# Patient Record
Sex: MALE | Race: WHITE | NOT HISPANIC OR LATINO | Employment: UNEMPLOYED | URBAN - METROPOLITAN AREA
[De-identification: names, ages, dates, MRNs, and addresses within clinical notes are randomized per-mention and may not be internally consistent; named-entity substitution may affect disease eponyms.]

---

## 2017-06-16 ENCOUNTER — OFFICE VISIT (OUTPATIENT)
Dept: FAMILY MEDICINE | Facility: CLINIC | Age: 42
End: 2017-06-16
Payer: MEDICAID

## 2017-06-16 ENCOUNTER — RADIANT APPOINTMENT (OUTPATIENT)
Dept: GENERAL RADIOLOGY | Facility: CLINIC | Age: 42
End: 2017-06-16
Attending: PHYSICIAN ASSISTANT
Payer: MEDICAID

## 2017-06-16 VITALS
WEIGHT: 241.4 LBS | HEIGHT: 74 IN | BODY MASS INDEX: 30.98 KG/M2 | SYSTOLIC BLOOD PRESSURE: 111 MMHG | TEMPERATURE: 97.9 F | DIASTOLIC BLOOD PRESSURE: 75 MMHG | HEART RATE: 100 BPM

## 2017-06-16 DIAGNOSIS — M54.5 ACUTE MIDLINE LOW BACK PAIN, WITH SCIATICA PRESENCE UNSPECIFIED: Primary | ICD-10-CM

## 2017-06-16 DIAGNOSIS — M54.9 BACK PAIN: ICD-10-CM

## 2017-06-16 DIAGNOSIS — F41.1 GAD (GENERALIZED ANXIETY DISORDER): ICD-10-CM

## 2017-06-16 PROCEDURE — 72100 X-RAY EXAM L-S SPINE 2/3 VWS: CPT

## 2017-06-16 PROCEDURE — 99214 OFFICE O/P EST MOD 30 MIN: CPT | Performed by: PHYSICIAN ASSISTANT

## 2017-06-16 RX ORDER — ESCITALOPRAM OXALATE 10 MG/1
10 TABLET ORAL DAILY
Qty: 30 TABLET | Refills: 0 | Status: SHIPPED | OUTPATIENT
Start: 2017-06-16 | End: 2017-11-21

## 2017-06-16 RX ORDER — KETOROLAC TROMETHAMINE 30 MG/ML
60 INJECTION, SOLUTION INTRAMUSCULAR; INTRAVENOUS ONCE
Qty: 2 ML | Refills: 0 | OUTPATIENT
Start: 2017-06-16 | End: 2017-06-16

## 2017-06-16 RX ORDER — ALPRAZOLAM 0.25 MG
TABLET ORAL
Qty: 5 TABLET | Refills: 0 | Status: SHIPPED | OUTPATIENT
Start: 2017-06-16 | End: 2017-11-27

## 2017-06-16 RX ORDER — KETOROLAC TROMETHAMINE 10 MG/1
10 TABLET, FILM COATED ORAL EVERY 6 HOURS PRN
Qty: 20 TABLET | Refills: 0 | Status: SHIPPED | OUTPATIENT
Start: 2017-06-16 | End: 2017-11-27

## 2017-06-16 RX ORDER — CYCLOBENZAPRINE HCL 10 MG
5-10 TABLET ORAL 3 TIMES DAILY PRN
Qty: 30 TABLET | Refills: 0 | Status: SHIPPED | OUTPATIENT
Start: 2017-06-16 | End: 2018-12-14

## 2017-06-16 ASSESSMENT — ENCOUNTER SYMPTOMS
MYALGIAS: 0
EYE DISCHARGE: 0
DIARRHEA: 0
EYE REDNESS: 0
SORE THROAT: 0
SHORTNESS OF BREATH: 0
CHILLS: 0
HEADACHES: 0
WHEEZING: 0
BLURRED VISION: 0
NAUSEA: 0
PALPITATIONS: 0
ABDOMINAL PAIN: 0
VOMITING: 0
FEVER: 0
COUGH: 0

## 2017-06-16 ASSESSMENT — ANXIETY QUESTIONNAIRES
GAD7 TOTAL SCORE: 10
6. BECOMING EASILY ANNOYED OR IRRITABLE: SEVERAL DAYS
3. WORRYING TOO MUCH ABOUT DIFFERENT THINGS: SEVERAL DAYS
2. NOT BEING ABLE TO STOP OR CONTROL WORRYING: MORE THAN HALF THE DAYS
7. FEELING AFRAID AS IF SOMETHING AWFUL MIGHT HAPPEN: MORE THAN HALF THE DAYS
1. FEELING NERVOUS, ANXIOUS, OR ON EDGE: MORE THAN HALF THE DAYS
5. BEING SO RESTLESS THAT IT IS HARD TO SIT STILL: SEVERAL DAYS

## 2017-06-16 ASSESSMENT — PATIENT HEALTH QUESTIONNAIRE - PHQ9: 5. POOR APPETITE OR OVEREATING: SEVERAL DAYS

## 2017-06-16 NOTE — MR AVS SNAPSHOT
After Visit Summary   6/16/2017    Perry Preciado Jr.    MRN: [id number]           Patient Information     Date Of Birth          1975        Visit Information        Provider Department      6/16/2017 1:00 PM Ro Estrada PA-C [facility]        Today's Diagnoses     Acute midline low back pain, with sciatica presence unspecified    -  1    KANE (generalized anxiety disorder)          Care Instructions    1. Back pain- No acute findings on x-ray. Will treat with ketorolac every 6 hours as needed x 4 days and cylclobenzaprine every 8-12 hrs as needed. Can also try alternating heat/ice in 20 minute intervals, avoid aggravating factors, but encouraged gentle ROM and stretching.  Would recommend physical therapy or follow up with primary care provider if symptoms worsen or do not improve.    2. Anxiety and feeling of panic attacks. Will restart lexapro 10mg #30 with no refills once daily. Discussed how this medication works and potential for increased anxiety in the next few weeks. Also prescribed alprazolam 0.25mg #5 with no refills to use for severe anxiety or panic attack. This medication is not intended for long term treatment of anxiety. Discussed importance of good sleep, exercise for stress relief, and the need to decrease caffeine consumption. Follow up with primary care provider in 1-2 weeks or sooner if needed.             Follow-ups after your visit        Additional Services     MENTAL HEALTH REFERRAL       Your provider has referred you to: [facility]: [facility] - Counseling (Individual/Couples/Family) - [facility] [phone]   [email or web address]/   *Patient will be contacted by [place]'s scheduling partner, [facility]), to schedule an appointment.  Patients may also call [facility] to schedule.      All scheduling is subject to the client's specific insurance plan &  "benefits, provider/location availability, and provider clinical specialities.  Please arrive 15 minutes early for your first appointment and bring your completed paperwork.    Please be aware that coverage of these services is subject to the terms and limitations of your health insurance plan.  Call member services at your health plan with any benefit or coverage questions.            PHYSICAL THERAPY REFERRAL       *This therapy referral will be filtered to a centralized scheduling office at [facility] and the patient will receive a call to schedule an appointment at a [facility] location most convenient for them. *     [facility] provides Physical Therapy evaluation and treatment and many specialty services across the [facility] system.  If requesting a specialty program, please choose from the list below.    If you have not heard from the scheduling office within 2 business days, please call [phone] for all locations, with the exception of [place], please call [phone].  Treatment: Evaluation & Treatment  Special Instructions/Modalities: none  Special Programs: None    Please be aware that coverage of these services is subject to the terms and limitations of your health insurance plan.  Call member services at your health plan with any benefit or coverage questions.      **Note to Provider:  If you are referring outside of [facility] for the therapy appointment, please list the name of the location in the \"special instructions\" above, print the referral and give to the patient to schedule the appointment.                  Follow-up notes from your care team     Return in about 1 week (around 6/23/2017).      Who to contact     If you have questions or need follow up information about today's clinic visit or your schedule please contact [facility] directly at [phone].  Normal or non-critical lab and imaging results will be communicated to you by " "MyChart, letter or phone within 4 business days after the clinic has received the results. If you do not hear from us within 7 days, please contact the clinic through [employer] or phone. If you have a critical or abnormal lab result, we will notify you by phone as soon as possible.  Submit refill requests through [employer] or call your pharmacy and they will forward the refill request to us. Please allow 3 business days for your refill to be completed.          Additional Information About Your Visit        [employer] Information     [employer] lets you send messages to your doctor, view your test results, renew your prescriptions, schedule appointments and more. To sign up, go to [email or web address] . Click on \"Log in\" on the left side of the screen, which will take you to the Welcome page. Then click on \"Sign up Now\" on the right side of the page.     You will be asked to enter the access code listed below, as well as some personal information. Please follow the directions to create your username and password.     Your access code is: [email or web address]  Expires: 9/15/2017  9:22 AM     Your access code will  in 90 days. If you need help or a new code, please call your [place] clinic or [phone].        Care EveryWhere ID     This is your Care EveryWhere ID. This could be used by other organizations to access your [place] medical records  [id number]        Your Vitals Were     Pulse Temperature Height BMI (Body Mass Index)          100 97.9  F (36.6  C) (Tympanic) 6' 2\" (1.88 m) 30.99 kg/m2         Blood Pressure from Last 3 Encounters:   17 111/75   16 129/90   16 130/86    Weight from Last 3 Encounters:   17 241 lb 6.4 oz (109.5 kg)   16 256 lb (116.1 kg)   16 258 lb (117 kg)              We Performed the Following     MENTAL HEALTH REFERRAL     PHYSICAL THERAPY REFERRAL          Today's Medication Changes          These changes are accurate as of: 17 11:59 PM.  If you " have any questions, ask your nurse or doctor.               Start taking these medicines.        Dose/Directions    ALPRAZolam 0.25 MG tablet   Commonly known as:  XANAX   Used for:  KANE (generalized anxiety disorder)   Started by:  Ro Estrada PA-C        Take 1 tablet by mouth once daily as needed for severe anxiety   Quantity:  5 tablet   Refills:  0       cyclobenzaprine 10 MG tablet   Commonly known as:  FLEXERIL   Used for:  Acute midline low back pain, with sciatica presence unspecified   Started by:  Ro Estrada PA-C        Dose:  5-10 mg   Take 0.5-1 tablets (5-10 mg) by mouth 3 times daily as needed for muscle spasms   Quantity:  30 tablet   Refills:  0       escitalopram 10 MG tablet   Commonly known as:  LEXAPRO   Used for:  KANE (generalized anxiety disorder)   Started by:  Ro Estrada PA-C        Dose:  10 mg   Take 1 tablet (10 mg) by mouth daily   Quantity:  30 tablet   Refills:  0       * ketorolac 60 MG/2ML Soln injection   Commonly known as:  TORADOL   Used for:  Acute midline low back pain, with sciatica presence unspecified   Started by:  Ro Estrada PA-C        Dose:  60 mg   Inject 2 mLs (60 mg) into the muscle once for 1 dose   Quantity:  2 mL   Refills:  0       * ketorolac 10 MG tablet   Commonly known as:  TORADOL   Used for:  Acute midline low back pain, with sciatica presence unspecified   Started by:  Ro Estrada PA-C        Dose:  10 mg   Take 1 tablet (10 mg) by mouth every 6 hours as needed for moderate pain   Quantity:  20 tablet   Refills:  0       * Notice:  This list has 2 medication(s) that are the same as other medications prescribed for you. Read the directions carefully, and ask your doctor or other care provider to review them with you.         Where to get your medicines      These medications were sent to [facility] [place] [id number] [place] [address]     Phone:  [phone]      cyclobenzaprine 10 MG tablet    escitalopram 10 MG tablet    ketorolac 10 MG tablet         Some of these will need a paper prescription and others can be bought over the counter.  Ask your nurse if you have questions.     Bring a paper prescription for each of these medications     ALPRAZolam 0.25 MG tablet       You don't need a prescription for these medications     ketorolac 60 MG/2ML Soln injection                Primary Care Provider Office Phone # Fax #    Wilfrid Turner [phone]       [place] [id number] [facility] [address]        Thank you!     Thank you for choosing [facility]  for your care. Our goal is always to provide you with excellent care. Hearing back from our patients is one way we can continue to improve our services. Please take a few minutes to complete the written survey that you may receive in the mail after your visit with us. Thank you!             Your Updated Medication List - Protect others around you: Learn how to safely use, store and throw away your medicines at www.disposemymeds.org.          This list is accurate as of: 6/16/17 11:59 PM.  Always use your most recent med list.                   Brand Name Dispense Instructions for use    ALEVE PO      None Entered       ALPRAZolam 0.25 MG tablet    XANAX    5 tablet    Take 1 tablet by mouth once daily as needed for severe anxiety       cyclobenzaprine 10 MG tablet    FLEXERIL    30 tablet    Take 0.5-1 tablets (5-10 mg) by mouth 3 times daily as needed for muscle spasms       escitalopram 10 MG tablet    LEXAPRO    30 tablet    Take 1 tablet (10 mg) by mouth daily       ibuprofen 600 MG tablet    ADVIL/MOTRIN    30 tablet    Take 1 tablet (600 mg) by mouth every 8 hours as needed for moderate pain       * ketorolac 60 MG/2ML Soln injection    TORADOL    2 mL    Inject 2 mLs (60 mg) into the muscle once for 1 dose       * ketorolac 10 MG tablet    TORADOL    20 tablet    Take 1  tablet (10 mg) by mouth every 6 hours as needed for moderate pain       * Notice:  This list has 2 medication(s) that are the same as other medications prescribed for you. Read the directions carefully, and ask your doctor or other care provider to review them with you.

## 2017-06-16 NOTE — PROGRESS NOTES
SUBJECTIVE:                                                    Perry Preciado Jr. is a 42 year old male who presents to clinic today for the following health issues:    Back Pain      Duration: last night         Specific cause: fall off roof yesterday, did not have immediate pain    Description:   Location of pain: low back right,   Character of pain: gnawing and constant  Pain radiation:radiates into the right leg  New numbness or weakness in legs, not attributed to pain:  YES    Intensity: Currently 6/10    History:   Pain interferes with job: YES, unable to go in   History of back problems: recurrent self limited episodes of low back pain in the past  Any previous MRI or X-rays: Yes--at [facility] .  Date 2005 Back surgery had discectomy  Sees a specialist for back pain:  No.  Did injections, though has been 2 years since last injection  Therapies tried without relief: none    Alleviating factors:   Improved by: aleve      Precipitating factors:  Worsened by: Sitting    Functional and Psychosocial Screen (Val STarT Back):      Not performed today       Accompanying Signs & Symptoms:  Risk of Fracture:  yes  Risk of Cauda Equina:  None  Risk of Infection:  None  Risk of Cancer:  None  Risk of Ankylosing Spondylitis:  Onset at age <35, male, AND morning back stiffness. no                    Depression Followup    Status since last visit: Worsened, sometimes get panicky, or anxious around people.  Lexapro did work, though has been off med x 1.5 years. Has struggled with anxiety most of his life.     See PHQ-9 for current symptoms.  Other associated symptoms: None    Complicating factors:   Significant life event:  Yes-  Moved back home, got .    Current substance abuse:  None  Anxiety or Panic symptoms:  Yes-  Anxious and feels like he is going to have a panic attack    Sleep is good. He does not regularly exercise. Caffeine intake is very high. He consumes 40 floz of Mountain Dew, 2 cups of coffee, and  1 monster energy shake per day. No suicidal ideation or thoughts of harming himself. PHQ-9 score is 2 and KANE-7 score is 10    PHQ-9  English PHQ-9   Any Language          Problem list and histories reviewed & adjusted, as indicated.  Additional history: as documented    Patient Active Problem List   Diagnosis     Major depressive disorder, recurrent episode, moderate (H)     CARDIOVASCULAR SCREENING; LDL GOAL LESS THAN 130     Acute bilateral low back pain with right-sided sciatica     Carpal tunnel syndrome of right wrist     Past Surgical History:   Procedure Laterality Date     BACK SURGERY         Social History   Substance Use Topics     Smoking status: Former Smoker     Packs/day: 0.50     Types: Cigarettes     Smokeless tobacco: Not on file     Alcohol use No     History reviewed. No pertinent family history.      Current Outpatient Prescriptions   Medication Sig Dispense Refill     escitalopram (LEXAPRO) 10 MG tablet Take 1 tablet (10 mg) by mouth daily 30 tablet 0     ketorolac (TORADOL) 10 MG tablet Take 1 tablet (10 mg) by mouth every 6 hours as needed for moderate pain 20 tablet 0     ALPRAZolam (XANAX) 0.25 MG tablet Take 1 tablet by mouth once daily as needed for severe anxiety 5 tablet 0     cyclobenzaprine (FLEXERIL) 10 MG tablet Take 0.5-1 tablets (5-10 mg) by mouth 3 times daily as needed for muscle spasms 30 tablet 0     ALEVE OR None Entered       ibuprofen (ADVIL,MOTRIN) 600 MG tablet Take 1 tablet (600 mg) by mouth every 8 hours as needed for moderate pain (Patient not taking: Reported on 6/16/2017) 30 tablet 0     No Known Allergies  Labs reviewed in EPIC    Reviewed and updated as needed this visit by clinical staff  Tobacco  Allergies  Meds  Problems  Med Hx  Surg Hx  Fam Hx  Soc Hx        Reviewed and updated as needed this visit by Provider  Allergies  Meds  Problems         ROS:  Review of Systems   Constitutional: Negative for chills, fever and malaise/fatigue.   HENT:  "Negative for congestion, ear pain and sore throat.    Eyes: Negative for blurred vision, discharge and redness.   Respiratory: Negative for cough, shortness of breath and wheezing.    Cardiovascular: Negative for chest pain and palpitations.   Gastrointestinal: Negative for abdominal pain, diarrhea, nausea and vomiting.   Musculoskeletal: Positive for joint pain. Negative for myalgias.   Skin: Negative for rash.   Neurological: Negative for headaches.   Psychiatric/Behavioral: Negative for depression, hallucinations, memory loss, substance abuse and suicidal ideas. The patient is nervous/anxious. The patient does not have insomnia.          OBJECTIVE:                                                    /75 (BP Location: Right arm, Cuff Size: Adult Large)  Pulse 100  Temp 97.9  F (36.6  C) (Tympanic)  Ht 6' 2\" (1.88 m)  Wt 241 lb 6.4 oz (109.5 kg)  BMI 30.99 kg/m2  Body mass index is 30.99 kg/(m^2).  Physical Exam   Constitutional: He is well-developed, well-nourished, and in no distress.   Musculoskeletal:   No obvious deformity, erythema, edema, or ecchymosis of the thoracic or lumbar spine. Tenderness with palpation along the lumbar spine and surrounding musculature. Negative straight leg raises. Non-tender internal and external rotation of the hips. 2+ DTR's, lower extremity CMS intact.   Neurological:   No focal deficits. Symmetric strength.    Psychiatric:   Alert and cooperative. Somewhat anxious.        Diagnostic Test Results:  X-rays of lumbar spine- acute findings.      ASSESSMENT/PLAN:                                                        1. Acute midline low back pain, with sciatica presence unspecified    - ketorolac (TORADOL) 60 MG/2ML SOLN injection; Inject 2 mLs (60 mg) into the muscle once for 1 dose  Dispense: 2 mL; Refill: 0  - PHYSICAL THERAPY REFERRAL  - ketorolac (TORADOL) 10 MG tablet; Take 1 tablet (10 mg) by mouth every 6 hours as needed for moderate pain  Dispense: 20 tablet; " Refill: 0  - cyclobenzaprine (FLEXERIL) 10 MG tablet; Take 0.5-1 tablets (5-10 mg) by mouth 3 times daily as needed for muscle spasms  Dispense: 30 tablet; Refill: 0    2. KANE (generalized anxiety disorder)    - MENTAL HEALTH REFERRAL  - escitalopram (LEXAPRO) 10 MG tablet; Take 1 tablet (10 mg) by mouth daily  Dispense: 30 tablet; Refill: 0  - ALPRAZolam (XANAX) 0.25 MG tablet; Take 1 tablet by mouth once daily as needed for severe anxiety  Dispense: 5 tablet; Refill: 0       Patient Instructions   1. Back pain- No acute findings on x-ray. Will treat with ketorolac every 6 hours as needed x 4 days and cylclobenzaprine every 8-12 hrs as needed. Can also try alternating heat/ice in 20 minute intervals, avoid aggravating factors, but encouraged gentle ROM and stretching.  Would recommend physical therapy or follow up with primary care provider if symptoms worsen or do not improve.    2. Anxiety and feeling of panic attacks. Will restart lexapro 10mg #30 with no refills once daily. Discussed how this medication works and potential for increased anxiety in the next few weeks. Also prescribed alprazolam 0.25mg #5 with no refills to use for severe anxiety or panic attack. This medication is not intended for long term treatment of anxiety. Discussed importance of good sleep, exercise for stress relief, and the need to decrease caffeine consumption. Follow up with primary care provider in 1-2 weeks or sooner if needed.         Ro Estrada PA-C  [facility]

## 2017-06-16 NOTE — PATIENT INSTRUCTIONS
1. Back pain- No acute findings on x-ray. Will treat with ketorolac every 6 hours as needed x 4 days and cylclobenzaprine every 8-12 hrs as needed. Can also try alternating heat/ice in 20 minute intervals, avoid aggravating factors, but encouraged gentle ROM and stretching.  Would recommend physical therapy or follow up with primary care provider if symptoms worsen or do not improve.    2. Anxiety and feeling of panic attacks. Will restart lexapro 10mg #30 with no refills once daily. Discussed how this medication works and potential for increased anxiety in the next few weeks. Also prescribed alprazolam 0.25mg #5 with no refills to use for severe anxiety or panic attack. This medication is not intended for long term treatment of anxiety. Discussed importance of good sleep, exercise for stress relief, and the need to decrease caffeine consumption. Follow up with primary care provider in 1-2 weeks or sooner if needed.

## 2017-06-16 NOTE — NURSING NOTE
"Chief Complaint   Patient presents with     Back Pain     Depression       Initial /75 (BP Location: Right arm, Cuff Size: Adult Large)  Pulse 100  Temp 97.9  F (36.6  C) (Tympanic)  Ht 6' 2\" (1.88 m)  Wt 241 lb 6.4 oz (109.5 kg)  BMI 30.99 kg/m2 Estimated body mass index is 30.99 kg/(m^2) as calculated from the following:    Height as of this encounter: 6' 2\" (1.88 m).    Weight as of this encounter: 241 lb 6.4 oz (109.5 kg).  Medication Reconciliation: complete    Health Maintenance that is potentially due pending provider review:  NONE    N/a  Carly Thompson M.A.          "

## 2017-06-17 ASSESSMENT — ANXIETY QUESTIONNAIRES: GAD7 TOTAL SCORE: 10

## 2017-06-17 ASSESSMENT — ENCOUNTER SYMPTOMS
HALLUCINATIONS: 0
NERVOUS/ANXIOUS: 1
DEPRESSION: 0
INSOMNIA: 0
MEMORY LOSS: 0

## 2017-06-17 ASSESSMENT — PATIENT HEALTH QUESTIONNAIRE - PHQ9: SUM OF ALL RESPONSES TO PHQ QUESTIONS 1-9: 2

## 2017-06-17 ASSESSMENT — LIFESTYLE VARIABLES: SUBSTANCE_ABUSE: 0

## 2017-07-07 PROBLEM — F41.1 GAD (GENERALIZED ANXIETY DISORDER): Status: ACTIVE | Noted: 2017-07-07

## 2017-11-21 ENCOUNTER — TELEPHONE (OUTPATIENT)
Dept: FAMILY MEDICINE | Facility: CLINIC | Age: 42
End: 2017-11-21

## 2017-11-21 DIAGNOSIS — F41.1 GAD (GENERALIZED ANXIETY DISORDER): ICD-10-CM

## 2017-11-21 RX ORDER — ESCITALOPRAM OXALATE 20 MG/1
20 TABLET ORAL DAILY
Qty: 30 TABLET | Refills: 0 | Status: SHIPPED | OUTPATIENT
Start: 2017-11-21 | End: 2017-11-27 | Stop reason: ALTCHOICE

## 2017-11-21 NOTE — TELEPHONE ENCOUNTER
escitalopram - pharmacy is requesting 20 MG instead of 10 mg      Last Written Prescription Date:  6/16/17  Last Fill Quantity: 30,   # refills: 0  Future Office visit:       Routing refill request to provider for review/approval because:  Drug not on the G, P or [facility] refill protocol or controlled substance

## 2017-11-27 ENCOUNTER — OFFICE VISIT (OUTPATIENT)
Dept: FAMILY MEDICINE | Facility: CLINIC | Age: 42
End: 2017-11-27

## 2017-11-27 ENCOUNTER — TELEPHONE (OUTPATIENT)
Dept: FAMILY MEDICINE | Facility: CLINIC | Age: 42
End: 2017-11-27

## 2017-11-27 VITALS
BODY MASS INDEX: 30.3 KG/M2 | DIASTOLIC BLOOD PRESSURE: 86 MMHG | WEIGHT: 236 LBS | SYSTOLIC BLOOD PRESSURE: 110 MMHG | TEMPERATURE: 97.2 F | HEART RATE: 88 BPM

## 2017-11-27 DIAGNOSIS — F41.9 ANXIETY: Primary | ICD-10-CM

## 2017-11-27 DIAGNOSIS — F41.1 GAD (GENERALIZED ANXIETY DISORDER): ICD-10-CM

## 2017-11-27 DIAGNOSIS — M25.511 CHRONIC RIGHT SHOULDER PAIN: ICD-10-CM

## 2017-11-27 DIAGNOSIS — G89.29 CHRONIC RIGHT SHOULDER PAIN: ICD-10-CM

## 2017-11-27 DIAGNOSIS — F41.1 GAD (GENERALIZED ANXIETY DISORDER): Primary | ICD-10-CM

## 2017-11-27 DIAGNOSIS — S49.91XS INJURY OF RIGHT SHOULDER, SEQUELA: ICD-10-CM

## 2017-11-27 PROCEDURE — 99214 OFFICE O/P EST MOD 30 MIN: CPT | Performed by: PHYSICIAN ASSISTANT

## 2017-11-27 RX ORDER — TRAMADOL HYDROCHLORIDE 50 MG/1
50-100 TABLET ORAL EVERY 6 HOURS PRN
Qty: 20 TABLET | Refills: 0 | Status: SHIPPED | OUTPATIENT
Start: 2017-11-27 | End: 2019-03-04

## 2017-11-27 RX ORDER — VENLAFAXINE HYDROCHLORIDE 75 MG/1
75 TABLET, EXTENDED RELEASE ORAL DAILY
Qty: 30 TABLET | Refills: 1 | Status: SHIPPED | OUTPATIENT
Start: 2017-11-27 | End: 2018-01-29

## 2017-11-27 RX ORDER — DULOXETIN HYDROCHLORIDE 60 MG/1
60 CAPSULE, DELAYED RELEASE ORAL DAILY
Qty: 30 CAPSULE | Refills: 1 | Status: SHIPPED | OUTPATIENT
Start: 2017-11-27 | End: 2017-11-27

## 2017-11-27 RX ORDER — ALPRAZOLAM 0.25 MG
TABLET ORAL
Qty: 20 TABLET | Refills: 0 | Status: SHIPPED | OUTPATIENT
Start: 2017-11-27 | End: 2018-01-26

## 2017-11-27 RX ORDER — HYDROXYZINE HYDROCHLORIDE 25 MG/1
25-50 TABLET, FILM COATED ORAL EVERY 6 HOURS PRN
Qty: 60 TABLET | Refills: 1 | Status: SHIPPED | OUTPATIENT
Start: 2017-11-27 | End: 2019-03-04

## 2017-11-27 ASSESSMENT — ENCOUNTER SYMPTOMS
PHOTOPHOBIA: 0
BLURRED VISION: 0
DIZZINESS: 0
SEIZURES: 0
COUGH: 0
NECK PAIN: 0
BLOOD IN STOOL: 0
SORE THROAT: 0
HEARTBURN: 0
NAUSEA: 0
WHEEZING: 0
NERVOUS/ANXIOUS: 1
HEMOPTYSIS: 0
DEPRESSION: 1
ABDOMINAL PAIN: 0
WEIGHT LOSS: 0
FEVER: 0
DYSURIA: 0
VOMITING: 0
FOCAL WEAKNESS: 0
NEUROLOGICAL NEGATIVE: 1
BACK PAIN: 0
SENSORY CHANGE: 0
ORTHOPNEA: 0
WEAKNESS: 0
LOSS OF CONSCIOUSNESS: 0
FREQUENCY: 0
EYE PAIN: 0
SHORTNESS OF BREATH: 0
HEADACHES: 0
EYE REDNESS: 0
INSOMNIA: 0
EYE DISCHARGE: 0
HALLUCINATIONS: 0
DIARRHEA: 0
MYALGIAS: 0
DOUBLE VISION: 0
CONSTIPATION: 0
PALPITATIONS: 0
DIAPHORESIS: 0
TINGLING: 0
SPUTUM PRODUCTION: 0

## 2017-11-27 ASSESSMENT — LIFESTYLE VARIABLES: SUBSTANCE_ABUSE: 0

## 2017-11-27 NOTE — MR AVS SNAPSHOT
After Visit Summary   11/27/2017    Perry Preciado Jr.    MRN: [id number]           Patient Information     Date Of Birth          1975        Visit Information        Provider Department      11/27/2017 2:00 PM Iban Garay PA-C [facility]        Today's Diagnoses     Anxiety    -  1    Chronic right shoulder pain        KANE (generalized anxiety disorder)        Injury of right shoulder, sequela           Follow-ups after your visit        Additional Services     MENTAL HEALTH REFERRAL  - Adult; Assessments and Testing, Psychiatry and Medication Management; General Psychological Testing; [facility]: [facility] [phone]; The scheduling team will contact you to schedule your appointment.  If ...       All scheduling is subject to the client's specific insurance plan & benefits, provider/location availability, and provider clinical specialities.  Please arrive 15 minutes early for your first appointment and bring your completed paperwork.    Please be aware that coverage of these services is subject to the terms and limitations of your health insurance plan.  Call member services at your health plan with any benefit or coverage questions.                      ORTHO  REFERRAL       [facility] is referring you to the Orthopedic  Services at [facility].       The  Representative will assist you in the coordination of your Orthopedic and Musculoskeletal Care as prescribed by your physician.    The  Representative will call you within 1 business day to help schedule your appointment, or you may contact the  Representative at:    All areas ~ [phone]     Type of Referral : Surgical / Specialist       Timeframe requested: Routine    Coverage of these services is subject to the terms and limitations of your health insurance plan.  Please call member services at your health plan  "with any benefit or coverage questions.      If X-rays, CT or MRI's have been performed, please contact the facility where they were done to arrange for , prior to your scheduled appointment.  Please bring this referral request to your appointment and present it to your specialist.                  Follow-up notes from your care team     Return if symptoms worsen or fail to improve.      Who to contact     If you have questions or need follow up information about today's clinic visit or your schedule please contact [facility] directly at [phone].  Normal or non-critical lab and imaging results will be communicated to you by MyScreenhart, letter or phone within 4 business days after the clinic has received the results. If you do not hear from us within 7 days, please contact the clinic through [employer] or phone. If you have a critical or abnormal lab result, we will notify you by phone as soon as possible.  Submit refill requests through [employer] or call your pharmacy and they will forward the refill request to us. Please allow 3 business days for your refill to be completed.          Additional Information About Your Visit        [employer] Information     [employer] lets you send messages to your doctor, view your test results, renew your prescriptions, schedule appointments and more. To sign up, go to [email or web address] . Click on \"Log in\" on the left side of the screen, which will take you to the Welcome page. Then click on \"Sign up Now\" on the right side of the page.     You will be asked to enter the access code listed below, as well as some personal information. Please follow the directions to create your username and password.     Your access code is: [email or web address]  Expires: 2018  3:44 PM     Your access code will  in 90 days. If you need help or a new code, please call your [facility] or [phone].        Care EveryWhere ID     This is your Care EveryWhere ID. This " could be used by other organizations to access your [place] medical records  [id number]        Your Vitals Were     Pulse Temperature BMI (Body Mass Index)             88 97.2  F (36.2  C) (Tympanic) 30.3 kg/m2          Blood Pressure from Last 3 Encounters:   11/27/17 110/86   06/16/17 111/75   07/04/16 129/90    Weight from Last 3 Encounters:   11/27/17 236 lb (107 kg)   06/16/17 241 lb 6.4 oz (109.5 kg)   06/07/16 256 lb (116.1 kg)              We Performed the Following     MENTAL HEALTH REFERRAL  - Adult; Assessments and Testing, Psychiatry and Medication Management; General Psychological Testing; [facility] [phone]; The scheduling team will contact you to schedule your appointment.  If ...     ORTHO  REFERRAL          Today's Medication Changes          These changes are accurate as of: 11/27/17  3:44 PM.  If you have any questions, ask your nurse or doctor.               Start taking these medicines.        Dose/Directions    DULoxetine 60 MG EC capsule   Commonly known as:  CYMBALTA   Used for:  KANE (generalized anxiety disorder), Anxiety   Started by:  Iban Garay PA-C        Dose:  60 mg   Take 1 capsule (60 mg) by mouth daily   Quantity:  30 capsule   Refills:  1       hydrOXYzine 25 MG tablet   Commonly known as:  ATARAX   Used for:  Anxiety   Started by:  Iban Garay PA-C        Dose:  25-50 mg   Take 1-2 tablets (25-50 mg) by mouth every 6 hours as needed for anxiety   Quantity:  60 tablet   Refills:  1       traMADol 50 MG tablet   Commonly known as:  ULTRAM   Used for:  Injury of right shoulder, sequela   Started by:  Iban Garay PA-C        Dose:   mg   Take 1-2 tablets ( mg) by mouth every 6 hours as needed for pain   Quantity:  20 tablet   Refills:  0         Stop taking these medicines if you haven't already. Please contact your care team if you have questions.     escitalopram 20 MG tablet   Commonly known as:  LEXAPRO   Stopped  by:  Iban Garay PA-C                Where to get your medicines      These medications were sent to [facility] [address] [address], [place] - [address] AT [facility] [facility] [address]  [address] [address]     Phone:  [phone]     DULoxetine 60 MG EC capsule    hydrOXYzine 25 MG tablet         Some of these will need a paper prescription and others can be bought over the counter.  Ask your nurse if you have questions.     Bring a paper prescription for each of these medications     ALPRAZolam 0.25 MG tablet    traMADol 50 MG tablet                Primary Care Provider Office Phone # Fax #    Wilfrid Turner [phone]       [facility] [address] [address]        Equal Access to Services     VIOLA ESTES AH: Won blando Socandie, waaxda luqadaha, qaybta kaalmada adesajanyada, deejay palmer. So [facility] [phone].    ATENCIÓN: Si habla español, tiene a galvez disposición servicios gratuitos de asistencia lingüística. [facility] [phone].    We comply with applicable federal civil rights laws and [place] laws. We do not discriminate on the basis of race, color, national origin, age, disability, sex, sexual orientation, or gender identity.            Thank you!     Thank you for choosing [facility]  for your care. Our goal is always to provide you with excellent care. Hearing back from our patients is one way we can continue to improve our services. Please take a few minutes to complete the written survey that you may receive in the mail after your visit with us. Thank you!             Your Updated Medication List - Protect others around you: Learn how to safely use, store and throw away your medicines at www.disposemymeds.org.          This list is accurate as of: 11/27/17  3:44 PM.  Always use your most recent med list.                   Brand Name Dispense Instructions for use Diagnosis     ALEVE PO      None Entered        ALPRAZolam 0.25 MG tablet    XANAX    20 tablet    Take 1 tablet by mouth once daily as needed for severe anxiety    KANE (generalized anxiety disorder)       cyclobenzaprine 10 MG tablet    FLEXERIL    30 tablet    Take 0.5-1 tablets (5-10 mg) by mouth 3 times daily as needed for muscle spasms    Acute midline low back pain, with sciatica presence unspecified       DULoxetine 60 MG EC capsule    CYMBALTA    30 capsule    Take 1 capsule (60 mg) by mouth daily    KANE (generalized anxiety disorder), Anxiety       hydrOXYzine 25 MG tablet    ATARAX    60 tablet    Take 1-2 tablets (25-50 mg) by mouth every 6 hours as needed for anxiety    Anxiety       ibuprofen 600 MG tablet    ADVIL/MOTRIN    30 tablet    Take 1 tablet (600 mg) by mouth every 8 hours as needed for moderate pain        traMADol 50 MG tablet    ULTRAM    20 tablet    Take 1-2 tablets ( mg) by mouth every 6 hours as needed for pain    Injury of right shoulder, sequela

## 2017-11-27 NOTE — NURSING NOTE
"Chief Complaint   Patient presents with     Anxiety     Depression       Initial /86 (BP Location: Right arm, Patient Position: Chair, Cuff Size: Adult Regular)  Pulse 88  Temp 97.2  F (36.2  C) (Tympanic)  Wt 236 lb (107 kg)  BMI 30.3 kg/m2 Estimated body mass index is 30.3 kg/(m^2) as calculated from the following:    Height as of 6/16/17: 6' 2\" (1.88 m).    Weight as of this encounter: 236 lb (107 kg).  Medication Reconciliation: complete    Health Maintenance that is potentially due pending provider review:  NONE    n/a    Is there anyone who you would like to be able to receive your results? No  If yes have patient fill out UMBERTO    Jasmin STEVENS CMA    "

## 2017-11-27 NOTE — PROGRESS NOTES
HPI    SUBJECTIVE:   Perry Preciado Jr. is a 42 year old male who presents to clinic today for anxiety and depression but he also has a long history of right shoulder problems and has had significant muscle atrophy. 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 We discussed treatment options for her shoulder and I have recommended orthopedic referral because he has had previous injections as well as x-rays.  For his anxiety he admits to a significant history of drug use more than 9 years ago and feels that has contributed to his anxiety and has never had a psychiatry evaluation.          *Patient states that he would also like gabapentin and flexeril     Depression and Anxiety Follow-Up    Status since last visit: No change- Could be a little worse says that he does not feel his medication is helping     Other associated symptoms:None    Complicating factors:     Significant life event: No     Current substance abuse: None    PHQ-9 Score and MyChart F/U Questions 6/16/2017   Total Score 2   Q9: Suicide Ideation Not at all     KANE-7 SCORE 6/16/2017   Total Score 10       PHQ-9  English  PHQ-9   Any Language  GAD7  Suicide Assessment Five-step Evaluation and Treatment (SAFE-T)      Amount of exercise or physical activity: 4-5 days/week for an average of 30-45 minutes    Problems taking medications regularly: No    Medication side effects: none    Diet: regular (no restrictions)    Problem list and histories reviewed & adjusted, as indicated.  Additional history: as documented    Patient Active Problem List   Diagnosis     Major depressive disorder, recurrent episode, moderate (H)     CARDIOVASCULAR SCREENING; LDL GOAL LESS THAN 130     Acute bilateral low back pain with right-sided sciatica     Carpal tunnel syndrome of right wrist     KANE (generalized anxiety disorder)     Past Surgical History:   Procedure Laterality Date     BACK  SURGERY         Social History   Substance Use Topics     Smoking status: Former Smoker     Packs/day: 0.50     Types: Cigarettes     Smokeless tobacco: Current User     Alcohol use No     History reviewed. No pertinent family history.      Current Outpatient Prescriptions   Medication Sig Dispense Refill     hydrOXYzine (ATARAX) 25 MG tablet Take 1-2 tablets (25-50 mg) by mouth every 6 hours as needed for anxiety 60 tablet 1     ALPRAZolam (XANAX) 0.25 MG tablet Take 1 tablet by mouth once daily as needed for severe anxiety 20 tablet 0     DULoxetine (CYMBALTA) 60 MG EC capsule Take 1 capsule (60 mg) by mouth daily 30 capsule 1     traMADol (ULTRAM) 50 MG tablet Take 1-2 tablets ( mg) by mouth every 6 hours as needed for pain 20 tablet 0     cyclobenzaprine (FLEXERIL) 10 MG tablet Take 0.5-1 tablets (5-10 mg) by mouth 3 times daily as needed for muscle spasms 30 tablet 0     ibuprofen (ADVIL,MOTRIN) 600 MG tablet Take 1 tablet (600 mg) by mouth every 8 hours as needed for moderate pain 30 tablet 0     ALEVE OR None Entered       No Known Allergies  Labs reviewed in EPIC      Reviewed and updated as needed this visit by clinical staff     Reviewed and updated as needed this visit by Provider       Review of Systems   Constitutional: Negative for diaphoresis, fever, malaise/fatigue and weight loss.   HENT: Negative for congestion, ear discharge, ear pain, hearing loss, nosebleeds and sore throat.    Eyes: Negative for blurred vision, double vision, photophobia, pain, discharge and redness.   Respiratory: Negative for cough, hemoptysis, sputum production, shortness of breath and wheezing.    Cardiovascular: Negative for chest pain, palpitations, orthopnea and leg swelling.   Gastrointestinal: Negative for abdominal pain, blood in stool, constipation, diarrhea, heartburn, melena, nausea and vomiting.   Genitourinary: Negative.  Negative for dysuria, frequency and urgency.   Musculoskeletal: Positive for joint  pain. Negative for back pain, myalgias and neck pain.   Skin: Negative for itching and rash.   Neurological: Negative.  Negative for dizziness, tingling, sensory change, focal weakness, seizures, loss of consciousness, weakness and headaches.   Endo/Heme/Allergies: Negative.    Psychiatric/Behavioral: Positive for depression. Negative for hallucinations, substance abuse and suicidal ideas. The patient is nervous/anxious. The patient does not have insomnia.          Physical Exam   Constitutional: He is oriented to person, place, and time and well-developed, well-nourished, and in no distress.   HENT:   Head: Normocephalic and atraumatic.   Right Ear: External ear normal.   Left Ear: External ear normal.   Nose: Nose normal.   Mouth/Throat: Oropharynx is clear and moist.   Eyes: Conjunctivae and EOM are normal. Pupils are equal, round, and reactive to light. Right eye exhibits no discharge. Left eye exhibits no discharge. No scleral icterus.   Neck: Normal range of motion. Neck supple. No thyromegaly present.   Cardiovascular: Normal rate, regular rhythm, normal heart sounds and intact distal pulses.  Exam reveals no gallop and no friction rub.    No murmur heard.  Pulmonary/Chest: Effort normal and breath sounds normal. No respiratory distress. He has no wheezes. He has no rales. He exhibits no tenderness.   Abdominal: Soft. Bowel sounds are normal. He exhibits no distension and no mass. There is no tenderness. There is no rebound and no guarding.   Musculoskeletal: Normal range of motion. He exhibits no edema or tenderness.   Lymphadenopathy:     He has no cervical adenopathy.   Neurological: He is alert and oriented to person, place, and time. He has normal reflexes. No cranial nerve deficit. He exhibits normal muscle tone. Gait normal. Coordination normal.   Skin: Skin is warm and dry. No rash noted. No erythema.   Psychiatric: Memory, affect and judgment normal. His mood appears anxious. He exhibits a depressed  mood. He expresses no homicidal and no suicidal ideation. He expresses no suicidal plans and no homicidal plans.         (F41.9) Anxiety  (primary encounter diagnosis)  Comment:   Plan: MENTAL HEALTH REFERRAL  - Adult; Assessments         and Testing, Psychiatry and Medication         Management; General Psychological Testing; [facility]        [facility] [phone]; The        scheduling team will contact you to schedule         your appointment.  If ..., hydrOXYzine (ATARAX)        25 MG tablet, DULoxetine (CYMBALTA) 60 MG EC         capsule            (M25.511,  G89.29) Chronic right shoulder pain  Comment:  Plan: ORTHO  REFERRAL            (F41.1) KANE (generalized anxiety disorder)  Comment:   Plan: ALPRAZolam (XANAX) 0.25 MG tablet, DULoxetine         (CYMBALTA) 60 MG EC capsule            (S49.91XS) Injury of right shoulder, sequela  Comment:   Plan: traMADol (ULTRAM) 50 MG tablet            At this time referral to orthopedics for shoulder problems and referral to psychiatry for psychiatric problems. Temporarily, I have prescribed hydroxyzine for anxiety and panic attacks and a few Xanax for severe cases and then Cymbalta for his depression and anxiety as well. Will follow-up in the next month after he has seen psychiatry.

## 2017-11-27 NOTE — TELEPHONE ENCOUNTER
Verbal order from Faizan COLBY discontinue the Cymbalta due to cost and start Effexor XR 75 mg one daily  Sara Butler RN

## 2017-11-27 NOTE — TELEPHONE ENCOUNTER
Pt's wife says Perry was just seen this afternoon. He currently doesn't have insurance. The Cymbalta Rx would cost $250.  He is wondering if there is something cheaper.  They use "[facility] in [place].    Wife- Melinda phone  [phone]

## 2018-01-26 DIAGNOSIS — F41.1 GAD (GENERALIZED ANXIETY DISORDER): ICD-10-CM

## 2018-01-26 NOTE — TELEPHONE ENCOUNTER
Controlled Substance Refill Request for ALPRAZolam (XANAX) 0.25 MG tablet  Problem List Complete:  Yes, KANE (generalized anxiety disorder) [F41.1]    checked in past 6 months?  No, route to RN     PHQ-9 SCORE 6/16/2017   Total Score 2     KANE-7 SCORE 6/16/2017   Total Score 10       Last Written Prescription Date:  11/27/17  Last Fill Quantity: 20,  # refills: 0   Last Office Visit with [facility] provider:  11/27/17   Future Office Visit:

## 2018-01-29 ENCOUNTER — TELEPHONE (OUTPATIENT)
Dept: FAMILY MEDICINE | Facility: CLINIC | Age: 43
End: 2018-01-29

## 2018-01-29 DIAGNOSIS — F32.0 MILD MAJOR DEPRESSION (H): Primary | ICD-10-CM

## 2018-01-29 RX ORDER — VENLAFAXINE HYDROCHLORIDE 75 MG/1
75 CAPSULE, EXTENDED RELEASE ORAL DAILY
Qty: 90 CAPSULE | Refills: 3 | Status: SHIPPED | OUTPATIENT
Start: 2018-01-29 | End: 2018-12-14

## 2018-01-29 RX ORDER — ALPRAZOLAM 0.25 MG
TABLET ORAL
Qty: 20 TABLET | Refills: 0 | Status: SHIPPED | OUTPATIENT
Start: 2018-01-29 | End: 2019-03-04

## 2018-01-29 NOTE — TELEPHONE ENCOUNTER
Insurance will not cover the venlafaxine tablets--please fax new rx for the capsules if OK to change.

## 2018-02-01 ENCOUNTER — TELEPHONE (OUTPATIENT)
Dept: FAMILY MEDICINE | Facility: CLINIC | Age: 43
End: 2018-02-01

## 2018-02-01 NOTE — TELEPHONE ENCOUNTER
Pharmacy requesting refill of Gabapentin 300 mg capsules.  SIG: Take 3 capsules by mouth 3 times caily  #90  Last filled 8/2/16  Not on his current med list.   Everette Craig

## 2018-02-22 ENCOUNTER — TELEPHONE (OUTPATIENT)
Dept: FAMILY MEDICINE | Facility: CLINIC | Age: 43
End: 2018-02-22

## 2018-02-22 NOTE — TELEPHONE ENCOUNTER
There are no medications in this encounter.     Controlled Substance Refill Request for gabapentin  Problem List Complete:  Yes   checked in past 6 months?  No, route to RN

## 2018-05-05 DIAGNOSIS — F41.1 GAD (GENERALIZED ANXIETY DISORDER): Primary | ICD-10-CM

## 2018-05-07 RX ORDER — ESCITALOPRAM OXALATE 20 MG/1
TABLET ORAL
Qty: 30 TABLET | Refills: 1 | Status: SHIPPED | OUTPATIENT
Start: 2018-05-07 | End: 2018-08-09

## 2018-05-07 NOTE — TELEPHONE ENCOUNTER
"Requested Prescriptions   Pending Prescriptions Disp Refills     escitalopram (LEXAPRO) 20 MG tablet [Pharmacy Med Name: ESCITALOPRAM 20MG TABLETS] 30 tablet 0     Sig: TAKE 1 TABLET BY MOUTH EVERY DAY    SSRIs Protocol Passed    5/5/2018 12:52 PM       Passed - Recent (12 mo) or future (30 days) visit within the authorizing provider's specialty    Patient had office visit in the last 12 months or has a visit in the next 30 days with authorizing provider or within the authorizing provider's specialty.  See \"Patient Info\" tab in inbasket, or \"Choose Columns\" in Meds & Orders section of the refill encounter.           Passed - Patient is age 18 or older      This patient wants a new prescription for escitalopram (LEXAPRO) 20 MG tablet [Pharmacy Med Name: ESCITALOPRAM 20MG TABLETS  "

## 2018-09-25 DIAGNOSIS — F41.1 GAD (GENERALIZED ANXIETY DISORDER): ICD-10-CM

## 2018-09-25 RX ORDER — ALPRAZOLAM 0.25 MG
TABLET ORAL
Qty: 20 TABLET | Refills: 0 | Status: CANCELLED | OUTPATIENT
Start: 2018-09-25

## 2018-09-26 NOTE — TELEPHONE ENCOUNTER
Patient needs to be seen, was given a short script on 1-29-18, per office visit on 11-27-17:    At this time referral to orthopedics for shoulder problems and referral to psychiatry for psychiatric problems. Temporarily, I have prescribed hydroxyzine for anxiety and panic attacks and a few Xanax for severe cases and then Cymbalta for his depression and anxiety as well. Will follow-up in the next month after he has seen psychiatry.    Left message to call back.  FUNMILAYO Cooper

## 2018-12-14 ENCOUNTER — ANCILLARY PROCEDURE (OUTPATIENT)
Dept: GENERAL RADIOLOGY | Facility: CLINIC | Age: 43
End: 2018-12-14
Attending: NURSE PRACTITIONER
Payer: COMMERCIAL

## 2018-12-14 ENCOUNTER — TELEPHONE (OUTPATIENT)
Dept: FAMILY MEDICINE | Facility: CLINIC | Age: 43
End: 2018-12-14

## 2018-12-14 ENCOUNTER — OFFICE VISIT (OUTPATIENT)
Dept: FAMILY MEDICINE | Facility: CLINIC | Age: 43
End: 2018-12-14
Payer: COMMERCIAL

## 2018-12-14 VITALS
RESPIRATION RATE: 18 BRPM | BODY MASS INDEX: 30.93 KG/M2 | HEART RATE: 76 BPM | DIASTOLIC BLOOD PRESSURE: 80 MMHG | TEMPERATURE: 97.2 F | SYSTOLIC BLOOD PRESSURE: 118 MMHG | WEIGHT: 241 LBS | HEIGHT: 74 IN

## 2018-12-14 DIAGNOSIS — M25.522 LEFT ELBOW PAIN: ICD-10-CM

## 2018-12-14 DIAGNOSIS — G44.309 HEADACHES DUE TO OLD HEAD TRAUMA: Primary | ICD-10-CM

## 2018-12-14 DIAGNOSIS — M70.32 BURSITIS OF LEFT ELBOW, UNSPECIFIED BURSA: ICD-10-CM

## 2018-12-14 DIAGNOSIS — R55 SYNCOPE, UNSPECIFIED SYNCOPE TYPE: ICD-10-CM

## 2018-12-14 DIAGNOSIS — R00.2 PALPITATIONS: ICD-10-CM

## 2018-12-14 DIAGNOSIS — M77.8 LEFT ELBOW TENDINITIS: ICD-10-CM

## 2018-12-14 DIAGNOSIS — S09.90XS HEADACHES DUE TO OLD HEAD TRAUMA: Primary | ICD-10-CM

## 2018-12-14 PROCEDURE — 73070 X-RAY EXAM OF ELBOW: CPT | Mod: LT

## 2018-12-14 PROCEDURE — 99214 OFFICE O/P EST MOD 30 MIN: CPT | Performed by: NURSE PRACTITIONER

## 2018-12-14 RX ORDER — HYDROCODONE BITARTRATE AND ACETAMINOPHEN 5; 325 MG/1; MG/1
1 TABLET ORAL EVERY 8 HOURS PRN
Qty: 30 TABLET | Refills: 0 | Status: SHIPPED | OUTPATIENT
Start: 2018-12-14 | End: 2019-03-04

## 2018-12-14 ASSESSMENT — ANXIETY QUESTIONNAIRES
6. BECOMING EASILY ANNOYED OR IRRITABLE: MORE THAN HALF THE DAYS
GAD7 TOTAL SCORE: 12
1. FEELING NERVOUS, ANXIOUS, OR ON EDGE: MORE THAN HALF THE DAYS
5. BEING SO RESTLESS THAT IT IS HARD TO SIT STILL: MORE THAN HALF THE DAYS
3. WORRYING TOO MUCH ABOUT DIFFERENT THINGS: SEVERAL DAYS
2. NOT BEING ABLE TO STOP OR CONTROL WORRYING: MORE THAN HALF THE DAYS
GAD7 TOTAL SCORE: 12
GAD7 TOTAL SCORE: 12
7. FEELING AFRAID AS IF SOMETHING AWFUL MIGHT HAPPEN: MORE THAN HALF THE DAYS
7. FEELING AFRAID AS IF SOMETHING AWFUL MIGHT HAPPEN: MORE THAN HALF THE DAYS
4. TROUBLE RELAXING: SEVERAL DAYS

## 2018-12-14 ASSESSMENT — PATIENT HEALTH QUESTIONNAIRE - PHQ9
SUM OF ALL RESPONSES TO PHQ QUESTIONS 1-9: 8
SUM OF ALL RESPONSES TO PHQ QUESTIONS 1-9: 8
10. IF YOU CHECKED OFF ANY PROBLEMS, HOW DIFFICULT HAVE THESE PROBLEMS MADE IT FOR YOU TO DO YOUR WORK, TAKE CARE OF THINGS AT HOME, OR GET ALONG WITH OTHER PEOPLE: SOMEWHAT DIFFICULT

## 2018-12-14 ASSESSMENT — MIFFLIN-ST. JEOR: SCORE: 2062.92

## 2018-12-14 NOTE — PATIENT INSTRUCTIONS
Contact Cardiology department to set-up your Holter monitor at [phone]    Contact [phone] to set-up MRI     Your provider has referred you for the following:   Consult at [facility]: [facility] [phone]   [email or web address]  [facility]: [facility] [phone]   [email or web address]  Brian [phone]   [email or web address]        Patient Education     Near-Fainting with Uncertain Cause  Fainting (syncope) is a temporary loss of consciousness (passing out). This happens when blood flow to the brain is reduced. Near-fainting (near-syncope) is like fainting, but you do not fully pass out. Instead, you feel like you are going to pass out, but do not actually lose consciousness.  Signs and symptoms  The following are symptoms of near-fainting:    Feeling lightheaded or like you are going to faint    Weak pulse    Nausea    Sweating    Blurred vision or feeling like your vision is fading    Palpitations    Chest pain    Hard time breathing    Feeling cool and clammy  Causes  This happens when your blood pressure suddenly drops, and not enough blood flows to your brain.  Common minor causes include:    Sudden emotional stress such as fear, pain, panic, or the sight of blood    Straining or overexertion, such as straining while using the toilet, coughing, or sneezing    Standing up too quickly, or standing up for too long a time  More serious causes include:    Very slow, fast, or irregular heart rate (arrhythmia)    Dehydration    Significant blood loss    Medicines, or a recent change in medicines. Medicines that can cause fainting include blood pressure or heart medicines.    Heart attack    Heart valve problems  Remember, even minor causes can become serious if you fall and injure yourself, or are driving. You may need more tests. It is very important that you follow up with your doctor as advised.  Home care  The  following guidelines will help you care for yourself at home:    Rest today. Resume your normal activities as soon as you are feeling back to normal.    If you become lightheaded or dizzy, lie down right away or sit with your head between your knees.    Drink plenty of fluids and don't skip meals.  Because the exact cause of your near fainting spell is not known, another spell could occur without warning. To stay safe, do not drive a car or use dangerous equipment. Do not take a bath alone. Use a shower instead. Do not swim alone. You can resume these activities when your healthcare provider says that you are no longer in danger of having a near-fainting spell.  Follow-up care  Follow up with your healthcare provider, or as advised.  Call 911  Call 911 if any of the following occur:    Another near-fainting or full fainting spell occurs, and it is not explained by the common causes listed above    Chest, arm, neck, jaw, back or abdominal pain    Shortness of breath    Weakness, tingling, or numbness in one side of the face, or in one arm or leg    Slurred speech, confusion, trouble walking or seeing    Seizure  When to seek medical advice  Call your healthcare provider right away if any of these occur:    Changes in your medicines    Occasional mild lightheadedness, especially when standing up too quickly or straining  Date Last Reviewed: 10/1/2016    8220-9514 [employer]. [address] [place] [address]. All rights reserved. This information is not intended as a substitute for professional medical care. Always follow your healthcare professional's instructions.           Patient Education     Tennis Elbow  Muscles connect to bones by thick, fibrous cords (tendons). When the muscles are overused by repeated motion, the tendons may become inflamed and painful. This condition is called tendonitis.  Tennis elbow (lateral epicondylitis) is a form of tendonitis. It occurs when the forearm muscles  are used again and again in a twisting motion. Pain from tennis elbow occurs mainly on the outside of the elbow. But the pain can spread into the forearm and wrist. Your elbow may also be swollen and tender to the touch.  The pain may get worse when you move your arm or do simple activities. Bending your wrist back, shaking hands, or turning a doorknob may cause pain. The pain often gets worse after several weeks or months. Sometimes you may feel pain when your arm is still.  Tennis players who use a backhand stroke with poor technique are more likely to get tennis elbow. But playing tennis is only one cause of tennis elbow. Other common activities that can cause it include:    Hammering    Painting    Raking  Besides tennis players, people at risk include , gardeners, musicians, and dentists. Sometimes people get tennis elbow without doing anything that would cause the injury.  Treatment includes resting the arm and taking anti-inflammatory medicines. Special splints can help ease symptoms. Symptoms should get better after 4 to 6 weeks of rest. You may need steroid injections if resting and using a splint don t help. After the pain is relieved, you should change your activities so the symptoms don t return. You may need physical therapy. It may include stretching, range-of-motion, and strengthening exercises. These treatments help most cases. You may need surgery if your symptoms continue for 6 months despite treatment.  Home care  Follow these guidelines when caring for yourself at home:    Rest your elbow as needed. Protect it from movement that causes pain. You may be told to use a forearm splint at night to ease symptoms in the morning. Your healthcare provider may recommend a special wrap or splint to compress the muscles of the forearm. This can ease pain during daytime activities. As your symptoms get better, start to move your elbow more.    Put an ice pack on the injured area. Do this for 20  minutes every 1 to 2 hours the first day for pain relief. You can make an ice pack by wrapping a plastic bag of ice cubes in a thin towel. Continue using the ice pack 3 to 4 times a day for the next several days. Then use the ice pack as needed to ease pain and swelling.    You may use acetaminophen or ibuprofen to control pain, unless another pain medicine was prescribed. If you have chronic liver or kidney disease, talk with your healthcare provider before using these medicines. Also talk with your provider if you ve had a stomach ulcer or gastrointestinal bleeding.    After your elbow heals, avoid the motion that caused your pain. Or learn to move in a way that causes less stress on the tendon. Using a forearm wrap may keep tennis elbow from happening again.    A tennis elbow strap may ease pain and keep you from further injury when you start playing tennis again. You can also lower your risk for injury by warming up before you play and cooling down afterward. You should also use the right equipment. For instance, make sure your racquet has the right  and is the right size for you.  Follow-up care  Follow up with your healthcare provider, or as advised, if your symptoms don t get better after 2 to 3 weeks of treatment.  When to seek medical advice  Call your healthcare provider right away if any of these occur:    Redness over the painful area    Pain, stiffness,  or swelling at the elbow gets worse    Any numbness or tingling in your arm, hands, or fingers    Unexplained fever over 100.4 F (38 C)   Date Last Reviewed: 5/1/2017 2000-2018 [employer]. [address] [place] [address], [place] [id number]. All rights reserved. This information is not intended as a substitute for professional medical care. Always follow your healthcare professional's instructions.

## 2018-12-14 NOTE — TELEPHONE ENCOUNTER
Per insurance : NEW TO THERAPY MAX 7 DAYS SUPPLY    We dispensed only #21 tablets, patient is aware that he lost all remaining tablets.    This is an FYI    Thank you  Renu Davis CPht    [facility]  Phone: [phone]  Fax: [phone]

## 2018-12-14 NOTE — LETTER
My Depression Action Plan  Name: Perry Preciado Jr.   Date of Birth 1975  Date: 12/14/2018    My doctor: No Ref-Primary, Physician   My clinic: [place] [id number] [place] [id number] [phone]          GREEN    ZONE   Good Control    What it looks like:     Things are going generally well. You have normal up s and down s. You may even feel depressed from time to time, but bad moods usually last less than a day.   What you need to do:  1. Continue to care for yourself (see self care plan)  2. Check your depression survival kit and update it as needed  3. Follow your physician s recommendations including any medication.  4. Do not stop taking medication unless you consult with your physician first.           YELLOW         ZONE Getting Worse    What it looks like:     Depression is starting to interfere with your life.     It may be hard to get out of bed; you may be starting to isolate yourself from others.    Symptoms of depression are starting to last most all day and this has happened for several days.     You may have suicidal thoughts but they are not constant.   What you need to do:     1. Call your care team, your response to treatment will improve if you keep your care team informed of your progress. Yellow periods are signs an adjustment may need to be made.     2. Continue your self-care, even if you have to fake it!    3. Talk to someone in your support network    4. Open up your depression survival kit           RED    ZONE Medical Alert - Get Help    What it looks like:     Depression is seriously interfering with your life.     You may experience these or other symptoms: You can t get out of bed most days, can t work or engage in other necessary activities, you have trouble taking care of basic hygiene, or basic responsibilities, thoughts of suicide or death that will not go away, self-injurious behavior.     What you need to do:  1. Call  your care team and request a same-day appointment. If they are not available (weekends or after hours) call your local crisis line, emergency room or 911.            Depression Self Care Plan / Survival Kit    Self-Care for Depression  Here s the deal. Your body and mind are really not as separate as most people think.  What you do and think affects how you feel and how you feel influences what you do and think. This means if you do things that people who feel good do, it will help you feel better.  Sometimes this is all it takes.  There is also a place for medication and therapy depending on how severe your depression is, so be sure to consult with your medical provider and/ or Behavioral Health Consultant if your symptoms are worsening or not improving.     In order to better manage my stress, I will:    Exercise  Get some form of exercise, every day. This will help reduce pain and release endorphins, the  feel good  chemicals in your brain. This is almost as good as taking antidepressants!  This is not the same as joining a gym and then never going! (they count on that by the way ) It can be as simple as just going for a walk or doing some gardening, anything that will get you moving.      Hygiene   Maintain good hygiene (Get out of bed in the morning, Make your bed, Brush your teeth, Take a shower, and Get dressed like you were going to work, even if you are unemployed).  If your clothes don't fit try to get ones that do.    Diet  I will strive to eat foods that are good for me, drink plenty of water, and avoid excessive sugar, caffeine, alcohol, and other mood-altering substances.  Some foods that are helpful in depression are: complex carbohydrates, B vitamins, flaxseed, fish or fish oil, fresh fruits and vegetables.    Psychotherapy  I agree to participate in Individual Therapy (if recommended).    Medication  If prescribed medications, I agree to take them.  Missing doses can result in serious side effects.   I understand that drinking alcohol, or other illicit drug use, may cause potential side effects.  I will not stop my medication abruptly without first discussing it with my provider.    Staying Connected With Others  I will stay in touch with my friends, family members, and my primary care provider/team.    Use your imagination  Be creative.  We all have a creative side; it doesn t matter if it s oil painting, sand castles, or mud pies! This will also kick up the endorphins.    Witness Beauty  (AKA stop and smell the roses) Take a look outside, even in mid-winter. Notice colors, textures. Watch the squirrels and birds.     Service to others  Be of service to others.  There is always someone else in need.  By helping others we can  get out of ourselves  and remember the really important things.  This also provides opportunities for practicing all the other parts of the program.    Humor  Laugh and be silly!  Adjust your TV habits for less news and crime-drama and more comedy.    Control your stress  Try breathing deep, massage therapy, biofeedback, and meditation. Find time to relax each day.     My support system    Clinic Contact:  Phone number:    Contact 1:  Phone number:    Contact 2:  Phone number:    Oriental orthodox/:  Phone number:    Therapist:  Phone number:    [facility] crisis center:    Phone number:    Other community support:  Phone number:

## 2018-12-15 ASSESSMENT — ANXIETY QUESTIONNAIRES: GAD7 TOTAL SCORE: 12

## 2019-01-09 ENCOUNTER — OFFICE VISIT (OUTPATIENT)
Dept: ORTHOPEDICS | Facility: CLINIC | Age: 44
End: 2019-01-09
Payer: COMMERCIAL

## 2019-01-09 VITALS
SYSTOLIC BLOOD PRESSURE: 139 MMHG | BODY MASS INDEX: 30.93 KG/M2 | WEIGHT: 241 LBS | DIASTOLIC BLOOD PRESSURE: 89 MMHG | HEIGHT: 74 IN

## 2019-01-09 DIAGNOSIS — M77.12 LATERAL EPICONDYLITIS OF LEFT ELBOW: ICD-10-CM

## 2019-01-09 DIAGNOSIS — S59.902A INJURY OF LEFT ELBOW, INITIAL ENCOUNTER: Primary | ICD-10-CM

## 2019-01-09 DIAGNOSIS — R20.0 LEFT ARM NUMBNESS: ICD-10-CM

## 2019-01-09 PROCEDURE — 99244 OFF/OP CNSLTJ NEW/EST MOD 40: CPT | Performed by: PEDIATRICS

## 2019-01-09 ASSESSMENT — MIFFLIN-ST. JEOR: SCORE: 2062.92
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
## 2019-01-09 NOTE — LETTER
January 9, 2019      Perry Preciado .  [address] [address]        To Whom It May Concern,     Perry is under my care for left arm injury.  He may return to work with the following restrictions:  - Limit lifting with left arm to 5-10 lbs, occasionally    Follow up will be in 3-4 weeks.      Sincerely,        Yumiko Shell MD

## 2019-01-10 ENCOUNTER — HOSPITAL ENCOUNTER (OUTPATIENT)
Dept: MRI IMAGING | Facility: CLINIC | Age: 44
Discharge: HOME | End: 2019-01-10
Attending: NURSE PRACTITIONER | Admitting: NURSE PRACTITIONER
Payer: COMMERCIAL

## 2019-01-10 DIAGNOSIS — G44.309 HEADACHES DUE TO OLD HEAD TRAUMA: ICD-10-CM

## 2019-01-10 DIAGNOSIS — S09.90XS HEADACHES DUE TO OLD HEAD TRAUMA: ICD-10-CM

## 2019-01-10 PROCEDURE — 70551 MRI BRAIN STEM W/O DYE: CPT

## 2019-01-10 NOTE — LETTER
January 14, 2019      Perry Preciado Jr.  [address] [address]        Dear ,    We are writing to inform you of your test results. We tried to reach you by phone but were unable.    Your MRI results are normal.    Resulted Orders   MR Brain w/o Contrast    Narrative    MRI OF THE BRAIN WITHOUT CONTRAST 1/10/2019 2:12 PM     COMPARISON: None.    HISTORY:  Headache, post traumatic. Head trauma 2 months ago continued  headaches with syncope. Headaches due to old head trauma.    TECHNIQUE: Sagittal T1-weighted, axial T2-weighted, axial FLAIR, and  axial diffusion-weighted MR images of the brain were acquired without  intravenous contrast.    FINDINGS: The ventricles and basal cisterns are within normal limits  in configuration. There is no midline shift. There are no extra-axial  fluid collections. There is no evidence for acute stroke or for acute  intracranial hemorrhage. Gray-white differentiation is well  maintained.    There is no sinusitis or mastoiditis.      Impression    IMPRESSION: Normal brain MRI.    BRAD WALKER MD       If you have any questions or concerns, please call the clinic at the number listed above.       Sincerely,        Deneen Monroe,ABHIJIT/rm

## 2019-03-04 ENCOUNTER — OFFICE VISIT (OUTPATIENT)
Dept: FAMILY MEDICINE | Facility: CLINIC | Age: 44
End: 2019-03-04
Payer: COMMERCIAL

## 2019-03-04 VITALS
SYSTOLIC BLOOD PRESSURE: 124 MMHG | WEIGHT: 242 LBS | RESPIRATION RATE: 18 BRPM | DIASTOLIC BLOOD PRESSURE: 80 MMHG | TEMPERATURE: 98.8 F | HEIGHT: 74 IN | HEART RATE: 80 BPM | BODY MASS INDEX: 31.06 KG/M2

## 2019-03-04 DIAGNOSIS — M77.8 LEFT ELBOW TENDINITIS: ICD-10-CM

## 2019-03-04 DIAGNOSIS — M25.522 LEFT ELBOW PAIN: ICD-10-CM

## 2019-03-04 DIAGNOSIS — M19.019 ARTHROPATHY OF SHOULDER REGION: ICD-10-CM

## 2019-03-04 DIAGNOSIS — G56.02 CARPAL TUNNEL SYNDROME OF LEFT WRIST: ICD-10-CM

## 2019-03-04 DIAGNOSIS — S49.91XS INJURY OF RIGHT SHOULDER, SEQUELA: ICD-10-CM

## 2019-03-04 PROCEDURE — 99214 OFFICE O/P EST MOD 30 MIN: CPT | Performed by: NURSE PRACTITIONER

## 2019-03-04 RX ORDER — VENLAFAXINE HYDROCHLORIDE 75 MG/1
75 CAPSULE, EXTENDED RELEASE ORAL DAILY
COMMUNITY
End: 2019-05-09

## 2019-03-04 RX ORDER — TRAMADOL HYDROCHLORIDE 50 MG/1
50 TABLET ORAL EVERY 12 HOURS PRN
Qty: 20 TABLET | Refills: 0 | Status: SHIPPED | OUTPATIENT
Start: 2019-03-04 | End: 2019-07-15

## 2019-03-04 ASSESSMENT — MIFFLIN-ST. JEOR: SCORE: 2067.45

## 2019-03-04 NOTE — PROGRESS NOTES
SUBJECTIVE:   Perry Preciado Jr. is a 43 year old male who presents to clinic today for the following health issues:    Patient is here to follow up with his left elbow and right shoulder pain. He states that his pain has gotten worse.      Problem list and histories reviewed & adjusted, as indicated.  Additional history: as documented    Patient Active Problem List   Diagnosis     Major depressive disorder, recurrent episode, moderate (H)     CARDIOVASCULAR SCREENING; LDL GOAL LESS THAN 130     Acute bilateral low back pain with right-sided sciatica     Carpal tunnel syndrome of right wrist     KANE (generalized anxiety disorder)     Past Surgical History:   Procedure Laterality Date     BACK SURGERY         Social History     Tobacco Use     Smoking status: Former Smoker     Packs/day: 0.50     Types: Cigarettes     Smokeless tobacco: Current User   Substance Use Topics     Alcohol use: No     History reviewed. No pertinent family history.      Current Outpatient Medications   Medication Sig Dispense Refill     ALEVE OR None Entered       escitalopram (LEXAPRO) 20 MG tablet Take 1 tablet (20 mg total) by mouth once daily. 90 tablet 0     GABAPENTIN PO Take 800 mg by mouth 4 times daily       ibuprofen (ADVIL,MOTRIN) 600 MG tablet Take 1 tablet (600 mg) by mouth every 8 hours as needed for moderate pain 30 tablet 0     venlafaxine (EFFEXOR XR) 75 MG 24 hr capsule Take 75 mg by mouth daily       Allergies   Allergen Reactions     Wellbutrin [Bupropion]      Recent Labs   Lab Test 11/15/14  1445 10/07/14  0155   ALT  --  40   CR 1.28* 1.29*   GFRESTIMATED 63 62   GFRESTBLACK 76 75   POTASSIUM 4.0 3.4      BP Readings from Last 3 Encounters:   03/04/19 124/80   01/09/19 139/89   12/14/18 118/80    Wt Readings from Last 3 Encounters:   03/04/19 109.8 kg (242 lb)   01/09/19 109.3 kg (241 lb)   12/14/18 109.3 kg (241 lb)                    Reviewed and updated as needed this visit by clinical staff       Reviewed  "and updated as needed this visit by Provider         ROS:  Constitutional, HEENT, cardiovascular, pulmonary, gi and gu systems are negative, except as otherwise noted.    OBJECTIVE:     /80 (BP Location: Right arm, Cuff Size: Adult Large)   Pulse 80   Temp 98.8  F (37.1  C) (Tympanic)   Resp 18   Ht 1.88 m (6' 2\")   Wt 109.8 kg (242 lb)   BMI 31.07 kg/m    Body mass index is 31.07 kg/m .  GENERAL: healthy, alert and no distress  EYES: Eyes grossly normal to inspection, PERRL and conjunctivae and sclerae normal  RESP: lungs clear to auscultation - no rales, rhonchi or wheezes  CV: regular rate and rhythm, normal S1 S2, no S3 or S4, no murmur, click or rub, no peripheral edema and peripheral pulses strong  MS:   Inspection: atrophy noted right shoulder   Tender: anterior capsule proximal bicep tendon, greater tuberosity and upper trapezius muscle  Non-tender: proximal-mid clavicle, mid-distal clavicle, AC joint,  Range of Motion  Active:limited due to pain.  Passive: limited due to pain.  Strength: forward flexion 3/5, abduction  3/5, internal rotation  3/5 and external rotation  3/5  Special tests:  Positive: Neers and Calderon  Negative: cross arm adduction    SKIN: no suspicious lesions or rashes  NEURO: Normal strength and tone, mentation intact and speech normal  PSYCH: mentation appears normal, affect normal/bright    Inspection: olecranon bursa swelling  Tender: lateral epicondyle, olecranon bursa and distal bicep tendon  Non-tender: common extensor tendon, medial epicondyle, common flexor tendon and radial head/neck  Range of Motion: flexion: normal, extension: normal, supination:  normal, pronation: normal  Strength: elbow strength full  Special tests: normal stability, normal valgus stress, normal varus stress:     Positive Phalen's test left    ASSESSMENT/PLAN:     (M77.8) Left elbow tendinitis  Comment: Symptoms also representative of tendinitis.  Patient will wear a arm brace.  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  Atrophy noted.  Patient has had increased issues and problems we will have him follow-up with orthopedics  Plan: ORTHO  REFERRAL          (G56.02) Carpal tunnel syndrome of left wrist  Comment: Patient noted to have carpal tunnel on the left hand will have him start wearing brace if not improving next steps would be physical therapy  Plan: order for DME       (M25.522) Left elbow pain  Comment:   Plan:     (S49.91XS) Injury of right shoulder, sequela  Comment: Tramadol reordered  Plan: traMADol (ULTRAM) 50 MG tablet          [facility]

## 2019-03-04 NOTE — PATIENT INSTRUCTIONS
Patient Education     Carpal Tunnel Syndrome    Carpal tunnel syndrome is a painful condition of the wrist and arm. It is caused by pressure on the median nerve. The median nerve is one of the nerves that give feeling and movement to the hand. It passes through a tunnel in the wrist called the carpal tunnel. This tunnel is made up of bones and ligaments. Narrowing of this tunnel or swelling of the tissues inside the tunnel puts pressure on the median nerve. This causes numbness, pins and needles, or electric shooting pains in your hand and forearm. Often the pain is worse at night and may wake you when you are asleep.  Carpal tunnel syndrome may occur during pregnancy and with use of birth control pills. It is more common in workers who must often bend their wrists. It is also common in people who work with power tools that cause strong vibrations.  Home care    Rest the painful wrist. Avoid repeated bending of the wrist back and forth. This puts pressure on the median nerve. Avoid using power tools with strong vibrations.    If you were given a splint, wear it at night while you sleep. You may also wear it during the day for comfort.    Move your fingers and wrists often to prevent stiffness.    Elevate your arms on pillows when you lie down.    Try using the unaffected hand more.    Try not to hold your wrists in a bent, downward position.    Sometimes changes in the work place may ease symptoms. If you type most of the day, it may help to change the position of your keyboard or add a wrist support. Your wrist should be in a neutral position and not bent back when typing.    You may use over-the-counter pain medicine to treat pain and inflammation, unless another medicine was prescribed. Anti-inflammatory pain medicines, such as ibuprofen or naproxen may be more effective than acetaminophen, which treats pain, but not inflammation. If you have chronic liver or kidney disease or ever had a stomach ulcer or  gastrointestinal bleeding, talk with your healthcare provider before using these medicines.    Opioid pain medicine will only give temporary relief and does not treat the problem. If pain continues, you may need a shot of a steroid drug into your wrist.    If the above methods fail, you may need surgery. This will open the carpal tunnel and release the pressure on the trapped nerve.  Follow-up care  Follow up with your healthcare provider, or as advised. If X-rays were taken, you will be notified of any new findings that may affect your care.     When to seek medical advice  Call your healthcare provider right away if any of these occur:    Pain not improving with the above treatment    Fingers or hand become cold, blue, numb, or tingly    Your whole arm becomes swollen or weak   Date Last Reviewed: 5/1/2018 2000-2018 [employer]. [address] [place] [address], [place] [address]. All rights reserved. This information is not intended as a substitute for professional medical care. Always follow your healthcare professional's instructions.           Patient Education     Chronic Pain  Pain serves an important role. It lets you know something is wrong that needs your attention. When the body heals, pain normally goes away.  When pain lasts longer than 6 months, it is called  chronic  pain. This is pain that is present even after the body has healed. Chronic pain can cause mood problems and get in the way of your relationships and your daily life.  A number of conditions can cause chronic pain. Some of the more common include:    Previous surgery    An old injury    Infection    Diseases such as diabetes    Nerve damage    Back injury    Arthritis    Migraine or other headaches    Fibromyalgia    Cancer  Depression and stress can make chronic pain symptoms worse. In some cases, a cause for the pain can't be found.   Treatment  Treatment can greatly reduce pain. In many cases, pain can become less severe, occur  less often, and interfere less with your daily life. Chronic pain is often treated with a combination of medicines, therapies, and lifestyle changes. You will work closely with your healthcare provider to find a treatment plan that works best for you.    Ask your healthcare provider for a referral to a pain management specialty center. These can provide the most recent and proven pain management strategies, along with emotional support and comprehensive services.    Several different types of medicines may be prescribed for chronic pain. Work with your healthcare provider to develop a medicine plan that helps manage your pain.    Physical therapy can help reduce certain types of chronic pain.    Occupational therapy teaches you how to do routine tasks of daily living in ways that lessen your discomfort.    Counseling can help you cope better with stress and pain.    Other therapies such as meditation, yoga, biofeedback, massage, and acupuncture can also help manage chronic pain.    Changing certain habits can help reduce chronic pain. They include:  ? Eating healthy  ? Developing an exercise routine  ? Getting enough sleep   ? Stopping smoking and limiting alcohol use  ? Losing excess weight  Follow-up care  Follow up with your healthcare provider, or as advised. Let your healthcare provider know if your current treatment plan is working or if changes are needed.  Resources  For more information, contact:    American Headache and Migraine Association, ahma.memberclicks.net or 300-160-3120    American Chronic Pain Association, theacpa.org or 582-009-2426  Date Last Reviewed: 8/1/2017 2000-2018 [employer]. [address] [place] [address]. All rights reserved. This information is not intended as a substitute for professional medical care. Always follow your healthcare professional's instructions.           Patient Education     Shoulder Pain with Uncertain Cause  Shoulder pain can have many  causes. Pain often comes from the structures that surround the shoulder joint. These are the joint capsule, ligaments, tendons, muscles, and bursa. Pain can also come from cartilage in the joint. Cartilage can become worn out or injured. It s important to know what s causing your pain so the healthcare provider can use the correct treatment. But sometimes it s difficult to find the exact cause of shoulder pain. You may need to see a specialist (orthopedist). You may also need special tests such as a CT scan or MRI. The provider may need to use special tools to look inside the joint (arthroscopy).  Shoulder pain can be treated with a sling or a device that keeps your shoulder from moving. You can take an anti-inflammatory medicine such as ibuprofen to ease pain. You may need to do special shoulder exercises. Follow up with a specialist if the pain is severe or doesn t go away after a few weeks.  Home care  Follow these tips when caring for yourself at home:    If a sling was given to you, leave it in place for the time advised by your healthcare provider. If you aren t sure how long to wear it, ask for advice. If the sling becomes loose, adjust it so that your forearm is level with the ground. Your shoulder should feel well supported.    Put an ice pack on the injured area for 20 minutes every 1 to 2 hours the first day. You can make your own ice pack by putting ice cubes in a plastic bag. Wrap the bag in a thin towel. Continue with ice packs 3 to 4 times a day for the next 2 days. Then use the pack as needed to ease pain and swelling.    You may use acetaminophen or ibuprofen to control pain, unless another pain medicine was prescribed. If you have chronic liver or kidney disease, talk with your healthcare provider before using these medicines. Also talk with your provider if you ve ever had a stomach ulcer or GI bleeding.    Shoulder pain may seem worse at night, when there is less to distract you from the pain. If  you sleep on your side, try to keep weight off your painful shoulder. Propping pillows behind you may stop you from rolling over onto that shoulder during sleep.     Shoulder and elbow joints can become stiff if left in a sling for too long. You should start range of motion exercises about 7 to 10 days after the injury. Talk with your provider to find out what type of exercises to do and how soon to start.    You can take the sling off to shower or bathe.  Follow-up care  Follow up with your healthcare provider if you don t start to get better in the next 5 days.  When to seek medical advice  Call your healthcare provider right away if any of these occur:    Pain or swelling gets worse or continues for more than a few days    Your hand or fingers become cold, blue, numb, or tingly    Large amount of bruising on your shoulder or upper arm    Difficulty moving your hand or fingers    Weakness in your hand or fingers    Your shoulder becomes stiff    It feels like your shoulder is popping out    You are less able to do your daily activities  Date Last Reviewed: 10/1/2016    7036-1506 [employer]. [address] [place] [address], [place] [address]. All rights reserved. This information is not intended as a substitute for professional medical care. Always follow your healthcare professional's instructions.

## 2019-03-20 ENCOUNTER — TRANSFERRED RECORDS (OUTPATIENT)
Dept: HEALTH INFORMATION MANAGEMENT | Facility: CLINIC | Age: 44
End: 2019-03-20

## 2019-04-02 ENCOUNTER — HOSPITAL ENCOUNTER (OUTPATIENT)
Dept: MRI IMAGING | Facility: CLINIC | Age: 44
Discharge: HOME | End: 2019-04-02
Attending: ORTHOPAEDIC SURGERY | Admitting: ORTHOPAEDIC SURGERY
Payer: COMMERCIAL

## 2019-04-02 ENCOUNTER — HOSPITAL ENCOUNTER (OUTPATIENT)
Dept: GENERAL RADIOLOGY | Facility: CLINIC | Age: 44
End: 2019-04-02
Attending: ORTHOPAEDIC SURGERY
Payer: COMMERCIAL

## 2019-04-02 ENCOUNTER — HOSPITAL ENCOUNTER (OUTPATIENT)
Dept: MRI IMAGING | Facility: CLINIC | Age: 44
End: 2019-04-02
Attending: ORTHOPAEDIC SURGERY
Payer: COMMERCIAL

## 2019-04-02 DIAGNOSIS — M25.511 RIGHT SHOULDER PAIN: ICD-10-CM

## 2019-04-02 PROCEDURE — A9579 GAD-BASE MR CONTRAST NOS,1ML: HCPCS | Performed by: ORTHOPAEDIC SURGERY

## 2019-04-02 PROCEDURE — 72141 MRI NECK SPINE W/O DYE: CPT

## 2019-04-02 PROCEDURE — 27211110 XR SHOULDER CONTRAST CT/MR INJECTION

## 2019-04-02 PROCEDURE — 25000125 ZZHC RX 250: Performed by: ORTHOPAEDIC SURGERY

## 2019-04-02 PROCEDURE — 73222 MRI JOINT UPR EXTREM W/DYE: CPT | Mod: RT

## 2019-04-02 PROCEDURE — 25000128 H RX IP 250 OP 636: Performed by: ORTHOPAEDIC SURGERY

## 2019-04-02 PROCEDURE — 25500064 ZZH RX 255 OP 636: Performed by: ORTHOPAEDIC SURGERY

## 2019-04-02 RX ORDER — IOPAMIDOL 408 MG/ML
10 INJECTION, SOLUTION INTRATHECAL ONCE
Status: COMPLETED | OUTPATIENT
Start: 2019-04-02 | End: 2019-04-02

## 2019-04-02 RX ORDER — EPINEPHRINE 1 MG/ML
0.05 INJECTION, SOLUTION, CONCENTRATE INTRAVENOUS ONCE
Status: COMPLETED | OUTPATIENT
Start: 2019-04-02 | End: 2019-04-02

## 2019-04-02 RX ORDER — LIDOCAINE HYDROCHLORIDE 10 MG/ML
5 INJECTION, SOLUTION EPIDURAL; INFILTRATION; INTRACAUDAL; PERINEURAL ONCE
Status: COMPLETED | OUTPATIENT
Start: 2019-04-02 | End: 2019-04-02

## 2019-04-02 RX ADMIN — LIDOCAINE HYDROCHLORIDE 2 ML: 10 INJECTION, SOLUTION EPIDURAL; INFILTRATION; INTRACAUDAL; PERINEURAL at 14:30

## 2019-04-02 RX ADMIN — IOPAMIDOL 2 ML: 408 INJECTION, SOLUTION INTRATHECAL at 14:30

## 2019-04-02 RX ADMIN — GADOPENTETATE DIMEGLUMINE 0.05 ML: 469.01 INJECTION INTRAVENOUS at 14:30

## 2019-04-02 RX ADMIN — EPINEPHRINE 0.05 MG: 1 INJECTION, SOLUTION, CONCENTRATE INTRAVENOUS at 14:30

## 2019-04-02 NOTE — PROGRESS NOTES
RADIOLOGY PROCEDURE NOTE  Patient name: Perry Preciado Jr.  MRN: [id number]  : 1975    Pre-procedure diagnosis: Pain.  Post-procedure diagnosis: Same    Procedure Date/Time: 2019  2:27 PM  Procedure: Right shoulder intraarticular KANE injection for MRI to follow.  Estimated blood loss: None  Specimen(s) collected with description: None.  The patient tolerated the procedure well with no immediate complications.    See imaging dictation for procedural details.    Provider name: Morgan Stokes  Assistant(s):None

## 2019-05-06 ENCOUNTER — TELEPHONE (OUTPATIENT)
Dept: FAMILY MEDICINE | Facility: CLINIC | Age: 44
End: 2019-05-06

## 2019-05-06 NOTE — TELEPHONE ENCOUNTER
Called pt in regards to him calling directly to Dr. Oneill office inre: to his MRI.    Reyna Lozano  [facility]

## 2019-05-06 NOTE — TELEPHONE ENCOUNTER
Reason for Call:  MRI Results    Detailed comments: pt is calling for his MRI results from 4/2/19.  Please let him know.      Phone Number Patient can be reached at: Home number on file [phone] (home)    Best Time: any    Can we leave a detailed message on this number? YES    Call taken on 5/6/2019 at 9:28 AM by Reyna Lozano

## 2019-05-09 ENCOUNTER — OFFICE VISIT (OUTPATIENT)
Dept: FAMILY MEDICINE | Facility: CLINIC | Age: 44
End: 2019-05-09
Payer: COMMERCIAL

## 2019-05-09 VITALS
TEMPERATURE: 97.5 F | DIASTOLIC BLOOD PRESSURE: 84 MMHG | SYSTOLIC BLOOD PRESSURE: 128 MMHG | WEIGHT: 258 LBS | HEART RATE: 80 BPM | BODY MASS INDEX: 33.13 KG/M2

## 2019-05-09 DIAGNOSIS — M25.511 CHRONIC RIGHT SHOULDER PAIN: ICD-10-CM

## 2019-05-09 DIAGNOSIS — M77.8 ELBOW TENDINITIS: Primary | ICD-10-CM

## 2019-05-09 DIAGNOSIS — G89.29 CHRONIC RIGHT SHOULDER PAIN: ICD-10-CM

## 2019-05-09 DIAGNOSIS — F41.1 GAD (GENERALIZED ANXIETY DISORDER): ICD-10-CM

## 2019-05-09 PROCEDURE — 99214 OFFICE O/P EST MOD 30 MIN: CPT | Performed by: NURSE PRACTITIONER

## 2019-05-09 RX ORDER — HYDROCODONE BITARTRATE AND ACETAMINOPHEN 5; 325 MG/1; MG/1
1 TABLET ORAL EVERY 12 HOURS PRN
Qty: 90 TABLET | Refills: 0 | Status: SHIPPED | OUTPATIENT
Start: 2019-05-09 | End: 2019-06-26

## 2019-05-09 RX ORDER — ESCITALOPRAM OXALATE 20 MG/1
TABLET ORAL
Qty: 90 TABLET | Refills: 0 | Status: SHIPPED | OUTPATIENT
Start: 2019-05-09 | End: 2019-09-23

## 2019-05-09 RX ORDER — ARIPIPRAZOLE 5 MG/1
5 TABLET ORAL DAILY
Refills: 0 | COMMUNITY
Start: 2019-05-07 | End: 2019-07-18

## 2019-05-09 RX ORDER — QUETIAPINE FUMARATE 100 MG/1
100 TABLET, FILM COATED ORAL DAILY
Refills: 1 | COMMUNITY
Start: 2019-03-19 | End: 2019-09-23

## 2019-05-09 ASSESSMENT — PAIN SCALES - GENERAL: PAINLEVEL: SEVERE PAIN (7)

## 2019-05-09 NOTE — NURSING NOTE
"Chief Complaint   Patient presents with     Shoulder       Initial /84 (BP Location: Right arm, Patient Position: Sitting, Cuff Size: Adult Large)   Pulse 80   Temp 97.5  F (36.4  C) (Tympanic)   Wt 117 kg (258 lb)   BMI 33.13 kg/m   Estimated body mass index is 33.13 kg/m  as calculated from the following:    Height as of 3/4/19: 1.88 m (6' 2\").    Weight as of this encounter: 117 kg (258 lb).    Patient presents to the clinic using No DME    Health Maintenance that is potentially due pending provider review:  NONE    n/a    Is there anyone who you would like to be able to receive your results? No  If yes have patient fill out UMBERTO    Jasmin Avila CMA    "

## 2019-05-09 NOTE — PATIENT INSTRUCTIONS
Follow-up with Dr Bonilla for your right Shoulder pain      Patient Education   Opioid Pain Medicines (Narcotics)  What You Need to Know  What are opioids?   Opioids are pain medicines that must be prescribed by a doctor. They are also known as narcotics. Examples are:     morphine (MS Contin, Parris)    oxycodone (Oxycontin)    oxycodone and acetaminophen (Percocet)    hydrocodone and acetaminophen (Vicodin, Norco)    fentanyl patch (Duragesic)    hydromorphone (Dilaudid)    methadone  What do opioids do well?   Opioids are best for short-term pain after a surgery or injury. They also work well for cancer pain. Unlike other pain medicines, they do not harm the liver or kidney (though people with liver or kidney problems should use caution while taking opioids). They may help some people with long-lasting (chronic) pain.   What do opioids NOT do well?   Opioids never get rid of pain entirely, and they do not work well for most patients with chronic pain. Opioids do not reduce swelling, one of the causes of pain. They also don't work well for nerve pain.   Prescribed opioids aren't the best way to manage chronic pain.   Other ways to manage pain include:     ibuprofen or acetaminophen. You should always try this first.     acupuncture or massage, deep breathing, meditation, visual imagery, aromatherapy       use heat or ice at the pain site     physical therapy and exercise     stop smoking     see a counselor or therapist   Risks and side effects  Talk to your doctor before you start or decide to keep taking one of these medicines. Risks and side effects include:    Lowering your breathing rate enough to cause death    Overdose, including death, especially if taking higher than prescribed doses    Long-term opioid use    Worse depression symptoms; less pleasure in things you usually enjoy    Feeling tired or sluggish    Slower thoughts or cloudy thinking    Being more sensitive to pain over time; pain is harder to  control    Trouble sleeping or restless sleep    Changes in hormone levels (for example, less testosterone)    Changes in sex drive or ability to have sex    Constipation    Unsafe driving    Itching and sweating    Feeling dizzy    Nausea, vomiting and dry mouth  What else should I know about opioids?    When someone takes opioids for too long or too often, they become dependent. This means that if you stop or reduce the medicine too quickly, you will have withdrawal symptoms.    Dependence is not the same as addiction. Addiction is when people keep using a substance despite harm. This includes harm to the body, their mind or their relations with others. If you have a history of drug or alcohol abuse, taking opioids can cause a relapse.    Over time, opioids don't work as well. Most people will need higher and higher doses. The higher the dose, the more serious the side effects. We don't know the long-term effects of opioids.    People who have used opioids for a long time may have a lower quality of life, worse depression, higher levels of pain and more visits to doctors.    Never share your opioids with others; it is against the law. Be sure to store opioids in a secure place, locked if possible.Young children can easily swallow them and overdose.    You can overdose on opioids. Signs of overdose include decrease or loss of consciousnes, slowed breathing, trouble waking and blue lips. If someone is worried about overdose, they should call 911.     If you are at risk for overdose, you may get naloxone (Narcan), a medicine that reverses the effects of opioids. If you overdose, a friend or family member can give you Narcan while waiting for the ambulance. They need to know the signs of overdose and how to give Narcan.  While you're taking opioids:  Don't use alcohol or street drugs. Taking them together can cause death.  Don't take any of these medicines unless your doctor says its okay. Taking these with opioids  can cause death.  Benzodiazepines (such as lorazepam or diazepam).  Muscle relaxers (such as cyclobenzaprine)  sleeping pills  other opioids   Safe disposal of opioids  Find your area drug take-back program, your pharmacy mail-back program, buy a special disposal bag (such as Deterra) from your pharmacy or flush them down the toilet. Use the guidelines at www.fda.gov/drugs/resourcesforyou.  For informational purposes only. Not to replace the advice of your health care provider.   Copyright   2016 [employer]. All rights reserved. Buy buy tea 323070 - Rev 02/18.       Patient Education     Anxiety Reaction  Anxiety is the feeling we all get when we think something bad might happen. It is a normal response to stress and usually causes only a mild reaction. When anxiety becomes more severe, it can interfere with daily life. In some cases, you may not even be aware of what it is you re anxious about. There may also be a genetic link or it may be a learned behavior in the home.  Both psychological and physical triggers cause stress reaction. It's often a response to fear or emotional stress, real or imagined. This stress may come from home, family, work, or social relationships.  During an anxiety reaction, you may feel:    Helpless    Nervous    Depressed    Irritable  Your body may show signs of anxiety in many ways. You may experience:    Dry mouth    Shakiness    Dizziness    Weakness    Trouble breathing    Breathing fast (hyperventilating)    Chest pressure    Sweating    Headache    Nausea    Diarrhea    Tiredness    Inability to sleep    Sexual problems  Home care    Try to locate the sources of stress in your life. They may not be obvious. These may include:  ? Daily hassles of life (such as traffic jams, missed appointments, or car troubles)  ? Major life changes, both good (new baby or job promotion) and bad (loss of job or loss of loved one)  ? Overload: feeling that you have too many  responsibilities and can't take care of all of them at once  ? Feeling helpless or feeling that your problems are beyond what you re able to solve    Notice how your body reacts to stress. Learn to listen to your body signals. This will help you take action before the stress becomes severe.    When you can, do something about the source of your stress. (Avoid hassles, limit the amount of change that happens in your life at one time and take a break when you feel overloaded).    Unfortunately, many stressful situations can't be avoided. It is necessary to learn how to better manage stress. There are many proven methods that will reduce your anxiety. These include simple things like exercise, good nutrition, and adequate rest. Also, there are certain techniques that are helpful:  ? Relaxation  ? Breathing exercises  ? Visualization  ? Biofeedback  ? Meditation  For more information about this, consult your healthcare provider or go to a local bookstore and review the many books and tapes available on this subject.  Follow-up care  If you feel that your anxiety is not responding to self-help measures, contact your healthcare provider or make an appointment with a counselor. You may need short-term psychological counseling and temporary medicine to help you manage stress.  Call 911  Call 911 if any of these happen:    Trouble breathing    Confusion    Drowsiness or trouble wakening    Fainting or loss of consciousness    Rapid heart rate    Seizure    New chest pain that becomes more severe, lasts longer, or spreads into your shoulder, arm, neck, jaw, or back  When to seek medical advice  Call your healthcare provider right away if any of these happen:    Your symptoms get worse    Severe headache not relieved by rest and mild pain reliever  Date Last Reviewed: 10/1/2017    2539-2371 [employer]. [address] [place] [address], [place] [address]. All rights reserved. This information is not intended as a  substitute for professional medical care. Always follow your healthcare professional's instructions.           Patient Education     Chronic Pain  Pain serves an important role. It lets you know something is wrong that needs your attention. When the body heals, pain normally goes away.  When pain lasts longer than 6 months, it is called  chronic  pain. This is pain that is present even after the body has healed. Chronic pain can cause mood problems and get in the way of your relationships and your daily life.  A number of conditions can cause chronic pain. Some of the more common include:    Previous surgery    An old injury    Infection    Diseases such as diabetes    Nerve damage    Back injury    Arthritis    Migraine or other headaches    Fibromyalgia    Cancer  Depression and stress can make chronic pain symptoms worse. In some cases, a cause for the pain can't be found.   Treatment  Treatment can greatly reduce pain. In many cases, pain can become less severe, occur less often, and interfere less with your daily life. Chronic pain is often treated with a combination of medicines, therapies, and lifestyle changes. You will work closely with your healthcare provider to find a treatment plan that works best for you.    Ask your healthcare provider for a referral to a pain management specialty center. These can provide the most recent and proven pain management strategies, along with emotional support and comprehensive services.    Several different types of medicines may be prescribed for chronic pain. Work with your healthcare provider to develop a medicine plan that helps manage your pain.    Physical therapy can help reduce certain types of chronic pain.    Occupational therapy teaches you how to do routine tasks of daily living in ways that lessen your discomfort.    Counseling can help you cope better with stress and pain.    Other therapies such as meditation, yoga, biofeedback, massage, and acupuncture can  also help manage chronic pain.    Changing certain habits can help reduce chronic pain. They include:  ? Eating healthy  ? Developing an exercise routine  ? Getting enough sleep   ? Stopping smoking and limiting alcohol use  ? Losing excess weight  Follow-up care  Follow up with your healthcare provider, or as advised. Let your healthcare provider know if your current treatment plan is working or if changes are needed.  Resources  For more information, contact:    American [employer] Migraine Association, [facility].[employer] Plan or [phone]    American Chronic Pain Association, theacpa.org or 103-195-1554  Date Last Reviewed: 8/1/2017 2000-2018 [employer]. [address] [place] [address]. All rights reserved. This information is not intended as a substitute for professional medical care. Always follow your healthcare professional's instructions.           Patient Education     Tennis Elbow  Muscles connect to bones by thick, fibrous cords (tendons). When the muscles are overused by repeated motion, the tendons may become inflamed and painful. This condition is called tendonitis.  Tennis elbow (lateral epicondylitis) is a form of tendonitis. It occurs when the forearm muscles are used again and again in a twisting motion. Pain from tennis elbow occurs mainly on the outside of the elbow. But the pain can spread into the forearm and wrist. Your elbow may also be swollen and tender to the touch.  The pain may get worse when you move your arm or do simple activities. Bending your wrist back, shaking hands, or turning a doorknob may cause pain. The pain often gets worse after several weeks or months. Sometimes you may feel pain when your arm is still.  Tennis players who use a backhand stroke with poor technique are more likely to get tennis elbow. But playing tennis is only one cause of tennis elbow. Other common activities that can cause it  include:    Hammering    Painting    Raking  Besides tennis players, people at risk include , gardeners, musicians, and dentists. Sometimes people get tennis elbow without doing anything that would cause the injury.  Treatment includes resting the arm and taking anti-inflammatory medicines. Special splints can help ease symptoms. Symptoms should get better after 4 to 6 weeks of rest. You may need steroid injections if resting and using a splint don t help. After the pain is relieved, you should change your activities so the symptoms don t return. You may need physical therapy. It may include stretching, range-of-motion, and strengthening exercises. These treatments help most cases. You may need surgery if your symptoms continue for 6 months despite treatment.  Home care  Follow these guidelines when caring for yourself at home:    Rest your elbow as needed. Protect it from movement that causes pain. You may be told to use a forearm splint at night to ease symptoms in the morning. Your healthcare provider may recommend a special wrap or splint to compress the muscles of the forearm. This can ease pain during daytime activities. As your symptoms get better, start to move your elbow more.    Put an ice pack on the injured area. Do this for 20 minutes every 1 to 2 hours the first day for pain relief. You can make an ice pack by wrapping a plastic bag of ice cubes in a thin towel. Continue using the ice pack 3 to 4 times a day for the next several days. Then use the ice pack as needed to ease pain and swelling.    You may use acetaminophen or ibuprofen to control pain, unless another pain medicine was prescribed. If you have chronic liver or kidney disease, talk with your healthcare provider before using these medicines. Also talk with your provider if you ve had a stomach ulcer or gastrointestinal bleeding.    After your elbow heals, avoid the motion that caused your pain. Or learn to move in a way that causes  less stress on the tendon. Using a forearm wrap may keep tennis elbow from happening again.    A tennis elbow strap may ease pain and keep you from further injury when you start playing tennis again. You can also lower your risk for injury by warming up before you play and cooling down afterward. You should also use the right equipment. For instance, make sure your racquet has the right  and is the right size for you.  Follow-up care  Follow up with your healthcare provider, or as advised, if your symptoms don t get better after 2 to 3 weeks of treatment.  When to seek medical advice  Call your healthcare provider right away if any of these occur:    Redness over the painful area    Pain, stiffness,  or swelling at the elbow gets worse    Any numbness or tingling in your arm, hands, or fingers    Unexplained fever over 100.4 F (38 C)   Date Last Reviewed: 5/1/2017 2000-2018 [employer]. [address] [place] [address]. All rights reserved. This information is not intended as a substitute for professional medical care. Always follow your healthcare professional's instructions.

## 2019-05-09 NOTE — LETTER
[facility]  05/09/19    Patient: Perry Preciado Jr.  YOB: 1975  Medical Record Number: [id number]                                                                  Opioid / Opioid Plus Controlled Substance Agreement    I understand that my care provider has prescribed an opioid (narcotic) controlled substance to help manage my condition(s). I am taking this medicine to help me function or work. I know this is strong medicine, and that it can cause serious side effects. Opioid medicine can be sedating, addicting and may cause a dependency on the drug. They can affect my ability to drive or think, and cause depression. They need to be taken exactly as prescribed. Combining opioids with certain medicines or chemicals (such as cocaine, sedatives and tranquilizers, sleeping pills, meth) can be dangerous or even fatal. Also, if I stop opioids suddenly, I may have severe withdrawal symptoms. Last, I understand that opioids do not work for all types of pain nor for all patients. If not helpful, I may be asked to stop them.        The risks, benefits, and side effects of these medicine(s) were explained to me. I agree that:    1. I will take part in other treatments as advised by my care team. This may be psychiatry or counseling, physical therapy, behavioral therapy, group treatment or a referral to a pain clinic. I will reduce or stop my medicine when my care team tells me to do so.  2. I will take my medicines as prescribed. I will not change the dose or schedule unless my care team tells me to. There will be no refills if I  run out early.   I may be contactedwithout warning and asked to complete a urine drug test or pill count at any time.   3. I will keep all my appointments, and understand this is part of the monitoring of opioids. My care team may require an office visit for EVERY opioid/controlled substance refill. If I miss appointments or don t follow instructions, my care team  may stop my medicine.  4. I will not ask other providers to prescribe controlled substances, and I will not accept controlled substances from other people. If I need another prescribed controlled substance for a new reason, I will tell my care team within 1 business day.  5. I will use one pharmacy to fill all of my controlled substance prescriptions, and it is up to me to make sure that I do not run out of my medicines on weekends or holidays. If my care team is willing to refill my opioid prescription without a visit, I must request refills only during office hours, refills may take up to 3 days to process, and it may take up to 5 to 7 days for my medicine to be mailed and ready at my pharmacy. Prescriptions will not be mailed anywhere except my pharmacy.        [id number]  Rev 12/18         Registration to scan to EHR                             Page 1 of 2               Controlled Substance Agreement Opioid        [facility]  05/09/19  Patient: Perry Preciado Jr.  YOB: 1975  Medical Record Number: [id number]                                                                  6. I am responsible for my prescriptions. If the medicine/prescription is lost or stolen, it will not be replaced. I also agree not to share controlled substance medicines with anyone.  7. I agree to not use ANY illegal or recreational drugs. This includes marijuana, cocaine, bath salts or other drugs. I agree not to use alcohol unless my care team says I may.          I agree to give urine samples whenever asked. If I don t give a urine sample, the care team may stop my medicine.    8. If I enroll in the [place] Medical Marijuana program, I will tell my care team. I will also sign an agreement to share my medical records with my care team.   9. I will bring in my list of medicines (or my medicine bottles) each time I come to the clinic.   10. I will tell my care team right away if I become pregnant or have a new  medical problem treated outside of my regular clinic.  11. I understand that this medicine can affect my thinking and judgment. It may be unsafe for me to drive, use machinery and do dangerous tasks. I will not do any of these things until I know how the medicine affects me. If my dose changes, I will wait to see how it affects me. I will contact my care team if I have concerns about medicine side effects.    I understand that if I do not follow any of the conditions above, my prescriptions or treatment may be stopped.      I agree that my provider, clinic care team, and pharmacy may work with any city, state or federal law enforcement agency that investigates the misuse, sale, or other diversion of my controlled medicine. I will allow my provider to discuss my care with or share a copy of this agreement with any other treating provider, pharmacy or emergency room where I receive care. I agree to give up (waive) any right of privacy or confidentiality with respect to these consents.     I have read this agreement and have asked questions about anything I did not understand.      ________________________________________________________________________  Patient signature - Date/Time -  Perry Preciado Jr.                                      ________________________________________________________________________  Witness signature                                                            ________________________________________________________________________  Provider signature - WOLF Wei CNP      [id number]  Rev 12/18         Registration to scan to EHR                         Page 2 of 2                   Controlled Substance Agreement Opioid           Page 1 of 2  Opioid Pain Medicines (also known as Narcotics)  What You Need to Know    What are opioids?   Opioids are pain medicines that must be prescribed by a doctor.  They are also known as narcotics.    Examples are:     morphine (MS Contin,  Parris)    oxycodone (Oxycontin)    oxycodone and acetaminophen (Percocet)    hydrocodone and acetaminophen (Vicodin, Norco)     fentanyl patch (Duragesic)     hydromorphone (Dilaudid)     methadone     What do opioids do well?   Opioids are best for short-term pain after a surgery or injury. They also work well for cancer pain. Unlike other pain medicines, they do not cause liver or kidney failure or ulcers. They may help some people with long-lasting (chronic) pain.     What do opioids NOT do well?   Opioids never get rid of pain entirely, and they do not work well for most patients with chronic pain. Opioids do not reduce swelling, one of the causes of pain. They also don t work well for nerve pain.                           For informational purposes only.  Not to replace the advice of your care provider.  Copyright [address] [facility]. All right reserved. [employer] 956020-Gth 02/18.      Page 2 of 2    Risks and side effects   Talk to your doctor before you start or decide to keep taking one of these medicines. Side effects include:    Lowering your breathing rate enough to cause death    Overdose, including death, especially if taking higher than prescribed doses    Long-term opioid use    Worse depression symptoms; less pleasure in things you usually enjoy    Feeling tired or sluggish    Slower thoughts or cloudy thinking    Being more sensitive to pain over time; pain is harder to control    Trouble sleeping or restless sleep    Changes in hormone levels (for example, less testosterone)    Changes in sex drive or ability to have sex    Constipation    Unsafe driving    Itching and sweating    Feeling dizzy    Nausea, vomiting and dry mouth    What else should I know about opioids?  When someone takes opioids for too long or too often, they become dependent. This means that if you stop or reduce the medicine too quickly, you will have withdrawal symptoms.    Dependence is not the same as addiction.  Addiction is when people keep using a substance that harms their body, their mind or their relations with others. If you have a history of drug or alcohol abuse, taking opioids can cause a relapse.    Over time, opioids don t work as well. Most people will need higher and higher doses. The higher the dose, the more serious the side effects. We don t know the long-term effects of opioids.      Prescribed opioids aren't the best way to manage chronic pain    Other ways to manage pain include:      Ibuprofen or acetaminophen.  You should always try this first.      Treat health problems that may be causing pain.      acupuncture or massage, deep breathing, meditation, visual imagery, aromatherapy.      Use heat or ice at the pain site      Physical therapy and exercise      Stop smoking      See a counselor or therapist                                                  People who have used opioids for a long time may have a lower quality of life, worse depression, higher levels of pain and more visits to doctors.    Never share your opioids with others. Be sure to store opioids in a secure place, locked if possible.Young children can easily swallow them and overdose.     You can overdose on opioids.  Signs of overdose include decrease or loss of consciousness, slowed breathing, trouble waking and blue lips.  If someone is worried about overdose, they should call 911.    If you are at risk for overdose, you may get naloxone (Narcan, a medicine that reverses the effects of opioids.  If you overdose, a friend or family member can give you Narcan while waiting for the ambulance.  They need to know the signs of overdose and how to give Narcan.    While you're taking opioids:    Don't use alcohol or street drugs. Taking them together can cause death.    Don't take any of these medicines unless your doctor says its okay.  Taking these with opioids can cause death.    Benzodiazepines (such as lorazepam         or  diazepam)    Muscle relaxers (such as cyclobenzaprine)    sleeping pills    other opioids    Safe disposal of opioids  Find your area drug take-back program, your pharmacy mail-back program, buy a special disposal bag (such as Deterra) from your pharmacy or flush them down the toilet.  Use the guidelines at:  www.fda.gov/drugs/resourcesforyou

## 2019-05-09 NOTE — PROGRESS NOTES
SUBJECTIVE:   Perry Preciado Jr. is a 44 year old male who presents to clinic today for the following   health issues:    Musculoskeletal problem/pain      Duration: About a year     Description  Location: Right shoulder     Intensity:  severe    Accompanying signs and symptoms: none    History  Previous similar problem: no   Previous evaluation:  none    Precipitating or alleviating factors:  Trauma or overuse: YES  Aggravating factors include: overuse    Therapies tried and outcome: nothing      Additional history: as documented    Reviewed  and updated as needed this visit by clinical staff         Reviewed and updated as needed this visit by Provider         Patient Active Problem List   Diagnosis     Major depressive disorder, recurrent episode, moderate (H)     CARDIOVASCULAR SCREENING; LDL GOAL LESS THAN 130     Acute bilateral low back pain with right-sided sciatica     Carpal tunnel syndrome of right wrist     KANE (generalized anxiety disorder)     Past Surgical History:   Procedure Laterality Date     BACK SURGERY         Social History     Tobacco Use     Smoking status: Former Smoker     Packs/day: 0.50     Types: Cigarettes     Smokeless tobacco: Current User   Substance Use Topics     Alcohol use: No     History reviewed. No pertinent family history.      Current Outpatient Medications   Medication Sig Dispense Refill     ALEVE OR None Entered       escitalopram (LEXAPRO) 20 MG tablet Take 1 tablet (20 mg total) by mouth once daily. 90 tablet 0     GABAPENTIN PO Take 800 mg by mouth 4 times daily       ibuprofen (ADVIL,MOTRIN) 600 MG tablet Take 1 tablet (600 mg) by mouth every 8 hours as needed for moderate pain 30 tablet 0     order for DME Wrist splint 1 Units 0     order for DME Equipment being ordered: wrist band 1 Units 0     traMADol (ULTRAM) 50 MG tablet Take 1 tablet (50 mg) by mouth every 12 hours as needed for pain 20 tablet 0     ARIPiprazole (ABILIFY) 5 MG tablet Take 5 mg by mouth  daily  0     QUEtiapine (SEROQUEL) 100 MG tablet Take 100 mg by mouth daily  1     Allergies   Allergen Reactions     Wellbutrin [Bupropion]      Naltrexone Other (See Comments)     Headaches  Headaches       Recent Labs   Lab Test 11/15/14  1445 10/07/14  0155   ALT  --  40   CR 1.28* 1.29*   GFRESTIMATED 63 62   GFRESTBLACK 76 75   POTASSIUM 4.0 3.4      BP Readings from Last 3 Encounters:   05/09/19 128/84   03/04/19 124/80   01/09/19 139/89    Wt Readings from Last 3 Encounters:   05/09/19 117 kg (258 lb)   03/04/19 109.8 kg (242 lb)   01/09/19 109.3 kg (241 lb)                    ROS:  Constitutional, HEENT, cardiovascular, pulmonary, gi and gu systems are negative, except as otherwise noted.    OBJECTIVE:     /84 (BP Location: Right arm, Patient Position: Sitting, Cuff Size: Adult Large)   Pulse 80   Temp 97.5  F (36.4  C) (Tympanic)   Wt 117 kg (258 lb)   BMI 33.13 kg/m    Body mass index is 33.13 kg/m .  GENERAL: healthy, alert and no distress  EYES: Eyes grossly normal to inspection, PERRL and conjunctivae and sclerae normal  HENT: ear canals and TM's normal, nose and mouth without ulcers or lesions  NECK: no adenopathy, no asymmetry, masses, or scars and thyroid normal to palpation  RESP: lungs clear to auscultation - no rales, rhonchi or wheezes  CV: regular rate and rhythm, normal S1 S2, no S3 or S4, no murmur, click or rub, no peripheral edema and peripheral pulses strong  MS: no gross musculoskeletal defects noted, no edema  SKIN: no suspicious lesions or rashes  NEURO: Normal strength and tone, mentation intact and speech normal  PSYCH: mentation appears normal, affect normal/bright  MS:   Inspection: atrophy noted right shoulder   Tender: anterior capsule proximal bicep tendon, greater tuberosity and upper trapezius muscle with severe atrophy of the muscles to the shoulder  Non-tender: proximal-mid clavicle, mid-distal clavicle, AC joint,  Range of Motion  Active:limited due to  pain.  Passive: limited due to pain.  Strength: forward flexion 3/5, abduction  3/5, internal rotation  3/5 and external rotation  3/5  Special tests:  Positive: Neers and Calderon  Negative: cross arm adduction    Inspection: no swelling, no ecchymosis  Tender: lateral epicondyle, common flexor tendon, supracondylar notch, olecranon bursa and distal bicep tendon  Non-tender: common extensor tendon, medial epicondyle, common flexor tendon and radial head/neck  Range of Motion: all normal  Strength: elbow strength full  Special tests: normal stability, normal valgus stress, normal varus stress:         ASSESSMENT/PLAN:   (M77.8) Elbow tendinitis  (primary encounter diagnosis)  Comment: Patient's current symptoms are present his elbow will have patient see physical therapy  Plan: PHYSICAL THERAPY REFERRAL, patient does state that he has a arm band he will wear that        HYDROcodone-acetaminophen (NORCO) 5-325 MG         tablet         (M25.511,  G89.29) Chronic right shoulder pain  Comment: Patient has chronic shoulder pain controlled by Norco renewed today.  Reviewed physical therapy patient not interested in physical therapy.  Patient did sign a substance agreement letter will continue to manage her chronic pain.  We will see how that develops over the next month or 2 next steps would be to place a referral for pain patient is also being followed up by orthopedics for his shoulder.  Did provide him with pain medications until he can determine his treatment plan with orthopedics for shoulder.  Plan: HYDROcodone-acetaminophen (NORCO) 5-325 MG         tablet            (F41.1) KANE (generalized anxiety disorder)  Comment: Anxiety controlled with Lexapro renewed today  Plan: escitalopram (LEXAPRO) 20 MG tablet     WOLF Wei [facility]

## 2019-06-03 ENCOUNTER — TELEPHONE (OUTPATIENT)
Dept: FAMILY MEDICINE | Facility: CLINIC | Age: 44
End: 2019-06-03

## 2019-06-03 ENCOUNTER — HOSPITAL ENCOUNTER (OUTPATIENT)
Dept: PHYSICAL THERAPY | Facility: CLINIC | Age: 44
Setting detail: THERAPIES SERIES
End: 2019-06-03
Attending: NURSE PRACTITIONER
Payer: COMMERCIAL

## 2019-06-03 PROCEDURE — 97110 THERAPEUTIC EXERCISES: CPT | Mod: GP | Performed by: PHYSICAL THERAPIST

## 2019-06-03 PROCEDURE — 97162 PT EVAL MOD COMPLEX 30 MIN: CPT | Mod: GP | Performed by: PHYSICAL THERAPIST

## 2019-06-03 NOTE — TELEPHONE ENCOUNTER
Notify patient that he will need to follow-up with orthopedics for his work restrictions for his shoulder as they are treating him and determine his treatment plan and progress.       Deneen Monroe CNP
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
## 2019-06-19 ENCOUNTER — TRANSFERRED RECORDS (OUTPATIENT)
Dept: HEALTH INFORMATION MANAGEMENT | Facility: CLINIC | Age: 44
End: 2019-06-19

## 2019-06-26 ENCOUNTER — NURSE TRIAGE (OUTPATIENT)
Dept: NURSING | Facility: CLINIC | Age: 44
End: 2019-06-26

## 2019-06-26 ENCOUNTER — TRANSFERRED RECORDS (OUTPATIENT)
Dept: HEALTH INFORMATION MANAGEMENT | Facility: CLINIC | Age: 44
End: 2019-06-26

## 2019-06-26 DIAGNOSIS — G89.29 CHRONIC RIGHT SHOULDER PAIN: ICD-10-CM

## 2019-06-26 DIAGNOSIS — M25.511 CHRONIC RIGHT SHOULDER PAIN: ICD-10-CM

## 2019-06-26 DIAGNOSIS — M77.8 ELBOW TENDINITIS: ICD-10-CM

## 2019-06-26 RX ORDER — HYDROCODONE BITARTRATE AND ACETAMINOPHEN 5; 325 MG/1; MG/1
1 TABLET ORAL EVERY 12 HOURS PRN
Qty: 20 TABLET | Refills: 0 | Status: SHIPPED | OUTPATIENT
Start: 2019-06-26 | End: 2019-07-15

## 2019-06-26 NOTE — TELEPHONE ENCOUNTER
Requested Prescriptions   Pending Prescriptions Disp Refills     HYDROcodone-acetaminophen (NORCO) 5-325 MG tablet 90 tablet 0     Sig: Take 1 tablet by mouth every 12 hours as needed for severe pain       There is no refill protocol information for this order        Controlled Substance Refill Request for Norco  Problem List Complete:  No     PROVIDER TO CONSIDER COMPLETION OF PROBLEM LIST AND OVERVIEW/CONTROLLED SUBSTANCE AGREEMENT    Last Written Prescription Date:  5/9/19  Last Fill Quantity: 90,   # refills: 0    THE MOST RECENT OFFICE VISIT MUST BE WITHIN THE PAST 3 MONTHS. AT LEAST ONE FACE TO FACE VISIT MUST OCCUR EVERY 6 MONTHS. ADDITIONAL VISITS CAN BE VIRTUAL.  (THIS STATEMENT SHOULD BE DELETED.)    Last Office Visit with [facility] primary care provider: 5/9/19  [place]    Future Office visit:     Controlled substance agreement:   Encounter-Level CSA:    There are no encounter-level csa.     Patient-Level CSA:    Controlled Substance Agreement - Opioid - Scan on 5/10/2019  3:21 PM (below)           Last Urine Drug Screen: No results found for: CDAUT, No results found for: COMDAT, No results found for: THC13, PCP13, COC13, MAMP13, OPI13, AMP13, BZO13, TCA13, MTD13, BAR13, OXY13, PPX13, BUP13     Processing:  Patient will  in clinic     [email or web address]       checked in past 3 months?  No, route to RN

## 2019-06-26 NOTE — TELEPHONE ENCOUNTER
Patient calling clinic back regarding his refill request for HYDROcodone-acetaminophen (NORCO) 5-325 MG tablet. Informed patient a prescription for 20 tablets is ready at the clinic. Patient wanted RN to check if pharmacy has the prescription and if the provider called in the medication. Pharmacy states they do no have prescription for patient. Advised patient to call clinic in the morning to  prescription. Caller verbalized understanding. Denies further questions.        Adama Acuña RN  [facility]         Reason for Disposition    Caller has medication question only, adult not sick, and triager answers question    Protocols used: MEDICATION QUESTION CALL-A-

## 2019-06-26 NOTE — TELEPHONE ENCOUNTER
Long term plan unclear.  Will give 20 tabs.  Deneen needs to weigh in on ongoing use, her note made it seem like ongoing norco from her might not be the plan.

## 2019-07-15 ENCOUNTER — OFFICE VISIT (OUTPATIENT)
Dept: FAMILY MEDICINE | Facility: CLINIC | Age: 44
End: 2019-07-15
Payer: COMMERCIAL

## 2019-07-15 VITALS
WEIGHT: 253 LBS | DIASTOLIC BLOOD PRESSURE: 80 MMHG | BODY MASS INDEX: 32.47 KG/M2 | RESPIRATION RATE: 18 BRPM | HEART RATE: 88 BPM | SYSTOLIC BLOOD PRESSURE: 124 MMHG | TEMPERATURE: 97.2 F | HEIGHT: 74 IN

## 2019-07-15 DIAGNOSIS — M25.511 CHRONIC RIGHT SHOULDER PAIN: ICD-10-CM

## 2019-07-15 DIAGNOSIS — G89.29 CHRONIC RIGHT SHOULDER PAIN: ICD-10-CM

## 2019-07-15 DIAGNOSIS — Z71.6 ENCOUNTER FOR SMOKING CESSATION COUNSELING: Primary | ICD-10-CM

## 2019-07-15 DIAGNOSIS — M77.8 ELBOW TENDINITIS: ICD-10-CM

## 2019-07-15 PROCEDURE — 99214 OFFICE O/P EST MOD 30 MIN: CPT | Performed by: NURSE PRACTITIONER

## 2019-07-15 RX ORDER — HYDROCODONE BITARTRATE AND ACETAMINOPHEN 5; 325 MG/1; MG/1
1 TABLET ORAL EVERY 12 HOURS PRN
Qty: 60 TABLET | Refills: 0 | Status: SHIPPED | OUTPATIENT
Start: 2019-07-15 | End: 2019-09-23

## 2019-07-15 ASSESSMENT — ANXIETY QUESTIONNAIRES
GAD7 TOTAL SCORE: 2
3. WORRYING TOO MUCH ABOUT DIFFERENT THINGS: SEVERAL DAYS
2. NOT BEING ABLE TO STOP OR CONTROL WORRYING: SEVERAL DAYS
4. TROUBLE RELAXING: NOT AT ALL
7. FEELING AFRAID AS IF SOMETHING AWFUL MIGHT HAPPEN: NOT AT ALL
GAD7 TOTAL SCORE: 2
GAD7 TOTAL SCORE: 2
7. FEELING AFRAID AS IF SOMETHING AWFUL MIGHT HAPPEN: NOT AT ALL
6. BECOMING EASILY ANNOYED OR IRRITABLE: NOT AT ALL
1. FEELING NERVOUS, ANXIOUS, OR ON EDGE: NOT AT ALL
5. BEING SO RESTLESS THAT IT IS HARD TO SIT STILL: NOT AT ALL

## 2019-07-15 ASSESSMENT — MIFFLIN-ST. JEOR: SCORE: 2112.35

## 2019-07-15 ASSESSMENT — PATIENT HEALTH QUESTIONNAIRE - PHQ9
SUM OF ALL RESPONSES TO PHQ QUESTIONS 1-9: 5
SUM OF ALL RESPONSES TO PHQ QUESTIONS 1-9: 5
10. IF YOU CHECKED OFF ANY PROBLEMS, HOW DIFFICULT HAVE THESE PROBLEMS MADE IT FOR YOU TO DO YOUR WORK, TAKE CARE OF THINGS AT HOME, OR GET ALONG WITH OTHER PEOPLE: NOT DIFFICULT AT ALL

## 2019-07-15 NOTE — PROGRESS NOTES
Subjective     Perry Preciado Jr. is a 44 year old male who presents to clinic today for the following health issues:    HPI   Chronic Pain Follow-Up       Type / Location of Pain: right shoulder  Analgesia/pain control:       Recent changes:  same      Overall control: Comfortably manageable  Activity level/function:      Daily activities:  Able to do light housework, cooking    Work:  Unable to work  Adverse effects:  No  Adherance    Taking medication as directed?  Yes    Participating in other treatments: no   Risk Factors:    Sleep:  Good    Mood/anxiety:  controlled    Recent family or social stressors:  none noted    Other aggravating factors: none  PHQ-9 SCORE 6/16/2017 12/14/2018 7/15/2019   PHQ-9 Total Score MyChart - 8 (Mild depression) 5 (Mild depression)   PHQ-9 Total Score 2 8 5     KANE-7 SCORE 6/16/2017 12/14/2018 7/15/2019   Total Score - 12 (moderate anxiety) 2 (minimal anxiety)   Total Score 10 12 2     Encounter-Level CSA:    There are no encounter-level csa.     Patient-Level CSA:    Controlled Substance Agreement - Opioid - Scan on 5/10/2019  3:21 PM (below)           Patient Active Problem List   Diagnosis     Major depressive disorder, recurrent episode, moderate (H)     CARDIOVASCULAR SCREENING; LDL GOAL LESS THAN 130     Acute bilateral low back pain with right-sided sciatica     Carpal tunnel syndrome of right wrist     KANE (generalized anxiety disorder)     Past Surgical History:   Procedure Laterality Date     BACK SURGERY         Social History     Tobacco Use     Smoking status: Former Smoker     Packs/day: 0.50     Types: Cigarettes     Smokeless tobacco: Current User   Substance Use Topics     Alcohol use: No     History reviewed. No pertinent family history.      Current Outpatient Medications   Medication Sig Dispense Refill     ALEVE OR None Entered       ARIPiprazole (ABILIFY) 5 MG tablet Take 5 mg by mouth daily  0     escitalopram (LEXAPRO) 20 MG tablet Take 1 tablet (20 mg  "total) by mouth once daily. 90 tablet 0     GABAPENTIN PO Take 800 mg by mouth 4 times daily       HYDROcodone-acetaminophen (NORCO) 5-325 MG tablet Take 1 tablet by mouth every 12 hours as needed for severe pain 20 tablet 0     ibuprofen (ADVIL,MOTRIN) 600 MG tablet Take 1 tablet (600 mg) by mouth every 8 hours as needed for moderate pain 30 tablet 0     QUEtiapine (SEROQUEL) 100 MG tablet Take 100 mg by mouth daily  1     Allergies   Allergen Reactions     Wellbutrin [Bupropion]      Naltrexone Other (See Comments)     Headaches  Headaches       Recent Labs   Lab Test 11/15/14  1445 10/07/14  0155   ALT  --  40   CR 1.28* 1.29*   GFRESTIMATED 63 62   GFRESTBLACK 76 75   POTASSIUM 4.0 3.4        Reviewed and updated as needed this visit by Provider         Review of Systems   ROS COMP: Constitutional, HEENT, cardiovascular, pulmonary, gi and gu systems are negative, except as otherwise noted.      Objective    /80 (BP Location: Right arm, Cuff Size: Adult Large)   Pulse 88   Temp 97.2  F (36.2  C) (Tympanic)   Resp 18   Ht 1.88 m (6' 2\")   Wt 114.8 kg (253 lb)   BMI 32.48 kg/m    Body mass index is 32.48 kg/m .  Physical Exam   GENERAL: healthy, alert and no distress  EYES: Eyes grossly normal to inspection, PERRL and conjunctivae and sclerae normal  NECK: no adenopathy, no asymmetry, masses, or scars and thyroid normal to palpation  RESP: lungs clear to auscultation - no rales, rhonchi or wheezes  CV: regular rate and rhythm, normal S1 S2, no S3 or S4, no murmur, click or rub, no peripheral edema and peripheral pulses strong  MS: no gross musculoskeletal defects noted, no edema  SKIN: no suspicious lesions or rashes  NEURO: Normal strength and tone, mentation intact and speech normal  PSYCH: mentation appears normal, affect normal/bright  Inspection: atrophy noted right shoulder   Tender: anterior capsule proximal bicep tendon, greater tuberosity and upper trapezius muscle with severe atrophy of the " "muscles to the shoulder  Non-tender: proximal-mid clavicle, mid-distal clavicle, AC joint,  Range of Motion  Active:limited due to pain.  Passive: limited due to pain.  Strength: forward flexion 3/5, abduction  3/5, internal rotation  3/5 and external rotation  3/5  Special tests:  Positive: Neers and Calderon  Negative: cross arm adduction     Inspection: no swelling, no ecchymosis  Tender: lateral epicondyle, common flexor tendon, supracondylar notch, olecranon bursa and distal bicep tendon  Non-tender: common extensor tendon, medial epicondyle, common flexor tendon and radial head/neck  Range of Motion: all normal  Strength: elbow strength full  Special tests: normal stability, normal valgus stress, normal varus stress:   Diagnostic Test Results:  Labs reviewed in Epic        Assessment & Plan     (Z71.6) Encounter for smoking cessation counseling  (primary encounter diagnosis)  Comment: Patient would like to stop smoking Chantix ordered  Plan: varenicline (CHANTIX BRUCE) 0.5 MG X 11 & 1 MG X         42 tablet    (M25.511,  G89.29) Chronic right shoulder pain  Comment: Patient has history of chronic shoulder pain was seen by Dr. kebede.  Referred to the [facility] for evaluation his pain medications today  Plan: HYDROcodone-acetaminophen (NORCO) 5-325 MG         tablet           BMI:   Estimated body mass index is 32.48 kg/m  as calculated from the following:    Height as of this encounter: 1.88 m (6' 2\").    Weight as of this encounter: 114.8 kg (253 lb).           See Patient Instructions    Return in about 4 weeks (around 8/12/2019) for Pain management.    WOLF Wei [facility]      "

## 2019-07-15 NOTE — PATIENT INSTRUCTIONS
Patient Education     Chronic Pain  Pain serves an important role. It lets you know something is wrong that needs your attention. When the body heals, pain normally goes away.  When pain lasts longer than 6 months, it is called  chronic  pain. This is pain that is present even after the body has healed. Chronic pain can cause mood problems and get in the way of your relationships and your daily life.  A number of conditions can cause chronic pain. Some of the more common include:    Previous surgery    An old injury    Infection    Diseases such as diabetes    Nerve damage    Back injury    Arthritis    Migraine or other headaches    Fibromyalgia    Cancer  Depression and stress can make chronic pain symptoms worse. In some cases, a cause for the pain can't be found.   Treatment  Treatment can greatly reduce pain. In many cases, pain can become less severe, occur less often, and interfere less with your daily life. Chronic pain is often treated with a combination of medicines, therapies, and lifestyle changes. You will work closely with your healthcare provider to find a treatment plan that works best for you.    Ask your healthcare provider for a referral to a pain management specialty center. These can provide the most recent and proven pain management strategies, along with emotional support and comprehensive services.    Several different types of medicines may be prescribed for chronic pain. Work with your healthcare provider to develop a medicine plan that helps manage your pain.    Physical therapy can help reduce certain types of chronic pain.    Occupational therapy teaches you how to do routine tasks of daily living in ways that lessen your discomfort.    Counseling can help you cope better with stress and pain.    Other therapies such as meditation, yoga, biofeedback, massage, and acupuncture can also help manage chronic pain.    Changing certain habits can help reduce chronic pain. They  include:  ? Eating healthy  ? Developing an exercise routine  ? Getting enough sleep   ? Stopping smoking and limiting alcohol use  ? Losing excess weight  Follow-up care  Follow up with your healthcare provider, or as advised. Let your healthcare provider know if your current treatment plan is working or if changes are needed.  Resources  For more information, contact:    American Headache and Migraine [email or web address] or [phone]    American Chronic Pain Association, theacpa.org or 777-727-3749  Date Last Reviewed: 8/1/2017 2000-2018 [employer]. [address] [place] [address]. All rights reserved. This information is not intended as a substitute for professional medical care. Always follow your healthcare professional's instructions.           Patient Education     Kicking the Smoking Habit  If you smoke, quitting is one of the best changes you can make for your heart and your overall health. Your risk of heart attack goes down within one day of putting out that last cigarette. As you go longer without smoking, your risk goes down even more. Quitting isn t easy, but millions of people have done it. You can, too. It s never too late to quit.  Getting started  Boost your chances of success by deciding on your  quit plan.  Your health care provider and cardiac rehab team can help you develop this plan. Even if you ve already quit, it s easy to slip back into smoking.  Your plan can help you avoid and recover from relapse.  In any case, start by setting a date to quit within a month, and do it.    Keys to your quit plan    Talk to your healthcare provider about prescription medicines and nicotine replacement products that help stop the urge to smoke.     Join a support group or quit smoking program. Talking with others about the challenges of quitting can help you get through them.    Ask other smokers in your household to quit with you.    Look for the cues in your  life that you associate with smoking and avoid them.  Track your triggers  What gives you that  S-pfct-z-cigarette  feeling? List all the situations that make you want a cigarette. Then think of other ways to deal with these situations. Here are some examples:  Situation How I'll handle it   Finishing a meal Get up from the table and take a walk   Having an argument Find a quiet place and breathe deeply   Feeling lonely or bored Call a friend to talk         Tips for quitting successfully    List the benefits of quitting such as reducing heart risks and saving money. Keep this list and review it whenever you feel like smoking.    Get support. Let your friends know you may call them to chat when you have an urge to smoke.    If you ve tried to quit before without success, this time avoid the triggers that may cause the relapse.    Make the most of slip-ups. Try to learn from them, and then get back on track.    Be accountable to your friends and your calendar so that you stay on track.  For family and friends    Be supportive and patient. Quitting smoking can be difficult and stressful.    If you smoke, now s a great time to quit. Even if you don t quit, never smoke around your loved one. Secondhand smoke is dangerous to his or her heart.    The best goals are accomplished in teams. Remember that when your loved one states he or she wants to stop smoking.  Date Last Reviewed: 7/1/2016 2000-2018 [employer]. [address] [place] [address], [place] [address]. All rights reserved. This information is not intended as a substitute for professional medical care. Always follow your healthcare professional's instructions.           Patient Education     Resources to Help You Quit Smoking  If you have quit smoking or are thinking about quitting, congratulations! It can be hard to quit smoking, but the benefits are well worth it. To help you quit and stay smoke-free, there are many resources that can help.  Your  health plan  If you have health insurance, call them for more details about their phone coaching programs.    Blue Cross and Blue Shield [facility]: 2-615-720-BLUE    [employer]: 6-308-626-QUIT    [facility]: 4-314-399-BLUE    [employer]: 3-258-415-5712    [employer]: 1-948.129.8086    [facility] members: 1-230.317.7488    [facility]: 1-937.680.9760    [facility]: 1-667.460.9729    [facility]: 1-563.950.6096  American Cancer Society: 1-314.398.2423  The American Cancer Society can help you find local resources to quit smoking.  QUITPLAN: 3-726-017-PLAN (5137)  Offers a telephone helpline, gum, patches and lozenges. These services are free for the uninsured and those without coverage. The online program is free to everyone at www.[employer].com.  American Lung Association: 5-757-IVXC-[facility] (351-2393)  Provides a lung helpline as well as an online program, self-help book and group clinic support for quitting smoking. www.lung.org/stop-smoking  National Cancer [place]: 4-948-057-QUIT (6355)  Offers a telephone hotline, online text chat and a website with tools, information and support for smokers who want to quit. www.smokefree.gov  Medication Therapy Management:  458.883.4216 ([facility])  [phone] ([place])  This is a clinic program to help you quit smoking. It offers one-on-one sessions with a pharmacist.  [employer] Exhale! Group Tobacco Cessation: 819.404.4608  Small group sessions held throughout the [facility] area for people who are trying to live free from tobacco. www.[employer].org/exhale  Call to reserve your spot or for additional information.  For informational purposes only. Not to replace the advice of your health care provider.  Copyright   2013 [employer]. All rights reserved. [employer] 358951 - Rev 12/15.  For informational purposes only. Not to replace the advice of your health care provider.  Copyright    2018 [facility]. All rights reserved.           Patient Education     The Benefits of Living Smoke Free  What do you want to gain from quitting? Check off some reasons to quit.  Health benefits  ___  Improve my ability to breathe without coughing or shortness of breath  ___  Reduce my risk of lung cancer, heart disease, chronic lung disease  ___  Have fewer wrinkles and softer skin  ___  Improve my sense of taste and smell  ___  For pregnant women--reduce the risk of having a miscarriage, stillbirth, premature birth, or low-birth-weight baby  Personal benefits  ___  Feel more in control of my life  ___  Have better-smelling hair, breath, clothes, home, and car  ___  Save time by not having to take smoke breaks, buy cigarettes, or hunt for a light  ___  Have whiter teeth  Family benefits  ___  Reduce my children s respiratory tract infections  ___  Set a good example for my children  ___  Reduce my family s cancer risk  Financial benefits  ___  Save hundreds of dollars each year that would be spent on cigarettes  ___  Save money on medical bills  ___  Save on life, health, and car insurance premiums     Those dollars add up!  Cigarettes are expensive, and getting more expensive all the time. Do you realize how much money you are spending on cigarettes per year? What is the average amount you spend on a pack of cigarettes? What is the average number of packs that you smoke per day? Using your answers to these questions, fill in this formula to help you find out:  ($ _____ per pack) ×  ( _____ number of packs per day) × (365 days) =  $ _____ yearly cost of smoking  Besides tobacco, there are other costs, including extra cleaning bills and replacement costs for clothing and furniture; medical expenses for smoking-related illnesses; and higher health, life, and car insurance premiums.  Cigars and pipes count too!  Cigars and pipes are also dangerous. So are smokeless (chewing) tobacco and snuff. All of  these products contain nicotine, a highly addictive substance that has harmful effects on your body. Quitting smoking means giving up all tobacco products.      For more information    https://smokefree.gov/rsjh-nm-vs-expert    National Cancer [place] Smoking Quitline: 877-44U-QUIT (953-572-1408)   Date Last Reviewed: 2/1/2017 2000-2018 [employer]. [address] [place] [address]. All rights reserved. This information is not intended as a substitute for professional medical care. Always follow your healthcare professional's instructions.

## 2019-07-16 ASSESSMENT — ANXIETY QUESTIONNAIRES: GAD7 TOTAL SCORE: 2

## 2019-07-18 DIAGNOSIS — G89.29 CHRONIC RIGHT SHOULDER PAIN: Primary | ICD-10-CM

## 2019-07-18 DIAGNOSIS — M25.511 CHRONIC RIGHT SHOULDER PAIN: Primary | ICD-10-CM

## 2019-07-18 DIAGNOSIS — F32.0 MILD MAJOR DEPRESSION (H): ICD-10-CM

## 2019-07-18 RX ORDER — GABAPENTIN 400 MG/1
800 CAPSULE ORAL 4 TIMES DAILY
Qty: 240 CAPSULE | Refills: 0 | Status: SHIPPED | OUTPATIENT
Start: 2019-07-18 | End: 2019-09-23

## 2019-07-18 RX ORDER — ARIPIPRAZOLE 5 MG/1
5 TABLET ORAL DAILY
Qty: 30 TABLET | Refills: 0 | Status: SHIPPED | OUTPATIENT
Start: 2019-07-18 | End: 2019-09-23

## 2019-07-18 NOTE — TELEPHONE ENCOUNTER
"Requested Prescriptions   Pending Prescriptions Disp Refills     ARIPiprazole (ABILIFY) 5 MG tablet  0     Sig: Take 1 tablet (5 mg) by mouth daily       Antipsychotic Medications Failed - 7/18/2019 10:08 AM        Failed - Lipid panel on file within the past 12 months     No lab results found.            Failed - CBC on file in past 12 months     Recent Labs   Lab Test 11/15/14  1445   WBC 9.6   RBC 4.49   HGB 13.8   HCT 39.5*                    Failed - A1c or Glucose on file in past 12 months     Recent Labs   Lab Test 11/15/14  1445   GLC 94       Please review patients last 3 weights. If a weight gain of >10 lbs exists, you may refill the prescription once after instructing the patient to schedule an appointment within the next 30 days.    Wt Readings from Last 3 Encounters:   07/15/19 114.8 kg (253 lb)   05/09/19 117 kg (258 lb)   03/04/19 109.8 kg (242 lb)             Passed - Blood pressure under 140/90 in past 12 months     BP Readings from Last 3 Encounters:   07/15/19 124/80   05/09/19 128/84   03/04/19 124/80                 Passed - Patient is 12 years of age or older        Passed - Heart Rate on file within past 12 months     Pulse Readings from Last 3 Encounters:   07/15/19 88   05/09/19 80   03/04/19 80               Passed - Medication is active on med list        Passed - Recent (6 mo) or future (30 days) visit within the authorizing provider's specialty     Patient had office visit in the last 6 months or has a visit in the next 30 days with authorizing provider or within the authorizing provider's specialty.  See \"Patient Info\" tab in inbasket, or \"Choose Columns\" in Meds & Orders section of the refill encounter.            gabapentin (NEURONTIN) 400 MG capsule       Sig: Take 2 capsules (800 mg) by mouth 4 times daily       There is no refill protocol information for this order        ABILIFY 5 MG TAB      Last Written Prescription Date:  PATIENT REPORTED  Last Fill Quantity: 30,   # " refills: LAST FILLED AT PHARMACY 5/7/19  Last Office Visit: 7/15/19  Future Office visit:       GABAPENTIN 400 MG CAP      Last Written Prescription Date:  PATIENT REPORTED  Last Fill Quantity: 240,   # refills: LAST FILLED AT PHARMACY 6/18/19  Last Office Visit: 7/15/19  Future Office visit:       Routing refill request to provider for review/approval because:  Drug not on the [facility], P or [facility] refill protocol or controlled substance

## 2019-08-14 NOTE — ADDENDUM NOTE
Encounter addended by: Yodit Martinez, PT on: 8/14/2019 3:30 PM   Actions taken: Sign clinical note, Flowsheet accepted, Episode resolved

## 2019-08-14 NOTE — PROGRESS NOTES
Outpatient Physical Therapy Discharge Note     Patient: Perry Preciado Jr.  : 1975    Evaluation date:  6/3/19    Referring Provider: Deneen Monroe NP    Therapy Diagnosis: L elbow    Client Self Report:   Current status is unknown since patient did not return for further PT visits.    Objective Measurements:  Current objective status is unknown since patient failed to complete treatment     Goals:  Goal Identifier Picking up objects   Goal Description Pt will demonstrate full  GH AROM with less than 1/10 pain in order to be able to don/doff jacket/shirt to return to PLOF w/o  pain.   Target Date 19   Date Met      Progress:unable to assess as pt failed to schedule f/u appts     Goal Identifier GH ROM   Goal Description Pt will demonstrate full  GH AROM with less than 1/10 pain in order to be able to don/doff jacket/shirt to return to PLOF w/o  pain.   Target Date 19   Date Met      Progress:unable to assess as pt failed to schedule f/u appts     Goal Identifier SPADI   Goal Description Pt will report <10% disability on SPADI to demonstrate improved ability to complete daily activities and demonstrate significant clinical improvement   Target Date 19   Date Met      Progress:unable to assess as pt failed to schedule f/u appts       Progress Toward Goals:   Progress this reporting period: Progress this reporting period: Pt has failed to schedule f/u visits within 30 days from last visit thus is being d/c from therapy at this time. Objective measures are all taken from last visit. Current status is unknown at this time.          Plan:  Discharge from therapy.    Discharge:    Reason for Discharge: Patient has failed to schedule further appointments.      Equipment Issued: none    Discharge Plan:  Not completed since patient failed to schedule further appointments.    Yodit Martinez  Physical Therapist #[id number]  [place] [id number] [place] [address]  [id number]  [email or web address]   [email or web address]   Office: [phone] Fax: [phone]

## 2019-09-23 ENCOUNTER — OFFICE VISIT (OUTPATIENT)
Dept: FAMILY MEDICINE | Facility: CLINIC | Age: 44
End: 2019-09-23
Payer: COMMERCIAL

## 2019-09-23 ENCOUNTER — TELEPHONE (OUTPATIENT)
Dept: FAMILY MEDICINE | Facility: CLINIC | Age: 44
End: 2019-09-23

## 2019-09-23 VITALS
WEIGHT: 252 LBS | TEMPERATURE: 97.1 F | DIASTOLIC BLOOD PRESSURE: 80 MMHG | RESPIRATION RATE: 18 BRPM | HEART RATE: 80 BPM | HEIGHT: 74 IN | BODY MASS INDEX: 32.34 KG/M2 | SYSTOLIC BLOOD PRESSURE: 130 MMHG

## 2019-09-23 DIAGNOSIS — F41.1 GAD (GENERALIZED ANXIETY DISORDER): ICD-10-CM

## 2019-09-23 DIAGNOSIS — Z23 NEED FOR PROPHYLACTIC VACCINATION AND INOCULATION AGAINST INFLUENZA: ICD-10-CM

## 2019-09-23 DIAGNOSIS — M25.511 CHRONIC RIGHT SHOULDER PAIN: ICD-10-CM

## 2019-09-23 DIAGNOSIS — M25.511 CHRONIC RIGHT SHOULDER PAIN: Primary | ICD-10-CM

## 2019-09-23 DIAGNOSIS — F43.10 PTSD (POST-TRAUMATIC STRESS DISORDER): ICD-10-CM

## 2019-09-23 DIAGNOSIS — F32.0 MILD MAJOR DEPRESSION (H): ICD-10-CM

## 2019-09-23 DIAGNOSIS — G89.29 CHRONIC RIGHT SHOULDER PAIN: Primary | ICD-10-CM

## 2019-09-23 DIAGNOSIS — G89.29 CHRONIC RIGHT SHOULDER PAIN: ICD-10-CM

## 2019-09-23 PROCEDURE — 90471 IMMUNIZATION ADMIN: CPT | Performed by: NURSE PRACTITIONER

## 2019-09-23 PROCEDURE — 99214 OFFICE O/P EST MOD 30 MIN: CPT | Mod: 25 | Performed by: NURSE PRACTITIONER

## 2019-09-23 PROCEDURE — 90686 IIV4 VACC NO PRSV 0.5 ML IM: CPT | Performed by: NURSE PRACTITIONER

## 2019-09-23 RX ORDER — HYDROCODONE BITARTRATE AND ACETAMINOPHEN 5; 325 MG/1; MG/1
1 TABLET ORAL EVERY 12 HOURS PRN
Qty: 60 TABLET | Refills: 0 | Status: SHIPPED | OUTPATIENT
Start: 2019-09-23 | End: 2019-09-23

## 2019-09-23 RX ORDER — HYDROCODONE BITARTRATE AND ACETAMINOPHEN 5; 325 MG/1; MG/1
1 TABLET ORAL EVERY 12 HOURS PRN
Qty: 60 TABLET | Refills: 0 | Status: SHIPPED | OUTPATIENT
Start: 2019-09-23 | End: 2020-01-22

## 2019-09-23 RX ORDER — QUETIAPINE FUMARATE 100 MG/1
100 TABLET, FILM COATED ORAL DAILY
Qty: 30 TABLET | Refills: 0 | Status: ON HOLD | OUTPATIENT
Start: 2019-09-23 | End: 2020-05-04

## 2019-09-23 RX ORDER — GABAPENTIN 400 MG/1
800 CAPSULE ORAL 4 TIMES DAILY
Qty: 240 CAPSULE | Refills: 0 | Status: SHIPPED | OUTPATIENT
Start: 2019-09-23 | End: 2020-01-22

## 2019-09-23 RX ORDER — ARIPIPRAZOLE 5 MG/1
5 TABLET ORAL DAILY
Qty: 30 TABLET | Refills: 0 | Status: SHIPPED | OUTPATIENT
Start: 2019-09-23 | End: 2020-01-22

## 2019-09-23 RX ORDER — ESCITALOPRAM OXALATE 20 MG/1
TABLET ORAL
Qty: 90 TABLET | Refills: 0 | Status: SHIPPED | OUTPATIENT
Start: 2019-09-23 | End: 2020-01-22

## 2019-09-23 ASSESSMENT — MIFFLIN-ST. JEOR: SCORE: 2107.81

## 2019-09-23 NOTE — TELEPHONE ENCOUNTER
Pt said his Hydrocodone has to go to [facility]. Due to his Insurance it can only go there. Unable to transfer Pain Medications per Pharmacy.  Please resend RX  Pt said the scripts went to [facility]. They were able to transfer other meds but not the Generic Norco.  Simran [place] [facility]

## 2019-09-23 NOTE — PATIENT INSTRUCTIONS
Patient Education     Chronic Pain  Pain serves an important role. It lets you know something is wrong that needs your attention. When the body heals, pain normally goes away.  When pain lasts longer than 6 months, it is called  chronic  pain. This is pain that is present even after the body has healed. Chronic pain can cause mood problems and get in the way of your relationships and your daily life.  A number of conditions can cause chronic pain. Some of the more common include:    Previous surgery    An old injury    Infection    Diseases such as diabetes    Nerve damage    Back injury    Arthritis    Migraine or other headaches    Fibromyalgia    Cancer  Depression and stress can make chronic pain symptoms worse. In some cases, a cause for the pain can't be found.   Treatment  Treatment can greatly reduce pain. In many cases, pain can become less severe, occur less often, and interfere less with your daily life. Chronic pain is often treated with a combination of medicines, therapies, and lifestyle changes. You will work closely with your healthcare provider to find a treatment plan that works best for you.    Ask your healthcare provider for a referral to a pain management specialty center. These can provide the most recent and proven pain management strategies, along with emotional support and comprehensive services.    Several different types of medicines may be prescribed for chronic pain. Work with your healthcare provider to develop a medicine plan that helps manage your pain.    Physical therapy can help reduce certain types of chronic pain.    Occupational therapy teaches you how to do routine tasks of daily living in ways that lessen your discomfort.    Counseling can help you cope better with stress and pain.    Other therapies such as meditation, yoga, biofeedback, massage, and acupuncture can also help manage chronic pain.    Changing certain habits can help reduce chronic pain. They  include:  ? Eating healthy  ? Developing an exercise routine  ? Getting enough sleep   ? Stopping smoking and limiting alcohol use  ? Losing excess weight  Follow-up care  Follow up with your healthcare provider, or as advised. Let your healthcare provider know if your current treatment plan is working or if changes are needed.  Resources  For more information, contact:    American Headache and Migraine Association, [facility].[employer] or 489-814-4006    American Chronic Pain Association, theacpa.org or 748-452-1388  Date Last Reviewed: 8/1/2017 2000-2018 [employer]. [address] [place] [address]. All rights reserved. This information is not intended as a substitute for professional medical care. Always follow your healthcare professional's instructions.

## 2019-09-23 NOTE — PROGRESS NOTES
Subjective     Perry Preciado Jr. is a 44 year old male who presents to clinic today for the following health issues:    HPI   Chronic Pain Follow-Up       Type / Location of Pain: right shoulder  Analgesia/pain control:       Recent changes:  worse- mowing and raking has made the pain worse      Overall control: Comfortably manageable  Activity level/function:      Daily activities:  Able to do light housework, cooking    Work:  Unable to work  Adverse effects:  No  Adherance    Taking medication as directed?  Yes    Participating in other treatments: no   Risk Factors:    Sleep:  Good    Mood/anxiety:  controlled    Recent family or social stressors:  none noted    Other aggravating factors: none  PHQ-9 SCORE 6/16/2017 12/14/2018 7/15/2019   PHQ-9 Total Score MyChart - 8 (Mild depression) 5 (Mild depression)   PHQ-9 Total Score 2 8 5     KANE-7 SCORE 6/16/2017 12/14/2018 7/15/2019   Total Score - 12 (moderate anxiety) 2 (minimal anxiety)   Total Score 10 12 2     Encounter-Level CSA:    There are no encounter-level csa.     Patient-Level CSA:    Controlled Substance Agreement - Opioid - Scan on 5/10/2019  3:21 PM           Patient Active Problem List   Diagnosis     Major depressive disorder, recurrent episode, moderate (H)     CARDIOVASCULAR SCREENING; LDL GOAL LESS THAN 130     Acute bilateral low back pain with right-sided sciatica     Carpal tunnel syndrome of right wrist     KANE (generalized anxiety disorder)     Past Surgical History:   Procedure Laterality Date     BACK SURGERY         Social History     Tobacco Use     Smoking status: Former Smoker     Packs/day: 0.50     Types: Cigarettes     Smokeless tobacco: Current User   Substance Use Topics     Alcohol use: No     History reviewed. No pertinent family history.      Current Outpatient Medications   Medication Sig Dispense Refill     ALEVE OR None Entered       ARIPiprazole (ABILIFY) 5 MG tablet Take 1 tablet (5 mg) by mouth daily 30 tablet 0      escitalopram (LEXAPRO) 20 MG tablet Take 1 tablet (20 mg total) by mouth once daily. 90 tablet 0     gabapentin (NEURONTIN) 400 MG capsule Take 2 capsules (800 mg) by mouth 4 times daily 240 capsule 0     HYDROcodone-acetaminophen (NORCO) 5-325 MG tablet Take 1 tablet by mouth every 12 hours as needed for severe pain 60 tablet 0     ibuprofen (ADVIL,MOTRIN) 600 MG tablet Take 1 tablet (600 mg) by mouth every 8 hours as needed for moderate pain 30 tablet 0     QUEtiapine (SEROQUEL) 100 MG tablet Take 100 mg by mouth daily  1     Allergies   Allergen Reactions     Wellbutrin [Bupropion]      Naltrexone Other (See Comments)     Headaches  Headaches       Recent Labs   Lab Test 11/15/14  1445 10/07/14  0155   ALT  --  40   CR 1.28* 1.29*   GFRESTIMATED 63 62   GFRESTBLACK 76 75   POTASSIUM 4.0 3.4      BP Readings from Last 3 Encounters:   09/23/19 130/80   07/15/19 124/80   05/09/19 128/84    Wt Readings from Last 3 Encounters:   09/23/19 114.3 kg (252 lb)   07/15/19 114.8 kg (253 lb)   05/09/19 117 kg (258 lb)               Reviewed and updated as needed this visit by Provider         Review of Systems   ROS COMP: Constitutional, HEENT, cardiovascular, pulmonary, gi and gu systems are negative, except as otherwise noted.      Objective    There were no vitals taken for this visit.  There is no height or weight on file to calculate BMI.  Physical Exam   GENERAL: healthy, alert and no distress  EYES: Eyes grossly normal to inspection, PERRL and conjunctivae and sclerae normal  NECK: no adenopathy, no asymmetry, masses, or scars and thyroid normal to palpation  RESP: lungs clear to auscultation - no rales, rhonchi or wheezes  CV: regular rate and rhythm, normal S1 S2, no S3 or S4, no murmur, click or rub, no peripheral edema and peripheral pulses strong  MS: no gross musculoskeletal defects noted, no edema  SKIN: no suspicious lesions or rashes  NEURO: Normal strength and tone, mentation intact and speech normal  PSYCH:  mentation appears normal, affect normal/bright    Inspection: atrophy noted right shoulder   Tender: anterior capsule proximal bicep tendon, greater tuberosity and upper trapezius muscle with severe atrophy of the muscles to the shoulder  Non-tender: proximal-mid clavicle, mid-distal clavicle, AC joint,  Range of Motion  Active:limited due to pain.  Passive: limited due to pain.  Strength: forward flexion 3/5, abduction  3/5, internal rotation  3/5 and external rotation  3/5  Special tests:  Positive: Neers and Calderon  Negative: cross arm adduction     Inspection: no swelling, no ecchymosis  Tender: lateral epicondyle, common flexor tendon, supracondylar notch, olecranon bursa and distal bicep tendon  Non-tender: common extensor tendon, medial epicondyle, common flexor tendon and radial head/neck  Range of Motion: all normal  Strength: elbow strength full  Special tests: normal stability, normal valgus stress, normal varus stress:           Assessment & Plan     (M25.511,  G89.29) Chronic right shoulder pain  (primary encounter diagnosis)  Comment: Patient has chronic right shoulder pain with atrophy to the right shoulder has been seen by his shoulder specialist was sent for [facility]  for evaluation has not had that appointment yet. Patient states that it will be coming up in the next 2 to 3 weeks.  Based on patient's concern for chronic pain we will have him he will follow-up with [facility] I have also placed a pain management referral for him will determine best treatment plan once we hear from [facility] and then also from pain clinic.  Medications renewed today   evaluatPlan: gabapentin (NEURONTIN) 400 MG capsule,         HYDROcodone-acetaminophen (NORCO) 5-325 MG         tablet, PAIN MANAGEMENT REFERRAL            (F32.0) Mild major depression (H)  Comment: Patient on Abilify and Seroquel for major depression PTSD does have psych involved is recently switch clinics will need a new psychiatrist can put in a referral for  "psychiatry for evaluation medication management refilled his Seroquel and Abilify for 1 month  Plan: ARIPiprazole (ABILIFY) 5 MG tablet, MENTAL         HEALTH REFERRAL  - Adult; Psychiatry and         Medication Management; Psychiatry; [facility]:         Psychiatry Clinic [phone]; We will         contact you to schedule the appointment or         please call with any questions      (F41.1) KANE (generalized anxiety disorder)  Comment: *Patient feels his anxiety is controlled with Lexapro  Plan: escitalopram (LEXAPRO) 20 MG tablet, MENTAL         HEALTH REFERRAL  - Adult; Psychiatry and         Medication Management; Psychiatry; P:         Psychiatry Clinic [phone]; We will         contact you to schedule the appointment or         please call with any questions      (F43.10) PTSD (post-traumatic stress disorder)  Comment: Patient on Abilify and Seroquel for major depression PTSD does have psych involved is recently switch clinics will need a new psychiatrist can put in a referral for psychiatry for evaluation medication management refilled his Seroquel and Abilify for 1 month  Plan: ARIPiprazole (ABILIFY) 5 MG tablet, QUEtiapine         (SEROQUEL) 100 MG tablet, MENTAL HEALTH         REFERRAL  - Adult; Psychiatry and Medication         Management; Psychiatry; [facility]: Psychiatry Clinic         [phone]; We will contact you to schedule        the appointment or please call with any         questions      (Z23) Need for prophylactic vaccination and inoculation against influenza  Comment:   Plan: INFLUENZA VACCINE IM > 6 MONTHS VALENT IIV4         [83723], Vaccine Administration, Initial         [08908]       BMI:   Estimated body mass index is 32.35 kg/m  as calculated from the following:    Height as of this encounter: 1.88 m (6' 2\").    Weight as of this encounter: 114.3 kg (252 lb).   Weight management plan: Discussed healthy diet and exercise guidelines        See Patient Instructions    No follow-ups " on file.    WOLF Wei CNP  [facility]

## 2019-09-24 ENCOUNTER — TELEPHONE (OUTPATIENT)
Dept: PALLIATIVE MEDICINE | Facility: CLINIC | Age: 44
End: 2019-09-24

## 2019-09-30 PROBLEM — G89.4 CHRONIC PAIN SYNDROME: Status: ACTIVE | Noted: 2019-09-30

## 2019-10-07 ENCOUNTER — TELEPHONE (OUTPATIENT)
Dept: PSYCHIATRY | Facility: CLINIC | Age: 44
End: 2019-10-07
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
## 2020-01-22 ENCOUNTER — OFFICE VISIT (OUTPATIENT)
Dept: FAMILY MEDICINE | Facility: CLINIC | Age: 45
End: 2020-01-22
Payer: COMMERCIAL

## 2020-01-22 VITALS
BODY MASS INDEX: 31.7 KG/M2 | RESPIRATION RATE: 18 BRPM | DIASTOLIC BLOOD PRESSURE: 70 MMHG | HEART RATE: 88 BPM | HEIGHT: 74 IN | TEMPERATURE: 97.3 F | SYSTOLIC BLOOD PRESSURE: 132 MMHG | WEIGHT: 247 LBS

## 2020-01-22 DIAGNOSIS — F32.0 MILD MAJOR DEPRESSION (H): ICD-10-CM

## 2020-01-22 DIAGNOSIS — G89.29 CHRONIC RIGHT SHOULDER PAIN: Primary | ICD-10-CM

## 2020-01-22 DIAGNOSIS — F43.10 PTSD (POST-TRAUMATIC STRESS DISORDER): ICD-10-CM

## 2020-01-22 DIAGNOSIS — F41.1 GAD (GENERALIZED ANXIETY DISORDER): ICD-10-CM

## 2020-01-22 DIAGNOSIS — M25.511 CHRONIC RIGHT SHOULDER PAIN: Primary | ICD-10-CM

## 2020-01-22 DIAGNOSIS — M19.019 ARTHROPATHY OF SHOULDER REGION: ICD-10-CM

## 2020-01-22 PROCEDURE — 99214 OFFICE O/P EST MOD 30 MIN: CPT | Performed by: NURSE PRACTITIONER

## 2020-01-22 RX ORDER — GABAPENTIN 400 MG/1
800 CAPSULE ORAL 4 TIMES DAILY
Qty: 240 CAPSULE | Refills: 0 | Status: SHIPPED | OUTPATIENT
Start: 2020-01-22 | End: 2020-01-22

## 2020-01-22 RX ORDER — ESCITALOPRAM OXALATE 20 MG/1
TABLET ORAL
Qty: 90 TABLET | Refills: 0 | Status: SHIPPED | OUTPATIENT
Start: 2020-01-22 | End: 2020-01-22

## 2020-01-22 RX ORDER — ARIPIPRAZOLE 5 MG/1
5 TABLET ORAL DAILY
Qty: 30 TABLET | Refills: 0 | Status: SHIPPED | OUTPATIENT
Start: 2020-01-22 | End: 2020-01-22

## 2020-01-22 RX ORDER — HYDROCODONE BITARTRATE AND ACETAMINOPHEN 5; 325 MG/1; MG/1
1 TABLET ORAL EVERY 12 HOURS PRN
Qty: 60 TABLET | Refills: 0 | Status: SHIPPED | OUTPATIENT
Start: 2020-01-22 | End: 2020-01-22

## 2020-01-22 RX ORDER — ESCITALOPRAM OXALATE 20 MG/1
TABLET ORAL
Qty: 90 TABLET | Refills: 0 | Status: ON HOLD | OUTPATIENT
Start: 2020-01-22 | End: 2020-05-19

## 2020-01-22 RX ORDER — HYDROCODONE BITARTRATE AND ACETAMINOPHEN 5; 325 MG/1; MG/1
1 TABLET ORAL EVERY 12 HOURS PRN
Qty: 60 TABLET | Refills: 0 | Status: SHIPPED | OUTPATIENT
Start: 2020-01-22 | End: 2020-04-20

## 2020-01-22 RX ORDER — ARIPIPRAZOLE 5 MG/1
5 TABLET ORAL DAILY
Qty: 30 TABLET | Refills: 0 | Status: ON HOLD | OUTPATIENT
Start: 2020-01-22 | End: 2020-05-19

## 2020-01-22 RX ORDER — GABAPENTIN 400 MG/1
800 CAPSULE ORAL 4 TIMES DAILY
Qty: 240 CAPSULE | Refills: 0 | Status: ON HOLD | OUTPATIENT
Start: 2020-01-22 | End: 2020-05-19

## 2020-01-22 ASSESSMENT — ANXIETY QUESTIONNAIRES
6. BECOMING EASILY ANNOYED OR IRRITABLE: SEVERAL DAYS
7. FEELING AFRAID AS IF SOMETHING AWFUL MIGHT HAPPEN: SEVERAL DAYS
5. BEING SO RESTLESS THAT IT IS HARD TO SIT STILL: SEVERAL DAYS
GAD7 TOTAL SCORE: 7
1. FEELING NERVOUS, ANXIOUS, OR ON EDGE: SEVERAL DAYS
7. FEELING AFRAID AS IF SOMETHING AWFUL MIGHT HAPPEN: SEVERAL DAYS
4. TROUBLE RELAXING: SEVERAL DAYS
2. NOT BEING ABLE TO STOP OR CONTROL WORRYING: SEVERAL DAYS
3. WORRYING TOO MUCH ABOUT DIFFERENT THINGS: SEVERAL DAYS

## 2020-01-22 ASSESSMENT — PATIENT HEALTH QUESTIONNAIRE - PHQ9
10. IF YOU CHECKED OFF ANY PROBLEMS, HOW DIFFICULT HAVE THESE PROBLEMS MADE IT FOR YOU TO DO YOUR WORK, TAKE CARE OF THINGS AT HOME, OR GET ALONG WITH OTHER PEOPLE: SOMEWHAT DIFFICULT
SUM OF ALL RESPONSES TO PHQ QUESTIONS 1-9: 8
SUM OF ALL RESPONSES TO PHQ QUESTIONS 1-9: 8

## 2020-01-22 ASSESSMENT — MIFFLIN-ST. JEOR: SCORE: 2085.13

## 2020-01-22 NOTE — PROGRESS NOTES
Subjective     Perry Preciado Jr. is a 44 year old male who presents to clinic today for the following health issues:    HPI   Chronic Pain Follow-Up       Type / Location of Pain: right shoulder  Analgesia/pain control:       Recent changes:  worse      Overall control: Comfortably manageable  Activity level/function:      Daily activities:  Able to do light housework, cooking    Work:  Unable to work  Adverse effects:  No  Adherance    Taking medication as directed?  Yes    Participating in other treatments: no   Risk Factors:    Sleep:  Good    Mood/anxiety:  controlled    Recent family or social stressors:  none noted    Other aggravating factors: none  PHQ-9 SCORE 12/14/2018 7/15/2019 1/22/2020   PHQ-9 Total Score MyChart 8 (Mild depression) 5 (Mild depression) 8 (Mild depression)   PHQ-9 Total Score 8 5 8     KANE-7 SCORE 12/14/2018 7/15/2019 1/22/2020   Total Score 12 (moderate anxiety) 2 (minimal anxiety) 7 (mild anxiety)   Total Score 12 2 7     Encounter-Level CSA:    There are no encounter-level csa.     Patient-Level CSA:    Controlled Substance Agreement - Opioid - Scan on 5/10/2019  3:21 PM           Patient Active Problem List   Diagnosis     Major depressive disorder, recurrent episode, moderate (H)     CARDIOVASCULAR SCREENING; LDL GOAL LESS THAN 130     Acute bilateral low back pain with right-sided sciatica     Carpal tunnel syndrome of right wrist     KANE (generalized anxiety disorder)     Chronic pain syndrome     Past Surgical History:   Procedure Laterality Date     BACK SURGERY         Social History     Tobacco Use     Smoking status: Former Smoker     Packs/day: 0.50     Types: Cigarettes     Smokeless tobacco: Current User   Substance Use Topics     Alcohol use: No     History reviewed. No pertinent family history.      Current Outpatient Medications   Medication Sig Dispense Refill     ALEVE OR None Entered       ARIPiprazole (ABILIFY) 5 MG tablet Take 1 tablet (5 mg) by mouth daily 30  "tablet 0     escitalopram (LEXAPRO) 20 MG tablet Take 1 tablet (20 mg total) by mouth once daily. 90 tablet 0     gabapentin (NEURONTIN) 400 MG capsule Take 2 capsules (800 mg) by mouth 4 times daily 240 capsule 0     HYDROcodone-acetaminophen (NORCO) 5-325 MG tablet Take 1 tablet by mouth every 12 hours as needed for severe pain 60 tablet 0     ibuprofen (ADVIL,MOTRIN) 600 MG tablet Take 1 tablet (600 mg) by mouth every 8 hours as needed for moderate pain 30 tablet 0     QUEtiapine (SEROQUEL) 100 MG tablet Take 1 tablet (100 mg) by mouth daily 30 tablet 0     Allergies   Allergen Reactions     Wellbutrin [Bupropion]      Naltrexone Other (See Comments)     Headaches  Headaches       Recent Labs   Lab Test 11/15/14  1445 10/07/14  0155   ALT  --  40   CR 1.28* 1.29*   GFRESTIMATED 63 62   GFRESTBLACK 76 75   POTASSIUM 4.0 3.4      BP Readings from Last 3 Encounters:   01/22/20 132/70   09/23/19 130/80   07/15/19 124/80    Wt Readings from Last 3 Encounters:   01/22/20 112 kg (247 lb)   09/23/19 114.3 kg (252 lb)   07/15/19 114.8 kg (253 lb)                  Reviewed and updated as needed this visit by Provider         Review of Systems   ROS COMP: Constitutional, HEENT, cardiovascular, pulmonary, GI, , musculoskeletal, neuro, skin, endocrine and psych systems are negative, except as otherwise noted.      Objective    /70 (BP Location: Right arm, Cuff Size: Adult Large)   Pulse 88   Temp 97.3  F (36.3  C) (Tympanic)   Resp 18   Ht 1.88 m (6' 2\")   Wt 112 kg (247 lb)   BMI 31.71 kg/m    Body mass index is 31.71 kg/m .  Physical Exam   GENERAL: healthy, alert and no distress  EYES: Eyes grossly normal to inspection, PERRL and conjunctivae and sclerae normal  HENT: ear canals and TM's normal, nose and mouth without ulcers or lesions  NECK: no adenopathy, no asymmetry, masses, or scars and thyroid normal to palpation  RESP: lungs clear to auscultation - no rales, rhonchi or wheezes  CV: regular rate and " rhythm, normal S1 S2, no S3 or S4, no murmur, click or rub, no peripheral edema and peripheral pulses strong  MS: no gross musculoskeletal defects noted, no edema  SKIN: no suspicious lesions or rashes  NEURO: Normal strength and tone, mentation intact and speech normal  PSYCH: mentation appears normal, affect normal/bright      Inspection: atrophy noted right shoulder   Tender: anterior capsule proximal bicep tendon, greater tuberosity and upper trapezius muscle with severe atrophy of the muscles to the shoulder  Non-tender: proximal-mid clavicle, mid-distal clavicle, AC joint,  Range of Motion  Active:limited due to pain.  Passive: limited due to pain.  Strength: forward flexion 3/5, abduction  3/5, internal rotation  3/5 and external rotation  3/5  Special tests:  Positive: Neers and Calderon  Negative: cross arm adduction     Inspection: no swelling, no ecchymosis  Tender: lateral epicondyle, common flexor tendon, supracondylar notch, olecranon bursa and distal bicep tendon  Non-tender: common extensor tendon, medial epicondyle, common flexor tendon and radial head/neck  Range of Motion: all normal  Strength: elbow strength full  Special tests: normal stability, normal valgus stress, normal varus stress:              Assessment & Plan     (M25.511,  G89.29) Chronic right shoulder pain  (primary encounter diagnosis)  Comment: Controlled with current dose of Norco renewed today patient was given 1 month supply patient can have one more refill before having to be re-seen.  Plan: HYDROcodone-acetaminophen (NORCO) 5-325 MG         tablet, gabapentin (NEURONTIN) 400 MG capsule,         DISCONTINUED: gabapentin (NEURONTIN) 400 MG         capsule, DISCONTINUED:         HYDROcodone-acetaminophen (NORCO) 5-325 MG         tablet, DISCONTINUED: HYDROcodone-acetaminophen        (NORCO) 5-325 MG tablet, DISCONTINUED:         gabapentin (NEURONTIN) 400 MG capsule    (M19.019) Arthropathy of shoulder region  Comment: Patient  have follow-up with [facility] for atrophy of the shoulder  Plan: HYDROcodone-acetaminophen (NORCO) 5-325 MG         tablet, gabapentin (NEURONTIN) 400 MG capsule,         DISCONTINUED: gabapentin (NEURONTIN) 400 MG         capsule, DISCONTINUED:         HYDROcodone-acetaminophen (NORCO) 5-325 MG         tablet, DISCONTINUED: HYDROcodone-acetaminophen        (NORCO) 5-325 MG tablet, DISCONTINUED:         gabapentin (NEURONTIN) 400 MG capsule      (F32.0) Mild major depression (H)  Comment: Controlled with current medications  Plan: ARIPiprazole (ABILIFY) 5 MG tablet,         DISCONTINUED: ARIPiprazole (ABILIFY) 5 MG         tablet, DISCONTINUED: ARIPiprazole (ABILIFY) 5         MG tablet      (F43.10) PTSD (post-traumatic stress disorder)  Comment: Controlled with current medications  Plan: ARIPiprazole (ABILIFY) 5 MG tablet,         DISCONTINUED: ARIPiprazole (ABILIFY) 5 MG         tablet, DISCONTINUED: ARIPiprazole (ABILIFY) 5         MG tablet      (F41.1) KANE (generalized anxiety disorder)  Comment: Controlled with current medications  Plan: escitalopram (LEXAPRO) 20 MG tablet,         DISCONTINUED: escitalopram (LEXAPRO) 20 MG         tablet, DISCONTINUED: escitalopram (LEXAPRO) 20        MG tablet       See Patient Instructions    No follow-ups on file.    WOLF Wei [facility]

## 2020-01-22 NOTE — PATIENT INSTRUCTIONS
Contact Dr Bonilla to reinitiate your [place] Referral.        Patient Education     Chronic Pain  Pain serves an important role. It lets you know something is wrong that needs your attention. When the body heals, pain normally goes away.  When pain lasts longer than 6 months, it is called  chronic  pain. This is pain that is present even after the body has healed. Chronic pain can cause mood problems and get in the way of your relationships and your daily life.  A number of conditions can cause chronic pain. Some of the more common include:    Previous surgery    An old injury    Infection    Diseases such as diabetes    Nerve damage    Back injury    Arthritis    Migraine or other headaches    Fibromyalgia    Cancer  Depression and stress can make chronic pain symptoms worse. In some cases, a cause for the pain can't be found.   Treatment  Treatment can greatly reduce pain. In many cases, pain can become less severe, occur less often, and interfere less with your daily life. Chronic pain is often treated with a combination of medicines, therapies, and lifestyle changes. You will work closely with your healthcare provider to find a treatment plan that works best for you.    Ask your healthcare provider for a referral to a pain management specialty center. These can provide the most recent and proven pain management strategies, along with emotional support and comprehensive services.    Several different types of medicines may be prescribed for chronic pain. Work with your healthcare provider to develop a medicine plan that helps manage your pain.    Physical therapy can help reduce certain types of chronic pain.    Occupational therapy teaches you how to do routine tasks of daily living in ways that lessen your discomfort.    Counseling can help you cope better with stress and pain.    Other therapies such as meditation, yoga, biofeedback, massage, and acupuncture can also help manage chronic pain.    Changing certain  habits can help reduce chronic pain. They include:  ? Eating healthy  ? Developing an exercise routine  ? Getting enough sleep   ? Stopping smoking and limiting alcohol use  ? Losing excess weight  Follow-up care  Follow up with your healthcare provider, or as advised. Let your healthcare provider know if your current treatment plan is working or if changes are needed.  Resources  For more information, contact:    American Headache and Migraine Association, [employer] or 863-475-8703    American Chronic Pain Association, theacpa.org or 453-431-8580  Date Last Reviewed: 8/1/2017 2000-2019 [employer]. [address] [place], [place] [address]. All rights reserved. This information is not intended as a substitute for professional medical care. Always follow your healthcare professional's instructions.

## 2020-01-22 NOTE — PROGRESS NOTES
"Subjective     Perry Preciado Jr. is a 44 year old male who presents to clinic today for the following health issues:    HPI   Elbow Pain    Onset: ***    Description:   Location: {.:058444::\"***\"}  Character: {.:176334}    Intensity: {.:648597}    Progression of Symptoms: {.:174289:x}    Accompanying Signs & Symptoms:  Other symptoms: {.:659918::\"none\"}    History:   Previous similar pain: { :092997}      Precipitating factors:   Trauma or overuse: { :876617}    Alleviating factors:  Improved by: {.:385329::\"nothing\"}    Therapies Tried and outcome: ***      {additonal problems for provider to add (Optional):137250}    {HIST REVIEW/ LINKS 2 (Optional):057561}    {Additional problems for the provider to add (optional):174930}  Reviewed and updated as needed this visit by Provider         Review of Systems   {ROS COMP (Optional):107487}      Objective    There were no vitals taken for this visit.  There is no height or weight on file to calculate BMI.  Physical Exam   {Exam List (Optional):886534}    {Diagnostic Test Results (Optional):984271::\"Diagnostic Test Results:\",\"Labs reviewed in Epic\"}        {PROVIDER CHARTING PREFERENCE:776052}      "

## 2020-01-23 ASSESSMENT — ANXIETY QUESTIONNAIRES: GAD7 TOTAL SCORE: 7

## 2020-04-20 ENCOUNTER — VIRTUAL VISIT (OUTPATIENT)
Dept: FAMILY MEDICINE | Facility: CLINIC | Age: 45
End: 2020-04-20
Payer: COMMERCIAL

## 2020-04-20 DIAGNOSIS — G89.29 CHRONIC RIGHT SHOULDER PAIN: ICD-10-CM

## 2020-04-20 DIAGNOSIS — M25.511 CHRONIC RIGHT SHOULDER PAIN: ICD-10-CM

## 2020-04-20 DIAGNOSIS — M19.019 ARTHROPATHY OF SHOULDER REGION: ICD-10-CM

## 2020-04-20 PROCEDURE — 99214 OFFICE O/P EST MOD 30 MIN: CPT | Mod: 95 | Performed by: NURSE PRACTITIONER

## 2020-04-20 RX ORDER — HYDROCODONE BITARTRATE AND ACETAMINOPHEN 5; 325 MG/1; MG/1
1 TABLET ORAL EVERY 12 HOURS PRN
Qty: 60 TABLET | Refills: 0 | Status: ON HOLD | OUTPATIENT
Start: 2020-04-20 | End: 2020-05-19

## 2020-04-20 NOTE — PATIENT INSTRUCTIONS
Patient Education     Chronic Pain  Pain serves an important role. It lets you know something is wrong that needs your attention. When the body heals, pain normally goes away.  When pain lasts longer than 6 months, it is called  chronic  pain. This is pain that is present even after the body has healed. Chronic pain can cause mood problems and get in the way of your relationships and your daily life.  A number of conditions can cause chronic pain. Some of the more common include:    Previous surgery    An old injury    Infection    Diseases such as diabetes    Nerve damage    Back injury    Arthritis    Migraine or other headaches    Fibromyalgia    Cancer  Depression and stress can make chronic pain symptoms worse. In some cases, a cause for the pain can't be found.   Treatment  Treatment can greatly reduce pain. In many cases, pain can become less severe, occur less often, and interfere less with your daily life. Chronic pain is often treated with a combination of medicines, therapies, and lifestyle changes. You will work closely with your healthcare provider to find a treatment plan that works best for you.    Ask your healthcare provider for a referral to a pain management specialty center. These can provide the most recent and proven pain management strategies, along with emotional support and comprehensive services.    Several different types of medicines may be prescribed for chronic pain. Work with your healthcare provider to develop a medicine plan that helps manage your pain.    Physical therapy can help reduce certain types of chronic pain.    Occupational therapy teaches you how to do routine tasks of daily living in ways that lessen your discomfort.    Counseling can help you cope better with stress and pain.    Other therapies such as meditation, yoga, biofeedback, massage, and acupuncture can also help manage chronic pain.    Changing certain habits can help reduce chronic pain. They  include:  ? Eating healthy  ? Developing an exercise routine  ? Getting enough sleep   ? Stopping smoking and limiting alcohol use  ? Losing excess weight  Follow-up care  Follow up with your healthcare provider, or as advised. Let your healthcare provider know if your current treatment plan is working or if changes are needed.  Resources  For more information, contact:    American Headache and Migraine [email or web address] or [phone]    American Chronic Pain Association, theacpa.org or 526-421-8682  Date Last Reviewed: 8/1/2017 2000-2019 [employer]. [address] [place] [address]. All rights reserved. This information is not intended as a substitute for professional medical care. Always follow your healthcare professional's instructions.           Patient Education     Managing Chronic Pain   Being in pain can be exhausting. You may find you have trouble working, sleeping, or just doing day-to-day tasks. But you can learn to manage pain, feel better, and regain control of your life.   Understanding chronic pain  Chronic pain is a serious medical problem. It is defined as pain that lasts longer than 3 months. Chronic pain includes pain that you feel regularly, even if it comes and goes. The pain may be from an ongoing injury or health problem. Or it may be because of a chronic pain syndrome, such as fibromyalgia. Sometimes pain persists when no cause can be found.   Pain should be treated  You have a right to have your pain treated. Untreated chronic pain can affect your overall health. It can lead to depression, anxiety, anger, and personality changes. It can also disrupt work, sleep, relationships, and other aspects of normal life. It may not be possible to relieve all of your pain. But it can be reduced to a level you can cope with.   Your role in treatment  Your healthcare provider will work closely with you on a plan to manage your pain. But it s up to you to put  this plan into action. Control of chronic pain is done mainly through self-management. This means that you take an active role in your care. Getting support from family and friends is important too.   Planning your treatment  Your healthcare provider will first look for a cause of your pain that can be treated. He or she will also assess your pain level. This may be done by asking you to rate your pain on a scale from 1 (low pain) to 10 (severe pain). Your provider will also ask you to describe the pain. For example, is your pain sharp or dull? Is it constant or does it come and go? You may be asked to keep a pain log. This is a diary in which you track your pain. It may help identify things that tend to make your pain worse. You and your healthcare provider can make a plan to help prevent and cope with pain on a daily basis. In some cases, you may be referred to a special pain program or clinic. Your treatment plan may include:    Medicines    Complementary therapies    Mind and body therapies    Other medical treatments    Getting physical activity   Medicines  Medicine will most likely be a part of your treatment plan. Your provider will evaluate which are the best medicines for your pain. You may use over-the-counter or prescription medicines. You may need to take more than one medicine. It may take some time to find the best medicine or combination of medicines for your pain. Take all medicines as directed. Pain medicines can be used in many ways. You may take medicines:    Every day to help   stay ahead  of the pain so that it doesn t flare up    At times when pain is worse than usual    Before activities that tend to trigger pain    To decrease sensitivity to pain  Medicines for chronic pain include:    Nonsteroidal anti-inflammatory medicines (NSAIDs) for pain from swelling and inflammation. Your provider may prescribe a type of NSAID called a CARDOZO inhibitor.    Acetaminophen    Anticonvulsants to treat nerve  pain called neuropathy    Antidepressants to treat neuropathy    Muscle relaxants for muscle spasms    Topical medicines. These are put on the skin.    Opioids, or narcotics, to treat severe pain. These very strong medicines can help ease certain kinds of pain. They most often are used for short periods of time. Your provider will monitor your care very closely if you take opioids.   Complementary therapies  These are treatments that can be used along with medical care to help relieve pain. Look for a licensed practitioner with experience treating chronic pain. Talk with your healthcare provider about using complementary therapies such as:    Massage    Acupuncture and acupressure    Chiropractic    Vitamins or herbal supplements   Mind/body therapies  The brain and the body are both part of the pain response. The brain reads the pain signals from the body. This means that your mind has some control over how pain signals are processed. Mind/body therapies may help change how your brain reads pain signals. They may be learned with the help of a trained therapist or in a class. They include:    Deep breathing    Distraction    Visualization    Meditation    Biofeedback   Other medical treatments  If other treatments don t work for you, one of these procedures or devices may help:    Nerve blocks to numb nerves in a painful area    Trigger point injections for painful muscles    Steroid injections for joint pain     Transcutaneous electrical nerve stimulation (TENS) to block pain signals to the brain    Spinal stimulation to block spinal pain    Implanted spinal pump that contains pain medicine    Ablation using heat, cold, or chemicals to destroy painful nerves                                                                                                                    Getting physical activity  Being physically active has many benefits. It can improve your ability to cope with pain. It may also help improve your  mood, sleep, and overall health. Your healthcare provider can help you plan an exercise program that s right for your needs. This may include:    Stretching and range-of-motion exercises    Low-impact exercise such as walking, biking, swimming, and other water exercise    Strength training using light weights    Walking up the stairs instead of taking the elevator    Riding a bike instead of driving    Parking your car farther from your destination   You may need to not do high-impact activities. These involve jumping, running, or sudden starts, stops, or changes of direction. If you haven t exercised in a long time or you have physical limitations, your healthcare provider may refer you to a physical therapist. He or she can teach you stretches and exercises that fit your condition and fitness level.   Being active and healthy  A healthier lifestyle makes it easier to cope with pain and function better. Follow these tips:    Choose a balance of healthy foods and drinks.    Limit alcohol and caffeine.    Go to bed at about the same time each day.    Don t let pain keep you from others. Spend time with friends and family.    Keep your mind active. Read books or take classes.    If you re not working, volunteer or join a club or social group.   Getting support  A support group lets you talk with others who also have chronic pain. Chronic pain support groups can help you feel less isolated. They can also give you tips for coping with pain. To find a local support group, contact your nearest hospital or pain clinic.   You may also want to try counseling. Counseling can help you learn coping skills and methods such as visualization. It can also help with mood problems. When choosing a counselor, look for someone who has worked with people who have chronic pain.   See your provider for regular visits and let him or her know how well treatments are working for you. Also reach out to family and friends for help and  support.                              For more support and information, contact these groups:    American Academy of Pain Management, www.aapainmanage.org    American Academy of Pain Medicine, www.painmed.org    American Chronic Pain Association, www.theacpa.org    National Pain Foundation, www.thenationalpainfoundation.org  Date Last Reviewed: 12/1/2017 2000-2019 [employer]. [address] [place] [address]. All rights reserved. This information is not intended as a substitute for professional medical care. Always follow your healthcare professional's instructions.

## 2020-04-20 NOTE — PROGRESS NOTES
"Perry Preciado Jr. is a 45 year old male who is being evaluated via a billable video visit.      The patient has been notified of following:     \"This video visit will be conducted via a call between you and your physician/provider. We have found that certain health care needs can be provided without the need for an in-person physical exam.  This service lets us provide the care you need with a video conversation.  If a prescription is necessary we can send it directly to your pharmacy.  If lab work is needed we can place an order for that and you can then stop by our lab to have the test done at a later time.    Video visits are billed at different rates depending on your insurance coverage.  Please reach out to your insurance provider with any questions.    If during the course of the call the physician/provider feels a video visit is not appropriate, you will not be charged for this service.\"    Patient has given verbal consent for Video visit? Yes    How would you like to obtain your AVS? Mail a copy    Patient would like the video invitation sent by: Text to cell phone: [phone]    Subjective     Perry Preciado Jr. is a 45 year old male who presents to clinic today for the following health issues:    Chronic Pain Follow-Up    Where in your body do you have pain? Right shoulder, neck  How has your pain affected your ability to work? Patient started a full time job today  Which of these pain treatments have you tried since your last clinic visit? none  How well are you sleeping? Fair  How has your mood been since your last visit? Better  Have you had a significant life event? No  Other aggravating factors: none  Taking medication as directed? Yes    PHQ-9 SCORE 12/14/2018 7/15/2019 1/22/2020   PHQ-9 Total Score MyChart 8 (Mild depression) 5 (Mild depression) 8 (Mild depression)   PHQ-9 Total Score 8 5 8     KANE-7 SCORE 12/14/2018 7/15/2019 1/22/2020   Total Score 12 (moderate anxiety) 2 (minimal " anxiety) 7 (mild anxiety)   Total Score 12 2 7     No flowsheet data found.  Encounter-Level CSA:    There are no encounter-level csa.     Patient-Level CSA:    Controlled Substance Agreement - Opioid - Scan on 5/10/2019  3:21 PM                Video Start Time: 9:24 AM      Patient Active Problem List   Diagnosis     Major depressive disorder, recurrent episode, moderate (H)     CARDIOVASCULAR SCREENING; LDL GOAL LESS THAN 130     Acute bilateral low back pain with right-sided sciatica     Carpal tunnel syndrome of right wrist     KANE (generalized anxiety disorder)     Chronic pain syndrome     Past Surgical History:   Procedure Laterality Date     BACK SURGERY         Social History     Tobacco Use     Smoking status: Former Smoker     Packs/day: 0.50     Types: Cigarettes     Smokeless tobacco: Current User   Substance Use Topics     Alcohol use: No     History reviewed. No pertinent family history.      Current Outpatient Medications   Medication Sig Dispense Refill     ALEVE OR None Entered       ARIPiprazole (ABILIFY) 5 MG tablet Take 1 tablet (5 mg) by mouth daily 30 tablet 0     escitalopram (LEXAPRO) 20 MG tablet Take 1 tablet (20 mg total) by mouth once daily. 90 tablet 0     gabapentin (NEURONTIN) 400 MG capsule Take 2 capsules (800 mg) by mouth 4 times daily 240 capsule 0     HYDROcodone-acetaminophen (NORCO) 5-325 MG tablet Take 1 tablet by mouth every 12 hours as needed for severe pain 60 tablet 0     ibuprofen (ADVIL,MOTRIN) 600 MG tablet Take 1 tablet (600 mg) by mouth every 8 hours as needed for moderate pain 30 tablet 0     QUEtiapine (SEROQUEL) 100 MG tablet Take 1 tablet (100 mg) by mouth daily 30 tablet 0     Allergies   Allergen Reactions     Wellbutrin [Bupropion]      Naltrexone Other (See Comments)     Headaches  Headaches       Recent Labs   Lab Test 11/15/14  1445 10/07/14  0155   ALT  --  40   CR 1.28* 1.29*   GFRESTIMATED 63 62   GFRESTBLACK 76 75   POTASSIUM 4.0 3.4      BP Readings  "from Last 3 Encounters:   01/22/20 132/70   09/23/19 130/80   07/15/19 124/80    Wt Readings from Last 3 Encounters:   01/22/20 112 kg (247 lb)   09/23/19 114.3 kg (252 lb)   07/15/19 114.8 kg (253 lb)                    Reviewed and updated as needed this visit by Provider         Review of Systems   ROS COMP: Constitutional, HEENT, cardiovascular, pulmonary, gi and gu systems are negative, except as otherwise noted.      Objective    There were no vitals taken for this visit.  Estimated body mass index is 31.71 kg/m  as calculated from the following:    Height as of 1/22/20: 1.88 m (6' 2\").    Weight as of 1/22/20: 112 kg (247 lb).  Physical Exam   GENERAL: healthy, alert and no distress  EYES: Eyes grossly normal to inspection, PERRL and conjunctivae and sclerae normal  RESP: no distress   CV: no distress     Diagnostic Test Results:  Labs reviewed in Epic        Assessment & Plan     (M25.511,  G89.29) Chronic right shoulder pain  Comment: Controlled with current dose of medications will renew today.  Patient also noted to have new onset questionable tendinitis to the shoulder will be seen next week in person for further evaluation  Plan: HYDROcodone-acetaminophen (NORCO) 5-325 MG         tablet      (M19.019) Arthropathy of shoulder region  Comment: Controlled   Plan: HYDROcodone-acetaminophen (NORCO) 5-325 MG         tablet         WOLF Wei CNP  [facility]      Video-Visit Details    Type of service:  Video Visit    Video End Time (time video stopped): 946    Originating Location (pt. Location): Home    Distant Location (provider location):  [facility]     Mode of Communication:  Video Conference via [employer]    No follow-ups on file.       WOLF Wei CNP    "

## 2020-05-02 ENCOUNTER — TRANSFERRED RECORDS (OUTPATIENT)
Dept: HEALTH INFORMATION MANAGEMENT | Facility: CLINIC | Age: 45
End: 2020-05-02

## 2020-05-03 ENCOUNTER — HOSPITAL ENCOUNTER (INPATIENT)
Age: 45
End: 2020-05-03
Attending: PSYCHIATRY & NEUROLOGY | Admitting: PSYCHIATRY & NEUROLOGY
Payer: COMMERCIAL

## 2020-05-03 ENCOUNTER — TELEPHONE (OUTPATIENT)
Dept: BEHAVIORAL HEALTH | Facility: CLINIC | Age: 45
End: 2020-05-03

## 2020-05-03 RX ORDER — OLANZAPINE 10 MG/2ML
10 INJECTION, POWDER, FOR SOLUTION INTRAMUSCULAR 3 TIMES DAILY PRN
Status: CANCELLED | OUTPATIENT
Start: 2020-05-03

## 2020-05-03 RX ORDER — BISACODYL 10 MG
10 SUPPOSITORY, RECTAL RECTAL DAILY PRN
Status: CANCELLED | OUTPATIENT
Start: 2020-05-03

## 2020-05-03 RX ORDER — ALUMINA, MAGNESIA, AND SIMETHICONE 2400; 2400; 240 MG/30ML; MG/30ML; MG/30ML
30 SUSPENSION ORAL EVERY 4 HOURS PRN
Status: CANCELLED | OUTPATIENT
Start: 2020-05-03

## 2020-05-03 RX ORDER — HYDROXYZINE HYDROCHLORIDE 25 MG/1
50-100 TABLET, FILM COATED ORAL EVERY 4 HOURS PRN
Status: CANCELLED | OUTPATIENT
Start: 2020-05-03

## 2020-05-03 RX ORDER — ACETAMINOPHEN 325 MG/1
650 TABLET ORAL EVERY 4 HOURS PRN
Status: CANCELLED | OUTPATIENT
Start: 2020-05-03

## 2020-05-03 RX ORDER — TRAZODONE HYDROCHLORIDE 50 MG/1
50 TABLET, FILM COATED ORAL
Status: CANCELLED | OUTPATIENT
Start: 2020-05-03

## 2020-05-03 RX ORDER — OLANZAPINE 10 MG/1
10 TABLET ORAL 3 TIMES DAILY PRN
Status: CANCELLED | OUTPATIENT
Start: 2020-05-03

## 2020-05-03 NOTE — TELEPHONE ENCOUNTER
Patient cleared and ready for behavioral bed placement: Yes    S Pt is a 45 year old male at the [facility] ed    B Pt arrived to ed last night by EMS. Pt is suicidal with a plan and intent to use a gun and shoot himself. Pt does have access to guns and has made previous attempts by overdose. Pt is a daily meth user. UTOX positive for meth only. Pt is anxious and says the only thing that helps is meth. Pt also endorses AH and VH that he attributes to meth use. Pt did take oral zyprexa to alleviate anxiety concerns and paranoia in ed. Pt has a history of depression, anxiety, and schizophrenia. Pt has no current therapy or outpatient providers. Pt and his finance broke up recently. Pt is paranoid in the ed. Last use of meth was yesterday. Pt thinks people are trying to hurt him. Pt is homeless. No previous mental health admissions. Pt has no medical concerns besides chronic back and neck pain. Pt denies all symptoms for COVID19. Pt denies aggression and HI. Pt labs resulted potassium 3.3, creatine 1.35.     [facility] [place]    [facility] [place] [facility]/[place]    - started 734AM 736AM  - intake requesting clinical from ER 834AM  - 926AM intake spoke with ed physician. Physician feels pt is experiencing some psychotic symptoms beyond meth use as last use was yesterday morning. Pt did receive 20mg zyprexa thus far in the ed orally due to paranoia and psychosis. Pt is on [place] and travel screening results were negative. Pt does not have any complaints of COVID19  - 933AM on call accepts pt  - requested covid19 screening from facility (pt denies all concerns but is at an at risk population) escalated to management intake awaiting outcome 1030AM  - per on call provider management at facility and provider are requesting a COVID19 test and result before accepting pt management aware of request intake awaiting directive 1042AM  - doc-doc attempted unable to start 1157AM started On call physician wants COVID19 tested and resulted because pt is homeless  and using drugs and will be on a locked unit with close proximity to pts and staff. Pt needs a COVID19 negative test.   - 139PM per intake supervisor if covid19 testing takes over 24 hours check chest xray, additional lab work (lft (normal), c reactive protein (normal), was obtained and ed feels that there is not a concern of COVID!9 per Luanne MENDEZ of behavioral health proceed with admission after talking with on call   - on call accepts pt 153PM unit and ed aware of admission  - pt eloped from ed 3PM went to parking lot, pt then was brought back into the ed, police negotiated with pt and pt was brought back to ed and no restraints or IM required presenting with paranoia  - pt was calm overnight and slept with no behavior issues 758AM  - COVID19 screening still in process  -called provider 801AM    /74  P 80  T 98.4  R  18  SPO2 99

## 2020-05-04 ENCOUNTER — HOSPITAL ENCOUNTER (INPATIENT)
Facility: HOSPITAL | Age: 45
LOS: 16 days | Discharge: PSYCHIATRIC OR SUBSTANCE USE FACILITY | End: 2020-05-20
Attending: PSYCHIATRY & NEUROLOGY | Admitting: PSYCHIATRY & NEUROLOGY
Payer: COMMERCIAL

## 2020-05-04 DIAGNOSIS — F10.10 ALCOHOL ABUSE: ICD-10-CM

## 2020-05-04 DIAGNOSIS — F11.10 OPIOID ABUSE (H): Primary | ICD-10-CM

## 2020-05-04 DIAGNOSIS — J45.20 MILD INTERMITTENT ASTHMA WITHOUT COMPLICATION: ICD-10-CM

## 2020-05-04 DIAGNOSIS — I10 HYPERTENSION, UNSPECIFIED TYPE: ICD-10-CM

## 2020-05-04 DIAGNOSIS — E78.1 HYPERTRIGLYCERIDEMIA: ICD-10-CM

## 2020-05-04 DIAGNOSIS — F17.200 TOBACCO USE DISORDER: ICD-10-CM

## 2020-05-04 DIAGNOSIS — F41.1 GAD (GENERALIZED ANXIETY DISORDER): ICD-10-CM

## 2020-05-04 DIAGNOSIS — F33.1 MAJOR DEPRESSIVE DISORDER, RECURRENT EPISODE, MODERATE (H): ICD-10-CM

## 2020-05-04 DIAGNOSIS — F29 PSYCHOSIS, UNSPECIFIED PSYCHOSIS TYPE (H): ICD-10-CM

## 2020-05-04 PROCEDURE — 25000132 ZZH RX MED GY IP 250 OP 250 PS 637: Performed by: NURSE PRACTITIONER

## 2020-05-04 PROCEDURE — 12400000 ZZH R&B MH

## 2020-05-04 RX ORDER — QUETIAPINE FUMARATE 100 MG/1
100-200 TABLET, FILM COATED ORAL
Status: ON HOLD | COMMUNITY
End: 2020-05-19

## 2020-05-04 RX ORDER — OLANZAPINE 10 MG/1
10 TABLET ORAL 3 TIMES DAILY PRN
Status: DISCONTINUED | OUTPATIENT
Start: 2020-05-04 | End: 2020-05-20 | Stop reason: HOSPADM

## 2020-05-04 RX ORDER — ALBUTEROL SULFATE 90 UG/1
1-2 AEROSOL, METERED RESPIRATORY (INHALATION) EVERY 4 HOURS PRN
Refills: 12 | Status: ON HOLD | COMMUNITY
Start: 2019-07-17 | End: 2020-05-04

## 2020-05-04 RX ORDER — CITALOPRAM HYDROBROMIDE 20 MG/1
20 TABLET ORAL DAILY
Refills: 1 | Status: ON HOLD | COMMUNITY
Start: 2019-11-25 | End: 2020-05-04

## 2020-05-04 RX ORDER — BISACODYL 10 MG
10 SUPPOSITORY, RECTAL RECTAL DAILY PRN
Status: DISCONTINUED | OUTPATIENT
Start: 2020-05-04 | End: 2020-05-20 | Stop reason: HOSPADM

## 2020-05-04 RX ORDER — ACETAMINOPHEN 325 MG/1
650 TABLET ORAL EVERY 4 HOURS PRN
Status: DISCONTINUED | OUTPATIENT
Start: 2020-05-04 | End: 2020-05-20 | Stop reason: HOSPADM

## 2020-05-04 RX ORDER — HYDROXYZINE HYDROCHLORIDE 25 MG/1
50-100 TABLET, FILM COATED ORAL EVERY 4 HOURS PRN
Status: DISCONTINUED | OUTPATIENT
Start: 2020-05-04 | End: 2020-05-20 | Stop reason: HOSPADM

## 2020-05-04 RX ORDER — OLANZAPINE 10 MG/2ML
10 INJECTION, POWDER, FOR SOLUTION INTRAMUSCULAR 3 TIMES DAILY PRN
Status: DISCONTINUED | OUTPATIENT
Start: 2020-05-04 | End: 2020-05-20 | Stop reason: HOSPADM

## 2020-05-04 RX ORDER — TRAZODONE HYDROCHLORIDE 50 MG/1
50 TABLET, FILM COATED ORAL
Status: DISCONTINUED | OUTPATIENT
Start: 2020-05-04 | End: 2020-05-05

## 2020-05-04 RX ORDER — ALUMINA, MAGNESIA, AND SIMETHICONE 2400; 2400; 240 MG/30ML; MG/30ML; MG/30ML
30 SUSPENSION ORAL EVERY 4 HOURS PRN
Status: DISCONTINUED | OUTPATIENT
Start: 2020-05-04 | End: 2020-05-20 | Stop reason: HOSPADM

## 2020-05-04 RX ADMIN — HYDROXYZINE HYDROCHLORIDE 100 MG: 25 TABLET, FILM COATED ORAL at 20:13

## 2020-05-04 ASSESSMENT — ACTIVITIES OF DAILY LIVING (ADL)
AMBULATION: 0-->INDEPENDENT
DRESS: SCRUBS (BEHAVIORAL HEALTH);INDEPENDENT
COGNITION: 0 - NO COGNITION ISSUES REPORTED
LAUNDRY: UNABLE TO COMPLETE
DRESS: 0-->INDEPENDENT
TOILETING: 0-->INDEPENDENT
FALL_HISTORY_WITHIN_LAST_SIX_MONTHS: NO
TRANSFERRING: 0-->INDEPENDENT
RETIRED_COMMUNICATION: 0-->UNDERSTANDS/COMMUNICATES WITHOUT DIFFICULTY
SWALLOWING: 0-->SWALLOWS FOODS/LIQUIDS WITHOUT DIFFICULTY
BATHING: 0-->INDEPENDENT
RETIRED_EATING: 0-->INDEPENDENT
ORAL_HYGIENE: INDEPENDENT
HYGIENE/GROOMING: INDEPENDENT

## 2020-05-04 ASSESSMENT — MIFFLIN-ST. JEOR: SCORE: 2061.99

## 2020-05-04 NOTE — PLAN OF CARE
"  Problem: Adult Behavioral Health Plan of Care  Goal: Patient-Specific Goal (Individualization)  Description: Patient will sleep 6-8 hours a night  Patient will attend 50% of unit programming when able  Patient will complete ADLs independently  Patient will be compliant with treatment team recommendations    5/4/2020: Patient unable to wean at this time due to unpredictable behaviors. Treatment team to reassess daily.  5/4/2020 1619 by Alexandria Cobian, RN  Outcome: No Change    Problem: Thought Process Alteration  Goal: Optimal Thought Clarity  Description: Patient will verbalize a decrease in hallucinations by time of discharge.  Patient will be able to have a linear, logical conversation by time of discharge.  Patient will verbalize 2-3 coping skills by time of discharge.  5/4/2020 1619 by Alexandria Cobian RN  Outcome: No Change    End of shift report received from previous shift RN. Pt observed, sleeping in bed having regular respirations. Nourishment at bedside   1630- Pt sister Alla called (on signed UMBERTO), states pt needs help and wants help. Reports pt is not aggressive but does show paranoia when under the influence. Sister inquired about plan of discharge, writer reports treatment team hasn't met yet so no plan has been set up, she is hopeful to see pt go to treatment.   1700- Pt up for dinner. Calm, cooperative, flat affect, hypoactive, lethargic. Answers questions briskly and appropriately. Denies SI/HI/hallucinations. States he's ready to \"better life and get somewhere better\". Hopeful for placement at treatment facility as endorses struggle with methamphetamine addiction. Pt ate all of dinner, second plate ordered from kitchen. Pt made phone call to sister and ate 100% of second entree. Extra juice and milk provided. Requests making another phone call in one hour. Sleeps between cares.  1930-Attempted to wake pt per his request, at 7:30pm. Pt audibly snoring, pt left resting.   2020- Offered pt to use " "phone, declined and will use tomorrow. Accepted snack and extra fluids. C/o anxiety, hydroxyzine 100mg administered.  Pt states thoughts of suicide had brought him in but is not currently having thoughts of harm. Endorses drink \"a 6 pack\" everyday.   2230- Pt sleeping.   Face to face end of shift report communicated to oncoming RN.     Alexandria Cobian RN  5/4/2020  10:33 PM        "

## 2020-05-04 NOTE — PROGRESS NOTES
05/04/20 1457   Patient Belongings   Did you bring any home meds/supplements to the hospital?  No   Patient Belongings remains with patient;locker;sent to security per site process   Patient Belongings Remaining with Patient ring   Patient Belongings Put in Hospital Secure Location (Security or Locker, etc.) clothing;shoes;wallet;cash/credit card   Belongings Search Yes   Clothing Search Yes   Second Staff María RN   Comment ring on patients finger, black shorts, brown long sleeve shirt, blue jeans, plaid fleece lined jacket, blackbelt, sun glasses, cigs and orange bic lighter,2 silver colored rings in denture cup, brown leather slip on shoes   List items sent to safe: black leather wallet, ebt card, [place] ID card, sliding scale card, vanilla gift card for $50, net [employer] debit visa, [facility] debit visa, green dot debit visa, [employer] card    All other belongings put in assigned cubby in belongings room.     I have reviewed my belongings list on admission and verify that it is correct.     Patient signature_______________________________    Second staff witness (if patient unable to sign) ______________________________       I have received all my belongings at discharge.    Patient signature________________________________    Evy   5/4/2020  3:02 PM

## 2020-05-04 NOTE — PLAN OF CARE
Prior to Admission Medication Reconciliation:     Medications added:   [] None  [] As listed below:        Medications deleted:   [] None  [] As listed below:        Changes made to existing medications:   [] None  [] As listed below:        Last times/dates taken verified with patient:  [] Yes- completed myself  [] Nurse completed no changes made  [] Unable to review with patient:  [] Nurse completed/changes made:         Allergy modifications:    []Did not review  []Patient verified NKA  []Patient verified current existing allergies: no changes made  []New allergies: listed below        Medication reconciliation sources:   []Patient  []Patient family member/emergency contact: **  [][facility] Report Review  []Epic Chart Review  []Care Everywhere review  []Pharmacy med list: **  []Pharmacy phone call  []Outside meds dispense report  []Nursing home MAR:  []Other: **    Pharmacy desired at discharge:    Is patient on coumadin?  []No  []Yes:     INR result:    INR date:    Verified by facility:    Coumadin strength/instructions:    Requests for consultation by provider or pharmacist:   [] Patient understands why all of their meds were prescribed and how to take them. No questions.   [] Fill dates coincide with compliancy for all maintenance meds.   [] Fill dates do not coincide with compliancy with maintenance meds. See notes in PTA medlist about how patient is taking.   [] Patient has questions about the following:        Comments:           Sabine Buchanan on 5/4/2020 at 2:11 PM       Discrepancies: [] No []Not Applicable []Yes: listed below        Time spent on medication reconciliation:   []5-20 mins  []20-40 mins  []> 40 mins    Issues completing PTA medication reconciliation:  [] On hold for a long time  [] Waited for a call back  [] Fax didn't come through  [] Fax took a long time  [] Other:    Notifying appropriate party of changes/additions/discrepancies:  []No changes made, notification not necessary.   []  Notified attending provider via text page  [] Notified attending provider in person  [] Notified pharmacy  [] Notified nurse  [] Attending provider not available, left detailed notes  [] Changes/additions made don't need provider notification because provider has not seen patient or input any orders  [] Changes/additions made don't need provider notification because changes made are to medications not ordered      No medications prior to admission.

## 2020-05-04 NOTE — PLAN OF CARE
"  Problem: Adult Behavioral Health Plan of Care  Goal: Patient-Specific Goal (Individualization)  Description: Patient will sleep 6-8 hours a night  Patient will attend 50% of unit programming when able  Patient will complete ADLs independently  Patient will be compliant with treatment team recommendations    5/4/2020: Patient unable to wean at this time due to unpredictable behaviors. Treatment team to reassess daily.  Outcome: No Change  Note:      Patient was compliant with changing into scrubs and answering some questions, but seems to be still somewhat sedated from B-52 he received in ED. He is slow to respond and unable to answer many questions from this writer. He did need assistance with filling out UMBERTO and menu. He denies SI and HI, though he was delayed in answering these questions. He denies hallucinations at this time. He does endorse pain but does not tell writer where or how much it hurts. He also does not discuss his mood at this time. Patient has two small reddened areas to his left ankle area as well as a linear scar on his left ankle. He also has multiple tattoos all over his body. His pharmacy is [facility] in [place]. He tells this writer what medications he is on, but keeps repeating them over again.     Problem: Thought Process Alteration  Goal: Optimal Thought Clarity  Description: Patient will verbalize a decrease in hallucinations by time of discharge.  Patient will be able to have a linear, logical conversation by time of discharge.  Patient will verbalize 2-3 coping skills by time of discharge.  Outcome: No Change     ADMISSION NOTE    Reason for admission: SI, paranoia, meth use, hallucinations.  Safety concerns: elopement precautions.  Risk for or history of violence: per transport staff, it took multiple staff to get patient to take B52 after he eloped from hospital and he has an \"explosive personality\".   Full skin assessment: completed-multiple tattoos all over body, linear scar on " "right ankle, 2 small reddened areas on left ankle    Patient arrived on unit [facility] accompanied by staff and security on 5/4/2020  2:19 PM.   Status on arrival: cooperative  /72   Pulse 77   Temp 97  F (36.1  C) (Tympanic)   Resp 16   Ht 1.88 m (6' 2\")   Wt 110.2 kg (243 lb)   BMI 31.20 kg/m    Patient given tour of unit and Welcome to  unit papers given to patient, wanding completed, belongings inventoried, and admission assessment completed.   Patient's legal status on arrival is  hold. Appropriate legal rights discussed with and copy given to patient. Patient Bill of Rights discussed with and copy given to patient.   Patient denies SI, HI, and thoughts of self harm and contracts for safety while on unit.      Face to face end of shift report communicated to oncoming RN.                 "

## 2020-05-04 NOTE — PLAN OF CARE
Prior to Admission Medication Reconciliation:     Medications added:   [x] None  [] As listed below:    Medications deleted:   [] None  [x] As listed below:    celexa 20 mg- patient got a 30 ds back in November- not sure if this was a mistake or he was trying it instead of the lexapro? Provider to advise.     Aleve- noted from 2008- nurse can ask if still taking and add back on if necessary    Ibuprofen script with no refills from 2014    Changes made to existing medications:   [x] None  [] As listed below:      Last times/dates taken verified with patient:  [] Yes- completed myself  [x] Nurse will have to complete.  [] Unable to review with patient:  [] Nurse completed/changes made:     Allergy modifications:    [x]Did not review  []Patient verified NKA  []Patient verified current existing allergies: no changes made  []New allrgies: listed below      Medication reconciliation sources:   []Patient  []Patient family member/emergency contact: **  [][facility] Report Review  []Epic Chart Review  []Care Everywhere review  []Pharmacy med list: **  [x]Pharmacy phone call:    Aripiprazole 5 mg 1/22/20 30 ds daily    Citalopram 20 mg 1 every day 11/25/2019 30 ds    lexapro 20 mg 1 daily 30  Ds 1/22/2020    Gabapentin 400 mg 2 capsules qid 1/22/20    Quetiapine 100 mg 1-2 tabs at bedtime prn 30 ds 11/25/2019  [x]Outside meds dispense report: see below  []Nursing home MAR:  []Other: **    Pharmacy desired at discharge: pt fills at [facility] in [place]    Is patient on coumadin?  [x]No    Requests for consultation by provider or pharmacist:   [] Patient understands why all of their meds were prescribed and how to take them. No questions.   [] Fill dates coincide with compliancy for all maintenance meds.   [x] Fill dates do not coincide with compliancy with maintenance meds.     Comments: patient most likely not compliant. See above for fill dates on maintenance meds and below for norco history. Ready for nurse to discuss with  patient.       Sabine Buchanan on 5/4/2020 at 2:57 PM       Discrepancies: [] No []Not Applicable []Yes: listed below        Time spent on medication reconciliation:   []5-20 mins  [x]20-40 mins  []> 40 mins    Issues completing PTA medication reconciliation:  [] On hold for a long time  [] Waited for a call back  [] Fax didn't come through  [] Fax took a long time  [] Other:    Notifying appropriate party of changes/additions/discrepancies:  []No changes made, notification not necessary.   [] Notified attending provider via text page  [] Notified attending provider in person  [] Notified pharmacy  [] Notified nurse  [] Attending provider not available, left detailed notes  [x] Changes/additions made don't need provider notification because provider has not seen patient or input any orders  [] Changes/additions made don't need provider notification because changes made are to medications not ordered  Medications Prior to Admission   Medication Sig Dispense Refill Last Dose     ARIPiprazole (ABILIFY) 5 MG tablet Take 1 tablet (5 mg) by mouth daily 30 tablet 0      escitalopram (LEXAPRO) 20 MG tablet Take 1 tablet (20 mg total) by mouth once daily. 90 tablet 0      gabapentin (NEURONTIN) 400 MG capsule Take 2 capsules (800 mg) by mouth 4 times daily 240 capsule 0      HYDROcodone-acetaminophen (NORCO) 5-325 MG tablet Take 1 tablet by mouth every 12 hours as needed for severe pain 60 tablet 0      QUEtiapine (SEROQUEL) 100 MG tablet Take 100-200 mg by mouth nightly as needed        VENTOLIN  (90 Base) MCG/ACT inhaler Take 1-2 puffs by mouth every 4 hours as needed  12         Medication Dispense History (from 5/5/2019 to 5/3/2020)   Expand All  Collapse All   ARIPiprazole      Dispensed  Days Supply  Quantity  Provider  Pharmacy    ARIPIPRAZOLE 5MG TABLETS  11/25/2019  30  30 each  CB BRITO  [facility] #...    ARIPIPRAZOLE 5MG TABLETS  07/18/2019  30  30 each  TIARRA MCGEE  [facility]  #...    ARIPIPRAZOLE 5MG TABLETS  05/07/2019  30  30 each  SHERMAN TALBOT  [facility] #...          Albuterol Sulfate      Dispensed  Days Supply  Quantity  Provider  Pharmacy    VENTOLIN HFA INH W/DOS CTR 200PUFFS  07/17/2019  16  18 g  [facility]I LUI  [facility]    VENTOLIN  (90 Base) MCG/ACTAERS  07/01/2019  16  18 g  [facility]...          Citalopram Hydrobromide      Dispensed  Days Supply  Quantity  Provider  Pharmacy    CITALOPRAM 20MG TABLETS  11/25/2019  30  30 each  CB BRITO  [facility] #...          Escitalopram Oxalate      Dispensed  Days Supply  Quantity  Provider  Pharmacy    ESCITALOPRAM 20MG TABLETS  07/17/2019  30  30 each  TIARRA MONROE  [facility] #...    ESCITALOPRAM 20MG TABLETS  05/07/2019  30  30 each  SHERMAN TALBOT  [facility] #...          Gabapentin      Dispensed  Days Supply  Quantity  Provider  Pharmacy    GABAPENTIN 400MG CAPSULES  06/18/2019  30  240 each  SHERMAN TALBOT  [facility] #...    GABAPENTIN 400MG CAPSULES  05/06/2019  30  240 each  SHERMAN TALBOT  [facility] #...          HYDROcodone-Acetaminophen      Dispensed  Days Supply  Quantity  Provider  Pharmacy    HYDROCOD/ACETAM 5-325MG TAB  04/20/2020  30  60  [id number] ...    HYDROCODONE/ACETAMINOPHEN 5-325 TB  09/24/2019  30  60 each  [facility] #...    HYDROCO/APAP TAB 5-325MG  07/15/2019  30  60 tablet  Tiarra Monroe APRN CNP  [facility]    HYDROCO/APAP TAB 5-325MG  06/28/2019  10  20 tablet  Koby Baez MD  [facility]    HYDROCODONE/ACETAMINOPHEN 5-325 TB  05/10/2019  34  68 each  [facility] #...          QUEtiapine Fumarate      Dispensed  Days Supply  Quantity  Provider  Pharmacy    QUETIAPINE 100MG TABLETS  11/25/2019  30  60 each  CB BR[facility]O  [facility] STORE #...          Varenicline Tartrate      Dispensed  Days Supply  Quantity  Provider  Pharmacy    [facility] STARTING MONTH BRUCE 53'S  07/15/2019  28  53 each  [facility] #...

## 2020-05-05 PROBLEM — R91.1 LUNG NODULE: Status: ACTIVE | Noted: 2020-05-05

## 2020-05-05 PROBLEM — M51.379 DDD (DEGENERATIVE DISC DISEASE), LUMBOSACRAL: Status: ACTIVE | Noted: 2020-05-05

## 2020-05-05 PROBLEM — M54.41 CHRONIC RIGHT-SIDED LOW BACK PAIN WITH RIGHT-SIDED SCIATICA: Status: ACTIVE | Noted: 2018-05-10

## 2020-05-05 PROBLEM — F15.10 METHAMPHETAMINE ABUSE (H): Status: ACTIVE | Noted: 2018-02-23

## 2020-05-05 PROBLEM — G89.29 CHRONIC RIGHT-SIDED LOW BACK PAIN WITH RIGHT-SIDED SCIATICA: Status: ACTIVE | Noted: 2018-05-10

## 2020-05-05 PROBLEM — F17.200 TOBACCO USE DISORDER: Status: ACTIVE | Noted: 2018-05-10

## 2020-05-05 PROBLEM — Z91.51 HISTORY OF SUICIDE ATTEMPT: Status: ACTIVE | Noted: 2018-05-10

## 2020-05-05 PROBLEM — F19.10 IV DRUG ABUSE (H): Status: ACTIVE | Noted: 2018-05-10

## 2020-05-05 PROBLEM — J45.20 MILD INTERMITTENT ASTHMA WITHOUT COMPLICATION: Status: ACTIVE | Noted: 2018-05-10

## 2020-05-05 PROBLEM — F19.10 SUBSTANCE ABUSE (H): Status: ACTIVE | Noted: 2018-05-07

## 2020-05-05 PROBLEM — F41.8 DEPRESSION WITH ANXIETY: Status: ACTIVE | Noted: 2020-05-05

## 2020-05-05 PROBLEM — F11.10 HEROIN ABUSE (H): Status: ACTIVE | Noted: 2018-05-10

## 2020-05-05 LAB
ALBUMIN SERPL-MCNC: 3.2 G/DL (ref 3.4–5)
ALP SERPL-CCNC: 76 U/L (ref 40–150)
ALT SERPL W P-5'-P-CCNC: 35 U/L (ref 0–70)
AST SERPL W P-5'-P-CCNC: 25 U/L (ref 0–45)
BILIRUB DIRECT SERPL-MCNC: <0.1 MG/DL (ref 0–0.2)
BILIRUB SERPL-MCNC: 0.2 MG/DL (ref 0.2–1.3)
CREAT SERPL-MCNC: 1.16 MG/DL (ref 0.66–1.25)
GFR SERPL CREATININE-BSD FRML MDRD: 76 ML/MIN/{1.73_M2}
POTASSIUM SERPL-SCNC: 4.4 MMOL/L (ref 3.4–5.3)
PROT SERPL-MCNC: 7.1 G/DL (ref 6.8–8.8)

## 2020-05-05 PROCEDURE — 80076 HEPATIC FUNCTION PANEL: CPT | Performed by: NURSE PRACTITIONER

## 2020-05-05 PROCEDURE — 36415 COLL VENOUS BLD VENIPUNCTURE: CPT | Performed by: NURSE PRACTITIONER

## 2020-05-05 PROCEDURE — 84132 ASSAY OF SERUM POTASSIUM: CPT | Performed by: NURSE PRACTITIONER

## 2020-05-05 PROCEDURE — 87389 HIV-1 AG W/HIV-1&-2 AB AG IA: CPT | Performed by: NURSE PRACTITIONER

## 2020-05-05 PROCEDURE — 99232 SBSQ HOSP IP/OBS MODERATE 35: CPT | Performed by: NURSE PRACTITIONER

## 2020-05-05 PROCEDURE — 82565 ASSAY OF CREATININE: CPT | Performed by: NURSE PRACTITIONER

## 2020-05-05 PROCEDURE — 12400000 ZZH R&B MH

## 2020-05-05 PROCEDURE — 40000275 ZZH STATISTIC RCP TIME EA 10 MIN

## 2020-05-05 PROCEDURE — 93005 ELECTROCARDIOGRAM TRACING: CPT

## 2020-05-05 PROCEDURE — 82306 VITAMIN D 25 HYDROXY: CPT | Performed by: NURSE PRACTITIONER

## 2020-05-05 PROCEDURE — 86803 HEPATITIS C AB TEST: CPT | Performed by: NURSE PRACTITIONER

## 2020-05-05 PROCEDURE — 99223 1ST HOSP IP/OBS HIGH 75: CPT | Mod: 95 | Performed by: NURSE PRACTITIONER

## 2020-05-05 PROCEDURE — 25000132 ZZH RX MED GY IP 250 OP 250 PS 637: Performed by: NURSE PRACTITIONER

## 2020-05-05 RX ORDER — ALBUTEROL SULFATE 0.83 MG/ML
2.5 SOLUTION RESPIRATORY (INHALATION) EVERY 6 HOURS PRN
Status: DISCONTINUED | OUTPATIENT
Start: 2020-05-05 | End: 2020-05-06

## 2020-05-05 RX ORDER — QUETIAPINE FUMARATE 100 MG/1
100 TABLET, FILM COATED ORAL AT BEDTIME
Status: DISCONTINUED | OUTPATIENT
Start: 2020-05-05 | End: 2020-05-15

## 2020-05-05 RX ORDER — ALBUTEROL SULFATE 90 UG/1
2 AEROSOL, METERED RESPIRATORY (INHALATION) EVERY 6 HOURS PRN
Status: DISCONTINUED | OUTPATIENT
Start: 2020-05-05 | End: 2020-05-05 | Stop reason: CLARIF

## 2020-05-05 RX ORDER — GABAPENTIN 400 MG/1
400 CAPSULE ORAL 3 TIMES DAILY
Status: DISCONTINUED | OUTPATIENT
Start: 2020-05-06 | End: 2020-05-06

## 2020-05-05 RX ORDER — GABAPENTIN 300 MG/1
300 CAPSULE ORAL 3 TIMES DAILY
Status: DISCONTINUED | OUTPATIENT
Start: 2020-05-05 | End: 2020-05-05

## 2020-05-05 RX ORDER — GABAPENTIN 300 MG/1
300 CAPSULE ORAL 3 TIMES DAILY
Status: COMPLETED | OUTPATIENT
Start: 2020-05-05 | End: 2020-05-05

## 2020-05-05 RX ORDER — ACAMPROSATE CALCIUM 333 MG/1
666 TABLET, DELAYED RELEASE ORAL 3 TIMES DAILY
Status: DISCONTINUED | OUTPATIENT
Start: 2020-05-05 | End: 2020-05-20 | Stop reason: HOSPADM

## 2020-05-05 RX ORDER — CITALOPRAM HYDROBROMIDE 20 MG/1
20 TABLET ORAL DAILY
Status: DISCONTINUED | OUTPATIENT
Start: 2020-05-05 | End: 2020-05-20 | Stop reason: HOSPADM

## 2020-05-05 RX ORDER — QUETIAPINE FUMARATE 50 MG/1
50 TABLET, FILM COATED ORAL 2 TIMES DAILY PRN
Status: DISCONTINUED | OUTPATIENT
Start: 2020-05-05 | End: 2020-05-20 | Stop reason: HOSPADM

## 2020-05-05 RX ADMIN — GABAPENTIN 300 MG: 300 CAPSULE ORAL at 20:21

## 2020-05-05 RX ADMIN — QUETIAPINE 50 MG: 50 TABLET, FILM COATED ORAL at 11:43

## 2020-05-05 RX ADMIN — QUETIAPINE FUMARATE 100 MG: 100 TABLET ORAL at 20:20

## 2020-05-05 RX ADMIN — ACAMPROSATE CALCIUM 666 MG: 333 TABLET, DELAYED RELEASE ORAL at 13:24

## 2020-05-05 RX ADMIN — GABAPENTIN 300 MG: 300 CAPSULE ORAL at 13:24

## 2020-05-05 RX ADMIN — ACAMPROSATE CALCIUM 666 MG: 333 TABLET, DELAYED RELEASE ORAL at 20:21

## 2020-05-05 RX ADMIN — CITALOPRAM HYDROBROMIDE 20 MG: 20 TABLET ORAL at 11:43

## 2020-05-05 ASSESSMENT — ACTIVITIES OF DAILY LIVING (ADL)
HYGIENE/GROOMING: INDEPENDENT
DRESS: SCRUBS (BEHAVIORAL HEALTH)
ORAL_HYGIENE: INDEPENDENT

## 2020-05-05 NOTE — PLAN OF CARE
"Face to face end of shift report received from Helena ERICKSON RN. Rounding completed and patient observed lying in bed with eyes closed, breathing regular and unlabored.     20:00 Update: Patient endorsed both anxiety and depression but said they are better than on admit. He denied HI/SI and all hallucinations at this time. He isolated to his room most of the shift but came out for dinner. He said he was praying because he \"is done using and done putting my family through this.\" He said multiple times he is getting too old for \"chasing a drug.\" He denied pain. He is clean and appropriately dressed. Patient has slept off and on. He ate only 25% of dinner and declined snack but denied any issues. He said he is very appreciative of the staff here. He denied needing any PRNs. He talked highly of his family and said he called his parents. He reported he is from the [facility]note [facility] but \"grew up all over the place because of bouncing from foster home to foster home.\"       Face to face end of shift report communicated to oncoming RN.     Problem: Adult Behavioral Health Plan of Care  Goal: Patient-Specific Goal (Individualization)  Description: Patient will sleep 6-8 hours a night  Patient will attend 50% of unit programming when able  Patient will complete ADLs independently  Patient will be compliant with treatment team recommendations      5/5/2020 1656 by Carolann Chapa, RN  Outcome: No Change     Problem: Thought Process Alteration  Goal: Optimal Thought Clarity  Description: Patient will verbalize a decrease in hallucinations by time of discharge.  Patient will be able to have a linear, logical conversation by time of discharge.  Patient will verbalize 2-3 coping skills by time of discharge.  5/5/2020 1656 by Carolann Chapa, RN  Outcome: Improving     "

## 2020-05-05 NOTE — PLAN OF CARE
Face to face end of shift report obtained from Alexandria MOSELEY RN.     Problem: Adult Behavioral Health Plan of Care  Goal: Patient-Specific Goal (Individualization)  Description: Patient will sleep 6-8 hours a night  Patient will attend 50% of unit programming when able  Patient will complete ADLs independently  Patient will be compliant with treatment team recommendations    5/4/2020: Patient unable to wean at this time due to unpredictable behaviors. Treatment team to reassess daily.  5/5/2020 0037 by Debbie Tate, FUNMILAYO  Outcome: No Change  Note:      Pt observed resting in bed.Pt appears to be sleeping in bed with eyes closed, having regular respirations and position changes. 15 minutes and PRN safety checks completed with no noted complains.    Patient slept 7 hours. Will continue to monitor.      Problem: Thought Process Alteration  Goal: Optimal Thought Clarity  Description: Patient will verbalize a decrease in hallucinations by time of discharge.  Patient will be able to have a linear, logical conversation by time of discharge.  Patient will verbalize 2-3 coping skills by time of discharge.  5/5/2020 0037 by Debbie Tate, RN  Outcome: No Change     Face to face end of shift report communicated to oncoming RN.  Debbie Tate, FUNMILAYO  6:12 AM

## 2020-05-05 NOTE — H&P
"Psychiatric Eval/H&P    Patient Name: Perry Preciado Jr.   YOB: 1975  Age: 45 year old  [id number]    Primary Physician: Deneen Monroe   Completed by FE EncisoKEVIN   none  ARHMS:n/a  Primary psychiatrist/NP : none  Therapist none            CC: \"I cant keep doing this. Im 45\"    HPI   Perry Preciado Jr. is a 45 year old  male who brought himself ot the ER with increased suicidal thoughts with no plan. He did attempt one other time in 2010.     He was quite labile in the ER initially though was given 10 mg of oral Zyprexa and did calm down. Believes he was diagnosed with schizophrenia in 2016. He reports he only hears voices when he is high and only had paranoid thoughts when hes high. Sleep was decent when not using. States his depression is still present when sober but celexa helped. Also states seroquel was very helpful for anxiety as well as gabapentin. States \" I have no paranoia or hallucnations when im clean\". He appears anxious and sad. He makes limited eye contact. He states \"im 45 years old. I cant keep doing this. I want my life on track\". He appears quite depressed. He is easy to engage with in conversation and is a good historian. He does not appear preoccupied. He states he would like to go to inpatient CD treatmnet though feels he needs more than 30 days which I would agree with. He states his parents are quite supportive and so is one of his sisters. He talks to them regularly.           Past Psychiatric History:     [facility] 2010 admission did have attempt of overdose  No history of civil commitment. No other inpatient admissions    Reports historical head traumas when he was involved in Ultimate fighting.        Social History:     was . Has 3 children. 2 younger children were adopted out and oldest adult child is in intermediate. Has been on and off with his ex wife though she \"cheated on me\" while he was in treatment triggering his recent relapse. He is " "close with his parents who live in [place]. Also close with one sister. Is on disability for his shoulder though is asking about disability for his mental health. Is currently homeless        Chemical Use History:     Drinks infrequently though when he does it is to excess.     Last treatment 2019 2019 was at treatment center in [place] not court ordered. Was sober almost 2 years. He does want to go to treatment. Does use IV.      Family Psychiatric History: none       Medical History and ROS    Prior to Admission medications    Medication Sig Start Date End Date Taking? Authorizing Provider   QUEtiapine (SEROQUEL) 100 MG tablet Take 100-200 mg by mouth nightly as needed   Yes Reported, Patient   ARIPiprazole (ABILIFY) 5 MG tablet Take 1 tablet (5 mg) by mouth daily 1/22/20   Deneen Monroe APRN CNP   escitalopram (LEXAPRO) 20 MG tablet Take 1 tablet (20 mg total) by mouth once daily. 1/22/20   Deneen Monroe APRN CNP   gabapentin (NEURONTIN) 400 MG capsule Take 2 capsules (800 mg) by mouth 4 times daily 1/22/20   Deneen Monroe APRN CNP   HYDROcodone-acetaminophen (NORCO) 5-325 MG tablet Take 1 tablet by mouth every 12 hours as needed for severe pain 4/20/20   Deneen Monroe APRN CNP     Allergies   Allergen Reactions     Wellbutrin [Bupropion]      Naltrexone Other (See Comments)     Headaches  Headaches       Past Medical History:   Diagnosis Date     Acute pain of right shoulder due to trauma      Anxiety      Past Surgical History:   Procedure Laterality Date     BACK SURGERY         Current Medications celexa, gabapentin, seroquel :nota taken in close to 1 month     Past Psychiatric Medications Tried abilify: unkown if helped      Physical Exam:     Please refer to the physical exam completed on by valentina Levi on 4/5/20. No Further issues of concern noted           MSE/PSYCH  PSYCHIATRIC EXAM  /70   Pulse 77   Temp 99.1  F (37.3  C) (Tympanic)   Resp 16   Ht 1.88 m (6' 2\")   Wt " "110.2 kg (243 lb)   SpO2 99%   BMI 31.20 kg/m    MSE/PSYCH  PSYCHIATRIC EXAM  /78   Pulse 80   Temp 96.5  F (35.8  C) (Oral)   Resp 16   Ht 1.88 m (6' 2\")   Wt 110.2 kg (243 lb)   SpO2 96%   BMI 31.20 kg/m    -Appearance/Behavior: normal and improved  -Motor: intact  -Gait: not observed was laying in bed  -Abnormal involuntary movements: none  -Mood: anxious  -Affect: sad. flat  -Speech: regular      -Thought process/associations: Logical and Goal directed.  -Thought content: no delusions or hallucinations  -Perceptual disturbances: No hallucinations..              -Suicidal/Homicidal Ideation: suicidla ideation. No plan. No hallucniation  -Judgment: poor  -Insight: fair  *Orientation: time, place and person.  *Memory: intact  *Attention: intact  *Language: fluent, no aphasias, able to repeat phrases and name objects. Vocab intact.  *Fund of information: likeley lower average  *Cognitive functioning estimate: 0 - independent.         Labs:     Cbc unremarkable    CMP remarkable for elevated creatinine (1.3) and BUN (20) as well as mild hypokalemia 3.3 and procalcitonin(0.26)     Assessment/Impression: This is a 45 year old yo male with a history of schizophrenia and substance abuse who has been off of his Seroquel for over a month and has been increasingly depressed with suicidal ideation that worsened the day he brought himself in. he admits to meth, alcohol and marijuana use,   Educated regarding medication indications, risks, benefits, side effects, contraindications and possible interactions. Verbally expressed understanding.     DX:       Schizophrenia by history  Ptsd by history   Methamphetamine use disorder, severe          Plan:     Labs Needed:    Lfts,   hep c   hIV  Creatinine  potassium      Med Changes:    Pta meds were abilify, Seroquel, celexa and gabapentin which he has not been taking for at least a month.       Will stop abilify    Will order ekg for baseline to monitor while on " "celexa and seroquel    Restart seroquel 100 mg hs  Restart celexa 20 mg daily   Restart gabapentin  Start campral 666 tid        DC Planning:    -rule 25 and likely inpatient CD treatment.   No need for 72 hour hold. Volunatary    Anticipated length of stay 5 days              Perry Preciado Jr. is a 45 year old male who is being evaluated via a billable video visit.      The patient has been notified of following:     \"This video visit will be conducted via a call between you and your physician/provider. We have found that certain health care needs can be provided without the need for an in-person physical exam.  This service lets us provide the care you need with a video conversation.  If a prescription is necessary we can send it directly to your pharmacy.  If lab work is needed we can place an order for that and you can then stop by our lab to have the test done at a later time.    If during the course of the call the physician/provider feels a video visit is not appropriate, you will not be charged for this service.\"     Patient has given verbal consent for Video visit? Yes    Video Start Time: 1045    Video End Time (time video stopped): 1115    I have reviewed and updated the patient's Past Medical History, Social History, Family History and Medication List.    ALLERGIES  Wellbutrin [bupropion] and Naltrexone      Video-Visit Details    Type of service:  Video Visit        Originating Location (pt. Location): [place]    Distant Location (provider location):  [place]    Mode of Communication:  [facility] Conference [facility]    "

## 2020-05-05 NOTE — PLAN OF CARE
"  Problem: Adult Behavioral Health Plan of Care  Goal: Patient-Specific Goal (Individualization)  Description: Patient will sleep 6-8 hours a night  Patient will attend 50% of unit programming when able  Patient will complete ADLs independently  Patient will be compliant with treatment team recommendations    5/4/2020: Patient unable to wean at this time due to unpredictable behaviors. Treatment team to reassess daily.  5/5/2020 0856 by Helena Arango RN  Outcome: No Change  Note:        Problem: Thought Process Alteration  Goal: Optimal Thought Clarity  Description: Patient will verbalize a decrease in hallucinations by time of discharge.  Patient will be able to have a linear, logical conversation by time of discharge.  Patient will verbalize 2-3 coping skills by time of discharge.  5/5/2020 0856 by Helena Arango RN  Outcome: No Change   Pt. Has been in bed this morning, states \"I just feel so tired\", slept 7 plus hours last night, compliant with treatment team recommendations, polite and cooperative so far, is independent with ADL's, able to have a linear conversation with this writer, ate 75% of breakfast, denies nausea and pain, denies suicidal ideation, states he feels safe here, denies hallucinations so far this shift.  Pt. Moved into room 520, bed 1, is cooperative, willing to stay voluntary.  1140-  Pt. Requested/received seroquel 50 mg po for anxiety.    Face to face end of shift report will be communicated to oncoming afternoon shift RN.     Helena Arango RN  5/5/2020  9:38 AM        "

## 2020-05-05 NOTE — H&P
[facility]    History and Physical  Medical Services       Date of Admission:  5/4/2020  Date of Service (when I saw the patient): 05/05/20    Assessment & Plan     Principal Problem:    Psychosis (H)    Active Problems:    HTN (hypertension)- pt reports he does have a hx of high blood pressure but has not taken any medications for it and states that his bp has been normal for a while. Blood pressure is stable 118/78. Pt denies chest pain, sob.       Mild intermittent asthma without complication- pt uses albuterol inhaler as needed for wheezing and sob. Pt reports he has not needed to use it for over a year. Pt denies wheezing, sob, chest pain, difficulty breathing. Albuterol neb ordered due to covid and inhaler conservation.        Chronic right-sided low back pain with right-sided sciatica- pt has hx of DDD and displacement of lumbar intervertebral disc without myelopathy. Pt reports pain is controlled with gabapentin. Gabapentin ordered by pmhnp.     Drug abuse- pt reports he used Meth 2 days prior to going to ED. Utox positive for amphetamines.     Abnormal creatinine- pt baseline creatinine 11/26/19 was 1.23. Creatinine on 5/2/20 was 1.35. Will continue to monitor. Creatinine in process.       Code Status: Full Code    Xi Levi CNP    Primary Care Physician   Deneen Monroe    Chief Complaint   Psych evaluation     History is obtained from the patient and medical chart     History of Present Illness   (Per medical chart) Perry Preciado Jr. is a 45 year old  male who brought himself ot the ER with increased suicidal thoughts with no plan. He did attempt one other time in 2010.     Past Medical History    I have reviewed this patient's medical history and updated it with pertinent information if needed.   Past Medical History:   Diagnosis Date     Acute bilateral low back pain with right-sided sciatica 6/7/2016     Acute pain of right shoulder due to trauma      Anxiety      Aspiration  pneumonia (H) 2/24/2014     CARDIOVASCULAR SCREENING; LDL GOAL LESS THAN 130 6/7/2016     Carpal tunnel syndrome of right wrist 6/7/2016     Chest pain 2/19/2014     Chest trauma 2/19/2014     Chronic pain syndrome 9/30/2019     Chronic right-sided low back pain with right-sided sciatica 5/10/2018     DDD (degenerative disc disease), lumbosacral 5/5/2020     Depression with anxiety 5/5/2020     Displacement of lumbar intervertebral disc without myelopathy 5/16/2012     KANE (generalized anxiety disorder) 7/7/2017     Heroin abuse (H) 5/10/2018     History of ADHD 1/6/2014     History of drug abuse (H) 1/6/2014     History of suicide attempt 5/10/2018     HTN (hypertension) 2/26/2014     Hyperglycemia 2/23/2014     Hypertriglyceridemia 11/2/2015     IV drug abuse (H) 5/10/2018     Major depressive disorder, recurrent (H) 2/23/2018     Major depressive disorder, recurrent episode, moderate (H) 6/6/2016     Methamphetamine abuse (H) 2/23/2018    Overview:  see [facility] H&P 11/27/2009, [facility] admit 9/2/2010     Mild intermittent asthma without complication 5/10/2018     Opiate overdose (H) 2/22/2014     Positive D dimer 11/2/2015     Psychosis (H) 5/4/2020     Sepsis (H) 2/22/2014     SIRS (systemic inflammatory response syndrome) (H) 11/2/2015     Substance abuse (H) 5/7/2018     Suicidal ideation 2/23/2018     Tobacco use disorder 5/10/2018       Past Surgical History   I have reviewed this patient's surgical history and updated it with pertinent information if needed.  Past Surgical History:   Procedure Laterality Date     BACK SURGERY         Prior to Admission Medications   Prior to Admission Medications   Prescriptions Last Dose Informant Patient Reported? Taking?   ARIPiprazole (ABILIFY) 5 MG tablet More than a month at Unknown time  No No   Sig: Take 1 tablet (5 mg) by mouth daily   HYDROcodone-acetaminophen (NORCO) 5-325 MG tablet Unknown at Unknown time  No No   Sig: Take 1 tablet by mouth every 12 hours as  needed for severe pain   QUEtiapine (SEROQUEL) 100 MG tablet Past Week at Unknown time  Yes Yes   Sig: Take 100-200 mg by mouth nightly as needed   escitalopram (LEXAPRO) 20 MG tablet More than a month at Unknown time  No No   Sig: Take 1 tablet (20 mg total) by mouth once daily.   gabapentin (NEURONTIN) 400 MG capsule More than a month at Unknown time  No No   Sig: Take 2 capsules (800 mg) by mouth 4 times daily      Facility-Administered Medications: None     Allergies   Allergies   Allergen Reactions     Wellbutrin [Bupropion]      Naltrexone Other (See Comments)     Headaches  Headaches         Social History   I have reviewed this patient's social history and updated it with pertinent information if needed. Perry GARCIA Ilana .  reports that he has quit smoking. His smoking use included cigarettes. He smoked 0.50 packs per day. He uses smokeless tobacco. He reports current drug use. Drug: Methamphetamines. He reports that he does not drink alcohol.    Family History   I have reviewed this patient's family history and updated it with pertinent information if needed.   No family history on file.    Review of Systems   CONSTITUTIONAL:  negative  EYES:  negative  HEENT:  negative  RESPIRATORY:  negative  CARDIOVASCULAR:  negative  GASTROINTESTINAL:  negative  GENITOURINARY:  negative  INTEGUMENT/BREAST:  negative  HEMATOLOGIC/LYMPHATIC:  negative  ALLERGIC/IMMUNOLOGIC:  negative  ENDOCRINE:  negative  MUSCULOSKELETAL:  negative  NEUROLOGICAL:  negative    Physical Exam   Temp: 97.5  F (36.4  C) Temp src: Tympanic BP: 114/75 Pulse: 87   Resp: 16 SpO2: 98 % O2 Device: None (Room air)    Vital Signs with Ranges  Temp:  [96.5  F (35.8  C)-99.1  F (37.3  C)] 97.5  F (36.4  C)  Pulse:  [77-87] 87  Resp:  [16] 16  BP: (106-118)/(52-78) 114/75  SpO2:  [96 %-99 %] 98 %  243 lbs 0 oz    Constitutional: NAD, vitals stable, appears well, well-developed, appears well-nourished   HEENT: atraumatic, normocephalic, PERRLA,  bilateral ears normal, nose normal, oropharynx no exudate, no erythema, no thyromegaly, no lymphadenopathy   Respiratory: good effort, speaks in full sentences, symmetric rise and fall of chest, CTA bialterally, no rales, no rhonchi, no crackles, no wheezes  Cardiovascular: RRR, S1, S2, no extra heart sounds, no murmurs, no edema   GI: normal inspection, normoactive bowel sounds x 4, no tenderness, no guarding, no masses, no organomegly  Lymph/Hematologic: no cervical lymphadenopathy, no bruising   Genitourinary: deferred   Skin: warm, dry, intact, no rashes, no open wounds  Musculoskeletal: gait normal, FROM  Neurologic: alert and oriented   Psychiatric: calm, cooperative     Data   Data reviewed today:   Recent Labs   Lab 05/05/20  1204   POTASSIUM 4.4   CR 1.16       No results found for this or any previous visit (from the past 24 hour(s)).

## 2020-05-06 PROCEDURE — 99231 SBSQ HOSP IP/OBS SF/LOW 25: CPT | Performed by: NURSE PRACTITIONER

## 2020-05-06 PROCEDURE — 12400000 ZZH R&B MH

## 2020-05-06 PROCEDURE — 99233 SBSQ HOSP IP/OBS HIGH 50: CPT | Mod: 95 | Performed by: NURSE PRACTITIONER

## 2020-05-06 PROCEDURE — 25000132 ZZH RX MED GY IP 250 OP 250 PS 637: Performed by: NURSE PRACTITIONER

## 2020-05-06 RX ORDER — ALBUTEROL SULFATE 90 UG/1
2 AEROSOL, METERED RESPIRATORY (INHALATION) EVERY 4 HOURS PRN
Status: DISCONTINUED | OUTPATIENT
Start: 2020-05-06 | End: 2020-05-20 | Stop reason: HOSPADM

## 2020-05-06 RX ORDER — GABAPENTIN 300 MG/1
600 CAPSULE ORAL 3 TIMES DAILY
Status: DISCONTINUED | OUTPATIENT
Start: 2020-05-07 | End: 2020-05-11

## 2020-05-06 RX ORDER — GABAPENTIN 400 MG/1
400 CAPSULE ORAL 3 TIMES DAILY
Status: COMPLETED | OUTPATIENT
Start: 2020-05-06 | End: 2020-05-06

## 2020-05-06 RX ADMIN — ACAMPROSATE CALCIUM 666 MG: 333 TABLET, DELAYED RELEASE ORAL at 14:15

## 2020-05-06 RX ADMIN — GABAPENTIN 400 MG: 400 CAPSULE ORAL at 08:16

## 2020-05-06 RX ADMIN — ACAMPROSATE CALCIUM 666 MG: 333 TABLET, DELAYED RELEASE ORAL at 20:52

## 2020-05-06 RX ADMIN — GABAPENTIN 400 MG: 400 CAPSULE ORAL at 20:53

## 2020-05-06 RX ADMIN — GABAPENTIN 400 MG: 400 CAPSULE ORAL at 14:15

## 2020-05-06 RX ADMIN — HYDROXYZINE HYDROCHLORIDE 100 MG: 25 TABLET, FILM COATED ORAL at 17:22

## 2020-05-06 RX ADMIN — CITALOPRAM HYDROBROMIDE 20 MG: 20 TABLET ORAL at 08:16

## 2020-05-06 RX ADMIN — QUETIAPINE FUMARATE 100 MG: 100 TABLET ORAL at 20:52

## 2020-05-06 RX ADMIN — QUETIAPINE 50 MG: 50 TABLET, FILM COATED ORAL at 10:55

## 2020-05-06 RX ADMIN — HYDROXYZINE HYDROCHLORIDE 100 MG: 25 TABLET, FILM COATED ORAL at 12:53

## 2020-05-06 RX ADMIN — ACAMPROSATE CALCIUM 666 MG: 333 TABLET, DELAYED RELEASE ORAL at 08:16

## 2020-05-06 ASSESSMENT — ACTIVITIES OF DAILY LIVING (ADL)
ORAL_HYGIENE: INDEPENDENT
LAUNDRY: UNABLE TO COMPLETE
DRESS: INDEPENDENT;SCRUBS (BEHAVIORAL HEALTH)
HYGIENE/GROOMING: INDEPENDENT
HYGIENE/GROOMING: INDEPENDENT
ORAL_HYGIENE: INDEPENDENT
LAUNDRY: UNABLE TO COMPLETE
DRESS: INDEPENDENT;SCRUBS (BEHAVIORAL HEALTH)

## 2020-05-06 NOTE — PLAN OF CARE
"  Problem: Adult Behavioral Health Plan of Care  Goal: Patient-Specific Goal (Individualization)  Description: Patient will sleep 6-8 hours a night  Patient will attend 50% of unit programming when able  Patient will complete ADLs independently  Patient will be compliant with treatment team recommendations      5/6/2020 1546 by Karly Ling, RN  Outcome: Improving  Note: 1530: Received end of shift report from FUNMILAYO Melgoza. Pt participating in group upon arrival.     2151: Pt out et about on unit majority of shift, participates in groups, PRN hydroxyzine given x1 post dinner for c/o \"My anxiety is getting pretty high\"; effective relief noted. Denies SI/HI, hallucinations, endorses moderate depression et anxiety. Fair insight to overall situation, voices goals of wanting to complete treatment et stay sober. Compliant with nursing assessment et medication administration. Adequate food et fluid intake.     Face to face end of shift report to be communicated to on-coming staff.     Karly iLng RN  5/6/2020  10:19 PM                Problem: Adult Behavioral Health Plan of Care  Goal: Adheres to Safety Considerations for Self and Others  Outcome: Improving  Note: Appropriate behavior with staff et peers.      Problem: Thought Process Alteration  Goal: Optimal Thought Clarity  Description: Patient will verbalize a decrease in hallucinations by time of discharge.  Patient will be able to have a linear, logical conversation by time of discharge.  Patient will verbalize 2-3 coping skills by time of discharge.  5/6/2020 1546 by Karly Ling, RN  Outcome: Improving  Note: Thought process is linear, logical. Conversation is reality based. Denies hallucinations. Pt states he would like to get back into hobbies, wood carving, , having own business.     Problem: Adult Behavioral Health Plan of Care  Goal: Optimized Coping Skills in Response to Life Stressors  Outcome: Improving     "

## 2020-05-06 NOTE — PLAN OF CARE
Face to face shift report received from Debbie CALLE RN. Patient observed.      Problem: Adult Behavioral Health Plan of Care  Goal: Patient-Specific Goal (Individualization)  Description: Patient will sleep 6-8 hours a night  Patient will attend 50% of unit programming when able  Patient will complete ADLs independently  Patient will be compliant with treatment team recommendations      5/6/2020 1009 by Rhona Otero, RN  Outcome: Improving  Note: Patient is in his room this morning, he reports that he is sad today. Pt denies SI, HI, hallucinations, and anxiety. He reports he is sad as he wants to be done using drugs (meth), he states he really wants to get help. He reports that he feels he needs to stay here until he gets to treatment. Pt states he is tired of the drug life and wants to make things better for himself. He states he has wanted to quit before but never this strongly. Pt casarez report he feels worthless due to the way he lives his life and wishes he had made different choices. Pt does report he has a good supportive family. Pt does ask to find out what the plan is as he really wants to know what the plan is.   Pt reports sadness throughout the day, he states he continues to feel hopeless.   1055 pt administered Seroquel 50 mg for anxiety  1253 pt administered hydroxyzine for anxiety  Pt requested a book for self help, nurse does contact OT who does provide a patient a Hope book.    Problem: Thought Process Alteration  Goal: Optimal Thought Clarity  Description: Patient will verbalize a decrease in hallucinations by time of discharge.  Patient will be able to have a linear, logical conversation by time of discharge.  Patient will verbalize 2-3 coping skills by time of discharge.    Pt denies hallucinations. He has linear conversations today.   5/6/2020 1009 by Rhona Otero, RN  Outcome: Improving   The face to face report will be communicated to on coming RN.

## 2020-05-06 NOTE — PROGRESS NOTES
[facility]    Medical Services Progress Note    Date of Service (when I saw the patient): 05/06/2020    Assessment & Plan     Principal Problem:    Psychosis (H)     Active Medical Problems:    HTN (hypertension)- pt reports he does have a hx of high blood pressure but has not taken any medications for it and states that his bp has been normal for a while. Blood pressure is stable 118/78. Pt denies chest pain, sob.   5/6/20- pt vitals stable. Denies chest pain, sob.        Mild intermittent asthma without complication- pt uses albuterol inhaler as needed for wheezing and sob. Pt reports he has not needed to use it for over a year. Pt denies wheezing, sob, chest pain, difficulty breathing. Albuterol neb ordered due to covid and inhaler conservation. \  5/6/20- pt denies wheezing, chest pain, sob, difficulty breathing. Pt has not needed albuterol inhaler.        Chronic right-sided low back pain with right-sided sciatica- pt has hx of DDD and displacement of lumbar intervertebral disc without myelopathy. Pt reports pain is controlled with gabapentin. Gabapentin ordered by pmhnp.   5/6/20- pt denies any pain. Taking Gabapentin for pain.      Drug abuse- pt reports he used Meth 2 days prior to going to ED. Utox positive for amphetamines.      Abnormal creatinine- pt baseline creatinine 11/26/19 was 1.23. Creatinine on 5/2/20 was 1.35. Will continue to monitor. Creatinine in process.   5/6/20- creatinine improved to 1.16.     Pt medically stable, no acute medical concerns. Chronic medical problems stable. Will sign off. Please consult for any new medical issues or concerns.       Code Status: No Order.    Xi Levi CNP      -Data reviewed today: I reviewed all new labs and imaging results over the last 24 hours.     Physical Exam   Temp: 97.4  F (36.3  C) Temp src: Tympanic BP: 111/84 Pulse: 87   Resp: 14 SpO2: 93 % O2 Device: None (Room air)    Vitals:    05/04/20 1419   Weight: 110.2 kg (243 lb)      Vital Signs with Ranges  Temp:  [97.4  F (36.3  C)-98.4  F (36.9  C)] 97.4  F (36.3  C)  Pulse:  [] 87  Resp:  [14-16] 14  BP: (106-126)/(70-87) 111/84  SpO2:  [93 %-97 %] 93 %  No intake/output data recorded.    Constitutional: NAD, vitals stable, appears well- developed, well appearing   Respiratory: good effort, speaks in full sentences, symmetric rise and fall of chest, CTA bilaterally, no rales, rhonchi, crackles, wheezing   Cardiovascular: RRR, S1, S2, no extra heart sounds, no murmurs, no edema   GI: normal inspection, normoactive bowel sounds x 4, no tenderness, no guarding, no masses, no organomegaly   Skin/Integumen: warm, dry, intact, no rashes, no open wounds       Medications     acamprosate  666 mg Oral TID     citalopram  20 mg Oral Daily     gabapentin  400 mg Oral TID     [START ON 5/7/2020] gabapentin  600 mg Oral TID     QUEtiapine  100 mg Oral At Bedtime       Data   Recent Labs   Lab 05/05/20  1204   POTASSIUM 4.4   CR 1.16   ALBUMIN 3.2*   PROTTOTAL 7.1   BILITOTAL 0.2   ALKPHOS 76   ALT 35   AST 25       No results found for this or any previous visit (from the past 24 hour(s)).

## 2020-05-06 NOTE — PROGRESS NOTES
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 recommendations, including housing.    2. Obtain a full DA (Diagnostic Assessment) from [facility]    3. Obtain [facility] for counseling 1-2 times per week. Follow all recommendations.    4. Remain free from all mood altering substances unless prescribed by a physician.    5. Obtain a full physical. Follow " all recommendations.        Collateral Contacts     Name:       Relationship:       Phone Number:     Releases:    No     Collateral Contacts     Name:       Relationship:       Phone Number:       Releases:    No   ollateral Contacts      A problematic pattern of alcohol/drug use leading to clinically significant impairment or distress, as manifested by at least two of the following, occurring within a 12-month period:    1.) Alcohol/drug is often taken in larger amounts or over a longer period than was intended.  2.) There is a persistent desire or unsuccessful efforts to cut down or control alcohol/drug use  3.) A great deal of time is spent in activities necessary to obtain alcohol, use alcohol, or recover from its effects.  4.) Craving, or a strong desire or urge to use alcohol/drug  5.) Recurrent alcohol/drug use resulting in a failure to fulfill major role obligations at work, school or home.  6.) Continued alcohol use despite having persistent or recurrent social or interpersonal problems caused or exacerbated by the effects of alcohol/drug.  7.) Important social, occupational, or recreational activities are given up or reduced because of alcohol/drug use.  8.) Recurrent alcohol/drug use in situations in which it is physically hazardous.  9.) Alcohol/drug use is continued despite knowledge of having a persistent or recurrent physical or psychological problem that is likely to have been caused or exacerbated by alcohol.  10.) Tolerance, as defined by either of the following: A need for markedly increased amounts of alcohol/drug to achieve intoxication or desired effect.  11.) Withdrawal, as manifested by either of the following: The characteristic withdrawal syndrome for alcohol/drug (refer to Criteria A and B of the criteria set for alcohol/drug withdrawal).    Pt [facility] been in locked [facility] facility since [facility]/04/2020.

## 2020-05-06 NOTE — PROGRESS NOTES
"[facility]  Psychiatric Progress Note      Impression:     Perry is slept all night.  He does not feel oversedated today.  He is still very flat and appears extremely depressed.  He makes very little eye contact.  When I asked him if he is still having suicidal ideation he tells me \"I just feel extremely worthless absolutely worthless\" he does believe that when he finds out that he can get into treatment and get his life back together he will feel hopeful again    Educated regarding medication indications, risks, benefits, side effects, contraindications and possible interactions. Verbally expressed understanding.        Diagnoses:     Mdd, recurrent severe without psychotic features  Ptsd by history   Methamphetamine use disorder, severe    Attestation:  Patient has been seen and evaluated by me,  Leigh Viera NP          Interim History:   The patient's care was discussed with the treatment team and chart notes were reviewed.            Medications:       acamprosate  666 mg Oral TID     citalopram  20 mg Oral Daily     gabapentin  400 mg Oral TID     [START ON 5/7/2020] gabapentin  600 mg Oral TID     QUEtiapine  100 mg Oral At Bedtime              10 point ROS negative        Allergies:     Allergies   Allergen Reactions     Wellbutrin [Bupropion]      Naltrexone Other (See Comments)     Headaches  Headaches              Psychiatric Examination:   /84   Pulse 87   Temp 97.4  F (36.3  C) (Tympanic)   Resp 14   Ht 1.88 m (6' 2\")   Wt 110.2 kg (243 lb)   SpO2 93%   BMI 31.20 kg/m    Weight is 243 lbs 0 oz  Body mass index is 31.2 kg/m .    Appearance:  awake, alert and adequately groomed  Attitude:  cooperative  Eye Contact:  poor   Mood:  sad   Affect:  intensity is flat  Speech:  decreased prosody  Psychomotor Behavior:  no evidence of tardive dyskinesia, dystonia, or tics  Thought Process:  logical  Associations:  no loose associations  Thought Content:  no evidence of " suicidal ideation or homicidal ideation significant feelings of worthlessness and hopelessness  Insight:  Limited  Judgment:  poor  Oriented to:  time, person, and place  Attention Span and Concentration:  fair  Recent and Remote Memory:  intact  Fund of Knowledge: low-normal  Muscle Strength and Tone: normal  Gait and Station: Normal           Labs:     Results for orders placed or performed during the hospital encounter of 05/04/20   Hepatic panel     Status: Abnormal   Result Value Ref [place]    Bilirubin Direct <0.1 0.0 - 0.2 mg/dL    Bilirubin Total 0.2 0.2 - 1.3 mg/dL    Albumin 3.2 (L) 3.4 - 5.0 g/dL    Protein Total 7.1 6.8 - 8.8 g/dL    Alkaline Phosphatase 76 40 - 150 U/L    ALT 35 0 - 70 U/L    AST 25 0 - 45 U/L   Creatinine     Status: None   Result Value Ref [facility]    Creatinine 1.16 0.66 - 1.25 mg/dL    GFR Estimate 76 >60 mL/min/[1.73_m2]    GFR Estimate If Black 88 >60 mL/min/[1.73_m2]   Potassium     Status: None   Result Value Ref Range    Potassium 4.4 3.4 - 5.3 mmol/L                Plan/Treatment Team     BEHAVIORAL TEAM DISCUSSION    Progress: Still very depressed s    Continued Stay Criteria/Rationale: Very high risk of suicide and relapse if he were to be discharged    Medical/Physical: Hepatitis C and HIV labs are pending    Precautions: None    Falls precaution?: No  Behavioral Orders   Procedures     Code 1 - Restrict to Unit     Routine Programming     As clinically indicated     Status 15     Every 15 minutes.                     Plan for this encounter/Med Changes/Labs:     Rule 25 be completed today    Gabapentin will be increased today  EKG reviewed        Rationale for change in precautions or plan: Increase gabapentin to target anxiety      Participants:april and albert HARDY 3 to 5 days.  He will need to go [id number] to chemical dependency treatment.  High risk of relapse and suicide              Perry Preciado Jr. is a 45 year old male who is being evaluated via a billable  "video visit.      The patient has been notified of following:     \"This video visit will be conducted via a call between you and your physician/provider. We have found that certain health care needs can be provided without the need for an in-person physical exam.  This service lets us provide the care you need with a video conversation.  If a prescription is necessary we can send it directly to your pharmacy.  If lab work is needed we can place an order for that and you can then stop by our lab to have the test done at a later time.    If during the course of the call the physician/provider feels a video visit is not appropriate, you will not be charged for this service.\"     Patient has given verbal consent for Video visit? Yes    Video Start Time: 0900    Video End Time (time video stopped): 0915    I have reviewed and updated the patient's Past Medical History, Social History, Family History and Medication List.    ALLERGIES  Wellbutrin [bupropion] and Naltrexone      Video-Visit Details    Type of service:  Video Visit        Originating Location (pt. Location): [place]    Distant Location (provider location):  [place]    Mode of Communication:  Video Conference via [employer]                   "

## 2020-05-06 NOTE — PLAN OF CARE
Face to face end of shift report obtained from Carolann SÁNCHEZ RN.     Problem: Adult Behavioral Health Plan of Care  Goal: Patient-Specific Goal (Individualization)  Description: Patient will sleep 6-8 hours a night  Patient will attend 50% of unit programming when able  Patient will complete ADLs independently  Patient will be compliant with treatment team recommendations    5/6/2020 0012 by Debbie Tate, RN  Outcome: No Change  Note:      Pt observed resting in bed.Pt appears to be sleeping in bed with eyes closed, having regular respirations and position changes. 15 minutes and PRN safety checks completed with no noted complains.    Patient slept 7 hours. Will continue to monitor.      Problem: Thought Process Alteration  Goal: Optimal Thought Clarity  Description: Patient will verbalize a decrease in hallucinations by time of discharge.  Patient will be able to have a linear, logical conversation by time of discharge.  Patient will verbalize 2-3 coping skills by time of discharge.  5/6/2020 0012 by Debbie Tate, RN  Outcome: No Change     Face to face end of shift report communicated to oncoming RN.  Debbie Tate, FUNMILAYO  6:18 AM

## 2020-05-06 NOTE — PLAN OF CARE
Spoke with pt this morning- He was up in the lounge socializing with peers. Informed pt that staff spoke with [facility] this morning and confirmed that he will be meeting with pt today to complete rule 25 assessment. Pt states he is sick of relying on his friends and family for things and is ready to make a change and start over but is worried he wont be able to do it.     [facility] here to meet with pt this afternoon for rule 25

## 2020-05-07 LAB
DEPRECATED CALCIDIOL+CALCIFEROL SERPL-MC: 22 UG/L (ref 20–75)
HCV AB SERPL QL IA: NONREACTIVE
HIV 1+2 AB+HIV1 P24 AG SERPL QL IA: NONREACTIVE
SARS-COV-2 PCR COMMENT: NORMAL
SARS-COV-2 RNA SPEC QL NAA+PROBE: NEGATIVE
SARS-COV-2 RNA SPEC QL NAA+PROBE: NORMAL
SPECIMEN SOURCE: NORMAL
SPECIMEN SOURCE: NORMAL

## 2020-05-07 PROCEDURE — 87635 SARS-COV-2 COVID-19 AMP PRB: CPT | Performed by: NURSE PRACTITIONER

## 2020-05-07 PROCEDURE — 25000132 ZZH RX MED GY IP 250 OP 250 PS 637: Performed by: NURSE PRACTITIONER

## 2020-05-07 PROCEDURE — 99233 SBSQ HOSP IP/OBS HIGH 50: CPT | Mod: 95 | Performed by: NURSE PRACTITIONER

## 2020-05-07 PROCEDURE — 12400000 ZZH R&B MH

## 2020-05-07 RX ORDER — PROPRANOLOL HYDROCHLORIDE 20 MG/1
20 TABLET ORAL 2 TIMES DAILY
Status: COMPLETED | OUTPATIENT
Start: 2020-05-07 | End: 2020-05-07

## 2020-05-07 RX ORDER — PROPRANOLOL HYDROCHLORIDE 20 MG/1
20 TABLET ORAL 2 TIMES DAILY
Status: DISCONTINUED | OUTPATIENT
Start: 2020-05-08 | End: 2020-05-11

## 2020-05-07 RX ORDER — PROPRANOLOL HYDROCHLORIDE 20 MG/1
20 TABLET ORAL 2 TIMES DAILY
Status: DISCONTINUED | OUTPATIENT
Start: 2020-05-07 | End: 2020-05-07

## 2020-05-07 RX ADMIN — GABAPENTIN 600 MG: 300 CAPSULE ORAL at 20:58

## 2020-05-07 RX ADMIN — GABAPENTIN 600 MG: 300 CAPSULE ORAL at 13:31

## 2020-05-07 RX ADMIN — QUETIAPINE FUMARATE 100 MG: 100 TABLET ORAL at 20:58

## 2020-05-07 RX ADMIN — ACAMPROSATE CALCIUM 666 MG: 333 TABLET, DELAYED RELEASE ORAL at 13:31

## 2020-05-07 RX ADMIN — ACAMPROSATE CALCIUM 666 MG: 333 TABLET, DELAYED RELEASE ORAL at 08:14

## 2020-05-07 RX ADMIN — CITALOPRAM HYDROBROMIDE 20 MG: 20 TABLET ORAL at 08:15

## 2020-05-07 RX ADMIN — PROPRANOLOL HYDROCHLORIDE 20 MG: 20 TABLET ORAL at 20:57

## 2020-05-07 RX ADMIN — QUETIAPINE 50 MG: 50 TABLET, FILM COATED ORAL at 11:58

## 2020-05-07 RX ADMIN — ACAMPROSATE CALCIUM 666 MG: 333 TABLET, DELAYED RELEASE ORAL at 20:58

## 2020-05-07 RX ADMIN — HYDROXYZINE HYDROCHLORIDE 100 MG: 25 TABLET, FILM COATED ORAL at 17:06

## 2020-05-07 RX ADMIN — GABAPENTIN 600 MG: 300 CAPSULE ORAL at 08:14

## 2020-05-07 RX ADMIN — PROPRANOLOL HYDROCHLORIDE 20 MG: 20 TABLET ORAL at 13:32

## 2020-05-07 ASSESSMENT — ACTIVITIES OF DAILY LIVING (ADL)
HYGIENE/GROOMING: INDEPENDENT
ORAL_HYGIENE: INDEPENDENT
ORAL_HYGIENE: INDEPENDENT
LAUNDRY: UNABLE TO COMPLETE
HYGIENE/GROOMING: INDEPENDENT
LAUNDRY: UNABLE TO COMPLETE
DRESS: SCRUBS (BEHAVIORAL HEALTH);INDEPENDENT
DRESS: INDEPENDENT

## 2020-05-07 NOTE — PLAN OF CARE
Problem: Adult Behavioral Health Plan of Care  Goal: Patient-Specific Goal (Individualization)  Description: Patient will sleep 6-8 hours a night  Patient will attend 50% of unit programming when able  Patient will complete ADLs independently  Patient will be compliant with treatment team recommendations      5/7/2020 1609 by Karly Ling, RN  Outcome: Improving  Note: 1530: Received end of shift report from FUNMILAYO Melgoza. Pt sitting on bed in room upon arrival, full range affect, mood is calm.     2228: Pt isolated self majority of shift, resting on et off in bed, is hopeful, voices feelings of hopefulness et anxiety r/t life stressors, peer pressure, et completing treatment. Continues fair insight to overall sitution. Denies SI/HI, hallucinations et pain. Adequate food et fluid intake. PRN hydroxyzine given x1 during dinner for c/o increasing anxiety. Effective relief noted.     Face to face end of shift report to be communicated to on-coming staff.     Karly Ling RN  5/7/2020  10:31 PM                Problem: Adult Behavioral Health Plan of Care  Goal: Adheres to Safety Considerations for Self and Others  Outcome: Improving  Note: Appropriate behavior with staff et peers.      Problem: Adult Behavioral Health Plan of Care  Goal: Optimized Coping Skills in Response to Life Stressors  Outcome: Improving     Problem: Thought Process Alteration  Goal: Optimal Thought Clarity  Description: Patient will verbalize a decrease in hallucinations by time of discharge.  Patient will be able to have a linear, logical conversation by time of discharge.  Patient will verbalize 2-3 coping skills by time of discharge.  5/7/2020 1609 by Karly Ling, RN  Outcome: Improving  Note: Thought process is linear, logical. Conversation is reality based.

## 2020-05-07 NOTE — PLAN OF CARE
Face to face shift report received from Debbie CALLE RN. Patient observed.      Problem: Adult Behavioral Health Plan of Care  Goal: Patient-Specific Goal (Individualization)  Description: Patient will sleep 6-8 hours a night  Patient will attend 50% of unit programming when able  Patient will complete ADLs independently  Patient will be compliant with treatment team recommendations      5/7/2020 1055 by Rhona Otero, RN  Outcome: Improving  Note: Patient is up in the lounge this morning, he eats in the lounge and socializes with peers. Pt is less tearful today, but continues to endorse sadness. Pt does continue to talk about the poor decisions he feels he has made throughout his life. Pt does have a CoVid swab completed and is cooperative with this. Pt does continue to report some anxiety. Pt is compliant with medications.   Pt does return to his room after watching television for a bit. Pt continues to sit in his room.        Problem: Thought Process Alteration  Goal: Optimal Thought Clarity  Description: Patient will verbalize a decrease in hallucinations by time of discharge.  Patient will be able to have a linear, logical conversation by time of discharge.  Patient will verbalize 2-3 coping skills by time of discharge.    Pt is linear in conversation. He denies hallucinations, SI, and HI.   5/7/2020 1055 by Rhona Otero RN  Outcome: Improving  The face to face report will be communicated to on coming RN.

## 2020-05-07 NOTE — PLAN OF CARE
Face to face end of shift report obtained from FUNMILAYO Brandt.     Problem: Adult Behavioral Health Plan of Care  Goal: Patient-Specific Goal (Individualization)  Description: Patient will sleep 6-8 hours a night  Patient will attend 50% of unit programming when able  Patient will complete ADLs independently  Patient will be compliant with treatment team recommendations    5/7/2020 0007 by Debbie Tate, RN  Note:     Pt observed resting in bed.Pt appears to be sleeping in bed with eyes closed, having regular respirations and position changes. 15 minutes and PRN safety checks completed with no noted complains.    Patient slept 5.5 hours. Will continue to monitor.      Problem: Thought Process Alteration  Goal: Optimal Thought Clarity  Description: Patient will verbalize a decrease in hallucinations by time of discharge.  Patient will be able to have a linear, logical conversation by time of discharge.  Patient will verbalize 2-3 coping skills by time of discharge.  5/7/2020 0007 by Debbie Tate, RN  Outcome: No Change     Face to face end of shift report communicated to oncoming RN.  Debbie Tate, FUNMILAYO  6:13 AM

## 2020-05-07 NOTE — PLAN OF CARE
"Met with pt this morning- He states he is doing \"better today than yesterday.\" Asked pt about his rule 25 assessment yesterday- he says he thinks it went well. He talked about needing more than a 30 day program. Informed pt that [facility] informed staff this morning he will be sending referrals to [facility] programs and talked about these with him. Pt asked if a referral can also be sent to [facility] in [place]- informed [facility] of pt's request.   "

## 2020-05-07 NOTE — PROGRESS NOTES
"[facility]  Psychiatric Progress Note      Impression:     Kb appears brighter today. He smiles and his eye contact is improved. He is appearing anxious and seems restless. Nursing states that he appeared anxious and restless in the lounge last night. Rocks and moves in his chair a lot. States he feels anxious and also has anxious thoughts. He asked for seorquel to be increased though I expressed some concern of akathesia though he believes it is anxiety. I did tell him that propranolol could help with anxiety as well as restlessness nad he said that he had tried it in the past for anxiety when he was on seroquel and it worked very well.     Educated regarding medication indications, risks, benefits, side effects, contraindications and possible interactions. Verbally expressed understanding.        Diagnoses:     Mdd, recurrent severe without psychotic features  Ptsd by history   Methamphetamine use disorder, severe    Attestation:  Patient has been seen and evaluated by me,  Leigh Viera NP          Interim History:   The patient's care was discussed with the treatment team and chart notes were reviewed.            Medications:       acamprosate  666 mg Oral TID     citalopram  20 mg Oral Daily     gabapentin  600 mg Oral TID     [START ON 5/8/2020] propranolol  20 mg Oral BID     propranolol  20 mg Oral BID     QUEtiapine  100 mg Oral At Bedtime              10 point ROS negative        Allergies:     Allergies   Allergen Reactions     Wellbutrin [Bupropion]      Naltrexone Other (See Comments)     Headaches  Headaches              Psychiatric Examination:   /67   Pulse 95   Temp 97.5  F (36.4  C) (Tympanic)   Resp 16   Ht 1.88 m (6' 2\")   Wt 110.2 kg (243 lb)   SpO2 99%   BMI 31.20 kg/m    Weight is 243 lbs 0 oz  Body mass index is 31.2 kg/m .    Appearance:  awake, alert and adequately groomed  Attitude:  cooperative  Eye Contact:  improving  Mood:  A bit brighter  Affect:  " intensity is flat  Speech:  decreased prosody  Psychomotor Behavior:  no evidence of tardive dyskinesia, dystonia, or tics  Thought Process:  logical  Associations:  no loose associations  Thought Content:  no evidence of suicidal ideation or homicidal ideation significant feelings of worthlessness and hopelessness  Insight:  Limited  Judgment:  poor  Oriented to:  time, person, and place  Attention Span and Concentration:  fair  Recent and Remote Memory:  intact  Fund of Knowledge: low-normal  Muscle Strength and Tone: normal  Gait and Station: Normal           Labs:     Results for orders placed or performed during the hospital encounter of 05/04/20   Hepatic panel     Status: Abnormal   Result Value Ref [place]    Bilirubin Direct <0.1 0.0 - 0.2 mg/dL    Bilirubin Total 0.2 0.2 - 1.3 mg/dL    Albumin 3.2 (L) 3.4 - 5.0 g/dL    Protein Total 7.1 6.8 - 8.8 g/dL    Alkaline Phosphatase 76 40 - 150 U/L    ALT 35 0 - 70 U/L    AST 25 0 - 45 U/L   Vitamin D     Status: None   Result Value Ref Range    Vitamin D Deficiency screening 22 20 - 75 ug/L   Hepatitis C antibody     Status: None   Result Value Ref [facility]    Hepatitis C Antibody Nonreactive NR^Nonreactive   HIV Antigen Antibody Combo     Status: None   Result Value Ref [place]    HIV Antigen Antibody Combo Nonreactive NR^Nonreactive       Creatinine     Status: None   Result Value Ref [place]    Creatinine 1.16 0.66 - 1.25 mg/dL    GFR Estimate 76 >60 mL/min/[1.73_m2]    GFR Estimate If Black 88 >60 mL/min/[1.73_m2]   Potassium     Status: None   Result Value Ref [place]    Potassium 4.4 3.4 - 5.3 mmol/L                Plan/Treatment Team     BEHAVIORAL TEAM DISCUSSION    Progress:     smilling today. Still very depressed though appears a bit brighter.     Continued Stay Criteria/Rationale: Very high risk of suicide and relapse if he were to be discharged    Medical/Physical: Hepatitis C and HIV labs are pending    Precautions: None    Falls precaution?: No  Behavioral  "Orders   Procedures     Code 1 - Restrict to Unit     NO Nebulized Medications     Routine Programming     As clinically indicated     Status 15     Every 15 minutes.                     Plan for this encounter/Med Changes/Labs:     Rule 25 be completed and referrals being sent    Propranolol will be started for anxiety     EKG reviewed        Rationale for change in precautions or plan: Increase gabapentin to target anxiety      Participants:april and tabby      ELOS 3 to 5 days.  He will need to go [id number] to chemical dependency treatment.  High risk of relapse and suicide              Perry Preciado Jr. is a 45 year old male who is being evaluated via a billable video visit.      The patient has been notified of following:     \"This video visit will be conducted via a call between you and your physician/provider. We have found that certain health care needs can be provided without the need for an in-person physical exam.  This service lets us provide the care you need with a video conversation.  If a prescription is necessary we can send it directly to your pharmacy.  If lab work is needed we can place an order for that and you can then stop by our lab to have the test done at a later time.    If during the course of the call the physician/provider feels a video visit is not appropriate, you will not be charged for this service.\"     Patient has given verbal consent for Video visit? Yes    Video Start Time: 0920    Video End Time (time video stopped): 0940    I have reviewed and updated the patient's Past Medical History, Social History, Family History and Medication List.    ALLERGIES  Wellbutrin [bupropion] and Naltrexone      Video-Visit Details    Type of service:  Video Visit        Originating Location (pt. Location): [place]    Distant Location (provider location):  [place]    Mode of Communication:  Video Conference via [employer]                   "

## 2020-05-08 PROCEDURE — 12400000 ZZH R&B MH

## 2020-05-08 PROCEDURE — 25000132 ZZH RX MED GY IP 250 OP 250 PS 637: Performed by: NURSE PRACTITIONER

## 2020-05-08 PROCEDURE — 99232 SBSQ HOSP IP/OBS MODERATE 35: CPT | Mod: 95 | Performed by: NURSE PRACTITIONER

## 2020-05-08 RX ADMIN — ACAMPROSATE CALCIUM 666 MG: 333 TABLET, DELAYED RELEASE ORAL at 13:22

## 2020-05-08 RX ADMIN — PROPRANOLOL HYDROCHLORIDE 20 MG: 20 TABLET ORAL at 16:07

## 2020-05-08 RX ADMIN — GABAPENTIN 600 MG: 300 CAPSULE ORAL at 13:22

## 2020-05-08 RX ADMIN — ACAMPROSATE CALCIUM 666 MG: 333 TABLET, DELAYED RELEASE ORAL at 20:33

## 2020-05-08 RX ADMIN — GABAPENTIN 600 MG: 300 CAPSULE ORAL at 20:33

## 2020-05-08 RX ADMIN — OLANZAPINE 10 MG: 10 TABLET, FILM COATED ORAL at 12:21

## 2020-05-08 RX ADMIN — QUETIAPINE 50 MG: 50 TABLET, FILM COATED ORAL at 17:26

## 2020-05-08 RX ADMIN — PROPRANOLOL HYDROCHLORIDE 20 MG: 20 TABLET ORAL at 08:21

## 2020-05-08 RX ADMIN — ACAMPROSATE CALCIUM 666 MG: 333 TABLET, DELAYED RELEASE ORAL at 08:21

## 2020-05-08 RX ADMIN — QUETIAPINE FUMARATE 100 MG: 100 TABLET ORAL at 20:34

## 2020-05-08 RX ADMIN — HYDROXYZINE HYDROCHLORIDE 100 MG: 25 TABLET, FILM COATED ORAL at 14:51

## 2020-05-08 RX ADMIN — CITALOPRAM HYDROBROMIDE 20 MG: 20 TABLET ORAL at 08:21

## 2020-05-08 RX ADMIN — GABAPENTIN 600 MG: 300 CAPSULE ORAL at 08:21

## 2020-05-08 ASSESSMENT — ACTIVITIES OF DAILY LIVING (ADL)
ORAL_HYGIENE: INDEPENDENT
HYGIENE/GROOMING: INDEPENDENT
DRESS: INDEPENDENT;SCRUBS (BEHAVIORAL HEALTH)
LAUNDRY: UNABLE TO COMPLETE

## 2020-05-08 NOTE — PROGRESS NOTES
"[facility]  Psychiatric Progress Note      Impression:     Perry is seen via video conferencing today in the presence of the RN. He reports that he is \"doing good\" today. He reports that he slept well last night. Does report some ongoing anxiety, Propranolol was started yesterday which he reported has helped in the past. He does not feel that any adjustments to medications are needed today. When asked about suicidal ideation he states \"not really, I've been doing really good\". Reports that he has attended some group programming He asks about CD treatment referrals, Rule 25 completed on Wednesday. is encouraged to speak with LADC and/or  for the most current updates.     Educated regarding medication indications, risks, benefits, side effects, contraindications and possible interactions. Verbally expressed understanding.        Diagnoses:     Mdd, recurrent severe without psychotic features  PTSD, chronic by history   Methamphetamine use disorder, severe    Attestation:  Patient has been seen and evaluated by me,  Danyelle Sherman NP          Interim History:   The patient's care was discussed with the treatment team and chart notes were reviewed.            Medications:       acamprosate  666 mg Oral TID     citalopram  20 mg Oral Daily     gabapentin  600 mg Oral TID     propranolol  20 mg Oral BID     QUEtiapine  100 mg Oral At Bedtime        10 point ROS - denies concerns       Allergies:     Allergies   Allergen Reactions     Wellbutrin [Bupropion]      Naltrexone Other (See Comments)     Headaches  Headaches              Psychiatric Examination:   /63   Pulse 64   Temp 97.5  F (36.4  C) (Tympanic)   Resp 16   Ht 1.88 m (6' 2\")   Wt 110.2 kg (243 lb)   SpO2 96%   BMI 31.20 kg/m    Weight is 243 lbs 0 oz  Body mass index is 31.2 kg/m .    Appearance: awake, alert, dressed in hospital scrubs, casually groomed  Attitude: cooperative, pleasant  Eye Contact: good  Mood: " "depressed and anxious  Affect: little brighter today  Speech: clear, coherent  Psychomotor Behavior:  no evidence of tardive dyskinesia, dystonia, or tics  Thought Process:  logical, linear, more goal oriented  Associations:  no loose associations  Thought Content:  no evidence of suicidal ideation or homicidal ideation, feels hopeless  Insight:  Limited  Judgment: limited  Oriented to:  time, person, and place  Attention Span and Concentration:  fair  Recent and Remote Memory:  intact  Fund of Knowledge: low-normal  Muscle Strength and Tone: normal  Gait and Station: Normal           Labs:     No results found for this or any previous visit (from the past 24 hour(s)).           Plan/Treatment Team     BEHAVIORAL TEAM DISCUSSION    Progress: gradual. Mood starting to improve    Continued Stay Criteria/Rationale: Very high risk of suicide and relapse if he were to be discharged prior to finding CD treatment placement    Medical/Physical: none noted    Precautions: None    Falls precaution?: No  Behavioral Orders   Procedures     Code 1 - Restrict to Unit     NO Nebulized Medications     Routine Programming     As clinically indicated     Status 15     Every 15 minutes.       Plan for this encounter/Med Changes/Labs:     Rule 25 completed- referrals for CD treatment sent  Continue current medications      Rationale for change in precautions or plan: symptoms starting to improve on meds      Participants: Danyelle Sherman CNP, SW, RN      ELOS: 3 to 5 days.  He will need to go [id number] to CD treatment.  High risk of relapse and suicide attempt is discharged prior to treatment bed secured.      Video visit  Perry GARCIA Ilana  is a 45 year old male who is being evaluated via a billable video visit.      The patient has been notified of following:     \"This video visit will be conducted via a call between you and your physician/provider. We have found that certain health care needs can be provided without the need " "for an in-person physical exam.  This service lets us provide the care you need with a video conversation.  If a prescription is necessary we can send it directly to your pharmacy.  If lab work is needed we can place an order for that and you can then stop by our lab to have the test done at a later time.    If during the course of the call the physician/provider feels a video visit is not appropriate, you will not be charged for this service.\"     Patient has given verbal consent for Video visit? Yes    Video Start Time: 1145    I have reviewed and updated the patient's Past Medical History, Social History, Family History and Medication List.      Video-Visit Details    Type of service:  Video Visit    Video End Time (time video stopped): 1155      Originating Location (pt. Location): [facility] inpatient behavioral health unit    Distant Location (provider location): remote provider    Mode of Communication:  Video Conference via [employer] Meeting trista                   "

## 2020-05-08 NOTE — PLAN OF CARE
Pt. Transferred to [facility] @ 1326. Accompanied by 3 staff and security. Pt. Calm and cooperative. Pt. Given tour of unit.     Face to face end of shift report communicated to oncoming RN.     Shana Marcano RN  5/8/2020  3:35 PM

## 2020-05-08 NOTE — PLAN OF CARE
Problem: Adult Behavioral Health Plan of Care  Goal: Patient-Specific Goal (Individualization)  Description: Patient will sleep 6-8 hours a night  Patient will attend 50% of unit programming when able  Patient will complete ADLs independently  Patient will be compliant with treatment team recommendations      5/7/2020 2359 by Yodit Garcias, RN  Outcome: No Change  Note:        Problem: Thought Process Alteration  Goal: Optimal Thought Clarity  Description: Patient will verbalize a decrease in hallucinations by time of discharge.  Patient will be able to have a linear, logical conversation by time of discharge.  Patient will verbalize 2-3 coping skills by time of discharge.  5/7/2020 2356 by Yodit Garcias, RN  Outcome: No Change     Face to face shift report received from Karly MELLO. Rounding completed, pt observed.    Pt appeared to be sleeping most of this shift.     Face to face report will be communicated to onctrey RN.    Yodit Garcias RN  5/8/2020  6:10 AM

## 2020-05-08 NOTE — PLAN OF CARE
Problem: Adult Behavioral Health Plan of Care  Goal: Patient-Specific Goal (Individualization)  Description: Patient will sleep 6-8 hours a night  Patient will attend 50% of unit programming when able  Patient will complete ADLs independently  Patient will be compliant with treatment team recommendations      5/8/2020 0746 by Haven Singleton RN  Outcome: Improving     Problem: Thought Process Alteration  Goal: Optimal Thought Clarity  Description: Patient will verbalize a decrease in hallucinations by time of discharge.  Patient will be able to have a linear, logical conversation by time of discharge.  Patient will verbalize 2-3 coping skills by time of discharge.  5/8/2020 0746 by Haven Singleton RN  Outcome: Improving    Face to face shift report received from SILVANA MELLO.   Rounding completed, pt observed.     Pt was up in common area for meals has been eating % of meals, states he is anxious to get to the next phase and go to treatment.  Pt is compliant with medications and treatment team recommendations.  Remains independent with ADLS and stated he slept well last night.      1221-Pt stated he became very anxious and needed something for anxiety.  Pt stated that all of a sudden he became very nervous/anxious and requested Zyprexa and took oral Zyprexa.  PRN Zyprexa given per PRN order.   No falls this shift very pleasant and cooperative with staff and peers.     1310-Pt was transferred to [facility] for continuation of care.  Report given to KATHY MELLO.

## 2020-05-08 NOTE — PLAN OF CARE
"Met with pt this afternoon- He was resting in his room. States he feels \"panicky\" today and has anxiety- says nothing triggered this he just woke up feeling this way. Pt states he is doing \"good\" otherwise. Says he has been attending the unit programming and finds some of the groups helpful. Pt asks if referrals have been sent out for treatment yet- informed pt that staff did not see a note from [facility] indicating this but staff did update him yesterday that he would also like the referral to [facility] in [place]. Pt denies any other questions or concerns at this time.   "

## 2020-05-08 NOTE — PLAN OF CARE
PT arrived with hospital security at 1615 from [facility], [facility] and [facility]

## 2020-05-09 PROCEDURE — 25000132 ZZH RX MED GY IP 250 OP 250 PS 637: Performed by: NURSE PRACTITIONER

## 2020-05-09 PROCEDURE — 12400000 ZZH R&B MH

## 2020-05-09 PROCEDURE — 99232 SBSQ HOSP IP/OBS MODERATE 35: CPT | Mod: 95 | Performed by: NURSE PRACTITIONER

## 2020-05-09 RX ORDER — ONDANSETRON 4 MG/1
4 TABLET, ORALLY DISINTEGRATING ORAL EVERY 6 HOURS PRN
Status: DISCONTINUED | OUTPATIENT
Start: 2020-05-09 | End: 2020-05-20 | Stop reason: HOSPADM

## 2020-05-09 RX ADMIN — HYDROXYZINE HYDROCHLORIDE 100 MG: 25 TABLET, FILM COATED ORAL at 10:08

## 2020-05-09 RX ADMIN — QUETIAPINE 50 MG: 50 TABLET, FILM COATED ORAL at 12:03

## 2020-05-09 RX ADMIN — ACAMPROSATE CALCIUM 666 MG: 333 TABLET, DELAYED RELEASE ORAL at 21:29

## 2020-05-09 RX ADMIN — GABAPENTIN 600 MG: 300 CAPSULE ORAL at 13:42

## 2020-05-09 RX ADMIN — POLYETHYLENE GLYCOL 400 AND PROPYLENE GLYCOL 2 DROP: 4; 3 SOLUTION/ DROPS OPHTHALMIC at 13:42

## 2020-05-09 RX ADMIN — NICOTINE POLACRILEX 4 MG: 2 GUM, CHEWING ORAL at 18:43

## 2020-05-09 RX ADMIN — CITALOPRAM HYDROBROMIDE 20 MG: 20 TABLET ORAL at 08:20

## 2020-05-09 RX ADMIN — POLYETHYLENE GLYCOL 400 AND PROPYLENE GLYCOL 2 DROP: 4; 3 SOLUTION/ DROPS OPHTHALMIC at 16:55

## 2020-05-09 RX ADMIN — PROPRANOLOL HYDROCHLORIDE 20 MG: 20 TABLET ORAL at 16:02

## 2020-05-09 RX ADMIN — QUETIAPINE FUMARATE 100 MG: 100 TABLET ORAL at 21:30

## 2020-05-09 RX ADMIN — HYDROXYZINE HYDROCHLORIDE 100 MG: 25 TABLET, FILM COATED ORAL at 18:10

## 2020-05-09 RX ADMIN — GABAPENTIN 600 MG: 300 CAPSULE ORAL at 08:21

## 2020-05-09 RX ADMIN — ACAMPROSATE CALCIUM 666 MG: 333 TABLET, DELAYED RELEASE ORAL at 08:20

## 2020-05-09 RX ADMIN — PROPRANOLOL HYDROCHLORIDE 20 MG: 20 TABLET ORAL at 08:20

## 2020-05-09 RX ADMIN — ACAMPROSATE CALCIUM 666 MG: 333 TABLET, DELAYED RELEASE ORAL at 13:42

## 2020-05-09 RX ADMIN — GABAPENTIN 600 MG: 300 CAPSULE ORAL at 21:29

## 2020-05-09 ASSESSMENT — ACTIVITIES OF DAILY LIVING (ADL)
HYGIENE/GROOMING: INDEPENDENT
ORAL_HYGIENE: INDEPENDENT
DRESS: SCRUBS (BEHAVIORAL HEALTH)
DRESS: INDEPENDENT;SCRUBS (BEHAVIORAL HEALTH)
ORAL_HYGIENE: INDEPENDENT
HYGIENE/GROOMING: INDEPENDENT
LAUNDRY: UNABLE TO COMPLETE

## 2020-05-09 NOTE — PLAN OF CARE
"PT reporting increased feelings of wanting to use. PT reports he has been looking at his veins and has been feeling very preoccupied with wanting to use and wanting the Euphoric feeling of using. PT reported he has used meth and heroin since he was 14. First used meth as a child with his father.  He reported that he is exhausted with being a drug user and disappointed with himself for continuing to use drugs even when he doesn't want to. PT reports he has a very supportive family but he feels like a burden to them.  PT agrees with the plan to go door to door to treatment and believes that is the best option for him. PT reported that he feels he needs a new town, new friends and to not be in contact with friends of his past because he will almost always accept drug offers from them.     PT has had several anxiety r/t scheduled and PRN medications throughout the day for anxiety with no relief.   PT had Zyprexa 10mg 1221  PT Gabapentin 600mg at 1322  Atarax 100mg 1322  Propanolol 20mg 1607  Seroquel PRN at 1725 50mg    PT is reporting that he is not feeling \"relief or any difference with these medications.\"  PT reported that he was on Suboxone during his last treatment and has been on it before and that it is something he would like to go back on. He reported his craving intensities are very high today.       PT went to some groups and has been calm, and cooperative throughout shift with full range affect.    PT denies HI, SI, and hallucinations.   PT denies pain.       Face to face end of shift report communicated to oncoming RN     Nory Franco RN  5/8/2020  9:36 PM                       Problem: Adult Behavioral Health Plan of Care  Goal: Patient-Specific Goal (Individualization)  Description: Patient will sleep 6-8 hours a night  Patient will attend 50% of unit programming when able  Patient will complete ADLs independently  Patient will be compliant with treatment team recommendations      5/8/2020 2123 by Salvador, " Nory, RN  Outcome: Improving    Problem: Thought Process Alteration  Goal: Optimal Thought Clarity  Description: Patient will verbalize a decrease in hallucinations by time of discharge.  Patient will be able to have a linear, logical conversation by time of discharge.  Patient will verbalize 2-3 coping skills by time of discharge.  5/8/2020 2123 by Nory Franco, RN  Outcome: Improving   ng

## 2020-05-09 NOTE — PLAN OF CARE
"  Problem: Adult Behavioral Health Plan of Care  Goal: Patient-Specific Goal (Individualization)  Description: Patient will sleep 6-8 hours a night  Patient will attend 50% of unit programming when able  Patient will complete ADLs independently  Patient will be compliant with treatment team recommendations      5/9/2020 1834 by Dorina Rivas RN  Outcome: Improving  Note: Shift Summery:  Patient is alert and up on the unit complaining of extreme anxiety especially after a phone call that troubled him. Patient denies physical problems including pain. Patient talked about his cravings to use drugs and states that he wants to go to treatment that will help him get rid of the drugs in his system. Patient has a very reddened eye and patient complains of burning irritation in the eye. Requested and given systane eye gtt at 1630 for burning. Patient states that these drops have been effective for relief of burning. Patient had also requested and given Hydroxyzine 100 mg po for complaints of anxiety. Patient then complained of nicotine withdrawal and requested nicotine gum also. Contacted practitioner.   Face to face end of shift report communicated to 11-7 shift RN.     Dorina Rivas RN  5/9/2020  10:29 PM        Patient's Stated Goal for Shift:  \"no stated goal\"    Goal Status:  In Process       Problem: Thought Process Alteration  Goal: Optimal Thought Clarity  Description: Patient will verbalize a decrease in hallucinations by time of discharge.  Patient will be able to have a linear, logical conversation by time of discharge.  Patient will verbalize 2-3 coping skills by time of discharge.  5/9/2020 1834 by Dorina Rivas RN  Outcome: Improving     "

## 2020-05-09 NOTE — PLAN OF CARE
Face to face shift report received from nurse. Rounding completed, pt observed.    Problem: Adult Behavioral Health Plan of Care  Goal: Patient-Specific Goal (Individualization)  Description: Patient will sleep 6-8 hours a night  Patient will attend 50% of unit programming when able  Patient will complete ADLs independently  Patient will be compliant with treatment team recommendations  5/9/2020 0743 by Juan Sainz RN  Note: Patient is calm and cooperative. PRN of hydroxyzine @ 1011 c/o anxiety. Attended groups and is independent. Patient is compliant with treatment teams. Patient now gets double portions for food. Also a new order for eye drops. Patient is independent with ADL's.       Problem: Thought Process Alteration  Goal: Optimal Thought Clarity  Description: Patient will verbalize a decrease in hallucinations by time of discharge.  Patient will be able to have a linear, logical conversation by time of discharge.  Patient will verbalize 2-3 coping skills by time of discharge.  5/9/2020 0743 by Juan Sainz RN  Outcome: Improving  Patient was talking logically.    Face to face report will be communicated to oncoming RN.    Juan Sainz RN  5/9/2020  1500 AM

## 2020-05-09 NOTE — PROGRESS NOTES
"[facility]  Psychiatric Progress Note      Impression:     Perry is seen via video conferencing today in the presence of the RN. He states that everything seems to be \"going good\". He does report feeling a little nauseous today and has a headache, states he feels he is still going through some withdrawal. We discussed the plan to go door to door to treatment, which he feels is the only option that will keep him sober. He notes that he hopes they will be able to find a treatment facility away from where he currently lives. \"Then I won't know anyone. I know there are drugs everywhere though. I would go to [facility] if it would keep me sober\". He states \"I'm 45 years old, its time to stop living my life like I'm in a puddle\". He does ask if I know where referrals have been sent so far. Encouraged him to speak with [facility] on Monday to discuss where referrals have been sent and preference to go somewhere unfamiliar to try to enhance his chance of success at treatment.     Educated regarding medication indications, risks, benefits, side effects, contraindications and possible interactions. Verbally expressed understanding.        Diagnoses:     Major depressive disorder, recurrent, severe without psychotic features  PTSD, chronic by history   Methamphetamine use disorder, severe    Attestation:  Patient has been seen and evaluated by me,  Danyelle Sherman NP          Interim History:   The patient's care was discussed with the treatment team and chart notes were reviewed.            Medications:       acamprosate  666 mg Oral TID     citalopram  20 mg Oral Daily     gabapentin  600 mg Oral TID     propranolol  20 mg Oral BID     QUEtiapine  100 mg Oral At Bedtime        10 point ROS - reports nausea, headache today       Allergies:     Allergies   Allergen Reactions     Wellbutrin [Bupropion]      Naltrexone Other (See Comments)     Headaches  Headaches              Psychiatric Examination:   /74   Pulse " "62   Temp 97  F (36.1  C) (Tympanic)   Resp 16   Ht 1.88 m (6' 2\")   Wt 110.2 kg (243 lb)   SpO2 98%   BMI 31.20 kg/m    Weight is 243 lbs 0 oz  Body mass index is 31.2 kg/m .    Appearance: awake, alert, dressed in hospital scrubs, casually groomed  Attitude: cooperative, pleasant  Eye Contact: good  Mood: anxious  Affect: mood congruent  Speech: clear, coherent  Psychomotor Behavior:  no evidence of tardive dyskinesia, dystonia, or tics  Thought Process:  logical, linear, goal oriented  Associations:  no loose associations  Thought Content:  no evidence of suicidal ideation or homicidal ideation, feeling more hopeful in regards to sobriety  Insight:  Limited, slight improvement  Judgment: limited  Oriented to:  time, person, and place  Attention Span and Concentration:  fair  Recent and Remote Memory:  intact  Fund of Knowledge: low-normal  Muscle Strength and Tone: normal  Gait and Station: Normal           Labs:     No results found for this or any previous visit (from the past 24 hour(s)).           Plan/Treatment Team     BEHAVIORAL TEAM DISCUSSION    Progress: gradual. Mood starting to improve    Continued Stay Criteria/Rationale: Very high risk of suicide and relapse if he were to be discharged prior to finding CD treatment placement    Medical/Physical: none noted    Precautions: None    Falls precaution?: No  Behavioral Orders   Procedures     Code 1 - Restrict to Unit     NO Nebulized Medications     Routine Programming     As clinically indicated     Status 15     Every 15 minutes.       Plan for this encounter/Med Changes/Labs:     Rule 25 completed- referrals for CD treatment sent  Continue Campral 666 mg TID  Celexa 20 mg daily  Gabapentin 600 mg TID  Propranolol 20 mg BID  Seroquel 100 mg at HS  Will add Zofran for nausea, eye drops for dry/irritated eyes      Rationale for change in precautions or plan: symptoms starting to improve on meds, high risk for relapse and " "rehospitalization      Participants: Danyelle Sherman CNP, RN      ELOS: about 3-5 days.  He will need to go [id number] to CD treatment.  High risk of relapse and suicide attempt is discharged prior to treatment bed secured.      Video visit  Perry Preciado Jr. is a 45 year old male who is being evaluated via a billable video visit.      The patient has been notified of following:     \"This video visit will be conducted via a call between you and your physician/provider. We have found that certain health care needs can be provided without the need for an in-person physical exam.  This service lets us provide the care you need with a video conversation.  If a prescription is necessary we can send it directly to your pharmacy.  If lab work is needed we can place an order for that and you can then stop by our lab to have the test done at a later time.    If during the course of the call the physician/provider feels a video visit is not appropriate, you will not be charged for this service.\"     Patient has given verbal consent for Video visit? Yes    Video Start Time: 1040    I have reviewed and updated the patient's Past Medical History, Social History, Family History and Medication List.      Video-Visit Details    Type of service:  Video Visit    Video End Time (time video stopped): 1050      Originating Location (pt. Location): [facility] inpatient behavioral health unit    Distant Location (provider location): remote provider    Mode of Communication:  Video Conference via [employer] trista                   "

## 2020-05-09 NOTE — PLAN OF CARE
Face to face end of shift report obtained from Nory PEREIRA RN.     Problem: Adult Behavioral Health Plan of Care  Goal: Patient-Specific Goal (Individualization)  Description: Patient will sleep 6-8 hours a night  Patient will attend 50% of unit programming when able  Patient will complete ADLs independently  Patient will be compliant with treatment team recommendations    5/9/2020 0015 by Debbie Tate, RN  Outcome: No Change  Note:      Pt observed resting in bed.Pt appears to be sleeping in bed with eyes closed, having regular respirations and position changes. 15 minutes and PRN safety checks completed with no noted complains.    Patient slept 6.75 hours. Will continue to monitor.    Problem: Thought Process Alteration  Goal: Optimal Thought Clarity  Description: Patient will verbalize a decrease in hallucinations by time of discharge.  Patient will be able to have a linear, logical conversation by time of discharge.  Patient will verbalize 2-3 coping skills by time of discharge.  5/9/2020 0015 by Debbie Tate, RN  Outcome: No Change     Face to face end of shift report communicated to oncoming RN.  Debbie Tate, FUNMILAYO  6:09 AM

## 2020-05-10 PROCEDURE — 25000132 ZZH RX MED GY IP 250 OP 250 PS 637: Performed by: NURSE PRACTITIONER

## 2020-05-10 PROCEDURE — 12400000 ZZH R&B MH

## 2020-05-10 RX ORDER — CLONIDINE HYDROCHLORIDE 0.1 MG/1
0.1 TABLET ORAL 3 TIMES DAILY PRN
Status: DISCONTINUED | OUTPATIENT
Start: 2020-05-10 | End: 2020-05-12

## 2020-05-10 RX ADMIN — NICOTINE POLACRILEX 4 MG: 2 GUM, CHEWING ORAL at 15:29

## 2020-05-10 RX ADMIN — PROPRANOLOL HYDROCHLORIDE 20 MG: 20 TABLET ORAL at 08:25

## 2020-05-10 RX ADMIN — CITALOPRAM HYDROBROMIDE 20 MG: 20 TABLET ORAL at 08:25

## 2020-05-10 RX ADMIN — HYDROXYZINE HYDROCHLORIDE 100 MG: 25 TABLET, FILM COATED ORAL at 15:29

## 2020-05-10 RX ADMIN — CLONIDINE HYDROCHLORIDE 0.1 MG: 0.1 TABLET ORAL at 17:51

## 2020-05-10 RX ADMIN — GABAPENTIN 600 MG: 300 CAPSULE ORAL at 13:18

## 2020-05-10 RX ADMIN — POLYETHYLENE GLYCOL 400 AND PROPYLENE GLYCOL 2 DROP: 4; 3 SOLUTION/ DROPS OPHTHALMIC at 06:19

## 2020-05-10 RX ADMIN — HYDROXYZINE HYDROCHLORIDE 100 MG: 25 TABLET, FILM COATED ORAL at 10:55

## 2020-05-10 RX ADMIN — PROPRANOLOL HYDROCHLORIDE 20 MG: 20 TABLET ORAL at 15:29

## 2020-05-10 RX ADMIN — GABAPENTIN 600 MG: 300 CAPSULE ORAL at 08:25

## 2020-05-10 RX ADMIN — POLYETHYLENE GLYCOL 400 AND PROPYLENE GLYCOL 2 DROP: 4; 3 SOLUTION/ DROPS OPHTHALMIC at 17:52

## 2020-05-10 RX ADMIN — GABAPENTIN 600 MG: 300 CAPSULE ORAL at 20:15

## 2020-05-10 RX ADMIN — QUETIAPINE FUMARATE 100 MG: 100 TABLET ORAL at 20:15

## 2020-05-10 RX ADMIN — QUETIAPINE 50 MG: 50 TABLET, FILM COATED ORAL at 13:18

## 2020-05-10 RX ADMIN — POLYETHYLENE GLYCOL 400 AND PROPYLENE GLYCOL 2 DROP: 4; 3 SOLUTION/ DROPS OPHTHALMIC at 10:55

## 2020-05-10 RX ADMIN — ACAMPROSATE CALCIUM 666 MG: 333 TABLET, DELAYED RELEASE ORAL at 08:25

## 2020-05-10 RX ADMIN — ACAMPROSATE CALCIUM 666 MG: 333 TABLET, DELAYED RELEASE ORAL at 20:15

## 2020-05-10 RX ADMIN — HYDROXYZINE HYDROCHLORIDE 100 MG: 25 TABLET, FILM COATED ORAL at 06:15

## 2020-05-10 RX ADMIN — ACAMPROSATE CALCIUM 666 MG: 333 TABLET, DELAYED RELEASE ORAL at 13:18

## 2020-05-10 ASSESSMENT — MIFFLIN-ST. JEOR: SCORE: 2076.96

## 2020-05-10 ASSESSMENT — ACTIVITIES OF DAILY LIVING (ADL)
DRESS: SCRUBS (BEHAVIORAL HEALTH);INDEPENDENT
LAUNDRY: UNABLE TO COMPLETE
ORAL_HYGIENE: INDEPENDENT
HYGIENE/GROOMING: INDEPENDENT
ORAL_HYGIENE: INDEPENDENT
LAUNDRY: UNABLE TO COMPLETE
HYGIENE/GROOMING: INDEPENDENT
DRESS: SCRUBS (BEHAVIORAL HEALTH)

## 2020-05-10 NOTE — PROGRESS NOTES
BEHAVIORAL TEAM DISCUSSION     Progress: gradual. Mood starting to improve, high anxiety     Continued Stay Criteria/Rationale: Very high risk of suicide and relapse if he were to be discharged prior to finding CD treatment placement     Medical/Physical: none noted     Precautions: None     Falls precaution?: No       Behavioral Orders   Procedures     Code 1 - Restrict to Unit     NO Nebulized Medications     Routine Programming       As clinically indicated     Status 15       Every 15 minutes.     Plan for this encounter/Med Changes/Labs:      Rule 25 completed- referrals for CD treatment sent  Continue Campral 666 mg TID  Celexa 20 mg daily  Gabapentin 600 mg TID  Seroquel 100 mg at HS  Will add Zofran for nausea, eye drops for dry/irritated eyes  Start Clonidine 0.1 mg TID prn anxiety/restlessness     Rationale for change in precautions or plan: symptoms starting to improve on meds, high risk for relapse and rehospitalization, anxiety remains high     Participants: Danyelle Sherman CNP, RN        ELOS:  He will need to go [id number] to CD treatment.  High risk of relapse and suicide attempt if discharged prior to treatment bed secured.     Diagnoses:      Major depressive disorder, recurrent, severe without psychotic features  PTSD, chronic by history   Methamphetamine use disorder, severe       Medications:        acamprosate  666 mg Oral TID     citalopram  20 mg Oral Daily     gabapentin  600 mg Oral TID     propranolol  20 mg Oral BID     QUEtiapine  100 mg Oral At Bedtime

## 2020-05-10 NOTE — PLAN OF CARE
Face to face end of shift report obtained from Dorina STEVENS RN.     Problem: Adult Behavioral Health Plan of Care  Goal: Patient-Specific Goal (Individualization)  Description: Patient will sleep 6-8 hours a night  Patient will attend 50% of unit programming when able  Patient will complete ADLs independently  Patient will be compliant with treatment team recommendations    5/10/2020 0048 by Debbie Tate, RN  Outcome: No Change  Note:    Pt observed resting in bed.Pt appears to be sleeping in bed with eyes closed, having regular respirations and position changes. 15 minutes and PRN safety checks completed with no noted complains.      0615 PRN hydroxyzine given for anxiety 10/10   0619 PRN eye drops given for right eye irritation   Patient slept 7 hours. Will continue to monitor.    Problem: Thought Process Alteration  Goal: Optimal Thought Clarity  Description: Patient will verbalize a decrease in hallucinations by time of discharge.  Patient will be able to have a linear, logical conversation by time of discharge.  Patient will verbalize 2-3 coping skills by time of discharge.  5/10/2020 0048 by Debbie Tate, RN  Outcome: No Change     Face to face end of shift report communicated to oncoming RN.  Debbie Tate, RN  6:50 AM

## 2020-05-11 PROCEDURE — 25000132 ZZH RX MED GY IP 250 OP 250 PS 637: Performed by: NURSE PRACTITIONER

## 2020-05-11 PROCEDURE — 99233 SBSQ HOSP IP/OBS HIGH 50: CPT | Mod: 95 | Performed by: NURSE PRACTITIONER

## 2020-05-11 PROCEDURE — 12400000 ZZH R&B MH

## 2020-05-11 RX ORDER — POLYETHYLENE GLYCOL 3350 17 G
2 POWDER IN PACKET (EA) ORAL
Status: DISCONTINUED | OUTPATIENT
Start: 2020-05-11 | End: 2020-05-20 | Stop reason: HOSPADM

## 2020-05-11 RX ORDER — GABAPENTIN 300 MG/1
900 CAPSULE ORAL 3 TIMES DAILY
Status: DISCONTINUED | OUTPATIENT
Start: 2020-05-11 | End: 2020-05-20 | Stop reason: HOSPADM

## 2020-05-11 RX ORDER — AMANTADINE HYDROCHLORIDE 100 MG/1
100 CAPSULE, GELATIN COATED ORAL 2 TIMES DAILY
Status: DISCONTINUED | OUTPATIENT
Start: 2020-05-11 | End: 2020-05-20 | Stop reason: HOSPADM

## 2020-05-11 RX ORDER — PROPRANOLOL HYDROCHLORIDE 80 MG/1
80 CAPSULE, EXTENDED RELEASE ORAL DAILY
Status: DISCONTINUED | OUTPATIENT
Start: 2020-05-11 | End: 2020-05-12

## 2020-05-11 RX ADMIN — AMANTADINE HYDROCHLORIDE 100 MG: 100 CAPSULE ORAL at 20:15

## 2020-05-11 RX ADMIN — ACAMPROSATE CALCIUM 666 MG: 333 TABLET, DELAYED RELEASE ORAL at 08:04

## 2020-05-11 RX ADMIN — Medication 2 MG: at 14:58

## 2020-05-11 RX ADMIN — GABAPENTIN 900 MG: 300 CAPSULE ORAL at 20:16

## 2020-05-11 RX ADMIN — POLYETHYLENE GLYCOL 400 AND PROPYLENE GLYCOL 2 DROP: 4; 3 SOLUTION/ DROPS OPHTHALMIC at 08:04

## 2020-05-11 RX ADMIN — ACAMPROSATE CALCIUM 666 MG: 333 TABLET, DELAYED RELEASE ORAL at 13:26

## 2020-05-11 RX ADMIN — HYDROXYZINE HYDROCHLORIDE 100 MG: 25 TABLET, FILM COATED ORAL at 16:20

## 2020-05-11 RX ADMIN — Medication 2 MG: at 19:22

## 2020-05-11 RX ADMIN — GABAPENTIN 900 MG: 300 CAPSULE ORAL at 13:26

## 2020-05-11 RX ADMIN — PROPRANOLOL HYDROCHLORIDE 20 MG: 20 TABLET ORAL at 08:04

## 2020-05-11 RX ADMIN — QUETIAPINE FUMARATE 100 MG: 100 TABLET ORAL at 20:16

## 2020-05-11 RX ADMIN — ACAMPROSATE CALCIUM 666 MG: 333 TABLET, DELAYED RELEASE ORAL at 20:15

## 2020-05-11 RX ADMIN — ACETAMINOPHEN 650 MG: 325 TABLET, FILM COATED ORAL at 16:20

## 2020-05-11 RX ADMIN — CITALOPRAM HYDROBROMIDE 20 MG: 20 TABLET ORAL at 08:04

## 2020-05-11 RX ADMIN — GABAPENTIN 600 MG: 300 CAPSULE ORAL at 08:04

## 2020-05-11 RX ADMIN — QUETIAPINE 50 MG: 50 TABLET, FILM COATED ORAL at 10:09

## 2020-05-11 RX ADMIN — AMANTADINE HYDROCHLORIDE 100 MG: 100 CAPSULE ORAL at 12:12

## 2020-05-11 RX ADMIN — PROPRANOLOL HYDROCHLORIDE 80 MG: 80 CAPSULE, EXTENDED RELEASE ORAL at 12:12

## 2020-05-11 ASSESSMENT — ACTIVITIES OF DAILY LIVING (ADL)
HYGIENE/GROOMING: INDEPENDENT
LAUNDRY: UNABLE TO COMPLETE
ORAL_HYGIENE: INDEPENDENT
DRESS: SCRUBS (BEHAVIORAL HEALTH)
ORAL_HYGIENE: INDEPENDENT
DRESS: INDEPENDENT
HYGIENE/GROOMING: INDEPENDENT

## 2020-05-11 NOTE — PLAN OF CARE
Face to face shift report received from nurse. Rounding completed, pt observed.    Problem: Adult Behavioral Health Plan of Care  Goal: Patient-Specific Goal (Individualization)  Description: Patient will sleep 6-8 hours a night  Patient will attend 50% of unit programming when able  Patient will complete ADLs independently  Patient will be compliant with treatment team recommendations    5/11/2020 1355 by Juan Sainz RN  Outcome: No Change  Note: Patient was calm and cooperative. Is completing ADL's independently, Is compliant with treatment team. @ 1620 took atarax for anxiety and tylenol for lower back/right shoulder pain. Requested nicotine lozenge twice on this shift. Was up socializing with peers all shift. No group participation this shift. States PRNs were somewhat effective. Excellent food and fluid intake.       Problem: Thought Process Alteration  Goal: Optimal Thought Clarity  Description: Patient will verbalize a decrease in hallucinations by time of discharge.  Patient will be able to have a linear, logical conversation by time of discharge.  Patient will verbalize 2-3 coping skills by time of discharge.  5/11/2020 1552 by Juan Sainz RN  Outcome: Improving  Patient denies any hallucinations, and was able to have a linear conversation.     Face to face report will be communicated to oncoming RN.    Juan Sainz RN  5/11/2020  10:52 PM

## 2020-05-11 NOTE — PROGRESS NOTES
"[facility]  Psychiatric Progress Note      Impression:     Kb is still appearing very restless and rocks back and forth. He tells me that he feels \"like a marching band is going to come out of me\". He appears quite uncomfortable. He does not have repetitive movements other than rocking though states  \"sometimes he gets tics\" . He states it is painful. States his body is uncomfortable. He becomes tearful when he tells me about this and states it is embarrassing. I asked him what he felt this was caused by and he said \"im a junkie. I use heroin and fentanyl. Its probably withdrawal.   He states his last binge was on meth though just prior to this he was using opiates. He states suboxone made him feel better. He also states that using meth decreased his feelings of needing to move though once the methamphetamine left his system the feelings increased dramatically.          Educated regarding medication indications, risks, benefits, side effects, contraindications and possible interactions. Verbally expressed understanding.        Diagnoses:     Mdd, recurrent severe without psychotic features  Ptsd by history   Methamphetamine use disorder, severe    Attestation:  Patient has been seen and evaluated by me,  Leigh Viera NP          Interim History:   The patient's care was discussed with the treatment team and chart notes were reviewed.            Medications:       acamprosate  666 mg Oral TID     amantadine  100 mg Oral BID     citalopram  20 mg Oral Daily     gabapentin  900 mg Oral TID     propranolol ER  80 mg Oral Daily     QUEtiapine  100 mg Oral At Bedtime              10 point ROS negative        Allergies:     Allergies   Allergen Reactions     Wellbutrin [Bupropion]      Naltrexone Other (See Comments)     Headaches  Headaches              Psychiatric Examination:   /78   Pulse 64   Temp 98.1  F (36.7  C) (Tympanic)   Resp 18   Ht 1.88 m (6' 2\")   Wt 111.7 kg (246 lb 4.8 " oz)   SpO2 95%   BMI 31.62 kg/m    Weight is 246 lbs 4.8 oz  Body mass index is 31.62 kg/m .    Appearance:  awake, alert and adequately groomed  Attitude:  cooperative  Eye Contact:  improving  Mood:  Anxious and tearful  Affect:  intensity is flat  Speech:  decreased prosody  Psychomotor Behavior:  no evidence of tardive dyskinesia, dystonia, or tics  Thought Process:  logical  Associations:  no loose associations  Thought Content:  no evidence of suicidal ideation or homicidal ideationdepressed tearful  Insight:  Limited  Judgment:  poor  Oriented to:  time, person, and place  Attention Span and Concentration:  fair  Recent and Remote Memory:  intact  Fund of Knowledge: low-normal  Muscle Strength and Tone: normal  Gait and Station: Normal           Labs:     Results for orders placed or performed during the hospital encounter of 05/04/20   Hepatic panel     Status: Abnormal   Result Value Ref Range    Bilirubin Direct <0.1 0.0 - 0.2 mg/dL    Bilirubin Total 0.2 0.2 - 1.3 mg/dL    Albumin 3.2 (L) 3.4 - 5.0 g/dL    Protein Total 7.1 6.8 - 8.8 g/dL    Alkaline Phosphatase 76 40 - 150 U/L    ALT 35 0 - 70 U/L    AST 25 0 - 45 U/L   Vitamin D     Status: None   Result Value Ref Range    Vitamin D Deficiency screening 22 20 - 75 ug/L   Hepatitis C antibody     Status: None   Result Value Ref Range    Hepatitis C Antibody Nonreactive NR^Nonreactive   HIV Antigen Antibody Combo     Status: None   Result Value Ref Range    HIV Antigen Antibody Combo Nonreactive NR^Nonreactive       Creatinine     Status: None   Result Value Ref Range    Creatinine 1.16 0.66 - 1.25 mg/dL    GFR Estimate 76 >60 mL/min/[1.73_m2]    GFR Estimate If Black 88 >60 mL/min/[1.73_m2]   Potassium     Status: None   Result Value Ref Range    Potassium 4.4 3.4 - 5.3 mmol/L   COVID-19 Virus (Coronavirus) by PCR Nasopharyngeal     Status: None    Specimen: Nasopharyngeal   Result Value Ref Range    COVID-19 Virus PCR to [facility] - Source  Nasopharyngeal     COVID-19 Virus PCR to [facility] - Result       Test received-See reflex to IDDL test SARS CoV2 (COVID-19) Virus RT-PCR   SARS-CoV-2 COVID-19 Virus (Coronavirus) RT-PCR Nasopharyngeal     Status: None    Specimen: Nasopharyngeal   Result Value Ref Range    SARS-CoV-2 Virus Specimen Source Nasopharyngeal     SARS-CoV-2 PCR Result NEGATIVE     SARS-CoV-2 PCR Comment       This automated, real-time RT-PCR assay by [employer] on the [employer] has   been given Emergency Use Authorization (EUA) for the in vitro qualitative detection of RNA   from the SARS-CoV2 virus in nasopharyngeal swabs in viral transport medium from patients   with signs and symptoms of infection who are suspected of COVID-19. The performance is   unknown in asymptomatic patients.                  Plan/Treatment Team     BEHAVIORAL TEAM DISCUSSION    Progress:     Anxious, restless, [facility] is not taking ser[facility]. R/o tardive akathesia secondary to years of methamphetamine use.     Continued Stay Criteria/Rationale: Very high risk of suicide and relapse if he were to be discharged    Medical/Physical:  States he has high cholestrol and would likevel checked.     Precautions: None    Falls precaution?: No  Behavioral Orders   Procedures     Code 1 - Restrict to Unit     NO Nebulized Medications     Routine Programming     As clinically indicated     Status 15     Every 15 minutes.                     Plan for this encounter/Med Changes/Labs:     Will contact [facility] to see where referrals were made.     Consider adding suboxone . Will speak with Dr. Anderson first    Start amantadine  Change propranolol to LA.   Add cogentin though watch for increase in abnormal movements.          Rationale for change in precautions or plan: Increase gabapentin to target anxiety      Participants:david merrittOS 3 to 5 days.  He will need to go [id number] to chemical  "dependency treatment.  High risk of relapse and suicide              Perry Preciado Jr. is a 45 year old male who is being evaluated via a billable video visit.      The patient has been notified of following:     \"This video visit will be conducted via a call between you and your physician/provider. We have found that certain health care needs can be provided without the need for an in-person physical exam.  This service lets us provide the care you need with a video conversation.  If a prescription is necessary we can send it directly to your pharmacy.  If lab work is needed we can place an order for that and you can then stop by our lab to have the test done at a later time.    If during the course of the call the physician/provider feels a video visit is not appropriate, you will not be charged for this service.\"     Patient has given verbal consent for Video visit? Yes    Video Start Time: 1000    Video End Time (time video stopped): 1020    I have reviewed and updated the patient's Past Medical History, Social History, Family History and Medication List.    ALLERGIES  Wellbutrin [bupropion] and Naltrexone      Video-Visit Details    Type of service:  Video Visit        Originating Location (pt. Location): [place]    Distant Location (provider location):  [place] [facility]    Mode of Communication:  Video Conference via [employer]                   "

## 2020-05-11 NOTE — PLAN OF CARE
"  Problem: Adult Behavioral Health Plan of Care  Goal: Patient-Specific Goal (Individualization)  Description: Patient will sleep 6-8 hours a night  Patient will attend 50% of unit programming when able  Patient will complete ADLs independently  Patient will be compliant with treatment team recommendations      5/11/2020 1355 by Melinda Steen, RN  Outcome: No Change  Note:   VSS, denies physical pain. States he is having \"internal pain\" due to thoughts of guilt and ruining his 18 mos sobriety . \"I feel like there is a marching band inside of me.\"   Rates his anxiety 20/10. Endorses high depression.  States \"Heroine and Fentanyl are a big part of my diet.\"  \"I'm feeling sad over this whole thing.\" states he started using meth and heroine  five mos  ago when he found out his girlfriend cheated on him. States \"I'm having a hard time talking about things because I feel like such a piece of crap.\" \"people view addicts as less than human\"(pt is physically rocking and moving entire time provider is talking with pt.) Noted to be tense and rocking much of day. States he started using drugs at [age] yrs of age-\"my father got me on them.\"   Attends and participates in groups. Cooperative with assessment questions. Behavior appropriate with staff and peers.   Denies all criteria including: SI, SIB, HI or hallucinations.  Carlitos obtained and faxed to [facility] in [place].        Problem: Thought Process Alteration  Goal: Optimal Thought Clarity  Description: Patient will verbalize a decrease in hallucinations by time of discharge.  Patient will be able to have a linear, logical conversation by time of discharge.  Patient will verbalize 2-3 coping skills by time of discharge.  5/11/2020 1355 by Melinda Steen, RN  Outcome: No Change     Face to face end of shift report communicated to oncoming shift.     Melinda Steen RN  5/11/2020  2:08 PM        "

## 2020-05-11 NOTE — PLAN OF CARE
PT reported having a hard time today. PT reports he his really feeling withdrawal effects. He said he just feels restless and like he is coming out of his skin. PT reported hot flashes as well. PT encouraged to increase fluids. PT attended groups. PT is friendly with staff and patients.  PT had PRN atarax 100mg and clonidine 0.1mg. PT reported that he felt like he felt some relief from the clonidine but that it did not last as long as he thought. PT questioning again if he could be restarted on Suboxone stating that is what has helped him the best in the past.    PT was less irritable and more restless this shift but kept himself occupied.       Face to face end of shift report communicated to oncoming RN    Nory Franco RN  5/10/2020  9:28 PM         Problem: Adult Behavioral Health Plan of Care  Goal: Patient-Specific Goal (Individualization)  Description: Patient will sleep 6-8 hours a night  Patient will attend 50% of unit programming when able  Patient will complete ADLs independently  Patient will be compliant with treatment team recommendations      5/10/2020 2123 by Nory Franco RN  Outcome: Improving  Note:        Problem: Thought Process Alteration  Goal: Optimal Thought Clarity  Description: Patient will verbalize a decrease in hallucinations by time of discharge.  Patient will be able to have a linear, logical conversation by time of discharge.  Patient will verbalize 2-3 coping skills by time of discharge.  5/10/2020 2123 by Nory Franco RN  Outcome: Improving

## 2020-05-11 NOTE — PLAN OF CARE
Face to face end of shift report obtained from Nory PEREIRA RN.     Problem: Adult Behavioral Health Plan of Care  Goal: Patient-Specific Goal (Individualization)  Description: Patient will sleep 6-8 hours a night  Patient will attend 50% of unit programming when able  Patient will complete ADLs independently  Patient will be compliant with treatment team recommendations    5/11/2020 0034 by Debbie Tate, RN  Outcome: No Change  Note:    Pt observed resting in bed.Pt appears to be sleeping in bed with eyes closed, having regular respirations and position changes. 15 minutes and PRN safety checks completed with no noted complains.      Patient slept 5.5 hours. Will continue to monitor.    Problem: Thought Process Alteration  Goal: Optimal Thought Clarity  Description: Patient will verbalize a decrease in hallucinations by time of discharge.  Patient will be able to have a linear, logical conversation by time of discharge.  Patient will verbalize 2-3 coping skills by time of discharge.  5/11/2020 0034 by Debbie Tate, RN  Outcome: No Change     Face to face end of shift report communicated to oncoming RN.  Debbie Tate, FUNMILAYO  6:17 AM

## 2020-05-12 PROCEDURE — 25000132 ZZH RX MED GY IP 250 OP 250 PS 637: Performed by: NURSE PRACTITIONER

## 2020-05-12 PROCEDURE — 99233 SBSQ HOSP IP/OBS HIGH 50: CPT | Mod: 95 | Performed by: NURSE PRACTITIONER

## 2020-05-12 PROCEDURE — 12400000 ZZH R&B MH

## 2020-05-12 RX ORDER — PROPRANOLOL HYDROCHLORIDE 120 MG/1
120 CAPSULE, EXTENDED RELEASE ORAL DAILY
Status: DISCONTINUED | OUTPATIENT
Start: 2020-05-13 | End: 2020-05-13

## 2020-05-12 RX ORDER — BUPRENORPHINE AND NALOXONE 2; .5 MG/1; MG/1
1 FILM, SOLUBLE BUCCAL; SUBLINGUAL DAILY
Status: DISCONTINUED | OUTPATIENT
Start: 2020-05-12 | End: 2020-05-13

## 2020-05-12 RX ADMIN — GABAPENTIN 900 MG: 300 CAPSULE ORAL at 13:42

## 2020-05-12 RX ADMIN — BENZTROPINE MESYLATE 1.5 MG: 1 TABLET ORAL at 10:59

## 2020-05-12 RX ADMIN — Medication 2 MG: at 11:00

## 2020-05-12 RX ADMIN — ACAMPROSATE CALCIUM 666 MG: 333 TABLET, DELAYED RELEASE ORAL at 13:42

## 2020-05-12 RX ADMIN — GABAPENTIN 900 MG: 300 CAPSULE ORAL at 08:04

## 2020-05-12 RX ADMIN — ACAMPROSATE CALCIUM 666 MG: 333 TABLET, DELAYED RELEASE ORAL at 08:04

## 2020-05-12 RX ADMIN — CITALOPRAM HYDROBROMIDE 20 MG: 20 TABLET ORAL at 08:04

## 2020-05-12 RX ADMIN — AMANTADINE HYDROCHLORIDE 100 MG: 100 CAPSULE ORAL at 08:04

## 2020-05-12 RX ADMIN — ACAMPROSATE CALCIUM 666 MG: 333 TABLET, DELAYED RELEASE ORAL at 20:51

## 2020-05-12 RX ADMIN — Medication 2 MG: at 07:39

## 2020-05-12 RX ADMIN — QUETIAPINE FUMARATE 100 MG: 100 TABLET ORAL at 20:52

## 2020-05-12 RX ADMIN — Medication 2 MG: at 18:28

## 2020-05-12 RX ADMIN — GABAPENTIN 900 MG: 300 CAPSULE ORAL at 20:51

## 2020-05-12 RX ADMIN — BUPRENORPHINE HYDROCHLORIDE, NALOXONE HYDROCHLORIDE 1 FILM: 2; .5 FILM, SOLUBLE BUCCAL; SUBLINGUAL at 14:37

## 2020-05-12 RX ADMIN — PROPRANOLOL HYDROCHLORIDE 80 MG: 80 CAPSULE, EXTENDED RELEASE ORAL at 08:04

## 2020-05-12 RX ADMIN — AMANTADINE HYDROCHLORIDE 100 MG: 100 CAPSULE ORAL at 20:52

## 2020-05-12 RX ADMIN — POLYETHYLENE GLYCOL 400 AND PROPYLENE GLYCOL 2 DROP: 4; 3 SOLUTION/ DROPS OPHTHALMIC at 21:10

## 2020-05-12 NOTE — PROGRESS NOTES
"[facility]  Psychiatric Progress Note      Impression:     Kb states that the medication changes yesterday did make him feel less restless and he noticed a difference in his physical feeling of restlessness though he still states he feels like he could crawl out of his skin but the feeling is less.  He is not rocking back and forth and he is sitting more still than he was yesterday.  His affect is brighter and he looks more comfortable.  He tells me that he is getting referred to [facility] and is hoping to be accepted there for treatment.  He was on Suboxone for maintenance therapy for 6 months and states he did very well on it he does not recall his dose and states \"I do like to keep it lower\".  He has been here since  May 5 and has not had opiates in quite some time.  He is likely going to a medication assistance treatment program that we will provide Suboxone.  I did speak with Dr. Anderson about starting him on Suboxone and as the patient tolerates the Suboxone the dose can be increased and will likely need to be increased.  He did take it 3 times a day in the past though it is long-acting and there is no need for 3 times a day.  I did discuss this with him and he would prefer to take it twice a day.  Today I will start it low dose just once a day and likely increase it tomorrow.     Educated regarding medication indications, risks, benefits, side effects, contraindications and possible interactions. Verbally expressed understanding.        Diagnoses:     Mdd, recurrent severe without psychotic features  Ptsd by history   Methamphetamine use disorder, severe    Attestation:  Patient has been seen and evaluated by me,  Leigh Viera NP          Interim History:   The patient's care was discussed with the treatment team and chart notes were reviewed.            Medications:       acamprosate  666 mg Oral TID     amantadine  100 mg Oral BID     buprenorphine HCl-naloxone HCl  1 Film " "Sublingual Daily     citalopram  20 mg Oral Daily     gabapentin  900 mg Oral TID     [START ON 5/13/2020] propranolol ER  120 mg Oral Daily     QUEtiapine  100 mg Oral At Bedtime              10 point ROS negative        Allergies:     Allergies   Allergen Reactions     Wellbutrin [Bupropion]      Naltrexone Other (See Comments)     Headaches  Headaches              Psychiatric Examination:   /82   Pulse 63   Temp 98.4  F (36.9  C) (Tympanic)   Resp 16   Ht 1.88 m (6' 2\")   Wt 111.7 kg (246 lb 4.8 oz)   SpO2 96%   BMI 31.62 kg/m    Weight is 246 lbs 4.8 oz  Body mass index is 31.62 kg/m .    Appearance:  awake, alert and adequately groomed  Attitude:  cooperative  Eye Contact:  improving  Mood: Less anxious  Affect:  intensity is flat  Speech:  decreased prosody  Psychomotor Behavior: Restlessness is decreased though still present  Thought Process:  logical  Associations:  no loose associations  Thought Content:  no evidence of suicidal ideation or homicidal ideationdepressed tearful  Insight:  Limited  Judgment:  poor  Oriented to:  time, person, and place  Attention Span and Concentration:  fair  Recent and Remote Memory:  intact  Fund of Knowledge: low-normal  Muscle Strength and Tone: normal  Gait and Station: Normal           Labs:     Results for orders placed or performed during the hospital encounter of 05/04/20   Hepatic panel     Status: Abnormal   Result Value Ref Range    Bilirubin Direct <0.1 0.0 - 0.2 mg/dL    Bilirubin Total 0.2 0.2 - 1.3 mg/dL    Albumin 3.2 (L) 3.4 - 5.0 g/dL    Protein Total 7.1 6.8 - 8.8 g/dL    Alkaline Phosphatase 76 40 - 150 U/L    ALT 35 0 - 70 U/L    AST 25 0 - 45 U/L   Vitamin D     Status: None   Result Value Ref Range    Vitamin D Deficiency screening 22 20 - 75 ug/L   Hepatitis C antibody     Status: None   Result Value Ref Range    Hepatitis C Antibody Nonreactive NR^Nonreactive   HIV Antigen Antibody Combo     Status: None   Result Value Ref Range    HIV " Antigen Antibody Combo Nonreactive NR^Nonreactive       Creatinine     Status: None   Result Value Ref Range    Creatinine 1.16 0.66 - 1.25 mg/dL    GFR Estimate 76 >60 mL/min/[1.73_m2]    GFR Estimate If Black 88 >60 mL/min/[1.73_m2]   Potassium     Status: None   Result Value Ref Range    Potassium 4.4 3.4 - 5.3 mmol/L   COVID-19 Virus (Coronavirus) by PCR Nasopharyngeal     Status: None    Specimen: Nasopharyngeal   Result Value Ref Range    COVID-19 Virus PCR to [facility] - Source Nasopharyngeal     COVID-19 Virus PCR to [facility] - Result       Test received-See reflex to IDDL test SARS CoV2 (COVID-19) Virus RT-PCR   SARS-CoV-2 COVID-19 Virus (Coronavirus) RT-PCR Nasopharyngeal     Status: None    Specimen: Nasopharyngeal   Result Value Ref Range    SARS-CoV-2 Virus Specimen Source Nasopharyngeal     SARS-CoV-2 PCR Result NEGATIVE     SARS-CoV-2 PCR Comment       This automated, real-time RT-PCR assay by [employer] on the [employer] has   been given Emergency Use Authorization (EUA) for the in vitro qualitative detection of RNA   from the SARS-CoV2 virus in nasopharyngeal swabs in viral transport medium from patients   with signs and symptoms of infection who are suspected of COVID-19. The performance is   unknown in asymptomatic patients.                  Plan/Treatment Team     BEHAVIORAL TEAM DISCUSSION    Progress:     Anxious, restless, possible akathesia from [facility] states it is there when he is not taking seroquel. R/o tardive akathesia secondary to years of methamphetamine use.     Restlessness improved though continues to be an issue.  Will increase propranolol and monitor blood pressure.  Will add Cogentin as he has not tried this.  I did tell him that we would need to watch for an increase in any abnormal movements though he does not have noticeable dyskinesia and appears to be akathisia    Continued Stay Criteria/Rationale: Very high risk of suicide and relapse if he were to be  "discharged    Medical/Physical:   none  Precautions: None    Falls precaution?: No  Behavioral Orders   Procedures     Code 1 - Restrict to Unit     NO Nebulized Medications     Routine Programming     As clinically indicated     Status 15     Every 15 minutes.                     Plan for this encounter/Med Changes/Labs:     Restlessness has improved with amantadine and propranolol though it continues to be somewhat of an issue.  I did tell them I was going to give him a one-time dose of Cogentin to see if he felt less restless prior to starting his Suboxone as Suboxone will likely mask the restlessness while he is on it though it may return when he comes off of it.      I will add Suboxone today for maintenance therapy.  It will likely need to be increased tomorrow.  Increase slowly and monitor how he tolerates this.  He plans on going to a MAT program.          Rationale for change in precautions or plan: Increase gabapentin to target anxiety and akathisia.      Participants:april and Toni HARDY 3 to 5 days.  He will need to go [id number] to chemical dependency treatment.  High risk of relapse and suicide              Perry GARCIA Ilana Samuels is a 45 year old male who is being evaluated via a billable video visit.      The patient has been notified of following:     \"This video visit will be conducted via a call between you and your physician/provider. We have found that certain health care needs can be provided without the need for an in-person physical exam.  This service lets us provide the care you need with a video conversation.  If a prescription is necessary we can send it directly to your pharmacy.  If lab work is needed we can place an order for that and you can then stop by our lab to have the test done at a later time.    If during the course of the call the physician/provider feels a video visit is not appropriate, you will not be charged for this service.\"     Patient has given verbal consent for " Video visit? Yes    Video Start Time: 0900    Video End Time (time video stopped): 0939    I have reviewed and updated the patient's Past Medical History, Social History, Family History and Medication List.    ALLERGIES  Wellbutrin [bupropion] and Naltrexone      Video-Visit Details    Type of service:  Video Visit        Originating Location (pt. Location): [facility]    Distant Location (provider location):  [facility]    Mode of Communication:  Video Conference via [employer]

## 2020-05-12 NOTE — PLAN OF CARE
Met with pt this afternoon- [facility] was in meeting with pt and updating him that he has been accepting to [facility] in [facility] and they should have a bed for him within a week. Pt talks about needing structure as that is the only thing that will keep him sober. Pt states he has having a hard time with the cravings. He states it has been difficult for him to think the marriage is over, especially when his ex cheated on him with the Harry man. Pt asks for staff to call [facility] child support and let them know he can't pay as he will be going to treatment- informed pt that staff would get the number for him to call and he could inform them. Pt denies any other questions or concerns at this time.

## 2020-05-12 NOTE — PROGRESS NOTES
Referral sent to [facility] for dustin lynch in [place] - [email or web address]

## 2020-05-12 NOTE — PLAN OF CARE
Face to Face report received from FUNMILAYO Lewis.  Rounding completed. [place] [facility] [place]   Problem: Adult Behavioral Health Plan of Care  Goal: Patient-Specific Goal (Individualization)  Description: Patient will sleep 6-8 hours a night  Patient will attend 50% of unit programming when able  Patient will complete ADLs independently  Patient will be compliant with treatment team recommendations      5/12/2020 0753 by Toni Sinclair RN  Outcome: Improving  He is up on the unit and is social with others. He is maintaining appropriate boundaries with staff and peers. He is restless at times and is noted to be rocking back and forth when sitting in the chair. HIs speech is pressured. He is attending groups. He is compliant with his medications. He denies feeling suicidal. He continues with depression and anxiety. He is maintaining safe behavior on the unit.     Problem: Thought Process Alteration  Goal: Optimal Thought Clarity  Description: Patient will verbalize a decrease in hallucinations by time of discharge.  Patient will be able to have a linear, logical conversation by time of discharge.  Patient will verbalize 2-3 coping skills by time of discharge.  5/12/2020 0753 by Toni Sinclair, RN  Outcome: Improving  He is maintaining appropriate boundaries with staff and peers. He is noted to be talking/ moving his mouth when no one is around him. He is able to manage a reality based conversation. He speech is pressured. He focuses on getting suboxone and is speaking with the provider about this.

## 2020-05-12 NOTE — PLAN OF CARE
Face to face end of shift report obtained from Juan CHEN RN.     Problem: Adult Behavioral Health Plan of Care  Goal: Patient-Specific Goal (Individualization)  Description: Patient will sleep 6-8 hours a night  Patient will attend 50% of unit programming when able  Patient will complete ADLs independently  Patient will be compliant with treatment team recommendations    5/12/2020 0026 by Debbie Tate, RN  Outcome: No Change  Note:    Pt observed resting in bed.Pt appears to be sleeping in bed with eyes closed, having regular respirations and position changes. 15 minutes and PRN safety checks completed with no noted complaints.    Patient slept 7 hours. Will continue to monitor.    Problem: Thought Process Alteration  Goal: Optimal Thought Clarity  Description: Patient will verbalize a decrease in hallucinations by time of discharge.  Patient will be able to have a linear, logical conversation by time of discharge.  Patient will verbalize 2-3 coping skills by time of discharge.  5/12/2020 0026 by Debbie Tate, RN  Outcome: No Change     Face to face end of shift report communicated to oncoming RN.  Debbie Tate, FUNMILAYO  6:12 AM

## 2020-05-13 PROCEDURE — 25000132 ZZH RX MED GY IP 250 OP 250 PS 637: Performed by: NURSE PRACTITIONER

## 2020-05-13 PROCEDURE — 99232 SBSQ HOSP IP/OBS MODERATE 35: CPT | Performed by: NURSE PRACTITIONER

## 2020-05-13 PROCEDURE — 12400000 ZZH R&B MH

## 2020-05-13 RX ORDER — BUPRENORPHINE AND NALOXONE 2; .5 MG/1; MG/1
1 FILM, SOLUBLE BUCCAL; SUBLINGUAL DAILY
Status: DISCONTINUED | OUTPATIENT
Start: 2020-05-13 | End: 2020-05-15

## 2020-05-13 RX ORDER — PROPRANOLOL HYDROCHLORIDE 80 MG/1
80 CAPSULE, EXTENDED RELEASE ORAL DAILY
Status: DISCONTINUED | OUTPATIENT
Start: 2020-05-14 | End: 2020-05-14

## 2020-05-13 RX ADMIN — ACETAMINOPHEN 650 MG: 325 TABLET, FILM COATED ORAL at 08:54

## 2020-05-13 RX ADMIN — GABAPENTIN 900 MG: 300 CAPSULE ORAL at 14:02

## 2020-05-13 RX ADMIN — ACAMPROSATE CALCIUM 666 MG: 333 TABLET, DELAYED RELEASE ORAL at 08:54

## 2020-05-13 RX ADMIN — HYDROXYZINE HYDROCHLORIDE 100 MG: 25 TABLET, FILM COATED ORAL at 16:07

## 2020-05-13 RX ADMIN — GABAPENTIN 900 MG: 300 CAPSULE ORAL at 08:54

## 2020-05-13 RX ADMIN — QUETIAPINE 50 MG: 50 TABLET, FILM COATED ORAL at 18:27

## 2020-05-13 RX ADMIN — CITALOPRAM HYDROBROMIDE 20 MG: 20 TABLET ORAL at 08:54

## 2020-05-13 RX ADMIN — BUPRENORPHINE HYDROCHLORIDE, NALOXONE HYDROCHLORIDE 1 FILM: 2; .5 FILM, SOLUBLE BUCCAL; SUBLINGUAL at 09:03

## 2020-05-13 RX ADMIN — ACAMPROSATE CALCIUM 666 MG: 333 TABLET, DELAYED RELEASE ORAL at 14:02

## 2020-05-13 RX ADMIN — GABAPENTIN 900 MG: 300 CAPSULE ORAL at 20:43

## 2020-05-13 RX ADMIN — AMANTADINE HYDROCHLORIDE 100 MG: 100 CAPSULE ORAL at 20:43

## 2020-05-13 RX ADMIN — Medication 2 MG: at 14:02

## 2020-05-13 RX ADMIN — QUETIAPINE FUMARATE 100 MG: 100 TABLET ORAL at 20:43

## 2020-05-13 RX ADMIN — AMANTADINE HYDROCHLORIDE 100 MG: 100 CAPSULE ORAL at 08:54

## 2020-05-13 RX ADMIN — Medication 2 MG: at 08:56

## 2020-05-13 RX ADMIN — ACAMPROSATE CALCIUM 666 MG: 333 TABLET, DELAYED RELEASE ORAL at 20:43

## 2020-05-13 RX ADMIN — POLYETHYLENE GLYCOL 400 AND PROPYLENE GLYCOL 2 DROP: 4; 3 SOLUTION/ DROPS OPHTHALMIC at 08:56

## 2020-05-13 ASSESSMENT — ACTIVITIES OF DAILY LIVING (ADL)
HYGIENE/GROOMING: INDEPENDENT
HYGIENE/GROOMING: INDEPENDENT
DRESS: SCRUBS (BEHAVIORAL HEALTH)
ORAL_HYGIENE: INDEPENDENT
LAUNDRY: UNABLE TO COMPLETE
ORAL_HYGIENE: INDEPENDENT
DRESS: INDEPENDENT;SCRUBS (BEHAVIORAL HEALTH)

## 2020-05-13 NOTE — PLAN OF CARE
"  Problem: Adult Behavioral Health Plan of Care  Goal: Patient-Specific Goal (Individualization)  Description: Patient will sleep 6-8 hours a night  Patient will attend 50% of unit programming when able  Patient will complete ADLs independently  Patient will be compliant with treatment team recommendations      5/13/2020 1451 by Melinda Steen, RN  Outcome: No Change  Note:      VSS--HR low per trend, 59-61/min-provider notified before admin of Inderal-order to hold dose. No new orders for lower dose rec'd.   Pain to posterior neck-states he slept wrong-tylenol 650 mg rec'd with pt denying pain on recheck.   Denies SI, SIB, HI or hallucinations. Endorses high anxiety and depression. Irritable this AM-states he feels like he wants to leave because he is starting to have a lot of of\"feelings\" with sobriety and people rub him wrong. Pt encouraged to keep to his goal and stay until discharged to [facility]. Encourage pt to journal goals as well.  Remains hyper focused on getting Suboxone --pacing, agitated this AM until pt recieves. Pt wants frequency increased and states he will let us know when to increase the dose. Pt told per provider she is not changing dose or frequency. Pt asks this writer to call previous provider to get Suboxone.     Problem: Thought Process Alteration  Goal: Optimal Thought Clarity  Description: Patient will verbalize a decrease in hallucinations by time of discharge.  Patient will be able to have a linear, logical conversation by time of discharge.  Patient will verbalize 2-3 coping skills by time of discharge.  5/13/2020 1451 by Melinda Steen, RN  Outcome: No Change    Face to face end of shift report communicated to *oncoming shift.     Melinda Steen, RN  5/13/2020  2:58 PM           "

## 2020-05-13 NOTE — PLAN OF CARE
"Face to face end of shift report communicated to oncoming shift.     Xiomara Stoll RN  5/12/2020  10:41 PM  Patient polite and cooperative with nursing cares.  Patient attends groups this shift.  Patient complete's ADL's independently.  Patient compliant with treatment team recommendations.  Patient denies SI, HI, AH and VH.  Patient reports \"I'm starting to feel better and get things in order in my life, I need to meet people who do not use drugs and get back into that\"      Noticed patient's right eye has some redness/irritation, asked patient about this, patient reports \"well it's my contacts\"  gave patient contact case and some saline solution and he removed contacts and had eye drops put into his eye.      Patient requests multiple snacks.  \"I think the Gabapentin makes me so hungry\"        Problem: Adult Behavioral Health Plan of Care  Goal: Patient-Specific Goal (Individualization)  Description: Patient will sleep 6-8 hours a night  Patient will attend 50% of unit programming when able  Patient will complete ADLs independently  Patient will be compliant with treatment team recommendations      Outcome: Improving     Problem: Thought Process Alteration  Goal: Optimal Thought Clarity  Description: Patient will verbalize a decrease in hallucinations by time of discharge.  Patient will be able to have a linear, logical conversation by time of discharge.  Patient will verbalize 2-3 coping skills by time of discharge.  Outcome: Improving     "

## 2020-05-13 NOTE — PLAN OF CARE
"Face to face shift report received from nurse. Rounding completed, pt observed.    Problem: Adult Behavioral Health Plan of Care  Goal: Patient-Specific Goal (Individualization)  Description: Patient will sleep 6-8 hours a night  Patient will attend 50% of unit programming when able  Patient will complete ADLs independently  Patient will be compliant with treatment team recommendations  5/13/2020 1649 by Juan Sainz RN  Outcome: Improving, patient is calm and cooperative but has pressured speech and is fixated on getting a second dose of SUBOXONE. Patient stated a 10/10 anxiety took atarax @1600. States it didn't help @1830 Took seroquel 50mg to help. Keeps bring up suboxone for two time daily and stated that if it didn't get changed he might just sign himself out. Also states that he doesn't want to go because he wants help but he will do it if he has to. Reinforced that its good that he came in and we dont want him to leave because he does need some help and that he has been improving. During this interaction speech was pressured and he couldn't stand still constant movement back and forward with feet. @1845 Had a conversation with patient about 12 hour intent states he doesn't want to do that for sure now. But is still talking about the suboxone and how he feels that he has cravings but \"its just part of him coming off of drugs\". Stated that it calmed him down to talk with us. The whole time he didn't stop moving and talked in circles. Patient seems more restless, agitated, and less insightful thinking from previous shifts a couple of days ago. However, he is happy to know where he will be transferring to and is able to understand that we are here to help him. @ 2010 patient stated that he is feeling less anxiety and was even joking around, came across in a much happier mood.      Problem: Thought Process Alteration  Goal: Optimal Thought Clarity  Description: Patient will verbalize a decrease in " hallucinations by time of discharge.  Patient will be able to have a linear, logical conversation by time of discharge.  Patient will verbalize 2-3 coping skills by time of discharge.  5/13/2020 1649 by Juan Sainz RN  Outcome: Improving  Patient states he isnt having hallucinations, but at this time he isnt able to have nay linear or logical conversations.      Face to face report will be communicated to oncoming RN.    Juan GARCIA. FUNMILAYO Sainz  5/13/2020  4:50 PM

## 2020-05-13 NOTE — PROGRESS NOTES
"[facility]  Psychiatric Progress Note      Impression:     Perry Preciado Jr. is a 45 year old  male who brought himself ot the ER with increased suicidal thoughts with no plan. He did attempt one other time in 2010.      Perry was pleasant and cooperative. He questioned if it was normal for him to want to leave, \"around this time, like 12 days sober?\" Discussed if his desire to leave was related to wanting to use, or just to get outside. He denied the desire to use, but admitted to cravings. \"I think I'm just a little stir crazy. I'm having a pretty bad day. I'm not sure what it is.\" Stated that he was going to group as this often helps put things into perspective. He also talked about his desire to maintain sobriety despite the cravings. \"I'm a 45 year old adult. I can't keep living like this.\" Asked about his diagnoses and wondered if he could be tested for \"Bipolar Disorder.\" Reviewed the symptoms and criteria, including the need for a prolonged period of sobriety with ongoing symptoms, including mily.     He did ask about Suboxone scheduling. It is currently scheduled once daily and will not be increased by this provider who is not certified to manage Suboxone. Perry has been accepted to [facility], which is reportedly a Suboxone treatment program, and he will be able to have this managed there. He was agreeable to this and understanding in the presence of provider. He did complain to nursing later.     HR at 59 this morning. Propranolol was scheduled to be increased to 120mg. Will keep at 80mg for now. He was also agreeable to this.          Diagnoses:     Major Depressive Disorder, recurrent, severe without psychotic features  PTSD by history  Methamphetamine use disorder, severe  Opioid Abuse Disorder, Unspecified    Attestation:  Patient has been seen and evaluated by me,  Marco Antonio Vergara NP          Interim History:   The patient's care was discussed with the treatment team and " "chart notes were reviewed.            Medications:       acamprosate  666 mg Oral TID     amantadine  100 mg Oral BID     buprenorphine HCl-naloxone HCl  1 Film Sublingual Daily     citalopram  20 mg Oral Daily     gabapentin  900 mg Oral TID     propranolol ER  120 mg Oral Daily     QUEtiapine  100 mg Oral At Bedtime        10 point ROS negative        Allergies:     Allergies   Allergen Reactions     Wellbutrin [Bupropion]      Naltrexone Other (See Comments)     Headaches  Headaches              Psychiatric Examination:   /55   Pulse 61   Temp 96.9  F (36.1  C) (Tympanic)   Resp 14   Ht 1.88 m (6' 2\")   Wt 111.7 kg (246 lb 4.8 oz)   SpO2 94%   BMI 31.62 kg/m    Weight is 246 lbs 4.8 oz  Body mass index is 31.62 kg/m .    Appearance:  awake, alert and adequately groomed  Attitude:  cooperative  Eye Contact:  good  Mood:  anxious  Affect:  mood congruent  Speech:  clear, coherent  Psychomotor Behavior:  no evidence of tardive dyskinesia, dystonia, or tics  Thought Process:  logical and goal oriented  Associations:  no loose associations  Thought Content:  no evidence of suicidal ideation or homicidal ideation and no evidence of psychotic thought  Insight:  fair  Judgment:  fair  Oriented to:  time, person, and place  Attention Span and Concentration:  intact  Recent and Remote Memory:  intact  Fund of Knowledge: low-normal  Muscle Strength and Tone: normal  Gait and Station: Normal         Labs:     Patient concerned about cholesterol levels, which he stated have been \"detrimentaly\" high in the past. Will order a lipid panel to ease his anxiety.              Plan/Treatment Team     BEHAVIORAL TEAM DISCUSSION    Progress:     Some improvement, especially with anticipation of discharge. Reported he is having a bad day, but seems determined to utilize appropriate coping skills. He did talk about wanting to leave, but then identified the positives in staying, specifically related to his desire to remain " sober.       Continued Stay Criteria/Rationale:   High risk for substance abuse relapse and suicide if he were to discharge prior to having a program in place.     Medical/Physical:   none  Precautions: None    Falls precaution?: No  Behavioral Orders   Procedures     Code 1 - Restrict to Unit     NO Nebulized Medications     Routine Programming     As clinically indicated     Status 15     Every 15 minutes.       Plan for this encounter/Med Changes/Labs:     Continue Propranolol at 80mg w/o increasing to 120mg at this time.  Continue Suboxone order as is.  Continue Campral EC 666mg tid  Continue Symmetrel 100mg twice daily  Continue Celexa 20mg daily  Continue Gabapentin 900mg tid  Continue Seroquel 100mg at bed  Order Lipid panel    Rationale for change in precautions or plan:   HR trending down - prevent bradycardia. Will monitor at 80mg for now.     Participants: Marco Antonio Vergara, WOLF, CNP, SW, and Nursing      BHASKAROS 3 to 5 days.  He will need to go [id number] to chemical dependency treatment.  High risk of relapse and suicide

## 2020-05-13 NOTE — PLAN OF CARE
Problem: Adult Behavioral Health Plan of Care  Goal: Patient-Specific Goal (Individualization)  Description: Patient will sleep 6-8 hours a night  Patient will attend 50% of unit programming when able  Patient will complete ADLs independently  Patient will be compliant with treatment team recommendations    Pt appears to be sleeping comfortably with regular respirations. Pt slept approx. 5 hours this shift; woke up around 0430. Up for snack x2. Reports feeling very hungry. Will continue to monitor.    Outcome: Improving    Problem: Thought Process Alteration  Goal: Optimal Thought Clarity  Description: Patient will verbalize a decrease in hallucinations by time of discharge.  Patient will be able to have a linear, logical conversation by time of discharge.  Patient will verbalize 2-3 coping skills by time of discharge.    Outcome: Improving    Face to face end of shift report communicated to oncoming RN.     5/13/2020  5:51 AM

## 2020-05-13 NOTE — PLAN OF CARE
Met with pt this afternoon- He states he is doing better this afternoon than he was this morning. States he has been attending unit programming. He asks for update on a discharge date- informed him staff has not heard of a specific date yet. Pt then asks for the contact info for [facility] as he has some questions about what he can and can not have- provided pt with contact info.

## 2020-05-14 LAB
ALBUMIN SERPL-MCNC: 3.3 G/DL (ref 3.4–5)
ALP SERPL-CCNC: 93 U/L (ref 40–150)
ALT SERPL W P-5'-P-CCNC: 68 U/L (ref 0–70)
ANION GAP SERPL CALCULATED.3IONS-SCNC: 3 MMOL/L (ref 3–14)
AST SERPL W P-5'-P-CCNC: 31 U/L (ref 0–45)
BILIRUB SERPL-MCNC: 0.2 MG/DL (ref 0.2–1.3)
BUN SERPL-MCNC: 18 MG/DL (ref 7–30)
CALCIUM SERPL-MCNC: 8.3 MG/DL (ref 8.5–10.1)
CHLORIDE SERPL-SCNC: 103 MMOL/L (ref 94–109)
CHOLEST SERPL-MCNC: 274 MG/DL
CO2 SERPL-SCNC: 31 MMOL/L (ref 20–32)
CREAT SERPL-MCNC: 1.21 MG/DL (ref 0.66–1.25)
GFR SERPL CREATININE-BSD FRML MDRD: 72 ML/MIN/{1.73_M2}
GLUCOSE SERPL-MCNC: 110 MG/DL (ref 70–99)
HDLC SERPL-MCNC: 20 MG/DL
LDLC SERPL CALC-MCNC: ABNORMAL MG/DL
NONHDLC SERPL-MCNC: 254 MG/DL
POTASSIUM SERPL-SCNC: 4.6 MMOL/L (ref 3.4–5.3)
PROT SERPL-MCNC: 6.8 G/DL (ref 6.8–8.8)
SODIUM SERPL-SCNC: 137 MMOL/L (ref 133–144)
TRIGL SERPL-MCNC: 1730 MG/DL

## 2020-05-14 PROCEDURE — 36415 COLL VENOUS BLD VENIPUNCTURE: CPT | Performed by: NURSE PRACTITIONER

## 2020-05-14 PROCEDURE — 25000132 ZZH RX MED GY IP 250 OP 250 PS 637: Performed by: NURSE PRACTITIONER

## 2020-05-14 PROCEDURE — 80061 LIPID PANEL: CPT | Performed by: PSYCHIATRY & NEUROLOGY

## 2020-05-14 PROCEDURE — 80053 COMPREHEN METABOLIC PANEL: CPT | Performed by: NURSE PRACTITIONER

## 2020-05-14 PROCEDURE — 99232 SBSQ HOSP IP/OBS MODERATE 35: CPT | Performed by: NURSE PRACTITIONER

## 2020-05-14 PROCEDURE — 12400000 ZZH R&B MH

## 2020-05-14 RX ORDER — FENOFIBRATE 145 MG/1
145 TABLET, COATED ORAL
Status: DISCONTINUED | OUTPATIENT
Start: 2020-05-14 | End: 2020-05-20 | Stop reason: HOSPADM

## 2020-05-14 RX ORDER — PROPRANOLOL HCL 60 MG
60 CAPSULE, EXTENDED RELEASE 24HR ORAL DAILY
Status: DISCONTINUED | OUTPATIENT
Start: 2020-05-15 | End: 2020-05-16

## 2020-05-14 RX ADMIN — HYDROXYZINE HYDROCHLORIDE 100 MG: 25 TABLET, FILM COATED ORAL at 12:37

## 2020-05-14 RX ADMIN — ACAMPROSATE CALCIUM 666 MG: 333 TABLET, DELAYED RELEASE ORAL at 13:39

## 2020-05-14 RX ADMIN — CITALOPRAM HYDROBROMIDE 20 MG: 20 TABLET ORAL at 08:14

## 2020-05-14 RX ADMIN — AMANTADINE HYDROCHLORIDE 100 MG: 100 CAPSULE ORAL at 08:15

## 2020-05-14 RX ADMIN — AMANTADINE HYDROCHLORIDE 100 MG: 100 CAPSULE ORAL at 20:55

## 2020-05-14 RX ADMIN — GABAPENTIN 900 MG: 300 CAPSULE ORAL at 13:39

## 2020-05-14 RX ADMIN — QUETIAPINE 50 MG: 50 TABLET, FILM COATED ORAL at 18:39

## 2020-05-14 RX ADMIN — GABAPENTIN 900 MG: 300 CAPSULE ORAL at 20:55

## 2020-05-14 RX ADMIN — PROPRANOLOL HYDROCHLORIDE 80 MG: 80 CAPSULE, EXTENDED RELEASE ORAL at 08:14

## 2020-05-14 RX ADMIN — FENOFIBRATE 145 MG: 145 TABLET ORAL at 17:18

## 2020-05-14 RX ADMIN — QUETIAPINE FUMARATE 100 MG: 100 TABLET ORAL at 20:55

## 2020-05-14 RX ADMIN — ACAMPROSATE CALCIUM 666 MG: 333 TABLET, DELAYED RELEASE ORAL at 20:55

## 2020-05-14 RX ADMIN — BUPRENORPHINE HYDROCHLORIDE, NALOXONE HYDROCHLORIDE 1 FILM: 2; .5 FILM, SOLUBLE BUCCAL; SUBLINGUAL at 08:15

## 2020-05-14 RX ADMIN — ACAMPROSATE CALCIUM 666 MG: 333 TABLET, DELAYED RELEASE ORAL at 08:14

## 2020-05-14 RX ADMIN — Medication 2 MG: at 14:24

## 2020-05-14 RX ADMIN — GABAPENTIN 900 MG: 300 CAPSULE ORAL at 08:15

## 2020-05-14 ASSESSMENT — ACTIVITIES OF DAILY LIVING (ADL)
LAUNDRY: UNABLE TO COMPLETE
HYGIENE/GROOMING: INDEPENDENT
DRESS: SCRUBS (BEHAVIORAL HEALTH)
ORAL_HYGIENE: INDEPENDENT
HYGIENE/GROOMING: INDEPENDENT
DRESS: SCRUBS (BEHAVIORAL HEALTH);INDEPENDENT
LAUNDRY: UNABLE TO COMPLETE
ORAL_HYGIENE: INDEPENDENT

## 2020-05-14 NOTE — PROGRESS NOTES
[facility]    Medical Services Progress Note    Date of Service (when I saw the patient): 05/14/2020    Assessment & Plan     Principal Problem:    Psychosis (H)     Active Medical Problems:    HTN (hypertension)- pt reports he does have a hx of high blood pressure but has not taken any medications for it and states that his bp has been normal for a while. Blood pressure is stable 118/78. Pt denies chest pain, sob.   5/6/20- pt vitals stable. Denies chest pain, sob.   5/14/20- pt vitals stable. Denies cp, sob.       Mild intermittent asthma without complication- pt uses albuterol inhaler as needed for wheezing and sob. Pt reports he has not needed to use it for over a year. Pt denies wheezing, sob, chest pain, difficulty breathing. Albuterol neb ordered due to covid and inhaler conservation.   5/6/20- pt denies wheezing, chest pain, sob, difficulty breathing. Pt has not needed albuterol inhaler.   5/14/20- pt denies cp, sob, difficulty breathing, wheezing       Chronic right-sided low back pain with right-sided sciatica- pt has hx of DDD and displacement of lumbar intervertebral disc without myelopathy. Pt reports pain is controlled with gabapentin. Gabapentin ordered by pmhnp.   5/6/20- pt denies any pain. Taking Gabapentin for pain.      Drug abuse- pt reports he used Meth 2 days prior to going to ED. Utox positive for amphetamines.      Abnormal creatinine- pt baseline creatinine 11/26/19 was 1.23. Creatinine on 5/2/20 was 1.35. Will continue to monitor. Creatinine in process.   5/6/20- creatinine improved to 1.16.     Mixed hyperlipidemia- was consulted by pmhnp about pt lipid panel. Pt triglycerides are very high at 1,730. Pt has a history of alcohol and drug abuse.  Pt denies any pancreatitis symptoms such as nausea, vomiting, tenderness, abdominal pain. Spoke with pt about life style modifications such as a low calorie/low fat diet, restrict consumption of high glycemic foods, exercise, avoiding  alcohol, and drug use. Also discussed the increased risks of cardiovascular effects, stroke, and pancreatitis. Instructed the pt would need to follow up with PCP in one week after discharge, and also would need LFT and kidney function checked in 4-6 weeks after starting fenofibrate. Pt was instructed to report any medication side effects to nurse immediately. Pt verbalized understanding.  Pt also was started on Propranolol for withdrawal symptoms on this admission. Beta blockers can increase lipids. Orders- ordered CMP to check liver and kidney function, consulted with Pmhnp and she agreed that decreasing  propranolol to 60 mg and will wean off completely over the next few days, start on  Fenofibrate 145 mg daily with dinner. Spoke with nurse about monitoring for pancreatitis signs and symptoms as well as fenofibrate side effects such as myopathy.         Code Status: No Order.    Xi Levi CNP      -Data reviewed today: I reviewed all new labs and imaging results over the last 24 hours.     Physical Exam   Temp: 96.4  F (35.8  C) Temp src: Tympanic BP: 128/74 Pulse: 67   Resp: 16 SpO2: 94 % O2 Device: None (Room air)    Vitals:    05/04/20 1419 05/10/20 0700   Weight: 110.2 kg (243 lb) 111.7 kg (246 lb 4.8 oz)     Vital Signs with Ranges  Temp:  [96.1  F (35.6  C)-97.1  F (36.2  C)] 96.4  F (35.8  C)  Pulse:  [64-67] 67  Resp:  [16-18] 16  BP: (100-134)/(51-74) 128/74  SpO2:  [94 %-95 %] 94 %  No intake/output data recorded.    Constitutional: NAD, vitals stable, appears well- developed, well appearing   Respiratory: good effort, speaks in full sentences, symmetric rise and fall of chest, no audible wheezing  Cardiovascular: warm extremities per pt,  no edema   GI:  no tenderness, no guarding, no masses, per pt palpation   Skin/Integumen: warm, dry, intact, no rashes, no open wounds per pt report       Medications     acamprosate  666 mg Oral TID     amantadine  100 mg Oral BID     buprenorphine HCl-naloxone  "HCl  1 Film Sublingual Daily     citalopram  20 mg Oral Daily     fenofibrate  145 mg Oral Daily with supper     gabapentin  900 mg Oral TID     propranolol ER  80 mg Oral Daily     QUEtiapine  100 mg Oral At Bedtime       Data   No lab results found in last 7 days.    No results found for this or any previous visit (from the past 24 hour(s)).    Perry Preciado Jr. is a 45 year old male who is being evaluated via a billable video visit.      The patient has been notified of following:     \"This video visit will be conducted via a call between you and your physician/provider. We have found that certain health care needs can be provided without the need for an in-person physical exam.  This service lets us provide the care you need with a video conversation.  If a prescription is necessary we can send it directly to your pharmacy.  If lab work is needed we can place an order for that and you can then stop by our lab to have the test done at a later time.    If during the course of the call the physician/provider feels a video visit is not appropriate, you will not be charged for this service.\"     Patient has given verbal consent for Video visit? Yes      Video Start Time: 1500    Perry Preciado Jr. complains of  No chief complaint on file.      I have reviewed and updated the patient's Past Medical History, Social History, Family History and Medication List.    ALLERGIES  Wellbutrin [bupropion] and Naltrexone          Video-Visit Details    Type of service:  Video Visit    Video End Time (time video stopped): 1515    Originating Location (pt. Location): [address]    Distant Location (provider location):  [facility]     Mode of Communication:  Video Conference via [facility]      Xi Levi CNP              "

## 2020-05-14 NOTE — PROGRESS NOTES
Spoke with pt regarding his placement and suboxone. Pt presented as calm w/broad affect during conversation.

## 2020-05-14 NOTE — PLAN OF CARE
"  Problem: Adult Behavioral Health Plan of Care  Goal: Patient-Specific Goal (Individualization)  Description: Patient will sleep 6-8 hours a night  Patient will attend 50% of unit programming when able  Patient will complete ADLs independently  Patient will be compliant with treatment team recommendations      5/14/2020 1149 by Haven Singleton RN  Outcome: Improving     Problem: Thought Process Alteration  Goal: Optimal Thought Clarity  Description: Patient will verbalize a decrease in hallucinations by time of discharge.  Patient will be able to have a linear, logical conversation by time of discharge.  Patient will verbalize 2-3 coping skills by time of discharge.  5/14/2020 1149 by Haven Singleton RN  Outcome: Improving  Face to face shift report received from Dorina MELLO. Rounding completed, pt observed in common area watching tv.     Pt has been up and attending group, ADLS were completed independently.  Slept well during the night.  Pt has been compliant with medications and treatment team.  Pt talked about going on to treatment and being happy that he is here and looking forward to living life again.  Hopeful that things in his life will be \"good\" again.  Denies SI/HI at this time.  Speech appears to be pressured as he talks about his past life and the future.  He did discuss having a second dose of suboxone, writer informed him that the provider would make those decisions and inform him.      Pt has been attending group and compliant with medications and treatment team recommendations.  He did state that he feels he is ready to move on to treatment and hopes this happens soon.      1237-pt requested something increased anxiety, speech is rapid, was rocking in chair at lunch.  Gave 100 mg Atarax prn see MAR.  1330-pt stated medication was beneficial.      Lab value returned-left sticky note for provider to address.    1510-Provider called and spoke to pt in regards to his elevated Tri.  Enc to not drink alcohol and " monitor what he eats.  She will start him on a new medication.  Staff to educate on Cholesterol and how to reduce and manage for discharge.  Monitor for s/s of pancreatitis.  Call on-call MD if N/V occur.     Face to face report will be communicated to oncoming RN.    Haven Singleton RN  5/14/2020  2:20 PM

## 2020-05-14 NOTE — PLAN OF CARE
Face to face shift report received from nurse. Rounding completed, pt observed.    Problem: Adult Behavioral Health Plan of Care  Goal: Patient-Specific Goal (Individualization)  Description: Patient will sleep 6-8 hours a night  Patient will attend 50% of unit programming when able  Patient will complete ADLs independently  Patient will be compliant with treatment team recommendations  5/14/2020 1617 by Juan Sainz RN  Outcome: Improving, Patient is calm and cooperative. Is attending groups, able to complete ADL's independently, is compliant with treatment teams and medications. Took seroquel 50mg at 1841 for anxiety. Had a CMP at 1600. Denies any hallucinations. States he's happy to be getting treatment and he feels like hes doing the right thing.     Problem: Thought Process Alteration  Goal: Optimal Thought Clarity  Description: Patient will verbalize a decrease in hallucinations by time of discharge.  Patient will be able to have a linear, logical conversation by time of discharge.  Patient will verbalize 2-3 coping skills by time of discharge.  5/14/2020 1617 by Juan Sainz RN  Outcome: Improving, Patient denies having hallucinations, able to have a clear conversation.     Face to face report will be communicated to oncoming RN.    Juan Sainz RN  5/14/2020  10:15 PM

## 2020-05-14 NOTE — PROGRESS NOTES
Did not have the chance to speak with Perry today. He is upset that this provider will not increase Suboxone. He has been reminded that this may be addressed when he transfers to [facility].     A lipid profile was ordered yesterday. Triglycerides are significantly elevated, as well as non HDL Cholesterol. Spoke with FNP who is going to follow up with patient today and order the appropriate treatment.     Marco Antonio Vergara, WOLF, CNP

## 2020-05-14 NOTE — PLAN OF CARE
"  Problem: Adult Behavioral Health Plan of Care  Goal: Patient-Specific Goal (Individualization)  Description: Patient will sleep 6-8 hours a night  Patient will attend 50% of unit programming when able  Patient will complete ADLs independently  Patient will be compliant with treatment team recommendations      5/14/2020 0252 by Dorina Rivas, RN  Outcome: Improving  Note: Shift Summery:  Report received from evening shift RN.    Patient in bed with eyes closed and regular resps. Occasional position changes noted.    Hand off given to on-coming shift.    Patient's Stated Goal for Shift:  \"no stated goal\"    Goal Status:  In Process       Problem: Thought Process Alteration  Goal: Optimal Thought Clarity  Description: Patient will verbalize a decrease in hallucinations by time of discharge.  Patient will be able to have a linear, logical conversation by time of discharge.  Patient will verbalize 2-3 coping skills by time of discharge.  5/14/2020 0252 by Dorina Rivas, RN  Outcome: Improving     "

## 2020-05-14 NOTE — PLAN OF CARE
Spoke with pt briefly this morning- He asks staff for any update on a discharge date- informed pt staff has no update on this. Pt asks to have LADC come meet with him today for more of an update. Informed LADC who states he will meet with him this morning.

## 2020-05-15 PROCEDURE — 99232 SBSQ HOSP IP/OBS MODERATE 35: CPT | Mod: 95 | Performed by: NURSE PRACTITIONER

## 2020-05-15 PROCEDURE — 25000132 ZZH RX MED GY IP 250 OP 250 PS 637: Performed by: NURSE PRACTITIONER

## 2020-05-15 PROCEDURE — 12400000 ZZH R&B MH

## 2020-05-15 RX ORDER — BUPRENORPHINE AND NALOXONE 2; .5 MG/1; MG/1
1 FILM, SOLUBLE BUCCAL; SUBLINGUAL 2 TIMES DAILY
Status: DISCONTINUED | OUTPATIENT
Start: 2020-05-15 | End: 2020-05-16

## 2020-05-15 RX ADMIN — GABAPENTIN 900 MG: 300 CAPSULE ORAL at 20:38

## 2020-05-15 RX ADMIN — POLYETHYLENE GLYCOL 400 AND PROPYLENE GLYCOL 2 DROP: 4; 3 SOLUTION/ DROPS OPHTHALMIC at 08:49

## 2020-05-15 RX ADMIN — HYDROXYZINE HYDROCHLORIDE 100 MG: 25 TABLET, FILM COATED ORAL at 04:28

## 2020-05-15 RX ADMIN — BUPRENORPHINE HYDROCHLORIDE, NALOXONE HYDROCHLORIDE 1 FILM: 2; .5 FILM, SOLUBLE BUCCAL; SUBLINGUAL at 08:40

## 2020-05-15 RX ADMIN — AMANTADINE HYDROCHLORIDE 100 MG: 100 CAPSULE ORAL at 20:38

## 2020-05-15 RX ADMIN — CITALOPRAM HYDROBROMIDE 20 MG: 20 TABLET ORAL at 08:39

## 2020-05-15 RX ADMIN — PROPRANOLOL HYDROCHLORIDE 60 MG: 60 CAPSULE, EXTENDED RELEASE ORAL at 08:48

## 2020-05-15 RX ADMIN — ACAMPROSATE CALCIUM 666 MG: 333 TABLET, DELAYED RELEASE ORAL at 08:39

## 2020-05-15 RX ADMIN — BUPRENORPHINE HYDROCHLORIDE, NALOXONE HYDROCHLORIDE 1 FILM: 2; .5 FILM, SOLUBLE BUCCAL; SUBLINGUAL at 17:11

## 2020-05-15 RX ADMIN — ACAMPROSATE CALCIUM 666 MG: 333 TABLET, DELAYED RELEASE ORAL at 20:38

## 2020-05-15 RX ADMIN — GABAPENTIN 900 MG: 300 CAPSULE ORAL at 13:15

## 2020-05-15 RX ADMIN — QUETIAPINE FUMARATE 150 MG: 100 TABLET ORAL at 20:38

## 2020-05-15 RX ADMIN — ACAMPROSATE CALCIUM 666 MG: 333 TABLET, DELAYED RELEASE ORAL at 13:15

## 2020-05-15 RX ADMIN — Medication 2 MG: at 20:38

## 2020-05-15 RX ADMIN — QUETIAPINE 50 MG: 50 TABLET, FILM COATED ORAL at 18:54

## 2020-05-15 RX ADMIN — GABAPENTIN 900 MG: 300 CAPSULE ORAL at 08:39

## 2020-05-15 RX ADMIN — AMANTADINE HYDROCHLORIDE 100 MG: 100 CAPSULE ORAL at 08:40

## 2020-05-15 RX ADMIN — FENOFIBRATE 145 MG: 145 TABLET ORAL at 17:11

## 2020-05-15 RX ADMIN — Medication 2 MG: at 08:49

## 2020-05-15 ASSESSMENT — ACTIVITIES OF DAILY LIVING (ADL)
ORAL_HYGIENE: INDEPENDENT
LAUNDRY: UNABLE TO COMPLETE
DRESS: SCRUBS (BEHAVIORAL HEALTH)
HYGIENE/GROOMING: INDEPENDENT

## 2020-05-15 NOTE — PROGRESS NOTES
"[facility]  Psychiatric Progress Note      Impression:     Perry Preciado Jr. is a 45 year old  male who brought himself ot the ER with increased suicidal thoughts with no plan.      Perry is seen via video conferencing in the presence of an RN. He reports that he is just waiting to hear back on when he can go to [facility] for treatment. He does inquire about having Suboxone increased, will increase to BID per his request to split dose, states that in the past he took it three times a day. He denies any suicidal ideation. Talks in detail about wanting to stay sober, states he has \"wasted almost half a decade\" using drugs. Does report some restlessness and anxiety, though feels that this is slowly improving. Does report some trouble with sleep, was awake at 0430 and requested Atarax. Would like to try small increase in bedtime Seroquel, does not feel that this is causing akathisia. Was started on new medication yesterday by medical NP for elevated lipids, Propranolol was decreased this morning, plan is to taper off over the next few days as beta blockers can increase lipids. Has been attending groups throughout the day and is social with peers and staff.         Diagnoses:     Major Depressive Disorder, recurrent, severe without psychotic features  PTSD, chronic by history  Methamphetamine use disorder, severe  Opioid Abuse Disorder, severe    Attestation:  Patient has been seen and evaluated by me,  Danyelle Sherman, NP          Interim History:   The patient's care was discussed with the treatment team and chart notes were reviewed.            Medications:       acamprosate  666 mg Oral TID     amantadine  100 mg Oral BID     buprenorphine HCl-naloxone HCl  1 Film Sublingual Daily     citalopram  20 mg Oral Daily     fenofibrate  145 mg Oral Daily with supper     gabapentin  900 mg Oral TID     propranolol ER  60 mg Oral Daily     QUEtiapine  100 mg Oral At Bedtime        10 point ROS- denies " "concerns        Allergies:     Allergies   Allergen Reactions     Wellbutrin [Bupropion]      Naltrexone Other (See Comments)     Headaches  Headaches              Psychiatric Examination:   /83   Pulse 79   Temp 97.9  F (36.6  C) (Tympanic)   Resp 16   Ht 1.88 m (6' 2\")   Wt 111.7 kg (246 lb 4.8 oz)   SpO2 (!) 65%   BMI 31.62 kg/m    Weight is 246 lbs 4.8 oz  Body mass index is 31.62 kg/m .    Appearance: awake, alert, dressed in hospital scrubs, casually groomed  Attitude:  cooperative, pleasant  Eye Contact:  good  Mood: anxious, reports feeling more hopeful  Affect:  mood congruent  Speech:  clear, coherent  Psychomotor Behavior:  no evidence of tardive dyskinesia, dystonia, or tics  Thought Process:  logical and goal oriented  Associations:  no loose associations  Thought Content: denies any suicidal or homicidal ideation, denies hallucinations  Insight:  fair  Judgment:  fair  Oriented to:  time, person, and place  Attention Span and Concentration:  intact  Recent and Remote Memory:  intact  Fund of Knowledge: low-normal  Muscle Strength and Tone: normal  Gait and Station: Normal         Labs:     Results for orders placed or performed during the hospital encounter of 05/04/20 (from the past 24 hour(s))   Comprehensive metabolic panel   Result Value Ref [facility]    Sodium 137 133 - 144 mmol/L    Potassium 4.6 3.4 - 5.3 mmol/L    Chloride 103 94 - 109 mmol/L    Carbon Dioxide 31 20 - 32 mmol/L    Anion Gap 3 3 - 14 mmol/L    Glucose 110 (H) 70 - 99 mg/dL    Urea Nitrogen 18 7 - 30 mg/dL    Creatinine 1.21 0.66 - 1.25 mg/dL    GFR Estimate 72 >60 mL/min/[1.73_m2]    GFR Estimate If Black 84 >60 mL/min/[1.73_m2]    Calcium 8.3 (L) 8.5 - 10.1 mg/dL    Bilirubin Total 0.2 0.2 - 1.3 mg/dL    Albumin 3.3 (L) 3.4 - 5.0 g/dL    Protein Total 6.8 6.8 - 8.8 g/dL    Alkaline Phosphatase 93 40 - 150 U/L    ALT 68 0 - 70 U/L    AST 31 0 - 45 U/L            Plan/Treatment Team     BEHAVIORAL TEAM " "DISCUSSION    Progress:   Slowly improving. Feeling more positive about CD treatment. Denies SI and attending groups.       Continued Stay Criteria/Rationale:   High risk for substance abuse relapse and suicide if he were to discharge prior to having a program in place. Accepted at [facility] for treatment.     Medical/Physical:   none  Precautions: None    Falls precaution?: No  Behavioral Orders   Procedures     Code 1 - Restrict to Unit     NO Nebulized Medications     Routine Programming     As clinically indicated     Status 15     Every 15 minutes.       Plan for this encounter/Med Changes/Labs:     Decrease Propranolol LA to 60 mg daily, will wean off over next few days  Increase Suboxone to BID dosing  Continue Campral EC 666mg tid  Continue Symmetrel 100mg twice daily  Continue Celexa 20mg daily  Continue Gabapentin 900mg tid  Increase Seroquel to 150 mg at bed      Rationale for change in precautions or plan:   Taper off Propranolol as beta blockers can elevate lipids. Will discharge to facility that can manage Suboxone.    Participants: Danyelle Sherman, APRN, CNP, SW, and Nursing      ELOS 3 to 5 days.  He will need to go [id number] to chemical dependency treatment.  High risk of relapse and suicide        Video Visit:  Perry Preciado Jr. is a 45 year old male who is being evaluated via a billable video visit.      The patient has been notified of following:     \"This video visit will be conducted via a call between you and your physician/provider. We have found that certain health care needs can be provided without the need for an in-person physical exam.  This service lets us provide the care you need with a video conversation.  If a prescription is necessary we can send it directly to your pharmacy.  If lab work is needed we can place an order for that and you can then stop by our lab to have the test done at a later time.    If during the course of the call the physician/provider feels a " "video visit is not appropriate, you will not be charged for this service.\"     Patient has given verbal consent for Video visit? Yes    Video Start Time: 1035    I have reviewed and updated the patient's Past Medical History, Social History, Family History and Medication List.      Video-Visit Details    Type of service:  Video Visit    Video End Time (time video stopped): 1047    Originating Location (pt. Location): Other  [facility]    Distant Location (provider location):  [facility] remote provider    Mode of Communication:  Video Conference via [employer] Meeting trista                                "
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
## 2020-05-15 NOTE — PLAN OF CARE
Problem: Adult Behavioral Health Plan of Care  Goal: Patient-Specific Goal (Individualization)  Description: Patient will sleep 6-8 hours a night  Patient will attend 50% of unit programming when able  Patient will complete ADLs independently  Patient will be compliant with treatment team recommendations      5/15/2020 0729 by Haven Singleton RN  Outcome: Improving     Problem: Thought Process Alteration  Goal: Optimal Thought Clarity  Description: Patient will verbalize a decrease in hallucinations by time of discharge.  Patient will be able to have a linear, logical conversation by time of discharge.  Patient will verbalize 2-3 coping skills by time of discharge.  Outcome: Improving     Face to face shift report received from KATHY RN. Rounding completed, pt observed.     Pt is alert and out in commons area visiting with peers.  Calm and cooperative with staff.  ADLS complete independently. Is compliant with medications and treatment team recommendations.  Denies SI/HI at this time.   Able to have linear conversations.  Diet has been modified to  Low fat/low salt.  Writer encouraged increasing water consumption and educated on proper diet for recent abnormal lipid panal.     Pt saw provider today with changes to the Suboxone and increase to seroquel pt is in agreement with current plan.    0849-drops given for left eye redness and irritation.  Stated relief from eye drops.       Face to face report will be communicated to oncoming RN.    Haven Singleton RN  5/15/2020  2:07 PM

## 2020-05-15 NOTE — PLAN OF CARE
Face to face report received from Juan RN. Pt. Observed.       Problem: Adult Behavioral Health Plan of Care  Goal: Patient-Specific Goal (Individualization)  Description: Patient will sleep 6-8 hours a night  Patient will attend 50% of unit programming when able  Patient will complete ADLs independently  Patient will be compliant with treatment team recommendations      5/15/2020 0508 by Shana Marcano, RN  Outcome: No Change     Pt has been in bed with eyes closed and regular respirations for a total of 5 hours this noc  shift. 15 minute and PRN checks all night. Pt. complaints of 6/10 anxiety. PRN Atarax 100 mg po per  Pt. Request @ 0428. Will continue to monitor.      Face to face end of shift report to be communicated to oncoming RN.     Shana Marcano RN  5/15/2020

## 2020-05-15 NOTE — PLAN OF CARE
Face to face shift report received from nurse. Rounding completed, pt observed.    Problem: Adult Behavioral Health Plan of Care  Goal: Patient-Specific Goal (Individualization)  Description: Patient will sleep 6-8 hours a night  Patient will attend 50% of unit programming when able  Patient will complete ADLs independently  Patient will be compliant with treatment team recommendations  5/15/2020 4357 by Juan Sainz RN  Outcome: Improving   Patient is calm and cooperative. Patient is able to do ADL's indepndently, is compliant with medications and treatment teams, is attending groups. Was reporting rising restlessness he asked for a seroquel took it at 1854. Stated it helped. Patient was up in day room after supper talking with peers.    Problem: Thought Process Alteration  Goal: Optimal Thought Clarity  Description: Patient will verbalize a decrease in hallucinations by time of discharge.  Patient will be able to have a linear, logical conversation by time of discharge.  Patient will verbalize 2-3 coping skills by time of discharge.  5/15/2020 7167 by Juan Sainz RN  Outcome: Improving  Patient denies hallucinations but states his mind is constantly racing. Patient is able to have a clear conversation, states he has to work on being ok with it     Face to face report will be communicated to oncoming RN.    Juan GARCIA. FUNMILAYO Sainz  5/15/2020  10:48 PM

## 2020-05-16 LAB
ALBUMIN UR-MCNC: NEGATIVE MG/DL
APPEARANCE UR: CLEAR
BACTERIA #/AREA URNS HPF: ABNORMAL /HPF
BILIRUB UR QL STRIP: NEGATIVE
COLOR UR AUTO: ABNORMAL
GLUCOSE UR STRIP-MCNC: NEGATIVE MG/DL
HGB UR QL STRIP: NEGATIVE
KETONES UR STRIP-MCNC: NEGATIVE MG/DL
LEUKOCYTE ESTERASE UR QL STRIP: NEGATIVE
NITRATE UR QL: NEGATIVE
PH UR STRIP: 7 PH (ref 4.7–8)
RBC #/AREA URNS AUTO: <1 /HPF (ref 0–2)
SOURCE: ABNORMAL
SP GR UR STRIP: 1.01 (ref 1–1.03)
UROBILINOGEN UR STRIP-MCNC: NORMAL MG/DL (ref 0–2)
WBC #/AREA URNS AUTO: <1 /HPF (ref 0–5)

## 2020-05-16 PROCEDURE — 25000132 ZZH RX MED GY IP 250 OP 250 PS 637: Performed by: NURSE PRACTITIONER

## 2020-05-16 PROCEDURE — 12400000 ZZH R&B MH

## 2020-05-16 PROCEDURE — 99232 SBSQ HOSP IP/OBS MODERATE 35: CPT | Mod: 95 | Performed by: NURSE PRACTITIONER

## 2020-05-16 PROCEDURE — 81001 URINALYSIS AUTO W/SCOPE: CPT | Performed by: NURSE PRACTITIONER

## 2020-05-16 RX ORDER — BUPRENORPHINE AND NALOXONE 4; 1 MG/1; MG/1
1 FILM, SOLUBLE BUCCAL; SUBLINGUAL DAILY
Status: DISCONTINUED | OUTPATIENT
Start: 2020-05-17 | End: 2020-05-18

## 2020-05-16 RX ORDER — MULTIPLE VITAMINS W/ MINERALS TAB 9MG-400MCG
1 TAB ORAL DAILY
Status: DISCONTINUED | OUTPATIENT
Start: 2020-05-16 | End: 2020-05-20 | Stop reason: HOSPADM

## 2020-05-16 RX ORDER — BUPRENORPHINE AND NALOXONE 2; .5 MG/1; MG/1
1 FILM, SOLUBLE BUCCAL; SUBLINGUAL DAILY
Status: DISCONTINUED | OUTPATIENT
Start: 2020-05-16 | End: 2020-05-18

## 2020-05-16 RX ORDER — CLONIDINE HYDROCHLORIDE 0.1 MG/1
0.1 TABLET ORAL 3 TIMES DAILY PRN
Status: DISCONTINUED | OUTPATIENT
Start: 2020-05-16 | End: 2020-05-20 | Stop reason: HOSPADM

## 2020-05-16 RX ORDER — PRAZOSIN HYDROCHLORIDE 1 MG/1
1 CAPSULE ORAL AT BEDTIME
Status: DISCONTINUED | OUTPATIENT
Start: 2020-05-16 | End: 2020-05-17

## 2020-05-16 RX ADMIN — FENOFIBRATE 145 MG: 145 TABLET ORAL at 17:09

## 2020-05-16 RX ADMIN — MULTIPLE VITAMINS W/ MINERALS TAB 1 TABLET: TAB at 10:00

## 2020-05-16 RX ADMIN — AMANTADINE HYDROCHLORIDE 100 MG: 100 CAPSULE ORAL at 08:40

## 2020-05-16 RX ADMIN — PROPRANOLOL HYDROCHLORIDE 60 MG: 60 CAPSULE, EXTENDED RELEASE ORAL at 08:40

## 2020-05-16 RX ADMIN — Medication 2 MG: at 10:07

## 2020-05-16 RX ADMIN — Medication 2 MG: at 11:17

## 2020-05-16 RX ADMIN — BUPRENORPHINE HYDROCHLORIDE, NALOXONE HYDROCHLORIDE 1 FILM: 2; .5 FILM, SOLUBLE BUCCAL; SUBLINGUAL at 17:40

## 2020-05-16 RX ADMIN — ACAMPROSATE CALCIUM 666 MG: 333 TABLET, DELAYED RELEASE ORAL at 21:09

## 2020-05-16 RX ADMIN — AMANTADINE HYDROCHLORIDE 100 MG: 100 CAPSULE ORAL at 21:09

## 2020-05-16 RX ADMIN — POLYETHYLENE GLYCOL 400 AND PROPYLENE GLYCOL 2 DROP: 4; 3 SOLUTION/ DROPS OPHTHALMIC at 10:10

## 2020-05-16 RX ADMIN — HYDROXYZINE HYDROCHLORIDE 100 MG: 25 TABLET, FILM COATED ORAL at 11:16

## 2020-05-16 RX ADMIN — ACAMPROSATE CALCIUM 666 MG: 333 TABLET, DELAYED RELEASE ORAL at 08:40

## 2020-05-16 RX ADMIN — ACAMPROSATE CALCIUM 666 MG: 333 TABLET, DELAYED RELEASE ORAL at 13:47

## 2020-05-16 RX ADMIN — GABAPENTIN 900 MG: 300 CAPSULE ORAL at 21:08

## 2020-05-16 RX ADMIN — BUPRENORPHINE HYDROCHLORIDE, NALOXONE HYDROCHLORIDE 1 FILM: 2; .5 FILM, SOLUBLE BUCCAL; SUBLINGUAL at 08:40

## 2020-05-16 RX ADMIN — GABAPENTIN 900 MG: 300 CAPSULE ORAL at 08:40

## 2020-05-16 RX ADMIN — CITALOPRAM HYDROBROMIDE 20 MG: 20 TABLET ORAL at 08:40

## 2020-05-16 RX ADMIN — PRAZOSIN HYDROCHLORIDE 1 MG: 1 CAPSULE ORAL at 21:09

## 2020-05-16 RX ADMIN — QUETIAPINE FUMARATE 150 MG: 100 TABLET ORAL at 21:09

## 2020-05-16 RX ADMIN — GABAPENTIN 900 MG: 300 CAPSULE ORAL at 13:47

## 2020-05-16 RX ADMIN — CLONIDINE HYDROCHLORIDE 0.1 MG: 0.1 TABLET ORAL at 17:40

## 2020-05-16 ASSESSMENT — ACTIVITIES OF DAILY LIVING (ADL)
ORAL_HYGIENE: INDEPENDENT
DRESS: SCRUBS (BEHAVIORAL HEALTH)
ORAL_HYGIENE: INDEPENDENT
HYGIENE/GROOMING: INDEPENDENT
HYGIENE/GROOMING: INDEPENDENT
DRESS: SCRUBS (BEHAVIORAL HEALTH);INDEPENDENT
LAUNDRY: UNABLE TO COMPLETE

## 2020-05-16 NOTE — PROGRESS NOTES
"[facility]  Psychiatric Progress Note      Impression:     Perry Preciado Jr. is a 45 year old  male who brought himself ot the ER with increased suicidal thoughts with no plan.      Perry is seen via video conferencing in the presence of an RN. He reports doing \"okay\" today. Reports anxiety remains high. He notes that in the past he has never had to deal with his anxiety, as he would just use and it would take that feeling away. He does report that he fell asleep quicker last night, though has been struggling with vivid nightmares. He does not believe that he has been on Prazosin in the past, is interested in trying this at bedtime. Also discussed stopping Propranolol due to elevated triglycerides. He questions if there is 'anything stronger\" he can take for anxiety. Reviewed that we would increase Suboxone today, which could help and would add Clonidine as needed, he states he has taken that in the past at treatment though is unsure of it helped. Could consider increase in Celexa, though today he presents as more hyperverbal and somewhat restless. Question possible manic type symptoms or could be related to past history of ADHD. Will try to explore this further tomorrow. He does report some hesitancy when urinating and possible flank pain. Will order UA. He also reports red, irritated eyes likely due to his contacts. Agrees to leave contacts out and flush with saline and if still bothersome can speak to medical NP on Monday. Denies any side effects from medication.         Diagnoses:     Major Depressive Disorder, recurrent, severe without psychotic features  R/o bipolar disorder  PTSD, chronic by history  Methamphetamine use disorder, severe  Opioid Abuse Disorder, severe    Attestation:  Patient has been seen and evaluated by me,  Danyelle Sherman NP          Interim History:   The patient's care was discussed with the treatment team and chart notes were reviewed.            Medications:       " "acamprosate  666 mg Oral TID     amantadine  100 mg Oral BID     buprenorphine HCl-naloxone HCl  1 Film Sublingual Daily     [START ON 5/17/2020] buprenorphine HCl-naloxone HCl  1 Film Sublingual Daily     citalopram  20 mg Oral Daily     fenofibrate  145 mg Oral Daily with supper     gabapentin  900 mg Oral TID     multivitamin w/minerals  1 tablet Oral Daily     prazosin  1 mg Oral At Bedtime     QUEtiapine  150 mg Oral At Bedtime        10 point ROS- reports right eye irritation from contact lens, urinary hesitancy       Allergies:     Allergies   Allergen Reactions     Wellbutrin [Bupropion]      Naltrexone Other (See Comments)     Headaches  Headaches              Psychiatric Examination:   /65   Pulse 63   Temp 97.7  F (36.5  C) (Tympanic)   Resp 16   Ht 1.88 m (6' 2\")   Wt 111.7 kg (246 lb 4.8 oz)   SpO2 99%   BMI 31.62 kg/m    Weight is 246 lbs 4.8 oz  Body mass index is 31.62 kg/m .    Appearance: awake, alert, dressed in hospital scrubs, casually groomed  Attitude:  cooperative, pleasant  Eye Contact:  good  Mood: anxious  Affect: brighter  Speech:  clear, coherent, somewhat rambling today  Psychomotor Behavior:  no evidence of tardive dyskinesia, dystonia, or tics  Thought Process:  logical and goal oriented  Associations:  no loose associations  Thought Content: denies any suicidal or homicidal ideation, denies hallucinations  Insight:  fair  Judgment:  fair  Oriented to:  time, person, and place  Attention Span and Concentration:  intact  Recent and Remote Memory:  intact  Fund of Knowledge: low-normal  Muscle Strength and Tone: normal  Gait and Station: Normal         Labs:     Results for orders placed or performed during the hospital encounter of 05/04/20 (from the past 24 hour(s))   UA with Microscopic reflex to Culture    Specimen: Urine clean catch; Midstream Urine   Result Value Ref [facility]    Color Urine Light Yellow     Appearance Urine Clear     Glucose Urine Negative NEG^Negative " mg/dL    Bilirubin Urine Negative NEG^Negative    Ketones Urine Negative NEG^Negative mg/dL    Specific Gravity Urine 1.011 1.003 - 1.035    Blood Urine Negative NEG^Negative    pH Urine 7.0 4.7 - 8.0 pH    Protein Albumin Urine Negative NEG^Negative mg/dL    Urobilinogen mg/dL Normal 0.0 - 2.0 mg/dL    Nitrite Urine Negative NEG^Negative    Leukocyte Esterase Urine Negative NEG^Negative    Source Midstream Urine     WBC Urine <1 0 - 5 /HPF    RBC Urine <1 0 - 2 /HPF    Bacteria Urine None (A) NEG^Negative /HPF            Plan/Treatment Team     BEHAVIORAL TEAM DISCUSSION    Progress:   Slowly improving. Feeling more positive about CD treatment. Denies SI and attending groups.       Continued Stay Criteria/Rationale:   High risk for substance abuse relapse and suicide if he were to discharge prior to having a program in place. Accepted at [facility] for treatment.     Medical/Physical:   none  Precautions: None    Falls precaution?: No  Behavioral Orders   Procedures     Code 1 - Restrict to Unit     NO Nebulized Medications     Routine Programming     As clinically indicated     Status 15     Every 15 minutes.       Plan for this encounter/Med Changes/Labs:   Stop Propranolol LA  (can increase triglycerides)  Start Prazosin 1 mg at bedtime, increase as tolerated  Start Clonidine 0.1 mg TID prn anxiety/restless  Increase Suboxone to 4mg in AM, 2mg in evening  Continue Campral EC 666mg tid  Continue Symmetrel 100mg twice daily  Continue Celexa 20mg daily- monitor for manic symptoms  Continue Gabapentin 900mg tid  Continue Seroquel 150 mg at bed  Order UA- reports hesitancy, pain  Has been accepted at [facility] for CD treatment- date pending    Rationale for change in precautions or plan:   Taper off Propranolol as beta blockers can elevate lipids. Will discharge to facility that can manage Suboxone.    Participants: Danyelle Sherman, APRN, CNP, SW, and Nursing      ELOS 3 to 5 days.  He will need to go  "[id number] to chemical dependency treatment.  High risk of relapse and suicide        Video Visit:  Perry Preciado Jr. is a 45 year old male who is being evaluated via a billable video visit.      The patient has been notified of following:     \"This video visit will be conducted via a call between you and your physician/provider. We have found that certain health care needs can be provided without the need for an in-person physical exam.  This service lets us provide the care you need with a video conversation.  If a prescription is necessary we can send it directly to your pharmacy.  If lab work is needed we can place an order for that and you can then stop by our lab to have the test done at a later time.    If during the course of the call the physician/provider feels a video visit is not appropriate, you will not be charged for this service.\"     Patient has given verbal consent for Video visit? Yes    Video Start Time: 1305    I have reviewed and updated the patient's Past Medical History, Social History, Family History and Medication List.      Video-Visit Details    Type of service:  Video Visit    Video End Time (time video stopped): 1324    Originating Location (pt. Location): Other  [facility] unit    Distant Location (provider location):  [facility] remote provider    Mode of Communication:  Video Conference via [employer] Meeting trista                                "

## 2020-05-16 NOTE — PLAN OF CARE
Face to face report received from Juan MELLO. Pt. Observed.       Problem: Adult Behavioral Health Plan of Care  Goal: Patient-Specific Goal (Individualization)  Description: Patient will sleep 6-8 hours a night  Patient will attend 50% of unit programming when able  Patient will complete ADLs independently  Patient will be compliant with treatment team recommendations      5/16/2020 0223 by Shana Marcano, RN  Outcome: No Change     Pt has been in bed with eyes closed and regular respirations for a total of 6.75 hours this noc shift. Pt. Out to unge x 1 this noc shift. 15 minute and PRN checks all night. No complaints offered. Will continue to monitor.    Face to face end of shift report to be communicated to oncoming RN.     Shana Marcano RN  5/16/2020

## 2020-05-16 NOTE — PLAN OF CARE
"Problem: Adult Behavioral Health Plan of Care  Goal: Patient-Specific Goal (Individualization)  Description: Patient will sleep 6-8 hours a night  Patient will attend 50% of unit programming when able  Patient will complete ADLs independently  Patient will be compliant with treatment team recommendations  5/16/2020 1123 by Alexandria Cobian, RN  Outcome: No Change    End of shift report received from previous shift RN, pt observed in [facility] area. Calm, cooperative, full range affect. Hyperverbal at times but redirects self appropriately. Insight to illness, hopeful for recovery and discharge plan.   Reddness to left eye. No pain, discharge noted. Pt took contacts out, washed with NS, and placed in contact case with NS. PRN systane administered. Nicotine lozenge 2mg admin at 1007, 1117. Hydroxyzine 100mg admin for increasing anxiety at 1116, reports non effective.   1330- ITV meeting with provider. Pt calm, hyper verbal, rambles, goal orientated. Eye pain, concern reported to provider.  1430- Attempted UA collection. \"I just peed, I will try later\". Will pass it on to next shift.  Face to face end of shift report communicated to oncoming RN.   1345- Clean catch mid steam collected, labeled, sent to lab. Clear, yellow color.   Alexandria Cobian RN  5/16/2020  2:41 PM        Problem: Thought Process Alteration  Goal: Optimal Thought Clarity  Description: Patient will verbalize a decrease in hallucinations by time of discharge.  Patient will be able to have a linear, logical conversation by time of discharge.  Patient will verbalize 2-3 coping skills by time of discharge.  Outcome: Improving   Attends groups, social with peers. Linear logical conversation held.  "

## 2020-05-17 PROCEDURE — 25000132 ZZH RX MED GY IP 250 OP 250 PS 637: Performed by: NURSE PRACTITIONER

## 2020-05-17 PROCEDURE — 12400000 ZZH R&B MH

## 2020-05-17 RX ORDER — PRAZOSIN HYDROCHLORIDE 1 MG/1
2 CAPSULE ORAL AT BEDTIME
Status: DISCONTINUED | OUTPATIENT
Start: 2020-05-17 | End: 2020-05-20 | Stop reason: HOSPADM

## 2020-05-17 RX ADMIN — ACETAMINOPHEN 650 MG: 325 TABLET, FILM COATED ORAL at 17:43

## 2020-05-17 RX ADMIN — PRAZOSIN HYDROCHLORIDE 2 MG: 1 CAPSULE ORAL at 20:27

## 2020-05-17 RX ADMIN — AMANTADINE HYDROCHLORIDE 100 MG: 100 CAPSULE ORAL at 08:27

## 2020-05-17 RX ADMIN — BUPRENORPHINE HYDROCHLORIDE, NALOXONE HYDROCHLORIDE 1 FILM: 2; .5 FILM, SOLUBLE BUCCAL; SUBLINGUAL at 17:43

## 2020-05-17 RX ADMIN — QUETIAPINE FUMARATE 150 MG: 100 TABLET ORAL at 20:27

## 2020-05-17 RX ADMIN — AMANTADINE HYDROCHLORIDE 100 MG: 100 CAPSULE ORAL at 20:27

## 2020-05-17 RX ADMIN — GABAPENTIN 900 MG: 300 CAPSULE ORAL at 08:27

## 2020-05-17 RX ADMIN — FENOFIBRATE 145 MG: 145 TABLET ORAL at 17:05

## 2020-05-17 RX ADMIN — ACAMPROSATE CALCIUM 666 MG: 333 TABLET, DELAYED RELEASE ORAL at 08:27

## 2020-05-17 RX ADMIN — HYDROXYZINE HYDROCHLORIDE 100 MG: 25 TABLET, FILM COATED ORAL at 02:58

## 2020-05-17 RX ADMIN — GABAPENTIN 900 MG: 300 CAPSULE ORAL at 20:27

## 2020-05-17 RX ADMIN — CLONIDINE HYDROCHLORIDE 0.1 MG: 0.1 TABLET ORAL at 18:22

## 2020-05-17 RX ADMIN — CLONIDINE HYDROCHLORIDE 0.1 MG: 0.1 TABLET ORAL at 10:13

## 2020-05-17 RX ADMIN — CITALOPRAM HYDROBROMIDE 20 MG: 20 TABLET ORAL at 08:27

## 2020-05-17 RX ADMIN — ALUMINUM HYDROXIDE, MAGNESIUM HYDROXIDE, AND DIMETHICONE 30 ML: 400; 400; 40 SUSPENSION ORAL at 06:44

## 2020-05-17 RX ADMIN — Medication 2 MG: at 18:21

## 2020-05-17 RX ADMIN — BUPRENORPHINE HYDROCHLORIDE, NALOXONE HYDROCHLORIDE 1 FILM: 4; 1 FILM, SOLUBLE BUCCAL; SUBLINGUAL at 08:27

## 2020-05-17 RX ADMIN — ACAMPROSATE CALCIUM 666 MG: 333 TABLET, DELAYED RELEASE ORAL at 20:27

## 2020-05-17 RX ADMIN — ACAMPROSATE CALCIUM 666 MG: 333 TABLET, DELAYED RELEASE ORAL at 13:47

## 2020-05-17 RX ADMIN — GABAPENTIN 900 MG: 300 CAPSULE ORAL at 13:47

## 2020-05-17 RX ADMIN — Medication 2 MG: at 06:26

## 2020-05-17 RX ADMIN — MULTIPLE VITAMINS W/ MINERALS TAB 1 TABLET: TAB at 08:27

## 2020-05-17 ASSESSMENT — MIFFLIN-ST. JEOR: SCORE: 2157.25

## 2020-05-17 ASSESSMENT — ACTIVITIES OF DAILY LIVING (ADL)
LAUNDRY: UNABLE TO COMPLETE
DRESS: SCRUBS (BEHAVIORAL HEALTH)
DRESS: SCRUBS (BEHAVIORAL HEALTH);INDEPENDENT
HYGIENE/GROOMING: INDEPENDENT
HYGIENE/GROOMING: INDEPENDENT
ORAL_HYGIENE: INDEPENDENT
ORAL_HYGIENE: INDEPENDENT

## 2020-05-17 NOTE — PROGRESS NOTES
BEHAVIORAL TEAM DISCUSSION     Progress:   Slowly improving. Social with peers, denies SI and attending groups. Looking forward to treatment.        Continued Stay Criteria/Rationale:   High risk for substance abuse relapse and suicide if he were to discharge prior to having a program in place. Accepted at [facility] for treatment.      Medical/Physical:   none  Precautions: None     Falls precaution?: No       Behavioral Orders   Procedures     Code 1 - Restrict to Unit     NO Nebulized Medications     Routine Programming       As clinically indicated     Status 15       Every 15 minutes.        Plan for this encounter/Med Changes/Labs:   Increase Prazosin to 2 mg at bedtime, further increase as tolerated  Continue Suboxone 4mg in AM, 2mg in evening  Continue Campral EC 666mg tid  Continue Symmetrel 100mg twice daily  Continue Celexa 20mg daily- monitor for manic symptoms  Continue Gabapentin 900mg tid  Continue Seroquel 150 mg at bed  Has been accepted at [facility] for CD treatment- date pending     Rationale for change in precautions or plan:    Will discharge to facility that can manage Suboxone. Continue to stabilize mood.     Participants: Danyelle Sherman, APRN, CNP, and Nursing        ELOS 3 to 5 days.  He will need to go [id number] to chemical dependency treatment.  High risk of relapse and suicide        Diagnoses:      Major Depressive Disorder, recurrent, severe without psychotic features  R/o bipolar disorder  PTSD, chronic by history  Methamphetamine use disorder, severe  Opioid Abuse Disorder, severe    Current Facility-Administered Medications   Medication     acamprosate (CAMPRAL) EC tablet 666 mg     acetaminophen (TYLENOL) tablet 650 mg     albuterol (PROAIR HFA/PROVENTIL HFA/VENTOLIN HFA) 108 (90 Base) MCG/ACT inhaler 2 puff     alum & mag hydroxide-simethicone (MAALOX  ES) suspension 30 mL     amantadine (SYMMETREL) capsule 100 mg     bisacodyl (DULCOLAX) Suppository 10 mg      buprenorphine HCl-naloxone HCl (SUBOXONE) 2-0.5 MG per film 1 Film     buprenorphine HCl-naloxone HCl (SUBOXONE) 4-1 MG per film 1 Film     citalopram (celeXA) tablet 20 mg     cloNIDine (CATAPRES) tablet 0.1 mg     fenofibrate (TRICOR) tablet 145 mg     gabapentin (NEURONTIN) capsule 900 mg     hydrOXYzine (ATARAX) tablet  mg     magnesium hydroxide (MILK OF MAGNESIA) suspension 30 mL     Medication instructions: Do NOT use nebulized medications     multivitamin w/minerals (THERA-VIT-M) tablet 1 tablet     nicotine (COMMIT) lozenge 2 mg     OLANZapine (zyPREXA) tablet 10 mg    Or     OLANZapine (zyPREXA) injection 10 mg     ondansetron (ZOFRAN-ODT) ODT tab 4 mg     polyethylene glycol-propylene glycol PF (SYSTANE ULTRA PF) opthalmic solution 2 drop     prazosin (MINIPRESS) capsule 2 mg     QUEtiapine (SEROquel) tablet 150 mg     QUEtiapine (SEROquel) tablet 50 mg

## 2020-05-17 NOTE — PLAN OF CARE
"Problem: Adult Behavioral Health Plan of Care  Goal: Patient-Specific Goal (Individualization)  Description: Patient will sleep 6-8 hours a night  Patient will attend 50% of unit programming when able  Patient will complete ADLs independently  Patient will be compliant with treatment team recommendations  5/17/2020 1028 by Alexandria Cobian RN  Outcome: No Change    End of shift report received from previous shift RN. Pt observed in [facility] area being social with peers listening to music. Restless. Cooperative, high energy, hyperverbal, bright affect. Compliant with mediations. Declines pain, anxiety, depression. Shower supplies given. Reports \"antsyness\" after shower. PRN clonodine 0.1mg administered at 1013. Pt restless, akathisia noted.   Attends groups, talks of \"vision board\" and is hopeful to stay sober.   Face to face end of shift report communicated to oncoming RN.     Alexandria Cobian RN  5/17/2020  2:26 PM        Problem: Thought Process Alteration  Goal: Optimal Thought Clarity  Description: Patient will verbalize a decrease in hallucinations by time of discharge.  Patient will be able to have a linear, logical conversation by time of discharge.  Patient will verbalize 2-3 coping skills by time of discharge.  5/17/2020 1028 by Alexandria oCbian, RN  Outcome: Improving   No hallucinations reported this shift. Linear and logical. Has hard time tracking at times, remains hyperverbal.   "

## 2020-05-17 NOTE — PLAN OF CARE
Face to face report received from Melinda MELLO. Pt. Observed.     Problem: Adult Behavioral Health Plan of Care  Goal: Patient-Specific Goal (Individualization)  Description: Patient will sleep 6-8 hours a night  Patient will attend 50% of unit programming when able  Patient will complete ADLs independently  Patient will be compliant with treatment team recommendations      5/17/2020 0018 by Shana Marcano, RN  Outcome: No Change     Pt has been in bed with eyes closed and regular respirations x 5 hours this noc shift. 15 minute and PRN checks all night. 0258 Pt. Administered PRN Atarax 100 mg po per pt. Request for increasing anxiety related to not being able to stay asleep. Will continue to monitor.      Face to face end of shift report to be communicated to oncoming RN.     Shana Marcano RN  5/17/2020

## 2020-05-17 NOTE — PLAN OF CARE
"  Problem: Adult Behavioral Health Plan of Care  Goal: Patient-Specific Goal (Individualization)  Description: Patient will sleep 6-8 hours a night  Patient will attend 50% of unit programming when able  Patient will complete ADLs independently  Patient will be compliant with treatment team recommendations      5/16/2020 2240 by Melinda Steen, RN  Outcome: Improving  Note:      VSS, denies pain. Denies all criteria including: SI, SIB, HI or hallucinations.  Thought process clear--linear conversations. Pt sounds hopeful when talking about going to treatment and goals for his future. States he was \"just going through the motions before--once to get my drivers license back and another time for a girlfriend. I didn't learn anything. I want to have a life that is happy and not filled with constant bull shit.\"  Active on unit, behavior appropriate with staff and peers. Attends and participates in groups.   Problem: Thought Process Alteration  Goal: Optimal Thought Clarity  Description: Patient will verbalize a decrease in hallucinations by time of discharge.  Patient will be able to have a linear, logical conversation by time of discharge.  Patient will verbalize 2-3 coping skills by time of discharge.  5/16/2020 2240 by Melinda Steen, RN  Outcome: Improving    Face to face end of shift report communicated to *oncoming shift.     Melinda Steen RN  5/16/2020  10:46 PM           "

## 2020-05-18 PROCEDURE — 12400000 ZZH R&B MH

## 2020-05-18 PROCEDURE — 99232 SBSQ HOSP IP/OBS MODERATE 35: CPT | Performed by: NURSE PRACTITIONER

## 2020-05-18 PROCEDURE — 25000132 ZZH RX MED GY IP 250 OP 250 PS 637: Performed by: NURSE PRACTITIONER

## 2020-05-18 RX ORDER — BUPRENORPHINE AND NALOXONE 4; 1 MG/1; MG/1
1 FILM, SOLUBLE BUCCAL; SUBLINGUAL 2 TIMES DAILY
Status: DISCONTINUED | OUTPATIENT
Start: 2020-05-18 | End: 2020-05-20 | Stop reason: HOSPADM

## 2020-05-18 RX ADMIN — HYDROXYZINE HYDROCHLORIDE 100 MG: 25 TABLET, FILM COATED ORAL at 19:54

## 2020-05-18 RX ADMIN — ACAMPROSATE CALCIUM 666 MG: 333 TABLET, DELAYED RELEASE ORAL at 08:18

## 2020-05-18 RX ADMIN — BUPRENORPHINE HYDROCHLORIDE, NALOXONE HYDROCHLORIDE 1 FILM: 4; 1 FILM, SOLUBLE BUCCAL; SUBLINGUAL at 08:25

## 2020-05-18 RX ADMIN — Medication 2 MG: at 16:56

## 2020-05-18 RX ADMIN — AMANTADINE HYDROCHLORIDE 100 MG: 100 CAPSULE ORAL at 08:18

## 2020-05-18 RX ADMIN — ACAMPROSATE CALCIUM 666 MG: 333 TABLET, DELAYED RELEASE ORAL at 13:21

## 2020-05-18 RX ADMIN — GABAPENTIN 900 MG: 300 CAPSULE ORAL at 08:18

## 2020-05-18 RX ADMIN — PRAZOSIN HYDROCHLORIDE 2 MG: 1 CAPSULE ORAL at 21:10

## 2020-05-18 RX ADMIN — QUETIAPINE 50 MG: 50 TABLET, FILM COATED ORAL at 16:55

## 2020-05-18 RX ADMIN — GABAPENTIN 900 MG: 300 CAPSULE ORAL at 21:10

## 2020-05-18 RX ADMIN — BUPRENORPHINE HYDROCHLORIDE, NALOXONE HYDROCHLORIDE 1 FILM: 4; 1 FILM, SOLUBLE BUCCAL; SUBLINGUAL at 17:49

## 2020-05-18 RX ADMIN — MULTIPLE VITAMINS W/ MINERALS TAB 1 TABLET: TAB at 08:18

## 2020-05-18 RX ADMIN — CLONIDINE HYDROCHLORIDE 0.1 MG: 0.1 TABLET ORAL at 13:21

## 2020-05-18 RX ADMIN — QUETIAPINE FUMARATE 150 MG: 100 TABLET ORAL at 21:10

## 2020-05-18 RX ADMIN — FENOFIBRATE 145 MG: 145 TABLET ORAL at 16:54

## 2020-05-18 RX ADMIN — ACETAMINOPHEN 650 MG: 325 TABLET, FILM COATED ORAL at 08:18

## 2020-05-18 RX ADMIN — Medication 2 MG: at 21:13

## 2020-05-18 RX ADMIN — AMANTADINE HYDROCHLORIDE 100 MG: 100 CAPSULE ORAL at 21:10

## 2020-05-18 RX ADMIN — ACAMPROSATE CALCIUM 666 MG: 333 TABLET, DELAYED RELEASE ORAL at 21:10

## 2020-05-18 RX ADMIN — CITALOPRAM HYDROBROMIDE 20 MG: 20 TABLET ORAL at 08:18

## 2020-05-18 RX ADMIN — GABAPENTIN 900 MG: 300 CAPSULE ORAL at 13:21

## 2020-05-18 ASSESSMENT — ACTIVITIES OF DAILY LIVING (ADL)
DRESS: INDEPENDENT;SCRUBS (BEHAVIORAL HEALTH)
LAUNDRY: UNABLE TO COMPLETE
ORAL_HYGIENE: INDEPENDENT
HYGIENE/GROOMING: INDEPENDENT
DRESS: SCRUBS (BEHAVIORAL HEALTH)
ORAL_HYGIENE: INDEPENDENT
HYGIENE/GROOMING: INDEPENDENT

## 2020-05-18 NOTE — PLAN OF CARE
Problem: Adult Behavioral Health Plan of Care  Goal: Patient-Specific Goal (Individualization)  Description: Patient will sleep 6-8 hours a night  Patient will attend 50% of unit programming when able  Patient will complete ADLs independently  Patient will be compliant with treatment team recommendations      5/18/2020 1837 by Melinda Steen, RN  Outcome: Improving  Note:      VSS  Continues to deny all criteria.  Restless on unit this evening, endorses high anxiety. Seroquel 50 mg rec'd for anxiety per request. Also receives hydroxyzine 100 mg for anxiety later tonight (see mar). Pt verbalizes increased anxiety may be related to uncertainty with upcoming discharge and starting a new phase in his life. Pt remains hopeful for having a clean and sober future.   Attends groups, behavior appropriate with staff and peers.   No complaints of R-flank pain this shift. No U/A collected.     Problem: Thought Process Alteration  Goal: Optimal Thought Clarity  Description: Patient will verbalize a decrease in hallucinations by time of discharge.  Patient will be able to have a linear, logical conversation by time of discharge.  Patient will verbalize 2-3 coping skills by time of discharge.  5/18/2020 1837 by Melinda Steen, RN  Outcome: No Change   Face to face end of shift report communicated to oncoming shift.     Melinda Steen RN  5/18/2020  6:58 PM

## 2020-05-18 NOTE — PROGRESS NOTES
"[facility]    Medical Services Progress Note    Date of Service (when I saw the patient): 05/18/2020    Assessment & Plan     Principal Problem:    Psychosis (H)     Active Medical Problems:    HTN (hypertension)- pt reports he does have a hx of high blood pressure but has not taken any medications for it and states that his bp has been normal for a while. Blood pressure is stable 118/78. Pt denies chest pain, sob.   5/6/20- pt vitals stable. Denies chest pain, sob.   5/14/20- pt vitals stable. Denies cp, sob.   5/18/20- pt vitals stable. Denies cp, sob.      Mild intermittent asthma without complication- pt uses albuterol inhaler as needed for wheezing and sob. Pt reports he has not needed to use it for over a year. Pt denies wheezing, sob, chest pain, difficulty breathing. Albuterol neb ordered due to covid and inhaler conservation.   5/6/20- pt denies wheezing, chest pain, sob, difficulty breathing. Pt has not needed albuterol inhaler.   5/14/20- pt denies cp, sob, difficulty breathing, wheezing   5/18/20- pt denies cp, sob, difficulty breathing, wheezing.      Chronic right-sided low back pain with right-sided sciatica- pt has hx of DDD and displacement of lumbar intervertebral disc without myelopathy. Pt reports pain is controlled with gabapentin. Gabapentin ordered by pmhnp.   5/6/20- pt denies any pain. Taking Gabapentin for pain.   5/18/20- pt reports right side low back pain. He reports that it feels like its a pulled muscle and pain is reproducible.  But he also states \"it feels like I am not emptying my bladder fully when I urinate\" so questionable if the back pain is from chronic back pain vs UTI. UA on 5/16/20 was normal and no bacteria. Will repeat UA.      Drug abuse- pt reports he used Meth 2 days prior to going to ED. Utox positive for amphetamines.      Abnormal creatinine- pt baseline creatinine 11/26/19 was 1.23. Creatinine on 5/2/20 was 1.35. Will continue to monitor. Creatinine in " process.   5/6/20- creatinine improved to 1.16.     Mixed hyperlipidemia- was consulted by pmhnp about pt lipid panel. Pt triglycerides are very high at 1,730. Pt has a history of alcohol and drug abuse.  Pt denies any pancreatitis symptoms such as nausea, vomiting, tenderness, abdominal pain. Spoke with pt about life style modifications such as a low calorie/low fat diet, restrict consumption of high glycemic foods, exercise, avoiding alcohol, and drug use. Also discussed the increased risks of cardiovascular effects, stroke, and pancreatitis. Instructed the pt would need to follow up with PCP in one week after discharge, and also would need LFT and kidney function checked in 4-6 weeks after starting fenofibrate. Pt was instructed to report any medication side effects to nurse immediately. Pt verbalized understanding.  Pt also was started on Propranolol for withdrawal symptoms on this admission. Beta blockers can increase lipids. Orders- ordered CMP to check liver and kidney function, consulted with Pmhnp and she agreed that decreasing  propranolol to 60 mg and will wean off completely over the next few days, start on  Fenofibrate 145 mg daily with dinner. Spoke with nurse about monitoring for pancreatitis signs and symptoms as well as fenofibrate side effects such as myopathy.       Right sided flank pain- Pt has history of chronic low back pain and reports the pain feels like a pulled muscle. Pt demonstrated that when he bends over to the right side is when it hurts. Given that the pain is reproducible would think this is his chronic low back pain.  But he does report difficulty urinating and states it feels like he isn't emptying his bladder completely x 3 days.  Pt denies burning with urination, hematuria, penile pain or discharge. Pt reports he has had unprotected sex but it was 8 months ago and has not had any urinary symptoms until 3 days ago. Pt reports he has not been drinking much. Discussed with pt to  increase po fluid intake specifically water. Pt verbalized understanding and stated the would increase his water intake.   Ordered UA       Code Status: No Order.    Xi Levi CNP      -Data reviewed today: I reviewed all new labs and imaging results over the last 24 hours.     Physical Exam   Temp: 97.5  F (36.4  C) Temp src: Tympanic BP: 102/72 Pulse: 70   Resp: 18 SpO2: 95 % O2 Device: None (Room air)    Vitals:    05/04/20 1419 05/10/20 0700 05/17/20 0900   Weight: 110.2 kg (243 lb) 111.7 kg (246 lb 4.8 oz) 119.7 kg (264 lb)     Vital Signs with Ranges  Temp:  [97.5  F (36.4  C)-98.4  F (36.9  C)] 97.5  F (36.4  C)  Pulse:  [61-92] 70  Resp:  [16-18] 18  BP: (102-129)/(61-78) 102/72  SpO2:  [92 %-97 %] 95 %  No intake/output data recorded.    Constitutional: NAD, vitals stable, appears well- developed, well appearing   Respiratory: good effort, speaks in full sentences, symmetric rise and fall of chest, no audible wheezing  Cardiovascular: warm extremities per pt,  no edema   GI:  no tenderness, no guarding, no masses, per pt palpation   Skin/Integumen: warm, dry, intact, no rashes, no open wounds per pt report.  Musculoskeletal- no CVA tenderness, low right sided back pain with movement.        Medications     acamprosate  666 mg Oral TID     amantadine  100 mg Oral BID     buprenorphine HCl-naloxone HCl  1 Film Sublingual Daily     buprenorphine HCl-naloxone HCl  1 Film Sublingual Daily     citalopram  20 mg Oral Daily     fenofibrate  145 mg Oral Daily with supper     gabapentin  900 mg Oral TID     multivitamin w/minerals  1 tablet Oral Daily     prazosin  2 mg Oral At Bedtime     QUEtiapine  150 mg Oral At Bedtime       Data   Recent Labs   Lab 05/14/20  1558      POTASSIUM 4.6   CHLORIDE 103   CO2 31   BUN 18   CR 1.21   ANIONGAP 3   DORENE 8.3*   *   ALBUMIN 3.3*   PROTTOTAL 6.8   BILITOTAL 0.2   ALKPHOS 93   ALT 68   AST 31       No results found for this or any previous visit (from the  past 24 hour(s)).          Xi Levi, CNP

## 2020-05-18 NOTE — PROGRESS NOTES
Spoke with Ramakrishna HEREDIA regarding pt placement as well as sent new psych notes. Will let this writer know when he is able to admit.

## 2020-05-18 NOTE — PLAN OF CARE
Met with pt this morning- He asks for any update from [facility] regarding treatment. Informed [facility] that pt would like to meet with him. Pt talks about wanting to get on Social security, unemployment, and having some one to help manage his money he will get from his lawsuit. Talked with pt about putting in for a [facility] case management referral and pt was agreeable with this.

## 2020-05-18 NOTE — PLAN OF CARE
Problem: Adult Behavioral Health Plan of Care  Goal: Patient-Specific Goal (Individualization)  Description: Patient will sleep 6-8 hours a night  Patient will attend 50% of unit programming when able  Patient will complete ADLs independently  Patient will be compliant with treatment team recommendations    5/17/2020 2129 by Melinda Steen, RN  Outcome: Improving  Note:   VSS, pain to R-lower back (kidney) rated 5/10-tylenol rec'd per request with no further complaints. Denies all criteria including: SI, SIB, HI or hallucinations.  Endorses anxiety restless with difficult time sitting---Clonidine prn rec'd per request this afternoon. No S&S akathisia noted or reported.   Cooperative with medications and behavior appropriate with staff and peers. Attends and participates in groups. Pt hopeful to sleep better tonight. Prazosin increased to 2 mg.        Problem: Thought Process Alteration  Goal: Optimal Thought Clarity  Description: Patient will verbalize a decrease in hallucinations by time of discharge.  Patient will be able to have a linear, logical conversation by time of discharge.  Patient will verbalize 2-3 coping skills by time of discharge.  5/17/2020 2129 by Melinda Steen, RN  Outcome: Improving     Face to face end of shift report communicated to oncoming shift.     Melinda Steen RN  5/17/2020  9:38 PM    \

## 2020-05-18 NOTE — PROGRESS NOTES
"[facility]  Psychiatric Progress Note      Impression:     Perry Preciado Jr. is a 45 year old  male who brought himself ot the ER with increased suicidal thoughts with no plan.      Perry is seen via video conferencing in the presence of an RN. He reports feeling like the medication he is on is helping his anxiety and restlessness. Tolerating Prazosin. He reports that he does still have dreams, wakes up remembering vivid colors, though states he does not experience the anxiety he had previously with them. He denies any suicidal thoughts, reports feeling more hopeful. He states that he is \"feeling emotions\" now so is working on managing that as he would typically use substances to mask the emotions. Is very concerned that he would relapse is he were to discharge to a lesser level of care, states that he wants \"to get it right\" this time, states he has never really put effort into maintaining sobriety but feels that he is ready now. Does agree to increase Suboxone one more time, he has been accepted at a facility that can manage this medication. Currently waiting to hear admission date to [facility] as he has been accepted. He has been attending group programming throughout the day, feels that it has been very beneficial.          Diagnoses:     Major Depressive Disorder, recurrent, severe without psychotic features  R/o bipolar disorder  PTSD, chronic by history  Methamphetamine use disorder, severe  Opioid Abuse Disorder, severe    Attestation:  Patient has been seen and evaluated by me,  Danyelle Sherman NP          Interim History:   The patient's care was discussed with the treatment team and chart notes were reviewed.            Medications:       acamprosate  666 mg Oral TID     amantadine  100 mg Oral BID     buprenorphine HCl-naloxone HCl  1 Film Sublingual BID     citalopram  20 mg Oral Daily     fenofibrate  145 mg Oral Daily with supper     gabapentin  900 mg Oral TID     multivitamin " "w/minerals  1 tablet Oral Daily     prazosin  2 mg Oral At Bedtime     QUEtiapine  150 mg Oral At Bedtime        10 point ROS- flank pain (seen by medical NP)       Allergies:     Allergies   Allergen Reactions     Wellbutrin [Bupropion]      Naltrexone Other (See Comments)     Headaches  Headaches              Psychiatric Examination:   /72   Pulse 70   Temp 97.5  F (36.4  C) (Tympanic)   Resp 18   Ht 1.88 m (6' 2\")   Wt 119.7 kg (264 lb)   SpO2 95%   BMI 33.90 kg/m    Weight is 264 lbs 0 oz  Body mass index is 33.9 kg/m .    Appearance: awake, alert, dressed in hospital scrubs, casually groomed  Attitude:  cooperative, pleasant  Eye Contact:  good  Mood: improving, anxious regarding discharge  Affect: mood congruent  Speech:  clear, coherent  Psychomotor Behavior:  no evidence of tardive dyskinesia, dystonia, or tics  Thought Process: linear, logical, goal oriented  Associations:  no loose associations  Thought Content: denies any suicidal or homicidal ideation, denies hallucinations  Insight:  fair  Judgment:  fair  Oriented to:  time, person, and place  Attention Span and Concentration:  intact  Recent and Remote Memory:  intact  Fund of Knowledge: low-normal  Muscle Strength and Tone: normal  Gait and Station: Normal         Labs:     No results found for this or any previous visit (from the past 24 hour(s)).         Plan/Treatment Team     BEHAVIORAL TEAM DISCUSSION    Progress:   Slowly improving. Feeling more positive about CD treatment. Trying to manage cravings and urges to use.      Continued Stay Criteria/Rationale:   High risk for substance abuse relapse and suicide if he were to discharge prior to having a program in place. I do feel that he would relapse and not make it to treatment if he does not go door to door. Accepted at [facility] for treatment, pending date likely this week.    Medical/Physical:   none  Precautions: None    Falls precaution?: No  Behavioral Orders   Procedures " "    Code 1 - Restrict to Unit     NO Nebulized Medications     Routine Programming     As clinically indicated     Status 15     Every 15 minutes.       Plan for this encounter/Med Changes/Labs:   Continue Prazosin 2 mg at bedtime, further increase as tolerated  Increase Suboxone to 4 mg in AM, 4 mg in evening  Continue Campral EC 666mg tid  Continue Symmetrel 100mg twice daily  Continue Celexa 20mg daily  Continue Gabapentin 900mg tid  Continue Seroquel 150 mg at bed  Has been accepted at [facility] for CD treatment- date pending    Rationale for change in precautions or plan:   Will discharge to facility that can manage Suboxone- continue to adjust dose.     Participants: Danyelle Sherman, APRN, CNP, SW, and Nursing      ELOS 2-3 days.  He will need to go [id number] to chemical dependency treatment.  High risk of relapse and suicide.        Video Visit:  Perry Preciado Jr. is a 45 year old male who is being evaluated via a billable video visit.      The patient has been notified of following:     \"This video visit will be conducted via a call between you and your physician/provider. We have found that certain health care needs can be provided without the need for an in-person physical exam.  This service lets us provide the care you need with a video conversation.  If a prescription is necessary we can send it directly to your pharmacy.  If lab work is needed we can place an order for that and you can then stop by our lab to have the test done at a later time.    If during the course of the call the physician/provider feels a video visit is not appropriate, you will not be charged for this service.\"     Patient has given verbal consent for Video visit? Yes    Video Start Time: 1410    I have reviewed and updated the patient's Past Medical History, Social History, Family History and Medication List.      Video-Visit Details    Type of service:  Video Visit    Video End Time (time video stopped): " 1420    Originating Location (pt. Location): Other [facility] inpatient behavioral health unit    Distant Location (provider location):  [facility] remote provider    Mode of Communication:  Video Conference via [employer] Meeting trista

## 2020-05-18 NOTE — PLAN OF CARE
"PT reporting feeling more \"human and emotional today and not numbed by drugs.\" PT hopeful to go to treatment soon and would still like to go door to door to prevent relapse. PT social and compliant with medications.  PT complaining in AM about right flank and back pain and took Tylenol. PT was encouraged to drink fluids and had repeat UA ordered. PT urinated twice without giving sample and was reminded more than once to give sample.   PT then reported to NP that maybe \"the more I think about it, its probably just back pain, but I dont know. I might be overfixating on it.\" PT denies SI, HI and hallucinations.     Face to face end of shift report communicated to oncoming RN     Nory Franco RN  5/18/2020  3:24 PM       Problem: Adult Behavioral Health Plan of Care  Goal: Patient-Specific Goal (Individualization)  Description: Patient will sleep 6-8 hours a night  Patient will attend 50% of unit programming when able  Patient will complete ADLs independently  Patient will be compliant with treatment team recommendations      5/18/2020 1519 by Nory Franco, RN  Outcome: Improving  Note:        Problem: Thought Process Alteration  Goal: Optimal Thought Clarity  Description: Patient will verbalize a decrease in hallucinations by time of discharge.  Patient will be able to have a linear, logical conversation by time of discharge.  Patient will verbalize 2-3 coping skills by time of discharge.  Outcome: Improving     "

## 2020-05-18 NOTE — PLAN OF CARE
Face to face report received from Melinda MELLO. Pt. Observed.     Problem: Adult Behavioral Health Plan of Care  Goal: Patient-Specific Goal (Individualization)  Description: Patient will sleep 6-8 hours a night  Patient will attend 50% of unit programming when able  Patient will complete ADLs independently  Patient will be compliant with treatment team recommendations      5/18/2020 0426 by Shana Marcano RN  Outcome: No Change     Pt has been in bed with eyes closed and regular respirations for a total of 3.5 hours. 15 minute and PRN checks all night. No complaints offered. Will continue to monitor.      Face to face end of shift report to be communicated to oncoming RN.     Shana Marcano RN  5/18/2020

## 2020-05-19 LAB
ALBUMIN UR-MCNC: NEGATIVE MG/DL
APPEARANCE UR: CLEAR
BACTERIA #/AREA URNS HPF: ABNORMAL /HPF
BILIRUB UR QL STRIP: NEGATIVE
COLOR UR AUTO: ABNORMAL
GLUCOSE UR STRIP-MCNC: NEGATIVE MG/DL
HGB UR QL STRIP: NEGATIVE
KETONES UR STRIP-MCNC: NEGATIVE MG/DL
LEUKOCYTE ESTERASE UR QL STRIP: NEGATIVE
NITRATE UR QL: NEGATIVE
PH UR STRIP: 6.5 PH (ref 4.7–8)
RBC #/AREA URNS AUTO: <1 /HPF (ref 0–2)
SOURCE: ABNORMAL
SP GR UR STRIP: 1.01 (ref 1–1.03)
UROBILINOGEN UR STRIP-MCNC: NORMAL MG/DL (ref 0–2)
WBC #/AREA URNS AUTO: <1 /HPF (ref 0–5)

## 2020-05-19 PROCEDURE — 99238 HOSP IP/OBS DSCHRG MGMT 30/<: CPT | Performed by: NURSE PRACTITIONER

## 2020-05-19 PROCEDURE — 25000132 ZZH RX MED GY IP 250 OP 250 PS 637: Performed by: NURSE PRACTITIONER

## 2020-05-19 PROCEDURE — 12400000 ZZH R&B MH

## 2020-05-19 PROCEDURE — 81001 URINALYSIS AUTO W/SCOPE: CPT | Performed by: NURSE PRACTITIONER

## 2020-05-19 PROCEDURE — 99232 SBSQ HOSP IP/OBS MODERATE 35: CPT | Performed by: NURSE PRACTITIONER

## 2020-05-19 RX ORDER — CITALOPRAM HYDROBROMIDE 20 MG/1
20 TABLET ORAL DAILY
Qty: 30 TABLET | Refills: 0 | Status: SHIPPED | OUTPATIENT
Start: 2020-05-20 | End: 2023-12-12

## 2020-05-19 RX ORDER — AMANTADINE HYDROCHLORIDE 100 MG/1
100 CAPSULE, GELATIN COATED ORAL 2 TIMES DAILY
Qty: 60 CAPSULE | Refills: 0 | Status: SHIPPED | OUTPATIENT
Start: 2020-05-19 | End: 2023-12-12

## 2020-05-19 RX ORDER — BUPRENORPHINE AND NALOXONE 4; 1 MG/1; MG/1
1 FILM, SOLUBLE BUCCAL; SUBLINGUAL 2 TIMES DAILY
Qty: 60 FILM | Refills: 0 | Status: SHIPPED | OUTPATIENT
Start: 2020-05-19 | End: 2020-05-19

## 2020-05-19 RX ORDER — CLONIDINE HYDROCHLORIDE 0.1 MG/1
0.1 TABLET ORAL 3 TIMES DAILY PRN
Qty: 90 TABLET | Refills: 0 | Status: SHIPPED | OUTPATIENT
Start: 2020-05-19 | End: 2023-12-12

## 2020-05-19 RX ORDER — POLYETHYLENE GLYCOL 3350 17 G
2 POWDER IN PACKET (EA) ORAL
Qty: 81 LOZENGE | Refills: 0 | Status: SHIPPED | OUTPATIENT
Start: 2020-05-19 | End: 2023-12-12

## 2020-05-19 RX ORDER — BUPRENORPHINE AND NALOXONE 4; 1 MG/1; MG/1
1 FILM, SOLUBLE BUCCAL; SUBLINGUAL 2 TIMES DAILY
Qty: 60 FILM | Refills: 0 | Status: SHIPPED | OUTPATIENT
Start: 2020-05-19 | End: 2023-12-12

## 2020-05-19 RX ORDER — FENOFIBRATE 145 MG/1
145 TABLET, COATED ORAL
Qty: 30 TABLET | Refills: 0 | Status: SHIPPED | OUTPATIENT
Start: 2020-05-19 | End: 2023-12-12

## 2020-05-19 RX ORDER — MULTIPLE VITAMINS W/ MINERALS TAB 9MG-400MCG
1 TAB ORAL DAILY
Qty: 30 TABLET | Refills: 0 | Status: SHIPPED | OUTPATIENT
Start: 2020-05-20 | End: 2023-12-12

## 2020-05-19 RX ORDER — ACAMPROSATE CALCIUM 333 MG/1
666 TABLET, DELAYED RELEASE ORAL 3 TIMES DAILY
Qty: 180 TABLET | Refills: 0 | Status: SHIPPED | OUTPATIENT
Start: 2020-05-19 | End: 2023-12-12

## 2020-05-19 RX ORDER — GABAPENTIN 300 MG/1
900 CAPSULE ORAL 3 TIMES DAILY
Qty: 270 CAPSULE | Refills: 0 | Status: SHIPPED | OUTPATIENT
Start: 2020-05-19 | End: 2023-12-12

## 2020-05-19 RX ORDER — HYDROXYZINE HYDROCHLORIDE 50 MG/1
50-100 TABLET, FILM COATED ORAL 2 TIMES DAILY PRN
Qty: 90 TABLET | Refills: 0 | Status: SHIPPED | OUTPATIENT
Start: 2020-05-19 | End: 2023-12-12

## 2020-05-19 RX ORDER — ALBUTEROL SULFATE 90 UG/1
2 AEROSOL, METERED RESPIRATORY (INHALATION) EVERY 4 HOURS PRN
Qty: 1 INHALER | Refills: 0 | Status: SHIPPED | OUTPATIENT
Start: 2020-05-19 | End: 2023-12-12

## 2020-05-19 RX ORDER — QUETIAPINE FUMARATE 50 MG/1
150 TABLET, FILM COATED ORAL AT BEDTIME
Qty: 90 TABLET | Refills: 0 | Status: SHIPPED | OUTPATIENT
Start: 2020-05-19 | End: 2023-12-12

## 2020-05-19 RX ORDER — PRAZOSIN HYDROCHLORIDE 2 MG/1
2 CAPSULE ORAL AT BEDTIME
Qty: 30 CAPSULE | Refills: 0 | Status: SHIPPED | OUTPATIENT
Start: 2020-05-19 | End: 2023-12-12

## 2020-05-19 RX ADMIN — ACAMPROSATE CALCIUM 666 MG: 333 TABLET, DELAYED RELEASE ORAL at 22:08

## 2020-05-19 RX ADMIN — Medication 2 MG: at 11:33

## 2020-05-19 RX ADMIN — BUPRENORPHINE HYDROCHLORIDE, NALOXONE HYDROCHLORIDE 1 FILM: 4; 1 FILM, SOLUBLE BUCCAL; SUBLINGUAL at 08:09

## 2020-05-19 RX ADMIN — BUPRENORPHINE HYDROCHLORIDE, NALOXONE HYDROCHLORIDE 1 FILM: 4; 1 FILM, SOLUBLE BUCCAL; SUBLINGUAL at 17:30

## 2020-05-19 RX ADMIN — QUETIAPINE FUMARATE 150 MG: 100 TABLET ORAL at 22:09

## 2020-05-19 RX ADMIN — ACAMPROSATE CALCIUM 666 MG: 333 TABLET, DELAYED RELEASE ORAL at 08:08

## 2020-05-19 RX ADMIN — GABAPENTIN 900 MG: 300 CAPSULE ORAL at 22:08

## 2020-05-19 RX ADMIN — ACAMPROSATE CALCIUM 666 MG: 333 TABLET, DELAYED RELEASE ORAL at 13:36

## 2020-05-19 RX ADMIN — AMANTADINE HYDROCHLORIDE 100 MG: 100 CAPSULE ORAL at 22:09

## 2020-05-19 RX ADMIN — PRAZOSIN HYDROCHLORIDE 2 MG: 1 CAPSULE ORAL at 22:09

## 2020-05-19 RX ADMIN — GABAPENTIN 900 MG: 300 CAPSULE ORAL at 08:09

## 2020-05-19 RX ADMIN — Medication 2 MG: at 12:41

## 2020-05-19 RX ADMIN — GABAPENTIN 900 MG: 300 CAPSULE ORAL at 13:36

## 2020-05-19 RX ADMIN — QUETIAPINE 50 MG: 50 TABLET, FILM COATED ORAL at 20:06

## 2020-05-19 RX ADMIN — MULTIPLE VITAMINS W/ MINERALS TAB 1 TABLET: TAB at 08:09

## 2020-05-19 RX ADMIN — AMANTADINE HYDROCHLORIDE 100 MG: 100 CAPSULE ORAL at 08:09

## 2020-05-19 RX ADMIN — CLONIDINE HYDROCHLORIDE 0.1 MG: 0.1 TABLET ORAL at 13:36

## 2020-05-19 RX ADMIN — HYDROXYZINE HYDROCHLORIDE 50 MG: 25 TABLET, FILM COATED ORAL at 11:33

## 2020-05-19 RX ADMIN — CLONIDINE HYDROCHLORIDE 0.1 MG: 0.1 TABLET ORAL at 06:52

## 2020-05-19 RX ADMIN — FENOFIBRATE 145 MG: 145 TABLET ORAL at 17:30

## 2020-05-19 RX ADMIN — CITALOPRAM HYDROBROMIDE 20 MG: 20 TABLET ORAL at 08:08

## 2020-05-19 ASSESSMENT — ACTIVITIES OF DAILY LIVING (ADL)
DRESS: SCRUBS (BEHAVIORAL HEALTH);INDEPENDENT
HYGIENE/GROOMING: INDEPENDENT
HYGIENE/GROOMING: INDEPENDENT
ORAL_HYGIENE: INDEPENDENT
ORAL_HYGIENE: INDEPENDENT
DRESS: SCRUBS (BEHAVIORAL HEALTH)

## 2020-05-19 NOTE — PLAN OF CARE
Faxed referral for adult MH  to [facility] this morning.     Received intake packet from [facility] for - provided this to pt to complete.

## 2020-05-19 NOTE — PROGRESS NOTES
"[facility]  Psychiatric Progress Note      Impression:     Perry Preciado Jr. is a 45 year old  male who brought himself ot the ER with increased suicidal thoughts with no plan.      Perry is seen via video conferencing in the presence of an RN. He reports that he is doing well. Reports feeling anxious about discharge. He denies side effects from medications. Did bring up that nursing staff felt he was more restless and anxious since the suboxone was increased, though he denies this. \"I think whatever you guys have me on is starting to work\". States he is just anxious about treatment, knows that he has been accepted though currently does not have a date. He denies any suicidal thoughts.     Later in the day he did become aware that he was accepted at [facility] for tomorrow. Plan is for discharge at 9 AM. 30 day supply of medications sent to [facility] to go with him.          Diagnoses:     Major Depressive Disorder, recurrent, severe without psychotic features  R/o bipolar disorder  PTSD, chronic by history  Methamphetamine use disorder, severe  Opioid Abuse Disorder, severe    Attestation:  Patient has been seen and evaluated by me,  Danyelle Sherman NP          Interim History:   The patient's care was discussed with the treatment team and chart notes were reviewed.            Medications:       acamprosate  666 mg Oral TID     amantadine  100 mg Oral BID     buprenorphine HCl-naloxone HCl  1 Film Sublingual BID     citalopram  20 mg Oral Daily     fenofibrate  145 mg Oral Daily with supper     gabapentin  900 mg Oral TID     multivitamin w/minerals  1 tablet Oral Daily     prazosin  2 mg Oral At Bedtime     QUEtiapine  150 mg Oral At Bedtime        10 point ROS- denies concerns today       Allergies:     Allergies   Allergen Reactions     Wellbutrin [Bupropion]      Naltrexone Other (See Comments)     Headaches  Headaches              Psychiatric Examination:   /73   Pulse 66   " "Temp 98  F (36.7  C) (Tympanic)   Resp 16   Ht 1.88 m (6' 2\")   Wt 119.7 kg (264 lb)   SpO2 95%   BMI 33.90 kg/m    Weight is 264 lbs 0 oz  Body mass index is 33.9 kg/m .    Appearance: awake, alert, dressed in hospital scrubs, casually groomed  Attitude:  cooperative, pleasant  Eye Contact:  good  Mood: improving, anxious regarding discharge  Affect: mood congruent  Speech:  clear, coherent  Psychomotor Behavior:  no evidence of tardive dyskinesia, dystonia, or tics  Thought Process: linear, logical, goal oriented  Associations:  no loose associations  Thought Content: denies any suicidal or homicidal ideation, denies hallucinations  Insight:  fair  Judgment:  fair  Oriented to:  time, person, and place  Attention Span and Concentration:  intact  Recent and Remote Memory:  intact  Fund of Knowledge: low-normal  Muscle Strength and Tone: normal  Gait and Station: Normal         Labs:     Results for orders placed or performed during the hospital encounter of 05/04/20 (from the past 24 hour(s))   UA with Microscopic reflex to Culture    Specimen: Midstream Urine   Result Value Ref [facility]    Color Urine Straw     Appearance Urine Clear     Glucose Urine Negative NEG^Negative mg/dL    Bilirubin Urine Negative NEG^Negative    Ketones Urine Negative NEG^Negative mg/dL    Specific Gravity Urine 1.007 1.003 - 1.035    Blood Urine Negative NEG^Negative    pH Urine 6.5 4.7 - 8.0 pH    Protein Albumin Urine Negative NEG^Negative mg/dL    Urobilinogen mg/dL Normal 0.0 - 2.0 mg/dL    Nitrite Urine Negative NEG^Negative    Leukocyte Esterase Urine Negative NEG^Negative    Source Midstream Urine     WBC Urine <1 0 - 5 /HPF    RBC Urine <1 0 - 2 /HPF    Bacteria Urine Few (A) NEG^Negative /HPF            Plan/Treatment Team     BEHAVIORAL TEAM DISCUSSION    Progress: improving. Feeling more positive about CD treatment. Trying to manage cravings and urges to use.      Continued Stay Criteria/Rationale:   High risk for substance " "abuse relapse and suicide if he were to discharge prior to having a program in place. I do feel that he would relapse and not make it to treatment if he does not go door to door. Accepted at [facility] for treatment, pending date likely this week.    Medical/Physical:   none  Precautions: None    Falls precaution?: No  Behavioral Orders   Procedures     Code 1 - Restrict to Unit     NO Nebulized Medications     Routine Programming     As clinically indicated     Status 15     Every 15 minutes.       Plan for this encounter/Med Changes/Labs:   Continue Prazosin 2 mg at bedtime, further increase as tolerated  Increase Suboxone to 4 mg in AM, 4 mg in evening  Continue Campral EC 666mg tid  Continue Symmetrel 100mg twice daily  Continue Celexa 20mg daily  Continue Gabapentin 900mg tid  Continue Seroquel 150 mg at bed  Accepted at [facility] for CD treatment tomorrow 5/20/20    Rationale for change in precautions or plan:   Will discharge to facility that can manage Suboxone    Participants: Danyelle Sherman, APRN, CNP, SW, and Nursing      Video Visit:  Perry Preciado Jr. is a 45 year old male who is being evaluated via a billable video visit.      The patient has been notified of following:     \"This video visit will be conducted via a call between you and your physician/provider. We have found that certain health care needs can be provided without the need for an in-person physical exam.  This service lets us provide the care you need with a video conversation.  If a prescription is necessary we can send it directly to your pharmacy.  If lab work is needed we can place an order for that and you can then stop by our lab to have the test done at a later time.    If during the course of the call the physician/provider feels a video visit is not appropriate, you will not be charged for this service.\"     Patient has given verbal consent for Video visit? Yes    Video Start Time: 6341    I have reviewed and updated " the patient's Past Medical History, Social History, Family History and Medication List.      Video-Visit Details    Type of service:  Video Visit    Video End Time (time video stopped): 1354    Originating Location (pt. Location): Other  [facility] unit    Distant Location (provider location):  [facility] remote provider    Mode of Communication:  Video Conference via [employer] trista

## 2020-05-19 NOTE — DISCHARGE INSTRUCTIONS
Behavioral Discharge Planning and Instructions    Summary: Patient was admitted with SI and a plan to shoot self     Main Diagnosis: Schizophrenia by history, Ptsd by history, Methamphetamine use disorder, severe      Major Treatments, Procedures and Findings: Stabilize with medications, connect with community programs.    Symptoms to Report: feeling more aggressive, increased confusion, losing more sleep, mood getting worse or thoughts of suicide    Lifestyle Adjustment: Take all medications as prescribed, meet with doctor/ medication provider, out patient therapist, , and [facility] worker as scheduled. Abstain from alcohol or any unprescribed drugs.    Psychiatry Follow-up:     [facility]   - Referral sent on 5-19-20  [address]  3rd floor, [address]  [place] [address]  Phone: [phone]  Fax: [phone]    Rule 25 completed by SVETLANA Collier- 5-6-20  [facility]   Behavioral Health Unit  [address]  [address] [phone]  Fax - [phone]    [facility]- Accepted   [address] [place] [address]   Phone: [phone]  Fax: [phone]        [facility]   PCP- Deneen Monroe  [place], [place]    Medication Management- Jayce Flores     Therapy- Monica Martinez     [facility]  [address] [place] [address] [phone]  Fax: [phone]      Resources:   Crisis Intervention: 122.418.5535 or 752-107-9154 (TTY: 301.711.2495).  Call anytime for help.  National [place] on Mental Illness (www.[place].naldo.org): 398.625.3716 or 522-741-4681.  Suicide Awareness Voices of Education (SAVE) (www.save.org): 716-352-TMOO (8190)  National Suicide Prevention Line (www.mentalhealthmn.org): 209-711-IGWL (6880)  Mental Health Consumer/Survivor Network of [place] (www.mhcsn.net): 278.310.1516 or 886-277-6739  Mental Health Association of [place] (www.mentalhealth.org): 622.855.5066 or 978-263-0998    General  "Medication Instructions:   See your medication sheet(s) for instructions.   Take all medicines as directed.  Make no changes unless your doctor suggests them.   Go to all your doctor visits.  Be sure to have all your required lab tests. This way, your medicines can be refilled on time.  Do not use any drugs not prescribed by your doctor.  Avoid alcohol.    [facility] Area:  [facility], Crisis stabilization Hospitals in [place]- [phone]  [facility] Crisis Line: 1-453.891.6325  [facility]: 293-7529  Sexual Assault Program [facility]: [phone] or 1-153.649.3023  [facility]: 1-199.857.2510 Ext: 279       Calls answered Mon-Fri-8:00 am--4:30 pm    [place]:  [facility] Family Peace: 1-164.550.5444  [facility] - 6-166-775-7099  [facility] first call for help: 8-315-798-0658  [facility] Crisis Center:  ([phone]    [place]:  Warm Line: 1-246.744.7323       Calls answered Tuesday--Saturday 4:00 pm--10:00 pm  [facility] Crisis Line - [phone]  [facility] 326-715-3283    [place] Statewide:  [place] Crisis and Referral Services: 4-890-539-7903  National Suicide Prevention Lifeline: 4-924-302-TALK (8166)   - eek3cdsw- Text \"Life\" to 84421  First Call for Help: 2-1-1  EUFEMIA Helpline- 0-432-WVIF-HELP   Crisis Text Line: Text  [place]  to 859325    If you were tested, we will call you with your COVID-19 result. You don't need to call us to check on your result. You can also use the information at the end of this document to sign up for [facility] Socrative where you can get your results and a message about those results sent to you through the Socrative application.    Regardless of if you have been tested or not:  Patient who have symptoms (cough, fever, or shortness of breath), need to isolate for 7 days from when symptoms started AND 72 hours after fever resolves (without fever reducing medications) AND improvement of respiratory " symptoms (whichever is longer).      Isolate yourself at home (in own room/own bathroom if possible)    Do Not allow any visitors    Do Not go to work or school    Do Not go to Worship,  centers, shopping, or other public places.    Do Not shake hands.    Avoid close and intimate contact with others (hugging, kissing).    Follow CDC recommendations for household cleaning of frequently touched services.     After the initial 7 days, continue to isolate yourself from household members as much as possible. To continue decrease the risk of community spread and exposure, you and any members of your household should limit activities in public for 14 days after starting home isolation.     You can reference the following CDC link for helpful home isolation/care tips:  https://www.cdc.gov/coronavirus/2019-ncov/downloads/10Things.pdf    Protect Others:    Cover Your Mouth and Nose with a mask, disposable tissue or wash cloth to avoid spreading germs to others.    Wash your hands and face frequently with soap and water    Call Back If: Breathing difficulty develops or you become worse.    For more information about COVID19 and options for caring for yourself at home, please visit the CDC website at https://www.cdc.gov/coronavirus/2019-ncov/about/steps-when-sick.html  For more options for care at [facility], please visit our website at [email or web address]    For more information, please use the [place] Department of Health COVID-19 Website: https://www.health.[facility].[place].us/diseases/coronavirus/index.html  [place] Department of Health ([facility]) COVID-19 Hotlines (Interpreters available):      Health questions: Phone Number: 152.160.4159 or 1-515.277.4958 and Hours: 7 a.m. to 7 p.m.    Schools and  questions: Phone Number: 498.315.3837 or 1-486.888.9616 and Hours 7 a.m. to 7 p.m.

## 2020-05-19 NOTE — PLAN OF CARE
"  Problem: Adult Behavioral Health Plan of Care  Goal: Patient-Specific Goal (Individualization)  Description: Patient will sleep 6-8 hours a night  Patient will attend 50% of unit programming when able  Patient will complete ADLs independently  Patient will be compliant with treatment team recommendations  5/19/2020 1143 by Dorina Meadows, RN  Outcome: Improving  Note: Pt has been restless and anxious. Pt used Alpha Stim x1 and rec'd 50 mg of PRN Hydroxyzine at 1133 for anxiety. Pt hopeful that he will be updated on an admission date for [facility] soon. He denied suicidal ideation. 1336 - Pt rec'd 0.1 mg of PRN Clonidine for continued anxiety/restlessness. Pt reported that medications and Alpha Stim helped \"somewhat.\"     Face to face end of shift report communicated to oncoming RN.      Problem: Thought Process Alteration  Goal: Optimal Thought Clarity  Description: Patient will verbalize a decrease in hallucinations by time of discharge.  Patient will be able to have a linear, logical conversation by time of discharge.  Patient will verbalize 2-3 coping skills by time of discharge.  5/19/2020 1143 by Dorina Meadows, RN  Outcome: Improving  Note: Pt denied hallucinations. He was able to have a linear and logical conversation.      "

## 2020-05-19 NOTE — PLAN OF CARE
Problem: Adult Behavioral Health Plan of Care  Goal: Patient-Specific Goal (Individualization)  Description: Patient will sleep 6-8 hours a night  Patient will attend 50% of unit programming when able  Patient will complete ADLs independently  Patient will be compliant with treatment team recommendations      5/19/2020 0353 by Helena Klein, RN  Outcome: Improving  Note: Report received from Melinda graham. Pt was in the lounge at shift change with no complaints offered.     0015- Pt c/o to other nurse that he has not removed his contacts since he was admitted. This is not true as this nurse provided pt with a contact lense case and normal saline for his lenses and pt had removed them due to c/o dry eyes multiple mornings. Pt had also been educated on not sleeping with his contacts in.     0115- Pt observed sleeping in supine position with regular and unlabored respirations.    Pt has been in bed with eyes closed and regular respirations. 15 minute and PRN checks all night. No complaints offered. Will continue to monitor.    Per other NOC staff that has been here the past few nights, this pt is markedly anxious and jumpy this morning compared to previous days. He is rapidly pacing and bouncing on his heels in about a 10 ft area and appears extremely restless and unable to sit.    0652- Pt requested and was admin 0.1 mg clonidine for c/o high anxiety and restlessness. 0700- O2 rechecked with pt moving aroud nonstop and it is still 92% on room air.        Face to face end of shift report communicated to heraclio RN.    May 19, 2020  3:53 AM          Problem: Thought Process Alteration  Goal: Optimal Thought Clarity  Description: Patient will verbalize a decrease in hallucinations by time of discharge.  Patient will be able to have a linear, logical conversation by time of discharge.  Patient will verbalize 2-3 coping skills by time of discharge.  5/19/2020 0353 by Helena Klein, RN  Outcome:  Improving  Note: Pt does not endorse any hallucinations at this time and is able to have a linear and logical conversation with this writer and other nurse.

## 2020-05-19 NOTE — PLAN OF CARE
Spoke with pt this morning- He asks for update regarding discharge date. Informed pt that [facility] emailed staff today stating he is still waiting to hear a date from [facility].     Spoke with pt about opening at [facility] and provided him with contact info to call and ask questions. Pt called and said he agrees to go to [facility] tomorrow. Informed [facility] of this. [facility] arranged transport with [facility] for 9am tomorrow. Informed pt's nurse.

## 2020-05-19 NOTE — PLAN OF CARE
0648- Urine sample collected as ordered with no difficulty. Primary nurse notified. Waiting results.

## 2020-05-19 NOTE — PROGRESS NOTES
"[facility]    Medical Services Progress Note    Date of Service (when I saw the patient): 05/19/2020    Assessment & Plan     Principal Problem:    Psychosis (H)     Active Medical Problems:    HTN (hypertension)- pt reports he does have a hx of high blood pressure but has not taken any medications for it and states that his bp has been normal for a while. Blood pressure is stable 118/78. Pt denies chest pain, sob.   5/6/20- pt vitals stable. Denies chest pain, sob.   5/14/20- pt vitals stable. Denies cp, sob.   5/18/20- pt vitals stable. Denies cp, sob.  5/19/20- vitals stable. Denies cp, sob.       Mild intermittent asthma without complication- pt uses albuterol inhaler as needed for wheezing and sob. Pt reports he has not needed to use it for over a year. Pt denies wheezing, sob, chest pain, difficulty breathing. Albuterol neb ordered due to covid and inhaler conservation.   5/6/20- pt denies wheezing, chest pain, sob, difficulty breathing. Pt has not needed albuterol inhaler.   5/14/20- pt denies cp, sob, difficulty breathing, wheezing   5/18/20- pt denies cp, sob, difficulty breathing, wheezing.  5/19/20- denies sp, sob, difficulty breathing, wheezing      Chronic right-sided low back pain with right-sided sciatica- pt has hx of DDD and displacement of lumbar intervertebral disc without myelopathy. Pt reports pain is controlled with gabapentin. Gabapentin ordered by pmhnp.   5/6/20- pt denies any pain. Taking Gabapentin for pain.   5/18/20- pt reports right side low back pain. He reports that it feels like its a pulled muscle and pain is reproducible.  But he also states \"it feels like I am not emptying my bladder fully when I urinate\" so questionable if the back pain is from chronic back pain vs UTI. UA on 5/16/20 was normal and no bacteria. Will repeat UA.   5/19/20- pt denies pain today, UA showed few bacteria, otherwise was negative      Drug abuse- pt reports he used Meth 2 days prior to going " to ED. Utox positive for amphetamines.      Abnormal creatinine- pt baseline creatinine 11/26/19 was 1.23. Creatinine on 5/2/20 was 1.35. Will continue to monitor. Creatinine in process.   5/6/20- creatinine improved to 1.16.   5/20-20- creatinine was 1.21 on 5/14/20    Mixed hyperlipidemia- was consulted by pmhnp about pt lipid panel. Pt triglycerides are very high at 1,730. Pt has a history of alcohol and drug abuse.  Pt denies any pancreatitis symptoms such as nausea, vomiting, tenderness, abdominal pain. Spoke with pt about life style modifications such as a low calorie/low fat diet, restrict consumption of high glycemic foods, exercise, avoiding alcohol, and drug use. Also discussed the increased risks of cardiovascular effects, stroke, and pancreatitis. Instructed the pt would need to follow up with PCP in one week after discharge, and also would need LFT and kidney function checked in 4-6 weeks after starting fenofibrate. Pt was instructed to report any medication side effects to nurse immediately. Pt verbalized understanding.  Pt also was started on Propranolol for withdrawal symptoms on this admission. Beta blockers can increase lipids. Orders- ordered CMP to check liver and kidney function, consulted with Pmhnp and she agreed that decreasing  propranolol to 60 mg and will wean off completely over the next few days, start on  Fenofibrate 145 mg daily with dinner. Spoke with nurse about monitoring for pancreatitis signs and symptoms as well as fenofibrate side effects such as myopathy.     5/19/20- pt denies any new muscle aches, cramps, pain. Reports he is tolerating fenofibrate well. Pt will need to follow up with PCP within one week of discharge or sooner if symptoms warrant. Pt verbalized understanding.     Right sided flank pain- Pt has history of chronic low back pain and reports the pain feels like a pulled muscle. Pt demonstrated that when he bends over to the right side is when it hurts. Given that  the pain is reproducible would think this is his chronic low back pain.  But he does report difficulty urinating and states it feels like he isn't emptying his bladder completely x 3 days.  Pt denies burning with urination, hematuria, penile pain or discharge. Pt reports he has had unprotected sex but it was 8 months ago and has not had any urinary symptoms until 3 days ago. Pt reports he has not been drinking much. Discussed with pt to increase po fluid intake specifically water. Pt verbalized understanding and stated the would increase his water intake.   Ordered UA   5/19/20- pt reports he has increased water intake, denies any flank pain today. UA was negative other than a few bacteria.     Code Status: No Order.    Xi Levi CNP      -Data reviewed today: I reviewed all new labs and imaging results over the last 24 hours.     Physical Exam   Temp: 98  F (36.7  C) Temp src: Tympanic BP: 128/73 Pulse: 66   Resp: 16 SpO2: 95 % O2 Device: None (Room air)    Vitals:    05/04/20 1419 05/10/20 0700 05/17/20 0900   Weight: 110.2 kg (243 lb) 111.7 kg (246 lb 4.8 oz) 119.7 kg (264 lb)     Vital Signs with Ranges  Temp:  [96  F (35.6  C)-98  F (36.7  C)] 98  F (36.7  C)  Pulse:  [65-67] 66  Resp:  [16] 16  BP: (122-144)/(73-81) 128/73  SpO2:  [92 %-95 %] 95 %  No intake/output data recorded.    Constitutional: NAD, vitals stable, appears well- developed, well appearing   Respiratory: good effort, speaks in full sentences, symmetric rise and fall of chest, no audible wheezing  Cardiovascular: warm extremities per pt,  no edema   GI:  no tenderness, no guarding, no masses, per pt palpation   Skin/Integumen: warm, dry, intact, no rashes, no open wounds per pt report.  Musculoskeletal- no CVA tenderness, low right sided back pain with movement.        Medications     acamprosate  666 mg Oral TID     amantadine  100 mg Oral BID     buprenorphine HCl-naloxone HCl  1 Film Sublingual BID     citalopram  20 mg Oral Daily      fenofibrate  145 mg Oral Daily with supper     gabapentin  900 mg Oral TID     multivitamin w/minerals  1 tablet Oral Daily     prazosin  2 mg Oral At Bedtime     QUEtiapine  150 mg Oral At Bedtime       Data   Recent Labs   Lab 05/14/20  1558      POTASSIUM 4.6   CHLORIDE 103   CO2 31   BUN 18   CR 1.21   ANIONGAP 3   DORENE 8.3*   *   ALBUMIN 3.3*   PROTTOTAL 6.8   BILITOTAL 0.2   ALKPHOS 93   ALT 68   AST 31       No results found for this or any previous visit (from the past 24 hour(s)).          Xi Levi, CNP

## 2020-05-19 NOTE — PLAN OF CARE
Voice message left with Cornell from [facility] [phone] in regards to pt discharge and pushing time back as medications will not be ready. Also left a message at the main number [phone].  Message left with CANDE--Barbi STEVENS as well.

## 2020-05-19 NOTE — PROGRESS NOTES
Pt will be picked up tomorrow 05/20/2020 at 9:00am by Carlos Baires. Spoke with Barbi CASTELLON, who has spoken with NP for Pt Medication 30 day supply.

## 2020-05-20 VITALS
TEMPERATURE: 98 F | SYSTOLIC BLOOD PRESSURE: 116 MMHG | RESPIRATION RATE: 16 BRPM | BODY MASS INDEX: 33.88 KG/M2 | OXYGEN SATURATION: 92 % | WEIGHT: 264 LBS | HEIGHT: 74 IN | DIASTOLIC BLOOD PRESSURE: 61 MMHG | HEART RATE: 70 BPM

## 2020-05-20 PROCEDURE — 25000132 ZZH RX MED GY IP 250 OP 250 PS 637: Performed by: NURSE PRACTITIONER

## 2020-05-20 RX ADMIN — MULTIPLE VITAMINS W/ MINERALS TAB 1 TABLET: TAB at 08:46

## 2020-05-20 RX ADMIN — BUPRENORPHINE HYDROCHLORIDE, NALOXONE HYDROCHLORIDE 1 FILM: 4; 1 FILM, SOLUBLE BUCCAL; SUBLINGUAL at 08:48

## 2020-05-20 RX ADMIN — AMANTADINE HYDROCHLORIDE 100 MG: 100 CAPSULE ORAL at 08:47

## 2020-05-20 RX ADMIN — CITALOPRAM HYDROBROMIDE 20 MG: 20 TABLET ORAL at 08:47

## 2020-05-20 RX ADMIN — ACAMPROSATE CALCIUM 666 MG: 333 TABLET, DELAYED RELEASE ORAL at 08:44

## 2020-05-20 RX ADMIN — GABAPENTIN 900 MG: 300 CAPSULE ORAL at 08:45

## 2020-05-20 RX ADMIN — Medication 2 MG: at 00:09

## 2020-05-20 NOTE — PLAN OF CARE
Problem: Adult Behavioral Health Plan of Care  Goal: Patient-Specific Goal (Individualization)  Description: Patient will sleep 6-8 hours a night  Patient will attend 50% of unit programming when able  Patient will complete ADLs independently  Patient will be compliant with treatment team recommendations      5/19/2020 1923 by Melinda Steen, RN  Outcome: Improving  Note:     VSS, verbalizes high anxiety this evening with discharge tomorrow to [facility]. Pt states he was set to go to [facility] and was looking forward to having family involvement in his treatment plan. Pt updated that medications for discharge are on hold pending insurance and this writer was told to call [facility] and attempt to retime . Pt verbalizes understanding. Pt states he was told by [facility] staff they could pick him up Thursday as well.   Attends groups, behavior appropriate with staff and peers.    Pt updated on calling his Insurance company to request a change of restriction. Pt understand he needs to call by [phone].  Pt asks multiple times what is happening in the AM-if he is discharging or not. This writer verbalizes to him that I have not heard back from [facility] or  so I am unsure what will happen.      Problem: Thought Process Alteration  Goal: Optimal Thought Clarity  Description: Patient will verbalize a decrease in hallucinations by time of discharge.  Patient will be able to have a linear, logical conversation by time of discharge.  Patient will verbalize 2-3 coping skills by time of discharge.  5/19/2020 1923 by Melinda Steen, RN  Outcome: Improving     Face to face end of shift report communicated to oncoming shift.     Melinda Steen RN  5/19/2020  7:40 PM

## 2020-05-20 NOTE — PLAN OF CARE
Spoke with patient's insurance regarding the approval for medications.  Assured this writer that the approval needs to be run through the system.   Spoke with house supervisor and this is approved.

## 2020-05-20 NOTE — PLAN OF CARE
Called and left message for staff at [facility] regarding  time.     Pt is discharging at the recommendation of the treatment team. Pt is discharging to [facility] transported by [facility] staff. Pt denies having any thoughts of hurting themself or anyone else. Pt denies anxiety or depression. Pt has follow up with [facility] and [facility]. Discharge instructions, including; demographic sheet, psychiatric evaluation, discharge summary, and AVS were faxed to these next level of care providers.

## 2020-05-20 NOTE — PLAN OF CARE
Problem: Adult Behavioral Health Plan of Care  Goal: Patient-Specific Goal (Individualization)  Description: Patient will sleep 6-8 hours a night  Patient will attend 50% of unit programming when able  Patient will complete ADLs independently  Patient will be compliant with treatment team recommendations      5/20/2020 0544 by Helena Klein, RN  Outcome: Improving  Note: Report received from Melinda graham. Pt observed in the lounge visiting with peers for the beginning of the shift until approx 0200.     0215- Pt observed sleeping in supine position with regular and unlabored respirations.    0400 Pt up and to the lounge asking where his roommate is.     0445- Pt sleeping again    Pt has been in bed with eyes closed and regular respirations. 15 minute and PRN checks all night. No complaints offered. Will continue to monitor.    Pt verbalizes fear of not knowing [facility] and concern that they wont do the authorization for his meds through [employer]. Pt was reassured that we will help him with the process. Writer stood with pt to call but we were advised the member services number will not be available until 0800 our time for pt to request the authorization. April stated she will help if pt encounters issues getting auth himself.    AVS completed with pt, belogings done, and meals ordered from kitchen for the transport to [facility].     Pt only slept approx 3 hours    Face to face end of shift report communicated to heraclio RN.    May 20, 2020  5:44 AM          Problem: Thought Process Alteration  Goal: Optimal Thought Clarity  Description: Patient will verbalize a decrease in hallucinations by time of discharge.  Patient will be able to have a linear, logical conversation by time of discharge.  Patient will verbalize 2-3 coping skills by time of discharge.  5/20/2020 0544 by Helena Klein, RN  Outcome: Improving  Note: Pt is able to maintain  linear and reality based conversation

## 2020-05-20 NOTE — DISCHARGE SUMMARY
"     Psychiatric Discharge Summary    Perry Preciado Jr. MRN# [id number]   Age: 45 year old YOB: 1975     Date of Admission:  5/4/2020  Date of Discharge:             5/20/20  Admitting Physician:  Kostas Anderson MD  Discharge Physician:  Leigh sullivan         Event Leading to Hospitalization and Hospital Stay   Perry Precaido Jr. is a 45 year old  male who brought himself ot the ER with increased suicidal thoughts with no plan. He did attempt one other time in 2010.      He was quite labile in the ER initially though was given 10 mg of oral Zyprexa and did calm down. Believes he was diagnosed with schizophrenia in 2016. He reports he only hears voices when he is high and only had paranoid thoughts when hes high. Sleep was decent when not using. States his depression is still present when sober but celexa helped. Also states seroquel was very helpful for anxiety as well as gabapentin. States \" I have no paranoia or hallucnations when im clean\". He appears anxious and sad. He makes limited eye contact. He states \"im 45 years old. I cant keep doing this. I want my life on track\". He appears quite depressed. He is easy to engage with in conversation and is a good historian. He does not appear preoccupied. He states he would like to go to inpatient CD treatmnet though feels he needs more than 30 days which I would agree with. He states his parents are quite supportive and so is one of his sisters. He talks to them regularly.        Perry was restarted on Seroquel and tolerated it well.  He states that the Seroquel significantly helps his anxiety and when not on it he is high risk of relapsing.  He was not sleeping well until Seroquel was restarted.  She did appear to have some racing thoughts irritability, poor frustration tolerance when not on the Seroquel and the Seroquel did improve this significantly.  He did not have any euphoria or elation he still did meet criteria for a bipolar type II " disorder.  He was very pleasant throughout his hospitalization and quite easy to work with.  He recognizes his substance use is a significant issue and does want to become sober and work a program of sobriety.  He denied any further suicidal ideation throughout his hospitalization after the first 5 days.  He had his rule 25 and willingly went to chemical dependency treatment.  He was very cooperative while on the unit and participated in all unit programming.                     At time of discharge, there is no evidence that patient is in immediate danger of self or others.        DIagnoses:     MDD, recurrent, severe  R/o borderline personality disorder.   Ptsd by history   Methamphetamine use disorder, severe          Labs:     Results for orders placed or performed during the hospital encounter of 05/04/20   Hepatic panel     Status: Abnormal   Result Value Ref Range    Bilirubin Direct <0.1 0.0 - 0.2 mg/dL    Bilirubin Total 0.2 0.2 - 1.3 mg/dL    Albumin 3.2 (L) 3.4 - 5.0 g/dL    Protein Total 7.1 6.8 - 8.8 g/dL    Alkaline Phosphatase 76 40 - 150 U/L    ALT 35 0 - 70 U/L    AST 25 0 - 45 U/L   Vitamin D     Status: None   Result Value Ref Range    Vitamin D Deficiency screening 22 20 - 75 ug/L   Hepatitis C antibody     Status: None   Result Value Ref Range    Hepatitis C Antibody Nonreactive NR^Nonreactive   HIV Antigen Antibody Combo     Status: None   Result Value Ref Range    HIV Antigen Antibody Combo Nonreactive NR^Nonreactive       Creatinine     Status: None   Result Value Ref Range    Creatinine 1.16 0.66 - 1.25 mg/dL    GFR Estimate 76 >60 mL/min/[1.73_m2]    GFR Estimate If Black 88 >60 mL/min/[1.73_m2]   Potassium     Status: None   Result Value Ref Range    Potassium 4.4 3.4 - 5.3 mmol/L   COVID-19 Virus (Coronavirus) by PCR Nasopharyngeal     Status: None    Specimen: Nasopharyngeal   Result Value Ref Range    COVID-19 Virus PCR to [facility] - Source Nasopharyngeal     COVID-19 Virus PCR to [facility] - Result       Test received-See reflex to IDDL test SARS CoV2 (COVID-19) Virus RT-PCR   SARS-CoV-2 COVID-19 Virus (Coronavirus) RT-PCR Nasopharyngeal     Status: None    Specimen: Nasopharyngeal   Result Value Ref Range    SARS-CoV-2 Virus Specimen Source Nasopharyngeal     SARS-CoV-2 PCR Result NEGATIVE     SARS-CoV-2 PCR Comment       This automated, real-time RT-PCR assay by [employer] on the [employer] has   been given Emergency Use Authorization (EUA) for the in vitro qualitative detection of RNA   from the SARS-CoV2 virus in nasopharyngeal swabs in viral transport medium from patients   with signs and symptoms of infection who are suspected of COVID-19. The performance is   unknown in asymptomatic patients.     Lipid Profile     Status: Abnormal   Result Value Ref Range    Cholesterol 274 (H) <200 mg/dL    Triglycerides 1,730 (H) <150 mg/dL    HDL Cholesterol 20 (L) >39 mg/dL    LDL Cholesterol Calculated  <100 mg/dL     Cannot estimate LDL when triglyceride exceeds 400 mg/dL    Non HDL Cholesterol 254 (H) <130 mg/dL   Comprehensive metabolic panel     Status: Abnormal   Result Value Ref Range    Sodium 137 133 - 144 mmol/L    Potassium 4.6 3.4 - 5.3 mmol/L    Chloride 103 94 - 109 mmol/L    Carbon Dioxide 31 20 - 32 mmol/L    Anion Gap 3 3 - 14 mmol/L    Glucose 110 (H) 70 - 99 mg/dL    Urea Nitrogen 18 7 - 30 mg/dL    Creatinine 1.21 0.66 - 1.25 mg/dL    GFR Estimate 72 >60 mL/min/[1.73_m2]    GFR Estimate If Black 84 >60 mL/min/[1.73_m2]    Calcium 8.3 (L) 8.5 - 10.1 mg/dL    Bilirubin Total 0.2 0.2 - 1.3 mg/dL    Albumin 3.3 (L) 3.4 - 5.0 g/dL    Protein Total 6.8 6.8 - 8.8 g/dL    Alkaline Phosphatase 93 40 - 150 U/L    ALT 68 0 - 70 U/L    AST 31 0 - 45 U/L   UA with Microscopic reflex to Culture     Status: Abnormal    Specimen: Urine clean catch; Midstream Urine   Result Value Ref Range    Color Urine Light Yellow     Appearance Urine Clear     Glucose Urine Negative NEG^Negative  mg/dL    Bilirubin Urine Negative NEG^Negative    Ketones Urine Negative NEG^Negative mg/dL    Specific Gravity Urine 1.011 1.003 - 1.035    Blood Urine Negative NEG^Negative    pH Urine 7.0 4.7 - 8.0 pH    Protein Albumin Urine Negative NEG^Negative mg/dL    Urobilinogen mg/dL Normal 0.0 - 2.0 mg/dL    Nitrite Urine Negative NEG^Negative    Leukocyte Esterase Urine Negative NEG^Negative    Source Midstream Urine     WBC Urine <1 0 - 5 /HPF    RBC Urine <1 0 - 2 /HPF    Bacteria Urine None (A) NEG^Negative /HPF   UA with Microscopic reflex to Culture     Status: Abnormal    Specimen: Midstream Urine   Result Value Ref Range    Color Urine Straw     Appearance Urine Clear     Glucose Urine Negative NEG^Negative mg/dL    Bilirubin Urine Negative NEG^Negative    Ketones Urine Negative NEG^Negative mg/dL    Specific Gravity Urine 1.007 1.003 - 1.035    Blood Urine Negative NEG^Negative    pH Urine 6.5 4.7 - 8.0 pH    Protein Albumin Urine Negative NEG^Negative mg/dL    Urobilinogen mg/dL Normal 0.0 - 2.0 mg/dL    Nitrite Urine Negative NEG^Negative    Leukocyte Esterase Urine Negative NEG^Negative    Source Midstream Urine     WBC Urine <1 0 - 5 /HPF    RBC Urine <1 0 - 2 /HPF    Bacteria Urine Few (A) NEG^Negative /HPF                    Discharge Medications:     Current Discharge Medication List      START taking these medications    Details   acamprosate (CAMPRAL) 333 MG EC tablet Take 2 tablets (666 mg) by mouth 3 times daily  Qty: 180 tablet, Refills: 0    Associated Diagnoses: Alcohol abuse      albuterol (PROAIR HFA/PROVENTIL HFA/VENTOLIN HFA) 108 (90 Base) MCG/ACT inhaler Inhale 2 puffs into the lungs every 4 hours as needed for wheezing  Qty: 1 Inhaler, Refills: 0    Comments: Pharmacy may dispense brand covered by insurance (Proair, or proventil or ventolin or generic albuterol inhaler)  Associated Diagnoses: Mild intermittent asthma without complication      amantadine (SYMMETREL) 100 MG capsule Take 1  capsule (100 mg) by mouth 2 times daily  Qty: 60 capsule, Refills: 0    Associated Diagnoses: Psychosis, unspecified psychosis type (H)      buprenorphine HCl-naloxone HCl (SUBOXONE) 4-1 MG per film Place 1 Film under the tongue 2 times daily  Qty: 60 Film, Refills: 0    Comments: GEOVANNY: [id number]  Associated Diagnoses: Opioid abuse (H)      citalopram (CELEXA) 20 MG tablet Take 1 tablet (20 mg) by mouth daily  Qty: 30 tablet, Refills: 0    Associated Diagnoses: Major depressive disorder, recurrent episode, moderate (H)      cloNIDine (CATAPRES) 0.1 MG tablet Take 1 tablet (0.1 mg) by mouth 3 times daily as needed (anxiety/restless)  Qty: 90 tablet, Refills: 0    Associated Diagnoses: Hypertension, unspecified type      fenofibrate (TRICOR) 145 MG tablet Take 1 tablet (145 mg) by mouth daily (with dinner)  Qty: 30 tablet, Refills: 0    Associated Diagnoses: Hypertriglyceridemia      hydrOXYzine (ATARAX) 50 MG tablet Take 1-2 tablets ( mg) by mouth 2 times daily as needed for anxiety  Qty: 90 tablet, Refills: 0    Associated Diagnoses: KANE (generalized anxiety disorder)      multivitamin w/minerals (THERA-VIT-M) tablet Take 1 tablet by mouth daily  Qty: 30 tablet, Refills: 0    Associated Diagnoses: Alcohol abuse      nicotine (COMMIT) 2 MG lozenge Place 1 lozenge (2 mg) inside cheek every hour as needed for smoking cessation  Qty: 81 lozenge, Refills: 0    Associated Diagnoses: Tobacco use disorder      polyethylene glycol-propylene glycol PF (SYSTANE ULTRA PF) 0.4-0.3 % SOLN opthalmic solution Place 2 drops into both eyes 3 times daily as needed for dry eyes  Qty:        prazosin (MINIPRESS) 2 MG capsule Take 1 capsule (2 mg) by mouth At Bedtime  Qty: 30 capsule, Refills: 0    Associated Diagnoses: Hypertension, unspecified type         CONTINUE these medications which have CHANGED    Details   gabapentin (NEURONTIN) 300 MG capsule Take 3 capsules (900 mg) by mouth 3 times daily  Qty: 270 capsule, Refills: 0  "   Associated Diagnoses: KANE (generalized anxiety disorder)      QUEtiapine (SEROQUEL) 50 MG tablet Take 3 tablets (150 mg) by mouth At Bedtime  Qty: 90 tablet, Refills: 0    Associated Diagnoses: Psychosis, unspecified psychosis type (H)         STOP taking these medications       ARIPiprazole (ABILIFY) 5 MG tablet Comments:   Reason for Stopping:         escitalopram (LEXAPRO) 20 MG tablet Comments:   Reason for Stopping:         HYDROcodone-acetaminophen (NORCO) 5-325 MG tablet Comments:   Reason for Stopping:                      Psychiatric Examination:       MSE/PSYCH  PSYCHIATRIC EXAM  /62   Pulse (!) 18   Temp 98.7  F (37.1  C) (Tympanic)   Resp 16   Ht 1.88 m (6' 2\")   Wt 119.7 kg (264 lb)   SpO2 96%   BMI 33.90 kg/m    -Appearance/Behavior: normal and improved  -Motor: intact  -Gait: intact  -Abnormal involuntary movements: none  -Mood: reactive and calm  -Affect: brighter  -Speech: regular      -Thought process/associations: Logical and Goal directed.  -Thought content: no delusions or hallucinations  -Perceptual disturbances: No hallucinations..              -Suicidal/Homicidal Ideation: denies any   -Judgment: Good.  -Insight: Good.  *Orientation: time, place and person.  *Memory: intact  *Attention: intact  *Language: fluent, no aphasias, able to repeat phrases and name objects. Vocab intact.  *Fund of information: appropriate for education  *Cognitive functioning estimate: 0 - independent.           Discharge Plan:         He is going to CD treatment.         Attestation:  The patient has been seen and evaluated by Danyelle Sherman. I personally did not see him on his day of admission as he left early.          Discharge Services Provided:    30 minutes spent on discharge services, including:  Final examination of patient.  Review and discussion of Hospital stay.  Instructions for continued outpatient care/goals.  Preparation of discharge records.  Preparation of medications refills and " new prescriptions.  Preparation of Applicable referral forms.               Perry was not seen by me on his day of discharge.  He was seen by Danyelle Sherman the day prior to discharge and Danyelle gave me a report yesterday evening regarding his discharge and his disposition.

## 2020-09-28 ENCOUNTER — TELEPHONE (OUTPATIENT)
Dept: FAMILY MEDICINE | Facility: CLINIC | Age: 45
End: 2020-09-28

## 2020-09-28 NOTE — LETTER
My Asthma Action Plan    Name: Perry Preciado Jr.   YOB: 1975  Date: 9/28/2020   My doctor: WOLF Wei CNP   My clinic: [facility]        My Rescue Medicine:   Albuterol inhaler (Proair/Ventolin/Proventil HFA)  2-4 puffs EVERY 4 HOURS as needed. Use a spacer if recommended by your provider.   My Asthma Severity:   Intermittent / Exercise Induced  Know your asthma triggers: Patient is unaware of triggers             GREEN ZONE   Good Control    I feel good    No cough or wheeze    Can work, sleep and play without asthma symptoms       Take your asthma control medicine every day.     1. If exercise triggers your asthma, take your rescue medication    15 minutes before exercise or sports, and    During exercise if you have asthma symptoms  2. Spacer to use with inhaler: If you have a spacer, make sure to use it with your inhaler             YELLOW ZONE Getting Worse  I have ANY of these:    I do not feel good    Cough or wheeze    Chest feels tight    Wake up at night   1. Keep taking your Green Zone medications  2. Start taking your rescue medicine:    every 20 minutes for up to 1 hour. Then every 4 hours for 24-48 hours.  3. If you stay in the Yellow Zone for more than 12-24 hours, contact your doctor.  4. If you do not return to the Green Zone in 12-24 hours or you get worse, start taking your oral steroid medicine if prescribed by your provider.           RED ZONE Medical Alert - Get Help  I have ANY of these:    I feel awful    Medicine is not helping    Breathing getting harder    Trouble walking or talking    Nose opens wide to breathe       1. Take your rescue medicine NOW  2. If your provider has prescribed an oral steroid medicine, start taking it NOW  3. Call your doctor NOW  4. If you are still in the Red Zone after 20 minutes and you have not reached your doctor:    Take your rescue medicine again and    Call 911 or go to the emergency room right away    See  your regular doctor within 2 weeks of an Emergency Room or Urgent Care visit for follow-up treatment.          Annual Reminders:  Meet with Asthma Educator,  Flu Shot in the Fall, consider Pneumonia Vaccination for patients with asthma (aged 19 and older).    Pharmacy:    [facility], [place] - [address] PHARMACY [facility], [place] - [address] [place] [address] DRUG STORE #[id number] - [place], [place] - [address] [facility] [address] [facility] [address]  [facility], [place] - [address] [place] [address] PHARMACY 1654 - [place], [place] - [address] [facility] - [place], [place] - [address]    Electronically signed by WOLF Wei CNP   Date: 09/28/20                    Asthma Triggers  How To Control Things That Make Your Asthma Worse    Triggers are things that make your asthma worse.  Look at the list below to help you find your triggers and   what you can do about them. You can help prevent asthma flare-ups by staying away from your triggers.      Trigger                                                          What you can do   Cigarette Smoke  Tobacco smoke can make asthma worse. Do not allow smoking in your home, car or around you.  Be sure no one smokes at a child s day care or school.  If you smoke, ask your health care provider for ways to help you quit.  Ask family members to quit too.  Ask your health care provider for a referral to Quit Plan to help you quit smoking, or call 0-194-278-PLAN.     Colds, Flu, Bronchitis  These are common triggers of asthma. Wash your hands often.  Don t touch your eyes, nose or mouth.  Get a flu shot every year.     Dust Mites  These are tiny bugs that live in cloth or carpet. They are too small to see. Wash sheets and blankets in hot water every week.   Encase pillows and mattress in dust mite proof covers.  Avoid having carpet if you can. If you have carpet, vacuum weekly.    Use a dust mask and HEPA vacuum.   Pollen and Outdoor Mold  Some people are allergic to trees, grass, or weed pollen, or molds. Try to keep your windows closed.  Limit time out doors when pollen count is high.   Ask you health care provider about taking medicine during allergy season.     Animal Dander  Some people are allergic to skin flakes, urine or saliva from pets with fur or feathers. Keep pets with fur or feathers out of your home.    If you can t keep the pet outdoors, then keep the pet out of your bedroom.  Keep the bedroom door closed.  Keep pets off cloth furniture and away from stuffed toys.     Mice, Rats, and Cockroaches  Some people are allergic to the waste from these pests.   Cover food and garbage.  Clean up spills and food crumbs.  Store grease in the refrigerator.   Keep food out of the bedroom.   Indoor Mold  This can be a trigger if your home has high moisture. Fix leaking faucets, pipes, or other sources of water.   Clean moldy surfaces.  Dehumidify basement if it is damp and smelly.   Smoke, Strong Odors, and Sprays  These can reduce air quality. Stay away from strong odors and sprays, such as perfume, powder, hair spray, paints, smoke incense, paint, cleaning products, candles and new carpet.   Exercise or Sports  Some people with asthma have this trigger. Be active!  Ask your doctor about taking medicine before sports or exercise to prevent symptoms.    Warm up for 5-10 minutes before and after sports or exercise.     Other Triggers of Asthma  Cold air:  Cover your nose and mouth with a scarf.  Sometimes laughing or crying can be a trigger.  Some medicines and food can trigger asthma.

## 2020-09-28 NOTE — LETTER
[facility]  [address] [place] [id number]  [phone]      September 28, 2020    Perry Preciado Jr.                                                                                                                     [address] [place] [address]            Dear Perry,    At [facility] we care about your health and well-being. A review of your chart has indicated that you are due for an Asthma Control Test. For copyright reasons we have attached a copy of the questionnaire. Please complete the questions and mail them back to the clinic in the provided envelope.    You may contact the clinic at [phone] if you have any questions or concerns about this request.      Sincerely,         [facility] Care Staff/ ss

## 2020-09-28 NOTE — TELEPHONE ENCOUNTER
Panel Management Review      Patient has the following on his problem list:   Asthma review   No flowsheet data found.   1. Is Asthma diagnosis on the Problem List? Yes    2. Is Asthma listed on Health Maintenance? Yes    3. Patient is due for:  ACT and AAP      Composite cancer screening  Chart review shows that this patient is due/due soon for the following None  Summary:    Patient is due/failing the following:   AAP and ACT    Action needed:   Patient needs to do ACT.    Type of outreach:    Copy of ACT mailed to patient, will reach out in 5 days.    Questions for provider review:    None                                                                                                                                    Elizabeth Johnson MA      Chart routed to Care Team .

## 2020-12-07 ENCOUNTER — TELEPHONE (OUTPATIENT)
Dept: FAMILY MEDICINE | Facility: CLINIC | Age: 45
End: 2020-12-07

## 2020-12-07 NOTE — LETTER
[facility]  [address] [place] [id number]  [phone]      December 7, 2020    Perry Preciado Jr.                                                                                                                     [address] [place] [address]            Dear Perry,    At [facility] we care about your health and well-being. A review of your chart has indicated that you are due for an Asthma Control Test. For copyright reasons we have attached a copy of the questionnaire. Please complete the questions and mail them back to the clinic in the provided envelope.    You may contact the clinic at [phone] if you have any questions or concerns about this request.       Sincerely,         [facility] Care Staff/ ss

## 2021-05-28 ENCOUNTER — RECORDS - HEALTHEAST (OUTPATIENT)
Dept: ADMINISTRATIVE | Facility: CLINIC | Age: 46
End: 2021-05-28

## 2021-07-06 ENCOUNTER — TRANSFERRED RECORDS (OUTPATIENT)
Dept: HEALTH INFORMATION MANAGEMENT | Facility: CLINIC | Age: 46
End: 2021-07-06

## 2021-07-06 LAB
C TRACH DNA SPEC QL PROBE+SIG AMP: NEGATIVE
N GONORRHOEA DNA SPEC QL PROBE+SIG AMP: NEGATIVE
SPECIMEN DESCRIP: NORMAL
SPECIMEN DESCRIPTION: NORMAL

## 2021-07-07 ENCOUNTER — MEDICAL CORRESPONDENCE (OUTPATIENT)
Dept: HEALTH INFORMATION MANAGEMENT | Facility: CLINIC | Age: 46
End: 2021-07-07

## 2021-07-07 ENCOUNTER — TELEPHONE (OUTPATIENT)
Dept: BEHAVIORAL HEALTH | Facility: CLINIC | Age: 46
End: 2021-07-07

## 2021-07-07 ENCOUNTER — TRANSFERRED RECORDS (OUTPATIENT)
Dept: HEALTH INFORMATION MANAGEMENT | Facility: CLINIC | Age: 46
End: 2021-07-07

## 2021-07-07 NOTE — TELEPHONE ENCOUNTER
512pm - Gabriel, on call resident, paged   521pm - Gabriel accepts for GREEN team   Clinical faxed to the unit     R: 22/Demian     Pt placed in queue   530pm - unit charge notified, he reports staffing concerns. Intake expressed the appropriate escalation would be to connect w the ANS. Intake will have the ED call after shift change for admission, if unit is staffed and can safely accommodate admission please call ED directly for report @ [phone]  532pm - [facility] ED notified

## 2021-07-07 NOTE — TELEPHONE ENCOUNTER
S:DEC, [facility] ED, 46/M, Psychosis    B:Pt came into ED last night  Pt has been sober for the last 16 months and recently relapsed on meth  Pt cleared from sub induced psychosis  Pt reporting ah/vh  Pt reports he sees cars that are not there  Pt thinks people are following him and trying to kill him  Pt paranoid  Pt has hx of schizophrenia and is not on his meds  Pt reports being on seroquel but not med compliant  Pt reported SI with intent but no plan  Pt agitated-given zyprexa    utox pos for amphetamines  No other medical concerns  BP 96/54  (most recent at 1346)  Waiting for COVID  Patient cleared and ready for behavioral bed placement: Yes      A:72hh    R:pt placed on worklist awaiting mh placement

## 2021-07-08 ENCOUNTER — HOSPITAL ENCOUNTER (INPATIENT)
Facility: CLINIC | Age: 46
LOS: 4 days | Discharge: HOME | End: 2021-07-12
Attending: PSYCHIATRY & NEUROLOGY | Admitting: PSYCHIATRY & NEUROLOGY
Payer: COMMERCIAL

## 2021-07-08 DIAGNOSIS — F20.3 UNDIFFERENTIATED SCHIZOPHRENIA (H): Primary | ICD-10-CM

## 2021-07-08 PROBLEM — F29 PSYCHOSIS (H): Status: ACTIVE | Noted: 2021-07-08

## 2021-07-08 LAB
ALBUMIN SERPL-MCNC: 3.2 G/DL (ref 3.4–5)
ALP SERPL-CCNC: 73 U/L (ref 40–150)
ALT SERPL W P-5'-P-CCNC: 26 U/L (ref 0–70)
ANION GAP SERPL CALCULATED.3IONS-SCNC: 3 MMOL/L (ref 3–14)
AST SERPL W P-5'-P-CCNC: 15 U/L (ref 0–45)
BASOPHILS # BLD AUTO: 0.1 10E9/L (ref 0–0.2)
BASOPHILS NFR BLD AUTO: 0.9 %
BILIRUB SERPL-MCNC: 0.3 MG/DL (ref 0.2–1.3)
BUN SERPL-MCNC: 20 MG/DL (ref 7–30)
CALCIUM SERPL-MCNC: 8.6 MG/DL (ref 8.5–10.1)
CHLORIDE SERPL-SCNC: 109 MMOL/L (ref 94–109)
CHOLEST SERPL-MCNC: 152 MG/DL
CO2 SERPL-SCNC: 27 MMOL/L (ref 20–32)
CREAT SERPL-MCNC: 1.04 MG/DL (ref 0.66–1.25)
DIFFERENTIAL METHOD BLD: NORMAL
EOSINOPHIL # BLD AUTO: 0 10E9/L (ref 0–0.7)
EOSINOPHIL NFR BLD AUTO: 0 %
ERYTHROCYTE [DISTWIDTH] IN BLOOD BY AUTOMATED COUNT: 13.2 % (ref 10–15)
FOLATE SERPL-MCNC: 18.7 NG/ML
GFR SERPL CREATININE-BSD FRML MDRD: 86 ML/MIN/{1.73_M2}
GLUCOSE SERPL-MCNC: 90 MG/DL (ref 70–99)
HCT VFR BLD AUTO: 40.1 % (ref 40–53)
HDLC SERPL-MCNC: 33 MG/DL
HGB BLD-MCNC: 13.5 G/DL (ref 13.3–17.7)
INTERPRETATION ECG - MUSE: NORMAL
LDLC SERPL CALC-MCNC: 97 MG/DL
LYMPHOCYTES # BLD AUTO: 3.7 10E9/L (ref 0.8–5.3)
LYMPHOCYTES NFR BLD AUTO: 44.3 %
MCH RBC QN AUTO: 30.1 PG (ref 26.5–33)
MCHC RBC AUTO-ENTMCNC: 33.7 G/DL (ref 31.5–36.5)
MCV RBC AUTO: 89 FL (ref 78–100)
MONOCYTES # BLD AUTO: 0.6 10E9/L (ref 0–1.3)
MONOCYTES NFR BLD AUTO: 7.8 %
NEUTROPHILS # BLD AUTO: 3.9 10E9/L (ref 1.6–8.3)
NEUTROPHILS NFR BLD AUTO: 47 %
NONHDLC SERPL-MCNC: 119 MG/DL
PLATELET # BLD AUTO: 189 10E9/L (ref 150–450)
PLATELET # BLD EST: NORMAL 10*3/UL
POTASSIUM SERPL-SCNC: 3.7 MMOL/L (ref 3.4–5.3)
PROT SERPL-MCNC: 6.4 G/DL (ref 6.8–8.8)
RBC # BLD AUTO: 4.49 10E12/L (ref 4.4–5.9)
RBC MORPH BLD: NORMAL
SODIUM SERPL-SCNC: 139 MMOL/L (ref 133–144)
T4 FREE SERPL-MCNC: 0.95 NG/DL (ref 0.76–1.46)
TRIGL SERPL-MCNC: 112 MG/DL
TSH SERPL DL<=0.005 MIU/L-ACNC: 0.31 MU/L (ref 0.4–4)
VIT B12 SERPL-MCNC: 468 PG/ML (ref 193–986)
WBC # BLD AUTO: 8.3 10E9/L (ref 4–11)

## 2021-07-08 PROCEDURE — 82746 ASSAY OF FOLIC ACID SERUM: CPT | Performed by: STUDENT IN AN ORGANIZED HEALTH CARE EDUCATION/TRAINING PROGRAM

## 2021-07-08 PROCEDURE — 85025 COMPLETE CBC W/AUTO DIFF WBC: CPT | Performed by: STUDENT IN AN ORGANIZED HEALTH CARE EDUCATION/TRAINING PROGRAM

## 2021-07-08 PROCEDURE — 36415 COLL VENOUS BLD VENIPUNCTURE: CPT | Performed by: STUDENT IN AN ORGANIZED HEALTH CARE EDUCATION/TRAINING PROGRAM

## 2021-07-08 PROCEDURE — 124N000002 HC R&B MH UMMC

## 2021-07-08 PROCEDURE — 84443 ASSAY THYROID STIM HORMONE: CPT | Performed by: STUDENT IN AN ORGANIZED HEALTH CARE EDUCATION/TRAINING PROGRAM

## 2021-07-08 PROCEDURE — 250N000013 HC RX MED GY IP 250 OP 250 PS 637: Performed by: STUDENT IN AN ORGANIZED HEALTH CARE EDUCATION/TRAINING PROGRAM

## 2021-07-08 PROCEDURE — 80061 LIPID PANEL: CPT | Performed by: STUDENT IN AN ORGANIZED HEALTH CARE EDUCATION/TRAINING PROGRAM

## 2021-07-08 PROCEDURE — 82607 VITAMIN B-12: CPT | Performed by: STUDENT IN AN ORGANIZED HEALTH CARE EDUCATION/TRAINING PROGRAM

## 2021-07-08 PROCEDURE — 84439 ASSAY OF FREE THYROXINE: CPT | Performed by: STUDENT IN AN ORGANIZED HEALTH CARE EDUCATION/TRAINING PROGRAM

## 2021-07-08 PROCEDURE — 93005 ELECTROCARDIOGRAM TRACING: CPT

## 2021-07-08 PROCEDURE — 250N000013 HC RX MED GY IP 250 OP 250 PS 637

## 2021-07-08 PROCEDURE — 80053 COMPREHEN METABOLIC PANEL: CPT | Performed by: STUDENT IN AN ORGANIZED HEALTH CARE EDUCATION/TRAINING PROGRAM

## 2021-07-08 PROCEDURE — 93010 ELECTROCARDIOGRAM REPORT: CPT | Performed by: INTERNAL MEDICINE

## 2021-07-08 RX ORDER — CITALOPRAM HYDROBROMIDE 40 MG/1
40 TABLET ORAL DAILY
Status: ON HOLD | COMMUNITY
End: 2021-07-12

## 2021-07-08 RX ORDER — BUPRENORPHINE AND NALOXONE 12; 3 MG/1; MG/1
1 FILM, SOLUBLE BUCCAL; SUBLINGUAL 2 TIMES DAILY
Status: ON HOLD | COMMUNITY
End: 2021-07-12

## 2021-07-08 RX ORDER — CLONIDINE HYDROCHLORIDE 0.1 MG/1
0.1 TABLET ORAL 2 TIMES DAILY
Status: ON HOLD | COMMUNITY
End: 2021-07-12

## 2021-07-08 RX ORDER — ACETAMINOPHEN 325 MG/1
650 TABLET ORAL EVERY 4 HOURS PRN
Status: DISCONTINUED | OUTPATIENT
Start: 2021-07-08 | End: 2021-07-12 | Stop reason: HOSPADM

## 2021-07-08 RX ORDER — OLANZAPINE 10 MG/2ML
10 INJECTION, POWDER, FOR SOLUTION INTRAMUSCULAR 3 TIMES DAILY PRN
Status: DISCONTINUED | OUTPATIENT
Start: 2021-07-08 | End: 2021-07-12 | Stop reason: HOSPADM

## 2021-07-08 RX ORDER — AMOXICILLIN 250 MG
1 CAPSULE ORAL 2 TIMES DAILY PRN
Status: DISCONTINUED | OUTPATIENT
Start: 2021-07-08 | End: 2021-07-12 | Stop reason: HOSPADM

## 2021-07-08 RX ORDER — ARIPIPRAZOLE 5 MG/1
5 TABLET ORAL DAILY
Status: DISCONTINUED | OUTPATIENT
Start: 2021-07-08 | End: 2021-07-12 | Stop reason: HOSPADM

## 2021-07-08 RX ORDER — GABAPENTIN 300 MG/1
600 CAPSULE ORAL 3 TIMES DAILY
Status: DISCONTINUED | OUTPATIENT
Start: 2021-07-08 | End: 2021-07-09

## 2021-07-08 RX ORDER — GABAPENTIN 600 MG/1
600 TABLET ORAL 3 TIMES DAILY
Status: ON HOLD | COMMUNITY
End: 2021-07-12

## 2021-07-08 RX ORDER — BUPRENORPHINE AND NALOXONE 4; 1 MG/1; MG/1
3 FILM, SOLUBLE BUCCAL; SUBLINGUAL 2 TIMES DAILY
Status: DISCONTINUED | OUTPATIENT
Start: 2021-07-08 | End: 2021-07-12 | Stop reason: HOSPADM

## 2021-07-08 RX ORDER — MAGNESIUM HYDROXIDE/ALUMINUM HYDROXICE/SIMETHICONE 120; 1200; 1200 MG/30ML; MG/30ML; MG/30ML
30 SUSPENSION ORAL EVERY 4 HOURS PRN
Status: DISCONTINUED | OUTPATIENT
Start: 2021-07-08 | End: 2021-07-12 | Stop reason: HOSPADM

## 2021-07-08 RX ORDER — OLANZAPINE 10 MG/1
10 TABLET ORAL 3 TIMES DAILY PRN
Status: DISCONTINUED | OUTPATIENT
Start: 2021-07-08 | End: 2021-07-12 | Stop reason: HOSPADM

## 2021-07-08 RX ORDER — HYDROXYZINE HYDROCHLORIDE 25 MG/1
25 TABLET, FILM COATED ORAL EVERY 4 HOURS PRN
Status: DISCONTINUED | OUTPATIENT
Start: 2021-07-08 | End: 2021-07-12 | Stop reason: HOSPADM

## 2021-07-08 RX ORDER — ATORVASTATIN CALCIUM 20 MG/1
20 TABLET, FILM COATED ORAL DAILY
COMMUNITY
End: 2023-12-12

## 2021-07-08 RX ORDER — TRAZODONE HYDROCHLORIDE 50 MG/1
50 TABLET, FILM COATED ORAL
Status: DISCONTINUED | OUTPATIENT
Start: 2021-07-08 | End: 2021-07-12 | Stop reason: HOSPADM

## 2021-07-08 RX ADMIN — TRAZODONE HYDROCHLORIDE 50 MG: 50 TABLET ORAL at 03:06

## 2021-07-08 RX ADMIN — ARIPIPRAZOLE 5 MG: 5 TABLET ORAL at 13:04

## 2021-07-08 RX ADMIN — GABAPENTIN 600 MG: 300 CAPSULE ORAL at 13:05

## 2021-07-08 RX ADMIN — BUPRENORPHINE AND NALOXONE 3 FILM: 4; 1 FILM BUCCAL; SUBLINGUAL at 19:54

## 2021-07-08 RX ADMIN — HYDROXYZINE HYDROCHLORIDE 25 MG: 25 TABLET, FILM COATED ORAL at 03:06

## 2021-07-08 RX ADMIN — GABAPENTIN 600 MG: 300 CAPSULE ORAL at 19:54

## 2021-07-08 RX ADMIN — BUPRENORPHINE AND NALOXONE 3 FILM: 4; 1 FILM BUCCAL; SUBLINGUAL at 13:05

## 2021-07-08 ASSESSMENT — ACTIVITIES OF DAILY LIVING (ADL)
HYGIENE/GROOMING: INDEPENDENT
HYGIENE/GROOMING: INDEPENDENT
DRESS: SCRUBS (BEHAVIORAL HEALTH);INDEPENDENT
ORAL_HYGIENE: INDEPENDENT
LAUNDRY: WITH SUPERVISION
ORAL_HYGIENE: INDEPENDENT
DRESS: INDEPENDENT

## 2021-07-08 ASSESSMENT — MIFFLIN-ST. JEOR: SCORE: 1968.54

## 2021-07-08 NOTE — H&P
"Psychiatry History and Physical    Perry Preciado MRN# [id number]   Age: 46 year old YOB: 1975     Date of Admission:  7/8/2021  Admitting Physician:  Pb Arciniega MD          Contacts:     Primary Physician:   WOLF Wei [phone] [facility]       Other: KATHYChart Review   [phone]         Chief Complaint:     Auditory hallucinations and paranoid delusion           History of Present Illness:     History obtained from patient interview and chart review.    Perry Preciado is a 46 year old male with a history of recurrent, severe MDD, schizophrenia and polysubstance use admitted from the [facility] ED to unit 22 on a 72 hr hold on 07/08/2021 due to concern for auditory hallucinations and paranoid delusion in the context of methamphetamine relapse and medication non-adherance. The patient was previously diagnosed with recurrent severe MDD in 2019, states he was diagnosed with schizophrenia in 2016 and has been using meth since he was 13.  He was last hospitalized on 5/4/2020 for SI and was discharged to a chemical dependency program which he completed successfully and has been not been using for the last 16 months.  For the past 40 days, he has endorsed AH and five days ago, he relapsed on methamphetamine after stresses from \"seeing people at work use\". On 7/7 his girlfriend brought him to [facility] ED because of extreme paranoia and hallucinations. In the ED, he was given Zyprexa and then transferred to unit 22. He is currently prescribed Seroquel, Suboxone, Gabapentin and Celexa however he has not taken them for the last 5 days and says the Seroquel was not effective. His goal for hospitalization is to be stabilized and discharged to an inpatient chemical dependency program.       Per Staff Note:   \"Admitted to unit 22 a 46 year old male from [place] ED for Psychosis. Per notes, patient has hx of Schizophrenia and Polysubstance abuse. Pt " "has been sober for over a year and relapsed on meth, has reported AVH stating that he sees cars that are not there and that people are following him and trying to kill him. Pt was paranoid and had stopped taking his meds, reports being on Seroquel but not taking it currently.  Patient reported SI with intent but no plan, was agitated and given Zyprexa in ED. Utox positive for amphetamines. Covid is Negative.       Upon arrival, patient was cooperative with search and interview process. Oriented briefly to unit. Appeared anxious and slightly restless. Requested for food as he was hungry.  Denies SI/SIB/AVH currently. States his twin brother  a month ago, his sister tried to hang herself a while ago. Endorses previous treatment for CD States he currently does not drink and only drank a week ago. Other drugs he has used include meth and heroin. States he has been treated for depression with Celexa but has not taken the med for four days. States he has not taken all his meds for 4 days.  Endorses  poor sleep but did sleep well previous night. Pt states he has lost more than 20 pounds in the last six months without trying when he was using meth.      Pt is requesting for Suboxone stating he takes it BID but can wait till morning. Admitted on 72 hh and on Suicide/SIB/Withdrawal/Elopement Precautions. Rates anxiety at a 10/10, Depression at 10/10, 10 being worst. Given prn Hydroxyzine for anxiety and Trazodone for sleep and went to bed.\"     He was medically cleared for admission to inpatient psychiatric unit.    Patient Interview on 2021:  Perry Precidao says that he was \"16 months sober\" and relapsed on methamphetamine. For the past 40 days, he has experienced psychosis, paranoid thoughts of people being out to get him, and auditory hallucinations. He says that he was hospitalized 1 year ago in a similar state and received chemical dependency treatment. He would like to go to a chemical dependency treatment " "center upon discharge and mentioned [place] [facility]. He says that the trigger for his relapse was \"seeing a lot of people around me high\", particularly at his workplace. He works as a substance abuse specialist. He lives with his girlfriend and describes their living situation as a \"good environment\". He also reports VH and states that the AH have happened even when he wasn't using, although they were less intense. He agrees that the voices make him fearful when he is using. He says that the voices started \"a couple years ago\" prior to his 16 month period of sobriety. He says that he started using meth when he was 13. He states he he is \"starting to feel sick\" as he has not had suboxone at least since his admission. He has previously taken seroquel and celexa with minimal improvement. He says that his sleep is \"ok\" and he hasn't slept well for the past 4 days. He feels embarrassed and ashamed about his recent use. He has had suicidal ideations since his relapse. He agreed to restarting suboxone and gabapentin as well as adding abilify. He hasn't taken his medications for the past 5 days. He feels safe here and has not experienced paranoia since arriving. He would like double meal portions and was offered trazodone as needed for sleep.      ED/Hospital Course   In the [facility] ED, the patient was given Zyprexa for agitation, also placed on 72 hr hold but patient signed voluntary hospitalization at [facility]. Last night, he was given hydroxyzine 25mg and trazodone 50 mg for sleep.       The risks, benefits, alternatives and side effects have been discussed and are understood by the patient and other caregivers.         Psychiatric Review of Systems:     ROS negative other than what is stated in HPI           Medical Review of Systems:     The Review of Systems is negative other than what is noted in the HPI         Psychiatric History:     Prior diagnoses:  MDD, recurrent, severe, R/o borderline personality disorder, Ptsd by " history, Methamphetamine use disorder    Hospitalizations: [facility] has been hospitalized multiple times for chemical dependency (4/30/18, 2/8/18, 7/9/17 and 10/6/14). Also hospitalized on 5/4/2020 for SI and 10/18/2018 for anxiety.    Court Committments: none    Suicide attempts: Attempted in 2010    Self-injurious behavior: None    ECT: None    TMS:  None           Substance Use History:     Alcohol: Denies regular alcohol intake, except over past week. 1 bottle of whiskey a day, per chart review.    Illicit Substances:  Used amphetamines since 12 y/o, endorses heroin use    Chemical Dependency Treatment: Inpatient program on 5/20/2020           Social History:       Family/Relationships: maintain contact with His family.      Living Situation: Domiciled.Currently lives with his girlfriend.      Occupation: Worked as a substance abuse specialist.     Service: None            Past Medical History:       No past medical history on file.  No past surgical history on file.       Allergies:      Allergies   Allergen Reactions     Wellbutrin [Bupropion] Anaphylaxis and Swelling     Facial swelling            Medications:     No current outpatient medications on file.             Family History:     No family history on file.         Labs:     Recent Results (from the past 24 hour(s))   EKG 12 lead    Collection Time: 07/08/21  3:14 AM   Result Value Ref Range    Interpretation ECG Click View Image link to view waveform and result    CBC with platelets differential    Collection Time: 07/08/21  7:49 AM   Result Value Ref Range    WBC 8.3 4.0 - 11.0 10e9/L    RBC Count 4.49 4.4 - 5.9 10e12/L    Hemoglobin 13.5 13.3 - 17.7 g/dL    Hematocrit 40.1 40.0 - 53.0 %    MCV 89 78 - 100 fl    MCH 30.1 26.5 - 33.0 pg    MCHC 33.7 31.5 - 36.5 g/dL    RDW 13.2 10.0 - 15.0 %    Platelet Count 189 150 - 450 10e9/L    Diff Method Manual Differential     % Neutrophils 47.0 %    % Lymphocytes 44.3 %    % Monocytes 7.8 %    %  "Eosinophils 0.0 %    % Basophils 0.9 %    Absolute Neutrophil 3.9 1.6 - 8.3 10e9/L    Absolute Lymphocytes 3.7 0.8 - 5.3 10e9/L    Absolute Monocytes 0.6 0.0 - 1.3 10e9/L    Absolute Eosinophils 0.0 0.0 - 0.7 10e9/L    Absolute Basophils 0.1 0.0 - 0.2 10e9/L    RBC Morphology Normal     Platelet Estimate Confirming automated cell count    Comprehensive metabolic panel    Collection Time: 07/08/21  7:49 AM   Result Value Ref Range    Sodium 139 133 - 144 mmol/L    Potassium 3.7 3.4 - 5.3 mmol/L    Chloride 109 94 - 109 mmol/L    Carbon Dioxide 27 20 - 32 mmol/L    Anion Gap 3 3 - 14 mmol/L    Glucose 90 70 - 99 mg/dL    Urea Nitrogen 20 7 - 30 mg/dL    Creatinine 1.04 0.66 - 1.25 mg/dL    GFR Estimate 86 >60 mL/min/[1.73_m2]    GFR Estimate If Black >90 >60 mL/min/[1.73_m2]    Calcium 8.6 8.5 - 10.1 mg/dL    Bilirubin Total 0.3 0.2 - 1.3 mg/dL    Albumin 3.2 (L) 3.4 - 5.0 g/dL    Protein Total 6.4 (L) 6.8 - 8.8 g/dL    Alkaline Phosphatase 73 40 - 150 U/L    ALT 26 0 - 70 U/L    AST 15 0 - 45 U/L   Lipid panel    Collection Time: 07/08/21  7:49 AM   Result Value Ref Range    Cholesterol 152 <200 mg/dL    Triglycerides 112 <150 mg/dL    HDL Cholesterol 33 (L) >39 mg/dL    LDL Cholesterol Calculated 97 <100 mg/dL    Non HDL Cholesterol 119 <130 mg/dL   TSH with free T4 reflex and/or T3 as indicated    Collection Time: 07/08/21  7:49 AM   Result Value Ref Range    TSH 0.31 (L) 0.40 - 4.00 mU/L   Vitamin B12    Collection Time: 07/08/21  7:49 AM   Result Value Ref Range    Vitamin B12 468 193 - 986 pg/mL   Folate    Collection Time: 07/08/21  7:49 AM   Result Value Ref Range    Folate 18.7 >5.4 ng/mL   T4 free    Collection Time: 07/08/21  7:49 AM   Result Value Ref Range    T4 Free 0.95 0.76 - 1.46 ng/dL            Psychiatric Examination:   /73   Pulse 64   Temp 97.5  F (36.4  C)   Resp 14   Ht 1.905 m (6' 3\")   Wt 99.8 kg (220 lb)   SpO2 94%   BMI 27.50 kg/m      Appearance:  awake, alert, dressed in " hospital scrubs, appeared as age stated and cooperative, visible tattoos on neck,arms/forearms dorsum of hands/fingers  Attitude:  cooperative  Eye Contact:  fair  Mood:  depressed  Affect:  appropriate and in normal range, mood congruent and intensity is normal  Speech:  clear, coherent  Psychomotor Behavior:  no evidence of tardive dyskinesia, dystonia, or tics  Thought Process:  logical, linear and goal oriented  Associations:  no loose associations  Thought Content:  thoughts of self-harm, which are decreased and auditory hallucinations present  Insight: fair  Judgment: poor  Oriented to:  time, person, and place  Attention Span and Concentration:  intact  Recent and Remote Memory: Grossly intact  Language:  english with appropriate syntax and vocabulary  Fund of Knowledge: normal, fair  Muscle Strength and Tone: not assessed  Gait and Station: Normal         Physical Exam:     See ED assessment note by ED physician.         Assessment & Plan   Perry Preciado is a 46 year old male with a history of schizophrenia, MDD and polysubstance abuse admitted from the [facility] ED to unit 22 on 7/8/2021. Significant symptoms include AH, paranoid delusions, SI, depressed and substance use. The patient presented to the ED 5 days after relapsing on methamphetamine and medication non-adherence with AH and paranoid delusions. Upon presentation, he was given Zyprexa and transferred to unit 22.  He has a history of multiple hospitalizations for MDD and substance abuse and his last psychiatric hospitalization was in 5/4/2020 for SI. The patient is currently prescribed Seroquel, Suboxone, Gabapentin and Celexa. Given his recent meth relapse and history of AH and depression, the most likely diagnosis is substance induced psychosis.Differential diagnoses include schizophrenia and bipolar disorder.    Given that he currently has SI and psychosis, patient warrants inpatient psychiatric hospitalization to maintain his safety.  Disposition pending clinical stabilization, medication optimization and development of an appropriate discharge plan.    Risk for harm is moderate-high.  Risk factors: SI, maladaptive coping, substance use, impulsive and past behaviors  Protective factors: family and engaged in treatment     Admit to Unit 22 with Attending Physician Dr. Pb Arciniega M.D.    Principal psychiatric diagnosis:     # Substance Induced Psychosis  -Suboxone 4-1 mg per film. 3 film, sublingual BID  -CD treatment upon discharge     Secondary diagnoses:     # Schizophrenia  - Abilify PO 5mg Daily/AM  - Gabapentin  mg TID  -Hold Celexa    PRN Medications:   - Acetaminophen 650 mg po Q4hrs PRN pain  - Mylanta 30 ml Q4H PRN for indigestion  - Hydroxyzine 25 mg Q4hrs PRN   - Olanzapine 10 mg PO/IM TID PRN severe agitation/psychosis  - Trazodone 50 mg PRN at bedtime     Medications: risks/benefits discussed with patient and/or guardian     Patient will be treated in therapeutic milieu with appropriate individual and group therapies.    Legal Status:   Orders Placed This Encounter      Legal status Voluntary      Safety Assessment:      Behavioral Orders   Procedures     Code 1 - Restrict to Unit     Elopement precautions     Routine Programming     As clinically indicated     Self Injury Precaution     Single Room     Status 15     Every 15 minutes.     Suicide precautions     Patients on Suicide Precautions should have a Combination Diet ordered that includes a Diet selection(s) AND a Behavioral Tray selection for Safe Tray - with utensils, or Safe Tray - NO utensils       Withdrawal precautions      Pt has not required locked seclusion or restraints in the past 24 hours to maintain safety, please refer to RN documentation for further details.      Dispo: unknown pending medication management and clinical stabilization      -------------------------------------------------------  Cristian Viera, MS3    Kulwant Kohler MD  PGY-1 Psychiatry  Resident    Attestation:  For attending attestation statement, see progress note dated July 9, 2021. Pb Arciniega MD

## 2021-07-08 NOTE — PLAN OF CARE
Problem: Behavioral Health Plan of Care  Goal: Plan of Care Review  Outcome: No Change    Admitted to unit 22 a 46 year old male from [place] ED for Psychosis. Per notes, patient has hx of Schizophrenia and Polysubstance abuse. Pt has been sober for over a year and relapsed on meth, has reported AVH stating that he sees cars that are not there and that people are following him and trying to kill him. Pt was paranoid and had stopped taking his meds, reports being on Seroquel but not taking it currently.  Patient reported SI with intent but no plan, was agitated and given Zyprexa in ED. Utox positive for amphetamines. Covid is Negative.      Upon arrival, patient was cooperative with search and interview process. Oriented briefly to unit. Appeared anxious and slightly restless. Requested for food as he was hungry.  Denies SI/SIB/AVH currently. States his twin brother  a month ago, his sister tried to hang herself a while ago. Endorses previous treatment for CD States he currently does not drink and only drank a week ago. Other drugs he has used include meth and heroin. States he has been treated for depression with Celexa but has not taken the med for four days. States he has not taken all his meds for 4 days.  Endorses  poor sleep but did sleep well previous night. Pt states he has lost more than 20 pounds in the last six months without trying when he was using meth.     Pt is requesting for Suboxone stating he takes it BID but can wait till morning. Admitted on 72 hh and on Suicide/SIB/Withdrawal/Elopement Precautions. Rates anxiety at a 10/10, Depression at 10/10, 10 being worst. Given prn Hydroxyzine for anxiety and Trazodone for sleep and went to bed.     Note: Patient has labs this morning including Lipid Panel but he ate supper at around 0245 as he was very hungry.

## 2021-07-08 NOTE — PHARMACY-ADMISSION MEDICATION HISTORY
Admission Medication History Completed by Pharmacy    See [facility] Admission Navigator for allergy information, preferred outpatient pharmacy, prior to admission medications and immunization status.     Medication History Sources:     Perry    [facility] in [place], [place] ([phone])    Changes made to PTA medication list (reason):    Added: Atorvastatin, citalopram, clonidine, gabapentin    Deleted: None    Changed: None    Additional Information:    Perry verified his home medications. He reported taking gabapentin 900 mg PO TID but the prescription is for 600 mg PO TID.    Perry reported taking clonidine as needed but the prescription directions are clonidine 0.1 mg PO BID.    Prescriptions dispensed:  o Suboxone 12/3 mg films last filled 07/01/21 for quantity 60  o Gabapentin 600 mg last filled 07/01/21 for quantity 90    Prior to Admission medications    Medication Sig Last Dose Taking? Auth Provider   atorvastatin (LIPITOR) 20 MG tablet Take 20 mg by mouth daily Past Week at Unknown time Yes Unknown, Entered By History   Buprenorphine HCl-Naloxone HCl (SUBOXONE) 12-3 MG FILM per film Place 1 Film under the tongue 2 times daily  7/7/2021 at Unknown time Yes Reported, Patient   citalopram (CELEXA) 40 MG tablet Take 40 mg by mouth daily Past Week at Unknown time Yes Unknown, Entered By History   cloNIDine (CATAPRES) 0.1 MG tablet Take 0.1 mg by mouth 2 times daily  Yes Unknown, Entered By History   gabapentin (NEURONTIN) 600 MG tablet Take 600 mg by mouth 3 times daily Past Week at Unknown time Yes Unknown, Entered By History       Patient was asked about OTC/herbal products specifically.  PTA med list reflects this.     Allergies were reviewed, assessed, and updated with the patient.       The information provided in this note is only as accurate as the sources available at the time of the update(s).    Date completed: 07/08/21    Medication history completed by: Joyce Brown,  PharmD

## 2021-07-08 NOTE — PLAN OF CARE
"Pt refused a.m Abilify saying \"I don't HAVE to take it, right?\" Writer told pt she is not court ordered for taking it but it would be to her benefit if she would. Pt still refused. Pt denies SI/SIB/HI and hallucinations. Pt appears paranoid in the context she will not talk with writer through the med window, she would only stand between the windows and wouldn't come any closer to writer. Pt is aware of her discharge today.  "

## 2021-07-08 NOTE — PROGRESS NOTES
07/08/21 0306   Patient Belongings   Did you bring any home meds/supplements to the hospital?  Yes   Disposition of meds  Sent to security/pharmacy per site process   Patient Belongings locker   Patient Belongings Put in Hospital Secure Location (Security or Locker, etc.) cash/credit card;cell phone/electronics;clothing;medication(s);tote;plastic bag   Belongings Search Yes   Clothing Search Yes   Second Staff Colby Bethea   Comment clothing,belt , cell phone,shoes/ wrist watch 2 visa and 2 mastercards to the security   A               Admission:  I am responsible for any personal items that are not sent to the safe or pharmacy.  [facility] is not responsible for loss, theft or damage of any property in my possession.    Signature:  _________________________________ Date: _______  Time: _____                                              Staff Signature:  ____________________________ Date: ________  Time: _____      2nd Staff person, if patient is unable/unwilling to sign:    Signature: ________________________________ Date: ________  Time: _____     Discharge:  [facility] has returned all of my personal belongings:    Signature: _________________________________ Date: ________  Time: _____                                          Staff Signature:  ____________________________ Date: ________  Time: _____

## 2021-07-08 NOTE — PLAN OF CARE
Assessment/Intervention/Current Symtoms and Care Coordination  -Refer to psychosocial completed on 7/8/21 by Elva Painter, LIDIAC, JONELLEC for assessment/social functioning  -Chart review  -Pre round meeting with team  -Rounded with team, addressed patient needs/concerns  -Post round meeting with team  -Initial psychosocial  -Team note  -Personal plan of care  -Contacted Patricia ([phone]) to let her know patient has two charts. This hospitalization should be merged into MRN [id number].  Patient actually has three charts - she will have them all merged into one once he discharges.    Current Symptoms include the following: Psychosis and AH    Discharge Plan or Goal  Pending stabilization & development of a safe discharge plan.  Considerations include: Co Occurring substance use program    Barriers to Discharge  Patient requires further psychiatric stabilization due to current symptomology    Referral Status  CD assessment order placed    Legal Status  Patient is voluntary

## 2021-07-08 NOTE — PLAN OF CARE
BEHAVIORAL TEAM DISCUSSION    Participants:   Dr. Pb Arciniega, Attending Psychiatrist  Kulwant Kohler MD, PGY1  Taty Martin, FUNMILAYO and Helena Pelletier, RN   Elva Painter, [facility], [facility], [facility]  Progress: Initial  Anticipated length of stay: Unknown, pending stabilization and appropriate disposition plan.   Continued Stay Criteria/Rationale: SI / chemical use  Medical/Physical: No acute issues - was cleared by ED for psychiatric admission  Precautions:   Behavioral Orders   Procedures     Code 1 - Restrict to Unit     Elopement precautions     Routine Programming     As clinically indicated     Self Injury Precaution     Single Room     Status 15     Every 15 minutes.     Suicide precautions     Patients on Suicide Precautions should have a Combination Diet ordered that includes a Diet selection(s) AND a Behavioral Tray selection for Safe Tray - with utensils, or Safe Tray - NO utensils       Withdrawal precautions     Plan: The plan is to assess and patient for mental health and medication needs.  The patient will be prescribed medications to treat the identified symptoms.  Upon discharge the patient will be referred to services as appropriate based on the assessment.   Rationale for change in precautions or plan: No change at present-will continue to monitor and adjust as needed.

## 2021-07-08 NOTE — PLAN OF CARE
Problem: General Plan of Care (Inpatient Behavioral)  Goal: Individualization/Patient Specific Goal (IP Behavioral)  Description: The patient and/or their representative will achieve their patient-specific goals related to the plan of care.    The patient-specific goals include:  Flowsheets (Taken 7/8/2021 1318)  Areas of Vulnerability: chemical use, depression  Patient Strengths:   Awareness of substance use issues   Motivated and ready for change    The patient specific goals include:   Patient will participate in unit programming  Patient will identify triggers and positive coping skills  Patient will take medications as prescribed by physician both for mental health and medical  Patient coached to work on coping packet    The patient identified the following reasons for hospitalization:  Depression  Relapse    The patient identified the following goals for discharge:     Medical management     CD treatment
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
## 2021-07-08 NOTE — PLAN OF CARE
Problem: Suicidal Behavior  Goal: Suicidal Behavior is Absent or Managed  Outcome: Improving  Flowsheets (Taken 7/8/2021 1316)  Mutually Determined Action Steps (Suicidal Behavior Absent/Managed): verbalizes safety check rationale   Pt has been in bed most of the shift. Pt asked writer in the a.m for his scheduled meds. Writer informed pt that he doesn't have any yet but will probably have some after meeting with the Dr.  Pt was most concerned about getting suboxone. Pt gets his Rx from [facility] in [place].   Writer told pt while he was eating lunch that his meds have been ordered and are ready to be taken. Pt said he would come to the med window after he ate. Pt forgot and went back to his room. Pt did come and get meds eventually. Pt denies SI/SIB/HI at this time. Pt endorses having depression and anxiety.   Pt has been calm and cooperative   without behavioral issues.

## 2021-07-09 PROCEDURE — 124N000002 HC R&B MH UMMC

## 2021-07-09 PROCEDURE — H0001 ALCOHOL AND/OR DRUG ASSESS: HCPCS

## 2021-07-09 PROCEDURE — 99223 1ST HOSP IP/OBS HIGH 75: CPT | Mod: AI | Performed by: PSYCHIATRY & NEUROLOGY

## 2021-07-09 PROCEDURE — 250N000013 HC RX MED GY IP 250 OP 250 PS 637: Performed by: STUDENT IN AN ORGANIZED HEALTH CARE EDUCATION/TRAINING PROGRAM

## 2021-07-09 PROCEDURE — 250N000013 HC RX MED GY IP 250 OP 250 PS 637

## 2021-07-09 RX ORDER — GABAPENTIN 300 MG/1
900 CAPSULE ORAL 3 TIMES DAILY
Status: DISCONTINUED | OUTPATIENT
Start: 2021-07-09 | End: 2021-07-12 | Stop reason: HOSPADM

## 2021-07-09 RX ADMIN — BUPRENORPHINE AND NALOXONE 3 FILM: 4; 1 FILM BUCCAL; SUBLINGUAL at 09:16

## 2021-07-09 RX ADMIN — HYDROXYZINE HYDROCHLORIDE 25 MG: 25 TABLET, FILM COATED ORAL at 18:01

## 2021-07-09 RX ADMIN — ACETAMINOPHEN 650 MG: 325 TABLET, FILM COATED ORAL at 20:41

## 2021-07-09 RX ADMIN — GABAPENTIN 600 MG: 300 CAPSULE ORAL at 09:16

## 2021-07-09 RX ADMIN — GABAPENTIN 900 MG: 300 CAPSULE ORAL at 14:20

## 2021-07-09 RX ADMIN — BUPRENORPHINE AND NALOXONE 3 FILM: 4; 1 FILM BUCCAL; SUBLINGUAL at 20:38

## 2021-07-09 RX ADMIN — GABAPENTIN 900 MG: 300 CAPSULE ORAL at 20:38

## 2021-07-09 RX ADMIN — ARIPIPRAZOLE 5 MG: 5 TABLET ORAL at 09:16

## 2021-07-09 ASSESSMENT — ACTIVITIES OF DAILY LIVING (ADL)
HYGIENE/GROOMING: INDEPENDENT
HYGIENE/GROOMING: INDEPENDENT
ORAL_HYGIENE: INDEPENDENT
ORAL_HYGIENE: INDEPENDENT
DRESS: SCRUBS (BEHAVIORAL HEALTH)
DRESS: INDEPENDENT

## 2021-07-09 NOTE — PLAN OF CARE
Problem: Depressive Symptoms  Goal: Depressive Symptoms  Description: Signs and symptoms of listed problems will be absent or manageable.  Outcome: No Change    Nursing Assessment    General Shift Summary  Pt isolative to room almost entire shift, laying in bed majority of shift. Pt reports trying to catch up on sleep since they had been up for several days before admission. Pt was appropriate and polite. Pt does not appear to be responding.     Pt endorses depression 8/10 and anxiety 8/10.   Pt denies SI/SIB thoughts.       Patient is medication compliant and reported no side effects. No PRN medications given this shift.     Hygiene is adequate.   Pt is receiving double portions and ate almost all of meal.       Salvador Melvin RN

## 2021-07-09 NOTE — PROGRESS NOTES
"CLINICAL NUTRITION SERVICES - ASSESSMENT NOTE     Nutrition Prescription    RECOMMENDATIONS FOR MDs/PROVIDERS TO ORDER:  None today    Malnutrition Status:    Patient does not meet two of the established criteria necessary for diagnosing malnutrition    Recommendations already ordered by Registered Dietitian (RD):  Vanilla ensure PM snack     Future/Additional Recommendations:  Monitor intakes and wt trends  Adjust supplements per pt preference      REASON FOR ASSESSMENT  Perry Preciado is a 46 year old male assessed by the dietitian for MST score of 2: positive  for weight loss of 14-23 lbs and negative  for poor oral intake related to decreased appetite   PMH significant for severe MDD, schizophrenia and polysubstance admitted for auditory hallucinations and paranoid delusion.   NUTRITION HISTORY  Pt reports a decreased appetite over the past month d/t drug use. When asked for a diet recall pt stated he has been eating \"nothing really\" during this time. Reports a UBW of 250#.    CURRENT NUTRITION ORDERS  Diet: Regular  Intake/Tolerance: Pt stated appetite has improved since admit. Stated he has been eating 75-80% of his meals with double portions. He is concerned about his prior wt loss and hoping to maintain his weight while admitted. Pt stated he has been getting hungry between meals (lunch and dinner) discussed adding supplements, pt requested a vanilla ensure. Denied N/V/C/D or difficulty chewing/swallowing. Pt with no additional concerns at this time.    LABS  Labs reviewed    MEDICATIONS  Medications reviewed  PRN: maalox, senokot    ANTHROPOMETRICS  Height: 190.5 cm (6' 3\")  Most Recent Weight: 99.8 kg (220 lb)    IBW: 89.1 kg  BMI: 27.5 kg/m^2, Overweight BMI 25-29.9  Weight History: No documented wt history. Pt reports a UBW of 250# 1 month ago  Dosing Weight: 99.8 kg (current)     ASSESSED NUTRITION NEEDS  Estimated Energy Needs: 2495 kcals/day (25kcal/kg)  Justification: Maintenance  Estimated " Protein Needs:  grams protein/day (0.8 - 1 grams of pro/kg)  Justification: Maintenance  Estimated Fluid Needs: 3586-8287 mL/day (25 - 30 mL/kg)   Justification: Maintenance or Per provider pending fluid status    PHYSICAL FINDINGS  See malnutrition section below.    MALNUTRITION  % Intake: </=75% for >/= 1 month (severe)  % Weight Loss: Weight loss does not meet criteria, unable to verify pt reported wt  Subcutaneous Fat Loss: None observed  Muscle Loss: None observed  Fluid Accumulation/Edema: None noted  Malnutrition Diagnosis: Patient does not meet two of the established criteria necessary for diagnosing malnutrition    NUTRITION DIAGNOSIS  Predicted inadequate nutrient intake related to substance ause as evidenced by pt reported intakes and wt loss (30# per pt report) PTA with improvements since admit       INTERVENTIONS  Implementation  Nutrition Education: Discussed role of RD, menu ordering and available snacks/supplements. Encouraged a general healthy diet and adequate intakes. Discussed consuming more frequent meals/snacks while appetite is poor     PM snack: ensure      Goals  Patient to consume % of nutritionally adequate meal trays TID, or the equivalent with supplements/snacks.     Monitoring/Evaluation  Progress toward goals will be monitored and evaluated per protocol.    Marci Bush MS, RD, LDN  Unit Pager [phone]

## 2021-07-09 NOTE — PLAN OF CARE
Pt appears to be sleeping at start of shift. Isolative. Out of room for dinner. Pleasant, cooperative. Flat. Endorses high depression, anxiety. Denies SI.     1801 Prn atarax administered for high anxiety. Pt reports was somewhat effective.  2045 C/o 9/10 L shoulder pain. Prn tylenol administered.    Problem: Depressive Symptoms  Goal: Depressive Symptoms  Description: Signs and symptoms of listed problems will be absent or manageable.  Outcome: No Change
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
## 2021-07-09 NOTE — CONSULTS
7/9/2021    KARINE gil completed.   Pt reports he wants referral to [facility] in [place].   UMBERTO will be sent to [facility], I will send referral out this morning.     Recommendations:   1. Abstain from all non-prescribed mood-altering substances  2. Take all medications as prescribed  3. Enter and complete a residential or inpatient treatment program  4. Follow all recommendations upon discharge from treatment. Recommendations may include, but are not limited to: extended treatment, outpatient treatment and/or sober housing.  5. Follow all recommendations of your medical providers     Referrals:  [facility]  Main: [phone]  Referral/Intake Line: [phone]  Fax: [phone]     JAQUELINE consult completed by: Pippa Bobo [facility].  Phone Number: [phone]  E-mail Address: @[facility] Mental Health [facility] Addiction Services Evaluation Department  [address] [place] [address]     *Due to regulation of Title 42 of the Code of Federal Regulations (CFR) Part 2: Confidentiality laws apply to this note and the information wherein.  Thus, this note cannot be copy and pasted into any other health care staff's note nor can it be included in general medical records sent to ANY outside agency without the patient's written consent.

## 2021-07-09 NOTE — PLAN OF CARE
"  Problem: Suicidal Behavior  Goal: Suicidal Behavior is Absent or Managed  Outcome: Improving  Flowsheets (Taken 7/9/2021 4105)  Mutually Determined Action Steps (Suicidal Behavior Absent/Managed): verbalizes safety check rationale   Pt has been isolative to his room this a.m. Pt did come out to get VS done, eat breakfast and take a.m meds. Pt c/o pain in his lower back at 10/10. Pt endorses anxiety and depression at 10/10 and says \"I feel hopeless\". Writer asked what he thinks could make it better. Pt's first answer was \"I don't know\". Writer then asked again, pt then said \"I suppose CD tx.\" Pt denies SI/SIB/HI and hallucinations.   "

## 2021-07-10 PROCEDURE — 124N000002 HC R&B MH UMMC

## 2021-07-10 PROCEDURE — 250N000013 HC RX MED GY IP 250 OP 250 PS 637

## 2021-07-10 PROCEDURE — 250N000013 HC RX MED GY IP 250 OP 250 PS 637: Performed by: STUDENT IN AN ORGANIZED HEALTH CARE EDUCATION/TRAINING PROGRAM

## 2021-07-10 RX ADMIN — HYDROXYZINE HYDROCHLORIDE 25 MG: 25 TABLET, FILM COATED ORAL at 16:02

## 2021-07-10 RX ADMIN — GABAPENTIN 900 MG: 300 CAPSULE ORAL at 19:55

## 2021-07-10 RX ADMIN — GABAPENTIN 900 MG: 300 CAPSULE ORAL at 14:37

## 2021-07-10 RX ADMIN — ARIPIPRAZOLE 5 MG: 5 TABLET ORAL at 08:59

## 2021-07-10 RX ADMIN — GABAPENTIN 900 MG: 300 CAPSULE ORAL at 08:59

## 2021-07-10 RX ADMIN — BUPRENORPHINE AND NALOXONE 3 FILM: 4; 1 FILM BUCCAL; SUBLINGUAL at 19:55

## 2021-07-10 RX ADMIN — BUPRENORPHINE AND NALOXONE 3 FILM: 4; 1 FILM BUCCAL; SUBLINGUAL at 08:59

## 2021-07-10 RX ADMIN — NICOTINE POLACRILEX 2 MG: 2 GUM, CHEWING BUCCAL at 21:23

## 2021-07-10 ASSESSMENT — ACTIVITIES OF DAILY LIVING (ADL)
DRESS: SCRUBS (BEHAVIORAL HEALTH)
ORAL_HYGIENE: INDEPENDENT
DRESS: INDEPENDENT
HYGIENE/GROOMING: INDEPENDENT
HYGIENE/GROOMING: INDEPENDENT
ORAL_HYGIENE: INDEPENDENT

## 2021-07-10 NOTE — PLAN OF CARE
Problem: Sleep Disturbance  Goal: Adequate Sleep/Rest  Description: Pt slept 6 hours this night  Outcome: Declining   Pt c/o awakening twice with nightmares.he intermittently slept 6.50 hours,emotional support offered,pt offered no info around content of nightmares,defer to team

## 2021-07-10 NOTE — PLAN OF CARE
"Pt out in lounge at start of shift watching tv/ movies with peers. Flat, polite, cooperative. Good appetite. Showered.    1602 Endorses anxiety. Prn atarax administered. Reports some effectiveness.  1840 Endorses anxiety and depression. No SI. States he is \"just trying to not be so depressed.\" Pt did spend majority of shift out in lounge watching movies/tv with peers. Reports L shoulder pain that started a couple days ago; pt states he will speak with doctor on Monday about it. Declines prn at this time.  2120 Pt wondering if he can complete his unemployment online tomorrow morning; oncall resident updated and order received.    Problem: Depressive Symptoms  Goal: Depressive Symptoms  Description: Signs and symptoms of listed problems will be absent or manageable.  Outcome: No Change     "

## 2021-07-10 NOTE — PLAN OF CARE
"  Problem: Depressive Symptoms  Goal: Depressive Symptoms  Description: Signs and symptoms of listed problems will be absent or manageable.  Outcome: Improving  Flowsheets (Taken 7/10/2021 1405)  Depressive Symptoms Assessed: all  Depressive Symptoms Present:   anxiety   sleep   insight   affect   Pt has been up and out in the milieu on/off throughout the shift. Pt endorses depression and anxiety but didn't rate. Pt appears flat and blunted. Pt endorses AH but says \"they're better than they were when I came in.\" Pt denies SI/SIB/HI. Pt is eating and drinking appropriately. Pt is med compliant.   "

## 2021-07-11 PROCEDURE — 250N000013 HC RX MED GY IP 250 OP 250 PS 637

## 2021-07-11 PROCEDURE — 124N000002 HC R&B MH UMMC

## 2021-07-11 RX ADMIN — ARIPIPRAZOLE 5 MG: 5 TABLET ORAL at 08:07

## 2021-07-11 RX ADMIN — GABAPENTIN 900 MG: 300 CAPSULE ORAL at 08:07

## 2021-07-11 RX ADMIN — BUPRENORPHINE AND NALOXONE 3 FILM: 4; 1 FILM BUCCAL; SUBLINGUAL at 08:07

## 2021-07-11 RX ADMIN — GABAPENTIN 900 MG: 300 CAPSULE ORAL at 19:01

## 2021-07-11 RX ADMIN — BUPRENORPHINE AND NALOXONE 3 FILM: 4; 1 FILM BUCCAL; SUBLINGUAL at 19:01

## 2021-07-11 RX ADMIN — GABAPENTIN 900 MG: 300 CAPSULE ORAL at 13:27

## 2021-07-11 ASSESSMENT — ACTIVITIES OF DAILY LIVING (ADL)
HYGIENE/GROOMING: INDEPENDENT
ORAL_HYGIENE: INDEPENDENT
DRESS: SCRUBS (BEHAVIORAL HEALTH)
HYGIENE/GROOMING: INDEPENDENT
ORAL_HYGIENE: INDEPENDENT
DRESS: SCRUBS (BEHAVIORAL HEALTH);INDEPENDENT

## 2021-07-11 ASSESSMENT — MIFFLIN-ST. JEOR: SCORE: 2009.37
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
## 2021-07-11 NOTE — PLAN OF CARE
"Pt out in lounge at start of shift watching movie. Cooperative, flat, sad affect. Denies SI. Endorses depression and anxiety; states it is about the same as yesterday. States he is sad; unsure what makes it better, states it is \"new\" to him. Drank Ensure. Good appetite. States completed unemployment this morning. Pt significant other visited. Pt hoping to go to treatment after here. Pt did ask about signing a 12hr intent to leave; encouraged pt to wait and speak with the resident/ team in the morning about discharge. Pt hoping to discharge tomorrow and then go to treatment from home.    Problem: Depressive Symptoms  Goal: Depressive Symptoms  Description: Signs and symptoms of listed problems will be absent or manageable.  Outcome: No Change     "

## 2021-07-11 NOTE — PLAN OF CARE
Problem: Suicidal Behavior  Goal: Suicidal Behavior is Absent or Managed  Outcome: Improving  Flowsheets (Taken 7/11/2021 4898)  Mutually Determined Action Steps (Suicidal Behavior Absent/Managed): verbalizes safety check rationale   Pt has been in and out of his room all shift. Pt appears flat and blunted. Pt rates depression at 9/10 and low back pain at 7/10. Pt denies SI/SIB/HI and hallucinations. Pt c/o left shoulder pain at 10/10 with no relief from scheduled gabapentin. Gave pt an ice pack for his shoulder. When talking with pt he was telling writer about his disappointment in himself for relapsing. Pt then talked about going to tx after here and hoping he can go from door to door. Pt has been calm and polite.

## 2021-07-12 VITALS
WEIGHT: 229 LBS | OXYGEN SATURATION: 96 % | RESPIRATION RATE: 16 BRPM | HEART RATE: 65 BPM | TEMPERATURE: 97.5 F | HEIGHT: 75 IN | SYSTOLIC BLOOD PRESSURE: 108 MMHG | BODY MASS INDEX: 28.47 KG/M2 | DIASTOLIC BLOOD PRESSURE: 70 MMHG

## 2021-07-12 PROCEDURE — 99238 HOSP IP/OBS DSCHRG MGMT 30/<: CPT | Mod: GC | Performed by: PSYCHIATRY & NEUROLOGY

## 2021-07-12 PROCEDURE — 250N000013 HC RX MED GY IP 250 OP 250 PS 637

## 2021-07-12 RX ORDER — ARIPIPRAZOLE 5 MG/1
5 TABLET ORAL DAILY
Qty: 30 TABLET | Refills: 0 | Status: SHIPPED | OUTPATIENT
Start: 2021-07-13 | End: 2021-07-12

## 2021-07-12 RX ORDER — BUPRENORPHINE AND NALOXONE 4; 1 MG/1; MG/1
3 FILM, SOLUBLE BUCCAL; SUBLINGUAL 2 TIMES DAILY
Status: CANCELLED | COMMUNITY
Start: 2021-07-12

## 2021-07-12 RX ORDER — GABAPENTIN 300 MG/1
900 CAPSULE ORAL 3 TIMES DAILY
Qty: 270 CAPSULE | Refills: 0 | Status: SHIPPED | OUTPATIENT
Start: 2021-07-12 | End: 2023-12-12

## 2021-07-12 RX ORDER — GABAPENTIN 300 MG/1
900 CAPSULE ORAL 3 TIMES DAILY
Qty: 270 CAPSULE | Refills: 0 | Status: SHIPPED | OUTPATIENT
Start: 2021-07-12 | End: 2021-07-12

## 2021-07-12 RX ORDER — ARIPIPRAZOLE 5 MG/1
5 TABLET ORAL DAILY
Qty: 30 TABLET | Refills: 0 | Status: SHIPPED | OUTPATIENT
Start: 2021-07-13 | End: 2023-12-12

## 2021-07-12 RX ADMIN — ARIPIPRAZOLE 5 MG: 5 TABLET ORAL at 08:05

## 2021-07-12 RX ADMIN — GABAPENTIN 900 MG: 300 CAPSULE ORAL at 08:05

## 2021-07-12 RX ADMIN — BUPRENORPHINE AND NALOXONE 3 FILM: 4; 1 FILM BUCCAL; SUBLINGUAL at 08:05

## 2021-07-12 ASSESSMENT — ACTIVITIES OF DAILY LIVING (ADL)
DRESS: SCRUBS (BEHAVIORAL HEALTH);INDEPENDENT
ORAL_HYGIENE: INDEPENDENT
HYGIENE/GROOMING: INDEPENDENT
LAUNDRY: WITH SUPERVISION

## 2021-07-12 NOTE — PLAN OF CARE
Assessment/Intervention/Current Symtoms and Care Coordination    Attended team meeting and reviewed chart notes.    Pt will discharge today and will be in contact with [facility] for bed availability. Pt will discharge to home.        Discharge Plan or Goal  Discharge to home      Barriers to Discharge   none      Referral Status  [facility]- they have openings but pt has not been approved at this time.      Legal Status  voluntary

## 2021-07-12 NOTE — PLAN OF CARE
Problem: Suicidal Behavior  Goal: Suicidal Behavior is Absent or Managed  Outcome: Improving     Problem: Depressive Symptoms  Goal: Depressive Symptoms  Description: Signs and symptoms of listed problems will be absent or manageable.  Outcome: Improving     Pt is calm and cooperative. Requested to discharge. States his plan is to go to his apartment with his girlfriend and wait for an opening at [facility]. Pt denies hallucinations and SI. Rates anxiety a 7 and depression a 6. Med compliant. Complained of left shoulder and back pain. Given an ice pack with some reported relief. Will continue to monitor and assist as needed.

## 2021-07-12 NOTE — PLAN OF CARE
Problem: Sleep Disturbance  Goal: Adequate Sleep/Rest  Description: Pt slept 6 hours this night  Outcome: Improving   Pt slept 6.25 hours intermittently ,he awakened for a snack and reluctantly stated that he had experienced a nightmare but unable to      remember any thing about it,he did not mention any info regarding wanting to discharge soon,,defer to team

## 2021-07-12 NOTE — DISCHARGE INSTRUCTIONS
Behavioral Discharge Planning and Instructions    Summary: You were admitted on 7/8/2021 due to substance induced psychosis. You were treated by Dr. Arciniega and discharged on 7/12/21 from [facility] to Home.    Main Diagnosis:   # Substance Induced Psychosis  Major Depressive Disorder    Health Care Follow-up:   Medication Management - [facility]  [phone] FAX; [phone]  Appointment Date/TimeAugust 4th @ 10:45   Prescriber: Melinda Villarreal  Address: [facility] [address], [place]      Patient will be going to [facility]- [phone] when he is approved and bed is available.    Pt gets suboxone from [facility] in [facility]. His girlfriend will  and deliver to treatment center.      Attend all scheduled appointments with your outpatient providers. Call at least 24 hours in advance if you need to reschedule an appointment to ensure continued access to your outpatient providers.     Major Treatments, Procedures and Findings:  You were provided with: a psychiatric assessment, assessed for medical stability, medication evaluation and/or management, group therapy, milieu management.    Symptoms to Report: Feeling more aggressive, increased confusion, losing more sleep, mood getting worse, or thoughts of suicide.    Early warning signs can include: Increased depression or anxiety sleep disturbances increased thoughts or behaviors of suicide or self-harm  increased unusual thinking, such as paranoia or hearing voices.    Safety and Wellness: Take all medicines as directed. Make no changes unless your doctor suggests them. Follow treatment recommendations. Refrain from alcohol and non-prescribed drugs.  Ask your support system to help you reduce your access to items that could harm yourself or others. If there is a concern for safety, call 071.    Resources:   Crisis Intervention: 541.839.4095 or 444-878-9777 (TTY: 989.534.9516).  Call anytime for help.  National [place] on Mental  "Illness (www.[place].naldo.org): 580.551.3097 or 499-844-3126.  Alcoholics Anonymous (www.alcoholics-anonymous.org): Check your phone book for your local chapter.  Text 4 Life: txt \"LIFE\" to 73528 for immediate support and crisis intervention  Crisis text line: Text \"[place]\" to 541489. Free, confidential, 24/7.  [place], and [facility] Mobile Crisis Response Team (CRT):  [phone] or [phone]     General Medication Instructions:     See your medication sheet(s) for instructions.     Take all medicines as directed.  Make no changes unless your doctor suggests them.     Go to all your doctor visits.    Be sure to have all your required lab tests. This way, your medicines can be refilled on time.    Do not use any drugs not prescribed by your doctor.    Avoid alcohol.    Advance Directives:   Scanned document on file with [facility]? No scanned doc  Is document scanned? No. Copy Requested.  Honoring Choices Your Rights Handout: Informed and given  Was more information offered? Pt declined    The Treatment team has appreciated the opportunity to work with you. If you have any questions or concerns about your recent admission, you can contact the unit which can receive your call 24 hours a day, 7 days a week. They will be able to get in touch with a Provider if needed. The unit number is [phone] .       "

## 2021-07-12 NOTE — PROGRESS NOTES
Pt discharged at 1200. Pt's belongings were returned. Medication scripts sent to [facility]. Writer reviewed AVS with pt and pt verbalized understanding. Pt denies SI/HI, agrees to contract for safety out in the community. BT escorted pt out of the hospital. Pt taxied to his girlfriend's work with plan to start [facility] CD treatment when openings.

## 2021-07-12 NOTE — DISCHARGE SUMMARY
"    Psychiatric Discharge Summary    Hospital Day #4    Perry Preciado MRN# [id number]   Age: 46 year old YOB: 1975     Date of Admission:  7/8/2021  Date of Discharge:  7/12/2021 12:00 PM  Admitting Physician:  Pb Arciniega MD  Discharge Physician:  Pb Arciniega MD         Event Leading to Hospitalization:   \"Perry Preciado is a 46 year old male with a history of recurrent, severe MDD, schizophrenia and polysubstance use admitted from the [facility] ED to unit 22 on a 72 hr hold on 07/08/2021 due to concern for auditory hallucinations and paranoid delusion in the context of methamphetamine relapse and medication non-adherance. The patient was previously diagnosed with recurrent severe MDD in 2019, states he was diagnosed with schizophrenia in 2016 and has been using meth since he was 13.  He was last hospitalized on 5/4/2020 for SI and was discharged to a chemical dependency program which he completed successfully and has been not been using for the last 16 months.  For the past 40 days, he has endorsed AH and five days ago, he relapsed on methamphetamine after stresses from \"seeing people at work use\". On 7/7 his girlfriend brought him to [facility] ED because of extreme paranoia and hallucinations. In the ED, he was given Zyprexa and then transferred to unit 22. He is currently prescribed Seroquel, Suboxone, Gabapentin and Celexa however he has not taken them for the last 5 days and says the Seroquel was not effective. His goal for hospitalization is to be stabilized and discharged to an inpatient chemical dependency program.  \"       See Admission note by Pb Arciniega MD on for additional details.          Diagnoses:   # Substance-induced psychosis      #Schizoprenia    Diagnostic Impression:   Perry Preciado is a 46 year old male with a history of schizophrenia, MDD and polysubstance abuse admitted from the [facility] ED to unit 22 on 7/8/2021. Significant symptoms include AH, " paranoid delusions, SI, depressed and substance use. The patient presented to the ED 5 days after relapsing on methamphetamine and medication non-adherence with AH and paranoid delusions. Upon presentation, he was given Zyprexa and transferred to unit 22.  He has a history of multiple hospitalizations for MDD and substance abuse and his last psychiatric hospitalization was in 5/4/2020 for SI. Previous to admission, pt was prescribed Seroquel, Suboxone, Gabapentin and Celexa. Given his recent methampheamine relapse and history of AH and depression, the most likely diagnosis is substance induced psychosis.Differential diagnoses include schizophrenia and bipolar disorder.     Given that he presented  With SI and psychosis, patient warranted inpatient psychiatric hospitalization to maintain his safety. Clinical stabilization, medication optimization and development of an appropriate discharge plan were parameters considered by the treating team.       Consults:   Inpatient substance use comprehensive consult:   Referral  [facility]  Main: [phone]  Referral/Intake Line: [phone]  Fax: [phone]           Hospital Course:   Psychiatric Course:  Perry Preciado was admitted to [facility] on 7/8/21 on a 72 hour hold but signed in as a volentary patient on arrival. Initial presentation consistent with Substance-induced psychosis, Utox was positive for The patient was transferred from [facility] ED in [place] where he was given zyprexa and stabilized. Upon admission to unit 22 on 7/8, he was prescribed Abilify 5 mg/day for psychosis and Suboxone 12/12 mg and Gabapentin 600 mg PO 3x day were continued. Celexa was discontinued due to ineffectiveness. On the evening of 7/8, he required PRN Trazodone 50mg and hydroxyzine 25mg for insomnia and anxiety. On 7/9, he denied any AH so Abilify was continued at 5 mg, however he endorsed back pain so gabapentin was increased to 900 mg TID. Patient significantly  "improved over the weekend and CTC confirmed availability at [facility] treatment on 7/12. Treatment team decided to discharge pt, who agreed with plan and referral.       Risk Assessment:      Today Perry Preciado denies SI, SIB and HI. No overt evidence of psychosis or mily observed. Patient grossly appears to be cognitively intact. Insight and judgement have improved significantly since admission. Patient reports being \"ashamed about relapsing\"  though feels very motivated and hopeful abiut CD tratment.  Patient is aware of consequences of not following plan and medication non-complince . Patient expresses understanding of his illness and willingness to pursue treatment. Patient has not exhibited aggressive or violence behaviors since prior to this admission. Throughout patient's stay he was calmed and cooperative.  he has notable risk factors for self-harm, including anxiety, substance abuse, comorbid medical condition of chronic back/shoulder pain and previous suicide attempts. However, risk is mitigated by commitment to family, sobriety, ability to volunteer a safety plan and history of seeking help when needed. Patient does/does not have immediate access to firearms. Therefore, based on all available evidence including the factors cited above, he does not appear to be at imminent risk for self-harm, does not meet criteria for a 72-hr hold, and therefore remains appropriate for ongoing outpatient level of care. Patient agreed to further reduce risk of self-harm by following up with outpatient PCP and agreed to remain medication adherent. Additional steps taken to minimize risk include: medication optimization, close psychiatric follow up and provision of crisis resources. Voluntary referral for CD was offered, he accepted this offer.     Perry was released to group home. During this admission, he did participate in groups and was visible in the milieu, and his symptoms of AH, paranoid " delusions,  improved. At the time of discharge he was determined to not be a danger to himself or others.     This document serves as a transfer of care to Perry Preciado's outpatient providers.         Discharge Medications:     Discharge Medication List as of 7/12/2021 12:22 PM      START taking these medications    Details   buprenorphine HCl-naloxone HCl (SUBOXONE) 4-1 MG per film Place 3 Film under the tongue 2 times daily, HistoricalNADEAN         CONTINUE these medications which have CHANGED    Details   ARIPiprazole (ABILIFY) 5 MG tablet Take 1 tablet (5 mg) by mouth daily, Disp-30 tablet, R-0, E-Prescribe      gabapentin (NEURONTIN) 300 MG capsule Take 3 capsules (900 mg) by mouth 3 times daily, Disp-270 capsule, R-0, E-Prescribe         CONTINUE these medications which have NOT CHANGED    Details   atorvastatin (LIPITOR) 20 MG tablet Take 20 mg by mouth daily, Historical         STOP taking these medications       Buprenorphine HCl-Naloxone HCl (SUBOXONE) 12-3 MG FILM per film Comments:   Reason for Stopping:         citalopram (CELEXA) 40 MG tablet Comments:   Reason for Stopping:         cloNIDine (CATAPRES) 0.1 MG tablet Comments:   Reason for Stopping:         gabapentin (NEURONTIN) 600 MG tablet Comments:   Reason for Stopping:                    Psychiatric Examination:   Appearance:  no apparent distress, normal posture, normal gait, appears stated age, good hygiene and appropriately dressed  Attitude:  cooperative, engaged, friendly and pleasant  Psychomotor:  no evidence of tics, dystonia, or tardive dyskinesia  Eye Contact: appropriate  Speech:  fluent English, normal tone, normal rate and normal prosody  Mood:   Affect:  congruent, appropriate and non-labile  Thought Content: Denies AH, CAH, HI  Thought Process: linear and goal directed  Sensorium: awake and alert  Cognition: memory grossly intact  Impulse control: good  Insight: good  Judgment: fair         Discharge Plan:   Psychiatric  Appointments:   Medication Management - [facility]  [phone] FAX; [phone]  Appointment Date/TimeAusudhat 4th @ 10:45   Prescriber: Melinda Villarreal  Address: [facility] [address], [place]    Other referrals:  [facility], CD treatment  Main: [phone]  Referral/Intake Line: [phone]  Fax: [phone]          Pt seen and discussed with my attending physician, Pb Arciniega MD.   Kulwant Kohler MD  PGY-1 Psychiatry Resident    Attestation:   The patient has been seen and evaluated by me,  Pb Arciniega MD. I have examined the patient today and reviewed the discharge plan with the resident and medical student. I agree with the final assessment and plan, as noted in the discharge summary. I have reviewed today's vital signs, medications, labs and imaging.  Total time discharge plannin minutes  Pb Arciniega MD ,Ph.D.            Appendix A: All Labs This Admission:     Results for orders placed or performed during the hospital encounter of 21   CBC with platelets differential     Status: None   Result Value Ref Range    WBC 8.3 4.0 - 11.0 10e9/L    RBC Count 4.49 4.4 - 5.9 10e12/L    Hemoglobin 13.5 13.3 - 17.7 g/dL    Hematocrit 40.1 40.0 - 53.0 %    MCV 89 78 - 100 fl    MCH 30.1 26.5 - 33.0 pg    MCHC 33.7 31.5 - 36.5 g/dL    RDW 13.2 10.0 - 15.0 %    Platelet Count 189 150 - 450 10e9/L    Diff Method Manual Differential     % Neutrophils 47.0 %    % Lymphocytes 44.3 %    % Monocytes 7.8 %    % Eosinophils 0.0 %    % Basophils 0.9 %    Absolute Neutrophil 3.9 1.6 - 8.3 10e9/L    Absolute Lymphocytes 3.7 0.8 - 5.3 10e9/L    Absolute Monocytes 0.6 0.0 - 1.3 10e9/L    Absolute Eosinophils 0.0 0.0 - 0.7 10e9/L    Absolute Basophils 0.1 0.0 - 0.2 10e9/L    RBC Morphology Normal     Platelet Estimate Confirming automated cell count    Comprehensive metabolic panel     Status: Abnormal   Result Value Ref Range    Sodium 139 133 - 144 mmol/L    Potassium 3.7 3.4 - 5.3 mmol/L     Chloride 109 94 - 109 mmol/L    Carbon Dioxide 27 20 - 32 mmol/L    Anion Gap 3 3 - 14 mmol/L    Glucose 90 70 - 99 mg/dL    Urea Nitrogen 20 7 - 30 mg/dL    Creatinine 1.04 0.66 - 1.25 mg/dL    GFR Estimate 86 >60 mL/min/[1.73_m2]    GFR Estimate If Black >90 >60 mL/min/[1.73_m2]    Calcium 8.6 8.5 - 10.1 mg/dL    Bilirubin Total 0.3 0.2 - 1.3 mg/dL    Albumin 3.2 (L) 3.4 - 5.0 g/dL    Protein Total 6.4 (L) 6.8 - 8.8 g/dL    Alkaline Phosphatase 73 40 - 150 U/L    ALT 26 0 - 70 U/L    AST 15 0 - 45 U/L   Lipid panel     Status: Abnormal   Result Value Ref Range    Cholesterol 152 <200 mg/dL    Triglycerides 112 <150 mg/dL    HDL Cholesterol 33 (L) >39 mg/dL    LDL Cholesterol Calculated 97 <100 mg/dL    Non HDL Cholesterol 119 <130 mg/dL   TSH with free T4 reflex and/or T3 as indicated     Status: Abnormal   Result Value Ref Range    TSH 0.31 (L) 0.40 - 4.00 mU/L   Vitamin B12     Status: None   Result Value Ref Range    Vitamin B12 468 193 - 986 pg/mL   Folate     Status: None   Result Value Ref Range    Folate 18.7 >5.4 ng/mL   T4 free     Status: None   Result Value Ref Range    T4 Free 0.95 0.76 - 1.46 ng/dL   EKG 12 lead     Status: None   Result Value Ref Range    Interpretation ECG Click View Image link to view waveform and result    Chemical Dependency IP Consult     Status: None ()    Pippa Abad [facility]     7/9/2021 10:04 AM  7/9/2021    KARINE eval completed.   Pt reports he wants referral to [facility] in [place].   UMBERTO will be sent to [facility], I will send referral out this morning.     Recommendations:   1. Abstain from all non-prescribed mood-altering substances  2. Take all medications as prescribed  3. Enter and complete a residential or inpatient treatment   program  4. Follow all recommendations upon discharge from treatment.   Recommendations may include, but are not limited to: extended   treatment, outpatient treatment and/or sober housing.  5. Follow all recommendations  of your medical providers     Referrals:  [facility]  Main: [phone]  Referral/Intake Line: [phone]  Fax: [phone]     JAQUELINE consult completed by: Pippa Bobo [facility].  Phone Number: [phone]  E-mail Address: @[facility]   [facility]  [address] [place] [address]     *Due to regulation of Title 42 of the Code of Federal Regulations   (CFR) Part 2: Confidentiality laws apply to this note and the   information wherein.  Thus, this note cannot be copy and pasted   into any other health care staff's note nor can it be included in   general medical records sent to ANY outside agency without the   patient's written consent.

## 2021-07-14 ENCOUNTER — TELEPHONE (OUTPATIENT)
Dept: FAMILY MEDICINE | Facility: CLINIC | Age: 46
End: 2021-07-14

## 2021-07-14 NOTE — TELEPHONE ENCOUNTER
ED/UC/IP follow up phone call: [facility] discharge 7/12/21 Undifferentiated Schizophrenia    RN please call to follow up.    Number of ED visits in past 12 months = 0

## 2021-07-14 NOTE — TELEPHONE ENCOUNTER
ED / Discharge Outreach Protocol    Patient Contact    Attempt # 1    Was call answered?  No.  Unable to leave message. not set up.  LORELEI Martinez RN

## 2023-05-22 ENCOUNTER — TRANSFERRED RECORDS (OUTPATIENT)
Dept: HEALTH INFORMATION MANAGEMENT | Facility: CLINIC | Age: 48
End: 2023-05-22

## 2023-06-01 ENCOUNTER — TRANSFERRED RECORDS (OUTPATIENT)
Dept: HEALTH INFORMATION MANAGEMENT | Facility: CLINIC | Age: 48
End: 2023-06-01

## 2023-06-12 ENCOUNTER — TRANSFERRED RECORDS (OUTPATIENT)
Dept: HEALTH INFORMATION MANAGEMENT | Facility: CLINIC | Age: 48
End: 2023-06-12

## 2023-07-17 NOTE — PROGRESS NOTES
SUBJECTIVE:   Perry Preciado Jr. is a 43 year old male who presents to clinic today for the following health issues:    Patient would like a referral to Neurology to follow up with a concussion he got this summer when he was hit by a car.    Concern - MVA  Onset: August    Description:   Patient was on his bike and was hit by a minivan last summer.    Intensity: moderate    Progression of Symptoms:  same    Accompanying Signs & Symptoms:  Pain in left arm and hand, weakness in his left elbow, dizziness and fainting episodes    Previous history of similar problem:   none    Precipitating factors:   Worsened by: none    Alleviating factors:  Improved by: none    Therapies Tried and outcome: none    Problem list and histories reviewed & adjusted, as indicated.  Additional history: as documented    Patient Active Problem List   Diagnosis     Major depressive disorder, recurrent episode, moderate (H)     CARDIOVASCULAR SCREENING; LDL GOAL LESS THAN 130     Acute bilateral low back pain with right-sided sciatica     Carpal tunnel syndrome of right wrist     KANE (generalized anxiety disorder)     Past Surgical History:   Procedure Laterality Date     BACK SURGERY         Social History     Tobacco Use     Smoking status: Former Smoker     Packs/day: 0.50     Types: Cigarettes     Smokeless tobacco: Current User   Substance Use Topics     Alcohol use: No     History reviewed. No pertinent family history.      Current Outpatient Medications   Medication Sig Dispense Refill     ALEVE OR None Entered       escitalopram (LEXAPRO) 20 MG tablet Take 1 tablet (20 mg total) by mouth once daily. 90 tablet 0     hydrOXYzine (ATARAX) 25 MG tablet Take 1-2 tablets (25-50 mg) by mouth every 6 hours as needed for anxiety 60 tablet 1     ibuprofen (ADVIL,MOTRIN) 600 MG tablet Take 1 tablet (600 mg) by mouth every 8 hours as needed for moderate pain 30 tablet 0     order for DME Arm band 1 Units 0     ALPRAZolam (XANAX) 0.25 MG tablet  · TAKE 1 TABLET BY MOUTH ONCE DAILY AS NEEDED FOR SEVERE ANXIETY (Patient not taking: Reported on 12/14/2018) 20 tablet 0     traMADol (ULTRAM) 50 MG tablet Take 1-2 tablets ( mg) by mouth every 6 hours as needed for pain (Patient not taking: Reported on 12/14/2018) 20 tablet 0     Allergies   Allergen Reactions     Wellbutrin [Bupropion]      Recent Labs   Lab Test 11/15/14  1445 10/07/14  0155   ALT  --  40   CR 1.28* 1.29*   GFRESTIMATED 63 62   GFRESTBLACK 76 75   POTASSIUM 4.0 3.4      BP Readings from Last 3 Encounters:   12/14/18 118/80   11/27/17 110/86   06/16/17 111/75    Wt Readings from Last 3 Encounters:   12/14/18 109.3 kg (241 lb)   11/27/17 107 kg (236 lb)   06/16/17 109.5 kg (241 lb 6.4 oz)                    Reviewed and updated as needed this visit by clinical staff       Reviewed and updated as needed this visit by Provider         ROS:      OBJECTIVE:     /80 (BP Location: Right arm, Cuff Size: Adult Large)   Pulse 76   Temp 97.2  F (36.2  C) (Tympanic)   Resp 18   Ht 1.88 m (6' 2\")   Wt 109.3 kg (241 lb)   BMI 30.94 kg/m    Body mass index is 30.94 kg/m .  GENERAL: healthy, alert and no distress  EYES: Eyes grossly normal to inspection, PERRL and conjunctivae and sclerae normal  HENT: ear canals and TM's normal, nose and mouth without ulcers or lesions  NECK: no adenopathy, no asymmetry, masses, or scars and thyroid normal to palpation  RESP: lungs clear to auscultation - no rales, rhonchi or wheezes  CV: regular rate and rhythm, normal S1 S2, no S3 or S4, no murmur, click or rub, no peripheral edema and peripheral pulses strong  ABDOMEN: soft, nontender, no hepatosplenomegaly, no masses and bowel sounds normal  MS: no gross musculoskeletal defects noted, no edema  SKIN: no suspicious lesions or rashes  NEURO: Normal strength and tone, mentation intact and speech normal  PSYCH: mentation appears normal, affect normal/bright    Left  Inspection: no swelling, no ecchymosis, no " olecranon bursa swelling  Tender: common flexor tendon, flexor muscle of forearm, olecranon bursa and distal bicep tendon  Non-tender: lateral epicondyle, common extensor tendon, medial epicondyle, common flexor tendon and radial head/neck  Range of Motion: all normal  Strength: elbow strength full  Special tests: normal stability, normal valgus stress, normal varus stress:     LEFT ELBOW TWO VIEWS   12/14/2018 2:21 PM      HISTORY: Left elbow pain.     COMPARISON: None.     FINDINGS: Negative left elbow. Minimal traction spur at the olecranon  process.                                                                      IMPRESSION: Nothing acute.    ASSESSMENT/PLAN:   (G44.309,  S09.90XS) Headaches due to old head trauma  (primary encounter diagnosis)  Comment: Patient recently had motor vehicle accidents.  Continues to have episodes of headaches will obtain MRI and have him follow-up with neurology  Plan: MR Brain w/o & w Contrast, NEUROLOGY ADULT         REFERRAL      (R55) Syncope, unspecified syncope type  Comment: Your exam normal today.   Plan: No symptoms presently today no  heart palpitations with history of heart palpitations will obtain  Holter monitor    (R00.2) Palpitations  Comment:  No symptoms presently today no  heart palpitations with history of heart palpitations will obtain  Holter monitor  Plan: Zio Patch Holter 48 Hour Adult Pediatric      (M77.8) Left elbow tendinitis  Comment: X-ray negative today examination negative today patient continues to have symptoms of tendinitis will wear armband if not improving will follow up with orthopedics  Plan: ORTHO  REFERRAL      (M70.32) Bursitis of left elbow, unspecified bursa  Comment:X-ray negative today examination negative today patient continues to have symptoms of tendinitis will wear armband if not improving will follow up with orthopedics  Plan: To wear armband if not improving should follow-up with orthopedics    (M25.522) Left elbow  pain  Comment:   Plan: XR Elbow Left 2 Views, order for DME,         HYDROcodone-acetaminophen (NORCO) 5-325 MG         tablet      WOLF Wei CNP  [facility]

## 2023-11-21 ENCOUNTER — TELEPHONE (OUTPATIENT)
Dept: BEHAVIORAL HEALTH | Facility: CLINIC | Age: 48
End: 2023-11-21
Payer: COMMERCIAL

## 2023-11-21 NOTE — TELEPHONE ENCOUNTER
11/21/23: Pt is a(n) adult (18+ out of HS) Seeking as eval for Adult [facility] Assessment for [facility]..  Appointment scheduled by:  Patient.  (self-pay - complete Cost Estimate)  Caller name:  Perry Boughton    Caller phone #: Pt reported that he does not have a current phone number or address. Was calling with a staff from [facility]. Pt was given all information for assessment (address, provider, [facility] phone number). [facility] staff reported concerns with SI and stated that she planned to help pt to the ER to be seen, was also transferred to inpatient/detox line for consult.   Legal Guardianship Reviewed?  No  Honoring Choices Notified?  No  Brief reason for appt:  Pt called with support asking about getting into residential tx ASAP.      needed?  NO    Contact information verified/updated: [facility] attempted, but pt stated that they had not phone number/unhoused as well.     Marivel Shore

## 2023-11-27 ENCOUNTER — TELEPHONE (OUTPATIENT)
Dept: BEHAVIORAL HEALTH | Facility: CLINIC | Age: 48
End: 2023-11-27
Payer: COMMERCIAL

## 2023-11-27 NOTE — TELEPHONE ENCOUNTER
11/27/2023    Attempted mobile number, not in service. Attempted home number, went straight to , vm box not set up, unable to leave  regarding date and time of appointment.    Hazel Jarvis, Patient Navigator  [phone]

## 2023-11-29 NOTE — TELEPHONE ENCOUNTER
11/29/2023    Attempted pt's 763 number (listed as home and mobile) for reminder call, phone rang for unusually long time - voicemail never popped up. Unable to leave a message.    Pippa Bobo [facility] - Patient Navigator   [email or web address]  Direct phone: [phone]

## 2023-11-30 NOTE — TELEPHONE ENCOUNTER
This patient was a no call/no show to his 1:00 pm substance use disorder assessment appointment today on 11/30/2023.  The patient had not arrived to [facility] for the appointment as of 1:25 pm.  The patient should be directed to call the [facility] Intake department at [phone] to re-schedule the substance use disorder assessment as needed.

## 2023-12-12 ENCOUNTER — HOSPITAL ENCOUNTER (INPATIENT)
Facility: CLINIC | Age: 48
LOS: 8 days | Discharge: PSYCHIATRIC OR SUBSTANCE USE FACILITY | End: 2023-12-22
Attending: PSYCHIATRY & NEUROLOGY | Admitting: PSYCHIATRY & NEUROLOGY
Payer: COMMERCIAL

## 2023-12-12 ENCOUNTER — TELEPHONE (OUTPATIENT)
Dept: BEHAVIORAL HEALTH | Facility: CLINIC | Age: 48
End: 2023-12-12

## 2023-12-12 DIAGNOSIS — R19.7 DIARRHEA, UNSPECIFIED TYPE: ICD-10-CM

## 2023-12-12 DIAGNOSIS — F19.10 POLYSUBSTANCE ABUSE (H): ICD-10-CM

## 2023-12-12 DIAGNOSIS — F33.3 SEVERE EPISODE OF RECURRENT MAJOR DEPRESSIVE DISORDER, WITH PSYCHOTIC FEATURES (H): ICD-10-CM

## 2023-12-12 DIAGNOSIS — R45.851 SUICIDAL IDEATION: Primary | ICD-10-CM

## 2023-12-12 DIAGNOSIS — F10.21 ALCOHOL DEPENDENCE IN REMISSION (H): ICD-10-CM

## 2023-12-12 DIAGNOSIS — F11.21 OPIOID DEPENDENCE IN REMISSION (H): ICD-10-CM

## 2023-12-12 DIAGNOSIS — R45.89 SUICIDAL BEHAVIOR WITHOUT ATTEMPTED SELF-INJURY: ICD-10-CM

## 2023-12-12 DIAGNOSIS — F41.1 GAD (GENERALIZED ANXIETY DISORDER): ICD-10-CM

## 2023-12-12 DIAGNOSIS — E78.1 HYPERTRIGLYCERIDEMIA: ICD-10-CM

## 2023-12-12 DIAGNOSIS — K21.00 GASTROESOPHAGEAL REFLUX DISEASE WITH ESOPHAGITIS WITHOUT HEMORRHAGE: ICD-10-CM

## 2023-12-12 DIAGNOSIS — I10 HYPERTENSION, UNSPECIFIED TYPE: ICD-10-CM

## 2023-12-12 LAB
ALBUMIN SERPL BCG-MCNC: 4.4 G/DL (ref 3.5–5.2)
ALBUMIN UR-MCNC: 20 MG/DL
ALCOHOL BREATH TEST: 0 (ref 0–0.01)
ALP SERPL-CCNC: 79 U/L (ref 40–150)
ALT SERPL W P-5'-P-CCNC: 20 U/L (ref 0–70)
AMORPH CRY #/AREA URNS HPF: ABNORMAL /HPF
AMPHETAMINES UR QL SCN: ABNORMAL
ANION GAP SERPL CALCULATED.3IONS-SCNC: 9 MMOL/L (ref 7–15)
APPEARANCE UR: ABNORMAL
AST SERPL W P-5'-P-CCNC: 19 U/L (ref 0–45)
BARBITURATES UR QL SCN: ABNORMAL
BASOPHILS # BLD AUTO: 0.1 10E3/UL (ref 0–0.2)
BASOPHILS NFR BLD AUTO: 1 %
BENZODIAZ UR QL SCN: ABNORMAL
BILIRUB SERPL-MCNC: 0.4 MG/DL
BILIRUB UR QL STRIP: NEGATIVE
BUN SERPL-MCNC: 15.4 MG/DL (ref 6–20)
BZE UR QL SCN: ABNORMAL
CALCIUM SERPL-MCNC: 9.4 MG/DL (ref 8.6–10)
CANNABINOIDS UR QL SCN: ABNORMAL
CHLORIDE SERPL-SCNC: 104 MMOL/L (ref 98–107)
COLOR UR AUTO: YELLOW
CREAT SERPL-MCNC: 1.17 MG/DL (ref 0.67–1.17)
DEPRECATED HCO3 PLAS-SCNC: 29 MMOL/L (ref 22–29)
EGFRCR SERPLBLD CKD-EPI 2021: 77 ML/MIN/1.73M2
EOSINOPHIL # BLD AUTO: 0.1 10E3/UL (ref 0–0.7)
EOSINOPHIL NFR BLD AUTO: 1 %
ERYTHROCYTE [DISTWIDTH] IN BLOOD BY AUTOMATED COUNT: 13.4 % (ref 10–15)
FENTANYL UR QL: ABNORMAL
GLUCOSE SERPL-MCNC: 93 MG/DL (ref 70–99)
GLUCOSE UR STRIP-MCNC: NEGATIVE MG/DL
HCT VFR BLD AUTO: 46.5 % (ref 40–53)
HGB BLD-MCNC: 15.6 G/DL (ref 13.3–17.7)
HGB UR QL STRIP: ABNORMAL
IMM GRANULOCYTES # BLD: 0 10E3/UL
IMM GRANULOCYTES NFR BLD: 0 %
KETONES UR STRIP-MCNC: NEGATIVE MG/DL
LEUKOCYTE ESTERASE UR QL STRIP: NEGATIVE
LYMPHOCYTES # BLD AUTO: 3.2 10E3/UL (ref 0.8–5.3)
LYMPHOCYTES NFR BLD AUTO: 38 %
MCH RBC QN AUTO: 30.9 PG (ref 26.5–33)
MCHC RBC AUTO-ENTMCNC: 33.5 G/DL (ref 31.5–36.5)
MCV RBC AUTO: 92 FL (ref 78–100)
MONOCYTES # BLD AUTO: 0.8 10E3/UL (ref 0–1.3)
MONOCYTES NFR BLD AUTO: 10 %
MUCOUS THREADS #/AREA URNS LPF: PRESENT /LPF
NEUTROPHILS # BLD AUTO: 4.3 10E3/UL (ref 1.6–8.3)
NEUTROPHILS NFR BLD AUTO: 50 %
NITRATE UR QL: NEGATIVE
NRBC # BLD AUTO: 0 10E3/UL
NRBC BLD AUTO-RTO: 0 /100
OPIATES UR QL SCN: ABNORMAL
PCP QUAL URINE (ROCHE): ABNORMAL
PH UR STRIP: 5.5 [PH] (ref 5–7)
PLATELET # BLD AUTO: 264 10E3/UL (ref 150–450)
POTASSIUM SERPL-SCNC: 4 MMOL/L (ref 3.4–5.3)
PROT SERPL-MCNC: 7 G/DL (ref 6.4–8.3)
RBC # BLD AUTO: 5.05 10E6/UL (ref 4.4–5.9)
RBC URINE: 0 /HPF
SODIUM SERPL-SCNC: 142 MMOL/L (ref 135–145)
SP GR UR STRIP: 1.03 (ref 1–1.03)
TSH SERPL DL<=0.005 MIU/L-ACNC: 1.83 UIU/ML (ref 0.3–4.2)
UROBILINOGEN UR STRIP-MCNC: NORMAL MG/DL
WBC # BLD AUTO: 8.5 10E3/UL (ref 4–11)
WBC URINE: 0 /HPF

## 2023-12-12 PROCEDURE — 85025 COMPLETE CBC W/AUTO DIFF WBC: CPT | Performed by: PSYCHIATRY & NEUROLOGY

## 2023-12-12 PROCEDURE — 99285 EMERGENCY DEPT VISIT HI MDM: CPT | Mod: 25 | Performed by: PSYCHIATRY & NEUROLOGY

## 2023-12-12 PROCEDURE — 99284 EMERGENCY DEPT VISIT MOD MDM: CPT | Performed by: PSYCHIATRY & NEUROLOGY

## 2023-12-12 PROCEDURE — 250N000013 HC RX MED GY IP 250 OP 250 PS 637: Performed by: CLINICAL NURSE SPECIALIST

## 2023-12-12 PROCEDURE — 84443 ASSAY THYROID STIM HORMONE: CPT | Performed by: PSYCHIATRY & NEUROLOGY

## 2023-12-12 PROCEDURE — 80307 DRUG TEST PRSMV CHEM ANLYZR: CPT | Performed by: PSYCHIATRY & NEUROLOGY

## 2023-12-12 PROCEDURE — 250N000013 HC RX MED GY IP 250 OP 250 PS 637: Performed by: EMERGENCY MEDICINE

## 2023-12-12 PROCEDURE — G2213 INITIAT MED ASSIST TX IN ER: HCPCS | Performed by: PSYCHIATRY & NEUROLOGY

## 2023-12-12 PROCEDURE — 81001 URINALYSIS AUTO W/SCOPE: CPT | Performed by: PSYCHIATRY & NEUROLOGY

## 2023-12-12 PROCEDURE — 80053 COMPREHEN METABOLIC PANEL: CPT | Performed by: PSYCHIATRY & NEUROLOGY

## 2023-12-12 PROCEDURE — 250N000013 HC RX MED GY IP 250 OP 250 PS 637: Performed by: PSYCHIATRY & NEUROLOGY

## 2023-12-12 PROCEDURE — 99223 1ST HOSP IP/OBS HIGH 75: CPT | Performed by: CLINICAL NURSE SPECIALIST

## 2023-12-12 PROCEDURE — 36415 COLL VENOUS BLD VENIPUNCTURE: CPT | Performed by: PSYCHIATRY & NEUROLOGY

## 2023-12-12 PROCEDURE — 99418 PROLNG IP/OBS E/M EA 15 MIN: CPT | Performed by: CLINICAL NURSE SPECIALIST

## 2023-12-12 RX ORDER — BUPRENORPHINE HYDROCHLORIDE AND NALOXONE HYDROCHLORIDE DIHYDRATE 8; 2 MG/1; MG/1
1 TABLET SUBLINGUAL 2 TIMES DAILY
Status: DISCONTINUED | OUTPATIENT
Start: 2023-12-12 | End: 2023-12-13

## 2023-12-12 RX ORDER — MULTIPLE VITAMINS W/ MINERALS TAB 9MG-400MCG
1 TAB ORAL DAILY
Status: DISCONTINUED | OUTPATIENT
Start: 2023-12-12 | End: 2023-12-12

## 2023-12-12 RX ORDER — AMANTADINE HYDROCHLORIDE 100 MG/1
100 CAPSULE, GELATIN COATED ORAL 2 TIMES DAILY
Status: DISCONTINUED | OUTPATIENT
Start: 2023-12-12 | End: 2023-12-12

## 2023-12-12 RX ORDER — QUETIAPINE FUMARATE 25 MG/1
25 TABLET, FILM COATED ORAL 2 TIMES DAILY
Status: DISCONTINUED | OUTPATIENT
Start: 2023-12-12 | End: 2023-12-13

## 2023-12-12 RX ORDER — GABAPENTIN 300 MG/1
300 CAPSULE ORAL 3 TIMES DAILY
Qty: 4 CAPSULE | Refills: 0 | Status: COMPLETED | OUTPATIENT
Start: 2023-12-12 | End: 2023-12-13

## 2023-12-12 RX ORDER — CITALOPRAM HYDROBROMIDE 20 MG/1
20 TABLET ORAL DAILY
Status: DISCONTINUED | OUTPATIENT
Start: 2023-12-12 | End: 2023-12-12

## 2023-12-12 RX ORDER — NICOTINE 21 MG/24HR
1 PATCH, TRANSDERMAL 24 HOURS TRANSDERMAL DAILY
Status: DISCONTINUED | OUTPATIENT
Start: 2023-12-12 | End: 2023-12-22 | Stop reason: HOSPADM

## 2023-12-12 RX ORDER — QUETIAPINE FUMARATE 25 MG/1
25 TABLET, FILM COATED ORAL 2 TIMES DAILY
Status: DISCONTINUED | OUTPATIENT
Start: 2023-12-12 | End: 2023-12-12

## 2023-12-12 RX ORDER — AMANTADINE HYDROCHLORIDE 100 MG/1
100 CAPSULE, GELATIN COATED ORAL 2 TIMES DAILY
Status: DISCONTINUED | OUTPATIENT
Start: 2023-12-12 | End: 2023-12-22 | Stop reason: HOSPADM

## 2023-12-12 RX ORDER — PRAZOSIN HYDROCHLORIDE 1 MG/1
2 CAPSULE ORAL AT BEDTIME
Status: DISCONTINUED | OUTPATIENT
Start: 2023-12-12 | End: 2023-12-22 | Stop reason: HOSPADM

## 2023-12-12 RX ORDER — GABAPENTIN 300 MG/1
900 CAPSULE ORAL 3 TIMES DAILY
Status: DISCONTINUED | OUTPATIENT
Start: 2023-12-16 | End: 2023-12-14

## 2023-12-12 RX ORDER — PRAZOSIN HYDROCHLORIDE 2 MG/1
2 CAPSULE ORAL AT BEDTIME
Status: DISCONTINUED | OUTPATIENT
Start: 2023-12-12 | End: 2023-12-12

## 2023-12-12 RX ORDER — CLONIDINE HYDROCHLORIDE 0.1 MG/1
0.1 TABLET ORAL 3 TIMES DAILY PRN
Status: DISCONTINUED | OUTPATIENT
Start: 2023-12-12 | End: 2023-12-12

## 2023-12-12 RX ORDER — ACAMPROSATE CALCIUM 333 MG/1
666 TABLET, DELAYED RELEASE ORAL 3 TIMES DAILY
Status: DISCONTINUED | OUTPATIENT
Start: 2023-12-12 | End: 2023-12-20

## 2023-12-12 RX ORDER — ARIPIPRAZOLE 5 MG/1
5 TABLET ORAL DAILY
Status: DISCONTINUED | OUTPATIENT
Start: 2023-12-12 | End: 2023-12-12

## 2023-12-12 RX ORDER — ATORVASTATIN CALCIUM 20 MG/1
20 TABLET, FILM COATED ORAL EVERY EVENING
Status: DISCONTINUED | OUTPATIENT
Start: 2023-12-12 | End: 2023-12-22 | Stop reason: HOSPADM

## 2023-12-12 RX ORDER — GABAPENTIN 300 MG/1
900 CAPSULE ORAL 3 TIMES DAILY
Status: DISCONTINUED | OUTPATIENT
Start: 2023-12-12 | End: 2023-12-12

## 2023-12-12 RX ORDER — OLANZAPINE 10 MG/1
10 TABLET, ORALLY DISINTEGRATING ORAL ONCE
Status: COMPLETED | OUTPATIENT
Start: 2023-12-12 | End: 2023-12-12

## 2023-12-12 RX ORDER — QUETIAPINE FUMARATE 100 MG/1
100 TABLET, FILM COATED ORAL AT BEDTIME
Status: DISCONTINUED | OUTPATIENT
Start: 2023-12-12 | End: 2023-12-13

## 2023-12-12 RX ORDER — QUETIAPINE FUMARATE 100 MG/1
100 TABLET, FILM COATED ORAL AT BEDTIME
COMMUNITY
End: 2023-12-12

## 2023-12-12 RX ORDER — GABAPENTIN 300 MG/1
600 CAPSULE ORAL 3 TIMES DAILY
Qty: 12 CAPSULE | Refills: 0 | Status: DISCONTINUED | OUTPATIENT
Start: 2023-12-14 | End: 2023-12-14

## 2023-12-12 RX ORDER — OLANZAPINE 10 MG/1
10 TABLET, ORALLY DISINTEGRATING ORAL 2 TIMES DAILY PRN
Status: DISCONTINUED | OUTPATIENT
Start: 2023-12-12 | End: 2023-12-20

## 2023-12-12 RX ORDER — FLUOXETINE 10 MG/1
10 CAPSULE ORAL DAILY
Status: DISCONTINUED | OUTPATIENT
Start: 2023-12-12 | End: 2023-12-15

## 2023-12-12 RX ORDER — CLONIDINE HYDROCHLORIDE 0.1 MG/1
0.1 TABLET ORAL 3 TIMES DAILY PRN
Status: DISCONTINUED | OUTPATIENT
Start: 2023-12-12 | End: 2023-12-22 | Stop reason: HOSPADM

## 2023-12-12 RX ORDER — BUPRENORPHINE AND NALOXONE 12; 3 MG/1; MG/1
1 FILM, SOLUBLE BUCCAL; SUBLINGUAL 3 TIMES DAILY
COMMUNITY
End: 2023-12-12

## 2023-12-12 RX ORDER — CITALOPRAM HYDROBROMIDE 10 MG/1
10 TABLET ORAL DAILY
Status: ON HOLD | COMMUNITY
End: 2023-12-21

## 2023-12-12 RX ADMIN — OLANZAPINE 10 MG: 10 TABLET, ORALLY DISINTEGRATING ORAL at 14:24

## 2023-12-12 RX ADMIN — PRAZOSIN HYDROCHLORIDE 2 MG: 2 CAPSULE ORAL at 22:16

## 2023-12-12 RX ADMIN — AMANTADINE HYDROCHLORIDE 100 MG: 100 CAPSULE ORAL at 22:16

## 2023-12-12 RX ADMIN — ACAMPROSATE CALCIUM 666 MG: 333 TABLET, DELAYED RELEASE ORAL at 20:52

## 2023-12-12 RX ADMIN — NICOTINE POLACRILEX 4 MG: 4 GUM, CHEWING BUCCAL at 14:24

## 2023-12-12 RX ADMIN — NICOTINE 1 PATCH: 21 PATCH, EXTENDED RELEASE TRANSDERMAL at 15:00

## 2023-12-12 RX ADMIN — QUETIAPINE FUMARATE 100 MG: 100 TABLET ORAL at 22:16

## 2023-12-12 RX ADMIN — FLUOXETINE HYDROCHLORIDE 10 MG: 10 CAPSULE ORAL at 20:52

## 2023-12-12 RX ADMIN — BUPRENORPHINE AND NALOXONE 1 TABLET: 8; 2 TABLET SUBLINGUAL at 20:53

## 2023-12-12 RX ADMIN — NICOTINE POLACRILEX 4 MG: 4 GUM, CHEWING BUCCAL at 13:20

## 2023-12-12 RX ADMIN — BUPRENORPHINE AND NALOXONE 1 TABLET: 8; 2 TABLET SUBLINGUAL at 15:01

## 2023-12-12 RX ADMIN — ATORVASTATIN CALCIUM 20 MG: 20 TABLET, FILM COATED ORAL at 20:53

## 2023-12-12 RX ADMIN — NICOTINE POLACRILEX 4 MG: 4 GUM, CHEWING BUCCAL at 17:03

## 2023-12-12 RX ADMIN — OLANZAPINE 10 MG: 10 TABLET, ORALLY DISINTEGRATING ORAL at 17:03

## 2023-12-12 RX ADMIN — GABAPENTIN 300 MG: 300 CAPSULE ORAL at 22:16

## 2023-12-12 ASSESSMENT — ACTIVITIES OF DAILY LIVING (ADL)
ADLS_ACUITY_SCORE: 35
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
## 2023-12-12 NOTE — PLAN OF CARE
Perry Preciado Jr.  December 12, 2023  Plan of Care Hand-off Note     Patient Care Path: inpatient mental health    Plan for Care:   Pt presents to ED for aborted suicide attempt and substance use. Pt placed a gun in his mouth yesterday but did not follow through. Pt continues to endorse active SI with plan to use weapon on himself. Pt had recent relapse 1 month ago on fentanyl, amphetamines, and alcohol. Only positive for amphetamines, last use yesterday at 8 am. Pt endorsing paranoid, likely substance induced. Denies concerns for withdrawal symptoms from alcohol. Pt stopped MH medications 1 month ago. IPMH recommended  for safety and stabilization.    Identified Goals and Safety Issues: start medications again, IP    Overview:  Anabell Aguilar, [phone]            Legal Status: Legal Status at Admission: Voluntary/Patient has signed consent for treatment    Psychiatry Consult: ordered       Updated rn, attending  regarding plan of care.           Dillan Avery, YESSI
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
## 2023-12-12 NOTE — ED TRIAGE NOTES
Pt states that he has also been drinking recently but normally does not drink. He has recently relapsed from drugs
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
## 2023-12-13 ENCOUNTER — TELEPHONE (OUTPATIENT)
Dept: BEHAVIORAL HEALTH | Facility: CLINIC | Age: 48
End: 2023-12-13
Payer: COMMERCIAL

## 2023-12-13 PROCEDURE — 250N000013 HC RX MED GY IP 250 OP 250 PS 637: Performed by: CLINICAL NURSE SPECIALIST

## 2023-12-13 PROCEDURE — 250N000013 HC RX MED GY IP 250 OP 250 PS 637: Performed by: PSYCHIATRY & NEUROLOGY

## 2023-12-13 PROCEDURE — 250N000013 HC RX MED GY IP 250 OP 250 PS 637: Performed by: FAMILY MEDICINE

## 2023-12-13 PROCEDURE — 250N000013 HC RX MED GY IP 250 OP 250 PS 637: Performed by: EMERGENCY MEDICINE

## 2023-12-13 PROCEDURE — 99215 OFFICE O/P EST HI 40 MIN: CPT

## 2023-12-13 RX ORDER — DIPHENHYDRAMINE HCL 50 MG
50 CAPSULE ORAL ONCE
Status: COMPLETED | OUTPATIENT
Start: 2023-12-13 | End: 2023-12-13

## 2023-12-13 RX ORDER — QUETIAPINE FUMARATE 50 MG/1
50 TABLET, FILM COATED ORAL AT BEDTIME
Status: DISCONTINUED | OUTPATIENT
Start: 2023-12-13 | End: 2023-12-22 | Stop reason: HOSPADM

## 2023-12-13 RX ORDER — CALCIUM CARBONATE 500 MG/1
500 TABLET, CHEWABLE ORAL DAILY PRN
Status: DISCONTINUED | OUTPATIENT
Start: 2023-12-13 | End: 2023-12-22 | Stop reason: HOSPADM

## 2023-12-13 RX ORDER — ACETAMINOPHEN 500 MG
1000 TABLET ORAL ONCE
Status: COMPLETED | OUTPATIENT
Start: 2023-12-13 | End: 2023-12-13

## 2023-12-13 RX ORDER — FAMOTIDINE 10 MG
10 TABLET ORAL 2 TIMES DAILY
Status: DISCONTINUED | OUTPATIENT
Start: 2023-12-13 | End: 2023-12-22 | Stop reason: HOSPADM

## 2023-12-13 RX ORDER — BUPRENORPHINE 8 MG/1
8 TABLET SUBLINGUAL 2 TIMES DAILY
Status: DISCONTINUED | OUTPATIENT
Start: 2023-12-13 | End: 2023-12-14

## 2023-12-13 RX ADMIN — GABAPENTIN 300 MG: 300 CAPSULE ORAL at 13:45

## 2023-12-13 RX ADMIN — CALCIUM CARBONATE (ANTACID) CHEW TAB 500 MG 500 MG: 500 CHEW TAB at 19:40

## 2023-12-13 RX ADMIN — FLUOXETINE HYDROCHLORIDE 10 MG: 10 CAPSULE ORAL at 07:40

## 2023-12-13 RX ADMIN — ATORVASTATIN CALCIUM 20 MG: 20 TABLET, FILM COATED ORAL at 21:04

## 2023-12-13 RX ADMIN — ACETAMINOPHEN 1000 MG: 500 TABLET ORAL at 19:40

## 2023-12-13 RX ADMIN — QUETIAPINE 25 MG: 25 TABLET ORAL at 07:39

## 2023-12-13 RX ADMIN — OLANZAPINE 10 MG: 10 TABLET, ORALLY DISINTEGRATING ORAL at 16:46

## 2023-12-13 RX ADMIN — ACAMPROSATE CALCIUM 666 MG: 333 TABLET, DELAYED RELEASE ORAL at 07:40

## 2023-12-13 RX ADMIN — GABAPENTIN 300 MG: 300 CAPSULE ORAL at 20:40

## 2023-12-13 RX ADMIN — NICOTINE 1 PATCH: 21 PATCH, EXTENDED RELEASE TRANSDERMAL at 07:39

## 2023-12-13 RX ADMIN — AMANTADINE HYDROCHLORIDE 100 MG: 100 CAPSULE ORAL at 07:39

## 2023-12-13 RX ADMIN — NICOTINE POLACRILEX 4 MG: 4 GUM, CHEWING BUCCAL at 06:48

## 2023-12-13 RX ADMIN — BUPRENORPHINE AND NALOXONE 1 TABLET: 8; 2 TABLET SUBLINGUAL at 07:39

## 2023-12-13 RX ADMIN — GABAPENTIN 300 MG: 300 CAPSULE ORAL at 07:39

## 2023-12-13 RX ADMIN — BUPRENORPHINE 8 MG: 2 TABLET SUBLINGUAL at 20:40

## 2023-12-13 ASSESSMENT — ACTIVITIES OF DAILY LIVING (ADL)
ADLS_ACUITY_SCORE: 35

## 2023-12-13 NOTE — CONSULTS
Writer has consulted with patient and he was willing to complete a JAQUELINE evaluation. Patient and writer completed assessment. Patient is requesting residential treatment. Writer will complete assessment and Epic and send referrals to [facility].    SVETLANA France
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
## 2023-12-13 NOTE — PHARMACY-ADMISSION MEDICATION HISTORY
Pharmacy Intern Admission Medication History    Admission medication history is complete. The information provided in this note is only as accurate as the sources available at the time of the update.    Information Source(s): Patient and CareEverywhere/SureScripts via in-person    Pertinent Information: Patient was somewhat knowledgeable of medications that he has been taking at home. Patient reported that he threw away all of his medications about a month ago, so he hasn't been taking any medications for more than a month except Citalopram 10 mg tablet. Patient noted that he would like to start on medications again.    Changes made to PTA medication list:  Added:   citalopram (CELEXA) 10 MG tablet - Take 10 mg by mouth daily (Found on dispense report that patient's taking 10 mg, not 20 mg, confirmed with patient)  Deleted:   Acamprosate (CAMPRAL) 333 mg tablet - 2 tabs PO TID  Albuterol (PROAIR HFA/PROVENTIL HFA/VENTOLIN HFA) 108 (90 Base) MCG/ACT inhaler - 2 puffs Q4H PRN  Amantadine (SYMMETREL) 100 mg capsule - 1 cap PO BID  Aripiprazole (ABILIFY) 5 mg tablet - 1 tab PO QD  Atorvastatin (LIPITOR) 20 mg tablet - 1 tab PO QD  Suboxone 4-1 mg per film - 1 film BID  Citalopram (CELEXA) 20 mg tablet - 1 tab PO QD (Per dispense report, patient should be taking 10 mg tablet, patient confirmed taking 10 mg, last dose yesterday PM 12/11/23)  Clonidine (CATAPRES) 0.1 mg tablet - 1 tab PO TID PRN  Fenofibrate (TRICOR) 145 mg tablet - 1 tab PO QD  Gabapentin (NEURONTIN) 300 mg capsule - 3 caps PO TID  Hydroxyzine (ATARAX) 50 mg tablet - 1-2 tabs PO BID PRN  Multivitamin w/minerals (THERA-VIT-M) tablet - 1 tab PO QD  Nicotine (COMMIT) 2 mg lozenge - 1 lozenge Q1H PRN  Systane Ultra PF - 2 drops both eyes TID PRN  Prazosin (MINIPRESS) 2 mg capsule - 1 cap PO at QHS  Quetiapine (SEROQUEL) 50 mg tablet - 3 tabs PO QHS  Changed: None    Allergies reviewed with patient and updates made in EHR: yes    Medication History Completed  By: Hayeong Yun 12/12/2023 7:44 PM    PTA Med List   Medication Sig Last Dose    citalopram (CELEXA) 10 MG tablet Take 10 mg by mouth daily 12/11/2023 at PM

## 2023-12-13 NOTE — CONSULTS
"[facility]   Psychiatry Consultation Follow-Up     Perry Preciado Jr. MRN# [id number]   Age: 48 year old YOB: 1975   Date of Admission to ED: 12/12/2023         Reason for Consult:   We have been asked to evaluate this patient today at the request of [facility] staff to make recommendations for medications adjustments and recommendation for admission or discharge with services.     In person visit Details:     Patient was assessed and interviewed face-to-face in person with this writer   Assessment methods included conducting a formal interview with patient, review of medical records, collaboration with medical staff, and obtaining relevant collateral information from family and community providers when available.       Interim History    Perry Preciado Jr. is a 48 year old male with history notable for PTSD, ADHD, TBI, depression, anxiety, and substance use (methamphetamine, opiates, alcohol) that presented to the to the ED 12/13/2023 after aborted suicidal attempt in the context of relapse and medication non-adherence. The patient's care was discussed with the treatment team and chart notes were reviewed. No acute events overnight.    Patient interacting with staff appropriately at time of approach. He is agreeable with interview. Patient continues to endorse suicidal ideation and recounts events the day on arrival where he put his gun to his mouth. Feels that he will kill himself if he relapses again. He feels intermittently hopeless about maintaining sobriety but confident in recovery. Blaming himself for recent relapse.  His sleep was okay and his appetite is good. Patient has been itching today. No cravings for substances. No homicidal or violent ideation. No AVH, still feeling \"paranoid.\" Seroquel earlier this morning was helpful for anxiety as it had been in the past but patient is feeling overly tired. Discussed with patient decreasing titration schedule. Expressed cautious optimism " about ADHD treatment and this was explored with patient. Shares that he lost routine and contact with recovery community when he moved in with significant other. Notes that when he was in most recent recovery he was volunteering and practicing daily gratitude; encouraged continued focus on positive behavioral changes. Reviewed current medications with patient and plan for care. Patient remains voluntary.     The patient has not required medications for agitation, and has not required restraints/seclusion for patient and/or provider safety.    Brief Therapeutic Intervention(s): Provided rapport building, active listening, unconditional positive regard, and validation.        Medications:      acamprosate  666 mg Oral TID    amantadine  100 mg Oral BID    atorvastatin  20 mg Oral QPM    buprenorphine  8 mg Sublingual BID    diphenhydrAMINE  50 mg Oral Once    FLUoxetine  10 mg Oral Daily    gabapentin  300 mg Oral TID    And    [START ON 12/14/2023] gabapentin  600 mg Oral TID    And    [START ON 12/16/2023] gabapentin  900 mg Oral TID    nicotine  1 patch Transdermal Daily    And    nicotine   Transdermal Q8H RODOLFO    prazosin  2 mg Oral At Bedtime    QUEtiapine  12.5 mg Oral BID    QUEtiapine  50 mg Oral At Bedtime          Allergies:     Allergies   Allergen Reactions    Wellbutrin [Bupropion] Anaphylaxis and Swelling     Facial swelling    Wellbutrin [Bupropion]     Naltrexone Other (See Comments)     Headaches  Headaches            Labs:     Recent Results (from the past 24 hour(s))   Alcohol breath test POCT    Collection Time: 12/12/23  1:29 PM   Result Value Ref Range    Alcohol Breath Test 0.00 0.00 - 0.01   Comprehensive metabolic panel    Collection Time: 12/12/23  5:38 PM   Result Value Ref Range    Sodium 142 135 - 145 mmol/L    Potassium 4.0 3.4 - 5.3 mmol/L    Carbon Dioxide (CO2) 29 22 - 29 mmol/L    Anion Gap 9 7 - 15 mmol/L    Urea Nitrogen 15.4 6.0 - 20.0 mg/dL    Creatinine 1.17 0.67 - 1.17 mg/dL     "GFR Estimate 77 >60 mL/min/1.73m2    Calcium 9.4 8.6 - 10.0 mg/dL    Chloride 104 98 - 107 mmol/L    Glucose 93 70 - 99 mg/dL    Alkaline Phosphatase 79 40 - 150 U/L    AST 19 0 - 45 U/L    ALT 20 0 - 70 U/L    Protein Total 7.0 6.4 - 8.3 g/dL    Albumin 4.4 3.5 - 5.2 g/dL    Bilirubin Total 0.4 <=1.2 mg/dL   TSH with free T4 reflex    Collection Time: 12/12/23  5:38 PM   Result Value Ref Range    TSH 1.83 0.30 - 4.20 uIU/mL   CBC with platelets and differential    Collection Time: 12/12/23  5:38 PM   Result Value Ref Range    WBC Count 8.5 4.0 - 11.0 10e3/uL    RBC Count 5.05 4.40 - 5.90 10e6/uL    Hemoglobin 15.6 13.3 - 17.7 g/dL    Hematocrit 46.5 40.0 - 53.0 %    MCV 92 78 - 100 fL    MCH 30.9 26.5 - 33.0 pg    MCHC 33.5 31.5 - 36.5 g/dL    RDW 13.4 10.0 - 15.0 %    Platelet Count 264 150 - 450 10e3/uL    % Neutrophils 50 %    % Lymphocytes 38 %    % Monocytes 10 %    % Eosinophils 1 %    % Basophils 1 %    % Immature Granulocytes 0 %    NRBCs per 100 WBC 0 <1 /100    Absolute Neutrophils 4.3 1.6 - 8.3 10e3/uL    Absolute Lymphocytes 3.2 0.8 - 5.3 10e3/uL    Absolute Monocytes 0.8 0.0 - 1.3 10e3/uL    Absolute Eosinophils 0.1 0.0 - 0.7 10e3/uL    Absolute Basophils 0.1 0.0 - 0.2 10e3/uL    Absolute Immature Granulocytes 0.0 <=0.4 10e3/uL    Absolute NRBCs 0.0 10e3/uL          Psychiatric Examination:     /72   Pulse 80   Temp 98.3  F (36.8  C) (Oral)   Resp 16   Ht 1.905 m (6' 3\")   Wt 106.6 kg (235 lb)   SpO2 96%   BMI 29.37 kg/m    Weight is 235 lbs 0 oz  Body mass index is 29.37 kg/m .    Appearance: awake, alert, dressed in hospital scrubs, and appeared as age stated  Attitude:  cooperative  Eye Contact:  poor   Mood:  anxious and depressed, hopeless,   Affect:  mood congruent  Speech:  clear, coherent  Psychomotor Behavior:  no evidence of tardive dyskinesia, dystonia, or tics, fidgeting, and intact station, gait and muscle tone  Thought Process:  tangental  Associations:  no loose associations " and tangential  Thought Content:  active suicidal ideation present, no homicidal or violent ideation, no AVH,   Insight:  fair  Judgement:  fair  Oriented to:  time, person, and place  Attention Span and Concentration:  fair  Recent and Remote Memory:  intact           Preliminary Diagnoses:     PTSD  Stimulant (methamphetamine) use disorder  Opioid use disorder  Alcohol use disorder  Suicidal Ideation   ADHD        Assessment and Recommendations:   Perry Preciado Jr. is a 48 year old male with history notable for PTSD, ADHD, TBI, depression, anxiety, and substance use (methamphetamine, opiates, alcohol) that presented to the to the ED 12/13/2023 after aborted suicidal attempt in the context of relapse and medication non-adherence. Patient remains anxious, highly depressed, suicidal, hopeless. Patient does have access to a firearm. He is voluntary and currently motivated for inpatient psychiatric treatment; placement pending bed availability. Should patient request discharge, consider hold.     RISK ASSESSMENT:    Non-modifiable risk factors include personal history of trauma/abuse, history of impulsive behavior, history of suicide attempts, chronic pain, and gender. Modifiable risk factors include uncontrolled psychiatric symptoms, access to lethal means, hopelessness, and current suicidality.  Protective risk factors include obligation toward family, social support, access to treatment, and future focused . Chronic risk for suicide is elevated and based on presentation and afore noted factors, acute risk for suicide is significantly elevated.       Disposition: Inpatient psychiatric hospitalization for further symptom stabilization and medication management   Legal:     Voluntary; should patient request discharge, recommend re-evaluation for hold  Medications         Recommend the following changes:    discontinue Suboxone and Start Subutex 8-2 mg tablet BID for opioid use disorder due to patient  allergy  -decrease Seroquel to 12.5 mg at  8 am and 2 pm and nighttime dose to 50 mg     Continue the following medications:  - acamprosate 666 mg TID for alcohol cravings              - gabapentin 300 mg TID for sciatic pain and anxiety              - amantadine 100 mg BID for ADHD              - prazosin 2 mg at bedtime for nightmares              - atorvastatin 20 mg daily for high cholesterol               -Prozac 10 mg PO daily (initiated 12/12/2023)              - clonidine 0.1 mg TID PRN for withdrawal-related hypertension (hold for SBP <90, DBP<60, HR<60)  4. Additional testing  5. On-going medical management per ED team.   6. Consult psychiatry as needed.     Discussed the case and recommendations with  [facility],    ED attending Rory kam's ED RN regarding this case. Thank you for letting me participate in the care of this patient.  Please call [facility]/DEC at [phone] if you have follow-up questions or wish to place another consult.    Time with: Patient: 25 minutes; Treatment Team: 20 Minutes; Chart Review: 20 minutes  Total time spent was 65 minutes. Over 50% of times was spent counseling and coordination of care.    Donis Vergara, WOLF [facility] EMERGENCY DEPARTMENT

## 2023-12-13 NOTE — TELEPHONE ENCOUNTER
R: [place]  Access Inpatient Bed Call Log 12/13/2023 @ 12AM   Intake has called facilities that have not updated the bed status within the last 12 hours.                    [facility] is at capacity            [facility] is posting 0 beds. [phone]   [facility] is posting 0 beds. Negative covid required.                 [facility] is posting 0 bed. Neg covid. No high school or [facility]. [phone]. Per call @ 12:31 AM, they are able to review. Faxed clinical @ 12:42 AM  [facility] is posting 0 beds. [phone]   [facility] is posting 0 beds. [phone]   [facility] is posting 4 beds For Young Adults age 18-25. Negative covid. [phone].  Low acuity only. Pt not age appropriate  [facility] is posting 0 beds           [facility] is posting 0 beds [phone]     Awaiting callback from [facility]
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
## 2023-12-13 NOTE — ED NOTES
"Pt awake, VS taken. Pt asking writer \"Am I going to be okay?\" Writer spoke with the pt about being admitted and asks if the pt feels safe, he stated \"yes\". Pt calm and cooperative, and has 1:1 in room with him.  "

## 2023-12-13 NOTE — ED PROVIDER NOTES
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 5/4/2020    Sepsis (H) 2/22/2014    SIRS (systemic inflammatory response syndrome) (H) 11/2/2015    Substance abuse (H) 5/7/2018    Suicidal ideation 2/23/2018    Tobacco use disorder 5/10/2018     Past Surgical History:   Procedure Laterality Date    BACK SURGERY       acamprosate (CAMPRAL) 333 MG EC tablet  albuterol (PROAIR HFA/PROVENTIL HFA/VENTOLIN HFA) 108 (90 Base)   MCG/ACT inhaler  amantadine (SYMMETREL) 100 MG capsule  ARIPiprazole (ABILIFY) 5 MG tablet  atorvastatin (LIPITOR) 20 MG tablet  buprenorphine HCl-naloxone HCl (SUBOXONE) 4-1 MG per film  citalopram (CELEXA) 20 MG tablet  cloNIDine (CATAPRES) 0.1 MG tablet  fenofibrate (TRICOR) 145 MG tablet  gabapentin (NEURONTIN) 300 MG capsule  gabapentin (NEURONTIN) 300 MG capsule  hydrOXYzine (ATARAX) 50 MG tablet  multivitamin w/minerals (THERA-VIT-M) tablet  nicotine (COMMIT) 2 MG lozenge  polyethylene glycol-propylene glycol PF (SYSTANE ULTRA PF)   0.4-0.3 % SOLN opthalmic solution  prazosin (MINIPRESS) 2 MG capsule  QUEtiapine (SEROQUEL) 50 MG tablet      Allergies   Allergen Reactions    Wellbutrin [Bupropion] Anaphylaxis and Swelling     Facial swelling    Wellbutrin [Bupropion]     Naltrexone Other (See Comments)      Headaches  Headaches       Family History  History reviewed. No pertinent family history.  Social History   Social History     Tobacco Use    Smoking status: Every Day     Packs/day: .5     Types: Cigarettes    Smokeless tobacco: Current   Substance Use Topics    Alcohol use: Yes    Drug use: Yes     Types: Methamphetamines     Comment: last use 2 weeks ago      Past medical history, past surgical history, medications,   allergies, family history, and social history were reviewed with   the patient. No additional pertinent items.      Family history is significant for both mental illness and   substance use.    Substance use started in his teens and was interrupted by   incarceration for 16 years. He was released in 2007 and had a   period of continued sobriety until around 2011, when he relapsed.   His current relapse included intravenous meth use.       Past psychiatric medications tried:   - Wellbutrin - facial swelling, anaphylaxis  - Naltrexone - headaches, itching  - Prozac - might have been helpful  - Lexapro - helped but led to erectile dysfunction  - Celexa - not sure  - Trazodone - priapism  - Seroquel 200 mg BID was initially too sedating but when he got   used to it, found that it kept him even-keeled  - Ritalin as a child - effective for slowing his mind down and he   was able to focus  - Zyprexa - helpful  - Strattera - not effective  - Abilify - thinks possibly helpful in combination with Celexa  - prazosin - cannot remember if he actually took this  - gabapentin - helpful for sciatic pain and anxiety to some   extent  - acamprosate - helpful       Review of Systems  A medically appropriate review of systems was performed with   pertinent positives and negatives noted in the HPI, and all other   systems negative.      Per [place] , last refilled Suboxone 8-2 mg film, #75 for 30 days,   on 6/23/23. Filled pretty consistently beginning in April, with   progressive dose increases. No record of being  switched to   Subutex.      Physical Examination   BP: 131/88  Pulse: 104  Temp: 98.1  F (36.7  C)  Resp: 18  SpO2: 96 %    Physical Exam  General: Appears stated age.   Neuro: Alert and fully oriented. Extremities appear to   demonstrate normal strength on visual inspection.   Integumentary/Skin: no rash visualized, normal color    Psychiatric Examination   Appearance: awake, alert, adequately groomed, dressed in hospital   scrubs, and disheveled   Attitude:  cooperative  Eye Contact:  poor   Mood:  anxious  Affect:  mood congruent and intensity is heightened  Speech:  rambling and verbal diarrhea, difficult to interrupt,   repetitive  Psychomotor Behavior:  no evidence of tardive dyskinesia,   dystonia, or tics, intact station, gait and muscle tone, and   physical agitation  Thought Process:  goal oriented and circumstantial  Associations:  no loose associations  Thought Content:  active suicidal ideation present, plan for   suicide present, no auditory hallucinations present, no visual   hallucinations present, and paranoid ideation  Insight:  partial  Judgement:  limited  Oriented to:  time, person, and place  Attention Span and Concentration:  limited  Recent and Remote Memory:  fair  Language: able to name/identify objects without impairment  Fund of Knowledge: intact with awareness of current and past   events    ED Course        Labs Ordered and Resulted from Time of ED Arrival to Time of ED   Departure   URINE DRUG SCREEN PANEL - Abnormal       Result Value    Amphetamines Urine Screen Positive (*)     Barbituates Urine Screen Negative      Benzodiazepine Urine Screen Negative      Cannabinoids Urine Screen Negative      Cocaine Urine Screen Negative      Fentanyl Qual Urine Screen Negative      Opiates Urine Screen Negative      PCP Urine Screen Negative     ALCOHOL BREATH TEST POCT - Normal    Alcohol Breath Test 0.00         Assessments & Plan (with Medical Decision Making)   Patient presenting with  increasing depression and suicidal   ideation with an aborted attempt this morning in the setting of   having relapsed on substance use in the past month after being   sober for 8 months. He continues to endorse suicidal ideation   with a plan to use a gun. He remains very depressed and anxious,   with intense feelings of paranoia that have a different quality   than previously and which have been lingering long after he has   stopped using substances. He is verbalizing hopelessness,   worthlessness, shame, and obsessive thoughts about wanting to   die. He would benefit from hospitalization and he agrees to sign   himself in voluntarily. He is holdable should he try to leave.    Nursing notes reviewed noting no acute issues.     I have reviewed the assessment completed by the [facility].     Discussed the recommendations, including medication changes or   adjustments, with the patient, and they are in agreement.   Discussed risks and benefits of proposed medications. Answered   their questions regarding the plan and reasonable expectations.     Preliminary diagnosis:     PTSD  Stimulant (methamphetamine) use disorder  Opioid use disorder  Alcohol use disorder  Paranoia       Recommendations:  Discussed with Dr. Piter Eduardo, attending provider.    Recommend inpatient psychiatric admission for safety and   stabilization.  Recommend the following changes:     - discontinue Abilify in favor of optimizing Seroquel. He can   start with 25 mg at 8 am and 2 pm, and 100 mg at bedtime. He has   taken 200 mg BID in the past, and would eventually like to work   towards this dose. He would like to avoid over-sedation so that   he can work when he is feeling better.     - discontinue Celexa as he was not certain that it is helpful.   Start Prozac 10 mg daily - this medication could in theory   increase serum level of atorvastatin, increasing the risk of   rhabdomyolysis, so starting with a low dose is prudent.     Consider the  following labs: CBC, CMP, TSH, Hgb A1c, lipid panel,   vitamin D, vitamin B12 and folate, treponema, STI panel.    Continue the following PTA medications:    - acamprosate 666 mg TID for alcohol cravings   - gabapentin 900 mg TID for sciatic pain and anxiety   - Suboxone 8-2 mg tablet BID for opioid use disorder   - amantadine 100 mg BID for ADHD   - prazosin 2 mg at bedtime for nightmares   - atorvastatin 20 mg daily for high cholesterol   - clonidine 0.1 mg TID PRN for withdrawal-related hypertension      Attestation:  Patient time: 70 minutes  Family - Friend time: 0 minutes  Team time:15 minutes  Chart review: 20 minutes  Documentation: 40 minutes  Total time: 145 minutes  Over 50% of times was spent counseling and coordination of care.     I have provided critical care services at the bedside in the [facility] adult emergency department, evaluating the patient,   reviewing notes and laboratory values and directing care. I have   discussed recommendation regarding whether or not hospitalization   is needed and recommendations for medications and laboratory   testing with the attending emergency department provider.       Disclaimer: This note consists of symbols derived from   keyboarding, dictation, and/or voice recognition software. As a   result, there may be errors in the script that have gone   undetected.  Please consider this when interpreting information   found in the chart.    --  WOLF Mejia [facility] EMERGENCY DEPARTMENT  December 12, 2023       CBC with platelets differential     Status: None    Collection Time: 12/12/23  5:38 PM    Narrative    The following orders were created for panel order CBC with platelets differential.  Procedure                               Abnormality         Status                     ---------                               -----------         ------                     CBC with platelets and d...[[id number]]                       Final result                 Please view results for these tests on the individual orders.   Comprehensive metabolic panel     Status: Normal    Collection Time: 12/12/23  5:38 PM   Result Value Ref Range    Sodium 142 135 - 145 mmol/L    Potassium 4.0 3.4 - 5.3 mmol/L    Carbon Dioxide (CO2) 29 22 - 29 mmol/L    Anion Gap 9 7 - 15 mmol/L    Urea Nitrogen 15.4 6.0 - 20.0 mg/dL    Creatinine 1.17 0.67 - 1.17 mg/dL    GFR Estimate 77 >60 mL/min/1.73m2    Calcium 9.4 8.6 - 10.0 mg/dL    Chloride 104 98 - 107 mmol/L    Glucose 93 70 - 99 mg/dL    Alkaline Phosphatase 79 40 - 150 U/L    AST 19 0 - 45 U/L    ALT 20 0 - 70 U/L    Protein Total 7.0 6.4 - 8.3 g/dL    Albumin 4.4 3.5 - 5.2 g/dL    Bilirubin Total 0.4 <=1.2 mg/dL   TSH with free T4 reflex     Status: Normal    Collection Time: 12/12/23  5:38 PM   Result Value Ref Range    TSH 1.83 0.30 - 4.20 uIU/mL   CBC with platelets and differential     Status: None    Collection Time: 12/12/23  5:38 PM   Result Value Ref Range    WBC Count 8.5 4.0 - 11.0 10e3/uL    RBC Count 5.05 4.40 - 5.90 10e6/uL    Hemoglobin 15.6 13.3 - 17.7 g/dL    Hematocrit 46.5 40.0 - 53.0 %    MCV 92 78 - 100 fL    MCH 30.9 26.5 - 33.0 pg    MCHC 33.5 31.5 - 36.5 g/dL    RDW 13.4 10.0 - 15.0 %    Platelet Count 264 150 - 450 10e3/uL    % Neutrophils 50 %    % Lymphocytes 38 %    % Monocytes 10 %    % Eosinophils 1 %    % Basophils 1 %    % Immature Granulocytes 0 %    NRBCs per 100 WBC 0 <1 /100    Absolute Neutrophils 4.3 1.6 - 8.3 10e3/uL    Absolute Lymphocytes 3.2 0.8 - 5.3 10e3/uL    Absolute Monocytes 0.8 0.0 - 1.3 10e3/uL    Absolute Eosinophils 0.1 0.0 - 0.7 10e3/uL    Absolute Basophils 0.1 0.0 - 0.2 10e3/uL    Absolute Immature Granulocytes 0.0 <=0.4 10e3/uL    Absolute NRBCs 0.0 10e3/uL             MD Bridger Rodriguez David, MD  12/13/23 8735

## 2023-12-13 NOTE — TELEPHONE ENCOUNTER
10:10 AM: Intake called [facility] to check on statu of review. RN is away from desk. Intake to call back in 30 minutes.    10:38 AM: Intake paged [facility] to present Pt for station 10.    10:56 AM: Pt will need a private room d/t current presentation of paranoia and psychosis. No private beds available.    11:51 AM: Intake called [facility] and was informed that they do not have an appropriate bed at this time.       R: [place]  Access Inpatient Bed Call Log 12/13/23 8:15 AM    Intake has called facilities that have not updated the bed status within the last 12 hours.                             [facility] is at capacity           [facility] is posting 0 beds. [phone]  [facility] is posting 0 beds. Negative covid required.                [facility] is posting 0 bed. Neg covid. No high school or Meme-psych. [phone]  [facility] is posting 0 beds. [phone]  [facility] is posting 0 beds. [phone]  [facility] is posting 0 beds          [facility] is posting 2 beds [phone]    Pt remains on waitlist pending appropriate availability.

## 2023-12-14 ENCOUNTER — TELEPHONE (OUTPATIENT)
Dept: BEHAVIORAL HEALTH | Facility: CLINIC | Age: 48
End: 2023-12-14
Payer: COMMERCIAL

## 2023-12-14 PROBLEM — R45.89 SUICIDAL BEHAVIOR WITHOUT ATTEMPTED SELF-INJURY: Status: ACTIVE | Noted: 2023-12-14

## 2023-12-14 LAB
ALBUMIN UR-MCNC: NEGATIVE MG/DL
APPEARANCE UR: CLEAR
BILIRUB UR QL STRIP: NEGATIVE
COLOR UR AUTO: YELLOW
GLUCOSE UR STRIP-MCNC: NEGATIVE MG/DL
HGB UR QL STRIP: NEGATIVE
KETONES UR STRIP-MCNC: NEGATIVE MG/DL
LEUKOCYTE ESTERASE UR QL STRIP: NEGATIVE
MUCOUS THREADS #/AREA URNS LPF: PRESENT /LPF
NITRATE UR QL: NEGATIVE
PH UR STRIP: 5 [PH] (ref 5–7)
RBC URINE: <1 /HPF
SP GR UR STRIP: 1.02 (ref 1–1.03)
SQUAMOUS EPITHELIAL: <1 /HPF
UROBILINOGEN UR STRIP-MCNC: NORMAL MG/DL
WBC URINE: 1 /HPF

## 2023-12-14 PROCEDURE — 81001 URINALYSIS AUTO W/SCOPE: CPT | Performed by: PSYCHIATRY & NEUROLOGY

## 2023-12-14 PROCEDURE — 250N000013 HC RX MED GY IP 250 OP 250 PS 637: Performed by: PSYCHIATRY & NEUROLOGY

## 2023-12-14 PROCEDURE — 124N000002 HC R&B MH UMMC

## 2023-12-14 PROCEDURE — 87491 CHLMYD TRACH DNA AMP PROBE: CPT | Performed by: PSYCHIATRY & NEUROLOGY

## 2023-12-14 PROCEDURE — 99223 1ST HOSP IP/OBS HIGH 75: CPT | Mod: AI | Performed by: PSYCHIATRY & NEUROLOGY

## 2023-12-14 PROCEDURE — 250N000013 HC RX MED GY IP 250 OP 250 PS 637: Performed by: CLINICAL NURSE SPECIALIST

## 2023-12-14 PROCEDURE — 250N000013 HC RX MED GY IP 250 OP 250 PS 637: Performed by: EMERGENCY MEDICINE

## 2023-12-14 RX ORDER — AMOXICILLIN 250 MG
1 CAPSULE ORAL 2 TIMES DAILY PRN
Status: DISCONTINUED | OUTPATIENT
Start: 2023-12-14 | End: 2023-12-22 | Stop reason: HOSPADM

## 2023-12-14 RX ORDER — TRAZODONE HYDROCHLORIDE 50 MG/1
50 TABLET, FILM COATED ORAL
Status: DISCONTINUED | OUTPATIENT
Start: 2023-12-14 | End: 2023-12-22 | Stop reason: HOSPADM

## 2023-12-14 RX ORDER — NALOXONE HYDROCHLORIDE 0.4 MG/ML
0.2 INJECTION, SOLUTION INTRAMUSCULAR; INTRAVENOUS; SUBCUTANEOUS
Status: DISCONTINUED | OUTPATIENT
Start: 2023-12-14 | End: 2023-12-22 | Stop reason: HOSPADM

## 2023-12-14 RX ORDER — ONDANSETRON 4 MG/1
4 TABLET, FILM COATED ORAL EVERY 6 HOURS PRN
Status: DISCONTINUED | OUTPATIENT
Start: 2023-12-14 | End: 2023-12-14 | Stop reason: ALTCHOICE

## 2023-12-14 RX ORDER — LOPERAMIDE HCL 2 MG
2 CAPSULE ORAL EVERY 4 HOURS PRN
Status: DISCONTINUED | OUTPATIENT
Start: 2023-12-14 | End: 2023-12-22 | Stop reason: HOSPADM

## 2023-12-14 RX ORDER — DIAZEPAM 5 MG
5-20 TABLET ORAL EVERY 30 MIN PRN
Status: DISCONTINUED | OUTPATIENT
Start: 2023-12-14 | End: 2023-12-15

## 2023-12-14 RX ORDER — LOPERAMIDE HCL 2 MG
2 CAPSULE ORAL 4 TIMES DAILY PRN
Status: DISCONTINUED | OUTPATIENT
Start: 2023-12-14 | End: 2023-12-14

## 2023-12-14 RX ORDER — IBUPROFEN 600 MG/1
600 TABLET, FILM COATED ORAL EVERY 6 HOURS PRN
Status: DISCONTINUED | OUTPATIENT
Start: 2023-12-14 | End: 2023-12-22 | Stop reason: HOSPADM

## 2023-12-14 RX ORDER — NALOXONE HYDROCHLORIDE 0.4 MG/ML
0.4 INJECTION, SOLUTION INTRAMUSCULAR; INTRAVENOUS; SUBCUTANEOUS
Status: DISCONTINUED | OUTPATIENT
Start: 2023-12-14 | End: 2023-12-22 | Stop reason: HOSPADM

## 2023-12-14 RX ORDER — ACETAMINOPHEN 325 MG/1
650 TABLET ORAL EVERY 4 HOURS PRN
Status: DISCONTINUED | OUTPATIENT
Start: 2023-12-14 | End: 2023-12-22 | Stop reason: HOSPADM

## 2023-12-14 RX ORDER — MAGNESIUM HYDROXIDE/ALUMINUM HYDROXICE/SIMETHICONE 120; 1200; 1200 MG/30ML; MG/30ML; MG/30ML
30 SUSPENSION ORAL EVERY 4 HOURS PRN
Status: DISCONTINUED | OUTPATIENT
Start: 2023-12-14 | End: 2023-12-22 | Stop reason: HOSPADM

## 2023-12-14 RX ORDER — BUPRENORPHINE 2 MG/1
4 TABLET SUBLINGUAL 2 TIMES DAILY
Status: DISCONTINUED | OUTPATIENT
Start: 2023-12-14 | End: 2023-12-18

## 2023-12-14 RX ORDER — GABAPENTIN 100 MG/1
100 CAPSULE ORAL EVERY 6 HOURS PRN
Status: DISCONTINUED | OUTPATIENT
Start: 2023-12-14 | End: 2023-12-18

## 2023-12-14 RX ORDER — GABAPENTIN 600 MG/1
300 TABLET ORAL 3 TIMES DAILY
Status: DISCONTINUED | OUTPATIENT
Start: 2023-12-14 | End: 2023-12-20

## 2023-12-14 RX ORDER — FOLIC ACID 1 MG/1
1 TABLET ORAL DAILY
Status: DISCONTINUED | OUTPATIENT
Start: 2023-12-14 | End: 2023-12-22 | Stop reason: HOSPADM

## 2023-12-14 RX ORDER — ONDANSETRON 4 MG/1
4 TABLET, ORALLY DISINTEGRATING ORAL EVERY 6 HOURS PRN
Status: DISCONTINUED | OUTPATIENT
Start: 2023-12-14 | End: 2023-12-22 | Stop reason: HOSPADM

## 2023-12-14 RX ORDER — MULTIPLE VITAMINS W/ MINERALS TAB 9MG-400MCG
1 TAB ORAL DAILY
Status: DISCONTINUED | OUTPATIENT
Start: 2023-12-14 | End: 2023-12-22 | Stop reason: HOSPADM

## 2023-12-14 RX ADMIN — FLUOXETINE HYDROCHLORIDE 10 MG: 10 CAPSULE ORAL at 11:09

## 2023-12-14 RX ADMIN — AMANTADINE HYDROCHLORIDE 100 MG: 100 CAPSULE ORAL at 08:45

## 2023-12-14 RX ADMIN — AMANTADINE HYDROCHLORIDE 100 MG: 100 CAPSULE ORAL at 21:21

## 2023-12-14 RX ADMIN — ATORVASTATIN CALCIUM 20 MG: 20 TABLET, FILM COATED ORAL at 21:21

## 2023-12-14 RX ADMIN — Medication 12.5 MG: at 14:23

## 2023-12-14 RX ADMIN — FAMOTIDINE 10 MG: 10 TABLET ORAL at 08:44

## 2023-12-14 RX ADMIN — Medication 1 TABLET: at 11:09

## 2023-12-14 RX ADMIN — GABAPENTIN 600 MG: 300 CAPSULE ORAL at 08:44

## 2023-12-14 RX ADMIN — PRAZOSIN HYDROCHLORIDE 2 MG: 2 CAPSULE ORAL at 22:13

## 2023-12-14 RX ADMIN — FOLIC ACID 1 MG: 1 TABLET ORAL at 11:10

## 2023-12-14 RX ADMIN — Medication 300 MG: at 21:25

## 2023-12-14 RX ADMIN — FAMOTIDINE 10 MG: 10 TABLET ORAL at 21:21

## 2023-12-14 RX ADMIN — Medication 300 MG: at 14:23

## 2023-12-14 RX ADMIN — ACAMPROSATE CALCIUM 666 MG: 333 TABLET, DELAYED RELEASE ORAL at 21:25

## 2023-12-14 RX ADMIN — THIAMINE HCL TAB 100 MG 100 MG: 100 TAB at 11:10

## 2023-12-14 RX ADMIN — ACAMPROSATE CALCIUM 666 MG: 333 TABLET, DELAYED RELEASE ORAL at 11:09

## 2023-12-14 RX ADMIN — NICOTINE 1 PATCH: 21 PATCH, EXTENDED RELEASE TRANSDERMAL at 08:45

## 2023-12-14 RX ADMIN — QUETIAPINE FUMARATE 50 MG: 50 TABLET ORAL at 22:12

## 2023-12-14 RX ADMIN — ACAMPROSATE CALCIUM 666 MG: 333 TABLET, DELAYED RELEASE ORAL at 16:48

## 2023-12-14 ASSESSMENT — ACTIVITIES OF DAILY LIVING (ADL)
ADLS_ACUITY_SCORE: 35
DRESS: INDEPENDENT
DIFFICULTY_EATING/SWALLOWING: NO
WALKING_OR_CLIMBING_STAIRS_DIFFICULTY: NO
ADLS_ACUITY_SCORE: 29
HYGIENE/GROOMING: INDEPENDENT
DOING_ERRANDS_INDEPENDENTLY_DIFFICULTY: NO
DRESS: INDEPENDENT
CHANGE_IN_FUNCTIONAL_STATUS_SINCE_ONSET_OF_CURRENT_ILLNESS/INJURY: NO
DRESS: INDEPENDENT
DIFFICULTY_COMMUNICATING: NO
ADLS_ACUITY_SCORE: 29
HYGIENE/GROOMING: INDEPENDENT
ORAL_HYGIENE: INDEPENDENT
ORAL_HYGIENE: INDEPENDENT
WEAR_GLASSES_OR_BLIND: NO
HYGIENE/GROOMING: INDEPENDENT
WEAR_GLASSES_OR_BLIND: NO
TOILETING_ISSUES: NO
TOILETING_ISSUES: NO
DIFFICULTY_COMMUNICATING: NO
WALKING_OR_CLIMBING_STAIRS_DIFFICULTY: NO
DIFFICULTY_EATING/SWALLOWING: NO
LAUNDRY: WITH SUPERVISION
ADLS_ACUITY_SCORE: 29
DOING_ERRANDS_INDEPENDENTLY_DIFFICULTY: NO
CONCENTRATING,_REMEMBERING_OR_MAKING_DECISIONS_DIFFICULTY: YES
ADLS_ACUITY_SCORE: 29
HEARING_DIFFICULTY_OR_DEAF: NO
CONCENTRATING,_REMEMBERING_OR_MAKING_DECISIONS_DIFFICULTY: YES
ADLS_ACUITY_SCORE: 29
DRESSING/BATHING_DIFFICULTY: NO
ADLS_ACUITY_SCORE: 29
DRESSING/BATHING_DIFFICULTY: NO
CHANGE_IN_FUNCTIONAL_STATUS_SINCE_ONSET_OF_CURRENT_ILLNESS/INJURY: NO
ADLS_ACUITY_SCORE: 45
ORAL_HYGIENE: INDEPENDENT
HEARING_DIFFICULTY_OR_DEAF: NO

## 2023-12-14 NOTE — CONSULTS
Discussed with unit CTC.  Patient completed a JAQUELINE assessment yesterday 12/13/23 and no further update/changes are requested by CTC.  Patient has been referred to:    [facility]  [address] [place] [address]  Phone: [phone]     [facility]  [address] [place] [address]   ~13.7 mi  [phone]    Maurice Edmonds [facility] on 12/14/2023 at 12:56 PM

## 2023-12-14 NOTE — PLAN OF CARE
BEH IP Unit Acuity Rating Score (UARS)  Patient is given one point for every criteria they meet.    CRITERIA SCORING   On a 72 hour hold, court hold, committed, stay of commitment, or revocation 0    Patient LOS on BEH unit exceeds 20 days 0  LOS: 0   Patient under guardianship, 55+, otherwise medically complex, or under age 11 0   Suicide ideation without relief of precipitating factors 1   Current plan for suicide 0   Current plan for homicide 0   Imminent risk or actual attempt to seriously harm another without relief of factors precipitating the attempt 0   Severe dysfunction in daily living (ex: complete neglect for self care, extreme disruption in vegetative function, extreme deterioration in social interactions) 1   Recent (last 7 days) or current physical aggression in the ED or on unit 0   Restraints or seclusion episode in past 72 hours 0   Recent (last 7 days) or current verbal aggression, agitation, yelling, etc., while in the ED or unit 0   Active psychosis 1   Need for constant or near constant redirection (from leaving, from others, etc).  0   Intrusive or disruptive behaviors 0   TOTAL 3

## 2023-12-14 NOTE — ED NOTES
"Pt stating \"I can't use the bathroom, I feel the urge to go but I just stood in the bathroom for about 15 minutes and I can't go\". Pt said he will try again before going to bed. Pt also stating he is drowsy but asking for a zyprexa. Writer explained zyprexa would increase that drowsy feeling and not what it is intended for. Writer encouraged pt to get ready for bed- brush teeth ect and trying to sleep.  "

## 2023-12-14 NOTE — PLAN OF CARE
Patient was on the unit at about 3 am. He slept for 3 hours. It was reported by psych associate that patient complained of difficult urinating, has been going in and out of the restroom, and at some point asked psych associate to go into the restroom and help him look for his keys. Patient appear disorganized and disoriented to time and place.

## 2023-12-14 NOTE — TELEPHONE ENCOUNTER
R: [place]  Access Inpatient Bed Call Log 12/13/23 3:15 PM     Intake has called facilities that have not updated the bed status within the last 12 hours.                             9:13 PM Private on unit 12 is available- Paged [facility] for review.   9:58 Paged [facility] again.   10:31 PM intake contacted [facility] to present.     [facility] is at capacity           [facility] is posting 0 beds. [phone]  [facility] is posting 0 beds. Negative covid required.                [facility] is posting 0 bed. Neg covid. No high school or Meme-psych. [phone]  [facility] is posting 0 beds. [phone]  [facility] is posting 0 beds. [phone]  [facility] is posting 5 beds. Negative covid. [phone] Pt is not age appropriate.   [facility] is posting 0 beds          [facility] ([facility] is posting 2 beds [phone]    Pt remains on the work list pending appropriate bed availability.

## 2023-12-14 NOTE — PLAN OF CARE
"  Problem: Adult Inpatient Plan of Care  Goal: Plan of Care Review  Description: The Plan of Care Review/Shift note should be completed every shift.  The Outcome Evaluation is a brief statement about your assessment that the patient is improving, declining, or no change.  This information will be displayed automatically on your shift  note.  Outcome: Progressing     Problem: Suicide Risk  Goal: Absence of Self-Harm  Outcome: Progressing   Goal Outcome Evaluation:    Plan of Care Reviewed With: patient                 Patient alert on approach. He was seen walking to the bathroom wanting to use the bathroom. He noted that he has been having a hard time urinating. Writer opened the bathroom and he went in. He later reported that he was able to void and did not notice any blood. He also said he had a BM. Writer reported his concern of trouble voiding to provider. Writer also noted to writer the concern that patient appears sedated, during conversations and assessments, he falls asleep in between and has to be alerted by writer calling out his name. Also noted while he is responding to questions.   Provider reduced patient's subutex dosage and modified orders for Seroquel and subutex-see order comments.     Patient denied vitals noted at /71 (BP Location: Left arm, Patient Position: Sitting, Cuff Size: Adult Regular)   Pulse 103   Temp 99.2  F (37.3  C) (Oral)   Resp 16   Ht 1.905 m (6' 3\")   Wt 106.6 kg (235 lb)   SpO2 93%   BMI 29.37 kg/m    And COWS assessment completed and was 3. MSSA assessment completed and patient scored 6. No further intervention made. Patient had breakfast and has been withdrawn to his room sleeping mostly during the shift. He reports slight headache-will let writer know when he needs prn. Scheduled medications administered except scheduled morning subutex and seroquel which were held due to sedation. Patient contracts for safety and denies psych symptoms.     At about 1427 patient " approached by writer and Seroquel and gabapentin administered per order. Writer gave urine specimen cup to patient to collect specimen, he did and that was sent to the lab. Writer used patient label and added the time and initials (lab personnel Facundo informed over a phone call). Patient asked if he took his subutex and writer informed him that it was held due to him being sedated.  Provider teams and asked to placed patient on falls precaution due to him feeling unsteady and appearing unsteady while awake. He said he is mostly in bed, due to feeling unsteady on his feet-this was reported at about 1425.  Provider informed that patient's Campral was given at 1100 due to not be available at 0800 for administration and he has another scheduled for 1400. She verbally stated on a phone call that she (Dr. Crockett) was fine with writer administering the next one as scheduled.     Writer called unit 3 B for their bladder scanner to bladder scan patient, bladder scanner not on unit. Will pass on to evening shift to complete bladder scan per order.

## 2023-12-14 NOTE — TELEPHONE ENCOUNTER
Per chart and PPS handoff, [facility] was called @ 10:31PM   00:13 - Dr. Ortega accepts for MH admission. Placed pt in queue for 12NB    R: 12 / Bon    00:17 - Called unit CRN, who will review and call PPS back  01:09 - Unit CRN called back and is able to accommodate pt tonight  01:10 - Notified ED of placement    Indicia completed

## 2023-12-14 NOTE — CARE PLAN
12/14/23 0324   Patient Belongings   Did you bring any home meds/supplements to the hospital?  No   Patient Belongings locker;sent to security per site process   Patient Belongings Put in Hospital Secure Location (Security or Locker, etc.) cash/credit card;cell phone/electronics;clothing;dental appliance/dentures;money (see comment);shoes;wallet   Belongings Search Yes   Clothing Search Yes       -Items placed in pt storage locker-    1 Blue phone, (no )  1 Black coat  1 pack [place] 100's cigarettes  1 red zippo lighter  1 brown wallet  [place] ID card  Social security card  1 denture   1 Black hooded sweater  1 pair of black socks  1 pair of black and white Nike/Mikie shoes  1 red [place] [employer] t shirt  1 pair of black sweatpants    -Items sent to security in envelope #[id number]-    [place] EBT Card (1833)  Ven[employer] Debit (4675)  [place] Debit (0643)  [employer] Card (9348)  [employer] Debit (2400)  Visa Debit (7183)  $5 dollar cash    A               Admission:  I am responsible for any personal items that are not sent to the safe or pharmacy.  [facility] is not responsible for loss, theft or damage of any property in my possession.    Signature:  _________________________________ Date: _______  Time: _____                                              Staff Signature:  ____________________________ Date: ________  Time: _____      2nd Staff person, if patient is unable/unwilling to sign:    Signature: ________________________________ Date: ________  Time: _____     Discharge:  [facility] has returned all of my personal belongings:    Signature: _________________________________ Date: ________  Time: _____                                          Staff Signature:  ____________________________ Date: ________  Time: _____

## 2023-12-14 NOTE — ED NOTES
"Pt refusing some of his medications listed on the MAR, pt stating, \"I'm just so drowsy today and my head is banging. I want to talk to my doctor tomorrow about my Seroquel dosage, I'm just so drowsy\". Pt appears restless, per sitter, pt has been napping on and off throughout the day but seems anxious.  "
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
## 2023-12-15 LAB
C TRACH DNA SPEC QL PROBE+SIG AMP: NEGATIVE
N GONORRHOEA DNA SPEC QL NAA+PROBE: NEGATIVE

## 2023-12-15 PROCEDURE — 250N000013 HC RX MED GY IP 250 OP 250 PS 637: Performed by: PSYCHIATRY & NEUROLOGY

## 2023-12-15 PROCEDURE — 250N000013 HC RX MED GY IP 250 OP 250 PS 637: Performed by: EMERGENCY MEDICINE

## 2023-12-15 PROCEDURE — 250N000013 HC RX MED GY IP 250 OP 250 PS 637: Performed by: CLINICAL NURSE SPECIALIST

## 2023-12-15 PROCEDURE — 124N000002 HC R&B MH UMMC

## 2023-12-15 RX ADMIN — Medication 1 TABLET: at 08:24

## 2023-12-15 RX ADMIN — ACAMPROSATE CALCIUM 666 MG: 333 TABLET, DELAYED RELEASE ORAL at 08:27

## 2023-12-15 RX ADMIN — FAMOTIDINE 10 MG: 10 TABLET ORAL at 08:24

## 2023-12-15 RX ADMIN — BUPRENORPHINE 4 MG: 2 TABLET SUBLINGUAL at 08:26

## 2023-12-15 RX ADMIN — FAMOTIDINE 10 MG: 10 TABLET ORAL at 19:00

## 2023-12-15 RX ADMIN — FOLIC ACID 1 MG: 1 TABLET ORAL at 08:27

## 2023-12-15 RX ADMIN — FLUOXETINE HYDROCHLORIDE 10 MG: 10 CAPSULE ORAL at 08:27

## 2023-12-15 RX ADMIN — AMANTADINE HYDROCHLORIDE 100 MG: 100 CAPSULE ORAL at 08:25

## 2023-12-15 RX ADMIN — Medication 300 MG: at 19:00

## 2023-12-15 RX ADMIN — PRAZOSIN HYDROCHLORIDE 2 MG: 2 CAPSULE ORAL at 21:06

## 2023-12-15 RX ADMIN — QUETIAPINE FUMARATE 50 MG: 50 TABLET ORAL at 21:06

## 2023-12-15 RX ADMIN — Medication 300 MG: at 13:54

## 2023-12-15 RX ADMIN — ACAMPROSATE CALCIUM 666 MG: 333 TABLET, DELAYED RELEASE ORAL at 19:00

## 2023-12-15 RX ADMIN — Medication 300 MG: at 08:24

## 2023-12-15 RX ADMIN — ACAMPROSATE CALCIUM 666 MG: 333 TABLET, DELAYED RELEASE ORAL at 13:54

## 2023-12-15 RX ADMIN — AMANTADINE HYDROCHLORIDE 100 MG: 100 CAPSULE ORAL at 19:00

## 2023-12-15 RX ADMIN — Medication 12.5 MG: at 08:25

## 2023-12-15 RX ADMIN — OLANZAPINE 10 MG: 10 TABLET, ORALLY DISINTEGRATING ORAL at 10:42

## 2023-12-15 RX ADMIN — ATORVASTATIN CALCIUM 20 MG: 20 TABLET, FILM COATED ORAL at 19:00

## 2023-12-15 RX ADMIN — THIAMINE HCL TAB 100 MG 100 MG: 100 TAB at 08:28

## 2023-12-15 RX ADMIN — BUPRENORPHINE 4 MG: 2 TABLET SUBLINGUAL at 19:00

## 2023-12-15 RX ADMIN — Medication 12.5 MG: at 13:55

## 2023-12-15 RX ADMIN — NICOTINE 1 PATCH: 21 PATCH, EXTENDED RELEASE TRANSDERMAL at 08:28

## 2023-12-15 ASSESSMENT — ACTIVITIES OF DAILY LIVING (ADL)
DRESS: INDEPENDENT
ADLS_ACUITY_SCORE: 29
HYGIENE/GROOMING: INDEPENDENT
ADLS_ACUITY_SCORE: 29
DRESS: INDEPENDENT
HYGIENE/GROOMING: INDEPENDENT
ADLS_ACUITY_SCORE: 29
ORAL_HYGIENE: INDEPENDENT
ORAL_HYGIENE: INDEPENDENT
ADLS_ACUITY_SCORE: 29
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
## 2023-12-15 NOTE — PROGRESS NOTES
"Pt has been sleeping in room for most of the shift. He looks sedated but walks fine when going to the bathroom. He took a shower with no physical problems, pt stated feeling just tired and sleepy. He took his medication with no problems but the subutex was held due to pt being sedated, Pt per assessment looks sedated writer did not give scheduled dose of subutex 4mg . It is hard for pt to keep a conversation going for more than a minute without looking sedated, pt eyes closed, minimally asleep during check in. Clinical opiate withdrawal assessment done pt scored 3, no further concerns. His MSSA was also done pt scored 6 does not warrant any medications no further concerns noted. He stated being depressed and anxious. Denies SI/SIB/HI. He also denies hearing voices and or being paranoid. No acute behavioral concerns this shift. Pt cooperative with vital signs, assessment, and medication compliant.     Watch for fall precautions.      /72 (BP Location: Left arm, Patient Position: Sitting, Cuff Size: Adult Regular)   Pulse 98   Temp 98.2  F (36.8  C) (Oral)   Resp 18   Ht 1.905 m (6' 3\")   Wt 106.6 kg (235 lb)   SpO2 96%   BMI 29.37 kg/m     "

## 2023-12-15 NOTE — PLAN OF CARE
"  Problem: Suicide Risk  Goal: Absence of Self-Harm  Outcome: Progressing     Problem: Adult Behavioral Health Plan of Care  Goal: Plan of Care Review  Outcome: Progressing  Flowsheets (Taken 12/15/2023 5561)  Patient Agreement with Plan of Care: agrees   Goal Outcome Evaluation:    Plan of Care Reviewed With: patient      Patient was calmed and cooperative on this shift  vitals were stable. MSSA score was 3 and COWS 1 and 0. MSSA was discontinued, Dr. HOLLIS Said, to continue doing COWS.        Patient was awake from the start of the shift not sedated,  appeared alert and oriented took all scheduled medications with no issues. No urinary rentention, patient reported voiding just fine and stated, \"My voiding is getting better\". PRN Olanzapine given around 10 AM per patient's request. Patient attended group, affect was flat he denied SI/SIB, rated depression and anxiety 3/10.    Appetite was good, ate 95% of breakfast and 100% of lunch. Patient denied pain or any discomfort at this time. No behavior or any safety concern observed.        "

## 2023-12-15 NOTE — PLAN OF CARE
BEH IP Unit Acuity Rating Score (UARS)  Patient is given one point for every criteria they meet.    CRITERIA SCORING   On a 72 hour hold, court hold, committed, stay of commitment, or revocation 0    Patient LOS on BEH unit exceeds 20 days 0  LOS: 1   Patient under guardianship, 55+, otherwise medically complex, or under age 11 0   Suicide ideation without relief of precipitating factors 1   Current plan for suicide 0   Current plan for homicide 0   Imminent risk or actual attempt to seriously harm another without relief of factors precipitating the attempt 0   Severe dysfunction in daily living (ex: complete neglect for self care, extreme disruption in vegetative function, extreme deterioration in social interactions) 1   Recent (last 7 days) or current physical aggression in the ED or on unit 0   Restraints or seclusion episode in past 72 hours 0   Recent (last 7 days) or current verbal aggression, agitation, yelling, etc., while in the ED or unit 0   Active psychosis 1   Need for constant or near constant redirection (from leaving, from others, etc).  0   Intrusive or disruptive behaviors 0   TOTAL 3

## 2023-12-15 NOTE — PLAN OF CARE
Problem: Sleep Disturbance  Goal: Adequate Sleep/Rest  Outcome: Progressing   Goal Outcome Evaluation:  Assumed care of patient at 1130 pm, in bed sleeping without any s/s distress.Breathing  non-labored.No behavioral issues noted.No signs of SI,HI,SIB or psychosis noted.15 minute safety checks maintained as per policy.No complaints.Slept for 5.75 hours.Unable to assess MSSA/ COWS as pt slept all night.No s/s withdrawal noted. Continue to monitor.

## 2023-12-16 PROCEDURE — 250N000013 HC RX MED GY IP 250 OP 250 PS 637: Performed by: EMERGENCY MEDICINE

## 2023-12-16 PROCEDURE — 250N000013 HC RX MED GY IP 250 OP 250 PS 637: Performed by: CLINICAL NURSE SPECIALIST

## 2023-12-16 PROCEDURE — 250N000013 HC RX MED GY IP 250 OP 250 PS 637: Performed by: PSYCHIATRY & NEUROLOGY

## 2023-12-16 PROCEDURE — 99207 PR APP CREDIT; MD BILLING SHARED VISIT: CPT

## 2023-12-16 PROCEDURE — 250N000013 HC RX MED GY IP 250 OP 250 PS 637: Performed by: STUDENT IN AN ORGANIZED HEALTH CARE EDUCATION/TRAINING PROGRAM

## 2023-12-16 PROCEDURE — 124N000002 HC R&B MH UMMC

## 2023-12-16 RX ORDER — BUPRENORPHINE 2 MG/1
4 TABLET SUBLINGUAL ONCE
Status: COMPLETED | OUTPATIENT
Start: 2023-12-16 | End: 2023-12-16

## 2023-12-16 RX ADMIN — IBUPROFEN 600 MG: 600 TABLET, FILM COATED ORAL at 08:17

## 2023-12-16 RX ADMIN — OLANZAPINE 10 MG: 10 TABLET, ORALLY DISINTEGRATING ORAL at 11:06

## 2023-12-16 RX ADMIN — LOPERAMIDE HYDROCHLORIDE 2 MG: 2 CAPSULE ORAL at 06:20

## 2023-12-16 RX ADMIN — BUPRENORPHINE 4 MG: 2 TABLET SUBLINGUAL at 19:33

## 2023-12-16 RX ADMIN — FLUOXETINE 20 MG: 20 CAPSULE ORAL at 07:58

## 2023-12-16 RX ADMIN — AMANTADINE HYDROCHLORIDE 100 MG: 100 CAPSULE ORAL at 07:56

## 2023-12-16 RX ADMIN — CLONIDINE HYDROCHLORIDE 0.1 MG: 0.1 TABLET ORAL at 06:20

## 2023-12-16 RX ADMIN — Medication 1 TABLET: at 07:56

## 2023-12-16 RX ADMIN — FAMOTIDINE 10 MG: 10 TABLET ORAL at 19:34

## 2023-12-16 RX ADMIN — Medication 300 MG: at 14:17

## 2023-12-16 RX ADMIN — BUPRENORPHINE 4 MG: 2 TABLET SUBLINGUAL at 15:06

## 2023-12-16 RX ADMIN — NICOTINE 1 PATCH: 21 PATCH, EXTENDED RELEASE TRANSDERMAL at 07:58

## 2023-12-16 RX ADMIN — PRAZOSIN HYDROCHLORIDE 2 MG: 2 CAPSULE ORAL at 23:14

## 2023-12-16 RX ADMIN — Medication 12.5 MG: at 14:18

## 2023-12-16 RX ADMIN — ACAMPROSATE CALCIUM 666 MG: 333 TABLET, DELAYED RELEASE ORAL at 14:18

## 2023-12-16 RX ADMIN — Medication 300 MG: at 19:31

## 2023-12-16 RX ADMIN — BUPRENORPHINE 4 MG: 2 TABLET SUBLINGUAL at 07:56

## 2023-12-16 RX ADMIN — GABAPENTIN 100 MG: 100 CAPSULE ORAL at 12:07

## 2023-12-16 RX ADMIN — Medication 12.5 MG: at 07:56

## 2023-12-16 RX ADMIN — FAMOTIDINE 10 MG: 10 TABLET ORAL at 07:56

## 2023-12-16 RX ADMIN — FOLIC ACID 1 MG: 1 TABLET ORAL at 07:58

## 2023-12-16 RX ADMIN — CLONIDINE HYDROCHLORIDE 0.1 MG: 0.1 TABLET ORAL at 15:06

## 2023-12-16 RX ADMIN — AMANTADINE HYDROCHLORIDE 100 MG: 100 CAPSULE ORAL at 19:34

## 2023-12-16 RX ADMIN — THIAMINE HCL TAB 100 MG 100 MG: 100 TAB at 07:58

## 2023-12-16 RX ADMIN — ACAMPROSATE CALCIUM 666 MG: 333 TABLET, DELAYED RELEASE ORAL at 07:56

## 2023-12-16 RX ADMIN — ATORVASTATIN CALCIUM 20 MG: 20 TABLET, FILM COATED ORAL at 19:34

## 2023-12-16 RX ADMIN — NICOTINE POLACRILEX 4 MG: 4 GUM, CHEWING BUCCAL at 17:36

## 2023-12-16 RX ADMIN — ACAMPROSATE CALCIUM 666 MG: 333 TABLET, DELAYED RELEASE ORAL at 19:34

## 2023-12-16 RX ADMIN — Medication 300 MG: at 07:56

## 2023-12-16 RX ADMIN — IBUPROFEN 600 MG: 600 TABLET, FILM COATED ORAL at 17:16

## 2023-12-16 ASSESSMENT — ACTIVITIES OF DAILY LIVING (ADL)
DRESS: SCRUBS (BEHAVIORAL HEALTH);INDEPENDENT
ADLS_ACUITY_SCORE: 29
ADLS_ACUITY_SCORE: 29
ORAL_HYGIENE: INDEPENDENT
ADLS_ACUITY_SCORE: 29
HYGIENE/GROOMING: INDEPENDENT
ADLS_ACUITY_SCORE: 29
ADLS_ACUITY_SCORE: 29
HYGIENE/GROOMING: HANDWASHING;INDEPENDENT
ORAL_HYGIENE: INDEPENDENT
ADLS_ACUITY_SCORE: 29
DRESS: SCRUBS (BEHAVIORAL HEALTH);INDEPENDENT
ADLS_ACUITY_SCORE: 29
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
## 2023-12-16 NOTE — PROGRESS NOTES
Patient has been up twice using the restroom for longer than normal periods.  States he is voiding fine now but has had a bout of loose to liquid stools tonight.  States he is not constipated and feels the loose stool is part of his withdrawal process. Denies any other s/sxs at this time. Denies wanting any prns, warm packs for stomach upset, fluids, etc.  Will try to sleep more at this time and will let RN know if things worsen or any other concerns.     Kb appeared to sleep only 1.75 hours this night shift.  He was in the bathroom quite a bit during the night with both runny stools and flatulence.  He had ice water during the night to remain hydrated but denied any prns until about 0615 when he accepted Imodium and Clonidine.  States feeling tired and anxious about the loose stools but states this happens when he is withdrawing. COWs = 3.  Remains very pleasant, cooperative, and appreciative.    Will continue to monitor and support patient.

## 2023-12-16 NOTE — PROGRESS NOTES
Brief Medicine Note:  # Urinary hesitancy/weak stream  Nursing paged medicine on 12/16 reporting intermittent weak stream and urinary hesitancy x 3 weeks. Bladder scan performed and recorded 169 mL. Patient unable to urinate after initial scan, so repeat bladder scan not completed. Urinalyses on 12/12 and 12/14 not c/f infection.    - Changed bladder scan order to include straight catheterization if > 300 mL and patient unable to urinate  - Push fluids  - Possible BPH vs OAB, recommend follow up with PCP. Will hold off on starting a medication at this time as patient's symptoms are intermittent per nursing notes.     Medicine signing off. Please notify if symptoms don't improve or worsen.    Justine Mantilla PA-C  Internal Medicine [facility]  [facility]  Securely message with [facility] (more info)  Text page via [facility] Paging/Directory

## 2023-12-16 NOTE — PLAN OF CARE
Problem: Adult Behavioral Health Plan of Care  Goal: Plan of Care Review  Outcome: Progressing   Goal Outcome Evaluation:         Perry spent most of this evening in his room, resting in bed. He did not exhibit any withdrawal symptoms and scored 1 on his COWS assessment. His vital signs are stable (97.0, 81, 100/68, and sats 97%). His appetite is good. Perry is fully alert and oriented to name, place, and time. He also has some insight into his mental health. At around 7 pm, he approached the medication window and requested his suboxone and HS medication. No behavioral issues were observed during this shift. Pt on COWS assessment.

## 2023-12-17 PROCEDURE — 250N000013 HC RX MED GY IP 250 OP 250 PS 637: Performed by: EMERGENCY MEDICINE

## 2023-12-17 PROCEDURE — 250N000013 HC RX MED GY IP 250 OP 250 PS 637: Performed by: PSYCHIATRY & NEUROLOGY

## 2023-12-17 PROCEDURE — 250N000013 HC RX MED GY IP 250 OP 250 PS 637: Performed by: STUDENT IN AN ORGANIZED HEALTH CARE EDUCATION/TRAINING PROGRAM

## 2023-12-17 PROCEDURE — 124N000002 HC R&B MH UMMC

## 2023-12-17 PROCEDURE — 250N000013 HC RX MED GY IP 250 OP 250 PS 637: Performed by: CLINICAL NURSE SPECIALIST

## 2023-12-17 RX ADMIN — QUETIAPINE FUMARATE 50 MG: 50 TABLET ORAL at 21:02

## 2023-12-17 RX ADMIN — FAMOTIDINE 10 MG: 10 TABLET ORAL at 08:13

## 2023-12-17 RX ADMIN — THIAMINE HCL TAB 100 MG 100 MG: 100 TAB at 08:14

## 2023-12-17 RX ADMIN — GABAPENTIN 100 MG: 100 CAPSULE ORAL at 16:33

## 2023-12-17 RX ADMIN — OLANZAPINE 10 MG: 10 TABLET, ORALLY DISINTEGRATING ORAL at 16:39

## 2023-12-17 RX ADMIN — Medication 300 MG: at 08:13

## 2023-12-17 RX ADMIN — ACAMPROSATE CALCIUM 666 MG: 333 TABLET, DELAYED RELEASE ORAL at 08:14

## 2023-12-17 RX ADMIN — Medication 12.5 MG: at 08:14

## 2023-12-17 RX ADMIN — FLUOXETINE 20 MG: 20 CAPSULE ORAL at 08:14

## 2023-12-17 RX ADMIN — GABAPENTIN 100 MG: 100 CAPSULE ORAL at 10:21

## 2023-12-17 RX ADMIN — LOPERAMIDE HYDROCHLORIDE 2 MG: 2 CAPSULE ORAL at 03:41

## 2023-12-17 RX ADMIN — PRAZOSIN HYDROCHLORIDE 2 MG: 2 CAPSULE ORAL at 21:02

## 2023-12-17 RX ADMIN — BUPRENORPHINE 4 MG: 2 TABLET SUBLINGUAL at 07:22

## 2023-12-17 RX ADMIN — Medication 1 TABLET: at 08:14

## 2023-12-17 RX ADMIN — Medication 12.5 MG: at 14:30

## 2023-12-17 RX ADMIN — NICOTINE 1 PATCH: 21 PATCH, EXTENDED RELEASE TRANSDERMAL at 08:14

## 2023-12-17 RX ADMIN — LOPERAMIDE HYDROCHLORIDE 2 MG: 2 CAPSULE ORAL at 21:04

## 2023-12-17 RX ADMIN — FAMOTIDINE 10 MG: 10 TABLET ORAL at 21:03

## 2023-12-17 RX ADMIN — NICOTINE POLACRILEX 4 MG: 4 GUM, CHEWING BUCCAL at 16:33

## 2023-12-17 RX ADMIN — FOLIC ACID 1 MG: 1 TABLET ORAL at 08:13

## 2023-12-17 RX ADMIN — BUPRENORPHINE 4 MG: 2 TABLET SUBLINGUAL at 19:26

## 2023-12-17 RX ADMIN — AMANTADINE HYDROCHLORIDE 100 MG: 100 CAPSULE ORAL at 21:03

## 2023-12-17 RX ADMIN — ACAMPROSATE CALCIUM 666 MG: 333 TABLET, DELAYED RELEASE ORAL at 21:03

## 2023-12-17 RX ADMIN — ATORVASTATIN CALCIUM 20 MG: 20 TABLET, FILM COATED ORAL at 21:03

## 2023-12-17 RX ADMIN — Medication 300 MG: at 14:30

## 2023-12-17 RX ADMIN — ACAMPROSATE CALCIUM 666 MG: 333 TABLET, DELAYED RELEASE ORAL at 14:30

## 2023-12-17 RX ADMIN — Medication 300 MG: at 21:03

## 2023-12-17 RX ADMIN — IBUPROFEN 600 MG: 600 TABLET, FILM COATED ORAL at 08:14

## 2023-12-17 RX ADMIN — AMANTADINE HYDROCHLORIDE 100 MG: 100 CAPSULE ORAL at 08:13

## 2023-12-17 RX ADMIN — OLANZAPINE 10 MG: 10 TABLET, ORALLY DISINTEGRATING ORAL at 12:35

## 2023-12-17 RX ADMIN — CLONIDINE HYDROCHLORIDE 0.1 MG: 0.1 TABLET ORAL at 03:41

## 2023-12-17 RX ADMIN — GABAPENTIN 100 MG: 100 CAPSULE ORAL at 22:51

## 2023-12-17 ASSESSMENT — ACTIVITIES OF DAILY LIVING (ADL)
ADLS_ACUITY_SCORE: 29
HYGIENE/GROOMING: INDEPENDENT
ADLS_ACUITY_SCORE: 29
DRESS: SCRUBS (BEHAVIORAL HEALTH)
ADLS_ACUITY_SCORE: 29
ADLS_ACUITY_SCORE: 29
ORAL_HYGIENE: INDEPENDENT
HYGIENE/GROOMING: SHOWER;INDEPENDENT
DRESS: SCRUBS (BEHAVIORAL HEALTH);INDEPENDENT
ADLS_ACUITY_SCORE: 29
ORAL_HYGIENE: INDEPENDENT
ADLS_ACUITY_SCORE: 29

## 2023-12-17 NOTE — PROGRESS NOTES
Kb continues to report loose stools and frequent urination.  He is in the hallway bathroom quite a bit so far tonight.  States sleeping very little and has some stomach upset (cramping) due to the loose stools.  He now states that he has not experienced the GI sxs in the past with opiate or meth withdrawal. Denies seeing any noticeable blood in the stool or urine.  Denies any other s/sxs except anxiety, stomach cramping, and the loose frequent stools.    Oriented x4.  No pain except cramping.  Denies any bone and generalized body pain.  Skin smooth with no sweating.  COWs = 8 primarily due to frequent GI sxs.  Imodium and Clonidine given.  Patient cooperative with both meds. Ginger ale given and encouraged further fluid intake for patient.    Internal med consult and continue to monitor and provide support to patient who remains cooperative, calm, and appreciative of his care here.      Kb is now in his room sleeping.  Appears comfortable with unlabored breathing and no signs of distress.  Appears to have slept about one hour so far tonight.

## 2023-12-17 NOTE — PLAN OF CARE
"  Problem: Adult Inpatient Plan of Care  Goal: Plan of Care Review  Description: The Plan of Care Review/Shift note should be completed every shift.  The Outcome Evaluation is a brief statement about your assessment that the patient is improving, declining, or no change.  This information will be displayed automatically on your shift  note.  Outcome: Progressing   Goal Outcome Evaluation:    Patient was AOx4 this shift. Patient was cooperative with care and was medication compliant. Patient denied AVH and HI, but endorsed passive intermittent SI. Patient said it was wishing to be dead more than actually wanting to complete suicide. Denied plan or intent. Patient agreed to safe behaviors while on the unit. Patient rated his anxiety 9/10 and depression 7/10. Patient had severe GI upset in the a.m., but reported that to be improved by lunch. Patient reported straining to urinate, but was able to void. Patient rated GI pain at 7/10 and was given prn ibuprofen which was effective.   Patient struggled with controlling his anxiety throughout the shift. Patient said his withdrawal symptoms were greatly improved but that his \"panic\" was still an ongoing issue. Patient was given prn zyprexa and gabapentin for anxiety. Those were partially effective.   Patient was independent with his ADLs and hygiene. Patient showered and shaved.   Patient's morning COWs was 8. Patient reported fidgeting, night sweats, and irritability. Patient reported struggling with strong cravings. Patient reported the one time afternoon dose of subutex on Saturday was helpful.     Patient reported that he did 1-2 g meth and fentanyl over a few day period and he drank 175 mL of whisky daily for the past month. He reported occasional RUQ pain and kidney pain. Patient said he's never been told that he liver or kidney problems in the past. Patient still had feelings of worthlessness and hopelessness. When patient reported his substance use he said \" I know I " "messed my life up.\" Patient was educated on the disease model of substance use and positive reinforcement was given.     Pt reported having dentures on his person. Patient was given denture cream, denture , and a denture cup.     Last 24H PRN:     cloNIDine (CATAPRES) tablet 0.1 mg, 0.1 mg at 12/17/23 0341    gabapentin (NEURONTIN) capsule 100 mg, 100 mg at 12/17/23 1021    ibuprofen (ADVIL/MOTRIN) tablet 600 mg, 600 mg at 12/17/23 0814    loperamide (IMODIUM) capsule 2 mg, 2 mg at 12/17/23 0341    nicotine polacrilex (NICORETTE) gum 4 mg, 4 mg at 12/16/23 1736    OLANZapine zydis (zyPREXA) ODT tab 10 mg, 10 mg at 12/17/23 1235     /77   Pulse 93   Temp 96.8  F (36  C)   Resp 16   Ht 1.905 m (6' 3\")   Wt 106.6 kg (235 lb)   SpO2 99%   BMI 29.37 kg/m           "

## 2023-12-17 NOTE — PLAN OF CARE
"Pt reporting diarrhea, which he states that he has been having for the past couple days. Refused Imodium, encouraged pt to drink fluids, with pt being receptive to this. Alert, oriented x4. Cooperative. No noted confusion, sedation, or altered change in mental status. Denied shortness of breath and breathing difficult. Pt rated on COWS assessment 11 and 10; notified provider of scores, never hear back. Noted to be fidgeting during initial assessment, but noted to be still at times when patient was listening to writer. Received scheduled Subutex. Upon assessment for SI, pt stated that he intermittently had passive thoughts of not wanting to be alive. Denies having intention or plan to hurt himself. Contracted for safety. Denies having any thoughts of hurting others. Denies, A/VH. Pt endorsed hopelessness during initial assessment. Rated anxiety 8/10, upon reassessment, reported that anxiety wasn't improving upon reassessment. Endorsed 10/10 depression. No overt signs of psychosis or mily.     Pt requested that he wanted his bedtime medication at 11 PM. When went to pt's room at this time to given medications, pt stated that he had recently awoken from from a nap and had felt \"frozen\" and that he didn't know where he was \"for a minute.\" Assessed pt's orientation, with pt being oriented x4 at this time. Ask pt if he felt sedated at all and he endorsed sedation. Retook pt's vital which where WDL. Pt did not appeared outwardly sedated. Held pt's Seroquel due to reported sedation. Informed charge nurse of the situation and incoming nurses of this. Later reproached pt to reassess and potentially give Seroquel. At this time, pt was sleeping and heard loudly snoring. Seroquel was then not given.     Cooperative with scheduled medication. No behavioral concerns this shift.     Pt is voiding, but reports that it take a significant amount of time.      /74 (BP Location: Left arm, Patient Position: Sitting, Cuff Size: " "Adult Large)   Pulse 99   Temp 96.8  F (36  C)   Resp 18   Ht 1.905 m (6' 3\")   Wt 106.6 kg (235 lb)   SpO2 95%   BMI 29.37 kg/m      Problem: Adult Inpatient Plan of Care  Goal: Optimal Comfort and Wellbeing  Outcome: Not Progressing     Problem: Adult Behavioral Health Plan of Care  Goal: Adheres to Safety Considerations for Self and Others  Outcome: Progressing       "

## 2023-12-18 PROCEDURE — 250N000013 HC RX MED GY IP 250 OP 250 PS 637: Performed by: EMERGENCY MEDICINE

## 2023-12-18 PROCEDURE — 250N000013 HC RX MED GY IP 250 OP 250 PS 637: Performed by: PSYCHIATRY & NEUROLOGY

## 2023-12-18 PROCEDURE — 124N000002 HC R&B MH UMMC

## 2023-12-18 PROCEDURE — 250N000013 HC RX MED GY IP 250 OP 250 PS 637: Performed by: STUDENT IN AN ORGANIZED HEALTH CARE EDUCATION/TRAINING PROGRAM

## 2023-12-18 PROCEDURE — 250N000013 HC RX MED GY IP 250 OP 250 PS 637: Performed by: CLINICAL NURSE SPECIALIST

## 2023-12-18 PROCEDURE — H2032 ACTIVITY THERAPY, PER 15 MIN: HCPCS

## 2023-12-18 PROCEDURE — 99232 SBSQ HOSP IP/OBS MODERATE 35: CPT | Performed by: PSYCHIATRY & NEUROLOGY

## 2023-12-18 RX ORDER — QUETIAPINE FUMARATE 25 MG/1
25 TABLET, FILM COATED ORAL 2 TIMES DAILY PRN
Status: DISCONTINUED | OUTPATIENT
Start: 2023-12-18 | End: 2023-12-22 | Stop reason: HOSPADM

## 2023-12-18 RX ORDER — BUPRENORPHINE 2 MG/1
4 TABLET SUBLINGUAL 3 TIMES DAILY
Status: DISCONTINUED | OUTPATIENT
Start: 2023-12-18 | End: 2023-12-21

## 2023-12-18 RX ADMIN — ACETAMINOPHEN 650 MG: 325 TABLET, FILM COATED ORAL at 23:54

## 2023-12-18 RX ADMIN — Medication 1 TABLET: at 08:24

## 2023-12-18 RX ADMIN — Medication 12.5 MG: at 08:24

## 2023-12-18 RX ADMIN — BUPRENORPHINE 4 MG: 2 TABLET SUBLINGUAL at 15:31

## 2023-12-18 RX ADMIN — ACAMPROSATE CALCIUM 666 MG: 333 TABLET, DELAYED RELEASE ORAL at 08:24

## 2023-12-18 RX ADMIN — Medication 300 MG: at 13:46

## 2023-12-18 RX ADMIN — FLUOXETINE 20 MG: 20 CAPSULE ORAL at 08:25

## 2023-12-18 RX ADMIN — OLANZAPINE 10 MG: 10 TABLET, ORALLY DISINTEGRATING ORAL at 02:59

## 2023-12-18 RX ADMIN — Medication 300 MG: at 08:24

## 2023-12-18 RX ADMIN — FAMOTIDINE 10 MG: 10 TABLET ORAL at 08:24

## 2023-12-18 RX ADMIN — ATORVASTATIN CALCIUM 20 MG: 20 TABLET, FILM COATED ORAL at 19:02

## 2023-12-18 RX ADMIN — NICOTINE POLACRILEX 4 MG: 4 GUM, CHEWING BUCCAL at 15:32

## 2023-12-18 RX ADMIN — ACETAMINOPHEN 650 MG: 325 TABLET, FILM COATED ORAL at 14:44

## 2023-12-18 RX ADMIN — Medication 300 MG: at 19:01

## 2023-12-18 RX ADMIN — PRAZOSIN HYDROCHLORIDE 2 MG: 2 CAPSULE ORAL at 20:16

## 2023-12-18 RX ADMIN — AMANTADINE HYDROCHLORIDE 100 MG: 100 CAPSULE ORAL at 19:01

## 2023-12-18 RX ADMIN — QUETIAPINE FUMARATE 50 MG: 50 TABLET ORAL at 20:16

## 2023-12-18 RX ADMIN — GABAPENTIN 100 MG: 100 CAPSULE ORAL at 02:59

## 2023-12-18 RX ADMIN — BUPRENORPHINE 4 MG: 2 TABLET SUBLINGUAL at 19:03

## 2023-12-18 RX ADMIN — FAMOTIDINE 10 MG: 10 TABLET ORAL at 19:02

## 2023-12-18 RX ADMIN — BUPRENORPHINE 4 MG: 2 TABLET SUBLINGUAL at 08:22

## 2023-12-18 RX ADMIN — Medication 12.5 MG: at 13:46

## 2023-12-18 RX ADMIN — NICOTINE POLACRILEX 4 MG: 4 GUM, CHEWING BUCCAL at 08:45

## 2023-12-18 RX ADMIN — OLANZAPINE 10 MG: 10 TABLET, ORALLY DISINTEGRATING ORAL at 12:35

## 2023-12-18 RX ADMIN — AMANTADINE HYDROCHLORIDE 100 MG: 100 CAPSULE ORAL at 08:24

## 2023-12-18 RX ADMIN — THIAMINE HCL TAB 100 MG 100 MG: 100 TAB at 08:24

## 2023-12-18 RX ADMIN — FOLIC ACID 1 MG: 1 TABLET ORAL at 08:26

## 2023-12-18 RX ADMIN — NICOTINE 1 PATCH: 21 PATCH, EXTENDED RELEASE TRANSDERMAL at 08:24

## 2023-12-18 RX ADMIN — NICOTINE POLACRILEX 4 MG: 4 GUM, CHEWING BUCCAL at 13:45

## 2023-12-18 ASSESSMENT — ACTIVITIES OF DAILY LIVING (ADL)
ADLS_ACUITY_SCORE: 29

## 2023-12-18 NOTE — PLAN OF CARE
"Pt was seen in his room laying down at the start of the shift. Pt came up to the nurses station and requested PRN gabapentin and zyprexa for his anxiety. Pt believes he should be getting subutex 3 times a day instead of 2 times a day. He also stated that his dose should be higher if not. Pt advised to talk with his provider in the morning about dosage and frequency. Pt reported having multiple diarrhea episodes during the shift. Pt given PRN imodium.  Pt took all scheduled evening medications. He has been cooperative and pleasant. Pt appeared to be tense when walking down the halls with his hands in fist. When approached, he stated that he was having stomach discomfort and still feels like he has a full stomach after urinating. RN bladder scanned him and it was 16mL. Pt was seen using the phones in the halls and has been out on the unit for about half the evening. No behavioral outbursts.    /75 (BP Location: Left arm, Patient Position: Sitting, Cuff Size: Adult Large)   Pulse 83   Temp 97.8  F (36.6  C) (Temporal)   Resp 16   Ht 1.905 m (6' 3\")   Wt 106.6 kg (235 lb)   SpO2 95%   BMI 29.37 kg/m      Problem: Adult Inpatient Plan of Care  Goal: Optimal Comfort and Wellbeing  Outcome: Progressing  Intervention: Provide Person-Centered Care  Recent Flowsheet Documentation  Taken 12/17/2023 1800 by Selena Glover, RN  Trust Relationship/Rapport:   care explained   thoughts/feelings acknowledged       "

## 2023-12-18 NOTE — PROGRESS NOTES
"   12/18/23 1318   Group Therapy Session   Group Attendance other (see comments)     Pt was invited to attend morning groups while in the lounge getting coffee.  Pt stated he would be right back, though didn't return until much later, and this was mainly to get more coffee.  Engaged pt in brief casual conversation - stated he feels he's \"maybe doing a little better, I don't know, I've been better\".  When asked about hobbies/interests when he's doing well, pt spoke of enjoying artwork - painting, drawing, working on motorbikes, taking them apart, rebuilding them, etc.  "

## 2023-12-18 NOTE — PLAN OF CARE
BEH IP Unit Acuity Rating Score (UARS)  Patient is given one point for every criteria they meet.    CRITERIA SCORING   On a 72 hour hold, court hold, committed, stay of commitment, or revocation 0    Patient LOS on BEH unit exceeds 20 days 0  LOS: 4   Patient under guardianship, 55+, otherwise medically complex, or under age 11 0   Suicide ideation without relief of precipitating factors 1   Current plan for suicide 0   Current plan for homicide 0   Imminent risk or actual attempt to seriously harm another without relief of factors precipitating the attempt 0   Severe dysfunction in daily living (ex: complete neglect for self care, extreme disruption in vegetative function, extreme deterioration in social interactions) 1   Recent (last 7 days) or current physical aggression in the ED or on unit 0   Restraints or seclusion episode in past 72 hours 0   Recent (last 7 days) or current verbal aggression, agitation, yelling, etc., while in the ED or unit 0   Active psychosis 1   Need for constant or near constant redirection (from leaving, from others, etc).  0   Intrusive or disruptive behaviors 0   TOTAL 3

## 2023-12-18 NOTE — PLAN OF CARE
"Team Note Due:  Monday    Assessment/Intervention/Current Symtoms and Care Coordination:  Chart review and met with team, discussed pt progress, symptomology, and response to treatment.  Discussed the discharge plan and any potential impediments to discharge.    Called [facility] to check on the status of the referral. They said they didn't have a referral for him. Checked with [facility] who verified it was sent on 12/13. Called [facility] again, they did not receive a fax with this referral.     Referral sent to [facility] via email.    Called [facility] to verify that they received the referral sent on 12/13. They also did not receive the referral.     Referral sent to [facility] via email.     Introduced self to Perry. Discussed my role. He asked me to send a referral to [facility] in [place]. UMBERTO signed.     Referral to [email or web address]    During our conversation Perry presented as motivated for treatment and recovery. He had insight into how his use is affecting his loved ones and stated that he doesn't want to move back in with his girlfriend until he feels more confident in his sobriety. He endorsed feeling like a \"failure.\" He asked for resources to support his mental health upon discharge. Discussed an ACT team, Perry is agreeable to this. Will send referrals. Writer inquired into his history of psychotherapy, he has never engaged in this but is willing to try. He stated that he wants more mental health support and that he has had to \"take a look at [him]self\" while he's been here.     No ACT teams operating in [facility] at this time.     Looked into psychotherapists who accept his insurance. Will discuss further with Perry.     UMBERTO signed for [facility]. Referral sent.      Discharge Plan or Goal:  When patient is stable and ready for discharge options include: Residential treatment     Barriers to Discharge:  In the referral process for residential " treatment     Referral Status:  [facility] 12/13, emailed 12/18  [address] [place] [address]  Phone: [phone]     [facility] 12/13, emailed 12/18  [facility]  [address] [place] [address]   ~13.7 [address]  Phone: [phone]    [facility] in [place], 12/18  [address]  [place] [address]  Phone: [phone]    Legal Status:  Voluntary     Contacts:  Partner: An[place]ell, [phone]      Upcoming Meetings and Dates/Important Information and next steps:  ACT referrals

## 2023-12-18 NOTE — PLAN OF CARE
"Problem: Suicide Risk  Goal: Absence of Self-Harm  Outcome: Not Progressing    Pt presents in melancholy mood with congruent affect. Pt appears psychomotor agitated as he is unable sit still and is restless on the unit. Pt appeared sweaty and with slight hand tremors. Provider notifed, new order to increase subutex. COWS monitoring continues d/t dose changes to subutex.    This am, pt c/o diarrhea since Friday, AM dose of acamprosate held, due to SE of diarrhea. Provider notified, new order to hold on acamprosate until further notice. Checked in with pt after lunch, pt reported 4 bouts of diarrhea reportedly \"straight water\" with foul odor. Provider notified and IM consult ordered. Pt reports food and fluid intake not effected. Pt encouraged to increase fluids. Pt verbalized understanding.    Pt was visible on the unit, mostly withdrawn to self, not social with peers or staff. Pt is pleasant on approach and participated in nursing assessment. Pt denies SIB and thoughts of hurting others. Pt endorses depression, 7/10 and anxiety 6/10. Approx 1400 pt requested \"something for anxiety\". Pt given scheduled 1400 medications and educated on medication effects on anxiety. Pt denies AH/VH.    PRNs given this shift: 1444 650mg tylenol and nicotine gum x3.    VS reviewed: /81 (BP Location: Left arm, Patient Position: Sitting, Cuff Size: Adult Large)   Pulse 92   Temp 98.6  F (37  C) (Oral)   Resp 16   Ht 1.905 m (6' 3\")   Wt 106.6 kg (235 lb)   SpO2 93%   BMI 29.37 kg/m   . Patient denies  pain.    Length of stay: 4  "

## 2023-12-18 NOTE — PROGRESS NOTES
Perry has been in and out of his room to use the bathroom and to discuss medications several times during the night so far.  Still having frequent runny stools as well as voiding.  Denies any pain upon urination but states that his bottom is sore from the frequent stools. Denies any other pain tonight.     At one point, he seemed confused as to what time it was and stated his evening nurse knocked on his door to tell him his meds were ready.  He was very insistent that she had just knocked on his door in the past few minutes, but it had been at least 2 hours since she finished her evening shift.  He apologized for seeming confused about time.  Assured Kb that time had probably lapsed as he had been sleeping at the start of night shift.  A previous RN had given Kb both Neurontin and Zyprexa which he believes works well to help with his anxiety. Doses of each given on night shift.    116/76, 86, afebrile with SATs of 94% on RA. COWs = 5.    Appeared to sleep a total of 2.5 hours.
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
## 2023-12-19 LAB
ALBUMIN UR-MCNC: NEGATIVE MG/DL
ANION GAP SERPL CALCULATED.3IONS-SCNC: 6 MMOL/L (ref 7–15)
APPEARANCE UR: CLEAR
BILIRUB UR QL STRIP: NEGATIVE
BUN SERPL-MCNC: 14.8 MG/DL (ref 6–20)
CALCIUM SERPL-MCNC: 9.4 MG/DL (ref 8.6–10)
CHLORIDE SERPL-SCNC: 99 MMOL/L (ref 98–107)
COLOR UR AUTO: NORMAL
CREAT SERPL-MCNC: 1.07 MG/DL (ref 0.67–1.17)
DEPRECATED HCO3 PLAS-SCNC: 31 MMOL/L (ref 22–29)
EGFRCR SERPLBLD CKD-EPI 2021: 86 ML/MIN/1.73M2
ERYTHROCYTE [DISTWIDTH] IN BLOOD BY AUTOMATED COUNT: 13 % (ref 10–15)
GLUCOSE SERPL-MCNC: 104 MG/DL (ref 70–99)
GLUCOSE UR STRIP-MCNC: NEGATIVE MG/DL
HCT VFR BLD AUTO: 44.3 % (ref 40–53)
HGB BLD-MCNC: 14.6 G/DL (ref 13.3–17.7)
HGB UR QL STRIP: NEGATIVE
KETONES UR STRIP-MCNC: NEGATIVE MG/DL
LEUKOCYTE ESTERASE UR QL STRIP: NEGATIVE
MAGNESIUM SERPL-MCNC: 2 MG/DL (ref 1.7–2.3)
MCH RBC QN AUTO: 31.4 PG (ref 26.5–33)
MCHC RBC AUTO-ENTMCNC: 33 G/DL (ref 31.5–36.5)
MCV RBC AUTO: 95 FL (ref 78–100)
NITRATE UR QL: NEGATIVE
PH UR STRIP: 5 [PH] (ref 5–7)
PLATELET # BLD AUTO: 218 10E3/UL (ref 150–450)
POTASSIUM SERPL-SCNC: 5.2 MMOL/L (ref 3.4–5.3)
RBC # BLD AUTO: 4.65 10E6/UL (ref 4.4–5.9)
RBC URINE: <1 /HPF
SODIUM SERPL-SCNC: 136 MMOL/L (ref 135–145)
SP GR UR STRIP: 1.01 (ref 1–1.03)
UROBILINOGEN UR STRIP-MCNC: NORMAL MG/DL
WBC # BLD AUTO: 12.5 10E3/UL (ref 4–11)
WBC URINE: 1 /HPF

## 2023-12-19 PROCEDURE — 250N000013 HC RX MED GY IP 250 OP 250 PS 637: Performed by: EMERGENCY MEDICINE

## 2023-12-19 PROCEDURE — 250N000013 HC RX MED GY IP 250 OP 250 PS 637: Performed by: CLINICAL NURSE SPECIALIST

## 2023-12-19 PROCEDURE — 81001 URINALYSIS AUTO W/SCOPE: CPT | Performed by: PSYCHIATRY & NEUROLOGY

## 2023-12-19 PROCEDURE — 250N000013 HC RX MED GY IP 250 OP 250 PS 637: Performed by: PSYCHIATRY & NEUROLOGY

## 2023-12-19 PROCEDURE — 124N000002 HC R&B MH UMMC

## 2023-12-19 PROCEDURE — 80048 BASIC METABOLIC PNL TOTAL CA: CPT

## 2023-12-19 PROCEDURE — 99231 SBSQ HOSP IP/OBS SF/LOW 25: CPT | Performed by: PHYSICIAN ASSISTANT

## 2023-12-19 PROCEDURE — 36415 COLL VENOUS BLD VENIPUNCTURE: CPT

## 2023-12-19 PROCEDURE — 99207 PR NO CHARGE LOS: CPT | Performed by: PHYSICIAN ASSISTANT

## 2023-12-19 PROCEDURE — 250N000013 HC RX MED GY IP 250 OP 250 PS 637: Performed by: STUDENT IN AN ORGANIZED HEALTH CARE EDUCATION/TRAINING PROGRAM

## 2023-12-19 PROCEDURE — 85027 COMPLETE CBC AUTOMATED: CPT

## 2023-12-19 PROCEDURE — 83735 ASSAY OF MAGNESIUM: CPT

## 2023-12-19 RX ADMIN — AMANTADINE HYDROCHLORIDE 100 MG: 100 CAPSULE ORAL at 08:40

## 2023-12-19 RX ADMIN — BUPRENORPHINE 4 MG: 2 TABLET SUBLINGUAL at 19:15

## 2023-12-19 RX ADMIN — Medication 300 MG: at 08:40

## 2023-12-19 RX ADMIN — Medication 12.5 MG: at 13:13

## 2023-12-19 RX ADMIN — THIAMINE HCL TAB 100 MG 100 MG: 100 TAB at 08:40

## 2023-12-19 RX ADMIN — FLUOXETINE 20 MG: 20 CAPSULE ORAL at 08:42

## 2023-12-19 RX ADMIN — FOLIC ACID 1 MG: 1 TABLET ORAL at 08:40

## 2023-12-19 RX ADMIN — Medication 300 MG: at 13:13

## 2023-12-19 RX ADMIN — Medication 300 MG: at 20:29

## 2023-12-19 RX ADMIN — Medication 12.5 MG: at 08:40

## 2023-12-19 RX ADMIN — NICOTINE POLACRILEX 4 MG: 4 GUM, CHEWING BUCCAL at 14:25

## 2023-12-19 RX ADMIN — CLONIDINE HYDROCHLORIDE 0.1 MG: 0.1 TABLET ORAL at 18:29

## 2023-12-19 RX ADMIN — FAMOTIDINE 10 MG: 10 TABLET ORAL at 20:28

## 2023-12-19 RX ADMIN — AMANTADINE HYDROCHLORIDE 100 MG: 100 CAPSULE ORAL at 20:29

## 2023-12-19 RX ADMIN — PRAZOSIN HYDROCHLORIDE 2 MG: 2 CAPSULE ORAL at 20:29

## 2023-12-19 RX ADMIN — BUPRENORPHINE 4 MG: 2 TABLET SUBLINGUAL at 08:40

## 2023-12-19 RX ADMIN — NICOTINE 1 PATCH: 21 PATCH, EXTENDED RELEASE TRANSDERMAL at 14:25

## 2023-12-19 RX ADMIN — ACETAMINOPHEN 650 MG: 325 TABLET, FILM COATED ORAL at 08:52

## 2023-12-19 RX ADMIN — FAMOTIDINE 10 MG: 10 TABLET ORAL at 08:40

## 2023-12-19 RX ADMIN — ALUMINUM HYDROXIDE, MAGNESIUM HYDROXIDE, AND SIMETHICONE 30 ML: 200; 200; 20 SUSPENSION ORAL at 19:20

## 2023-12-19 RX ADMIN — ATORVASTATIN CALCIUM 20 MG: 20 TABLET, FILM COATED ORAL at 20:29

## 2023-12-19 RX ADMIN — BUPRENORPHINE 4 MG: 2 TABLET SUBLINGUAL at 13:13

## 2023-12-19 RX ADMIN — NICOTINE POLACRILEX 4 MG: 4 GUM, CHEWING BUCCAL at 18:29

## 2023-12-19 RX ADMIN — Medication 1 TABLET: at 08:40

## 2023-12-19 RX ADMIN — NICOTINE POLACRILEX 4 MG: 4 GUM, CHEWING BUCCAL at 08:39

## 2023-12-19 RX ADMIN — QUETIAPINE FUMARATE 50 MG: 50 TABLET ORAL at 20:29

## 2023-12-19 RX ADMIN — OLANZAPINE 10 MG: 10 TABLET, ORALLY DISINTEGRATING ORAL at 03:36

## 2023-12-19 ASSESSMENT — ACTIVITIES OF DAILY LIVING (ADL)
HYGIENE/GROOMING: INDEPENDENT
DRESS: INDEPENDENT
ORAL_HYGIENE: INDEPENDENT
ADLS_ACUITY_SCORE: 29
LAUNDRY: UNABLE TO COMPLETE
ORAL_HYGIENE: INDEPENDENT
ADLS_ACUITY_SCORE: 29
HYGIENE/GROOMING: INDEPENDENT
ADLS_ACUITY_SCORE: 29
DRESS: INDEPENDENT
ADLS_ACUITY_SCORE: 29
ADLS_ACUITY_SCORE: 29
LAUNDRY: UNABLE TO COMPLETE
ADLS_ACUITY_SCORE: 29
ADLS_ACUITY_SCORE: 29

## 2023-12-19 NOTE — CARE PLAN
12/18/23 1900   Group Therapy Session   Group Attendance attended group session   Time Session Began 1615   Time Session Ended 1700   Total Time (minutes) 45   Total # Attendees 3   Group Type expressive therapy   Group Topic Covered emotions/expression   Patient Response/Contribution cooperative with task     Art Therapy directive was to create an inside/outside drawing expressing aspects of self/identity.  Goals of directive: to create a visual self narrative, emotional expression, to identify personal strengths and goals, emotional expression.  Pt was an engaged participant, focused on task for the full duration of group. Pt identified several positive aspects of self, skills and passions. Pt briefly verbally processed with author and group. Pt was a positive participant in group discussion.  Pts mood was calm.

## 2023-12-19 NOTE — PROGRESS NOTES
[facility]    Medicine Progress Note - Hospitalist Service    Date of Admission:  12/12/2023    Assessment & Plan   Perry Preciado is a 48 year old male with PMH of substance induced psychosis, MDD, polysubstance use, and Schizophrenia who was admitted 12/12/2023. Internal medicine is consulted for diarrhea.     #Diarrhea  Patient reports 4 days (started 12/15) of loose, watery stools with mild lower abdominal discomfort. No recent antibiotic use or travel. No nausea or vomiting. He remains afebrile. Abdominal exam benign-bowel sounds present and minimal tenderness. Labs with mild leukocytosis but otherwise no other significant findings. Suspect 2/2 acamprosate which was started 12/12 and correlates to the timing of onset of diarrhea on 12/15. Could also be worsened in the setting of substance withdrawal. Differential also includes viral and less likely bacterial etiology  -No further work up indicated at this time  -Consider stool culture and C.Diff testing if no improvement with holding acamprosate or if clinically worsens (develops fever, worsening abdominal pain)  -Consider repeat BMP, Mg if diarrhea persists to monitor electrolytes  -Immodium PRN (already ordered)    The patient's care was discussed with the Bedside Nurse and Patient.    Medicine will sign off. Contact medicine if no improvement or worsens.     Hazel Lazo PA-C  Hospitalist Service  [facility]  Securely message with [employer] (more info)  Text page via [facility] Paging/Directory   ______________________________________________________________________    Subjective  Patient reports 4 days (started 12/15) of loose, watery stools with mild lower abdominal discomfort rated 7/10 at its worse. No recent antibiotic use or travel. No nausea or vomiting. No fevers, chills, dysuria or other complaints. Started acamprosate 12/12 which was held  yesterday.    Physical Exam   Vital Signs: Temp: 98.4  F (36.9  C) Temp src: Temporal BP: 133/85 Pulse: 82   Resp: 16 SpO2: 95 % O2 Device: None (Room air)    Weight: 235 lbs 0 oz    General: Initially asleep when entered room. Easily arousable.  NAD. Appears comfortable sitting on bed.   Resp: No signs of respiratory distress. Lungs clear to ausculation bilaterally without rales, rhonchi or wheezes.   Cardiac: RRR. S1 and S2 normal intensity. No murmurs appreciated.   GI: Abdomen soft, mild-tender with deep palpation, non-distended.  Bowel sounds present. No masses, hepatomegaly or splenomegaly. No rebound or guarding.  : No barr  Extremities: No cyanosis, clubbing or edema      Medical Decision Making       30 MINUTES SPENT BY ME on the date of service doing chart review, history, exam, documentation & further activities per the note.      Data

## 2023-12-19 NOTE — DISCHARGE INSTRUCTIONS
Behavioral Discharge Planning and Instructions    Summary: You were admitted on 12/12/2023  due to Suicidal Ideations.  You were treated by Temi Crockett MD and discharged on 12/22/23 from [facility].     Main Diagnosis: Psychosis, unspecified. Suicidal ideation with intent and plan.     Health Care Follow-up: You will work with psychiatry through [facility].       Information will be faxed to your outpatient providers to ensure a healthy continuity of care for you.     Attend all scheduled appointments with your outpatient providers. Call at least 24 hours in advance if you need to reschedule an appointment to ensure continued access to your outpatient providers.     Major Treatments, Procedures and Findings:  You were provided with: a psychiatric assessment, assessed for medical stability, medication evaluation and/or management, group therapy, CD evaluation/assessment, and milieu management    Symptoms to Report: feeling more aggressive, increased confusion, losing more sleep, mood getting worse, or thoughts of suicide    Early warning signs can include: increased depression or anxiety sleep disturbances increased thoughts or behaviors of suicide or self-harm     Safety and Wellness:  Take all medicines as directed.  Make no changes unless your doctor suggests them.      Follow treatment recommendations.  Refrain from alcohol and non-prescribed drugs.  Ask your support system to help you reduce your access to items that could harm yourself or others. Items could include:  Firearms  Medicines (both prescribed and over-the-counter)  Knives and other sharp objects  Ropes and like materials  Car keys  If there is a concern for safety, call 911. If there is a concern for safety, call 911.    Resources:   Crisis Intervention: 485.501.9908 or 583-415-7576 (TTY: 479.139.6365).  Call anytime for help.  National [place] on Mental Illness (www.mn.naldo.org): 896.497.4555 or 995-024-7821.  [place]  "Association for Children's Mental Health (www.macmh.org): 337.370.8812.  Suicide Awareness Voices of Education (SAVE) (www.save.org): 376-383-XALE (9631)  National Suicide Prevention Line (www.mentalhealthmn.org): 685-377-HXCC (0107)  Mental Health Consumer/Survivor Network of [place] (www.mhcsn.net): 937.413.9074 or 579-599-9429  Mental Health Association of [place] (www.mentalhealth.org): 623.217.3114 or 146-220-4355  Self- Management and Recovery Training., SMART-- Toll free: 804.621.3085  www.[employer] Moment.GnamGnam  [facility] Crisis Response 298-510-0271  Text 4 Life: txt \"LIFE\" to 59248 for immediate support and crisis intervention  Crisis text line: Text \"[place]\" to 208229. Free, confidential, 24/7.  Crisis Intervention: 820.644.6231 or 058-826-4366. Call anytime for help.        [employer] [place] (National [place] on Mental Illness) improves the lives of children and adults with mental illnesses and their families by providing free classes on mental illnesses and support groups for adults with mental illnesses, parents and family members. For more information: Phone: 731.497.2116 Toll free: 0-262-MPZN-HELPS Website: www.namAibops.orghttp://www.namihelps.org/      General Medication Instructions:   See your medication sheet(s) for instructions.   Take all medicines as directed.  Make no changes unless your doctor suggests them.   Go to all your doctor visits.  Be sure to have all your required lab tests. This way, your medicines can be refilled on time.  Do not use any drugs not prescribed by your doctor.  Avoid alcohol.    Advance Directives:   Scanned document on file with [place]?    Is document scanned? Pt states no documents  Honoring Choices Your Rights Handout: Informed and given  Was more information offered?      The Treatment team has appreciated the opportunity to work with you. If you have any questions or concerns about your recent admission, you can contact the unit which can receive your call 24 hours a day, 7 " days a week. They will be able to get in touch with a Provider if needed. The unit number is [phone] .

## 2023-12-19 NOTE — CONSULTS
See progress note dated 12/19/2023 by Hazel Lazo PA-C regarding consult for diarrhea.     Hazel Lazo PA-C  Internal Medicine [facility] Hospitalist  [facility]  Pager [phone]

## 2023-12-19 NOTE — PLAN OF CARE
BEH IP Unit Acuity Rating Score (UARS)  Patient is given one point for every criteria they meet.    CRITERIA SCORING   On a 72 hour hold, court hold, committed, stay of commitment, or revocation 0    Patient LOS on BEH unit exceeds 20 days 0  LOS: 5   Patient under guardianship, 55+, otherwise medically complex, or under age 11 0   Suicide ideation without relief of precipitating factors 1   Current plan for suicide 0   Current plan for homicide 0   Imminent risk or actual attempt to seriously harm another without relief of factors precipitating the attempt 0   Severe dysfunction in daily living (ex: complete neglect for self care, extreme disruption in vegetative function, extreme deterioration in social interactions) 1   Recent (last 7 days) or current physical aggression in the ED or on unit 0   Restraints or seclusion episode in past 72 hours 0   Recent (last 7 days) or current verbal aggression, agitation, yelling, etc., while in the ED or unit 0   Active psychosis 1   Need for constant or near constant redirection (from leaving, from others, etc).  0   Intrusive or disruptive behaviors 0   TOTAL 3

## 2023-12-19 NOTE — PROGRESS NOTES
"Perry was given Tylenol 650 mg for a \"sore throat\" which began today.  States feeling tired and is going to try to sleep now.  Offered chamomile tea for sleep and to coat his throat, but patient declined the tea.     Perry showered at 0300 as he had diarrhea which he states woke him from sleeping.  Then asked for, and was given, Zyprexa for anxiety/agitation.  Still has the sore throat and states the Tylenol was not totally helpful.  Declines an additional dose of Tylenol or ibuprofen at this time.    Continues to talk about the liquid frequent stools and the \"stomach\" pain.  Walking upright with no issue. Not guarding abdominal area.  Understands to let staff know if any new sxs or if discomfort worsens.  Will continue to monitor pain and any other s/sxs.  Will have internal med consult and labs today.  COWs = 5.    States stomach discomfort has not worsened or improved.  Warm packs given and has been increasing his fluid intake to replace output. States it has been difficult to start voiding but then voids with no issue.  Denies pain upon urination.  Frequent bathroom use all night long. Declined any further Tylenol at this time.    Appeared to sleep a total of 1.5 hours tonight.  Remains appreciative and pleasant.    Internal med consult and labs today.  Frequency and difficulty starting to void.  UA/UC?  Continue to assess stomach discomfort and any other s/sxs.   "

## 2023-12-19 NOTE — PLAN OF CARE
"Pt continues to report liquid stool several times this shift and diffuse low abdominal pain. Pt seen by IM provider. No no orders. See provider note 12/19/23 @1134 for recommendations. Pt diet and fluid intake adequate.    Pt continues to have feelings of worthlessness. Pt denies SIB, HI and thoughts of hurting others. Pt endorses feeling as if \"everyone would be better off if I wasn't here\". Pt denies plan or intent at this time and agrees to safe behaviors while on the unit. Pt endorses depression 6/10 and anxiety 7/10. Pt presents as mildly irritable with congruent affect. Pt oriented x4 but during pm check in, pt would nod off mid conversation. Pt reports not sleeping well at night which he believes is the reason he is tired. Pt napped after breakfast for approx an hour.    PRNs given this shift: 650mg tylenol for headache rated 9/10 with reported relief. Nicotine gum x2.  "

## 2023-12-19 NOTE — PLAN OF CARE
Problem: Adult Inpatient Plan of Care  Goal: Optimal Comfort and Wellbeing  Outcome: Progressing   Goal Outcome Evaluation:         Patient had an uneventful evening. Remained pleasant and polite with peers and staff. Denies any SI/SIB. Denies any AH/VH tonight. Opiate w/d score 5. Will have labs in the morning before the IM consult. Ate 100% of dinner and his GF brought in food tonight as well. He did not complain of any physical complaints. He remains appropriate with his interactions with peers and staff. He had a good visit with his GF. No restraints or seclusion episodes for the past 24 hours.

## 2023-12-19 NOTE — PLAN OF CARE
Team Note Due:  Monday    Assessment/Intervention/Current Symtoms and Care Coordination:  Chart review and met with team, discussed pt progress, symptomology, and response to treatment.  Discussed the discharge plan and any potential impediments to discharge.    Additional documentation sent to [facility] at their request.     Declined from [facility].     Referral for Community Support Program with [facility] completed.      Discharge Plan or Goal:  When patient is stable and ready for discharge options include: Residential treatment     Barriers to Discharge:  In the referral process for residential treatment     Referral Status:  Sarah 12/13, emailed 12/18  [address] [place] [address]  Phone: [phone]     [facility] 12/13, emailed 12/18  [employer]  [address] [place] [address]   [place]  Phone: [phone]    [facility] 12/18  [address]  [place] [address]  Phone: [phone]    [facility] 12/18, declined 12/19    Legal Status:  Voluntary     Contacts:  Partner: Anabell [phone]      Upcoming Meetings and Dates/Important Information and next steps:

## 2023-12-20 PROCEDURE — 250N000013 HC RX MED GY IP 250 OP 250 PS 637: Performed by: EMERGENCY MEDICINE

## 2023-12-20 PROCEDURE — G0177 OPPS/PHP; TRAIN & EDUC SERV: HCPCS

## 2023-12-20 PROCEDURE — 250N000013 HC RX MED GY IP 250 OP 250 PS 637: Performed by: STUDENT IN AN ORGANIZED HEALTH CARE EDUCATION/TRAINING PROGRAM

## 2023-12-20 PROCEDURE — 250N000013 HC RX MED GY IP 250 OP 250 PS 637: Performed by: CLINICAL NURSE SPECIALIST

## 2023-12-20 PROCEDURE — H2032 ACTIVITY THERAPY, PER 15 MIN: HCPCS

## 2023-12-20 PROCEDURE — 250N000013 HC RX MED GY IP 250 OP 250 PS 637: Performed by: PSYCHIATRY & NEUROLOGY

## 2023-12-20 PROCEDURE — 124N000002 HC R&B MH UMMC

## 2023-12-20 PROCEDURE — 99232 SBSQ HOSP IP/OBS MODERATE 35: CPT | Performed by: PSYCHIATRY & NEUROLOGY

## 2023-12-20 RX ORDER — OLANZAPINE 5 MG/1
5-10 TABLET, ORALLY DISINTEGRATING ORAL 2 TIMES DAILY PRN
Status: DISCONTINUED | OUTPATIENT
Start: 2023-12-20 | End: 2023-12-22 | Stop reason: HOSPADM

## 2023-12-20 RX ORDER — GABAPENTIN 600 MG/1
600 TABLET ORAL 3 TIMES DAILY
Status: DISCONTINUED | OUTPATIENT
Start: 2023-12-20 | End: 2023-12-22 | Stop reason: HOSPADM

## 2023-12-20 RX ADMIN — NICOTINE POLACRILEX 4 MG: 4 GUM, CHEWING BUCCAL at 11:01

## 2023-12-20 RX ADMIN — AMANTADINE HYDROCHLORIDE 100 MG: 100 CAPSULE ORAL at 20:47

## 2023-12-20 RX ADMIN — Medication 600 MG: at 20:46

## 2023-12-20 RX ADMIN — OLANZAPINE 10 MG: 10 TABLET, ORALLY DISINTEGRATING ORAL at 02:13

## 2023-12-20 RX ADMIN — FAMOTIDINE 10 MG: 10 TABLET ORAL at 20:47

## 2023-12-20 RX ADMIN — ATORVASTATIN CALCIUM 20 MG: 20 TABLET, FILM COATED ORAL at 20:46

## 2023-12-20 RX ADMIN — FLUOXETINE 20 MG: 20 CAPSULE ORAL at 08:10

## 2023-12-20 RX ADMIN — THIAMINE HCL TAB 100 MG 100 MG: 100 TAB at 08:10

## 2023-12-20 RX ADMIN — FOLIC ACID 1 MG: 1 TABLET ORAL at 08:10

## 2023-12-20 RX ADMIN — PRAZOSIN HYDROCHLORIDE 2 MG: 2 CAPSULE ORAL at 20:46

## 2023-12-20 RX ADMIN — BUPRENORPHINE 4 MG: 2 TABLET SUBLINGUAL at 13:31

## 2023-12-20 RX ADMIN — BUPRENORPHINE 4 MG: 2 TABLET SUBLINGUAL at 19:02

## 2023-12-20 RX ADMIN — QUETIAPINE FUMARATE 50 MG: 50 TABLET ORAL at 20:47

## 2023-12-20 RX ADMIN — BUPRENORPHINE 4 MG: 2 TABLET SUBLINGUAL at 08:01

## 2023-12-20 RX ADMIN — NICOTINE POLACRILEX 4 MG: 4 GUM, CHEWING BUCCAL at 15:53

## 2023-12-20 RX ADMIN — FAMOTIDINE 10 MG: 10 TABLET ORAL at 08:09

## 2023-12-20 RX ADMIN — NICOTINE POLACRILEX 4 MG: 4 GUM, CHEWING BUCCAL at 22:31

## 2023-12-20 RX ADMIN — Medication 12.5 MG: at 08:10

## 2023-12-20 RX ADMIN — ALUMINUM HYDROXIDE, MAGNESIUM HYDROXIDE, AND SIMETHICONE 30 ML: 200; 200; 20 SUSPENSION ORAL at 02:10

## 2023-12-20 RX ADMIN — AMANTADINE HYDROCHLORIDE 100 MG: 100 CAPSULE ORAL at 08:09

## 2023-12-20 RX ADMIN — CLONIDINE HYDROCHLORIDE 0.1 MG: 0.1 TABLET ORAL at 05:55

## 2023-12-20 RX ADMIN — Medication 1 TABLET: at 08:10

## 2023-12-20 RX ADMIN — Medication 600 MG: at 13:31

## 2023-12-20 RX ADMIN — Medication 300 MG: at 08:09

## 2023-12-20 RX ADMIN — OLANZAPINE 10 MG: 5 TABLET, ORALLY DISINTEGRATING ORAL at 15:53

## 2023-12-20 RX ADMIN — Medication 12.5 MG: at 13:31

## 2023-12-20 RX ADMIN — NICOTINE 1 PATCH: 21 PATCH, EXTENDED RELEASE TRANSDERMAL at 08:02

## 2023-12-20 ASSESSMENT — ACTIVITIES OF DAILY LIVING (ADL)
DRESS: INDEPENDENT
ADLS_ACUITY_SCORE: 29
ADLS_ACUITY_SCORE: 29
HYGIENE/GROOMING: INDEPENDENT
ADLS_ACUITY_SCORE: 29
LAUNDRY: UNABLE TO COMPLETE
ORAL_HYGIENE: INDEPENDENT
ADLS_ACUITY_SCORE: 29

## 2023-12-20 NOTE — PLAN OF CARE
Group Interaction:  Pt presented for psychotherapy group. Following the introduction, pt was observed to be falling asleep. Pt struggled to stay awake in the context of having his eyes closed some times, and forcing himself to stay awake. After a short time, the pt excused himself and said he needed to use the restroom.    Flavio Way, Ph.D., Health system.

## 2023-12-20 NOTE — PLAN OF CARE
"  Problem: Suicide Risk  Goal: Absence of Self-Harm  Intervention: Assess Risk to Self and Maintain Safety  Recent Flowsheet Documentation  Taken 12/20/2023 0216 by Bradley Goldman RN  Behavior Management: impulse control promoted     Problem: Excessive Substance Use  Goal: Improved Behavioral Control (Excessive Substance Use)  Outcome: Progressing  Note: Patient manifests no behavioral escalation, able to make his needs known  Intervention: Promote Behavior and Impulse Control  Flowsheets (Taken 12/20/2023 0216)  Behavior Management: impulse control promoted   Goal Outcome Evaluation:       Patient seems not to be in apparent distress this shift, though complained of abdominal discomfort for which he requested sprite or ice cream (not available) took an orange juice and offered Maalox declined Ibuprofen states \"the pain is inside not outside.\" Patient also requested and received Olanzapine 10 mg for anxiety/agitation at 0218. Patient had a frequent visit to the bathroom which seriously disrupted his sleep. At about 0300, patient approached this RN writer and asks \"Can I shower now, I just had an accident in my pant or do you want it to wait till morning? Patient was informed that when absolutely necessary patient could have shower but we typically do not encourage shower in the night because of limited number of staff who might be needed for other things like attending emergency codes etc and the unit might not be able to supervise the patient in shower at a time like that. Patient was allowed to have the shower, and when he finished grabbed 3 apple sauce to his room. At 0550 patient wanted to have all his 0800 scheduled medications when told its too early, he then requested another prn Zyprexa for anxiety, writer reminded patient that he had it at 0218 and that Zyprexa is prescribed twice daily prn therefore would not be able to have it till later in the afternoon today. Patient then requested/received prn " Clonidine 0.1 mg. (B/P 111/72, HR 92). Again, patient grabbed 2 apple source and an orange juice from the fridge. Patient scored 3 on COWS at 0600  Overall, patient slept 2 hours. Will continue to monitor.           Bradley Goldman, HUNG, RN

## 2023-12-20 NOTE — PLAN OF CARE
"Team Note Due:  Monday    Assessment/Intervention/Current Symtoms and Care Coordination:  Chart review and met with team, discussed pt progress, symptomology, and response to treatment.  Discussed the discharge plan and any potential impediments to discharge.    Met with Perry \"JULIAN\" to discuss hospitalization. He spoke with Sean this morning. Writer inquired into suicidal ideation, he stated, \"I don't feel like killing myself right now, when I get out I want to have the right medications so I can handle the stress. I don't think I'm going to off myself.\" We discussed his reoccurrence of use and he presented with feelings of shame around this, writer reframed this by highlighting his extended periods of sobriety and his proven ability to abstain from substances. We discussed the Community Support Program that writer has referred him to and the ways that this program may be useful. JULIAN asked if there was a surgery that he can have to \"get rid of the urges to use, like a lobotomy\" and writer again strongly encouraged engaging in psychotherapy which he continues to agree with.     Called Sean to get an update on the referral status. Spoke with Shauna. He has been added to the waitlist, she stated \"he's good to go, once we get an available bed we'll let you know.\" Shauna stated the estimated availability is \"sometime next week.\"      Discharge Plan or Goal:  When patient is stable and ready for discharge options include: Residential treatment     Barriers to Discharge:  In the referral process for residential treatment     Referral Status:  Sarah 12/13, emailed 12/18  [address] [place] [address]  Phone: [phone]     [facility] 12/13, emailed 12/18 - on waitlist as of 12/20/23  [employer]  [address] [place] [address]   [place]  Phone: [phone]    [facility] in [place], 12/18  [address]  [place] [address]  Phone: " [phone]    [facility] 12/18, declined 12/19    Community Support Program in [facility] 12/20    Legal Status:  Voluntary     Contacts:  Partner: Anabell, [phone]      Upcoming Meetings and Dates/Important Information and next steps:
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
## 2023-12-20 NOTE — PLAN OF CARE
BEH IP Unit Acuity Rating Score (UARS)  Patient is given one point for every criteria they meet.    CRITERIA SCORING   On a 72 hour hold, court hold, committed, stay of commitment, or revocation 0    Patient LOS on BEH unit exceeds 20 days 0  LOS: 6   Patient under guardianship, 55+, otherwise medically complex, or under age 11 0   Suicide ideation without relief of precipitating factors 0   Current plan for suicide 0   Current plan for homicide 0   Imminent risk or actual attempt to seriously harm another without relief of factors precipitating the attempt 0   Severe dysfunction in daily living (ex: complete neglect for self care, extreme disruption in vegetative function, extreme deterioration in social interactions) 1   Recent (last 7 days) or current physical aggression in the ED or on unit 0   Restraints or seclusion episode in past 72 hours 0   Recent (last 7 days) or current verbal aggression, agitation, yelling, etc., while in the ED or unit 0   Active psychosis 0   Need for constant or near constant redirection (from leaving, from others, etc).  0   Intrusive or disruptive behaviors 0   TOTAL 1

## 2023-12-20 NOTE — CARE PLAN
"   12/20/23 1400   General Information   Date Initially Attended OT 12/20/23 12/20/23 0227   Group Therapy Session   Group Attendance attended group session   Time Session Began 1030   Time Session Ended 1200   Total Time (minutes) 45   Total # Attendees 4   Group Type Occupational Therapy   Group Topic Covered balanced lifestyle;coping skills/lifestyle management;leisure exploration/use of leisure time;relaxation techniques   Group Session Detail OT Clinic/Task Group   Patient Participation Detail Intervention: OT Clinic with 3 peers. Pt participated in a OT Clinic group to facilitate coping skills exploration and creative expression through personally meaningful activities, and to encourage utilization of these healthy coping skills to promote overall health and wellness. Group included clinical observation of social, cognitive and kinesthetic performance skills to inform treatment and safe discharge planning.    Patient Response: Joined group after taking interest in the water color painting project that peers were working on. Shared never having done an activity like this, however finds it enjoyable and relaxing similar to drawing which patient expresses enjoying. Was social during this time with writer and other peers. 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 Having worked on these after their child took interest in them.   Spoke to another peer regarding having difficulties maintain work in the past d/t debilitating anxiety. Reports that their TBI greatly impacts their ability to work. Appeared to be proud of the two final painting projects created during this time, requesting to display them on the unit somewhere \"to add color to the place\".     Mood/Affect: Pleasant       Plan: Patient encouraged to maintain attendance for continued ongoing support in working towards occupational therapy goals to support overall treatment/care.          "

## 2023-12-20 NOTE — PLAN OF CARE
Goal Outcome Evaluation:    Plan of Care Reviewed With: patient      Problem: Suicide Risk  Goal: Absence of Self-Harm  Outcome: Progressing     Problem: Adult Behavioral Health Plan of Care  Goal: Plan of Care Review  Outcome: Progressing  Flowsheets (Taken 12/20/2023 0900)  Patient Agreement with Plan of Care: agrees  Perry has been out in the milieu most of the shift. He was calm, and cooperated with his medications and all care this shift. Speech is clear and coherent. He denied anxiety or depression. His COWS score was 1 this shift. He reported having abdominal pain and reported that he had diarrhea x 1 this shift. He refused an offer of Maalox and stated that he will let writer know if he changed his mind. Pt had an interview with Leo moulton and he stated that it went well. He was medication compliant and he denied side effects.
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
## 2023-12-21 PROCEDURE — 250N000013 HC RX MED GY IP 250 OP 250 PS 637: Performed by: PSYCHIATRY & NEUROLOGY

## 2023-12-21 PROCEDURE — 250N000013 HC RX MED GY IP 250 OP 250 PS 637: Performed by: EMERGENCY MEDICINE

## 2023-12-21 PROCEDURE — 90837 PSYTX W PT 60 MINUTES: CPT

## 2023-12-21 PROCEDURE — 124N000002 HC R&B MH UMMC

## 2023-12-21 PROCEDURE — 250N000013 HC RX MED GY IP 250 OP 250 PS 637: Performed by: CLINICAL NURSE SPECIALIST

## 2023-12-21 PROCEDURE — 99232 SBSQ HOSP IP/OBS MODERATE 35: CPT | Performed by: PSYCHIATRY & NEUROLOGY

## 2023-12-21 PROCEDURE — G0177 OPPS/PHP; TRAIN & EDUC SERV: HCPCS

## 2023-12-21 RX ORDER — LOPERAMIDE HCL 2 MG
2 CAPSULE ORAL EVERY 4 HOURS PRN
Qty: 30 CAPSULE | Refills: 0 | Status: SHIPPED | OUTPATIENT
Start: 2023-12-21 | End: 2024-01-05

## 2023-12-21 RX ORDER — BUPRENORPHINE AND NALOXONE 4; 1 MG/1; MG/1
1 FILM, SOLUBLE BUCCAL; SUBLINGUAL 2 TIMES DAILY
Start: 2023-12-21 | End: 2024-01-05

## 2023-12-21 RX ORDER — BUPRENORPHINE 2 MG/1
4 TABLET SUBLINGUAL 3 TIMES DAILY
Status: COMPLETED | OUTPATIENT
Start: 2023-12-21 | End: 2023-12-21

## 2023-12-21 RX ORDER — AMANTADINE HYDROCHLORIDE 100 MG/1
100 CAPSULE, GELATIN COATED ORAL 2 TIMES DAILY
Qty: 60 CAPSULE | Refills: 0 | Status: ON HOLD | OUTPATIENT
Start: 2023-12-21 | End: 2024-01-15

## 2023-12-21 RX ORDER — MULTIPLE VITAMINS W/ MINERALS TAB 9MG-400MCG
1 TAB ORAL DAILY
Qty: 30 TABLET | Refills: 0 | Status: ON HOLD | OUTPATIENT
Start: 2023-12-22 | End: 2024-01-15

## 2023-12-21 RX ORDER — FOLIC ACID 1 MG/1
1 TABLET ORAL DAILY
Qty: 30 TABLET | Refills: 0 | Status: ON HOLD | OUTPATIENT
Start: 2023-12-22 | End: 2024-01-15

## 2023-12-21 RX ORDER — CLONIDINE HYDROCHLORIDE 0.1 MG/1
0.1 TABLET ORAL 2 TIMES DAILY PRN
Qty: 60 TABLET | Refills: 0 | Status: ON HOLD | OUTPATIENT
Start: 2023-12-21 | End: 2024-01-15

## 2023-12-21 RX ORDER — FAMOTIDINE 10 MG
10 TABLET ORAL 2 TIMES DAILY
Qty: 60 TABLET | Refills: 0 | Status: ON HOLD | OUTPATIENT
Start: 2023-12-21 | End: 2024-01-15

## 2023-12-21 RX ORDER — BUPRENORPHINE AND NALOXONE 2; .5 MG/1; MG/1
1 FILM, SOLUBLE BUCCAL; SUBLINGUAL 2 TIMES DAILY
Start: 2023-12-21 | End: 2024-01-05

## 2023-12-21 RX ORDER — LANOLIN ALCOHOL/MO/W.PET/CERES
100 CREAM (GRAM) TOPICAL DAILY
Qty: 30 TABLET | Refills: 0 | Status: ON HOLD | OUTPATIENT
Start: 2023-12-22 | End: 2024-01-15

## 2023-12-21 RX ORDER — OLANZAPINE 10 MG/1
10 TABLET ORAL 2 TIMES DAILY PRN
Qty: 60 TABLET | Refills: 0 | Status: ON HOLD | OUTPATIENT
Start: 2023-12-21 | End: 2024-01-15

## 2023-12-21 RX ORDER — QUETIAPINE FUMARATE 25 MG/1
12.5 TABLET, FILM COATED ORAL 2 TIMES DAILY
Qty: 30 TABLET | Refills: 0 | Status: ON HOLD | OUTPATIENT
Start: 2023-12-21 | End: 2024-01-15

## 2023-12-21 RX ORDER — BUPRENORPHINE 2 MG/1
6 TABLET SUBLINGUAL 2 TIMES DAILY
Status: DISCONTINUED | OUTPATIENT
Start: 2023-12-22 | End: 2023-12-22 | Stop reason: HOSPADM

## 2023-12-21 RX ORDER — QUETIAPINE FUMARATE 50 MG/1
50 TABLET, FILM COATED ORAL AT BEDTIME
Qty: 30 TABLET | Refills: 0 | Status: ON HOLD | OUTPATIENT
Start: 2023-12-21 | End: 2024-01-15

## 2023-12-21 RX ORDER — GABAPENTIN 600 MG/1
600 TABLET ORAL 3 TIMES DAILY
Qty: 90 TABLET | Refills: 0 | Status: ON HOLD | OUTPATIENT
Start: 2023-12-21 | End: 2024-01-15

## 2023-12-21 RX ORDER — PRAZOSIN HYDROCHLORIDE 2 MG/1
2 CAPSULE ORAL AT BEDTIME
Qty: 30 CAPSULE | Refills: 0 | Status: ON HOLD | OUTPATIENT
Start: 2023-12-21 | End: 2024-01-15

## 2023-12-21 RX ORDER — TRAZODONE HYDROCHLORIDE 50 MG/1
50 TABLET, FILM COATED ORAL
Qty: 30 TABLET | Refills: 0 | Status: SHIPPED | OUTPATIENT
Start: 2023-12-21 | End: 2024-01-05

## 2023-12-21 RX ORDER — FLUOXETINE 10 MG/1
30 CAPSULE ORAL DAILY
Qty: 90 CAPSULE | Refills: 0 | Status: ON HOLD | OUTPATIENT
Start: 2023-12-22 | End: 2024-01-15

## 2023-12-21 RX ORDER — ATORVASTATIN CALCIUM 20 MG/1
20 TABLET, FILM COATED ORAL EVERY EVENING
Qty: 30 TABLET | Refills: 0 | Status: ON HOLD | OUTPATIENT
Start: 2023-12-21 | End: 2024-01-15

## 2023-12-21 RX ADMIN — FOLIC ACID 1 MG: 1 TABLET ORAL at 07:53

## 2023-12-21 RX ADMIN — BUPRENORPHINE 4 MG: 2 TABLET SUBLINGUAL at 18:40

## 2023-12-21 RX ADMIN — NICOTINE POLACRILEX 4 MG: 4 GUM, CHEWING BUCCAL at 20:50

## 2023-12-21 RX ADMIN — NICOTINE 1 PATCH: 21 PATCH, EXTENDED RELEASE TRANSDERMAL at 07:54

## 2023-12-21 RX ADMIN — OLANZAPINE 10 MG: 5 TABLET, ORALLY DISINTEGRATING ORAL at 11:12

## 2023-12-21 RX ADMIN — QUETIAPINE FUMARATE 50 MG: 50 TABLET ORAL at 20:08

## 2023-12-21 RX ADMIN — Medication 600 MG: at 18:41

## 2023-12-21 RX ADMIN — Medication 12.5 MG: at 07:53

## 2023-12-21 RX ADMIN — BUPRENORPHINE 4 MG: 2 TABLET SUBLINGUAL at 07:53

## 2023-12-21 RX ADMIN — OLANZAPINE 10 MG: 5 TABLET, ORALLY DISINTEGRATING ORAL at 01:15

## 2023-12-21 RX ADMIN — Medication 12.5 MG: at 14:51

## 2023-12-21 RX ADMIN — FAMOTIDINE 10 MG: 10 TABLET ORAL at 07:53

## 2023-12-21 RX ADMIN — FAMOTIDINE 10 MG: 10 TABLET ORAL at 20:07

## 2023-12-21 RX ADMIN — Medication 600 MG: at 07:53

## 2023-12-21 RX ADMIN — Medication 1 TABLET: at 07:54

## 2023-12-21 RX ADMIN — AMANTADINE HYDROCHLORIDE 100 MG: 100 CAPSULE ORAL at 20:08

## 2023-12-21 RX ADMIN — THIAMINE HCL TAB 100 MG 100 MG: 100 TAB at 07:53

## 2023-12-21 RX ADMIN — Medication 600 MG: at 14:51

## 2023-12-21 RX ADMIN — NICOTINE POLACRILEX 4 MG: 4 GUM, CHEWING BUCCAL at 08:26

## 2023-12-21 RX ADMIN — FLUOXETINE 30 MG: 20 CAPSULE ORAL at 07:53

## 2023-12-21 RX ADMIN — PRAZOSIN HYDROCHLORIDE 2 MG: 2 CAPSULE ORAL at 20:07

## 2023-12-21 RX ADMIN — AMANTADINE HYDROCHLORIDE 100 MG: 100 CAPSULE ORAL at 07:53

## 2023-12-21 RX ADMIN — ATORVASTATIN CALCIUM 20 MG: 20 TABLET, FILM COATED ORAL at 20:08

## 2023-12-21 RX ADMIN — BUPRENORPHINE 4 MG: 2 TABLET SUBLINGUAL at 14:51

## 2023-12-21 ASSESSMENT — ACTIVITIES OF DAILY LIVING (ADL)
ADLS_ACUITY_SCORE: 29
LAUNDRY: UNABLE TO COMPLETE
ADLS_ACUITY_SCORE: 29
ADLS_ACUITY_SCORE: 29
DRESS: INDEPENDENT
ADLS_ACUITY_SCORE: 29
ADLS_ACUITY_SCORE: 29
HYGIENE/GROOMING: INDEPENDENT
ADLS_ACUITY_SCORE: 29
ADLS_ACUITY_SCORE: 29
ORAL_HYGIENE: INDEPENDENT
ADLS_ACUITY_SCORE: 29
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
## 2023-12-21 NOTE — PLAN OF CARE
"Adult Inpatient Safety Plan:     [facility] Adult Inpatient Mental Health Units           [address]                                Perry Preciado      SAFETY PLAN:  Step 1: Warning signs / cues (Thoughts, images, mood, situation, behavior) that a crisis may be developing:  Thoughts:   Images: visions of harm: of self  Thinking Processes: ruminations (can't stop thinking about my problems):  , racing thoughts, and intrusive thoughts (bothersome, unwanted thoughts that come out of nowhere):    Mood: hopelessness, helplessness, and mood swings  Behaviors: isolating/withdrawing , using drugs, using alcohol, impulsive, reckless behaviors (acting without thinking):  , not taking care of my responsibilities, and not sleeping enough  Situations: anniversary of death of girlfriend, changes in symptoms: when I stop taking meds, pain, relationship problems, trauma , relapse, and medical condition / diagnosis:        Step 2: Coping strategies - Things I can do to take my mind off of my problems without contacting another person (relaxation technique, physical activity):  Distress Tolerance Strategies:  relaxation activities: hot shower, arts and crafts: painting, drawing, sculpting, , listen to positive and upbeat music:  , watch a funny movie:  , read a book:  , change body temperature (ice pack/cold water) , paced breathing/progressive muscle relaxation, and intense exercise: anything for 2-3 minutes   Physical Activities: go for a walk, exercise: working out, biking, yoga, meditation, deep breathing, and stretching   Focus on helpful thoughts:  \"I will get through this\" and self-compassion statements: I am worthy, I have gratitude, I am loved, I deserve happiness    Step 3: Remind myself of people and things that are important to me and worth living for:    Family  Friends      Step 4: When I am in crisis, I can ask these people to help me use my safety plan:   Name: Sister Phone:    Name: Mom Phone:    Name: " "Dad Phone:     Step 5: Making the environment safe:   I will remove alcohol and drugs, secure my medications, dispose of old medications, remove access to firearms, remove things I could use to hurt myself, and identify supportive people  Other ways to make my environment safe: making sure I'm making good decisions for my sobriety    Step 6: Professionals or agencies I can contact during a crisis:  Crisis Intervention: 746.627.6514 or 989-365-1131 (TTY: 844.591.5218).  Call anytime for help.  National [place] on Mental Illness (www.[place].naldo.org): 248.299.3357 or 621-888-6214.  Alcoholics Anonymous (www.alcoholics-anonymous.org): Check your phone book for your local chapter.  Suicide Awareness Voices of Education (SAVE) (www.save.org): 980-528-AOCO (3617)  National Suicide Prevention Line (www.mentalhealthmn.org): 201-750-IMYC (0922)  Mental Health Consumer/Survivor Network of [place] (www.mhcsn.net): 106.199.7385 or 009-865-4934  Mental Health Association of [place] (www.mentalhealth.org): 616.619.4057 or 399-574-1352  Self- Management and Recovery Training., SMART-- Toll free: 898.554.4034  www.Apttus.org  [facility] Crisis (COPE) Response - Adult 212 989-4994  [facility] Crisis Response - Adult 741 349-3279  Text 4 Life: txt \"LIFE\" to 97551 for immediate support and crisis intervention  Crisis text line: Text \"[place]\" to 263474. Free, confidential, 24/7.  Crisis Intervention: 411.126.7945 or 600-539-6550. Call anytime for help.   [facility] Mental Health Crisis Team - Child: [phone] 988 (Mental Health Crisis Line)      If unable to maintain safety despite working your plan, call 911 or go to my nearest emergency department.     Patient helped develop this safety plan and agreed to use it when needed.  Pt has been given a copy of this plan.      Today s date:  December 21, 2023  Completed by Clinician Name/ Credentials:  YESSI Rivera        Adapted from Safety Plan Template 2008 Verenice " Jaycee Watson is reprinted with the express permission of the authors.  No portion of the Safety Plan Template may be reproduced without the express, written permission.  You can contact the authors at [email or web address] or [email or web address].

## 2023-12-21 NOTE — PLAN OF CARE
Goal Outcome Evaluation:    Plan of Care Reviewed With: patient      Problem: Adult Behavioral Health Plan of Care  Goal: Plan of Care Review  Outcome: Progressing  Flowsheets (Taken 12/21/2023 1000)  Patient Agreement with Plan of Care: agrees  Perry has been in and out his room. He reports that he did not sleep well last night. He had some moments of restlessness, was agitated, requested and received prn Olanzapine 10 mg with relief stated. Pt is reporting frequency of loose stools x 3, was asked to let writer see it, but pt reported that he forgot and flushed the toilet each time. He denied SI, HI and he contracted for safety. Plan is for pt to discharge to [facility] tomorrow and will be picked at 11:00. Meds are ordered and pharmacy is aware of the discontinue time and date.  Will continue with plan of care.

## 2023-12-21 NOTE — PLAN OF CARE
Problem: Sleep Disturbance  Goal: Adequate Sleep/Rest  Outcome: Not Progressing   Goal Outcome Evaluation:    Perry appeared to only sleep a total of 2 hours this night shift.  Up 5-10 times to use the restroom but no diarrhea mentioned tonight.  Diarrhea only mentioned at the end of evening shift when patient asked to use the shower to clean up.  Showered.  Denies any pain tonight.  Zyprexa prn given at 0120,  Still seems confused as he thought his Zyprexa could be taken every hour (among other statements that pointed to confusion). Sounded tense and could be heard talking to himself tonight in his room alone.  Remains polite and cooperative.

## 2023-12-21 NOTE — PLAN OF CARE
"Individual Therapy Note      Date of Service: December 21, 2023    Patient: Perry goes by \"Perry,\" uses he/him pronouns    Individuals Present: Perry & Pal[facility]    Session start: 1000  Session end: 1045  Session duration in minutes: 45    Patient Active Problem List   Diagnosis    Major depressive disorder, recurrent episode, moderate (H)    KANE (generalized anxiety disorder)    Chronic pain syndrome    Psychosis (H)    HTN (hypertension)    Hyperglycemia    Hypertriglyceridemia    IV drug abuse (H)    Lung nodule    Major depressive disorder, recurrent (H24)    Methamphetamine abuse (H)    Mild intermittent asthma without complication    Opiate overdose (H)    Positive D dimer    Sepsis (H)    SIRS (systemic inflammatory response syndrome) (H)    Substance abuse (H)    Suicidal ideation    Tobacco use disorder    Depression with anxiety    DDD (degenerative disc disease), lumbosacral    Displacement of lumbar intervertebral disc without myelopathy    Heroin abuse (H)    History of ADHD    History of drug abuse (H)    History of suicide attempt    Chronic right-sided low back pain with right-sided sciatica    Aspiration pneumonia (H)    Chest pain    Chest trauma    Psychosis (H)    Polysubstance abuse (H)    Suicidal behavior without attempted self-injury         Modality Used:Person Centered, Brief Therapy, and Solution Focused    Goals: Safety planning, CBT skills and coping skills       Mental Status Exam:   Attitude: cooperative  Eye Contact: good  Mood: anxious  Affect: mood congruent  Speech: clear, coherent  Psychomotor Behavior: no evidence of tardive dyskinesia, dystonia, or tics  Thought Process:  logical, linear, and goal oriented  Associations: no loose associations  Thought Content: no evidence of suicidal ideation or homicidal ideation, no evidence of psychotic thought, no auditory hallucinations present, and no visual hallucinations present  Insight: good  Judgement: " intact  Attention Span and Concentration: intact    Pt progress: Pt agreed to go over safety plan. Pt was engaged with the process. We also discussed ways that he can communicate his feeling like something is off, looking into how some of the things from childhood have played out through his life, examined his drug use as a coping skill and discussed other ways to deal with feelings. Pt had good insight into his condition and coping skills that he can use.     Treatment Objective(s) Addressed:   The focus of this session was on identifying and practicing coping strategies, processing feelings related to past traumas and using, and safety planning     Progress Towards Goals and Assessment of Patient:   Patient is making progress towards treatment goals as evidenced by awareness, insight, and motivation.       Therapeutic Intervention(s):   Provided active listening, unconditional positive regard, and validation. Engaged in safety planning.  Engaged in guided discovery, explored patient's perspectives and helped expand them through socratic dialogue.    Plan/next step: Discharge

## 2023-12-21 NOTE — PLAN OF CARE
Team Note Due:  Monday    Assessment/Intervention/Current Symtoms and Care Coordination:  Chart review and met with team, discussed pt progress, symptomology, and response to treatment.  Discussed the discharge plan and any potential impediments to discharge.    Contacted [facility] to inquire about suboxone and discharge details. They have an opening tomorrow, they will send transportation at 11 AM. They need a 7-day supply of suboxone and a 30-day supply of other medications. Provider notified.     Communicated this discharge plan with Perry. He is agreeable. He called his girlfriend to see if she could drop belongings off tonight, she is not feeling well so is unable to do that this evening. He was okay with not having his belongings for a couple of days as he has an outfit here.      Discharge Plan or Goal:  Discharging at 11 AM on 12/22/23 to [facility]. They are sending transportation.     Barriers to Discharge:  Accepted to [facility], discharging tomorrow at 11 AM     Referral Status:  [facility] 12/13, emailed 12/18  [address] [place] [address]  Phone: [phone]     [facility] 12/13, emailed 12/18 - on waitlist as of 12/20/23  [employer]  [address] [place] [address]   ~13.7 mi  Phone: [phone]    [facility] in [facility] 12/18  [address]  [place] [address]  Phone: [phone]    [facility] 12/18, declined 12/19    [facility] Support Program in [facility] 12/20    Legal Status:  Voluntary     Contacts:  Partner: Anabell [phone]      Upcoming Meetings and Dates/Important Information and next steps:  Discharge 12/21 at 11:00 AM

## 2023-12-21 NOTE — PROGRESS NOTES
12/20/23 1900   Group Therapy Session   Group Attendance attended group session   Time Session Began 1615   Time Session Ended 1650   Total Time (minutes) 30   Total # Attendees 3   Group Type recreation   Group Topic Covered leisure exploration/use of leisure time   Group Session Detail TR leisure group   Patient Response/Contribution cooperative with task   Patient Participation Detail Pt participated in Therapeutic Recreation group with focus on leisure participation, communication skills, and critical thinking. Engaged and focused in the group recreational activity via a group game.  Pt was a full participant throughout the entire duration of group, but decided to be done with the group when another patient was asking for group to be over. Pt was sociable and interacted appropriately with others in group.

## 2023-12-21 NOTE — PLAN OF CARE
BEH IP Unit Acuity Rating Score (UARS)  Patient is given one point for every criteria they meet.    CRITERIA SCORING   On a 72 hour hold, court hold, committed, stay of commitment, or revocation 0    Patient LOS on BEH unit exceeds 20 days 0  LOS: 7   Patient under guardianship, 55+, otherwise medically complex, or under age 11 0   Suicide ideation without relief of precipitating factors 0   Current plan for suicide 0   Current plan for homicide 0   Imminent risk or actual attempt to seriously harm another without relief of factors precipitating the attempt 0   Severe dysfunction in daily living (ex: complete neglect for self care, extreme disruption in vegetative function, extreme deterioration in social interactions) 1   Recent (last 7 days) or current physical aggression in the ED or on unit 0   Restraints or seclusion episode in past 72 hours 0   Recent (last 7 days) or current verbal aggression, agitation, yelling, etc., while in the ED or unit 0   Active psychosis 0   Need for constant or near constant redirection (from leaving, from others, etc).  0   Intrusive or disruptive behaviors 0   TOTAL 1
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
## 2023-12-22 VITALS
RESPIRATION RATE: 18 BRPM | DIASTOLIC BLOOD PRESSURE: 76 MMHG | TEMPERATURE: 98.7 F | BODY MASS INDEX: 31.14 KG/M2 | HEIGHT: 73 IN | OXYGEN SATURATION: 95 % | WEIGHT: 235 LBS | HEART RATE: 69 BPM | SYSTOLIC BLOOD PRESSURE: 126 MMHG

## 2023-12-22 PROCEDURE — 250N000013 HC RX MED GY IP 250 OP 250 PS 637: Performed by: PSYCHIATRY & NEUROLOGY

## 2023-12-22 PROCEDURE — 99239 HOSP IP/OBS DSCHRG MGMT >30: CPT | Performed by: PSYCHIATRY & NEUROLOGY

## 2023-12-22 PROCEDURE — 250N000013 HC RX MED GY IP 250 OP 250 PS 637: Performed by: CLINICAL NURSE SPECIALIST

## 2023-12-22 PROCEDURE — 250N000013 HC RX MED GY IP 250 OP 250 PS 637: Performed by: EMERGENCY MEDICINE

## 2023-12-22 RX ADMIN — NICOTINE 1 PATCH: 21 PATCH, EXTENDED RELEASE TRANSDERMAL at 08:29

## 2023-12-22 RX ADMIN — FLUOXETINE 30 MG: 20 CAPSULE ORAL at 08:30

## 2023-12-22 RX ADMIN — FAMOTIDINE 10 MG: 10 TABLET ORAL at 08:30

## 2023-12-22 RX ADMIN — Medication 600 MG: at 08:30

## 2023-12-22 RX ADMIN — THIAMINE HCL TAB 100 MG 100 MG: 100 TAB at 08:30

## 2023-12-22 RX ADMIN — Medication 12.5 MG: at 08:30

## 2023-12-22 RX ADMIN — AMANTADINE HYDROCHLORIDE 100 MG: 100 CAPSULE ORAL at 08:30

## 2023-12-22 RX ADMIN — NICOTINE POLACRILEX 4 MG: 4 GUM, CHEWING BUCCAL at 08:35

## 2023-12-22 RX ADMIN — FOLIC ACID 1 MG: 1 TABLET ORAL at 08:30

## 2023-12-22 RX ADMIN — Medication 1 TABLET: at 08:30

## 2023-12-22 RX ADMIN — LOPERAMIDE HYDROCHLORIDE 2 MG: 2 CAPSULE ORAL at 08:29

## 2023-12-22 RX ADMIN — OLANZAPINE 10 MG: 5 TABLET, ORALLY DISINTEGRATING ORAL at 01:18

## 2023-12-22 RX ADMIN — BUPRENORPHINE 6 MG: 2 TABLET SUBLINGUAL at 08:29

## 2023-12-22 ASSESSMENT — ACTIVITIES OF DAILY LIVING (ADL)
ADLS_ACUITY_SCORE: 29

## 2023-12-22 NOTE — DISCHARGE SUMMARY
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 system.  All grammatical/typing errors or context distortion are unintentional and inherent to software.

## 2023-12-22 NOTE — PLAN OF CARE
"  Problem: Adult Behavioral Health Plan of Care  Goal: Plan of Care Review  Outcome: Progressing  Flowsheets (Taken 12/21/2023 1705)  Patient Agreement with Plan of Care: agrees  Goal: Patient-Specific Goal (Individualization)  Description: You can add care plan individualizations to a care plan. Examples of Individualization might be:  \"Parent requests to be called daily at 9am for status\", \"I have a hard time hearing out of my right ear\", or \"Do not touch me to wake me up as it startles  me\".  Outcome: Progressing   Goal Outcome Evaluation:    Plan of Care Reviewed With: patient       Patient stated  that his depression is getting better because he has been praying, but his anxiety level is still high. Rated anxiety 8/10, denied all other mental health symptoms. Patient spent most of the shift   in and out of his room. Mood was a little anxious, frequently talking on the phone. According to day shift  report, patient is discharging tomorrow to [facility], Medications were ordered. Patient will be picked  up at 11 AM.  Ate 100% of supper. Denied having any pain. No behavior issues or any safety concern observed. Took all scheduled medications with no issues.   "

## 2023-12-22 NOTE — CARE PLAN
12/21/23 9712   Group Therapy Session   Group Attendance attended group session   Time Session Began 1615   Time Session Ended 1700   Total Time (minutes) 15  (no charge)   Total # Attendees 3   Group Type life skill;task skill   Group Topic Covered coping skills/lifestyle management;problem-solving;leisure exploration/use of leisure time;cognitive activities   Group Session Detail OT Cognitive wellness group   Patient Response/Contribution cooperative with task   Patient Participation Detail Patient actively engaged in therapeutic leisure activity in order to promote: cognitive skills (attention, planning, recall, sequencing), leisure exploration, and structured socialization.  pt was a willing participant. pt was able to engage in 2 rounds of cognitive task with break in between. pt was able to follow verbal instructions and demonstration

## 2023-12-22 NOTE — PROGRESS NOTES
Perry appears to remain confused and disorganized.  He is walking in the hallway with just one shoe on and started to restock the lounge frig with juice as he thought he was assigned to do that for a job.  Appears very tired also.  Zyprexa prn given as requested.  Now trying to fall to sleep in his room.    By 0445, Perry has not been able to fall to sleep in his room.  He has dozed off at the hallway phone and in the bathroom.  Remains consistently very confused.  States he wants to go look at a bike, confused about which room is his on station 12, etc.  Remains very polite and cooperative, but does not seem to be improving in his condition.    Continues to be in and out of the bathroom most of the night.  Denies any soft stools or diarrhea, however.  Also denies any voiding issues.  Busy in his room cleaning and straightening it up at this time.    Due to discharge to [facility] at 1100 this morning.  Meds are up and in the med room.    Appeared to sleep a total of .5 hours only this night shift.

## 2023-12-22 NOTE — PLAN OF CARE
"Discharge Note:    Patient was walked down at 1045 with a 1100 scheduled  time (patient requested to go down early). At 1115 transportation called stating they wouldn't arrive until 1200. Writer educated patient on his AVH and medications. He voiced having an understanding. Medications were provided to him upon discharge. Patient left in his personal clothing. Writer went over his belongings with him, all items were present. Patient denied SI/HI/AVH/SIB. He voiced being excited that he was discharging and stated \"I can't believe I did this to myself, never again, so happy to be getting out\".  "

## 2024-01-05 ENCOUNTER — TELEPHONE (OUTPATIENT)
Dept: BEHAVIORAL HEALTH | Facility: CLINIC | Age: 49
End: 2024-01-05
Payer: COMMERCIAL

## 2024-01-05 ENCOUNTER — HOSPITAL ENCOUNTER (INPATIENT)
Facility: CLINIC | Age: 49
LOS: 9 days | Discharge: PSYCHIATRIC OR SUBSTANCE USE FACILITY | End: 2024-01-16
Attending: EMERGENCY MEDICINE | Admitting: ANESTHESIOLOGY
Payer: COMMERCIAL

## 2024-01-05 DIAGNOSIS — F11.90 OPIOID USE DISORDER: ICD-10-CM

## 2024-01-05 DIAGNOSIS — F41.1 GAD (GENERALIZED ANXIETY DISORDER): ICD-10-CM

## 2024-01-05 DIAGNOSIS — F19.10 POLYSUBSTANCE ABUSE (H): ICD-10-CM

## 2024-01-05 DIAGNOSIS — I10 HYPERTENSION, UNSPECIFIED TYPE: ICD-10-CM

## 2024-01-05 DIAGNOSIS — R19.7 DIARRHEA, UNSPECIFIED TYPE: ICD-10-CM

## 2024-01-05 DIAGNOSIS — F43.10 PTSD (POST-TRAUMATIC STRESS DISORDER): ICD-10-CM

## 2024-01-05 DIAGNOSIS — F33.2 SEVERE EPISODE OF RECURRENT MAJOR DEPRESSIVE DISORDER, WITHOUT PSYCHOTIC FEATURES (H): ICD-10-CM

## 2024-01-05 DIAGNOSIS — K21.00 GASTROESOPHAGEAL REFLUX DISEASE WITH ESOPHAGITIS WITHOUT HEMORRHAGE: ICD-10-CM

## 2024-01-05 DIAGNOSIS — R45.851 SUICIDAL IDEATION: ICD-10-CM

## 2024-01-05 DIAGNOSIS — F10.21 ALCOHOL DEPENDENCE IN REMISSION (H): ICD-10-CM

## 2024-01-05 DIAGNOSIS — F17.200 NICOTINE DEPENDENCE, UNCOMPLICATED, UNSPECIFIED NICOTINE PRODUCT TYPE: ICD-10-CM

## 2024-01-05 DIAGNOSIS — Z11.52 ENCOUNTER FOR SCREENING LABORATORY TESTING FOR SEVERE ACUTE RESPIRATORY SYNDROME CORONAVIRUS 2 (SARS-COV-2): Primary | ICD-10-CM

## 2024-01-05 DIAGNOSIS — E78.1 HYPERTRIGLYCERIDEMIA: ICD-10-CM

## 2024-01-05 DIAGNOSIS — F33.3 SEVERE EPISODE OF RECURRENT MAJOR DEPRESSIVE DISORDER, WITH PSYCHOTIC FEATURES (H): ICD-10-CM

## 2024-01-05 PROBLEM — F33.9 MAJOR DEPRESSIVE DISORDER, RECURRENT (H): Status: ACTIVE | Noted: 2018-02-23

## 2024-01-05 PROCEDURE — G2213 INITIAT MED ASSIST TX IN ER: HCPCS | Performed by: EMERGENCY MEDICINE

## 2024-01-05 PROCEDURE — 250N000013 HC RX MED GY IP 250 OP 250 PS 637: Performed by: EMERGENCY MEDICINE

## 2024-01-05 PROCEDURE — 99285 EMERGENCY DEPT VISIT HI MDM: CPT | Performed by: EMERGENCY MEDICINE

## 2024-01-05 PROCEDURE — 99285 EMERGENCY DEPT VISIT HI MDM: CPT | Mod: 25 | Performed by: EMERGENCY MEDICINE

## 2024-01-05 RX ORDER — FOLIC ACID 1 MG/1
1 TABLET ORAL DAILY
Status: DISCONTINUED | OUTPATIENT
Start: 2024-01-06 | End: 2024-01-16 | Stop reason: HOSPADM

## 2024-01-05 RX ORDER — AMANTADINE HYDROCHLORIDE 100 MG/1
100 CAPSULE, GELATIN COATED ORAL 2 TIMES DAILY
Status: DISCONTINUED | OUTPATIENT
Start: 2024-01-05 | End: 2024-01-16 | Stop reason: HOSPADM

## 2024-01-05 RX ORDER — OLANZAPINE 10 MG/1
10 TABLET ORAL 2 TIMES DAILY PRN
Status: DISCONTINUED | OUTPATIENT
Start: 2024-01-05 | End: 2024-01-16 | Stop reason: HOSPADM

## 2024-01-05 RX ORDER — CLONIDINE HYDROCHLORIDE 0.1 MG/1
0.1 TABLET ORAL 2 TIMES DAILY PRN
Status: DISCONTINUED | OUTPATIENT
Start: 2024-01-05 | End: 2024-01-07

## 2024-01-05 RX ORDER — BUPRENORPHINE AND NALOXONE 8; 2 MG/1; MG/1
1 FILM, SOLUBLE BUCCAL; SUBLINGUAL ONCE
Status: COMPLETED | OUTPATIENT
Start: 2024-01-05 | End: 2024-01-05

## 2024-01-05 RX ORDER — BUPRENORPHINE 8 MG/1
8 TABLET SUBLINGUAL 2 TIMES DAILY
Status: ON HOLD | COMMUNITY
End: 2024-01-15

## 2024-01-05 RX ORDER — FLUOXETINE 10 MG/1
30 CAPSULE ORAL DAILY
Status: DISCONTINUED | OUTPATIENT
Start: 2024-01-06 | End: 2024-01-08

## 2024-01-05 RX ORDER — GABAPENTIN 600 MG/1
600 TABLET ORAL 3 TIMES DAILY
Status: DISCONTINUED | OUTPATIENT
Start: 2024-01-05 | End: 2024-01-16 | Stop reason: HOSPADM

## 2024-01-05 RX ORDER — GABAPENTIN 600 MG/1
600 TABLET ORAL ONCE
Status: COMPLETED | OUTPATIENT
Start: 2024-01-05 | End: 2024-01-05

## 2024-01-05 RX ORDER — OLANZAPINE 10 MG/1
10 TABLET, ORALLY DISINTEGRATING ORAL ONCE
Status: COMPLETED | OUTPATIENT
Start: 2024-01-05 | End: 2024-01-05

## 2024-01-05 RX ORDER — PRAZOSIN HYDROCHLORIDE 2 MG/1
2 CAPSULE ORAL AT BEDTIME
Status: DISCONTINUED | OUTPATIENT
Start: 2024-01-05 | End: 2024-01-07

## 2024-01-05 RX ORDER — NICOTINE 21 MG/24HR
1 PATCH, TRANSDERMAL 24 HOURS TRANSDERMAL EVERY 24 HOURS
Status: ON HOLD | COMMUNITY
Start: 2023-06-08 | End: 2024-01-15

## 2024-01-05 RX ORDER — ATORVASTATIN CALCIUM 20 MG/1
20 TABLET, FILM COATED ORAL EVERY EVENING
Status: DISCONTINUED | OUTPATIENT
Start: 2024-01-06 | End: 2024-01-16 | Stop reason: HOSPADM

## 2024-01-05 RX ORDER — LOPERAMIDE HCL 2 MG
2 CAPSULE ORAL EVERY 4 HOURS PRN
Status: DISCONTINUED | OUTPATIENT
Start: 2024-01-05 | End: 2024-01-16 | Stop reason: HOSPADM

## 2024-01-05 RX ORDER — FAMOTIDINE 10 MG
10 TABLET ORAL 2 TIMES DAILY
Status: DISCONTINUED | OUTPATIENT
Start: 2024-01-05 | End: 2024-01-16 | Stop reason: HOSPADM

## 2024-01-05 RX ORDER — QUETIAPINE FUMARATE 25 MG/1
25 TABLET, FILM COATED ORAL ONCE
Status: COMPLETED | OUTPATIENT
Start: 2024-01-05 | End: 2024-01-05

## 2024-01-05 RX ORDER — MULTIPLE VITAMINS W/ MINERALS TAB 9MG-400MCG
1 TAB ORAL DAILY
Status: DISCONTINUED | OUTPATIENT
Start: 2024-01-06 | End: 2024-01-16 | Stop reason: HOSPADM

## 2024-01-05 RX ORDER — BUPRENORPHINE 2 MG/1
2 TABLET SUBLINGUAL 2 TIMES DAILY
Status: DISCONTINUED | OUTPATIENT
Start: 2024-01-05 | End: 2024-01-06

## 2024-01-05 RX ORDER — BUPRENORPHINE 2 MG/1
2 TABLET SUBLINGUAL 2 TIMES DAILY
COMMUNITY
End: 2024-01-05

## 2024-01-05 RX ORDER — BUPRENORPHINE AND NALOXONE 2; .5 MG/1; MG/1
1 FILM, SOLUBLE BUCCAL; SUBLINGUAL 2 TIMES DAILY
Status: DISCONTINUED | OUTPATIENT
Start: 2024-01-05 | End: 2024-01-06

## 2024-01-05 RX ORDER — BUPRENORPHINE AND NALOXONE 4; 1 MG/1; MG/1
1 FILM, SOLUBLE BUCCAL; SUBLINGUAL 2 TIMES DAILY
Status: DISCONTINUED | OUTPATIENT
Start: 2024-01-05 | End: 2024-01-06

## 2024-01-05 RX ORDER — QUETIAPINE FUMARATE 50 MG/1
50 TABLET, FILM COATED ORAL AT BEDTIME
Status: DISCONTINUED | OUTPATIENT
Start: 2024-01-05 | End: 2024-01-06

## 2024-01-05 RX ORDER — TRAZODONE HYDROCHLORIDE 50 MG/1
50 TABLET, FILM COATED ORAL
Status: DISCONTINUED | OUTPATIENT
Start: 2024-01-05 | End: 2024-01-06

## 2024-01-05 RX ADMIN — BUPRENORPHINE AND NALOXONE 1 FILM: 8; 2 FILM, SOLUBLE BUCCAL; SUBLINGUAL at 12:11

## 2024-01-05 RX ADMIN — OLANZAPINE 10 MG: 10 TABLET, ORALLY DISINTEGRATING ORAL at 12:11

## 2024-01-05 RX ADMIN — AMANTADINE HYDROCHLORIDE 100 MG: 100 CAPSULE ORAL at 21:36

## 2024-01-05 RX ADMIN — GABAPENTIN 600 MG: 600 TABLET, FILM COATED ORAL at 12:11

## 2024-01-05 RX ADMIN — PRAZOSIN HYDROCHLORIDE 2 MG: 2 CAPSULE ORAL at 22:35

## 2024-01-05 RX ADMIN — QUETIAPINE FUMARATE 12.5 MG: 25 TABLET ORAL at 21:36

## 2024-01-05 RX ADMIN — GABAPENTIN 600 MG: 600 TABLET, FILM COATED ORAL at 21:36

## 2024-01-05 RX ADMIN — QUETIAPINE FUMARATE 50 MG: 50 TABLET ORAL at 22:35

## 2024-01-05 RX ADMIN — OLANZAPINE 10 MG: 10 TABLET, ORALLY DISINTEGRATING ORAL at 18:38

## 2024-01-05 RX ADMIN — FAMOTIDINE 10 MG: 10 TABLET ORAL at 21:36

## 2024-01-05 RX ADMIN — QUETIAPINE FUMARATE 25 MG: 25 TABLET ORAL at 12:11

## 2024-01-05 ASSESSMENT — ACTIVITIES OF DAILY LIVING (ADL)
ADLS_ACUITY_SCORE: 35

## 2024-01-05 ASSESSMENT — LIFESTYLE VARIABLES: TOTAL_SCORE: 4

## 2024-01-05 NOTE — ED NOTES
Bed: ED12  Expected date:   Expected time:   Means of arrival:   Comments:  Benjamin Quinn 525 48M SI from rehab facility c/C on [facility]Y for transport

## 2024-01-05 NOTE — TELEPHONE ENCOUNTER
S: [facility] , DEC  Evy  calling at 2:35 PM about a 48 year old/Male presenting with SI with a plan and intent.       B: Pt arrived via EMS. Presenting problem, stressors: Pt presents with SI with a recent attempt last month. Pt reports that his Sister committed suicide last Thursday by hanging. Pt was kicked out of cd tx today    Pt affect in ED:  Sad  Pt Dx: Major Depressive Disorder, Generalized Anxiety Disorder, PTSD, ADHD, and Substance Use Disorder: Polysubstance disorder  Previous IP hx? Yes: Dec 12 admitted [facility] d/t SI and Paranoia.  Pt endorses SI with a plan to shoot himself with a gun.    Hx of suicide attempt? Yes: SA in Dec 2023  Pt denies SIB  Pt denies HI   Pt  May have had VH of shadows today, unknown .   Pt RARS Score: 2    Hx of aggression/violence, sexual offenses, legal concerns, Epic care plan? describe: No  Current concerns for aggression this visit? No  Does pt have a history of Civil Commitment? No  Is Pt their own guardian? Yes    Pt is prescribed medication. Is patient medication compliant? Yes  Pt endorses OP services: Psychiatrist  CD concerns: Actively using/consuming Meth via IV, Cannabis and Alcohol  Acute or chronic medical concerns: No  Does Pt present with specific needs, assistive devices, or exclusionary criteria? None      Pt is ambulatory  Pt is able to perform ADLs independently      A: Pt to be reviewed for [facility] admission. Pt is Voluntary  Preferred placement: [facility]    COVID Symptoms: No  If yes, COVID test required   Utox: Ordered, not yet collected   CMP: Not ordered, intake requested lab  CBC: Not ordered, intake requested lab    R: Patient cleared and ready for behavioral bed placement: Yes  Pt placed on [facility] worklist? Yes    Does Patient need a Transfer Center request created? No, Pt is located within [facility] ED, [facility] ED, or [facility] ED

## 2024-01-05 NOTE — ED PROVIDER NOTES
"    [facility] EMERGENCY DEPARTMENT ([facility])    1/05/24      ED PROVIDER NOTE        History     Chief Complaint   Patient presents with    Suicidal     Came from residential treatment center that he was kicked out of for using a cell phone.  Pt's dad committed suicide 2 months and sister committed suicide a few weeks ago.  Pt is feeling suicidal.       HPI  Perry Preciado Jr. is a 48 year old male with a history of major depressive disorder with SI and attempt, KANE, ADHD, polysubstance abuse, HTN, SIRS, pneumonia, and chronic back pain presents to the ED for evaluation of feeling suicidal.  He was hospitalized 12/12-22/23 at [facility] with suicide attempt.  Patient was at [facility] and was sent to the emergency department after he was using a cell phone which is against their rules.  Patient also expresses severe hopelessness and \"the inability to do anything right\".  Patient reports \"just wanting to die\"     Denies fever, nausea vomiting diarrhea or any acute medical issues.      Patient requests something to calm him down and that Zyprexa has worked before in the past.      Past Medical History  Past Medical History:   Diagnosis Date    Acute bilateral low back pain with right-sided sciatica 06/07/2016    Acute pain of right shoulder due to trauma     Adult antisocial behavior     assisted incarceration: 16 years    Aspiration pneumonia (H) 02/24/2014    CARDIOVASCULAR SCREENING; LDL GOAL LESS THAN 130 06/07/2016    Carpal tunnel syndrome of right wrist 06/07/2016    Chest pain 02/19/2014    Chest trauma 02/19/2014    Chronic pain syndrome 09/30/2019    Chronic right-sided low back pain with right-sided sciatica 05/10/2018    DDD (degenerative disc disease), lumbosacral 05/05/2020    Depression with anxiety 05/05/2020    Displacement of lumbar intervertebral disc without myelopathy 05/16/2012    KANE (generalized anxiety disorder) 07/07/2017    Heroin abuse (H) 05/10/2018 "    History of ADHD 01/06/2014    History of drug abuse (H) 01/06/2014    History of suicide attempt 05/10/2018    HTN (hypertension) 02/26/2014    Hyperglycemia 02/23/2014    Hypertriglyceridemia 11/02/2015    IV drug abuse (H) 05/10/2018    Major depressive disorder, recurrent (H24) 02/23/2018    Major depressive disorder, recurrent episode, moderate (H) 06/06/2016    Methamphetamine abuse (H) 02/23/2018    Overview:  see [facility] H&P 11/27/2009, [facility] admit 9/2/2010    Mild intermittent asthma without complication 05/10/2018    Opiate overdose (H) 02/22/2014    Positive D dimer 11/02/2015    Psychosis (H) 05/04/2020    Sepsis (H) 02/22/2014    SIRS (systemic inflammatory response syndrome) (H) 11/02/2015    Substance abuse (H) 05/07/2018    Suicidal ideation 02/23/2018    Tobacco use disorder 05/10/2018     Past Surgical History:   Procedure Laterality Date    BACK SURGERY       amantadine (SYMMETREL) 100 MG capsule  atorvastatin (LIPITOR) 20 MG tablet  buprenorphine (SUBUTEX) 2 MG SUBL sublingual tablet  famotidine (PEPCID) 10 MG tablet  FLUoxetine (PROZAC) 10 MG capsule  folic acid (FOLVITE) 1 MG tablet  gabapentin (NEURONTIN) 600 MG tablet  loperamide (IMODIUM) 2 MG capsule  multivitamin w/minerals (THERA-VIT-M) tablet  OLANZapine (ZYPREXA) 10 MG tablet  prazosin (MINIPRESS) 2 MG capsule  QUEtiapine (SEROQUEL) 25 MG tablet  QUEtiapine (SEROQUEL) 50 MG tablet  thiamine (B-1) 100 MG tablet  traZODone (DESYREL) 50 MG tablet  buprenorphine HCl-naloxone HCl (SUBOXONE) 2-0.5 MG per film  buprenorphine HCl-naloxone HCl (SUBOXONE) 4-1 MG per film  cloNIDine (CATAPRES) 0.1 MG tablet      Allergies   Allergen Reactions    Wellbutrin [Bupropion] Anaphylaxis and Swelling     Facial swelling    Wellbutrin [Bupropion]     Naltrexone Other (See Comments)     Headaches  Headaches       Family History  No family history on file.  Social History   Social History     Tobacco Use    Smoking status: Every Day     Packs/day: .5      Types: Cigarettes    Smokeless tobacco: Current   Substance Use Topics    Alcohol use: Yes    Drug use: Yes     Types: Methamphetamines     Comment: last use 2 weeks ago         A medically appropriate review of systems was performed with pertinent positives and negatives noted in the HPI, and all other systems negative.    Physical Exam   BP: 123/85  Pulse: 83  Temp: 97.6  F (36.4  C)  Resp: 18  SpO2: 96 %  Physical Exam  Standing, pacing,  Breathing somewhat quickly but no respiratory distress  Linear in thinking  + Suicidal ideation, denies hallucinations or delusions  + Hopelessness    ED Course, Procedures, & Data      Procedures       -----  Initiation of Medication for the Treatment of Opioid Use Disorder (OUD) in the Emergency Department (ED)  [facility]      Assessment:   Opioid(s) used: heroin   Amount: varies  Frequency: daily  Route:  varies  Duration: months  Last use: 3 days ago    Other substance(s) with ongoing use: denies    The patient meets the following DSM-V criteria for Opioid Use Disorder (OUD):   Craving  Reduced social, occupational, or recreational activities  Recurrent use in physically hazardous situations  Tolerance  Withdrawal    (Severity: Mild: 2-3 criteria, Moderate: 4-5 criteria. Severe: 6 or more criteria)  Based on my assessment, the patient has Moderate OUD.     Medication Initiated in the ED:  In the ED, treatment for OUD was initiated. The patient was given 1 dose(s) of  8mg  buprenorphine while in the ED.     Upon ED discharge, outpatient prescription treatment for OUD was initiated.   The patient was prescribed  none- admitted .      Referral to Ongoing Care and Supportive Services:   Upon ED discharge, the patient was referred to Addiction Medicine for ongoing care and supportive services. The patient was also provided with information on community resources for various supportive services for OUD, and advised to return to the ED if having worsening symptoms.   -----    Mental Health Risk Assessment        PSS-3      Date and Time Over the past 2 weeks have you felt down, depressed, or hopeless? Over the past 2 weeks have you had thoughts of killing yourself? Have you ever attempted to kill yourself? When did this last happen? User   01/05/24 1126 yes yes yes patient unable to complete [facility]          C-SSRS ([place])      Date and Time Q1 Wished to be Dead (Past Month) Q2 Suicidal Thoughts (Past Month) Q3 Suicidal Thought Method Q4 Suicidal Intent without Specific Plan Q5 Suicide Intent with Specific Plan Q6 Suicide Behavior (Lifetime) Within the Past 3 Months? RETIRED: Level of Risk per Screen Screening Not Complete User   01/05/24 1126 yes yes yes no yes -- -- -- -- KRC                       No results found for any visits on 01/05/24.  Medications   OLANZapine zydis (zyPREXA) ODT tab 10 mg (has no administration in time range)   buprenorphine HCl-naloxone HCl (SUBOXONE) 8-2 MG per film 1 Film (has no administration in time range)   gabapentin (NEURONTIN) tablet 600 mg (has no administration in time range)   QUEtiapine (SEROquel) tablet 25 mg (has no administration in time range)     Labs Ordered and Resulted from Time of ED Arrival to Time of ED Departure - No data to display  No orders to display          Critical care was not performed.     Medical Decision Making  The patient's presentation was of high complexity (an acute health issue posing potential threat to life or bodily function).    The patient's evaluation involved:  ordering and/or review of 3+ test(s) in this encounter (see separate area of note for details)  discussion of management or test interpretation with another health professional (dec mental health )    The patient's management necessitated high risk (a decision regarding hospitalization).    Assessment & Plan    Concerning for suicidal ideation, poor social support with first-degree family members who have committed suicide   Combined with  ongoing opioid use disorder, patient requires inpatient psychiatry admission   -Home medications from [facility] medication reconciliation ordered   -Single dose p.o. Zyprexa for agitation   -DEC assessment, urine drug screen, will admit on the last DEC assessment shows different findings    I have reviewed the nursing notes. I have reviewed the findings, diagnosis, plan and need for follow up with the patient.    New Prescriptions    No medications on file       Final diagnoses:   Suicidal ideation   Opioid use disorder       Wilfrid Faust MD  [facility] EMERGENCY DEPARTMENT  1/5/2024        Wilfrid Faust MD  01/05/24 1152
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
## 2024-01-05 NOTE — ED TRIAGE NOTES
Pt's father killed himself 7 months ago. Pt's sister hung herself last week. Pt was kicked out of treatment today
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
## 2024-01-06 ENCOUNTER — TELEPHONE (OUTPATIENT)
Dept: BEHAVIORAL HEALTH | Facility: CLINIC | Age: 49
End: 2024-01-06
Payer: COMMERCIAL

## 2024-01-06 LAB
AMPHETAMINES UR QL SCN: NORMAL
BARBITURATES UR QL SCN: NORMAL
BENZODIAZ UR QL SCN: NORMAL
BZE UR QL SCN: NORMAL
CANNABINOIDS UR QL SCN: NORMAL
FENTANYL UR QL: NORMAL
OPIATES UR QL SCN: NORMAL
PCP QUAL URINE (ROCHE): NORMAL

## 2024-01-06 PROCEDURE — 99255 IP/OBS CONSLTJ NEW/EST HI 80: CPT

## 2024-01-06 PROCEDURE — 250N000013 HC RX MED GY IP 250 OP 250 PS 637: Performed by: FAMILY MEDICINE

## 2024-01-06 PROCEDURE — 250N000013 HC RX MED GY IP 250 OP 250 PS 637

## 2024-01-06 PROCEDURE — 250N000013 HC RX MED GY IP 250 OP 250 PS 637: Performed by: EMERGENCY MEDICINE

## 2024-01-06 PROCEDURE — 80307 DRUG TEST PRSMV CHEM ANLYZR: CPT | Performed by: EMERGENCY MEDICINE

## 2024-01-06 RX ORDER — IBUPROFEN 600 MG/1
600 TABLET, FILM COATED ORAL ONCE
Status: COMPLETED | OUTPATIENT
Start: 2024-01-06 | End: 2024-01-06

## 2024-01-06 RX ORDER — BUPRENORPHINE AND NALOXONE 4; 1 MG/1; MG/1
1 FILM, SOLUBLE BUCCAL; SUBLINGUAL 2 TIMES DAILY
Status: DISCONTINUED | OUTPATIENT
Start: 2024-01-06 | End: 2024-01-07

## 2024-01-06 RX ORDER — LORAZEPAM 0.5 MG/1
2 TABLET ORAL ONCE
Status: COMPLETED | OUTPATIENT
Start: 2024-01-06 | End: 2024-01-06

## 2024-01-06 RX ORDER — QUETIAPINE FUMARATE 100 MG/1
100 TABLET, FILM COATED ORAL AT BEDTIME
Status: DISCONTINUED | OUTPATIENT
Start: 2024-01-06 | End: 2024-01-16 | Stop reason: HOSPADM

## 2024-01-06 RX ORDER — QUETIAPINE FUMARATE 25 MG/1
25 TABLET, FILM COATED ORAL DAILY
Status: DISCONTINUED | OUTPATIENT
Start: 2024-01-07 | End: 2024-01-10

## 2024-01-06 RX ORDER — ACETAMINOPHEN 325 MG/1
975 TABLET ORAL ONCE
Status: COMPLETED | OUTPATIENT
Start: 2024-01-06 | End: 2024-01-06

## 2024-01-06 RX ORDER — BUPRENORPHINE AND NALOXONE 2; .5 MG/1; MG/1
1 FILM, SOLUBLE BUCCAL; SUBLINGUAL 2 TIMES DAILY
Status: DISCONTINUED | OUTPATIENT
Start: 2024-01-06 | End: 2024-01-07

## 2024-01-06 RX ADMIN — QUETIAPINE FUMARATE 12.5 MG: 25 TABLET ORAL at 08:36

## 2024-01-06 RX ADMIN — OLANZAPINE 10 MG: 10 TABLET, FILM COATED ORAL at 16:07

## 2024-01-06 RX ADMIN — FAMOTIDINE 10 MG: 10 TABLET ORAL at 08:36

## 2024-01-06 RX ADMIN — BUPRENORPHINE AND NALOXONE 1 FILM: 2; .5 FILM, SOLUBLE BUCCAL; SUBLINGUAL at 16:35

## 2024-01-06 RX ADMIN — PRAZOSIN HYDROCHLORIDE 2 MG: 2 CAPSULE ORAL at 21:33

## 2024-01-06 RX ADMIN — THIAMINE HCL TAB 100 MG 100 MG: 100 TAB at 08:36

## 2024-01-06 RX ADMIN — BUPRENORPHINE AND NALOXONE 1 FILM: 4; 1 FILM, SOLUBLE BUCCAL; SUBLINGUAL at 16:35

## 2024-01-06 RX ADMIN — QUETIAPINE 100 MG: 100 TABLET ORAL at 21:32

## 2024-01-06 RX ADMIN — GABAPENTIN 600 MG: 600 TABLET, FILM COATED ORAL at 16:07

## 2024-01-06 RX ADMIN — GABAPENTIN 600 MG: 600 TABLET, FILM COATED ORAL at 20:15

## 2024-01-06 RX ADMIN — ATORVASTATIN CALCIUM 20 MG: 20 TABLET, FILM COATED ORAL at 20:14

## 2024-01-06 RX ADMIN — AMANTADINE HYDROCHLORIDE 100 MG: 100 CAPSULE ORAL at 08:36

## 2024-01-06 RX ADMIN — CLONIDINE HYDROCHLORIDE 0.1 MG: 0.1 TABLET ORAL at 11:30

## 2024-01-06 RX ADMIN — IBUPROFEN 600 MG: 600 TABLET, FILM COATED ORAL at 16:07

## 2024-01-06 RX ADMIN — GABAPENTIN 600 MG: 600 TABLET, FILM COATED ORAL at 08:36

## 2024-01-06 RX ADMIN — LORAZEPAM 2 MG: 0.5 TABLET ORAL at 17:35

## 2024-01-06 RX ADMIN — AMANTADINE HYDROCHLORIDE 100 MG: 100 CAPSULE ORAL at 20:18

## 2024-01-06 RX ADMIN — Medication 1 TABLET: at 08:35

## 2024-01-06 RX ADMIN — BUPRENORPHINE AND NALOXONE 1 FILM: 2; .5 FILM, SOLUBLE BUCCAL; SUBLINGUAL at 09:25

## 2024-01-06 RX ADMIN — CLONIDINE HYDROCHLORIDE 0.1 MG: 0.1 TABLET ORAL at 20:18

## 2024-01-06 RX ADMIN — ACETAMINOPHEN 975 MG: 325 TABLET, FILM COATED ORAL at 09:59

## 2024-01-06 RX ADMIN — FAMOTIDINE 10 MG: 10 TABLET ORAL at 20:18

## 2024-01-06 RX ADMIN — FOLIC ACID 1 MG: 1 TABLET ORAL at 08:36

## 2024-01-06 RX ADMIN — OLANZAPINE 10 MG: 10 TABLET, FILM COATED ORAL at 08:36

## 2024-01-06 RX ADMIN — BUPRENORPHINE AND NALOXONE 1 FILM: 4; 1 FILM, SOLUBLE BUCCAL; SUBLINGUAL at 09:25

## 2024-01-06 RX ADMIN — FLUOXETINE 30 MG: 10 CAPSULE ORAL at 08:37

## 2024-01-06 ASSESSMENT — ACTIVITIES OF DAILY LIVING (ADL)
ADLS_ACUITY_SCORE: 35

## 2024-01-06 NOTE — TELEPHONE ENCOUNTER
[place]: [place]  Access Inpatient Bed Call Log  1/6/24 @ 1:00am   Intake has called facilities that have not updated their bed status within the last 12 hours.??     *METRO:  [place] -- [facility]: @ cap per website.  [place] -- [facility]:  @ cap per website.  [place] -- [facility]: @ cap per website.  [place] -- [facility]: @ cap per website. Low acuity only.  [place] -- [facility]: @ cap per website.  [facility]: @ cap per website.   [facility]/ beds - POSTING 4 BEDS. Ages 18-25, Voluntary only, NO aggression/physical/sexual assault, violence hx or drug abuse. Negative Covid.   [place] -- [facility]: @ cap per website.  [place] -- [facility]: @ cap per website.  [place] -- [facility]: @ cap per website. Not reviewing until 10AM.     Pt remains on waitlist pending appropriate placement availability

## 2024-01-06 NOTE — ED NOTES
Pt requesting to use the phone to speak with their fiance. Gave the pt a phone. Five minutes later, bedside sitter notified RN of pt escalating and pacing. Went to assess the pt and they were pacing with a raised voice speaking to their fiance on the phone. Asked the pt to end the call and return the phone as they were escalating. Pt agreed and returned the phone. Pt cites they were agitated when the fiance had asked about the patient's recently  sister. Pt calm in room watching television.

## 2024-01-06 NOTE — CONSULTS
"  Perry Preciado Jr. MRN# [id number]   Age: 48 year old YOB: 1975   Date of Admission to ED: 1/5/2024    In person visit Details:     Patient was assessed and interviewed face-to-face in person with this writer [place]. Patient was observed to be able to participate in the assessment as evidenced by verbal consent. Assessment methods included conducting a formal interview with patient, review of medical records, collaboration with medical staff, and obtaining relevant collateral information from family and community providers when available.        Reason for Consult:   This note is being entered to supplement the psychiatry consultation note that was completed on January 5, 2024 by the licensed mental health professional Evy Tinsley, [facility]   have reviewed the pertinent clinical details related to their encounter. I am being consulted to offer additional guidance on psychiatric pharmacological interventions      Writer met patient in his room face-to-face by himself patient was pleasant and cooperative during assessment and interview.  Currently patient endorsed suicidal ideation denied homicidal ideation or self-injury behavior.  Patient said \" my suicidal ideation was triggered due to being kicked out of the treatment center for having cell phone, and my sister just commit suicide last Thursday by hanging herself.\"  Also patient said \" my father committed suicide several months ago by shooting himself on the head, also patient said he attempt suicide, in December by using guns.  Patient was admitted station 12 was inpatient mental health unit from December 12, 2023 until December 22, 2023 under Dr. Temi Crockett.    Patient reported he has been sober from any substance, such as methamphetamine and cocaine growing and alcohol.  Patient continues to endorse auditory hallucination, denied visual hallucination.  Patient endorses depressive symptoms, including sleep alteration, loss of interest in " pleasurable activities, feelings of guilt/worthlessness/hopelessness, problems with energy, problems with concentration, appetite disturbance. Patient severe suicidal ideation with a plan to use gun and denied homicidal ideation.  Rating currently his depression 10 out of 10.  Patient was on fluoxetine 30 mg    Patient endorses symptoms of generalized anxiety, including excessive worrying throughout the day, associated with, restlessness, easy fatigability, concentration difficulty, irritability, muscle tension and disrupted sleep.  Patient currently rating his anxiety 10 out of 10.      Patient endorses symptoms of panic attacks, ie sudden-onset periods of intense discomfort, accompanied by, palpitations/tachycardia, diaphoresis, tremulousness, shortness of breath, chest pain/discomfort, nausea/abdominal pain, chills, hot flashes, paresthesias, depersonalization, derealization, fear of losing control, or fear of dying.    Patient started Seroquel, on December 2023 for his schizophrenia, also patient started on Subutex, discontinue Celexa and started on Prozac 20 mg increase to 30 mg patient tolerated really well, will increase Seroquel to 100 mg at bedtime, and 25 mg in the morning patient may benefit from increasing Seroquel.  Per Dr. Crockett if further increase of fluoxetine patient need to be monitored for side effect of diarrhea.  Patient reported recently he stopped taking his Seroquel, and fluoxetine while staying in [facility] treatment center.  Due to family history of suicidal completion of his father and his patient is very high risk for suicidal if he attempt to leave AGAINST MEDICAL ADVICE please reassess or place a hold on him.    Patient having four sons, from age 27-16,  and single, history of severe substance use disorder such as amphetamine, heroin, cocaine, fentanyl.,  Alcohol.   Currently has been sober since early December 2023.        There is genetic loading for, his father completed  "suicide, his sister completed suicide k.  Medical history does not appear to be significant.  Substance use does not appear to be playing a contributing role in the patient's presentation.  Patient appears to cope with stress/frustration/emotion by withdrawing.  Stressors include chronic mental health issuess, peer issues, and family dynamics.        I have reviewed the nursing notes. I have reviewed the findings, diagnosis, plan and need for follow up with the patient.         HPI:     Perry Preciado Jr. is a 48 year old male with a history of major depressive disorder with SI and attempt, KANE, ADHD, polysubstance abuse, HTN, SIRS, pneumonia, and chronic back pain presents to the ED for evaluation of feeling suicidal.  He was hospitalized 12/12-22/23 at [facility] with suicide attempt.  Patient was at [facility] and was sent to the emergency department after he was using a cell phone which is against their rules.  Patient also expresses severe hopelessness and \"the inability to do anything right\".  Patient reports \"just wanting to die\"      Denies fever, nausea vomiting diarrhea or any acute medical issues.       Patient requests something to calm him down and that Zyprexa has worked before in the past.             Pt has not required locked seclusion or restraints in the past 24 hours to maintain safety, please refer to RN documentation for further details.  Substance use does not appear to be playing a contributing role in the patient's presentation.*  Brief Therapeutic Intervention(s):   Provided active listening, unconditional positive regard, and validation. Engaged in cognitive restructuring/ reframing, looked at common cognitive distortions and challenged negative thoughts. Engaged in guided discovery, explored patient's perspectives and helped expand them through socratic dialogue. Provided positive reinforcement for progress towards goals, gains in knowledge, and " application of skills previously taught.  Engaged in social skills training. Explored and identified early warning signs to anger.        Past Psychiatric History:     See DEC  note        Substance Use and History:     See DEC  note        Past Medical History:   PAST MEDICAL HISTORY:   Past Medical History:   Diagnosis Date    Acute bilateral low back pain with right-sided sciatica 06/07/2016    Acute pain of right shoulder due to trauma     Adult antisocial behavior     detention incarceration: 16 years    Aspiration pneumonia (H) 02/24/2014    CARDIOVASCULAR SCREENING; LDL GOAL LESS THAN 130 06/07/2016    Carpal tunnel syndrome of right wrist 06/07/2016    Chest pain 02/19/2014    Chest trauma 02/19/2014    Chronic pain syndrome 09/30/2019    Chronic right-sided low back pain with right-sided sciatica 05/10/2018    DDD (degenerative disc disease), lumbosacral 05/05/2020    Depression with anxiety 05/05/2020    Displacement of lumbar intervertebral disc without myelopathy 05/16/2012    KANE (generalized anxiety disorder) 07/07/2017    Heroin abuse (H) 05/10/2018    History of ADHD 01/06/2014    History of drug abuse (H) 01/06/2014    History of suicide attempt 05/10/2018    HTN (hypertension) 02/26/2014    Hyperglycemia 02/23/2014    Hypertriglyceridemia 11/02/2015    IV drug abuse (H) 05/10/2018    Major depressive disorder, recurrent (H24) 02/23/2018    Major depressive disorder, recurrent episode, moderate (H) 06/06/2016    Methamphetamine abuse (H) 02/23/2018    Overview:  see [facility] H&P 11/27/2009, [facility] admit 9/2/2010    Mild intermittent asthma without complication 05/10/2018    Opiate overdose (H) 02/22/2014    Positive D dimer 11/02/2015    Psychosis (H) 05/04/2020    Sepsis (H) 02/22/2014    SIRS (systemic inflammatory response syndrome) (H) 11/02/2015    Substance abuse (H) 05/07/2018    Suicidal ideation 02/23/2018    Tobacco use disorder 05/10/2018       PAST SURGICAL HISTORY:   Past  Surgical History:   Procedure Laterality Date    BACK SURGERY                 Allergies:     Allergies   Allergen Reactions    Wellbutrin [Bupropion] Anaphylaxis and Swelling     Facial swelling    Wellbutrin [Bupropion]     Naltrexone Other (See Comments)     Headaches  Headaches               Medications:   I have reviewed this patient's current medications  Current Facility-Administered Medications   Medication    amantadine (SYMMETREL) capsule 100 mg    atorvastatin (LIPITOR) tablet 20 mg    buprenorphine HCl-naloxone HCl (SUBOXONE) 2-0.5 MG per film 1 Film    buprenorphine HCl-naloxone HCl (SUBOXONE) 4-1 MG per film 1 Film    cloNIDine (CATAPRES) tablet 0.1 mg    famotidine (PEPCID) tablet 10 mg    FLUoxetine (PROzac) capsule 30 mg    folic acid (FOLVITE) tablet 1 mg    gabapentin (NEURONTIN) tablet 600 mg    loperamide (IMODIUM) capsule 2 mg    multivitamin w/minerals (THERA-VIT-M) tablet 1 tablet    OLANZapine (zyPREXA) tablet 10 mg    prazosin (MINIPRESS) capsule 2 mg    QUEtiapine (SEROquel) tablet 100 mg    [START ON 1/7/2024] QUEtiapine (SEROquel) tablet 25 mg    thiamine (B-1) tablet 100 mg     Current Outpatient Medications   Medication Sig    amantadine (SYMMETREL) 100 MG capsule Take 1 capsule (100 mg) by mouth 2 times daily    atorvastatin (LIPITOR) 20 MG tablet Take 1 tablet (20 mg) by mouth every evening    buprenorphine (SUBUTEX) 8 MG SUBL sublingual tablet Place 8 mg under the tongue 2 times daily    cloNIDine (CATAPRES) 0.1 MG tablet Take 1 tablet (0.1 mg) by mouth 2 times daily as needed (anxiety)    famotidine (PEPCID) 10 MG tablet Take 1 tablet (10 mg) by mouth 2 times daily    FLUoxetine (PROZAC) 10 MG capsule Take 3 capsules (30 mg) by mouth daily    folic acid (FOLVITE) 1 MG tablet Take 1 tablet (1 mg) by mouth daily    gabapentin (NEURONTIN) 600 MG tablet Take 1 tablet (600 mg) by mouth 3 times daily    multivitamin w/minerals (THERA-VIT-M) tablet Take 1 tablet by mouth daily    nicotine  (NICODERM CQ) 21 MG/24HR 24 hr patch Place 1 patch onto the skin every 24 hours    OLANZapine (ZYPREXA) 10 MG tablet Take 1 tablet (10 mg) by mouth 2 times daily as needed (agitation or sleep disturbances secondary to psychosis)    prazosin (MINIPRESS) 2 MG capsule Take 1 capsule (2 mg) by mouth at bedtime    QUEtiapine (SEROQUEL) 25 MG tablet Take 0.5 tablets (12.5 mg) by mouth 2 times daily    QUEtiapine (SEROQUEL) 50 MG tablet Take 1 tablet (50 mg) by mouth at bedtime    thiamine (B-1) 100 MG tablet Take 1 tablet (100 mg) by mouth daily              Family History:   FAMILY HISTORY: No family history on file.           Social History:        - Collateral information from the famly/friend: none         PTA Medications:   (Not in a hospital admission)         Allergies:     Allergies   Allergen Reactions    Wellbutrin [Bupropion] Anaphylaxis and Swelling     Facial swelling    Wellbutrin [Bupropion]     Naltrexone Other (See Comments)     Headaches  Headaches            Labs:     Recent Results (from the past 48 hour(s))   Urine Drug Screen Panel    Collection Time: 01/06/24  1:23 AM   Result Value Ref Range    Amphetamines Urine Screen Negative Screen Negative    Barbituates Urine Screen Negative Screen Negative    Benzodiazepine Urine Screen Negative Screen Negative    Cannabinoids Urine Screen Negative Screen Negative    Cocaine Urine Screen Negative Screen Negative    Fentanyl Qual Urine Screen Negative Screen Negative    Opiates Urine Screen Negative Screen Negative    PCP Urine Screen Negative Screen Negative          Physical and Psychiatric Examination:     /83   Pulse 79   Temp 99.1  F (37.3  C) (Oral)   Resp 18   SpO2 94%   Weight is 0 lbs 0 oz  There is no height or weight on file to calculate BMI.    Mental Status Exam:  Appearance: awake, alert  Attitude:  cooperative  Eye Contact:  poor   Mood:  angry  Affect:  appropriate and in normal range  Speech:  clear, coherent  Language: fluent and  intact in English  Psychomotor, Gait, Musculoskeletal:  no evidence of tardive dyskinesia, dystonia, or tics  Thought Process:  logical, linear, and goal oriented  Associations:  no loose associations  Thought Content:  no evidence of suicidal ideation or homicidal ideation and no evidence of psychotic thought  Insight:  fair  Judgement:  limited  Oriented to:  time, person, and place  Attention Span and Concentration:  limited  Recent and Remote Memory:  limited  Fund of Knowledge:  low-normal         Diagnoses:      Suicidal ideation  Opioid use disorder         Recommendations:     1.Pt displays the following risk factors that support IP admission: SI, family history of suicidal completion unable to contract for safety. Pt is unable to engage in safety planning to mitigate risk level in a non-secure setting. Lower levels of care have not been successful in mitigating risk. Due to this IP is the least restrictive option of care for pt. Pt should remain in IP until deemed safe to return to the community and engage in OP  supports    - Continue to recommend inpatient psychiatric hospitalizations for further stabilization   2.  Patient is voluntary for inpatient mental health unit if he attempt to leave AGAINST MEDICAL ADVICE patient need to be reassessed or placed 72-hour hold  3.  Increase Seroquel to 100 mg at bedtime and 25 mg daily in the morning  4.  Consult psychiatry as neede  5.   Refer to psychiatric provider for medication management.    treatment per ED team    - Consulted with  Karissa POWER [facility], ED physician Dr. Sparks asked if they would like this writer to enter orders in the EHR,  patient's ED RN regarding this case.    Please call [facility]/DEC at [phone] if you have follow-up questions or wish to place another consult.  Diego Dangelo, Psychiatric Nurse practitioner    Attestation:  Time with:  Patient: 25 minutes  Treatment Team: 30 Minutes  Chart Review: 30 minutes    Total time spent was 85  minutes. Over 50% of times was spent counseling and coordination of care.    I thank  primary ED provider* care team very much for letting me participate in the care of this patient.    I, Diego Dangelo, ABHIJIT, APRN, Psychiatric Nurse Practitioner have personally performed an examination of this patient.  I have edited the note to reflect all relevant changes.  I have discussed this patient with the care team January 6, 2024.  I have reviewed all vitals and laboratory findings.    Disclaimer: This note consists of symbols derived from keyboarding,

## 2024-01-06 NOTE — MEDICATION SCRIBE - ADMISSION MEDICATION HISTORY
Medication Scribe Admission Medication History    Admission medication history is complete. The information provided in this note is only as accurate as the sources available at the time of the update.    Information Source(s): Patient and CareEverywhere/SureScripts via in-person    Pertinent Information: Most recent discharge instructions include suboxone 2-0.5 mg films bid & suboxone 4-1mg film bid, & subutex 2mg, but patient reported not taking any suboxone film and taking subutex 8mg.     Changes made to PTA medication list:  Added: nicotine patches  Deleted: trazodone, imodium, suboxone 2-0.5mg film, suboxone 4-1 mg  Changed: subutex 2mg changed to 8mg    Medication Affordability:       Allergies reviewed with patient and updates made in EHR: yes    Medication History Completed By: Lena Pantoja 1/5/2024 8:36 PM    PTA Med List   Medication Sig Last Dose    amantadine (SYMMETREL) 100 MG capsule Take 1 capsule (100 mg) by mouth 2 times daily 1/4/2024    atorvastatin (LIPITOR) 20 MG tablet Take 1 tablet (20 mg) by mouth every evening 1/5/2024    buprenorphine (SUBUTEX) 8 MG SUBL sublingual tablet Place 8 mg under the tongue 2 times daily 1/5/2024    cloNIDine (CATAPRES) 0.1 MG tablet Take 1 tablet (0.1 mg) by mouth 2 times daily as needed (anxiety) 1/4/2024    famotidine (PEPCID) 10 MG tablet Take 1 tablet (10 mg) by mouth 2 times daily 1/4/2024    FLUoxetine (PROZAC) 10 MG capsule Take 3 capsules (30 mg) by mouth daily 1/5/2024    folic acid (FOLVITE) 1 MG tablet Take 1 tablet (1 mg) by mouth daily 1/5/2024    gabapentin (NEURONTIN) 600 MG tablet Take 1 tablet (600 mg) by mouth 3 times daily 1/5/2024    multivitamin w/minerals (THERA-VIT-M) tablet Take 1 tablet by mouth daily 1/4/2024    nicotine (NICODERM CQ) 21 MG/24HR 24 hr patch Place 1 patch onto the skin every 24 hours Past Month    OLANZapine (ZYPREXA) 10 MG tablet Take 1 tablet (10 mg) by mouth 2 times daily as needed (agitation or sleep disturbances  secondary to psychosis) 1/5/2024    prazosin (MINIPRESS) 2 MG capsule Take 1 capsule (2 mg) by mouth at bedtime Past Week    QUEtiapine (SEROQUEL) 25 MG tablet Take 0.5 tablets (12.5 mg) by mouth 2 times daily 1/5/2024    QUEtiapine (SEROQUEL) 50 MG tablet Take 1 tablet (50 mg) by mouth at bedtime 1/5/2024    thiamine (B-1) 100 MG tablet Take 1 tablet (100 mg) by mouth daily 1/4/2024
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
## 2024-01-06 NOTE — TELEPHONE ENCOUNTER
R: [place]  Access Inpatient Bed Call Log 1/4/24 @12:30 AM  Intake has called facilities that have not updated the bed status within the last 12 hours.                            -[facility] is posting 0 beds.     -[facility] is posting 0 beds. [phone]  -[facility] is posting 0 beds. Negative covid required             -[facility] is posting 0 beds. Neg covid. No high school or Meme-psych. [phone]  -[facility] is posting 0 beds. [phone]  -[facility] is posting 0 beds. [phone]  [facility] is posting 5 beds for Young Adults aged 18-26. Negative covid. [phone]; Pt does not meet facility criteria d/t age.  -[facility] is posting 0 beds          -[facility] is posting 0 beds. [phone]  -[facility] is posting 5 beds. LOW acuity ONLY. Mixed unit 12+. Negative covid- [phone].    Pt remains on the work list pending appropriate bed availability.

## 2024-01-06 NOTE — TELEPHONE ENCOUNTER
R: [place]  Access Inpatient Bed Call Log 1/6/24 8:30 AM    Intake has called facilities that have not updated the bed status within the last 12 hours.   ([place])                [facility] is at capacity           [facility] is posting 0 beds. [phone]   [facility] is posting 0 beds. Negative covid required.  1/6 Per call at 1:21 pm currently at capacity.             [facility] is posting 2 beds. Neg covid. No high school or Meme-psych. [phone] 1/6 Per call at 1:27 PM no beds available.  [facility] is posting 0 beds. [phone] 1/6 Per call at 1:21 pm currently at capacity.  [facility] is posting 0 beds. [phone] 1/6 Per call currently FULL.  [facility] is posting 4 beds. Negative covid. [phone] Per call @7:54am beds available 1/6 Pt not appropriate d/t unit restrictions.  [facility] is posting 0 beds          [facility] is posting 0 beds [phone].  1/6 Per call at 1:21 pm currently at capacity.    Pt remains on the work list pending appropriate bed availability.

## 2024-01-07 ENCOUNTER — TELEPHONE (OUTPATIENT)
Dept: BEHAVIORAL HEALTH | Facility: CLINIC | Age: 49
End: 2024-01-07
Payer: COMMERCIAL

## 2024-01-07 PROBLEM — R45.851 SUICIDAL IDEATION: Status: ACTIVE | Noted: 2018-02-23

## 2024-01-07 PROBLEM — F11.90 OPIOID USE DISORDER: Status: ACTIVE | Noted: 2024-01-07

## 2024-01-07 LAB
ALBUMIN SERPL BCG-MCNC: 3.5 G/DL (ref 3.5–5.2)
ALP SERPL-CCNC: 105 U/L (ref 40–150)
ALT SERPL W P-5'-P-CCNC: 109 U/L (ref 0–70)
ANION GAP SERPL CALCULATED.3IONS-SCNC: 7 MMOL/L (ref 7–15)
AST SERPL W P-5'-P-CCNC: 133 U/L (ref 0–45)
BASOPHILS # BLD AUTO: 0.1 10E3/UL (ref 0–0.2)
BASOPHILS NFR BLD AUTO: 1 %
BILIRUB SERPL-MCNC: 0.2 MG/DL
BUN SERPL-MCNC: 19 MG/DL (ref 6–20)
CALCIUM SERPL-MCNC: 9.1 MG/DL (ref 8.6–10)
CHLORIDE SERPL-SCNC: 104 MMOL/L (ref 98–107)
CREAT SERPL-MCNC: 1.09 MG/DL (ref 0.67–1.17)
DEPRECATED HCO3 PLAS-SCNC: 28 MMOL/L (ref 22–29)
EGFRCR SERPLBLD CKD-EPI 2021: 84 ML/MIN/1.73M2
EOSINOPHIL # BLD AUTO: 0.1 10E3/UL (ref 0–0.7)
EOSINOPHIL NFR BLD AUTO: 2 %
ERYTHROCYTE [DISTWIDTH] IN BLOOD BY AUTOMATED COUNT: 13.2 % (ref 10–15)
GLUCOSE SERPL-MCNC: 107 MG/DL (ref 70–99)
HCT VFR BLD AUTO: 41.9 % (ref 40–53)
HGB BLD-MCNC: 13.5 G/DL (ref 13.3–17.7)
IMM GRANULOCYTES # BLD: 0 10E3/UL
IMM GRANULOCYTES NFR BLD: 0 %
LYMPHOCYTES # BLD AUTO: 2 10E3/UL (ref 0.8–5.3)
LYMPHOCYTES NFR BLD AUTO: 38 %
MCH RBC QN AUTO: 30.4 PG (ref 26.5–33)
MCHC RBC AUTO-ENTMCNC: 32.2 G/DL (ref 31.5–36.5)
MCV RBC AUTO: 94 FL (ref 78–100)
MONOCYTES # BLD AUTO: 0.5 10E3/UL (ref 0–1.3)
MONOCYTES NFR BLD AUTO: 9 %
NEUTROPHILS # BLD AUTO: 2.7 10E3/UL (ref 1.6–8.3)
NEUTROPHILS NFR BLD AUTO: 50 %
NRBC # BLD AUTO: 0 10E3/UL
NRBC BLD AUTO-RTO: 0 /100
PLATELET # BLD AUTO: 268 10E3/UL (ref 150–450)
POTASSIUM SERPL-SCNC: 4.6 MMOL/L (ref 3.4–5.3)
PROT SERPL-MCNC: 6.4 G/DL (ref 6.4–8.3)
RBC # BLD AUTO: 4.44 10E6/UL (ref 4.4–5.9)
SARS-COV-2 RNA RESP QL NAA+PROBE: NEGATIVE
SODIUM SERPL-SCNC: 139 MMOL/L (ref 135–145)
T3FREE SERPL-MCNC: 3.1 PG/ML (ref 2–4.4)
T4 FREE SERPL-MCNC: 0.8 NG/DL (ref 0.9–1.7)
TSH SERPL DL<=0.005 MIU/L-ACNC: 1 UIU/ML (ref 0.3–4.2)
VIT D+METAB SERPL-MCNC: 23 NG/ML (ref 20–50)
WBC # BLD AUTO: 5.4 10E3/UL (ref 4–11)

## 2024-01-07 PROCEDURE — 82306 VITAMIN D 25 HYDROXY: CPT | Performed by: ANESTHESIOLOGY

## 2024-01-07 PROCEDURE — 250N000013 HC RX MED GY IP 250 OP 250 PS 637: Performed by: EMERGENCY MEDICINE

## 2024-01-07 PROCEDURE — 250N000013 HC RX MED GY IP 250 OP 250 PS 637: Performed by: FAMILY MEDICINE

## 2024-01-07 PROCEDURE — G0177 OPPS/PHP; TRAIN & EDUC SERV: HCPCS

## 2024-01-07 PROCEDURE — 84439 ASSAY OF FREE THYROXINE: CPT | Performed by: ANESTHESIOLOGY

## 2024-01-07 PROCEDURE — 85025 COMPLETE CBC W/AUTO DIFF WBC: CPT | Performed by: EMERGENCY MEDICINE

## 2024-01-07 PROCEDURE — 84443 ASSAY THYROID STIM HORMONE: CPT | Performed by: ANESTHESIOLOGY

## 2024-01-07 PROCEDURE — 36415 COLL VENOUS BLD VENIPUNCTURE: CPT | Performed by: EMERGENCY MEDICINE

## 2024-01-07 PROCEDURE — 84481 FREE ASSAY (FT-3): CPT | Performed by: ANESTHESIOLOGY

## 2024-01-07 PROCEDURE — 124N000002 HC R&B MH UMMC

## 2024-01-07 PROCEDURE — 250N000013 HC RX MED GY IP 250 OP 250 PS 637: Performed by: ANESTHESIOLOGY

## 2024-01-07 PROCEDURE — 80053 COMPREHEN METABOLIC PANEL: CPT | Performed by: EMERGENCY MEDICINE

## 2024-01-07 PROCEDURE — 99223 1ST HOSP IP/OBS HIGH 75: CPT | Mod: AI | Performed by: ANESTHESIOLOGY

## 2024-01-07 PROCEDURE — 250N000013 HC RX MED GY IP 250 OP 250 PS 637

## 2024-01-07 PROCEDURE — 87635 SARS-COV-2 COVID-19 AMP PRB: CPT | Performed by: EMERGENCY MEDICINE

## 2024-01-07 RX ORDER — PRAZOSIN HYDROCHLORIDE 1 MG/1
1 CAPSULE ORAL AT BEDTIME
Status: DISCONTINUED | OUTPATIENT
Start: 2024-01-07 | End: 2024-01-16 | Stop reason: HOSPADM

## 2024-01-07 RX ORDER — NALOXONE HYDROCHLORIDE 0.4 MG/ML
0.4 INJECTION, SOLUTION INTRAMUSCULAR; INTRAVENOUS; SUBCUTANEOUS
Status: DISCONTINUED | OUTPATIENT
Start: 2024-01-07 | End: 2024-01-16 | Stop reason: HOSPADM

## 2024-01-07 RX ORDER — BUPRENORPHINE 8 MG/1
8 TABLET SUBLINGUAL 2 TIMES DAILY
Status: DISCONTINUED | OUTPATIENT
Start: 2024-01-07 | End: 2024-01-08

## 2024-01-07 RX ORDER — NICOTINE 21 MG/24HR
1 PATCH, TRANSDERMAL 24 HOURS TRANSDERMAL DAILY
Status: DISCONTINUED | OUTPATIENT
Start: 2024-01-07 | End: 2024-01-16 | Stop reason: HOSPADM

## 2024-01-07 RX ORDER — NALOXONE HYDROCHLORIDE 0.4 MG/ML
0.2 INJECTION, SOLUTION INTRAMUSCULAR; INTRAVENOUS; SUBCUTANEOUS
Status: DISCONTINUED | OUTPATIENT
Start: 2024-01-07 | End: 2024-01-16 | Stop reason: HOSPADM

## 2024-01-07 RX ORDER — LITHIUM CARBONATE 300 MG/1
300 TABLET, FILM COATED, EXTENDED RELEASE ORAL EVERY 12 HOURS SCHEDULED
Status: DISCONTINUED | OUTPATIENT
Start: 2024-01-07 | End: 2024-01-16 | Stop reason: HOSPADM

## 2024-01-07 RX ORDER — HYDROXYZINE HYDROCHLORIDE 50 MG/1
50 TABLET, FILM COATED ORAL EVERY 6 HOURS PRN
Status: DISCONTINUED | OUTPATIENT
Start: 2024-01-07 | End: 2024-01-09

## 2024-01-07 RX ADMIN — GABAPENTIN 600 MG: 600 TABLET, FILM COATED ORAL at 13:01

## 2024-01-07 RX ADMIN — NICOTINE 1 PATCH: 21 PATCH, EXTENDED RELEASE TRANSDERMAL at 09:07

## 2024-01-07 RX ADMIN — GABAPENTIN 600 MG: 600 TABLET, FILM COATED ORAL at 08:21

## 2024-01-07 RX ADMIN — OLANZAPINE 10 MG: 10 TABLET, FILM COATED ORAL at 15:54

## 2024-01-07 RX ADMIN — FAMOTIDINE 10 MG: 10 TABLET ORAL at 20:48

## 2024-01-07 RX ADMIN — BUPRENORPHINE AND NALOXONE 1 FILM: 4; 1 FILM, SOLUBLE BUCCAL; SUBLINGUAL at 08:21

## 2024-01-07 RX ADMIN — LITHIUM CARBONATE 300 MG: 300 TABLET, EXTENDED RELEASE ORAL at 20:49

## 2024-01-07 RX ADMIN — THIAMINE HCL TAB 100 MG 100 MG: 100 TAB at 08:21

## 2024-01-07 RX ADMIN — PRAZOSIN HYDROCHLORIDE 1 MG: 1 CAPSULE ORAL at 22:03

## 2024-01-07 RX ADMIN — FLUOXETINE 30 MG: 10 CAPSULE ORAL at 08:21

## 2024-01-07 RX ADMIN — FAMOTIDINE 10 MG: 10 TABLET ORAL at 08:21

## 2024-01-07 RX ADMIN — GABAPENTIN 600 MG: 600 TABLET, FILM COATED ORAL at 20:49

## 2024-01-07 RX ADMIN — AMANTADINE HYDROCHLORIDE 100 MG: 100 CAPSULE ORAL at 20:49

## 2024-01-07 RX ADMIN — HYDROXYZINE HYDROCHLORIDE 50 MG: 50 TABLET, FILM COATED ORAL at 17:13

## 2024-01-07 RX ADMIN — LITHIUM CARBONATE 300 MG: 300 TABLET, EXTENDED RELEASE ORAL at 10:41

## 2024-01-07 RX ADMIN — QUETIAPINE 100 MG: 100 TABLET ORAL at 22:03

## 2024-01-07 RX ADMIN — OLANZAPINE 10 MG: 10 TABLET, FILM COATED ORAL at 03:09

## 2024-01-07 RX ADMIN — Medication 1 TABLET: at 08:21

## 2024-01-07 RX ADMIN — BUPRENORPHINE AND NALOXONE 1 FILM: 2; .5 FILM, SOLUBLE BUCCAL; SUBLINGUAL at 08:21

## 2024-01-07 RX ADMIN — OLANZAPINE 10 MG: 10 TABLET, FILM COATED ORAL at 12:58

## 2024-01-07 RX ADMIN — QUETIAPINE FUMARATE 25 MG: 25 TABLET ORAL at 08:21

## 2024-01-07 RX ADMIN — FOLIC ACID 1 MG: 1 TABLET ORAL at 08:21

## 2024-01-07 RX ADMIN — AMANTADINE HYDROCHLORIDE 100 MG: 100 CAPSULE ORAL at 08:56

## 2024-01-07 RX ADMIN — ATORVASTATIN CALCIUM 20 MG: 20 TABLET, FILM COATED ORAL at 20:49

## 2024-01-07 RX ADMIN — BUPRENORPHINE 8 MG: 8 TABLET SUBLINGUAL at 18:31

## 2024-01-07 ASSESSMENT — ACTIVITIES OF DAILY LIVING (ADL)
ADLS_ACUITY_SCORE: 28
ADLS_ACUITY_SCORE: 35
ADLS_ACUITY_SCORE: 28
ADLS_ACUITY_SCORE: 45
ADLS_ACUITY_SCORE: 28
ADLS_ACUITY_SCORE: 35
ADLS_ACUITY_SCORE: 28
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
## 2024-01-07 NOTE — TELEPHONE ENCOUNTER
1:52am - RN Anay reviewed pt for possible placement to [facility]. RN agreed pt would fit milieu and roommate assigned open bed. Pointed out pt needs labs ordered.     2:14am - Called ED, Requested CMP, CBC and COVID tests for pt placement.     2:18am - Paged [facility] for possible placement to [facility]    2:37am - Provider Sadiq accepts pt for placement to [facility].    2:44am - Notified Unit of pt in the queue. Unit will be short 1 staff at 3am.     2:47am - Notified ED of pt placement and updated on staffing for 30 after 3am.     Letty Completed S.R    R: Pt Placement to [facility]

## 2024-01-07 NOTE — CARE PLAN
Brought in 13 January 2024:  Black t-shirt, white t-shirt, white sweater, dark jeans, two cell phones, hey dude shoes, varied snacks.    Hey dude shoes, black t-shirt, white sweater, and dark jeans all received by pt. Other items stored.         01/07/24 1037   Patient Belongings   Did you bring any home meds/supplements to the hospital?  Yes   Disposition of meds  Sent to security/pharmacy per site process   Patient Belongings locker;sent to security per site process   Patient Belongings Remaining with Patient clothing;other (see comments);purse/wallet   Patient Belongings Put in Hospital Secure Location (Security or Locker, etc.) cash/credit card;shoes;wallet;money (see comment);glasses;clothing   Belongings Search Yes   Clothing Search Yes     Items stored in locker #18:  1 tshirt,1 pair of jeans, 1 pair of socks,1 pair of brown boots, 1 pair of gloves, boxers,  White belt, yellow sweatshirt, navy blue wrangler vest, 2 packs of cigarettes, 2 lighters, ray bans shades, wallet, no cellphone    Valuables sent to security in envelope #[id number]  Meds sent to pharmacy in envelopes #[id number], #[id number], and #[phone]        ..A               Admission:  I am responsible for any personal items that are not sent to the safe or pharmacy.  [facility] is not responsible for loss, theft or damage of any property in my possession.    Signature:  _________________________________ Date: _______  Time: _____                                              Staff Signature:  ____________________________ Date: ________  Time: _____      2nd Staff person, if patient is unable/unwilling to sign:    Signature: ________________________________ Date: ________  Time: _____     Discharge:  [facility] has returned all of my personal belongings:    Signature: _________________________________ Date: ________  Time: _____                                          Staff Signature:  ____________________________ Date: ________  Time: _____

## 2024-01-07 NOTE — PLAN OF CARE
"Goal Outcome Evaluation:    Plan of Care Reviewed With: patient        Problem: Suicidal Behavior  Goal: Suicidal Behavior is Absent or Managed  Outcome: Progressing     Problem: Depressive Signs/Symptoms  Goal: Optimized Energy Level (Depressive Signs/Symptoms)  Outcome: Progressing     Patient was admitted last night from ED, Patient is on suicide precautions and had threatened to kill himself using a gun. Patient was visible this shift, spent most of the morning in the lounge watching TV but mostly appeared sleepy. Patient was isolative and withdrawn, did not interact with peers. Patient was receptive with assessment but appeared guarded, endorsed feelings of frustration and sadness. Patient also reported feeling anxious 5/10 and depressed 4/10, denied SI/SIB/HI/AVH. Patient was compliant with medication. Patient was seen by on-call Psychiatrist and medications orders were changed, suboxone was changed to 8Mg twice daily and Lithium was started. Patient was overheard while making phone call saying \" Am Gonna show you what I will do when I get out of here\", I know you want to break up with me, I know you dont' love me\" patient continued. When asked to clarify, patient said that he was not threatening his girlfriend but acknowledged that both him and her are feeling sad. Patient reported plan to break up with her but will not hurt her. Patient verbalized feeling safe in here and contracted for safety. Patient was offered and agree to take a shower.  Patient received prn zyprexa with his scheduled 2pm gabapentin after verbalizing feeling agitated. Patient reports some effectiveness and has been watching TV in the lounge with peers.  Blood pressure 129/78, pulse 62, temperature 97.2  F (36.2  C), temperature source Oral, resp. rate 18, weight 113.5 kg (250 lb 4.8 oz), SpO2 92%.    "

## 2024-01-08 PROCEDURE — 250N000013 HC RX MED GY IP 250 OP 250 PS 637: Performed by: PSYCHIATRY & NEUROLOGY

## 2024-01-08 PROCEDURE — 99232 SBSQ HOSP IP/OBS MODERATE 35: CPT | Performed by: PSYCHIATRY & NEUROLOGY

## 2024-01-08 PROCEDURE — 250N000013 HC RX MED GY IP 250 OP 250 PS 637: Performed by: EMERGENCY MEDICINE

## 2024-01-08 PROCEDURE — 250N000013 HC RX MED GY IP 250 OP 250 PS 637

## 2024-01-08 PROCEDURE — 90832 PSYTX W PT 30 MINUTES: CPT

## 2024-01-08 PROCEDURE — 90853 GROUP PSYCHOTHERAPY: CPT

## 2024-01-08 PROCEDURE — 250N000013 HC RX MED GY IP 250 OP 250 PS 637: Performed by: ANESTHESIOLOGY

## 2024-01-08 PROCEDURE — 124N000002 HC R&B MH UMMC

## 2024-01-08 PROCEDURE — G0177 OPPS/PHP; TRAIN & EDUC SERV: HCPCS

## 2024-01-08 RX ADMIN — LITHIUM CARBONATE 300 MG: 300 TABLET, EXTENDED RELEASE ORAL at 20:03

## 2024-01-08 RX ADMIN — HYDROXYZINE HYDROCHLORIDE 50 MG: 50 TABLET, FILM COATED ORAL at 17:59

## 2024-01-08 RX ADMIN — HYDROXYZINE HYDROCHLORIDE 50 MG: 50 TABLET, FILM COATED ORAL at 11:53

## 2024-01-08 RX ADMIN — OLANZAPINE 10 MG: 10 TABLET, FILM COATED ORAL at 14:04

## 2024-01-08 RX ADMIN — FAMOTIDINE 10 MG: 10 TABLET ORAL at 20:03

## 2024-01-08 RX ADMIN — GABAPENTIN 600 MG: 600 TABLET, FILM COATED ORAL at 14:04

## 2024-01-08 RX ADMIN — NICOTINE 1 PATCH: 21 PATCH, EXTENDED RELEASE TRANSDERMAL at 05:25

## 2024-01-08 RX ADMIN — PRAZOSIN HYDROCHLORIDE 1 MG: 1 CAPSULE ORAL at 20:04

## 2024-01-08 RX ADMIN — OLANZAPINE 10 MG: 10 TABLET, FILM COATED ORAL at 03:21

## 2024-01-08 RX ADMIN — NICOTINE POLACRILEX 2 MG: 2 GUM, CHEWING ORAL at 20:08

## 2024-01-08 RX ADMIN — QUETIAPINE FUMARATE 25 MG: 25 TABLET ORAL at 07:58

## 2024-01-08 RX ADMIN — QUETIAPINE 100 MG: 100 TABLET ORAL at 20:04

## 2024-01-08 RX ADMIN — Medication 1 TABLET: at 07:58

## 2024-01-08 RX ADMIN — HYDROXYZINE HYDROCHLORIDE 50 MG: 50 TABLET, FILM COATED ORAL at 00:45

## 2024-01-08 RX ADMIN — FLUOXETINE 30 MG: 10 CAPSULE ORAL at 07:58

## 2024-01-08 RX ADMIN — THIAMINE HCL TAB 100 MG 100 MG: 100 TAB at 07:58

## 2024-01-08 RX ADMIN — GABAPENTIN 600 MG: 600 TABLET, FILM COATED ORAL at 07:58

## 2024-01-08 RX ADMIN — FAMOTIDINE 10 MG: 10 TABLET ORAL at 07:58

## 2024-01-08 RX ADMIN — GABAPENTIN 600 MG: 600 TABLET, FILM COATED ORAL at 20:03

## 2024-01-08 RX ADMIN — BUPRENORPHINE 8 MG: 8 TABLET SUBLINGUAL at 08:00

## 2024-01-08 RX ADMIN — FOLIC ACID 1 MG: 1 TABLET ORAL at 07:59

## 2024-01-08 RX ADMIN — ATORVASTATIN CALCIUM 20 MG: 20 TABLET, FILM COATED ORAL at 20:03

## 2024-01-08 RX ADMIN — NICOTINE POLACRILEX 2 MG: 2 GUM, CHEWING ORAL at 17:16

## 2024-01-08 RX ADMIN — NICOTINE POLACRILEX 2 MG: 2 GUM, CHEWING ORAL at 14:04

## 2024-01-08 RX ADMIN — LITHIUM CARBONATE 300 MG: 300 TABLET, EXTENDED RELEASE ORAL at 07:58

## 2024-01-08 RX ADMIN — AMANTADINE HYDROCHLORIDE 100 MG: 100 CAPSULE ORAL at 20:02

## 2024-01-08 RX ADMIN — AMANTADINE HYDROCHLORIDE 100 MG: 100 CAPSULE ORAL at 07:59

## 2024-01-08 RX ADMIN — BUPRENORPHINE 10 MG: 8 TABLET SUBLINGUAL at 19:09

## 2024-01-08 ASSESSMENT — ACTIVITIES OF DAILY LIVING (ADL)
ADLS_ACUITY_SCORE: 28
HYGIENE/GROOMING: INDEPENDENT
LAUNDRY: UNABLE TO COMPLETE
ADLS_ACUITY_SCORE: 28
ORAL_HYGIENE: INDEPENDENT
ADLS_ACUITY_SCORE: 28
HYGIENE/GROOMING: HANDWASHING;SHOWER;INDEPENDENT
DRESS: INDEPENDENT
ADLS_ACUITY_SCORE: 28
ORAL_HYGIENE: INDEPENDENT
ADLS_ACUITY_SCORE: 28
ADLS_ACUITY_SCORE: 28
DRESS: SCRUBS (BEHAVIORAL HEALTH);INDEPENDENT
LAUNDRY: WITH SUPERVISION
ADLS_ACUITY_SCORE: 28

## 2024-01-08 NOTE — CARE PLAN
01/08/24 1444   Group Therapy Session   Group Attendance attended group session   Time Session Began 1315   Time Session Ended 1400   Total Time (minutes) 45   Total # Attendees 5   Group Type life skill;other (see comments)  (OT)   Group Topic Covered balanced lifestyle;coping skills/lifestyle management;leisure exploration/use of leisure time;structured socialization   Group Session Detail OT - Topic:  Focus of group was on identification of specific coping skills for mental health management using a group game activity. The process also includes communication skills in a group setting, listening to others and sharing ideas, and practice strategy techniques.    Patient Response/Contribution able to recall/repeat info presented;cooperative with task;discussed personal experience with topic;expressed understanding of topic   Patient Participation Detail Pt easily engaged in the group. He was restless and often stood. Higher energy and very talkative throughout. He did use humor and was supportive of others.
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
## 2024-01-08 NOTE — CARE PLAN
01/08/24 1239   Group Therapy Session   Group Attendance attended group session   Time Session Began 1015   Time Session Ended 1100   Total Time (minutes) 20 - no charge   Total # Attendees 3   Group Type life skill;task skill;other (see comments)  (OT)   Group Topic Covered balanced lifestyle;coping skills/lifestyle management;leisure exploration/use of leisure time;structured socialization   Group Session Detail OT Clinic: Activity group for creative expression, promoting autonomy, building self worth, coping with stress and symptoms, and an opportunity to exercise cognitive skills (I.e. initiation, planning, organizing, sequencing, sustained attention, follow through, and overall self awareness).   Patient Response/Contribution able to recall/repeat info presented;cooperative with task;discussed personal experience with topic   Patient Participation Detail Pt was in and out of group and had difficulty sitting for any length of time, noting to staff that he has ADHD. He did choose a structured creative activity of coloring and did complete a couple of sections, noting then that he was done.

## 2024-01-08 NOTE — PLAN OF CARE
Goal Outcome Evaluation:    Plan of Care Reviewed With: patient    Pt out in common areas of the unit most of the shift.  Pt attended and participated in groups. Pt observed nodding off this morning after breakfast in the lounge for 30 - 60 minutes. Pt reports acute anxiety at noon today reporting anxiety at 7 out of 10. Pt requested and received hydroxyzine  50 mg prn. Pt reported this was effective to reduce anxiety to 3 out of 10. Pt denied any suicidal thoughts.  Pt states is help seeking and wants to go to CD tx after discharge. Pt again reports acute anxiety around change of shift requesting and receiving zyprexa 10 mg.

## 2024-01-08 NOTE — PLAN OF CARE
BEH IP Unit Acuity Rating Score (UARS)  Patient is given one point for every criteria they meet.    CRITERIA SCORING   On a 72 hour hold, court hold, committed, stay of commitment, or revocation 0/1    Patient LOS on BEH unit exceeds 20 days 0/1  LOS: 1   Patient under guardianship, 55+, otherwise medically complex, or under age 11 0/1   Suicide ideation without relief of precipitating factors 1/1   Current plan for suicide 0/1   Current plan for homicide 0/1   Imminent risk or actual attempt to seriously harm another without relief of factors precipitating the attempt 0/1   Severe dysfunction in daily living (ex: complete neglect for self care, extreme disruption in vegetative function, extreme deterioration in social interactions) 1/1   Recent (last 7 days) or current physical aggression in the ED or on unit 0/1   Restraints or seclusion episode in past 72 hours 0/1   Recent (last 7 days) or current verbal aggression, agitation, yelling, etc., while in the ED or unit 0/1   Active psychosis 0/1   Need for constant or near constant redirection (from leaving, from others, etc).  0/1   Intrusive or disruptive behaviors 0/1   TOTAL 2

## 2024-01-08 NOTE — PLAN OF CARE
Team Note Due:  Monday    Assessment/Intervention/Current Symtoms and Care Coordination:  Chart review and met with team, discussed pt progress, symptomology, and response to treatment.  Discussed the discharge plan and any potential impediments to discharge.    Tasks Completed:  - CTC introduced self to pt and explained role. Pt shared that he is interested in going into a MI/CD treatment center so he can work on both mental health and substance use. Pt reports preference for residential but reports that he is open to living in sober living while attending outpatient treatment, as well. CTC shared that a CD assessment will need to be completed first and pt reports understanding and openness for this. CTC will discuss with provider and ask for consult to be placed.  - CTC asked provider to submit CD consult.     Discharge Plan or Goal:  Current plan is to stabilize symptoms and develop safe disposition plan. Following hospitalization, plan is to discharge to MI/CD residential treatment.      Barriers to Discharge:  Pt was recently admitted and would benefit from further stabilization and medication management. Pt endorses anxiety.     Referral Status:  CD consult requested to explore substance use treatment options.     Legal Status:  Pt is voluntary     Contacts:  None      Upcoming Meetings and Dates/Important Information and next steps:  None

## 2024-01-08 NOTE — PROVIDER NOTIFICATION
24 1611   Individualization/Patient Specific Goals   Patient Personal Strengths expressive of needs;motivated for recovery;motivated for treatment   Patient Vulnerabilities legal concerns;poor impulse control;traumatic event;substance abuse/addiction;recent loss   Anxieties, Fears or Concerns Pt endorses anxiety at 7/10. Pt has significant history of polysubstance abuse and history of ADHD and PTSD.   Interprofessional Rounds   Summary Pt endorses anxiety at 7/10 and complains of hip pain. Pt's girlfriend visited him yesterday and brought snacks for him. The visit went well. Pt has been medication compliant, appropriate, and cooperative on the unit. Pt struggles with roommates due to PTSD. Pt's UTOX was negative and pt currently receives suboxone. Pt has significant polysubstance abuse history and began using amphetamines at age 11. Pt has recently experienced a lot of loss as both his sister and father  by suicide recently. Pt was recently kicked out of a CD treatment program due to having a cell phone which was against their policy.   Participants CTC;psychiatrist;nursing;OT   Behavioral Team Discussion   Participants Khurram Dawn MD; Edvin Paul RN; Yoanna Sheehan [facility]; Annette Mcknight, OTR/L; PA student   Progress Pt was recently admitted and would benefit from further stabilization and medication management.   Continued Stay Criteria/Rationale N/A   Medical/Physical Pt endorses hip pain   Precautions Suicide precautions   Plan Multidiscipinary team evaluation, CD evaluation, medication management, care coordination   Rationale for change in precautions or plan N/A   Safety Plan Per unit protocol   Anticipated Discharge Disposition substance use treatment     PRECAUTIONS AND SAFETY    Behavioral Orders   Procedures    Code 1 - Restrict to Unit    Discontinue 1:1 attendant for suicide risk     Order Specific Question:   I have performed an in person assessment of the patient     Answer:   Based  on this assessment the patient no longer requires a one on one attendant at this point in time.     Order Specific Question:   Rationale     Answer:   Patient States able to remain safe in hospital    Routine Programming     As clinically indicated    Status 15     Every 15 minutes.       Safety  Safety WDL: .WDL except, safety factors  Patient Location: dining room, lounge  Observed Behavior: calm  Observed Behavior (Comment): Watching TV, Dozing off  Suicidality: Status 15, Minimal furniture in room, Minimal personal belongings in room, Behavioral scrubs (joanna)

## 2024-01-08 NOTE — PLAN OF CARE
"  Problem: Suicidal Behavior  Goal: Suicidal Behavior is Absent or Managed  Outcome: Progressing  Flowsheets (Taken 1/7/2024 2133)  Mutually Determined Action Steps (Suicidal Behavior Absent/Managed): sets future-oriented goal     Problem: Depressive Signs/Symptoms  Goal: Improved Mood Symptoms (Depressive Signs/Symptoms)  Outcome: Progressing   Milieu Participation:Behavior: Pt visible in the milieu al shift and social with peers and select staff. Behaviors appropriate and assertive with needs. Pt stated his chief concern is anxiety. Stated, \"I just can't seem to shake it, anxiety hangs around and gets bad sometimes\". Pt stated his anxiety was 7/10. Pt requested and received zyprexa 10 mg. Pt stated later that hydroxyzine has helped in the past. Provider notified and order received for hydroxyzine 50 PRN every six hours for anxiety. Hydroxyzine 50 mg given and one hour later pt reported relief. Encouraged to go to groups and continue to be visible in the milieu as able. Pt attended group this evening, and states he had a good visit with his wife. Pt requested double portions and requested he could receive snacks his wife brings in. Provider notified. Denies depression, SI, AVH. Medication compliant with no stated or observed side effects.     Safety: status 15   Aggression/agitation/HI: denies, exhibited safe behavior   Affect: anxious Mood: calm    Physical Complaints/Issues: denies  I & O: eating and drinking well 100%  Sleep: denies concerns   LBM: denies concerns  ADLs: independent  Visits/calls: wife visited    Vitals:  Blood pressure 97/65, pulse 67, temperature 98  F (36.7  C), temperature source Temporal, resp. rate 16, weight 113.5 kg (250 lb 4.8 oz), SpO2 95%.  Pain denies                         "
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
## 2024-01-08 NOTE — CARE PLAN
01/07/24 1800   Group Therapy Session   Group Attendance attended group session   Time Session Began 1700   Time Session Ended 1745   Total Time (minutes) 45   Total # Attendees 1-4   Group Type life skill;psychoeducation   Group Topic Covered coping skills/lifestyle management;emotions/expression   Group Session Detail inside/Outside control  and Gratitude   Patient Response/Contribution cooperative with task;discussed personal experience with topic   Patient Participation Detail See Note     Pt independently attended topic group today and was cooperative.  Pt demonstrated a fair understanding of the topic covered and fair insight into their own specific issues related to topic.  Pt was appropriately social with peers and staff. Pt's affect was blunted and attention span was limited.  Pt had a difficult time sitting down and did a lot of pacing.  Pt will continue to be encouraged to attend groups for ongoing assessment and to work toward goals identified on plan of care.

## 2024-01-09 PROCEDURE — 250N000013 HC RX MED GY IP 250 OP 250 PS 637: Performed by: ANESTHESIOLOGY

## 2024-01-09 PROCEDURE — 124N000002 HC R&B MH UMMC

## 2024-01-09 PROCEDURE — 99232 SBSQ HOSP IP/OBS MODERATE 35: CPT | Performed by: PSYCHIATRY & NEUROLOGY

## 2024-01-09 PROCEDURE — 250N000013 HC RX MED GY IP 250 OP 250 PS 637: Performed by: PSYCHIATRY & NEUROLOGY

## 2024-01-09 PROCEDURE — 250N000013 HC RX MED GY IP 250 OP 250 PS 637: Performed by: EMERGENCY MEDICINE

## 2024-01-09 PROCEDURE — G0177 OPPS/PHP; TRAIN & EDUC SERV: HCPCS

## 2024-01-09 PROCEDURE — 250N000013 HC RX MED GY IP 250 OP 250 PS 637

## 2024-01-09 RX ORDER — HYDROXYZINE HYDROCHLORIDE 50 MG/1
100 TABLET, FILM COATED ORAL 3 TIMES DAILY PRN
Status: DISCONTINUED | OUTPATIENT
Start: 2024-01-09 | End: 2024-01-16 | Stop reason: HOSPADM

## 2024-01-09 RX ADMIN — QUETIAPINE 100 MG: 100 TABLET ORAL at 22:11

## 2024-01-09 RX ADMIN — NICOTINE POLACRILEX 2 MG: 2 GUM, CHEWING ORAL at 04:45

## 2024-01-09 RX ADMIN — NICOTINE POLACRILEX 2 MG: 2 GUM, CHEWING ORAL at 19:13

## 2024-01-09 RX ADMIN — OLANZAPINE 10 MG: 10 TABLET, FILM COATED ORAL at 16:27

## 2024-01-09 RX ADMIN — QUETIAPINE FUMARATE 25 MG: 25 TABLET ORAL at 08:12

## 2024-01-09 RX ADMIN — NICOTINE 1 PATCH: 21 PATCH, EXTENDED RELEASE TRANSDERMAL at 08:13

## 2024-01-09 RX ADMIN — HYDROXYZINE HYDROCHLORIDE 50 MG: 50 TABLET, FILM COATED ORAL at 09:58

## 2024-01-09 RX ADMIN — BUPRENORPHINE 10 MG: 8 TABLET SUBLINGUAL at 08:13

## 2024-01-09 RX ADMIN — AMANTADINE HYDROCHLORIDE 100 MG: 100 CAPSULE ORAL at 19:42

## 2024-01-09 RX ADMIN — OLANZAPINE 10 MG: 10 TABLET, FILM COATED ORAL at 04:10

## 2024-01-09 RX ADMIN — NICOTINE POLACRILEX 2 MG: 2 GUM, CHEWING ORAL at 22:13

## 2024-01-09 RX ADMIN — NICOTINE POLACRILEX 2 MG: 2 GUM, CHEWING ORAL at 09:16

## 2024-01-09 RX ADMIN — FLUOXETINE HYDROCHLORIDE 40 MG: 20 CAPSULE ORAL at 08:12

## 2024-01-09 RX ADMIN — LITHIUM CARBONATE 300 MG: 300 TABLET, EXTENDED RELEASE ORAL at 08:12

## 2024-01-09 RX ADMIN — PRAZOSIN HYDROCHLORIDE 1 MG: 1 CAPSULE ORAL at 22:11

## 2024-01-09 RX ADMIN — GABAPENTIN 600 MG: 600 TABLET, FILM COATED ORAL at 13:58

## 2024-01-09 RX ADMIN — FOLIC ACID 1 MG: 1 TABLET ORAL at 08:13

## 2024-01-09 RX ADMIN — FAMOTIDINE 10 MG: 10 TABLET ORAL at 08:12

## 2024-01-09 RX ADMIN — THIAMINE HCL TAB 100 MG 100 MG: 100 TAB at 08:12

## 2024-01-09 RX ADMIN — HYDROXYZINE HYDROCHLORIDE 100 MG: 50 TABLET, FILM COATED ORAL at 14:00

## 2024-01-09 RX ADMIN — GABAPENTIN 600 MG: 600 TABLET, FILM COATED ORAL at 08:13

## 2024-01-09 RX ADMIN — BUPRENORPHINE 10 MG: 8 TABLET SUBLINGUAL at 19:43

## 2024-01-09 RX ADMIN — AMANTADINE HYDROCHLORIDE 100 MG: 100 CAPSULE ORAL at 08:12

## 2024-01-09 RX ADMIN — ATORVASTATIN CALCIUM 20 MG: 20 TABLET, FILM COATED ORAL at 19:42

## 2024-01-09 RX ADMIN — Medication 1 TABLET: at 08:13

## 2024-01-09 RX ADMIN — HYDROXYZINE HYDROCHLORIDE 50 MG: 50 TABLET, FILM COATED ORAL at 02:44

## 2024-01-09 RX ADMIN — HYDROXYZINE HYDROCHLORIDE 100 MG: 50 TABLET, FILM COATED ORAL at 18:19

## 2024-01-09 RX ADMIN — FAMOTIDINE 10 MG: 10 TABLET ORAL at 19:42

## 2024-01-09 RX ADMIN — LITHIUM CARBONATE 300 MG: 300 TABLET, EXTENDED RELEASE ORAL at 19:43

## 2024-01-09 RX ADMIN — GABAPENTIN 600 MG: 600 TABLET, FILM COATED ORAL at 19:42

## 2024-01-09 ASSESSMENT — ACTIVITIES OF DAILY LIVING (ADL)
HYGIENE/GROOMING: INDEPENDENT
ADLS_ACUITY_SCORE: 28
ADLS_ACUITY_SCORE: 28
HYGIENE/GROOMING: INDEPENDENT
ADLS_ACUITY_SCORE: 28
DRESS: INDEPENDENT
DRESS: INDEPENDENT;SCRUBS (BEHAVIORAL HEALTH)
ADLS_ACUITY_SCORE: 28
ADLS_ACUITY_SCORE: 28
LAUNDRY: WITH SUPERVISION
ADLS_ACUITY_SCORE: 28
ADLS_ACUITY_SCORE: 28
ORAL_HYGIENE: INDEPENDENT
LAUNDRY: WITH SUPERVISION
ADLS_ACUITY_SCORE: 28
ORAL_HYGIENE: INDEPENDENT

## 2024-01-09 NOTE — PLAN OF CARE
Team Note Due:  Monday    Assessment/Intervention/Current Symtoms and Care Coordination:  Chart review and met with team, discussed pt progress, symptomology, and response to treatment.  Discussed the discharge plan and any potential impediments to discharge.    Tasks Completed:  - [place] from CD  who reported pt completed an assessment on 12/13 that is still valid.   - Met with pt to discuss referrals for residential treatment. Pt would like to stay close to the [facility] and is interested in MI/CD treatment centers. Discussed [facility], [facility], and [facility] programs. Pt completed ROIs for these programs. Pt also discussed a program in [place] that he was interested in but could not remember the name of. CTC will research programs in [place] and follow-up with pt.   - Submitted referrals for MI/CD programs.    Discharge Plan or Goal:  Current plan is to stabilize symptoms and develop safe disposition plan. Following hospitalization, plan is to discharge to MI/CD residential treatment.      Barriers to Discharge:  Pt was recently admitted and would benefit from further stabilization and medication management. Pt endorses anxiety.     Referral Status:  CTC made the following substance use treatment referrals:    [facility]   RESIDENTIAL ADMISSIONS ([facility], [facility], [facility])  Phone: [phone]  Fax: [phone]  [email or web address]  [email or web address]    [facility] for Referrals  Phone: [phone]  Fax: [phone]  [email or web address]    [facility]  Address:  [address] [place] [id number]   Phone: [phone]  1/9/2024: Called to receive fax number to submit referrals. Advised to send an email to support referral. Sent paperwork via secure email to [email or web address].     Legal Status:  Pt is voluntary     Contacts:  None      Upcoming Meetings and Dates/Important Information and next  steps:  - Follow-up on referrals  - Research programs in [place], [place]

## 2024-01-09 NOTE — PLAN OF CARE
"Problem: Depressive Signs/Symptoms  Goal: Optimized Energy Level (Depressive Signs/Symptoms)  Recent Flowsheet Documentation  Taken 1/8/2024 2206 by Carmelo Conrad RN  Mutually Determined Action Steps (Optimized Energy Level): grooms self without prompting  Intervention: Optimize Energy Level  Recent Flowsheet Documentation  Taken 1/8/2024 2000 by Carmelo Conrad RN  Activity (Behavioral Health): up ad shelley   Goal Outcome Evaluation:    The patient was present and engaged in appropriate interactions with both peers and staff. He shared that he had been dealing with high levels of anxiety but managed them by watching television and taking Hydroxyzine 50mg PRN, which he found helpful in \"taking the edge off.\" The patient did not report any other psychiatric symptoms and was cooperative and friendly when approached. He took all of his scheduled medications and ate his dinner and snacks. No safety or behavioral concerns were noted.      "

## 2024-01-09 NOTE — CARE PLAN
Occupational Therapy Group Note:     01/09/24 1612   Group Therapy Session   Group Attendance attended group session   Time Session Began 1400   Time Session Ended 1445   Total Time (minutes) 35 (no charge)   Total # Attendees 3   Group Type psychoeducation   Group Topic Covered relaxation techniques   Group Session Detail OT Relaxation Group   Patient Response/Contribution confronted peers appropriately;cooperative with task;listened actively   Patient Participation Detail Patient actively participated in a progressive muscle relaxation group in order to promote: positive relaxation and coping skills, encourage insight and self-reflection, promote a positive change, manage behaviors, and improve focus. In addition, patient was guided through proper deep breathing techniques and gentle full body movements/stretches to further promote relaxation. Patient intermittently engaged in guided activity. Patient was restless in group. Seemingly difficult for patient to sustain attention and engagement on task for prolonged periods. Pleasant in conversation and with interactions with others.

## 2024-01-09 NOTE — CARE PLAN
"Occupational Therapy Group Note:     01/09/24 1317   Group Therapy Session   Group Attendance attended group session   Time Session Began 1015   Time Session Ended 1105   Total Time (minutes) 25 (no charge)   Total # Attendees 4   Group Type recreation   Group Topic Covered leisure exploration/use of leisure time;structured socialization   Group Session Detail OT Leisure Group   Patient Response/Contribution cooperative with task   Patient Participation Detail Focus of today's group was on healthy leisure exploration and engagement. Patient actively participated in a group game in order to: improve social skills and decrease isolative behaviors, exercise cognitive skills, improve concentration, and encourage new learning and retention. Patient appeared restless in group; often shifting positions from sitting to standing and pacing. Patient described himself as \"hyper.\" Patient was in/out of group room and appeared to have some difficulty sustaining attention on task intermittently. Patient was hyper-verbal; however, redirectable. Patient speaks in an increased volume and occasionally speaks over others. However, patient was generally pleasant in interactions and remained mostly engaged in activity. Patient reported enjoyment in activity at conclusion of group.         "

## 2024-01-09 NOTE — CONSULTS
1/9/2024  JAQUELINE Consult Acknowledged  Pt completed a JAQUELINE CA with SVETLANA France, on 12/13/23. This JAQUELINE CA is still valid and he does not need a JAQUELINE Update at this time. Per EMR, pt discharged from the hospital to [facility], but was discharged due to violating their cell phone policy.    Per 12/13/2023 JAQUELINE CA:  Recommendations: ASAM Level 3.5     Clinical Substantiation:  Patient is a 48 year old male that identifies as Delma and heterosexual. Patient denies any current medical concerns or diagnoses and has access to medical services if needed. Patient reports mental health diagnoses and is medication compliant and has access to MH services in the community. Patient is willing to follow treatment recommendations. Patient reports several previous treatments episodes and appears to lack the insight and coping skills to arrest his addiction. Patient reported he has his own home and is currently laid off of work. Patient denied any current legal involvement .      Referrals/ Alternatives:  [facility] and [facility].         Here are some additional JAQUELINE tx programs for pt to look into:  [facility]  [address] [place] [address]  Phone: [phone]  Fax: [phone]  Email: [email or web address]   RESIDENTIAL ADMISSIONS ([facility] I, [facility] II, [facility] III)  Phone: [phone]  Fax: [phone]  [email or web address]  [email or web address]    [facility]   Phone: [phone]   Fax: [phone]  [place] [address], [facility], [phone]  [email or web address]    [place] Access Team for Referrals  Phone: [phone]  Fax: [phone]  [email or web address]    Fabian Hayes  [address] [place] [address]  Phone Number: [phone]  Fax Number:  [phone]  Admissions Phone Number: [phone]  [email or web address]    Geovanna Lopez MA [facility] [place]  JAQUELINE Evaluation Counselor  [phone]  [email or web address]

## 2024-01-09 NOTE — CARE PLAN
01/08/24 1383   Group Therapy Session   Group Attendance attended group session   Time Session Began 1410   Time Session Ended 1500   Total Time (minutes) 50   Total # Attendees 4   Group Type psychotherapeutic   Group Topic Covered cognitive activities;coping skills/lifestyle management   Group Session Detail DBT Toolbox   Patient Response/Contribution cooperative with task;discussed personal experience with topic;other (see comments)     Pt said he felt optimistic. He had spoken to his girlfriend which was positive. But she also informed him that the treatment center he was at, said they returned to him, two cell phones, he said they did not. He was angry and restless about this. Writer recommended learning 4 square breathing and he had not known the technique. He tried it several times and said it helped. He also uses drawing as coping and writer gave him some Art Therapy ideas for independent art work exploring feelings through his art style. He would like a daily check in if possible from covering CTC therapist. He and writer agreed when writer returned in a week, that we could meet for 1:1 Art Therapy. He gets worked up and angry, restless, but is willing to try new skills and actually practice them on the unit. Writer also gave him a fidget ball and some blank paper and markers to cope with feelings of restlessness and frustration.

## 2024-01-09 NOTE — PLAN OF CARE
Night Nursing Note  1/8/24 - 1/9/24      JULIAN was in bed at beginning of shift and appeared asleep.  Monitored q15 mins for safety.  Awake in lounge @ 0130. Stated had a argument with his girlfriend  that upset him. Requested and given lavender essential oil per his request.    Returned back to bed and slept until 0230.  Awake in lounge. Complained of sore feet and requested sandals.    Requested and given hydroxyzine 50mg for anxiety and to help return to sleep.  Continues to have difficulty sleeping. Feels having a private room may help.    Awake in lounge at 0400.  Requested and given zyprexa 10mg for anxiety   rated 7/10 @ 0410. Had snacks of pudding and orange juice.   Returned back to room and went to bed.  Slept @ intervals throughout night.  Requested and given nicorette gum @ 0445.    Continue to monitor, offer support and reassurance per care plan.    Slept 4 hrs.

## 2024-01-09 NOTE — CONSULTS
1/9/2024  JAQUELINE Consult Acknowledged  Pt completed a JAQUELINE CA with SVETLANA France, on 12/13/23. This JAQUELINE CA is still valid and he does not need a JAQUELINE Update at this time. Per EMR, pt discharged from the hospital to [facility], but was discharged due to violating their cell phone policy.     Per 12/13/2023 JAQUELINE CA:  Recommendations: ASAM Level 3.5     Clinical Substantiation:  Patient is a 48 year old male that identifies as [place] and heterosexual. Patient denies any current medical concerns or diagnoses and has access to medical services if needed. Patient reports mental health diagnoses and is medication compliant and has access to MH services in the community. Patient is willing to follow treatment recommendations. Patient reports several previous treatments episodes and appears to lack the insight and coping skills to arrest his addiction. Patient reported he has his own home and is currently laid off of work. Patient denied any current legal involvement .      Referrals/ Alternatives:  [facility] and [facility] Location.            Here are some additional JAQUELINE tx programs for pt to look into:  [facility]  [address] [place] [address]  Phone: [phone]  Fax: [phone]  Email: [email or web address]   RESIDENTIAL ADMISSIONS ([facility], [facility] II, [facility]iatric hospital III)  Phone: [phone]  Fax: [phone]  [email or web address]  [email or web address]     [facility]   Phone: [phone]   Fax: [phone]  [place] [address], [facility], [phone]  [email or web address]/     [place] Access Team for Referrals  Phone: [phone]  Fax: [phone]  [email or web address]/     Fabian Hayes  [address] [place] [address]  Phone Number: [phone]  Fax Number:  [phone]  Admissions Phone Number: [phone]  [email or web address]/     Geovanna Lopez MA [facility]  JAQUELINE Evaluation Counselor  [phone]  [email or web address]

## 2024-01-09 NOTE — PLAN OF CARE
Goal Outcome Evaluation:    Initial meeting note:    Therapist introduced self to patient and discussed psychotherapy service available to patient.     Pt response: Pt expressed interest in meeting with therapist    Plan: Made plan to meet with pt again; began identifying goals     Pt asked for a daily check in as time permits. He has ADHD, very restless, says he becomes easily frustrated. Responsive to mindfulness and grounding skills taught in short 1:1 session and group. Would like sobriety.
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
## 2024-01-09 NOTE — CARE PLAN
01/09/24 1550   Group Therapy Session   Group Attendance attended group session   Time Session Began 1315   Time Session Ended 1400   Total Time (minutes) 30   Total # Attendees 6   Group Type life skill;psychoeducation   Group Topic Covered coping skills/lifestyle management;relationship;problem-solving;structured socialization;cognitive activities   Group Session Detail General Health and Coping Skills: group discussion on an article about relationships and social skills.   Patient Participation Detail Pt participated in a group discussion on difficult conversations and confrontation. Pt appeared upbeat and social, was friendly with staff and peers. Pt contributed thoughtful insight and shared personal experiences to the group. Pt reported wanting to stand up during the discussion, and would occasionally leave and return to the room. Pt appeared engaged in the activity and attentive during the discussion.

## 2024-01-09 NOTE — PLAN OF CARE
Problem: Adult Behavioral Health Plan of Care  Goal: Plan of Care Review  Outcome: Progressing    Problem: Depressive Signs/Symptoms  Goal: Optimized Energy Level (Depressive Signs/Symptoms)  Outcome: Progressing  Intervention: Optimize Energy Level     Goal Outcome Evaluation:    Plan of Care Reviewed With: patient      Patient has been present at the milieu most of the shift, flat blunted affect, mood is anxious, endorsed high anxiety rating it 7/10 and depression 5/10, denied all other MH symptoms and no behavioral outbursts noted. Appeared restless and tired after breakfast saying he has not been sleeping well, requested and got PRN Nicotine gum and  hydroxyzine twice for anxiety with some effectiveness, said his anxiety is more from not getting enough sleep due to sharing a room and would benefit is he had a private room, spends time at the lounge watching TV and interacting with peers and said that is helping. Overall patient has been pleasant and cooperative with cares, is eating and drinking adequately, attended and participated in group activities and was medication compliant. Has a chemical dependency consult in place.   PRN hydroxyzine increased from 50 mg to 100 mg 3 times a day.
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
## 2024-01-09 NOTE — PROGRESS NOTES
"[facility]   Psychiatric Progress Note        Interim History:   The patient's care was discussed with the treatment team during the daily team meeting and/or staff's chart notes were reviewed.  Staff report patient was up off and on during last night, reported feeling anxious about having roommate which triggered his PTSD symptoms. Overall, slept not more than 4 hours. Was reported to be yelling during phone conversation with likely, his girlfriend, but polite and cooperative during his interactions with staff and other patients on this floor. Still reported high anxiety and sadness. Seen by CD counselor, see recommendations below:    \"[facility] and [facility].      Here are some additional JAQUELINE tx programs for pt to look into:  [facility]  [address] [place] [address]  Phone: [phone]  Fax: [phone]  Email: [email or web address]   RESIDENTIAL ADMISSIONS ([facility], [facility], [facility])  Phone: [phone]  Fax: [phone]  [email or web address]  www.[facility]     [facility]   Phone: [phone]   Fax: [phone]  [place] [address], [facility], [phone]  [email or web address]     [place] Access Team for Referrals  Phone: [phone]  Fax: [phone]  [email or web address]     [address] [address]  Phone Number: [phone]  Fax Number: [phone]  Admissions Phone Number: [phone]  [email or web address]"    Met with patient: We met the patient in presence of PA student. Perry was again pleasant and cooperative. He confirmed that his anxiety remained high and asked for a single room. We promised to move him to a single room. He also confirmed that he would like to go " to MICD program after discharge and was glad to hear that CD consult was in place. We talked about symptoms of ADHD and how they are different from anxiety, recommended not focus on treating ADHD at this point in time. We also suggested to increase but make less frequent his dose of Vistaril. He agreed with that, reported that, overall, he felt safe on this floor and had no more questions or concerns.     Shortly after our visit with Perry we were informed by RN that Perry suddenly decided not to move to another room and that he was OK with having a roommate?         Medications:      amantadine  100 mg Oral BID    atorvastatin  20 mg Oral QPM    buprenorphine  10 mg Sublingual BID    famotidine  10 mg Oral BID    FLUoxetine  40 mg Oral Daily    folic acid  1 mg Oral Daily    gabapentin  600 mg Oral TID    lithium ER  300 mg Oral Q12H RODOLFO (08/20)    multivitamin w/minerals  1 tablet Oral Daily    nicotine  1 patch Transdermal Daily    And    nicotine   Transdermal Q8H RODOLFO    prazosin  1 mg Oral At Bedtime    QUEtiapine  100 mg Oral At Bedtime    QUEtiapine  25 mg Oral Daily    thiamine  100 mg Oral Daily          Allergies:     Allergies   Allergen Reactions    Wellbutrin [Bupropion] Anaphylaxis and Swelling     Facial swelling    Wellbutrin [Bupropion]     Naltrexone Other (See Comments)     Headaches  Headaches            Labs:   No results found for this or any previous visit (from the past 24 hour(s)).       Psychiatric Examination:     /81   Pulse 77   Temp 97.1  F (36.2  C) (Temporal)   Resp 16   Wt 116.5 kg (256 lb 12.8 oz)   SpO2 96%   BMI 33.88 kg/m    Weight is 256 lbs 12.8 oz  Body mass index is 33.88 kg/m .    Orthostatic Vitals         Most Recent      Standing Orthostatic /77 01/09 0841    Standing Orthostatic Pulse (bpm) 75 01/09 0841           Appearance: dressed in hospital scrubs, appeared as age stated, awake but tired, alert, no apparent distress, mildly obese, and slightly  unkempt; many tattoos;  Attitude:  cooperative  Eye Contact:  fair, limited by sadness  Mood:  anxious and sad   Affect:  mood congruent and intensity is blunted  Speech:  clear, coherent and mild rambling  Psychomotor Behavior:  no evidence of tardive dyskinesia, dystonia, or tics and intact station, gait and muscle tone; some restlessness noted;  Thought Process:  goal oriented and logical, mostly organized  Associations:  no loose associations  Thought Content:  passive suicidal ideation present, no auditory hallucinations present, and no visual hallucinations present  Insight:  fair  Judgement:  limited  Oriented to:  time, person, and place  Attention Span and Concentration:   limited by anxiety, sadness  Recent and Remote Memory:  fair    Clinical Global Impressions  First: 6/4 1/8/2024      Most recent:            Precautions:     Behavioral Orders   Procedures    Code 1 - Restrict to Unit    Discontinue 1:1 attendant for suicide risk     Order Specific Question:   I have performed an in person assessment of the patient     Answer:   Based on this assessment the patient no longer requires a one on one attendant at this point in time.     Order Specific Question:   Rationale     Answer:   Patient States able to remain safe in hospital    Routine Programming     As clinically indicated    Status 15     Every 15 minutes.          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 No other medication changes today 1/9/2024. Patient as per discussion above agrees to stay in double room. CD consult done. Will continue to provide support and structure.    Total time spent was 39 minutes. Over 50% of times was spent counseling and coordination of care regarding coping skills, medication and discharge  planning.         I was present with PA student who participated in the service and in the documentation of the note. I have verified the history and personally performed the physical exam and medical decision making. I agree with the assessment and plan of care as documented in the note.     Khurram Dawn MD  [facility] Psychiatry

## 2024-01-09 NOTE — PLAN OF CARE
BEH IP Unit Acuity Rating Score (UARS)  Patient is given one point for every criteria they meet.    CRITERIA SCORING   On a 72 hour hold, court hold, committed, stay of commitment, or revocation 0/1    Patient LOS on BEH unit exceeds 20 days 0/1  LOS: 2   Patient under guardianship, 55+, otherwise medically complex, or under age 11 0/1   Suicide ideation without relief of precipitating factors 1/1   Current plan for suicide 0/1   Current plan for homicide 0/1   Imminent risk or actual attempt to seriously harm another without relief of factors precipitating the attempt 0/1   Severe dysfunction in daily living (ex: complete neglect for self care, extreme disruption in vegetative function, extreme deterioration in social interactions) 1/1   Recent (last 7 days) or current physical aggression in the ED or on unit 0/1   Restraints or seclusion episode in past 72 hours 0/1   Recent (last 7 days) or current verbal aggression, agitation, yelling, etc., while in the ED or unit 0/1   Active psychosis 0/1   Need for constant or near constant redirection (from leaving, from others, etc).  0/1   Intrusive or disruptive behaviors 0/1   TOTAL 2

## 2024-01-10 LAB — SARS-COV-2 RNA RESP QL NAA+PROBE: NEGATIVE

## 2024-01-10 PROCEDURE — 250N000013 HC RX MED GY IP 250 OP 250 PS 637: Performed by: EMERGENCY MEDICINE

## 2024-01-10 PROCEDURE — 87635 SARS-COV-2 COVID-19 AMP PRB: CPT | Performed by: PSYCHIATRY & NEUROLOGY

## 2024-01-10 PROCEDURE — 250N000013 HC RX MED GY IP 250 OP 250 PS 637: Performed by: PSYCHIATRY & NEUROLOGY

## 2024-01-10 PROCEDURE — 99232 SBSQ HOSP IP/OBS MODERATE 35: CPT | Performed by: PSYCHIATRY & NEUROLOGY

## 2024-01-10 PROCEDURE — 250N000013 HC RX MED GY IP 250 OP 250 PS 637

## 2024-01-10 PROCEDURE — G0177 OPPS/PHP; TRAIN & EDUC SERV: HCPCS

## 2024-01-10 PROCEDURE — 90853 GROUP PSYCHOTHERAPY: CPT

## 2024-01-10 PROCEDURE — 250N000013 HC RX MED GY IP 250 OP 250 PS 637: Performed by: ANESTHESIOLOGY

## 2024-01-10 PROCEDURE — 124N000002 HC R&B MH UMMC

## 2024-01-10 RX ORDER — QUETIAPINE FUMARATE 25 MG/1
25 TABLET, FILM COATED ORAL 2 TIMES DAILY
Status: DISCONTINUED | OUTPATIENT
Start: 2024-01-10 | End: 2024-01-11

## 2024-01-10 RX ADMIN — QUETIAPINE FUMARATE 25 MG: 25 TABLET ORAL at 08:13

## 2024-01-10 RX ADMIN — AMANTADINE HYDROCHLORIDE 100 MG: 100 CAPSULE ORAL at 08:13

## 2024-01-10 RX ADMIN — NICOTINE POLACRILEX 2 MG: 2 GUM, CHEWING ORAL at 18:58

## 2024-01-10 RX ADMIN — GABAPENTIN 600 MG: 600 TABLET, FILM COATED ORAL at 13:56

## 2024-01-10 RX ADMIN — THIAMINE HCL TAB 100 MG 100 MG: 100 TAB at 08:12

## 2024-01-10 RX ADMIN — FOLIC ACID 1 MG: 1 TABLET ORAL at 08:12

## 2024-01-10 RX ADMIN — NICOTINE 1 PATCH: 21 PATCH, EXTENDED RELEASE TRANSDERMAL at 08:25

## 2024-01-10 RX ADMIN — FAMOTIDINE 10 MG: 10 TABLET ORAL at 21:27

## 2024-01-10 RX ADMIN — NICOTINE POLACRILEX 2 MG: 2 GUM, CHEWING ORAL at 21:30

## 2024-01-10 RX ADMIN — NICOTINE POLACRILEX 2 MG: 2 GUM, CHEWING ORAL at 18:09

## 2024-01-10 RX ADMIN — OLANZAPINE 10 MG: 10 TABLET, FILM COATED ORAL at 13:56

## 2024-01-10 RX ADMIN — BUPRENORPHINE 10 MG: 8 TABLET SUBLINGUAL at 18:58

## 2024-01-10 RX ADMIN — BUPRENORPHINE 10 MG: 8 TABLET SUBLINGUAL at 08:12

## 2024-01-10 RX ADMIN — GABAPENTIN 600 MG: 600 TABLET, FILM COATED ORAL at 08:12

## 2024-01-10 RX ADMIN — LITHIUM CARBONATE 300 MG: 300 TABLET, EXTENDED RELEASE ORAL at 21:28

## 2024-01-10 RX ADMIN — NICOTINE POLACRILEX 2 MG: 2 GUM, CHEWING ORAL at 16:55

## 2024-01-10 RX ADMIN — FLUOXETINE HYDROCHLORIDE 40 MG: 20 CAPSULE ORAL at 08:12

## 2024-01-10 RX ADMIN — OLANZAPINE 10 MG: 10 TABLET, FILM COATED ORAL at 05:26

## 2024-01-10 RX ADMIN — HYDROXYZINE HYDROCHLORIDE 100 MG: 50 TABLET, FILM COATED ORAL at 16:53

## 2024-01-10 RX ADMIN — LITHIUM CARBONATE 300 MG: 300 TABLET, EXTENDED RELEASE ORAL at 08:12

## 2024-01-10 RX ADMIN — QUETIAPINE 100 MG: 100 TABLET ORAL at 21:28

## 2024-01-10 RX ADMIN — PRAZOSIN HYDROCHLORIDE 1 MG: 1 CAPSULE ORAL at 22:32

## 2024-01-10 RX ADMIN — HYDROXYZINE HYDROCHLORIDE 100 MG: 50 TABLET, FILM COATED ORAL at 00:58

## 2024-01-10 RX ADMIN — GABAPENTIN 600 MG: 600 TABLET, FILM COATED ORAL at 21:30

## 2024-01-10 RX ADMIN — HYDROXYZINE HYDROCHLORIDE 100 MG: 50 TABLET, FILM COATED ORAL at 12:04

## 2024-01-10 RX ADMIN — AMANTADINE HYDROCHLORIDE 100 MG: 100 CAPSULE ORAL at 21:28

## 2024-01-10 RX ADMIN — QUETIAPINE FUMARATE 25 MG: 25 TABLET ORAL at 21:29

## 2024-01-10 RX ADMIN — Medication 1 TABLET: at 08:13

## 2024-01-10 RX ADMIN — FAMOTIDINE 10 MG: 10 TABLET ORAL at 08:12

## 2024-01-10 RX ADMIN — ATORVASTATIN CALCIUM 20 MG: 20 TABLET, FILM COATED ORAL at 21:27

## 2024-01-10 ASSESSMENT — ACTIVITIES OF DAILY LIVING (ADL)
ADLS_ACUITY_SCORE: 28
DRESS: STREET CLOTHES;INDEPENDENT;SCRUBS (BEHAVIORAL HEALTH)
ADLS_ACUITY_SCORE: 28
HYGIENE/GROOMING: HANDWASHING;SHOWER;INDEPENDENT
LAUNDRY: UNABLE TO COMPLETE
ADLS_ACUITY_SCORE: 28
ADLS_ACUITY_SCORE: 28
ORAL_HYGIENE: INDEPENDENT
ADLS_ACUITY_SCORE: 28
ADLS_ACUITY_SCORE: 28

## 2024-01-10 ASSESSMENT — ABNORMAL INVOLUNTARY MOVEMENT SCALE (AIMS)
FACIAL_EXPRESSION_MUSCLES: NONE, NORMAL
LIPS_PARIETAL: NONE, NORMAL
JAW: NONE, NORMAL
TONGUE: NONE, NORMAL

## 2024-01-10 NOTE — PROGRESS NOTES
"[facility], [place]   Psychiatric Progress Note        Interim History:   The patient's care was discussed with the treatment team during the daily team meeting and/or staff's chart notes were reviewed.  Staff report patient was up off and on during last night, reported feeling anxious about having roommate who was snoring and preventing TJ from sleep, though, only yesterday patient said that he would like to stay in the same room with his current roommate and not to move to another room. Needed Zyprexa prn. Overall, slept for about 3.25 hours last night. Confirmed to RN that he was very afraid of relapsing on drugs if not discharged \"door to door\", see RN's note below:    \"Pt approached this RN asking about voluntary discharge and seeking PRN for anxiety, which he was given. Per meeting with pt, he reported feeling \"sad and anxious\" and had a strong urge to discharge and use drugs in order to \"smother my feelings.\" Pt presented as obviously anxious, agitated, restless and fidgeted throughout interview. He recognized this and said \"I'm not going to actually leave though. The mendez TJ recognizes this.\" He reported that \"there is a reason I'm here\" after a failed gun suicide attempt, but struggles with SI due to relapse and associated anxiety. He said he has completed a CD assessment and would like a door to door transfer to CD tx facility. Pt mentioned that in addition to relapse on drugs, he has been dealing with his sister and father's suicide this past year. Pt was able to mention some non pharma coping skills for anxiety such as deep breathing and exercise. He is otherwise pleasant, cooperative, social and visible in milieu. Will continue to monitor and encourage participation.\"     Met with patient: We met the patient in presence of PA student. Perry was again pleasant and cooperative, but visibly anxious and restless. Reported poor sleep last night, asked to increase his Seroquel " "and allow him to wear home shoes. He contracted for safety at this unit, confirmed that he would like to be discharged to MICD program directly \"door to door\" and had no other questions or concerns. We asked him about reasons for not changing his room to a single room. TJ said that it was because he felt more comfortable with his roommate who is a .          Medications:      amantadine  100 mg Oral BID    atorvastatin  20 mg Oral QPM    buprenorphine  10 mg Sublingual BID    famotidine  10 mg Oral BID    FLUoxetine  40 mg Oral Daily    folic acid  1 mg Oral Daily    gabapentin  600 mg Oral TID    lithium ER  300 mg Oral Q12H RODOLFO (08/20)    multivitamin w/minerals  1 tablet Oral Daily    nicotine  1 patch Transdermal Daily    And    nicotine   Transdermal Q8H RODOLFO    prazosin  1 mg Oral At Bedtime    QUEtiapine  100 mg Oral At Bedtime    QUEtiapine  25 mg Oral BID    thiamine  100 mg Oral Daily          Allergies:     Allergies   Allergen Reactions    Wellbutrin [Bupropion] Anaphylaxis and Swelling     Facial swelling    Wellbutrin [Bupropion]     Naltrexone Other (See Comments)     Headaches  Headaches            Labs:     Recent Results (from the past 24 hour(s))   Asymptomatic COVID-19 Virus (Coronavirus) by PCR Nasopharyngeal    Collection Time: 01/10/24 11:16 AM    Specimen: Nasopharyngeal; Swab   Result Value Ref Range    SARS CoV2 PCR Negative Negative          Psychiatric Examination:     /70   Pulse 64   Temp 97.8  F (36.6  C) (Oral)   Resp 18   Wt 116.5 kg (256 lb 12.8 oz)   SpO2 94%   BMI 33.88 kg/m    Weight is 256 lbs 12.8 oz  Body mass index is 33.88 kg/m .    Orthostatic Vitals         Most Recent      Standing Orthostatic /73 01/10 0818    Standing Orthostatic Pulse (bpm) 65 01/10 0818           Appearance: dressed in hospital scrubs, appeared as age stated, awake but tired, alert, no apparent distress, mildly obese, and slightly unkempt; many tattoos;  Attitude:  " cooperative  Eye Contact:  fair, limited by sadness  Mood:  anxious, less sad   Affect:  mood congruent and intensity is blunted  Speech:  clear, coherent and mild rambling  Psychomotor Behavior:  no evidence of tardive dyskinesia, dystonia, or tics and intact station, gait and muscle tone; some restlessness still present;  Thought Process:  goal oriented and logical, mostly organized  Associations:  no loose associations  Thought Content:  passive suicidal ideation present, no auditory hallucinations present, and no visual hallucinations present  Insight:  fair  Judgement:  improving  Oriented to:  time, person, and place  Attention Span and Concentration:   limited by anxiety, sadness  Recent and Remote Memory:  fair    Clinical Global Impressions  First: 6/4 1/8/2024      Most recent:            Precautions:     Behavioral Orders   Procedures    Code 1 - Restrict to Unit    Discontinue 1:1 attendant for suicide risk     Order Specific Question:   I have performed an in person assessment of the patient     Answer:   Based on this assessment the patient no longer requires a one on one attendant at this point in time.     Order Specific Question:   Rationale     Answer:   Patient States able to remain safe in hospital    Routine Programming     As clinically indicated    Status 15     Every 15 minutes.          Diagnoses:     Major depressive disorder, chronic, recurrent; generalized anxiety disorder, attention deficit disorder, substance use disorder with methamphetamine and heroin, traumatic brain injury          Plan:     Will increase Seroquel dose. Patient's shoes were inspected by RN. Those are heavy boots not allowed on this floor, patient was notified he could not use them and didn't protest. Patient as per discussion above agrees to stay in double room. CD consult done. Will continue to provide support and structure.    Total time spent was 36 minutes. Over 50% of times was spent counseling and coordination  of care regarding coping skills, medication and discharge planning.         I was present with PA student who participated in the service and in the documentation of the note. I have verified the history and personally performed the physical exam and medical decision making. I agree with the assessment and plan of care as documented in the note.     Khurram Dawn MD  [facility]

## 2024-01-10 NOTE — PLAN OF CARE
BEH IP Unit Acuity Rating Score (UARS)  Patient is given one point for every criteria they meet.    CRITERIA SCORING   On a 72 hour hold, court hold, committed, stay of commitment, or revocation 0/1    Patient LOS on BEH unit exceeds 20 days 0/1  LOS: 3   Patient under guardianship, 55+, otherwise medically complex, or under age 11 0/1   Suicide ideation without relief of precipitating factors 1/1   Current plan for suicide 0/1   Current plan for homicide 0/1   Imminent risk or actual attempt to seriously harm another without relief of factors precipitating the attempt 0/1   Severe dysfunction in daily living (ex: complete neglect for self care, extreme disruption in vegetative function, extreme deterioration in social interactions) 1/1   Recent (last 7 days) or current physical aggression in the ED or on unit 0/1   Restraints or seclusion episode in past 72 hours 0/1   Recent (last 7 days) or current verbal aggression, agitation, yelling, etc., while in the ED or unit 0/1   Active psychosis 0/1   Need for constant or near constant redirection (from leaving, from others, etc).  0/1   Intrusive or disruptive behaviors 0/1   TOTAL 2

## 2024-01-10 NOTE — CARE PLAN
01/10/24 1313   Group Therapy Session   Group Attendance attended group session   Time Session Began 1115   Time Session Ended 1200   Total Time (minutes) 30   Total # Attendees 4   Group Type life skill;other (see comments)  (OT)   Group Topic Covered balanced lifestyle;coping skills/lifestyle management;other (see comments)  (sensory)   Group Session Detail OT - Coping: Focus of group was basic introduction of using both internal and external senses for calming and alerting self for self regulation and overall mental health management. Pt was able to identify a few specific types of techniques with several of the various senses.   Patient Response/Contribution able to recall/repeat info presented;discussed personal experience with topic;expressed understanding of topic   Patient Participation Detail Pt was late to group due to behavioral issue on the unit. He did listen and ask questions, noting how he wants to change and manage things better for himself. Will continue to offer education and options for practice of techniques due to limited time in the group.

## 2024-01-10 NOTE — PLAN OF CARE
01/10/24     Group Psychotherapy Session   Group Attendance This patient attended this session   Time Session Began 1310   Time Session Ended 1346   Total Time (minutes) 36   Total # Attendees 4-6   Group Type Psychotherapy    Group Topic Covered Cognitive behavioral interventions with anxiety disorders   Group Session Detail This psychotherapy session focused on exploring sources of clinical anxiety, building understanding of these sources, and brainstorming on cognitive-behavioral approaches to cope with/address them. The session involved examining the primary domain of anxiety within the human brain, and its basic role in symptoms of anxiety. This was followed by hands-on learning on specific ways to engage behaviors that may counteract anxious preoccupation. Group included structured breathing exercise which participants engaged in, following the facilitator's lead. At the end, each patient articulated how they plan to engage behaviors that decrease anxiety.   Patient Response/Contribution Pt reported that he felt relieved following the group. Reported that he plans to practice the skills learned.   Patient Participation Detail Pt attended the group. Asked questions. Provided feedback to other group participants. Received feedback. Pt left the group briefly, and came back.   Flavio Way, Ph.D., L.I.C.S.W

## 2024-01-10 NOTE — CARE PLAN
01/10/24 1310   Group Therapy Session   Group Attendance attended group session   Time Session Began 1015   Time Session Ended 1100   Total Time (minutes) 45   Total # Attendees 5   Group Type life skill;task skill;other (see comments)  (OT)   Group Topic Covered balanced lifestyle;coping skills/lifestyle management;leisure exploration/use of leisure time;structured socialization   Group Session Detail OT Clinic: Activity group for creative expression, promoting autonomy, building self worth, coping with stress and symptoms, and an opportunity to exercise cognitive skills (I.e. initiation, planning, organizing, sequencing, sustained attention, follow through, and overall self awareness).   Patient Response/Contribution able to recall/repeat info presented;cooperative with task;discussed personal experience with topic;left the group on several occasions   Patient Participation Detail Pt was restless and stood most of the session when working on his chosen project. He was slightly impulsive and changed his mind a couple of times when choosing. He was pleasant in manner and thanked staff for offering groups and activities to engage in. He was social with others. He noted that he has worked as a  so was familiar with the materials and process of the project he chose to work on. See also BEH Rehab flowsheet.

## 2024-01-10 NOTE — PLAN OF CARE
Problem: Sleep Disturbance  Goal: Adequate Sleep/Rest  Outcome: Progressing  Intervention: Promote Sleep/Rest  Flowsheets (Taken 1/10/2024 0040)  Sleep/Rest Enhancement:   awakenings minimized   regular sleep/rest pattern promoted   noise level reduced  Patient appeared to sleep 3.25 hours this shift. He was restless tonight, checking the time at the wall clock, going to the BR many times and snacks given per request. PRN Hydroxyzine 100 mg was given at 0058 which was ineffective. He was a little irritable and agitated, complaining of his roommate who was snoring, stated he can't sleep. Pt requested for Zyprexa 10 mg and was given at 05:26 am. He was observed sleeping at the lounge after taking PRN Zyprexa. No complaints of pain. Will continue to monitor and assess pt.

## 2024-01-10 NOTE — CARE PLAN
01/09/24 1953   Group Therapy Session   Group Attendance attended group session   Time Session Began 1700   Time Session Ended 1745   Total Time (minutes) 45   Total # Attendees 5   Group Type recreation   Group Topic Covered structured socialization;leisure exploration/use of leisure time;coping skills/lifestyle management   Group Session Detail OT Leisure Group   Patient Response/Contribution cooperative with task;hyperverbal;required some redirection d/t presenting as verbally intrusive during discussion   Patient Participation Detail Pt actively participated in a structured occupational therapy group involving a self-reflecting, group leisure task. Pt appeared comfortable sharing positive past experiences and personal information with peers, and was respectful in listening and responding to peers. Pt was able to follow 3 step directions for the novel task and tracked the task consistently. Identified that his louann is important to him, and shared a dream he had recently.

## 2024-01-10 NOTE — PLAN OF CARE
Goal Outcome Evaluation:    Plan of Care Reviewed With: patient        Pt spent most of the shift in common areas of the unit.  Pt attended and participated in most available groups. Pt reports feeling anxious this shift. Pt requested hydroxyzine 100 mg this morning prn for anxiety.  Pt reported this was effective. Pt reports feeling hopeful about cd tx. And hopes to discharge soon for treatment.  Pt denies any suicidal thoughts.  Pt was social with peers. Pt reported loud out burst by peer was very triggering for him . Pt requested and received zyprexa 10 mg at 1400.

## 2024-01-10 NOTE — PLAN OF CARE
Team Note Due:  Monday    Assessment/Intervention/Current Symtoms and Care Coordination:  Chart review and met with team, discussed pt progress, symptomology, and response to treatment.  Discussed the discharge plan and any potential impediments to discharge.    Tasks Completed:  - Called to check status of referrals.  - Met with pt to discuss updates on referrals. Pt and CTC looked up the treatment center in [place], [place] and pt did not qualify for the program as it is for young adults. Pt expressed interest in [facility] as it is through his Muslim. This program is an intensive outpatient program with lodging. CTC completed UMBERTO with pt and pt signed.   - Submitted referral to [facility].     Discharge Plan or Goal:  Current plan is to stabilize symptoms and develop safe disposition plan. Following hospitalization, plan is to discharge to MI/CD residential treatment.      Barriers to Discharge:  Pt was recently admitted and would benefit from further stabilization and medication management. Pt endorses anxiety.     Referral Status:  [facility] made the following substance use treatment referrals:    [facility] - Submitted 1/9/2024  RESIDENTIAL ADMISSIONS ([facility], [facility], [facility])  Phone: [phone]  Fax: [phone]  [email or web address]  [email or web address]  1/10/2024: Left message to check status of referral.     [facility] Team for Referrals - Submitted 1/9/2024  Phone: [phone]  Fax: [phone]  [email or web address]  1/10/2024: Called and confirmed receipt of referral. Still in the review process. Received email from Allie with admissions team asking for updated progress notes, last date of use, and discharge date from [facility]. Provided updates via secure email - [email or web address].    [facility] - Submitted 1/9/2024  Address:  [address], [place]  [address]   Phone: [phone]  1/9/2024: Called to receive fax number to submit referrals. Advised to send an email to support referral. Sent paperwork via secure email to [email or web address].    [facility] - Submitted 1/10/2024  Email: [email or web address]  Phone: [phone]     Legal Status:  Pt is voluntary     Contacts:  None      Upcoming Meetings and Dates/Important Information and next steps:  None

## 2024-01-11 PROCEDURE — 99232 SBSQ HOSP IP/OBS MODERATE 35: CPT | Performed by: PSYCHIATRY & NEUROLOGY

## 2024-01-11 PROCEDURE — 250N000013 HC RX MED GY IP 250 OP 250 PS 637: Performed by: PSYCHIATRY & NEUROLOGY

## 2024-01-11 PROCEDURE — 250N000013 HC RX MED GY IP 250 OP 250 PS 637: Performed by: EMERGENCY MEDICINE

## 2024-01-11 PROCEDURE — G0177 OPPS/PHP; TRAIN & EDUC SERV: HCPCS

## 2024-01-11 PROCEDURE — 250N000013 HC RX MED GY IP 250 OP 250 PS 637: Performed by: ANESTHESIOLOGY

## 2024-01-11 PROCEDURE — 124N000002 HC R&B MH UMMC

## 2024-01-11 PROCEDURE — 250N000013 HC RX MED GY IP 250 OP 250 PS 637

## 2024-01-11 PROCEDURE — H2032 ACTIVITY THERAPY, PER 15 MIN: HCPCS

## 2024-01-11 RX ORDER — QUETIAPINE FUMARATE 50 MG/1
50 TABLET, FILM COATED ORAL 2 TIMES DAILY
Status: DISCONTINUED | OUTPATIENT
Start: 2024-01-11 | End: 2024-01-16 | Stop reason: HOSPADM

## 2024-01-11 RX ADMIN — NICOTINE POLACRILEX 2 MG: 2 GUM, CHEWING ORAL at 20:31

## 2024-01-11 RX ADMIN — NICOTINE 1 PATCH: 21 PATCH, EXTENDED RELEASE TRANSDERMAL at 08:14

## 2024-01-11 RX ADMIN — GABAPENTIN 600 MG: 600 TABLET, FILM COATED ORAL at 17:44

## 2024-01-11 RX ADMIN — GABAPENTIN 600 MG: 600 TABLET, FILM COATED ORAL at 08:09

## 2024-01-11 RX ADMIN — OLANZAPINE 10 MG: 10 TABLET, FILM COATED ORAL at 13:18

## 2024-01-11 RX ADMIN — QUETIAPINE FUMARATE 50 MG: 50 TABLET ORAL at 20:28

## 2024-01-11 RX ADMIN — GABAPENTIN 600 MG: 600 TABLET, FILM COATED ORAL at 14:29

## 2024-01-11 RX ADMIN — BUPRENORPHINE 10 MG: 8 TABLET SUBLINGUAL at 17:44

## 2024-01-11 RX ADMIN — FAMOTIDINE 10 MG: 10 TABLET ORAL at 08:08

## 2024-01-11 RX ADMIN — FOLIC ACID 1 MG: 1 TABLET ORAL at 08:09

## 2024-01-11 RX ADMIN — BUPRENORPHINE 10 MG: 8 TABLET SUBLINGUAL at 08:08

## 2024-01-11 RX ADMIN — QUETIAPINE FUMARATE 25 MG: 25 TABLET ORAL at 08:09

## 2024-01-11 RX ADMIN — THIAMINE HCL TAB 100 MG 100 MG: 100 TAB at 08:08

## 2024-01-11 RX ADMIN — LITHIUM CARBONATE 300 MG: 300 TABLET, EXTENDED RELEASE ORAL at 08:09

## 2024-01-11 RX ADMIN — Medication 1 TABLET: at 08:08

## 2024-01-11 RX ADMIN — LITHIUM CARBONATE 300 MG: 300 TABLET, EXTENDED RELEASE ORAL at 20:28

## 2024-01-11 RX ADMIN — HYDROXYZINE HYDROCHLORIDE 100 MG: 50 TABLET, FILM COATED ORAL at 11:48

## 2024-01-11 RX ADMIN — AMANTADINE HYDROCHLORIDE 100 MG: 100 CAPSULE ORAL at 08:08

## 2024-01-11 RX ADMIN — AMANTADINE HYDROCHLORIDE 100 MG: 100 CAPSULE ORAL at 20:28

## 2024-01-11 RX ADMIN — FAMOTIDINE 10 MG: 10 TABLET ORAL at 20:28

## 2024-01-11 RX ADMIN — FLUOXETINE HYDROCHLORIDE 40 MG: 20 CAPSULE ORAL at 08:08

## 2024-01-11 RX ADMIN — QUETIAPINE 100 MG: 100 TABLET ORAL at 20:29

## 2024-01-11 RX ADMIN — HYDROXYZINE HYDROCHLORIDE 100 MG: 50 TABLET, FILM COATED ORAL at 19:10

## 2024-01-11 RX ADMIN — ATORVASTATIN CALCIUM 20 MG: 20 TABLET, FILM COATED ORAL at 20:28

## 2024-01-11 ASSESSMENT — ACTIVITIES OF DAILY LIVING (ADL)
LAUNDRY: UNABLE TO COMPLETE
ADLS_ACUITY_SCORE: 28
ORAL_HYGIENE: INDEPENDENT
ADLS_ACUITY_SCORE: 28
LAUNDRY: WITH SUPERVISION
ADLS_ACUITY_SCORE: 28
HYGIENE/GROOMING: INDEPENDENT
DRESS: SCRUBS (BEHAVIORAL HEALTH);INDEPENDENT
ADLS_ACUITY_SCORE: 28
HYGIENE/GROOMING: HANDWASHING;SHOWER;INDEPENDENT
ADLS_ACUITY_SCORE: 28
DRESS: INDEPENDENT
ADLS_ACUITY_SCORE: 28
ORAL_HYGIENE: INDEPENDENT

## 2024-01-11 NOTE — CARE PLAN
01/11/24 1455   Group Therapy Session   Group Attendance attended group session   Time Session Began 1315   Time Session Ended 1400   Total Time (minutes) 25   Total # Attendees 5   Group Type life skill;psychoeducation   Group Topic Covered coping skills/lifestyle management;relationship;problem-solving;structured socialization;cognitive activities   Group Session Detail General Health and Coping Skills: education and group discusison on an article reading about apologies.   Patient Participation Detail Pt participated in a group discussion on an article reading about apologies. Pt joined the group later and occasionally left group, returning later during the discussion. Pt was a willing participant to reading out loud to the group and appeared engaged in the activity. Pt appeared friendly and polite, thanking Writer for the group.

## 2024-01-11 NOTE — PLAN OF CARE
Goal Outcome Evaluation:    Plan of Care Reviewed With: patient    Pt has been out in common areas most of the shift.  Pt has attended and participated in most available groups. Pt reports feeling anxious today. Pt reported that he tried to call fiancee this morning but no response and has been preoccupied with this. Pt has been social with peers. Pt reports feeling triggered by loud angry out burst from male peer. Pt requested and received hydroxyzine 100 mg prn at 1150 this morning and later requested zyprexa 10 mg @ 1330 for anxiety.  Pt is gopeful about going to CD tx. Denies any suicidal thoughts and is future and goal oriented.

## 2024-01-11 NOTE — PLAN OF CARE
Team Note Due:  Monday    Assessment/Intervention/Current Symtoms and Care Coordination:  Chart review and met with team, discussed pt progress, symptomology, and response to treatment.  Discussed the discharge plan and any potential impediments to discharge.    Tasks Completed:  - Checked status of referrals. See below notes.   - CTC met with pt to provide update on referral status. Pt expressed frustration regarding denials at [facility] and [place]. Pt and CTC went over current referrals and researched additional placement options. CTC discussed IRTS placement with pt, as well. Pt reported willingness to go to IRTS but wanting to stay in the [place] area. Pt also discussed a placement in [place] called [facility] and [facility] in [place]. Pt signed ROIs for [facility], [facility], and general IRTS referral. Pt reported that he is frustrated as he is here willingly and voluntarily asking for help because he recognizes he can't do it alone. Pt reports that he lost his support system and has been struggling with managing things on his own. CTC discussed case management services with pt and he reported he would be open to this. Pt also reported he might want to look into group homes in the future.  - CTC submitted referral to [facility].    - Pt asked to meet with [facility] again and asked about inpatient substance use program at [facility]. CTC shared that they are not a dual program and may decline him due to mental health. Pt reported that he understands but would like to submit a referral anyway. Submitted referral to [facility].  - Received email from Walt who reports that they may have availability tomorrow. [facility] spoke to provider who reports he would prefer an admission for early next week. Notified Walt and asked about availability for Monday admission.    Discharge Plan or Goal:  Current  "plan is to stabilize symptoms and develop safe disposition plan. Following hospitalization, plan is to discharge to MI/CD residential treatment.      Barriers to Discharge:  Pt was recently admitted and would benefit from further stabilization and medication management. Pt endorses anxiety.     Referral Status:  [facility] made the following substance use treatment referrals:    [facility] - Submitted 1/9/2024  RESIDENTIAL ADMISSIONS (NUWAY I, NUWAY II, NUWAY III)  Phone: [phone]  Fax: [phone]  [email or web address]  [email or web address]  1/10/2024: Left message to check status of referral.   1/11/2024: Spoke to Joyce who reported she has not seen the fax come through yet. Refaxed paperwork and answered questions regarding pt's participation in groups, motivation for treatment, and preceding events that led him to the hospital. Joyce called back after looking over the notes and referral. Joyce shared that she feels he needs a more high intensity program as pt has been through a lot recently and [facility] is medium intensity with groups ending at 2 PM and a lot of free time for pt's. Joyce recommended [facility], [facility], and [facility].    [facility] Access Team for Referrals - Submitted 1/9/2024  Phone: [phone]  Fax: [phone]  [email or web address]  1/10/2024: Called and confirmed receipt of referral. Still in the review process. Received email from Allie with admissions team asking for updated progress notes, last date of use, and discharge date from [facility]. Provided updates via secure email - [email or web address]. Per Allie: \"I staffed his referral with our team and he seems more appropriate for mental health programming at this point. He continues to endorse ongoing symptoms and it appears he would benefit from focusing on his mental health. We are denying his referral and recommending MH " "programming such as IRTS or PHP.\" CTC attempted to advocate for pt and provide additional detail regarding his cooperation on the unit, compliance with medications, daily group attendance and engagement, and stressors that led to his hospitalization. [facility] also inquired about [facility] status as a co-occurring program licensed to provide mental health and substance use treatment and to receive clarity around admissions criteria. However, [facility] continues to decline due to pt's mental health.     [facility] - Submitted 1/9/2024  Address:  [address] [place] [address]   Phone: [phone]  1/9/2024: Called to receive fax number to submit referrals. Advised to send an email to support referral. Sent paperwork via secure email to [email or web address]  1/11/2024: Attempted to call two times to check status of referral and was transferred to Sylvester. Call was not connected. Sent follow-up email to Sylvester at above email address to check status. [facility] responded and reported they may have availability tomorrow. Shared preference for admission early next week and asked about availability.    [facility] - Submitted 1/10/2024  Email: [email or web address]  Phone: [phone]  1/11/2024: Left message to check status of referral.     [facility] - Submitted 1/11/2024  Phone: [phone]  Fax: [phone]    [facility]  *Pending submission as this is not residential and there is not a lodging program with it*    [facility]  Phone: [phone]  Admissions Phone: [phone]  1/11/2024: Called [facility] to submit referral. They will look over pt's paperwork and assess for appropriateness. Peach from program who is declining pt due to mental health.     Legal Status:  Pt is voluntary     Contacts:  None      Upcoming Meetings and Dates/Important Information and next steps:  None  "

## 2024-01-11 NOTE — PLAN OF CARE
BEH IP Unit Acuity Rating Score (UARS)  Patient is given one point for every criteria they meet.    CRITERIA SCORING   On a 72 hour hold, court hold, committed, stay of commitment, or revocation 0/1    Patient LOS on BEH unit exceeds 20 days 0/1  LOS: 4   Patient under guardianship, 55+, otherwise medically complex, or under age 11 0/1   Suicide ideation without relief of precipitating factors 1/1   Current plan for suicide 0/1   Current plan for homicide 0/1   Imminent risk or actual attempt to seriously harm another without relief of factors precipitating the attempt 0/1   Severe dysfunction in daily living (ex: complete neglect for self care, extreme disruption in vegetative function, extreme deterioration in social interactions) 1/1   Recent (last 7 days) or current physical aggression in the ED or on unit 0/1   Restraints or seclusion episode in past 72 hours 0/1   Recent (last 7 days) or current verbal aggression, agitation, yelling, etc., while in the ED or unit 0/1   Active psychosis 0/1   Need for constant or near constant redirection (from leaving, from others, etc).  0/1   Intrusive or disruptive behaviors 0/1   TOTAL 2

## 2024-01-11 NOTE — PLAN OF CARE
NOC Shift Report    Pt in bed at beginning of shift, breathing quiet and unlabored. Pt slept on and off  through shift. Pt slept 4 hours.     No pt complaints or concerns at this time.     No PRNs given. Bedtime Minipress given this shift upon pt request.     Will continue to monitor.

## 2024-01-11 NOTE — CARE PLAN
01/11/24 1347   Group Therapy Session   Group Attendance attended group session   Time Session Began 1015   Time Session Ended 1100   Total Time (minutes) 45   Total # Attendees 6   Group Type life skill;task skill;other (see comments)  (OT)   Group Topic Covered balanced lifestyle;coping skills/lifestyle management;leisure exploration/use of leisure time;structured socialization   Group Session Detail OT Clinic: Activity group for creative expression, promoting autonomy, building self worth, coping with stress and symptoms, and an opportunity to exercise cognitive skills (I.e. initiation, planning, organizing, sequencing, sustained attention, follow through, and overall self awareness).   Patient Response/Contribution able to recall/repeat info presented;cooperative with task;discussed personal experience with topic;expressed understanding of topic;expressed readiness to alter behaviors   Patient Participation Detail Pt engaged in completing fine detail designs on his project with another patient working on a similar type project and that they were making it as a gift. He focused on positive results and affect brightened as he shared the results of his work. Accepted compliments. He was able to process the engagement in this type of activity and benefits for him. Focused on engaging his creativity, feeling relaxed, noted he had been able to sit for the entire session, and that he was doing something for someone else that was so helpful to him.

## 2024-01-11 NOTE — CARE PLAN
01/10/24 1811   Group Therapy Session   Group Attendance attended group session   Time Session Began 1700   Time Session Ended 1750   Total Time (minutes) 45   Total # Attendees 5   Group Type psychotherapeutic   Group Topic Covered coping skills/lifestyle management;emotions/expression   Group Session Detail CTC-Group members completed/discussed a worksheet on self-discovery about yourself including when you feel most comfortable, more stressed, be your authentic self, one best memory, one tough memory, and the most useful things you have learned so far.   Patient Response/Contribution expressed understanding of topic;cooperative with task;listened actively   Patient Participation Detail Patient presented to group was pleasant, engaged, and checked in feeling. Pt appeared comfortable sharing past experiences regarding both desirable and undesirable emotions regarding traumatic deaths of father and sister who completed suicide.

## 2024-01-11 NOTE — PLAN OF CARE
"  Problem: Depressive Signs/Symptoms  Goal: Optimized Energy Level (Depressive Signs/Symptoms)  Outcome: Progressing   Goal Outcome Evaluation:    Perry has been present in the television lounge the majority of the shift watching television. He has been hyperverbal.He did attend evening programing. He has been appreciate of care. Reports \"I have been in a dark place but I'm working my way out of there.\" Perry is appropriately dressed and groomed. He appears very anxious, rocks back and forth on his feet a lot. Perry denies SI/SI/AH/VH. Nursing to continue to support current plan of care.                        "

## 2024-01-11 NOTE — PROGRESS NOTES
Problem: PHYSICAL THERAPY ADULT  Goal: Performs mobility at highest level of function for planned discharge setting  See evaluation for individualized goals  Description: Treatment/Interventions: Functional transfer training, LE strengthening/ROM, Elevations, Therapeutic exercise, Endurance training, Patient/family training, Equipment eval/education, Bed mobility, Gait training  Equipment Recommended: Rene Jernigan       See flowsheet documentation for full assessment, interventions and recommendations  Note: Prognosis: Good  Problem List: Decreased strength, Decreased endurance, Impaired balance, Decreased mobility, Decreased safety awareness, Pain  Assessment: Pt is a 62 y o  female seen for PT evaluation s/p admit to [facility] on 2/11/2021  Pt was admitted with a primary dx of: cancer related pain  PT now consulted for assessment of mobility and d/c needs  Pt with Up with assistance orders  Pts current comorbidities effecting treatment include: 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 (raditation treatment), CHF, DM, Asthma, s/p mediastinoscopy on 2/12  Pts current clinical presentation is Unstable/ Unpredictable (high complexity) due to Ongoing medical management for primary dx, Increased reliance on more restrictive AD compared to baseline, Decreased activity tolerance compared to baseline, Fall risk, Increased assistance needed from caregiver at current time, Trending lab values  Prior to admission, pt was independent with all mobility, progressive decline in function over the past 3 weeks, L LE weakness > R LE  Upon evaluation, pt currently is requiring supervision for transfers and supervision for ambulation 60 ft w/ RW   Pt presents at PT eval functioning below baseline and currently w/ overall mobility deficits 2* to: LLE weakness, impaired balance, decreased endurance, gait deviations, pain, decreased activity tolerance compared to baseline, decreased functional [facility] [place]   Psychiatric Progress Note        Interim History:   The patient's care was discussed with the treatment team during the daily team meeting and/or staff's chart notes were reviewed.  Staff report patient slept for about 4 hours last night. He continues to be restless and anxious, frequently requests and utilizes prn anxiolytics. States that noise and commotion on unit triggers him. Goes to groups, participates and appears to be very focused/motivated on kicking his addictions and moving on with his life.     Met with patient: We met the patient in presence of PA student. Perry was again pleasant and cooperative, but visibly anxious and restless. This has not changed since yesterday. He recognized that he had been taking quite a lot of antianxiety meds, but told us that they didn't seem to be working well enough and requested to adjust them. We reviewed them with him, suggested to increase his dose of Seroquel and Prozac. Perry also asked us to change timing of his Subutex doses. He denied Suicidal ideation, Homicidal thoughts. 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 Confirmed that he would still very much want to go to [facility] program. He had no other questions or concerns.          Medications:      amantadine  100 mg Oral BID    atorvastatin  20 mg Oral QPM    buprenorphine  10 mg Sublingual BID    famotidine  10 mg Oral BID    [START ON 1/12/2024] FLUoxetine  60 mg Oral Daily    folic acid  1 mg Oral Daily    gabapentin  600 mg Oral TID    lithium ER  300 mg Oral Q12H [facility] (08/20)    multivitamin w/minerals  1 tablet Oral Daily    nicotine  1 patch Transdermal Daily    And    nicotine   Transdermal Q8H [facility]    prazosin  1 mg Oral At Bedtime    QUEtiapine  100 mg Oral At Bedtime    QUEtiapine  50 mg Oral BID    thiamine  100 mg Oral Daily          Allergies:      Allergies   Allergen Reactions    Wellbutrin [Bupropion] Anaphylaxis and Swelling     Facial swelling    Wellbutrin [Bupropion]     Naltrexone Other (See Comments)     Headaches  Headaches            Labs:     No results found for this or any previous visit (from the past 24 hour(s)).         Psychiatric Examination:     /70   Pulse 68   Temp 97.1  F (36.2  C) (Oral)   Resp 18   Wt 116.5 kg (256 lb 12.8 oz)   SpO2 97%   BMI 33.88 kg/m    Weight is 256 lbs 12.8 oz  Body mass index is 33.88 kg/m .    Orthostatic Vitals         Most Recent      Standing Orthostatic /97 01/11 0901    Standing Orthostatic Pulse (bpm) 68 01/11 0901           Appearance: dressed in hospital scrubs, appeared as age stated, awake but tired, alert, no apparent distress, mildly obese, and slightly unkempt; many tattoos;  Attitude: very cooperative  Eye Contact:  fair,    Mood:  anxious, less sad   Affect:  mood congruent and intensity is blunted  Speech:  clear, coherent and mild rambling  Psychomotor Behavior:  no evidence of tardive dyskinesia, dystonia, or tics and intact station, gait and muscle tone; some restlessness still present;  Thought Process:  goal oriented and logical, mostly organized  Associations:  no loose associations  Thought Content:  denies active and passive Suicidal ideation, no auditory hallucinations present, and no visual hallucinations present  Insight:  fair  Judgement:  improving  Oriented to:  time, person, and place  Attention Span and Concentration:   limited by anxiety, sadness  Recent and Remote Memory:  fair    Clinical Global Impressions  First: 6/4 1/8/2024      Most recent:            Precautions:     Behavioral Orders   Procedures    Code 1 - Restrict to Unit    Discontinue 1:1 attendant for suicide risk     Order Specific Question:   I have performed an in person assessment of the patient     Answer:   Based on this assessment the patient no longer requires a one on one attendant  mobility tolerance compared to baseline, decreased safety awareness, fall risk  Pt currently at a fall risk 2* to impairments listed above  Pt will continue to benefit from skilled acute PT interventions to address stated impairments; to maximize functional mobility; for ongoing pt/ family training; and DME needs  At conclusion of PT session pt returned back in chair with phone and call bell within reach  Pt denies any further questions at this time  Recommend home with family care and HHPT upon hospital D/C  PT Discharge Recommendation: Home with skilled therapy(family care and HHPT)     PT - OK to Discharge: No(stair training)    See flowsheet documentation for full assessment  at this point in time.     Order Specific Question:   Rationale     Answer:   Patient States able to remain safe in hospital    Routine Programming     As clinically indicated    Status 15     Every 15 minutes.          Diagnoses:     Major depressive disorder, chronic, recurrent; generalized anxiety disorder, attention deficit disorder, substance use disorder with methamphetamine and heroin, traumatic brain injury          Plan:     Will increase Seroquel dose, change Subutex doses timing and increase Prozac as of 1/11/2024. Will allow to wear home clothes. Patient as per discussion above agrees to stay in double room. CD consult done and recommendations made. Will continue to provide support and structure.    Total time spent was 38 minutes. Over 50% of times was spent counseling and coordination of care regarding coping skills, medication and discharge planning.         I was present with PA student who participated in the service and in the documentation of the note. I have verified the history and personally performed the physical exam and medical decision making. I agree with the assessment and plan of care as documented in the note.     Khurram Dawn MD  [facility] Psychiat[facility]

## 2024-01-12 LAB — SARS-COV-2 RNA RESP QL NAA+PROBE: NEGATIVE

## 2024-01-12 PROCEDURE — 250N000013 HC RX MED GY IP 250 OP 250 PS 637: Performed by: PSYCHIATRY & NEUROLOGY

## 2024-01-12 PROCEDURE — 250N000013 HC RX MED GY IP 250 OP 250 PS 637

## 2024-01-12 PROCEDURE — 87635 SARS-COV-2 COVID-19 AMP PRB: CPT | Performed by: PSYCHIATRY & NEUROLOGY

## 2024-01-12 PROCEDURE — 250N000013 HC RX MED GY IP 250 OP 250 PS 637: Performed by: ANESTHESIOLOGY

## 2024-01-12 PROCEDURE — 124N000002 HC R&B MH UMMC

## 2024-01-12 PROCEDURE — 90853 GROUP PSYCHOTHERAPY: CPT

## 2024-01-12 PROCEDURE — 99232 SBSQ HOSP IP/OBS MODERATE 35: CPT | Performed by: PSYCHIATRY & NEUROLOGY

## 2024-01-12 PROCEDURE — 250N000013 HC RX MED GY IP 250 OP 250 PS 637: Performed by: EMERGENCY MEDICINE

## 2024-01-12 PROCEDURE — G0177 OPPS/PHP; TRAIN & EDUC SERV: HCPCS

## 2024-01-12 RX ADMIN — FAMOTIDINE 10 MG: 10 TABLET ORAL at 08:03

## 2024-01-12 RX ADMIN — GABAPENTIN 600 MG: 600 TABLET, FILM COATED ORAL at 20:06

## 2024-01-12 RX ADMIN — FAMOTIDINE 10 MG: 10 TABLET ORAL at 20:06

## 2024-01-12 RX ADMIN — NICOTINE POLACRILEX 2 MG: 2 GUM, CHEWING ORAL at 16:03

## 2024-01-12 RX ADMIN — LITHIUM CARBONATE 300 MG: 300 TABLET, EXTENDED RELEASE ORAL at 08:03

## 2024-01-12 RX ADMIN — OLANZAPINE 10 MG: 10 TABLET, FILM COATED ORAL at 12:07

## 2024-01-12 RX ADMIN — THIAMINE HCL TAB 100 MG 100 MG: 100 TAB at 08:03

## 2024-01-12 RX ADMIN — AMANTADINE HYDROCHLORIDE 100 MG: 100 CAPSULE ORAL at 08:03

## 2024-01-12 RX ADMIN — NICOTINE POLACRILEX 2 MG: 2 GUM, CHEWING ORAL at 12:53

## 2024-01-12 RX ADMIN — HYDROXYZINE HYDROCHLORIDE 100 MG: 50 TABLET, FILM COATED ORAL at 06:40

## 2024-01-12 RX ADMIN — NICOTINE POLACRILEX 2 MG: 2 GUM, CHEWING ORAL at 20:07

## 2024-01-12 RX ADMIN — Medication 1 TABLET: at 08:03

## 2024-01-12 RX ADMIN — AMANTADINE HYDROCHLORIDE 100 MG: 100 CAPSULE ORAL at 20:06

## 2024-01-12 RX ADMIN — BUPRENORPHINE 10 MG: 8 TABLET SUBLINGUAL at 08:02

## 2024-01-12 RX ADMIN — LITHIUM CARBONATE 300 MG: 300 TABLET, EXTENDED RELEASE ORAL at 20:06

## 2024-01-12 RX ADMIN — BUPRENORPHINE 10 MG: 8 TABLET SUBLINGUAL at 17:11

## 2024-01-12 RX ADMIN — GABAPENTIN 600 MG: 600 TABLET, FILM COATED ORAL at 14:30

## 2024-01-12 RX ADMIN — ATORVASTATIN CALCIUM 20 MG: 20 TABLET, FILM COATED ORAL at 20:06

## 2024-01-12 RX ADMIN — GABAPENTIN 600 MG: 600 TABLET, FILM COATED ORAL at 08:03

## 2024-01-12 RX ADMIN — QUETIAPINE FUMARATE 50 MG: 50 TABLET ORAL at 16:04

## 2024-01-12 RX ADMIN — FOLIC ACID 1 MG: 1 TABLET ORAL at 08:03

## 2024-01-12 RX ADMIN — OLANZAPINE 10 MG: 10 TABLET, FILM COATED ORAL at 16:03

## 2024-01-12 RX ADMIN — QUETIAPINE FUMARATE 50 MG: 50 TABLET ORAL at 08:03

## 2024-01-12 RX ADMIN — QUETIAPINE 100 MG: 100 TABLET ORAL at 20:06

## 2024-01-12 RX ADMIN — NICOTINE 1 PATCH: 21 PATCH, EXTENDED RELEASE TRANSDERMAL at 08:14

## 2024-01-12 RX ADMIN — HYDROXYZINE HYDROCHLORIDE 100 MG: 50 TABLET, FILM COATED ORAL at 14:30

## 2024-01-12 RX ADMIN — FLUOXETINE HYDROCHLORIDE 60 MG: 20 CAPSULE ORAL at 08:02

## 2024-01-12 RX ADMIN — NICOTINE POLACRILEX 2 MG: 2 GUM, CHEWING ORAL at 14:30

## 2024-01-12 ASSESSMENT — ABNORMAL INVOLUNTARY MOVEMENT SCALE (AIMS)
LOWER_BODY_EXTREMITIES: NONE, NORMAL
TONGUE: NONE, NORMAL
UPPER_BODY_EXTREMITIES: NONE, NORMAL
AIMS_PATIENT_INCAPACITATION: NONE, NORMAL
LIPS_PARIETAL: NONE, NORMAL
JAW: NONE, NORMAL
TONGUE: NONE, NORMAL
LOWER_BODY_EXTREMITIES: NONE, NORMAL
AIMS_PATIENT_INCAPACITATION: NONE, NORMAL
AIMS_PATIENT_AWARENESS: NO AWARENESS
AIMS_SEVERITY: NONE, NORMAL
NECK_SHOULDER_HIPS: NONE, NORMAL
AIMS_SEVERITY: NONE, NORMAL
NECK_SHOULDER_HIPS: NONE, NORMAL
AIMS_PATIENT_AWARENESS: NO AWARENESS
FACIAL_EXPRESSION_MUSCLES: NONE, NORMAL
FACIAL_EXPRESSION_MUSCLES: NONE, NORMAL
JAW: NONE, NORMAL
UPPER_BODY_EXTREMITIES: NONE, NORMAL
LIPS_PARIETAL: NONE, NORMAL

## 2024-01-12 ASSESSMENT — ACTIVITIES OF DAILY LIVING (ADL)
ADLS_ACUITY_SCORE: 28
DRESS: INDEPENDENT
ADLS_ACUITY_SCORE: 28
HYGIENE/GROOMING: INDEPENDENT
ADLS_ACUITY_SCORE: 28
ORAL_HYGIENE: INDEPENDENT
ADLS_ACUITY_SCORE: 28
LAUNDRY: UNABLE TO COMPLETE
ORAL_HYGIENE: INDEPENDENT
ADLS_ACUITY_SCORE: 28
HYGIENE/GROOMING: HANDWASHING;SHOWER;INDEPENDENT
DRESS: INDEPENDENT
ADLS_ACUITY_SCORE: 28
ADLS_ACUITY_SCORE: 28
LAUNDRY: WITH SUPERVISION
ADLS_ACUITY_SCORE: 28

## 2024-01-12 NOTE — DISCHARGE INSTRUCTIONS
Behavioral Discharge Planning and Instructions    Summary: You were admitted on 1/5/2024  due to Suicidal Ideations, Suicide Attempt, and Chemical Use Issues.  You were treated by Khurram Dawn MD and discharged on 1/16/2024 from [facility] to [facility] located at: [address] [place] [id number]     Main Diagnosis:   Major depressive disorder, chronic, recurrent   Generalized anxiety disorder  Attention deficit disorder  Substance use disorder with methamphetamine and heroin  Traumatic brain injury     Health Care Follow-up:   Appointment Date/Time: Tuesday, February 6, 2024 at 2:35 PM  *Telehealth*  Psychiatrist: KASHIF Jennings    Address: [address] [place] [address], [address], [place] [address]  Phone Number: [phone]    Fax: [phone]  NOTE: Intake paperwork will be sent to [email or web address]. Please complete ASAP!  Follow-up Appointment: Monday, March 4, 2024 at 1:45 PM    A referral for case management services has been submitted for you through [facility]. Someone will follow-up with you to schedule intake. If you have questions in the meantime, you can contact the intake team at [phone].    Information will be faxed to your outpatient providers to ensure a healthy continuity of care for you.     Attend all scheduled appointments with your outpatient providers. Call at least 24 hours in advance if you need to reschedule an appointment to ensure continued access to your outpatient providers.     Major Treatments, Procedures and Findings:  You were provided with: a psychiatric assessment, assessed for medical stability, medication evaluation and/or management, group therapy, family therapy, individual therapy, CD evaluation/assessment, milieu management, and medical interventions    Symptoms to Report: feeling more aggressive, increased confusion, losing more sleep, mood getting worse, or thoughts of suicide    Early warning signs can  "include: increased depression or anxiety sleep disturbances increased thoughts or behaviors of suicide or self-harm     Safety and Wellness:  Take all medicines as directed. Make no changes unless your doctor suggests them. Follow treatment recommendations. Refrain from alcohol and non-prescribed drugs. Ask your support system to help you reduce your access to items that could harm yourself or others. Items could include:    Firearms  Medicines (both prescribed and over-the-counter)  Knives and other sharp objects  Ropes and like materials  Car keys  If there is a concern for safety, call 911.     Resources:   Crisis Intervention: 386.555.2796 or 461-123-5782 (TTY: 321.366.9387).  Call anytime for help.  National [place] on Mental Illness (www.mn.naldo.org): 402.383.7937 or 643-031-8868.  Alcoholics Anonymous (www.alcoholics-anonymous.org): Check your phone book for your local chapter.  Suicide Awareness Voices of Education (SAVE) (www.save.org): 498-267-PWES (7837)  National Suicide Prevention Line (www.mentalhealthmn.org): 876-810-FRXX (2183)  Self- Management and Recovery Training., SMART-- Toll free: 913.740.5412  www.[employer]"  [facility] Crisis (COPE) Response - Adult 333 131-6172  Text 4 Life: txt \"LIFE\" to 22494 for immediate support and crisis intervention  Crisis text line: Text \"[place]\" to 750464. Free, confidential, 24/7.     [facility] ([employer] [place] [employer] on Mental Illness) improves the lives of children and adults with mental illnesses and their families by providing free classes on mental illnesses and support groups for adults with mental illnesses, parents and family members. For more information: Phone: 108.135.8799 Toll free: 7-838-PHNF-HELPS Website: www.namihelps.orghttp://www.namihelps.org/    General Medication Instructions:   See your medication sheet(s) for instructions.   Take all medicines as directed.  Make no changes unless your doctor suggests them.   Go to all your doctor " visits.  Be sure to have all your required lab tests. This way, your medicines can be refilled on time.  Do not use any drugs not prescribed by your doctor.  Avoid alcohol.    Advance Directives:   Scanned document on file with [employer]? No scanned doc  Is document scanned? Pt states no documents  Honoring Choices Your Rights Handout: Informed and given  Was more information offered? Pt declined    The Treatment team has appreciated the opportunity to work with you. If you have any questions or concerns about your recent admission, you can contact the unit which can receive your call 24 hours a day, 7 days a week. They will be able to get in touch with a Provider if needed. The unit number is [phone].

## 2024-01-12 NOTE — PLAN OF CARE
Team Note Due:  Monday    Assessment/Intervention/Current Symtoms and Care Coordination:  Chart review and met with team, discussed pt progress, symptomology, and response to treatment.  Discussed the discharge plan and any potential impediments to discharge.    Tasks Completed:  - Followed-up with Walt. Cotton back from Jacinda at [facility] and pt is approved for admission to [facility] on Tuesday, January 16. Jacinda reports their team will provide transportation to their program for door to door service. Jacinda is requesting 30 days of medications sent with the patient and his discharge summary. Notified provider.  - Notified pt that he has been accepted for admission. Went over the program's website with pt so he could virtually tour the facility and learn more about the program. [facility] printed off information for pt, per his request.    Discharge Plan or Goal:  Current plan is to stabilize symptoms and develop safe disposition plan. Following hospitalization, plan is to discharge to MI/CD residential treatment.      Barriers to Discharge:  Pt was recently admitted and would benefit from further stabilization and medication management. Pt endorses anxiety.     Referral Status:  [facility] made the following substance use treatment referrals:    [facility] - Submitted 1/9/2024  [facility])  Phone: [phone]  Fax: [phone]  [email or web address]  [email or web address]  1/10/2024: Left message to check status of referral.   1/11/2024: Spoke to Joyce who reported she has not seen the fax come through yet. Refaxed paperwork and answered questions regarding pt's participation in groups, motivation for treatment, and preceding events that led him to the hospital. Joyce called back after looking over the notes and referral. Joyce shared that she feels he needs a more high intensity program as pt has been through a lot recently and [facility] is medium intensity with  "groups ending at 2 PM and a lot of free time for pt's. Joyce recommended [facility], [facility], and [facility].    [facility] Access Team for Referrals - Submitted 1/9/2024  Phone: [phone]  Fax: [phone]  [email or web address]  1/10/2024: Called and confirmed receipt of referral. Still in the review process. Received email from Allie with admissions team asking for updated progress notes, last date of use, and discharge date from [facility]. Provided updates via secure email - [email or web address]. Per Allie: \"I staffed his referral with our team and he seems more appropriate for mental health programming at this point. He continues to endorse ongoing symptoms and it appears he would benefit from focusing on his mental health. We are denying his referral and recommending MH programming such as IRTS or PHP.\" [facility] attempted to advocate for pt and provide additional detail regarding his cooperation on the unit, compliance with medications, daily group attendance and engagement, and stressors that led to his hospitalization. [facility] also inquired about [facility] status as a co-occurring program licensed to provide mental health and substance use treatment and to receive clarity around admissions criteria. However, [facility] continues to decline due to pt's mental health.     [facility] - Submitted 1/9/2024  Address:  [address] [place] [id number]   Phone: [phone]  1/9/2024: Called to receive fax number to submit referrals. Advised to send an email to support referral. Sent paperwork via secure email to [email or web address].  1/11/2024: Attempted to call two times to check status of referral and was transferred to Jacinda. Call was not connected. Sent follow-up email to Jacinda at above email address to check status. Jacinda responded and reported they may have availability tomorrow. Shared preference for " admission early next week and asked about availability.  1/12/2024: Sent email to Jacinda to check status and availability for next week. Pt has been approved for admission next Tuesday, January 16.    [facility] - Submitted 1/10/2024  Email: [email or web address]  Phone: [phone]  1/11/2024: Left message to check status of referral.     [facility] - Submitted 1/11/2024  Phone: [phone]  Fax: [phone]    [facility]  *Pending submission as this is not residential and there is not a lodging program with it*    [facility]  Phone: [phone]  Admissions Phone: [phone]  1/11/2024: Called [facility] to submit referral. They will look over pt's paperwork and assess for appropriateness. [place]co from program who is declining pt due to mental health.     Legal Status:  Pt is voluntary     Contacts:  None      Upcoming Meetings and Dates/Important Information and next steps:  None

## 2024-01-12 NOTE — CARE PLAN
01/12/24 1442   Group Therapy Session   Group Attendance attended group session   Time Session Began 1115   Time Session Ended 1200   Total Time (minutes) 45   Total # Attendees 3   Group Type life skill;other (see comments)  (OT)   Group Topic Covered balanced lifestyle;coping skills/lifestyle management;other (see comments)  (sensorimotor)   Group Session Detail OT - Coping:  Focus of group was on learning and practicing the evidenced based practice focused activity of sensory enhanced yoga for self regulation. Offered several techniques and modifications to engage in this type of activity, along with discussion of how to incorporate these types of practices into a weekly routine.   Patient Response/Contribution able to recall/repeat info presented;cooperative with task;discussed personal experience with topic;expressed readiness to alter behaviors;expressed understanding of topic   Patient Participation Detail Pt was actively engaged in discussion and asking questions. He did need some encouragement to be gentler and cautious with some of the movements. He noted benefits for himself of feeling less anxious and tense in his body.

## 2024-01-12 NOTE — PLAN OF CARE
BEH IP Unit Acuity Rating Score (UARS)  Patient is given one point for every criteria they meet.    CRITERIA SCORING   On a 72 hour hold, court hold, committed, stay of commitment, or revocation 0/1    Patient LOS on BEH unit exceeds 20 days 0/1  LOS: 5   Patient under guardianship, 55+, otherwise medically complex, or under age 11 0/1   Suicide ideation without relief of precipitating factors 1/1   Current plan for suicide 0/1   Current plan for homicide 0/1   Imminent risk or actual attempt to seriously harm another without relief of factors precipitating the attempt 0/1   Severe dysfunction in daily living (ex: complete neglect for self care, extreme disruption in vegetative function, extreme deterioration in social interactions) 1/1   Recent (last 7 days) or current physical aggression in the ED or on unit 0/1   Restraints or seclusion episode in past 72 hours 0/1   Recent (last 7 days) or current verbal aggression, agitation, yelling, etc., while in the ED or unit 0/1   Active psychosis 0/1   Need for constant or near constant redirection (from leaving, from others, etc).  0/1   Intrusive or disruptive behaviors 0/1   TOTAL 2

## 2024-01-12 NOTE — CARE PLAN
01/12/24 1444   Group Therapy Session   Group Attendance attended group session   Time Session Began 1300   Time Session Ended 1350   Total Time (minutes) 50   Total # Attendees 5   Group Type life skill;other (see comments)  (OT)   Group Topic Covered balanced lifestyle;leisure exploration/use of leisure time;cognitive activities;structured socialization   Group Session Detail OT - Topic: Focus of group was on identifying specific benefits and values they would like from leisure and then specific activities that are and can engage in for mental health management and well being. Brainstormed activities through a group cognitive activity.   Patient Response/Contribution able to recall/repeat info presented;cooperative with task;discussed personal experience with topic;expressed understanding of topic   Patient Participation Detail Pt easily engaged in the discussion and activity. Initially was slightly more hyperverbal and then did note having others share their ideas. He got distracted once with showing the pictures of his treatment center to another patient. He was supportive of others. Did stand for the group.

## 2024-01-12 NOTE — PLAN OF CARE
Patient has spent majority of the shift in the milieu. Social with select peers. Ate dinner and snack. Attended the evening group. Denies suicidal ideation and self injurious thoughts. Requested prn hydroxyzine for anxiety. Declined his prazosin. Affect has been bright and cooperative on the unit.     Patient evaluation continues. Assessed mood,anxiety,thoughts and behavior.     Patient gradually progressing towards goals.    Patient is encouraged to participate in groups and assisted to develop healthy coping skills.     VS reviewed: /77   Pulse 78   Temp 98.3  F (36.8  C) (Oral)   Resp 18   Wt 116.5 kg (256 lb 12.8 oz)   SpO2 95%   BMI 33.88 kg/m      Length of stay: 4    Refer to daily team meeting notes for individualized plan of care. Nursing will continue to assess.  .

## 2024-01-12 NOTE — PROGRESS NOTES
[facility], [place]   Psychiatric Progress Note        Interim History:   The patient's care was discussed with the treatment team during the daily team meeting and/or staff's chart notes were reviewed.  Staff report patient slept for about 4.5 hours last night. He continues to be anxious, frequently requests and utilizes prn anxiolytics. States that noise and commotion on unit triggers him. Goes to groups, participates and appears to be very focused/motivated on kicking his addictions and moving on with his life. Socializes with select peers. Per RN, yesterday he again refused prazosin. Per CTC patient was declined by a number of CD programs: [facility], [facility], [facility] seemed to be interested.    Met with patient: We met the patient in presence of PA student. Perry was again pleasant and cooperative, talked extensively about his negative experiences in life and him wanting to be a good member of society, not a drug addict and a convict. Said that he already felt somewhat better mood wise and would like to continue to improve. He denied having med side effects, confirmed that he would still like to go to residential CD program or IRTS and we praised him for that. He had no other questions or concerns.     After meeting with patient we were informed by [facility] that patient was accepted to [facility] CD program and his discharge date will be next Tuesday.          Medications:      amantadine  100 mg Oral BID    atorvastatin  20 mg Oral QPM    buprenorphine  10 mg Sublingual BID    famotidine  10 mg Oral BID    FLUoxetine  60 mg Oral Daily    folic acid  1 mg Oral Daily    gabapentin  600 mg Oral TID    lithium ER  300 mg Oral Q12H [facility] (08/20)    multivitamin w/minerals  1 tablet Oral Daily    nicotine  1 patch Transdermal Daily    And    nicotine   Transdermal Q8H [facility]    prazosin  1 mg Oral At Bedtime    QUEtiapine  100 mg Oral At Bedtime    QUEtiapine  50 mg Oral BID    thiamine  100 mg  Oral Daily          Allergies:     Allergies   Allergen Reactions    Wellbutrin [Bupropion] Anaphylaxis and Swelling     Facial swelling    Wellbutrin [Bupropion]     Naltrexone Other (See Comments)     Headaches  Headaches            Labs:     No results found for this or any previous visit (from the past 24 hour(s)).         Psychiatric Examination:     /77   Pulse 78   Temp 98  F (36.7  C) (Oral)   Resp 18   Wt 116.5 kg (256 lb 12.8 oz)   SpO2 94%   BMI 33.88 kg/m    Weight is 256 lbs 12.8 oz  Body mass index is 33.88 kg/m .    Orthostatic Vitals         Most Recent      Sitting Orthostatic /69 01/12 0851    Sitting Orthostatic Pulse (bpm) 68 01/12 0851    Standing Orthostatic /68 01/12 0851    Standing Orthostatic Pulse (bpm) 71 01/12 0851           Appearance: dressed in hospital scrubs, appeared as age stated, awake but tired, alert, no apparent distress, mildly obese, and slightly unkempt; many tattoos;  Attitude: very cooperative  Eye Contact:  fair,    Mood:  anxious, better  Affect:  mood congruent and reactive  Speech:  clear, coherent and mild rambling  Psychomotor Behavior:  no evidence of tardive dyskinesia, dystonia, or tics and intact station, gait and muscle tone; some restlessness still present;  Thought Process:  goal oriented and logical, mostly organized  Associations:  no loose associations  Thought Content:  denies active and passive Suicidal ideation, no auditory hallucinations present, and no visual hallucinations present  Insight:  fair  Judgement:  improving  Oriented to:  time, person, and place  Attention Span and Concentration:   limited by anxiety, sadness  Recent and Remote Memory:  fair    Clinical Global Impressions  First: 6/4 1/8/2024      Most recent:            Precautions:     Behavioral Orders   Procedures    Code 1 - Restrict to Unit    Discontinue 1:1 attendant for suicide risk     Order Specific Question:   I have performed an in person assessment of  the patient     Answer:   Based on this assessment the patient no longer requires a one on one attendant at this point in time.     Order Specific Question:   Rationale     Answer:   Patient States able to remain safe in hospital    Routine Programming     As clinically indicated    Status 15     Every 15 minutes.          Diagnoses:     Major depressive disorder, chronic, recurrent; generalized anxiety disorder, attention deficit disorder, substance use disorder with methamphetamine and heroin, traumatic brain injury          Plan:     No medication changes today 1/12/2024. Will allow to wear home clothes. Patient as per discussion above agrees to stay in double room. CD consult done and recommendations made and he was accepted to [facility] program with discharge on Tuesday. Will continue to provide support and structure.    Total time spent was 35 minutes. Over 50% of times was spent counseling and coordination of care regarding coping skills, medication and discharge planning.         I was present with PA student who participated in the service and in the documentation of the note. I have verified the history and personally performed the physical exam and medical decision making. I agree with the assessment and plan of care as documented in the note.     Khurram Dawn MD  [facility] Psychiatry

## 2024-01-12 NOTE — PLAN OF CARE
NOC Shift Report     Pt in bed at beginning of shift, breathing quiet and unlabored. Pt slept on and off  through shift. Pt slept 4.5  hours.      No pt complaints or concerns at this time.      No PRNs given.  See MAR    Will continue to monitor.

## 2024-01-12 NOTE — PLAN OF CARE
Goal Outcome Evaluation:    Plan of Care Reviewed With: patient        Pt spent most of the shift in common areas of the unit.  Pt was medication compliant. Social with peers. Pt attended and participated in all available groups. Pt continues to report anxiety and requested zyprexa 10 mg prn this morning and requested and receive hydroxyzine 100 mg @ 1400 this afternoon. Pt was glad to hear about possible discharge to [facility] program next week. Pt stated that he knows he needs longer period of structured sobriety to stay on track. Pt talked about past success 5 years sobriety following months of programming in the past. Pt reiterates he knows he needs sobriety and is scared of relapsing.

## 2024-01-12 NOTE — CARE PLAN
01/12/24 1428   Group Therapy Session   Group Attendance attended group session   Time Session Began 1015   Time Session Ended 1100   Total Time (minutes) 45   Total # Attendees 4   Group Type life skill;task skill;other (see comments)  (OT)   Group Topic Covered balanced lifestyle;coping skills/lifestyle management;leisure exploration/use of leisure time;structured socialization   Group Session Detail OT Clinic: Activity group for creative expression, promoting autonomy, building self worth, coping with stress and symptoms, and an opportunity to exercise cognitive skills (I.e. initiation, planning, organizing, sequencing, sustained attention, follow through, and overall self awareness).   Patient Response/Contribution able to recall/repeat info presented;cooperative with task;discussed personal experience with topic;expressed understanding of topic   Patient Participation Detail Pt chose another similar type creative project to work on and completed it within the session and spent most of his time standing to work. He also did take a couple of breaks from the group and returned. He did less detail work than previously. He shared news about discharge on Tuesday to treatment. He was slightly more hyperverbal this session.

## 2024-01-12 NOTE — PROGRESS NOTES
01/11/24 1800    Group Therapy Session   Time Session Began 1710   Time Session Ended 1800   Total Time (minutes) 30   Total # Attendees 5   Group Type expressive therapy   Group Topic Covered emotions/expression;relaxation techniques   Group Session Detail Mindfulness   Patient Response/Contribution cooperative with task   Patient Participation Detail Cooperatively engaged in Evening Music Relaxation group to decrease anxiety. Talkative affect, enthusiastic about the music and expression, tended to talk over the music, but was amenable to redirection.

## 2024-01-13 PROCEDURE — 250N000013 HC RX MED GY IP 250 OP 250 PS 637: Performed by: ANESTHESIOLOGY

## 2024-01-13 PROCEDURE — 250N000013 HC RX MED GY IP 250 OP 250 PS 637: Performed by: EMERGENCY MEDICINE

## 2024-01-13 PROCEDURE — G0177 OPPS/PHP; TRAIN & EDUC SERV: HCPCS

## 2024-01-13 PROCEDURE — 124N000002 HC R&B MH UMMC

## 2024-01-13 PROCEDURE — 250N000013 HC RX MED GY IP 250 OP 250 PS 637

## 2024-01-13 PROCEDURE — 250N000013 HC RX MED GY IP 250 OP 250 PS 637: Performed by: PSYCHIATRY & NEUROLOGY

## 2024-01-13 RX ADMIN — NICOTINE POLACRILEX 2 MG: 2 GUM, CHEWING ORAL at 10:12

## 2024-01-13 RX ADMIN — LITHIUM CARBONATE 300 MG: 300 TABLET, EXTENDED RELEASE ORAL at 20:37

## 2024-01-13 RX ADMIN — NICOTINE POLACRILEX 2 MG: 2 GUM, CHEWING ORAL at 19:10

## 2024-01-13 RX ADMIN — ATORVASTATIN CALCIUM 20 MG: 20 TABLET, FILM COATED ORAL at 20:37

## 2024-01-13 RX ADMIN — FAMOTIDINE 10 MG: 10 TABLET ORAL at 08:05

## 2024-01-13 RX ADMIN — OLANZAPINE 10 MG: 10 TABLET, FILM COATED ORAL at 11:35

## 2024-01-13 RX ADMIN — OLANZAPINE 10 MG: 10 TABLET, FILM COATED ORAL at 19:10

## 2024-01-13 RX ADMIN — FOLIC ACID 1 MG: 1 TABLET ORAL at 08:05

## 2024-01-13 RX ADMIN — FLUOXETINE HYDROCHLORIDE 60 MG: 20 CAPSULE ORAL at 08:04

## 2024-01-13 RX ADMIN — NICOTINE 1 PATCH: 21 PATCH, EXTENDED RELEASE TRANSDERMAL at 07:18

## 2024-01-13 RX ADMIN — NICOTINE POLACRILEX 2 MG: 2 GUM, CHEWING ORAL at 11:35

## 2024-01-13 RX ADMIN — Medication 1 TABLET: at 08:05

## 2024-01-13 RX ADMIN — HYDROXYZINE HYDROCHLORIDE 100 MG: 50 TABLET, FILM COATED ORAL at 16:01

## 2024-01-13 RX ADMIN — HYDROXYZINE HYDROCHLORIDE 100 MG: 50 TABLET, FILM COATED ORAL at 00:07

## 2024-01-13 RX ADMIN — AMANTADINE HYDROCHLORIDE 100 MG: 100 CAPSULE ORAL at 20:37

## 2024-01-13 RX ADMIN — FAMOTIDINE 10 MG: 10 TABLET ORAL at 20:37

## 2024-01-13 RX ADMIN — BUPRENORPHINE 10 MG: 8 TABLET SUBLINGUAL at 17:45

## 2024-01-13 RX ADMIN — NICOTINE POLACRILEX 2 MG: 2 GUM, CHEWING ORAL at 20:39

## 2024-01-13 RX ADMIN — HYDROXYZINE HYDROCHLORIDE 100 MG: 50 TABLET, FILM COATED ORAL at 10:12

## 2024-01-13 RX ADMIN — QUETIAPINE FUMARATE 50 MG: 50 TABLET ORAL at 14:10

## 2024-01-13 RX ADMIN — QUETIAPINE FUMARATE 50 MG: 50 TABLET ORAL at 08:04

## 2024-01-13 RX ADMIN — AMANTADINE HYDROCHLORIDE 100 MG: 100 CAPSULE ORAL at 08:04

## 2024-01-13 RX ADMIN — THIAMINE HCL TAB 100 MG 100 MG: 100 TAB at 08:05

## 2024-01-13 RX ADMIN — BUPRENORPHINE 10 MG: 8 TABLET SUBLINGUAL at 08:04

## 2024-01-13 RX ADMIN — GABAPENTIN 600 MG: 600 TABLET, FILM COATED ORAL at 14:10

## 2024-01-13 RX ADMIN — LITHIUM CARBONATE 300 MG: 300 TABLET, EXTENDED RELEASE ORAL at 08:05

## 2024-01-13 RX ADMIN — QUETIAPINE 100 MG: 100 TABLET ORAL at 20:37

## 2024-01-13 RX ADMIN — GABAPENTIN 600 MG: 600 TABLET, FILM COATED ORAL at 20:37

## 2024-01-13 RX ADMIN — GABAPENTIN 600 MG: 600 TABLET, FILM COATED ORAL at 08:05

## 2024-01-13 ASSESSMENT — ACTIVITIES OF DAILY LIVING (ADL)
ORAL_HYGIENE: INDEPENDENT
ADLS_ACUITY_SCORE: 28
HYGIENE/GROOMING: INDEPENDENT
ADLS_ACUITY_SCORE: 28
LAUNDRY: WITH SUPERVISION
DRESS: INDEPENDENT
ADLS_ACUITY_SCORE: 28

## 2024-01-13 NOTE — PLAN OF CARE
Patient has spent majority of the shift in the milieu. Attended the evening group. Denies suicidal ideation and self injurious thoughts. Reports being anxious and depressed. Requested prn zyprexa for restlessness, racing thoughts and some agitation. Denies auditory and visual hallucinations. Med compliant. Ate dinner and snack. Cooperative on  the unit.     Patient evaluation continues. Assessed mood,anxiety,thoughts and behavior.     Patient gradually progressing towards goals.    Patient is encouraged to participate in groups and assisted to develop healthy coping skills.     VS reviewed: /66   Pulse 75   Temp 97.4  F (36.3  C) (Oral)   Resp 18   Wt 116.5 kg (256 lb 12.8 oz)   SpO2 96%   BMI 33.88 kg/m      Length of stay: 5    Refer to daily team meeting notes for individualized plan of care. Nursing will continue to assess.

## 2024-01-13 NOTE — PLAN OF CARE
"Goal Outcome Evaluation:         Problem: Depressive Signs/Symptoms  Goal: Optimized Cognitive Function (Depressive Signs/Symptoms)  Outcome: Progressing  Flowsheets (Taken 1/13/2024 1146)  Mutually Determined Action Steps (Optimized Cognitive Function): participates in problem resolution   Patient has been cooperative and and medication compliant. Hard time sitting still, restless, \" at times I just want to scream out but I am able to control it'. ' I have all this built up agitation and anxiety inside of me' He has requested PRN meds x2. Sitting in lounge playing movie after movie. Tense anxious affect. Mood congruent. Will continue to offer support.                 "

## 2024-01-13 NOTE — PROGRESS NOTES
01/13/24 1300   Group Therapy Session   Group Attendance attended group session   Time Session Began 1115   Time Session Ended 1200   Total Time (minutes) 30   Total # Attendees 4   Group Type recreation   Group Session Detail Hands on healthy leisure practice with [place] category activity for concentration, cognitive processing, tracking, flexible thought process, coping, mood stabilization, reality-based activity, frustration tolerance and expanding social skills.   Patient Participation Detail Pt was restless to the point of often needing to stand during a seated activity.  Pt was limited in his ability to concentrate and was inappropriate with langage and topics he talked about at times.  Pt accepted redirection, though only after debating this with therapist.  Pt was more invested at the beginning of each round when providing answers to the topics posed was easier.  However, as the level of difficulty urszula, pt became more anxious.  This was despite providing a forgiving atmosphere where it was allowed to help each other or pass a turn if needed.  Pt does appear to lead a small amount of peers and suspect he was reluctant to be perceived in an inferior light.  Some of pt's behavior incongruent with his chronological age.  Pt left group early and did not return.  No charge.             2 phones, shoe, pants, hoodie with string, 2 shirts, & some snakes juvenal [employer] bag

## 2024-01-13 NOTE — PLAN OF CARE
01/12/24     Group Therapy Session   Group Attendance This patient attended the group session   Time Session Began 1700   Time Session Ended 1747   Total Time (minutes) 47   Total # Attendees 6-8   Group Type Group Psychotherapy/Process Group   Group Topic Covered Process Group: Violence Prevention.    Group Session Detail Process Group that looked into violence prevention by engaging a clinical explanation of the commitment process/outcomes; how to engage the treatment team for a patient centered, safe outcome. Facilitator introduced the topic with use of appropriate and ethical humor which got every pt relaxed and willing to engage in the discussion. Patients discussed their sources of frustration regarding the commitment and 72 hour hold process. Facilitator then engaged them in identifying the steps to hospitalization and discharge. Connected each stage with its possible sources of frustration. Then group reviewed how frustration could be decreased so as to prevent violence at each stage. Used a cognitive vignette to demonstrate appropriate patient response to each stage. Group concluded by reviewing possible different outcomes with the commitment/mandated treatment process.   Patient Response/Contribution This pt attended the group and participated. Asked questions and provided answers   Patient Participation Detail Pt engaged in the conversations. Asked questions and provided answers to other patients. Pt did leave the group before it was ended.   Flavio Way, Ph.D., [facility]

## 2024-01-13 NOTE — PLAN OF CARE
Problem: Sleep Disturbance  Goal: Adequate Sleep/Rest  Outcome: Progressing  Intervention: Promote Sleep/Rest  Recent Flowsheet Documentation  Taken 1/13/2024 0125 by Carol Thompson RN  Sleep/Rest Enhancement: noise level reduced   Goal Outcome Evaluation:         Pt was out sitting in the lounge area when shift started. Pt endorsed anxiety 6/10 but denies depression. Pt requested for and given prn hydroxyzine 100 mg at midnight, with stated effect. Pt went to his room and sat on the toilet for a while begore going to bed. Noted respiration even and unlabored during safety checks. Awake at 0400, came outside and sat in the lounge area quietly. Pt offered no complaints. Total sleep hour is 3.5.

## 2024-01-14 LAB — SARS-COV-2 RNA RESP QL NAA+PROBE: NEGATIVE

## 2024-01-14 PROCEDURE — 124N000002 HC R&B MH UMMC

## 2024-01-14 PROCEDURE — 250N000013 HC RX MED GY IP 250 OP 250 PS 637: Performed by: EMERGENCY MEDICINE

## 2024-01-14 PROCEDURE — 250N000013 HC RX MED GY IP 250 OP 250 PS 637: Performed by: PSYCHIATRY & NEUROLOGY

## 2024-01-14 PROCEDURE — 250N000013 HC RX MED GY IP 250 OP 250 PS 637: Performed by: ANESTHESIOLOGY

## 2024-01-14 PROCEDURE — 250N000013 HC RX MED GY IP 250 OP 250 PS 637

## 2024-01-14 PROCEDURE — 87635 SARS-COV-2 COVID-19 AMP PRB: CPT | Performed by: PSYCHIATRY & NEUROLOGY

## 2024-01-14 RX ADMIN — NICOTINE 1 PATCH: 21 PATCH, EXTENDED RELEASE TRANSDERMAL at 08:41

## 2024-01-14 RX ADMIN — HYDROXYZINE HYDROCHLORIDE 100 MG: 50 TABLET, FILM COATED ORAL at 12:41

## 2024-01-14 RX ADMIN — FAMOTIDINE 10 MG: 10 TABLET ORAL at 20:36

## 2024-01-14 RX ADMIN — THIAMINE HCL TAB 100 MG 100 MG: 100 TAB at 08:06

## 2024-01-14 RX ADMIN — BUPRENORPHINE 10 MG: 8 TABLET SUBLINGUAL at 16:57

## 2024-01-14 RX ADMIN — GABAPENTIN 600 MG: 600 TABLET, FILM COATED ORAL at 20:36

## 2024-01-14 RX ADMIN — BUPRENORPHINE 10 MG: 8 TABLET SUBLINGUAL at 08:07

## 2024-01-14 RX ADMIN — HYDROXYZINE HYDROCHLORIDE 100 MG: 50 TABLET, FILM COATED ORAL at 04:01

## 2024-01-14 RX ADMIN — FLUOXETINE HYDROCHLORIDE 60 MG: 20 CAPSULE ORAL at 08:06

## 2024-01-14 RX ADMIN — LITHIUM CARBONATE 300 MG: 300 TABLET, EXTENDED RELEASE ORAL at 20:36

## 2024-01-14 RX ADMIN — FAMOTIDINE 10 MG: 10 TABLET ORAL at 08:07

## 2024-01-14 RX ADMIN — Medication 1 TABLET: at 08:11

## 2024-01-14 RX ADMIN — QUETIAPINE FUMARATE 50 MG: 50 TABLET ORAL at 14:18

## 2024-01-14 RX ADMIN — ATORVASTATIN CALCIUM 20 MG: 20 TABLET, FILM COATED ORAL at 20:36

## 2024-01-14 RX ADMIN — NICOTINE POLACRILEX 2 MG: 2 GUM, CHEWING ORAL at 12:41

## 2024-01-14 RX ADMIN — AMANTADINE HYDROCHLORIDE 100 MG: 100 CAPSULE ORAL at 20:36

## 2024-01-14 RX ADMIN — NICOTINE POLACRILEX 2 MG: 2 GUM, CHEWING ORAL at 16:56

## 2024-01-14 RX ADMIN — NICOTINE POLACRILEX 2 MG: 2 GUM, CHEWING ORAL at 19:24

## 2024-01-14 RX ADMIN — QUETIAPINE FUMARATE 50 MG: 50 TABLET ORAL at 08:07

## 2024-01-14 RX ADMIN — GABAPENTIN 600 MG: 600 TABLET, FILM COATED ORAL at 14:18

## 2024-01-14 RX ADMIN — LITHIUM CARBONATE 300 MG: 300 TABLET, EXTENDED RELEASE ORAL at 08:06

## 2024-01-14 RX ADMIN — OLANZAPINE 10 MG: 10 TABLET, FILM COATED ORAL at 18:28

## 2024-01-14 RX ADMIN — AMANTADINE HYDROCHLORIDE 100 MG: 100 CAPSULE ORAL at 08:06

## 2024-01-14 RX ADMIN — FOLIC ACID 1 MG: 1 TABLET ORAL at 08:07

## 2024-01-14 RX ADMIN — OLANZAPINE 10 MG: 10 TABLET, FILM COATED ORAL at 10:41

## 2024-01-14 RX ADMIN — GABAPENTIN 600 MG: 600 TABLET, FILM COATED ORAL at 08:07

## 2024-01-14 RX ADMIN — QUETIAPINE 100 MG: 100 TABLET ORAL at 20:36

## 2024-01-14 ASSESSMENT — ACTIVITIES OF DAILY LIVING (ADL)
ADLS_ACUITY_SCORE: 28
ADLS_ACUITY_SCORE: 28
LAUNDRY: WITH SUPERVISION
ADLS_ACUITY_SCORE: 28
DRESS: INDEPENDENT
HYGIENE/GROOMING: INDEPENDENT
ADLS_ACUITY_SCORE: 28
ORAL_HYGIENE: INDEPENDENT
ADLS_ACUITY_SCORE: 28

## 2024-01-14 NOTE — PLAN OF CARE
Problem: Anxiety Signs/Symptoms  Goal: Optimized Energy Level (Anxiety Signs/Symptoms)  Recent Flowsheet Documentation  Taken 1/14/2024 1102 by Monica Corbett RN  Individualized Action Step (Optimized Energy Level): attending groups  Mutually Determined Action Steps (Optimized Energy Level):   grooms self without prompting   dresses/ready for morning activity   Goal Outcome Evaluation:      Up early requesting meds early as well. He states sleeping 'ok'. Appetite good, VSS.  Continues to be restless, hard to sit still when talking to staff.  He has been social with peers, and was out for community meeting. He becomes agitated different times during day requesting PRN medication. He states he starts thinking about things which brings on a lot of guilt, makes him feel uncomfortable. He has been polite, cooperative to other on the unit. He states he is trying to stay optimistic .

## 2024-01-14 NOTE — PROGRESS NOTES
"   01/13/24 2129   Group Therapy Session   Group Attendance attended group session   Time Session Began 1700   Time Session Ended 1745   Total Time (minutes) 45   Total # Attendees 3   Group Type task skill   Group Session Detail Education and group discussion provided on the importance of healthy leisure and the direct correlation of boredom and relapse from substances. Hands on healthy leisure exploration with choice of Sticker or Scratch Art project for concentration, creative expression, sequencing, simple problem solving, coping, follow through, mood stabilization, reality-based activity, an opportunity to experience success, relaxationa and socialization.   Patient Participation Detail Pt remains a bit anxious and restless, though he was able to be in and out of the room quickly, losing only a few seconds of group at a time.  Pt worked steadily on a sticker art project.  He was surprised at how detailed it was and how timeconsuming.  As education was presented regarding how those who are depressed, anxious or using substances for long periods of time tend to lose their leisure.  Pt connected to this topic and shared with the group that he used to be a peer  prior to his last relapse that has now lasted for the previous five years of his life.  Pt verbalized change talk and a desire to be able to help others again.  Pt also verbalized guilt and regret regarding \"losing everything\" again.  Pt was able to accept hopeful comments, at times agreeing with them, and at other times verbalizing disappointment in himself.  Pt was much more respectful during this group than he was earlier in the day, his language was  and he stuck to appropriate group topics.       "

## 2024-01-14 NOTE — PLAN OF CARE
Patient has spent majority of the shift in the milieu. Attended the evening group. Was restless and a bit agitated at the start of the shift. Requested prn hydroxyzine and zyprexa. Denies suicidal ideation and self injurious thoughts. Reported being anxious and depressed. Denies auditory and visual hallucinations. Med compliant. Ate dinner and snack. Cooperative on the unit. Plans on discharging early next week to treatment.     Patient evaluation continues. Assessed mood,anxiety,thoughts and behavior.     Patient gradually progressing towards goals.    Patient is encouraged to participate in groups and assisted to develop healthy coping skills.     VS reviewed: /72   Pulse 76   Temp 98.3  F (36.8  C) (Oral)   Resp 18   Wt 116.5 kg (256 lb 12.8 oz)   SpO2 95%   BMI 33.88 kg/m      Length of stay: 6    Refer to daily team meeting notes for individualized plan of care. Nursing will continue to assess.

## 2024-01-14 NOTE — PLAN OF CARE
Problem: Adult Behavioral Health Plan of Care  Goal: Plan of Care Review  Outcome: Progressing     Problem: Sleep Disturbance  Goal: Adequate Sleep/Rest  Outcome: Progressing   Goal Outcome Evaluation:       Up at about 03:30 due to not being able to stay asleep. He requested for and received Hydroxyzine and went back to bed. Since then he has been sleeping comfortably with even and non labored respirations during all safety checks. Pt slept for 6 hours. No safety or behavioral concerns noted. Will continue with same plan of care.

## 2024-01-15 ENCOUNTER — DOCUMENTATION ONLY (OUTPATIENT)
Dept: BEHAVIORAL HEALTH | Facility: CLINIC | Age: 49
End: 2024-01-15
Payer: COMMERCIAL

## 2024-01-15 PROCEDURE — 99232 SBSQ HOSP IP/OBS MODERATE 35: CPT | Performed by: PSYCHIATRY & NEUROLOGY

## 2024-01-15 PROCEDURE — 250N000013 HC RX MED GY IP 250 OP 250 PS 637: Performed by: ANESTHESIOLOGY

## 2024-01-15 PROCEDURE — 250N000013 HC RX MED GY IP 250 OP 250 PS 637: Performed by: EMERGENCY MEDICINE

## 2024-01-15 PROCEDURE — 250N000013 HC RX MED GY IP 250 OP 250 PS 637

## 2024-01-15 PROCEDURE — 250N000013 HC RX MED GY IP 250 OP 250 PS 637: Performed by: PSYCHIATRY & NEUROLOGY

## 2024-01-15 PROCEDURE — G0177 OPPS/PHP; TRAIN & EDUC SERV: HCPCS

## 2024-01-15 PROCEDURE — 124N000002 HC R&B MH UMMC

## 2024-01-15 RX ORDER — QUETIAPINE FUMARATE 100 MG/1
100 TABLET, FILM COATED ORAL AT BEDTIME
Qty: 30 TABLET | Refills: 1 | Status: SHIPPED | OUTPATIENT
Start: 2024-01-15 | End: 2024-07-11

## 2024-01-15 RX ORDER — LITHIUM CARBONATE 300 MG/1
300 TABLET, FILM COATED, EXTENDED RELEASE ORAL EVERY 12 HOURS
Qty: 60 TABLET | Refills: 1 | Status: SHIPPED | OUTPATIENT
Start: 2024-01-15 | End: 2024-04-12

## 2024-01-15 RX ORDER — GABAPENTIN 600 MG/1
600 TABLET ORAL 3 TIMES DAILY
Qty: 90 TABLET | Refills: 0 | Status: SHIPPED | OUTPATIENT
Start: 2024-01-15 | End: 2024-04-12

## 2024-01-15 RX ORDER — AMANTADINE HYDROCHLORIDE 100 MG/1
100 CAPSULE, GELATIN COATED ORAL 2 TIMES DAILY
Qty: 60 CAPSULE | Refills: 0 | Status: SHIPPED | OUTPATIENT
Start: 2024-01-15 | End: 2024-07-11

## 2024-01-15 RX ORDER — NICOTINE 21 MG/24HR
1 PATCH, TRANSDERMAL 24 HOURS TRANSDERMAL EVERY 24 HOURS
Qty: 30 PATCH | Refills: 1 | Status: SHIPPED | OUTPATIENT
Start: 2024-01-15 | End: 2024-07-11

## 2024-01-15 RX ORDER — LOPERAMIDE HCL 2 MG
2 CAPSULE ORAL EVERY 4 HOURS PRN
Qty: 60 CAPSULE | Refills: 0 | Status: SHIPPED | OUTPATIENT
Start: 2024-01-15 | End: 2024-07-11

## 2024-01-15 RX ORDER — BUPRENORPHINE 2 MG/1
10 TABLET SUBLINGUAL 2 TIMES DAILY
Qty: 300 TABLET | Refills: 0 | Status: SHIPPED | OUTPATIENT
Start: 2024-01-15

## 2024-01-15 RX ORDER — MULTIPLE VITAMINS W/ MINERALS TAB 9MG-400MCG
1 TAB ORAL DAILY
Qty: 30 TABLET | Refills: 0 | Status: SHIPPED | OUTPATIENT
Start: 2024-01-15 | End: 2024-07-11

## 2024-01-15 RX ORDER — ATORVASTATIN CALCIUM 20 MG/1
20 TABLET, FILM COATED ORAL EVERY EVENING
Qty: 30 TABLET | Refills: 0 | Status: SHIPPED | OUTPATIENT
Start: 2024-01-15 | End: 2024-07-11

## 2024-01-15 RX ORDER — LANOLIN ALCOHOL/MO/W.PET/CERES
100 CREAM (GRAM) TOPICAL DAILY
Qty: 30 TABLET | Refills: 0 | Status: SHIPPED | OUTPATIENT
Start: 2024-01-15 | End: 2024-07-11

## 2024-01-15 RX ORDER — QUETIAPINE FUMARATE 50 MG/1
50 TABLET, FILM COATED ORAL 2 TIMES DAILY
Qty: 60 TABLET | Refills: 1 | Status: SHIPPED | OUTPATIENT
Start: 2024-01-15 | End: 2024-07-11

## 2024-01-15 RX ORDER — PRAZOSIN HYDROCHLORIDE 1 MG/1
1 CAPSULE ORAL AT BEDTIME
Qty: 30 CAPSULE | Refills: 0 | Status: SHIPPED | OUTPATIENT
Start: 2024-01-15 | End: 2024-07-11

## 2024-01-15 RX ORDER — HYDROXYZINE HYDROCHLORIDE 50 MG/1
100 TABLET, FILM COATED ORAL 3 TIMES DAILY PRN
Qty: 90 TABLET | Refills: 1 | Status: SHIPPED | OUTPATIENT
Start: 2024-01-15

## 2024-01-15 RX ORDER — FOLIC ACID 1 MG/1
1 TABLET ORAL DAILY
Qty: 30 TABLET | Refills: 0 | Status: SHIPPED | OUTPATIENT
Start: 2024-01-15 | End: 2024-07-11

## 2024-01-15 RX ORDER — FAMOTIDINE 10 MG
10 TABLET ORAL 2 TIMES DAILY
Qty: 60 TABLET | Refills: 0 | Status: SHIPPED | OUTPATIENT
Start: 2024-01-15 | End: 2024-07-11

## 2024-01-15 RX ORDER — OLANZAPINE 10 MG/1
10 TABLET ORAL 2 TIMES DAILY PRN
Qty: 60 TABLET | Refills: 0 | Status: SHIPPED | OUTPATIENT
Start: 2024-01-15 | End: 2024-07-11

## 2024-01-15 RX ADMIN — HYDROXYZINE HYDROCHLORIDE 100 MG: 50 TABLET, FILM COATED ORAL at 13:44

## 2024-01-15 RX ADMIN — QUETIAPINE FUMARATE 50 MG: 50 TABLET ORAL at 07:50

## 2024-01-15 RX ADMIN — BUPRENORPHINE 10 MG: 8 TABLET SUBLINGUAL at 16:59

## 2024-01-15 RX ADMIN — OLANZAPINE 10 MG: 10 TABLET, FILM COATED ORAL at 11:09

## 2024-01-15 RX ADMIN — NICOTINE 1 PATCH: 21 PATCH, EXTENDED RELEASE TRANSDERMAL at 07:52

## 2024-01-15 RX ADMIN — LITHIUM CARBONATE 300 MG: 300 TABLET, EXTENDED RELEASE ORAL at 20:02

## 2024-01-15 RX ADMIN — QUETIAPINE FUMARATE 50 MG: 50 TABLET ORAL at 14:45

## 2024-01-15 RX ADMIN — HYDROXYZINE HYDROCHLORIDE 100 MG: 50 TABLET, FILM COATED ORAL at 03:33

## 2024-01-15 RX ADMIN — FOLIC ACID 1 MG: 1 TABLET ORAL at 07:50

## 2024-01-15 RX ADMIN — AMANTADINE HYDROCHLORIDE 100 MG: 100 CAPSULE ORAL at 07:50

## 2024-01-15 RX ADMIN — LITHIUM CARBONATE 300 MG: 300 TABLET, EXTENDED RELEASE ORAL at 07:50

## 2024-01-15 RX ADMIN — QUETIAPINE 100 MG: 100 TABLET ORAL at 20:01

## 2024-01-15 RX ADMIN — AMANTADINE HYDROCHLORIDE 100 MG: 100 CAPSULE ORAL at 20:01

## 2024-01-15 RX ADMIN — GABAPENTIN 600 MG: 600 TABLET, FILM COATED ORAL at 07:50

## 2024-01-15 RX ADMIN — Medication 1 TABLET: at 07:50

## 2024-01-15 RX ADMIN — GABAPENTIN 600 MG: 600 TABLET, FILM COATED ORAL at 20:01

## 2024-01-15 RX ADMIN — GABAPENTIN 600 MG: 600 TABLET, FILM COATED ORAL at 13:44

## 2024-01-15 RX ADMIN — FAMOTIDINE 10 MG: 10 TABLET ORAL at 07:50

## 2024-01-15 RX ADMIN — THIAMINE HCL TAB 100 MG 100 MG: 100 TAB at 07:50

## 2024-01-15 RX ADMIN — ATORVASTATIN CALCIUM 20 MG: 20 TABLET, FILM COATED ORAL at 20:01

## 2024-01-15 RX ADMIN — NICOTINE POLACRILEX 2 MG: 2 GUM, CHEWING ORAL at 07:23

## 2024-01-15 RX ADMIN — NICOTINE POLACRILEX 2 MG: 2 GUM, CHEWING ORAL at 16:05

## 2024-01-15 RX ADMIN — FLUOXETINE HYDROCHLORIDE 60 MG: 20 CAPSULE ORAL at 07:50

## 2024-01-15 RX ADMIN — FAMOTIDINE 10 MG: 10 TABLET ORAL at 20:01

## 2024-01-15 RX ADMIN — BUPRENORPHINE 10 MG: 8 TABLET SUBLINGUAL at 07:52

## 2024-01-15 ASSESSMENT — ACTIVITIES OF DAILY LIVING (ADL)
ADLS_ACUITY_SCORE: 28
ADLS_ACUITY_SCORE: 28
HYGIENE/GROOMING: INDEPENDENT
ADLS_ACUITY_SCORE: 28
LAUNDRY: WITH SUPERVISION
ADLS_ACUITY_SCORE: 28
LAUNDRY: WITH SUPERVISION
HYGIENE/GROOMING: INDEPENDENT
ORAL_HYGIENE: INDEPENDENT
ORAL_HYGIENE: INDEPENDENT
ADLS_ACUITY_SCORE: 28
DRESS: INDEPENDENT
ADLS_ACUITY_SCORE: 28
DRESS: INDEPENDENT

## 2024-01-15 NOTE — CARE PLAN
01/15/24 1425   Group Therapy Session   Group Attendance attended group session   Time Session Began 1300   Time Session Ended 1345   Total Time (minutes) 45   Total # Attendees 4   Group Type life skill;other (see comments)  (OT)   Group Topic Covered balanced lifestyle;leisure exploration/use of leisure time;cognitive activities;structured socialization   Group Session Detail OT - Topic: Pt actively participated in a structured occupational therapy group with a focus on visuospatial problem solving and social engagement via a group game.    Patient Response/Contribution able to recall/repeat info presented;cooperative with task;discussed personal experience with topic;expressed understanding of topic   Patient Participation Detail Pt demonstrated understanding of the novel 3-step task after an initial explanation. Pt remained generally focused and engaged throughout the full duration of group, except one break toward the end of group for the restroom. He did sit for the activity. He tended to offer help to others, even when not asked, yet was supportive and positive re: how they would do. Did note benefits for himself.

## 2024-01-15 NOTE — PROGRESS NOTES
Prior Authorization **INITIATED**    Medication: BUPRENORPHINE HCL 2 MG SL SUBL  Insurance Company: [employer] - Phone [phone] Fax [phone]  Pharmacy Filling the Rx: [place], [place] - [address]  Filling Pharmacy Phone: [phone]  Filling Pharmacy Fax: [phone]  Start Date: 1/15/2024  Reference #: CoverMyMeds Key: [id number] - PA Case ID: [id number]  Comments:  Insurance prefers Suboxone.SL films (Buprenorphine-Naloxone). Naltrexone noted on allergy list with headache as reaction.      Elizabeth Lentz [facility]  Discharge Pharmacy Liaison  [facility]  Pronouns: She/Her/Hers    Securely message with PanGenX, Epic Secure Chat, or Pax8  Phone: [phone]  Fax: [phone]  [email or web address]

## 2024-01-15 NOTE — PROGRESS NOTES
"[facility], [place]   Psychiatric Progress Note        Interim History:   The patient's care was discussed with the treatment team during the daily team meeting and/or staff's chart notes were reviewed.  Staff report patient had an, overall, low key weekend. He socialized with some peers and watched TV. Continued to complain of anxiety and low depression, frequently asked and received antianxiety prns. Slept for about 6.25 hours last night. Continued to confirm that he is strongly interested in stopping using drugs and be a productive member of society, see RN's note below:    \"Patient has spent majority of the shift in the milieu. Attended groups. Girlfriend dropped off his eye glasses. Ate breakfast and lunch. Denies suicidal ideation and self injurious thoughts. Requested prn hydroxyzine for anxiety and some agitation. Continues to have depression. Denies auditory and visual hallucinations. Med compliant. Pleasant and cooperative on the unit.\"    Met with patient: We met the patient in presence of PA student. Perry was again pleasant and cooperative, appeared to be more comfortable and somewhat less restless. Said that he was looking forward to going to [facility]. He was aware that it was located in [place], [place]. Asked our opinion about that program, we told him that we heard that it was a good program. He continued to complain of anxiety, reported having nightmares and disturbing dreams (saw his sister who suicided short time ago), asked us to increase his dose of Subutex. We discussed discharge meds, advised him against increase in Subutex, suggested to keep taking Prazosin. Perry agreed and had no more questions or concerns.          Medications:      amantadine  100 mg Oral BID    atorvastatin  20 mg Oral QPM    buprenorphine  10 mg Sublingual BID    famotidine  10 mg Oral BID    FLUoxetine  60 mg Oral Daily    folic acid  1 mg Oral Daily    gabapentin  600 mg Oral TID "    lithium ER  300 mg Oral Q12H [facility] (08/20)    multivitamin w/minerals  1 tablet Oral Daily    nicotine  1 patch Transdermal Daily    And    nicotine   Transdermal Q8H RODOLFO    prazosin  1 mg Oral At Bedtime    QUEtiapine  100 mg Oral At Bedtime    QUEtiapine  50 mg Oral BID    thiamine  100 mg Oral Daily          Allergies:     Allergies   Allergen Reactions    Wellbutrin [Bupropion] Anaphylaxis and Swelling     Facial swelling    Wellbutrin [Bupropion]     Naltrexone Other (See Comments)     Headaches  Headaches            Labs:     Recent Results (from the past 24 hour(s))   Asymptomatic COVID-19 Virus (Coronavirus) by PCR Nasopharyngeal    Collection Time: 01/14/24  6:30 PM    Specimen: Nasopharyngeal; Swab   Result Value Ref Range    SARS CoV2 PCR Negative Negative            Psychiatric Examination:     /75   Pulse 71   Temp 98.2  F (36.8  C) (Oral)   Resp 18   Wt 118.9 kg (262 lb 1.6 oz)   SpO2 94%   BMI 34.58 kg/m    Weight is 262 lbs 1.6 oz  Body mass index is 34.58 kg/m .    Orthostatic Vitals         Most Recent      Standing Orthostatic /70 01/15 0826    Standing Orthostatic Pulse (bpm) 71 01/15 0826           Appearance: dressed in hospital scrubs, appeared as age stated, alert, no apparent distress, mildly obese, and slightly unkempt; many tattoos;  Attitude: very cooperative  Eye Contact:  fair,    Mood:  anxious, better  Affect:  mood congruent and reactive  Speech:  clear, coherent and less rambling  Psychomotor Behavior:  no evidence of tardive dyskinesia, dystonia, or tics and intact station, gait and muscle tone; some restlessness still present;  Thought Process:  goal oriented and logical, mostly organized  Associations:  no loose associations  Thought Content:  denies active and passive Suicidal ideation, no auditory hallucinations present, and no visual hallucinations present  Insight:  fair  Judgement:  improving  Oriented to:  time, person, and place  Attention Span and  Concentration:   limited by anxiety, sadness  Recent and Remote Memory:  fair    Clinical Global Impressions  First: 6/4 1/8/2024      Most recent:            Precautions:     Behavioral Orders   Procedures    Code 1 - Restrict to Unit    Discontinue 1:1 attendant for suicide risk     Order Specific Question:   I have performed an in person assessment of the patient     Answer:   Based on this assessment the patient no longer requires a one on one attendant at this point in time.     Order Specific Question:   Rationale     Answer:   Patient States able to remain safe in hospital    Routine Programming     As clinically indicated    Status 15     Every 15 minutes.          Diagnoses:     Major depressive disorder, chronic, recurrent; generalized anxiety disorder, attention deficit disorder, substance use disorder with methamphetamine and heroin, traumatic brain injury          Plan:     No medication changes today 1/15/2024. Discharge meds were reconciled and sent to discharge pharmacy. I talked to pharmacy for about 10 min about insurance coverage of Subutex, it appeared that issue was resolved. Patient as per discussion above agrees to stay in double room. CD consult done and recommendations made and he was accepted to [facility] program with discharge on Tuesday. Will continue to provide support and structure.    Total time spent was 38 minutes. Over 50% of times was spent counseling and coordination of care regarding coping skills, medication and discharge planning.         I was present with PA student who participated in the service and in the documentation of the note. I have verified the history and personally performed the physical exam and medical decision making. I agree with the assessment and plan of care as documented in the note.     Khurram Dawn MD  [facility] Psychiatry

## 2024-01-15 NOTE — CARE PLAN
01/15/24 1225   Group Therapy Session   Group Attendance attended group session   Time Session Began 1015   Time Session Ended 1100   Total Time (minutes) 45   Total # Attendees 4   Group Type life skill;task skill;other (see comments)  (OT)   Group Topic Covered balanced lifestyle;coping skills/lifestyle management;leisure exploration/use of leisure time;structured socialization   Group Session Detail OT Clinic: Activity group for creative expression, promoting autonomy, building self worth, coping with stress and symptoms, and an opportunity to exercise cognitive skills (I.e. initiation, planning, organizing, sequencing, sustained attention, follow through, and overall self awareness).   Patient Response/Contribution able to recall/repeat info presented;cooperative with task;discussed personal experience with topic;expressed understanding of topic;left the group on several occasions   Patient Participation Detail Pt completed work on his creative project. He stood for the time working and did leave the group on occasion. He initially tended to point out things that he did not like, yet was able to accept positive feedback and then note it was acceptable design. He did engage in planning and simple problem solving with materials.

## 2024-01-15 NOTE — PLAN OF CARE
Patient has spent majority of the shift in the milieu. Social with select peers. Patient appears and reports being anxious and restless. Has difficulty at times sitting still and is observed standing and walking around his chair in the milieu. Denies suicidal ideation and self injurious thoughts. Continues to have depression. Denies auditory and visual hallucinations. Ate dinner and snack. Med compliant. Affect was flat and reports being a bit agitated requesting zyprexa, but has been pleasant and cooperative on the unit. Covid swab was resent due to lab saying they did not receive it earlier. Covid was negative.     Patient evaluation continues. Assessed mood,anxiety,thoughts and behavior.     Patient gradually progressing towards goals.    Patient is encouraged to participate in groups and assisted to develop healthy coping skills.     VS reviewed: /73   Pulse 77   Temp 97.9  F (36.6  C) (Temporal)   Resp 18   Wt 118.9 kg (262 lb 1.6 oz)   SpO2 94%   BMI 34.58 kg/m      Length of stay: 7    Refer to daily team meeting notes for individualized plan of care. Nursing will continue to assess.

## 2024-01-15 NOTE — PLAN OF CARE
"Team Note Due:  Monday    Assessment/Intervention/Current Symtoms and Care Coordination:  Chart review and met with team, discussed pt progress, symptomology, and response to treatment.  Discussed the discharge plan and any potential impediments to discharge.    Tasks Completed:  - Checked in with Jacinda at [facility] to confirm admission tomorrow and confirm when transportation will be coming to pick pt up. Jacinda responded and shared that pt will be picked up today. [facility] called [facility] as previous email stated that pt was scheduled for admission Tuesday morning. Spoke to Marci at [facility] who confirmed that pt is set for admission tomorrow morning and she will call back to confirm the time. Provided pick-up address to Marci.   - Jacinda sent follow-up email: \"Tuesday is the scheduled date. 10am  tomorrow. I'm so sorry; I thought this email came in sooner than it had. But yes, Perry is scheduled for tomorrow, Tuesday. My apologies for any confusion I've created!\" Pick-up time has been confirmed. RN notified.  - Met with pt to discuss discharge and confirm pick-up time tomorrow. Discussed follow-up psychiatry appointment and pt confirmed he does not have current psychiatrist. Pt reports he has been to [facility] in the past and is open to going there again. [facility] also discussed case management services and pt reports he is interested in this. [facility] will follow-up with [facility] to discuss referral process. Pt signed UMBERTO for [facility] and [facility] for case management.   - [facility] scheduled appointment for psychiatry with [facility] - [facility] updated.  - [facility] left message for [facility] intake to discuss referral process.   - [facility] attempted to reach someone through [facility] intake again and was unable to connect with someone on the intake team. [facility] faxed a DA form, H&P, most recent progress note, and pt Facesheet to support referral for case management services as well as provided follow-up contact information for " "pt.    Discharge Plan or Goal:  Pt will discharge to [facility] tomorrow morning at 10 AM.     Barriers to Discharge:  None     Referral Status:  ANNIKA made the following substance use treatment referrals:    [facility] - Submitted 1/9/2024  RESIDENTIAL ADMISSIONS ([facility] I, NUW[facility] II, NUWAY III)  Phone: [phone]  Fax: [phone]  [email or web address]  [email or web address]  1/10/2024: Left message to check status of referral.   1/11/2024: Spoke to Joyce who reported she has not seen the fax come through yet. Refaxed paperwork and answered questions regarding pt's participation in groups, motivation for treatment, and preceding events that led him to the hospital. Joyce called back after looking over the notes and referral. Joyce shared that she feels he needs a more high intensity program as pt has been through a lot recently and [facility] is medium intensity with groups ending at 2 PM and a lot of free time for pt's. Joyce recommended [facility], [facility], and [facility].    [facility] Access Team for Referrals - Submitted 1/9/2024  Phone: [phone]  Fax: [phone]  [email or web address]  1/10/2024: Called and confirmed receipt of referral. Still in the review process. Received email from Allie with admissions team asking for updated progress notes, last date of use, and discharge date from [facility]. Provided updates via secure email - [email or web address]. Per Allie: \"I staffed his referral with our team and he seems more appropriate for mental health programming at this point. He continues to endorse ongoing symptoms and it appears he would benefit from focusing on his mental health. We are denying his referral and recommending MH programming such as IRTS or PHP.\" ANNIKA attempted to advocate for pt and provide additional detail regarding his cooperation on the unit, compliance with " medications, daily group attendance and engagement, and stressors that led to his hospitalization. [facility] also inquired about [facility] status as a co-occurring program licensed to provide mental health and substance use treatment and to receive clarity around admissions criteria. However, [facility] continues to decline due to pt's mental health.     [facility] - Submitted 1/9/2024  Address:  [address] , [place] [address]   Phone: [phone]  1/9/2024: Called to receive fax number to submit referrals. Advised to send an email to support referral. Sent paperwork via secure email to [email or web address].  1/11/2024: Attempted to call two times to check status of referral and was transferred to [facility]. Call was not connected. Sent follow-up email to Sylvester at above email address to check status. Sylvester responded and reported they may have availability tomorrow. Shared preference for admission early next week and asked about availability.  1/12/2024: Sent email to Sylvester to check status and availability for next week. Pt has been approved for admission next Tuesday, January 16.  1/15/2024: Confirmed admission for tomorrow. Pt will be picked up at 10 AM tomorrow morning.    [facility] - Submitted 1/10/2024  Email: [email or web address]  Phone: [phone]  1/11/2024: Left message to check status of referral.     [facility] - Submitted 1/11/2024  Phone: [phone]  Fax: [phone]    [facility]  *Pending submission as this is not residential and there is not a lodging program with it*    [facility]  Phone: [phone]  Admissions Phone: [phone]  1/11/2024: Called [facility] to submit referral. They will look over pt's paperwork and assess for appropriateness. [place] from program who is declining pt due to mental health.    Case Management:  [facility] submitted referral for adult mental health case management services  through [facility] on 1/15/2024.      Legal Status:  Pt is voluntary     Contacts:  None      Upcoming Meetings and Dates/Important Information and next steps:  None

## 2024-01-15 NOTE — PROVIDER NOTIFICATION
01/15/24 1131   Individualization/Patient Specific Goals   Patient Personal Strengths expressive of needs;expressive of emotions;motivated for recovery;motivated for treatment   Patient Vulnerabilities family/relationship conflict;poor impulse control;substance abuse/addiction;adverse childhood experience(s)   Anxieties, Fears or Concerns Pt endorses anxiety and depression. Pt denies SI/SIB. Pt often appears restless, anxious, and perseverative.   Interprofessional Rounds   Summary Pt is restless and anxious. Pt shows difficulty with sitting still. Pt attends all groups. Pt endorses anxiety and depression but denies SI/SIB. Pt is discharging tomorrow to substance use treatment.   Participants CTC;psychiatrist;nursing;OT   Behavioral Team Discussion   Participants Khurram Dawn MD; Kathi Ortiz RN; RADU Morales; Annette Mcknight OTR/L; PA student   Progress Pt progresses toward his goals and shows strong motivation for change and treatment.   Continued Stay Criteria/Rationale N/A   Precautions None   Plan Multidisciplinary team evaluation, medication management, care coordination   Rationale for change in precautions or plan N/A   Safety Plan Per unit protocol   Anticipated Discharge Disposition substance use treatment     PRECAUTIONS AND SAFETY    Behavioral Orders   Procedures    Code 1 - Restrict to Unit    Discontinue 1:1 attendant for suicide risk     Order Specific Question:   I have performed an in person assessment of the patient     Answer:   Based on this assessment the patient no longer requires a one on one attendant at this point in time.     Order Specific Question:   Rationale     Answer:   Patient States able to remain safe in hospital    Routine Programming     As clinically indicated    Status 15     Every 15 minutes.       Safety  Safety WDL: WDL  Patient Location: patient room, own  Observed Behavior: sleeping  Observed Behavior (Comment): sleeping  Safety Measures: safety rounds  valium      Last Written Prescription Date:  8/29/19  Last Fill Quantity: 30,   # refills: 0  Last Office Visit: 8/22/19  Future Office visit:       Routing refill request to provider for review/approval because:  Drug not on the [facility], [facility] or [facility] refill protocol or controlled substance     completed  Diversional Activity: television  Suicidality: Status 15  Withdrawal: monitor withdrawal process

## 2024-01-15 NOTE — PROGRESS NOTES
Returned following fax back to plan:        Elizabeth Lentz CPhT  Discharge Pharmacy Liaison  [facility]  Pronouns: She/Her/Hers    Securely message with eoSemi, Epic Secure Chat, or MySiteApp  Phone: [phone]  Fax: [phone]  [email or web address]

## 2024-01-15 NOTE — CARE PLAN
01/15/24 1230   Group Therapy Session   Group Attendance attended group session   Time Session Began 1115   Time Session Ended 1200   Total Time (minutes) 45   Total # Attendees 5   Group Type life skill;other (see comments)  (OT)   Group Topic Covered balanced lifestyle;coping skills/lifestyle management;other (see comments)  (sensory)   Group Session Detail OT - Coping: Focus on group was for self regulation techniques - using 3 step process of pause, connect, engage.This includes the identification of warning signs and triggers/situations for dysregulation, what and who to connect with or use circuit breaker techniques, and sensory techniques to engage in for management. Discussion and practice of some techniques and focus for relapse management.   Patient Response/Contribution able to recall/repeat info presented;cooperative with task;discussed personal experience with topic;expressed readiness to alter behaviors;expressed understanding of topic   Patient Participation Detail Pt was active and engaged in the discussion. He did tend to be the first one to share. Focus was on his substance use and how to use this process for staying sober. Did note new things he has learned and things he used to do to manage and desire to return to using things to manage. Gave support to others.

## 2024-01-15 NOTE — PLAN OF CARE
BEH IP Unit Acuity Rating Score (UARS)  Patient is given one point for every criteria they meet.    CRITERIA SCORING   On a 72 hour hold, court hold, committed, stay of commitment, or revocation 0/1    Patient LOS on BEH unit exceeds 20 days 0/1  LOS: 8   Patient under guardianship, 55+, otherwise medically complex, or under age 11 0/1   Suicide ideation without relief of precipitating factors 1/1   Current plan for suicide 0/1   Current plan for homicide 0/1   Imminent risk or actual attempt to seriously harm another without relief of factors precipitating the attempt 0/1   Severe dysfunction in daily living (ex: complete neglect for self care, extreme disruption in vegetative function, extreme deterioration in social interactions) 1/1   Recent (last 7 days) or current physical aggression in the ED or on unit 0/1   Restraints or seclusion episode in past 72 hours 0/1   Recent (last 7 days) or current verbal aggression, agitation, yelling, etc., while in the ED or unit 0/1   Active psychosis 0/1   Need for constant or near constant redirection (from leaving, from others, etc).  0/1   Intrusive or disruptive behaviors 0/1   TOTAL 2          [Negative] : Heme/Lymph

## 2024-01-15 NOTE — PLAN OF CARE
Patient has spent majority of the shift in the milieu. Attended groups. Girlfriend dropped off his eye glasses. Ate breakfast and lunch. Denies suicidal ideation and self injurious thoughts. Requested prn hydroxyzine for anxiety and some agitation. Continues to have depression. Denies auditory and visual hallucinations. Med compliant. Pleasant and cooperative on the unit.     Patient evaluation continues. Assessed mood,anxiety,thoughts and behavior.     Patient gradually progressing towards goals.    Patient is encouraged to participate in groups and assisted to develop healthy coping skills.     VS reviewed: /75   Pulse 71   Temp 98.2  F (36.8  C) (Oral)   Resp 18   Wt 118.9 kg (262 lb 1.6 oz)   SpO2 94%   BMI 34.58 kg/m      Length of stay: 8    Refer to daily team meeting notes for individualized plan of care. Nursing will continue to assess.

## 2024-01-15 NOTE — PLAN OF CARE
Problem: Sleep Disturbance  Goal: Adequate Sleep/Rest  Outcome: Progressing  Intervention: Promote Sleep/Rest  Recent Flowsheet Documentation  Taken 1/15/2024 0106 by Carol Thompson RN  Sleep/Rest Enhancement: noise level reduced   Goal Outcome Evaluation:         Pt observed resting well during every 15 minutes safety checks with even and unlabored respiration. Pt awake at 0300 and requesred for antianxiety medication. Prn hydroxiyzie 100 mg given and per pt with good effect. Pt sat in the lounge quietly rest of the shift. No acute distress. No behavioral issues. Slept 6.25 hours.

## 2024-01-16 VITALS
WEIGHT: 265 LBS | SYSTOLIC BLOOD PRESSURE: 125 MMHG | DIASTOLIC BLOOD PRESSURE: 77 MMHG | HEART RATE: 76 BPM | RESPIRATION RATE: 18 BRPM | BODY MASS INDEX: 34.96 KG/M2 | TEMPERATURE: 96.1 F | OXYGEN SATURATION: 97 %

## 2024-01-16 PROCEDURE — 250N000013 HC RX MED GY IP 250 OP 250 PS 637: Performed by: ANESTHESIOLOGY

## 2024-01-16 PROCEDURE — 99239 HOSP IP/OBS DSCHRG MGMT >30: CPT | Performed by: PSYCHIATRY & NEUROLOGY

## 2024-01-16 PROCEDURE — 250N000013 HC RX MED GY IP 250 OP 250 PS 637: Performed by: EMERGENCY MEDICINE

## 2024-01-16 PROCEDURE — 250N000013 HC RX MED GY IP 250 OP 250 PS 637: Performed by: PSYCHIATRY & NEUROLOGY

## 2024-01-16 RX ADMIN — Medication 1 TABLET: at 08:04

## 2024-01-16 RX ADMIN — AMANTADINE HYDROCHLORIDE 100 MG: 100 CAPSULE ORAL at 08:04

## 2024-01-16 RX ADMIN — FOLIC ACID 1 MG: 1 TABLET ORAL at 08:04

## 2024-01-16 RX ADMIN — FAMOTIDINE 10 MG: 10 TABLET ORAL at 08:04

## 2024-01-16 RX ADMIN — OLANZAPINE 10 MG: 10 TABLET, FILM COATED ORAL at 03:10

## 2024-01-16 RX ADMIN — THIAMINE HCL TAB 100 MG 100 MG: 100 TAB at 08:05

## 2024-01-16 RX ADMIN — QUETIAPINE FUMARATE 50 MG: 50 TABLET ORAL at 08:04

## 2024-01-16 RX ADMIN — BUPRENORPHINE 10 MG: 8 TABLET SUBLINGUAL at 08:04

## 2024-01-16 RX ADMIN — NICOTINE 1 PATCH: 21 PATCH, EXTENDED RELEASE TRANSDERMAL at 08:04

## 2024-01-16 RX ADMIN — LITHIUM CARBONATE 300 MG: 300 TABLET, EXTENDED RELEASE ORAL at 08:05

## 2024-01-16 RX ADMIN — FLUOXETINE HYDROCHLORIDE 60 MG: 20 CAPSULE ORAL at 08:04

## 2024-01-16 RX ADMIN — HYDROXYZINE HYDROCHLORIDE 100 MG: 50 TABLET, FILM COATED ORAL at 01:30

## 2024-01-16 RX ADMIN — GABAPENTIN 600 MG: 600 TABLET, FILM COATED ORAL at 08:04

## 2024-01-16 ASSESSMENT — ACTIVITIES OF DAILY LIVING (ADL)
ADLS_ACUITY_SCORE: 28

## 2024-01-16 NOTE — PLAN OF CARE
Problem: Adult Behavioral Health Plan of Care  Goal: Develops/Participates in Therapeutic [place] to Support Successful Transition  Outcome: Adequate for Care Transition   Goal Outcome Evaluation:       Pt discharging to CD tx at 1000, being picked up by treatment center from [facility]. Pt reviewed AVS with writer, given opportunity to ask questions, pt verbalized that he understood discharge instructions and had no further questions. Writer asked pt to review his medications but he declined, states he knows his medications and what they are for, able to verbalize when he takes his medications and he signed receipt for medications which were ordered upon discharge. Pt reviewed and signed for his belongings and all personal items were returned to pt upon discharge from the unit, including supply of PTA medications. Pt reports feeling safe to leave, denies SI/SIB/HI/A/VH at this time, states he is ready for CD treatment. Pt restless to leave as he says he cannot wait to smoke outside. Pt left unit at 0945 accompanied by unit staff to [facility], medication bag handed to  from CD treatment.

## 2024-01-16 NOTE — CARE PLAN
01/15/24 1934   Group Therapy Session   Group Attendance attended group session   Time Session Began 1700   Time Session Ended 1745   Total Time (minutes) 45   Total # Attendees 5   Group Type community;recreation   Patient Participation Detail OT: Cognitive activity via leisure task for attention, sequencing, communication, new learning, retention, reality-orientation, social engagement, leisure exploration and coping with symptoms.  Pt Response: Pt presented with uplifted, jovial mood. Pt able to track the multi-step activity. Good language capability noted using memory and naming for creative responses. Some restlessness observed as pt would stand for about 75% of the group. Pt appeared to enjoy the abstract leisure task, evidenced by brightened affect and socialization with peers. Pt engaged in some banter with a peer, but this appeared to remain light without incident.
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
## 2024-01-16 NOTE — PLAN OF CARE
BEH IP Unit Acuity Rating Score (UARS)  Patient is given one point for every criteria they meet.    CRITERIA SCORING   On a 72 hour hold, court hold, committed, stay of commitment, or revocation 0/1    Patient LOS on BEH unit exceeds 20 days 0/1  LOS: 9   Patient under guardianship, 55+, otherwise medically complex, or under age 11 0/1   Suicide ideation without relief of precipitating factors 1/1   Current plan for suicide 0/1   Current plan for homicide 0/1   Imminent risk or actual attempt to seriously harm another without relief of factors precipitating the attempt 0/1   Severe dysfunction in daily living (ex: complete neglect for self care, extreme disruption in vegetative function, extreme deterioration in social interactions) 1/1   Recent (last 7 days) or current physical aggression in the ED or on unit 0/1   Restraints or seclusion episode in past 72 hours 0/1   Recent (last 7 days) or current verbal aggression, agitation, yelling, etc., while in the ED or unit 0/1   Active psychosis 0/1   Need for constant or near constant redirection (from leaving, from others, etc).  0/1   Intrusive or disruptive behaviors 0/1   TOTAL 2

## 2024-01-16 NOTE — PLAN OF CARE
Problem: Sleep Disturbance  Goal: Adequate Sleep/Rest  Outcome: Progressing  Intervention: Promote Sleep/Rest  Recent Flowsheet Documentation  Taken 1/16/2024 0154 by Carol Thompson RN  Sleep/Rest Enhancement: noise level reduced     Problem: Anxiety Signs/Symptoms  Goal: Optimized Energy Level (Anxiety Signs/Symptoms)  Outcome: Progressing   Goal Outcome Evaluation:         Pt in bed sleeping at the start of the shift breathing quietly and unlabored. No acute distress. Pt awake at 0145 and requested for prn hydroxyzine 50 mg and given. Pt sat in the lounge sleeping on and off. At 0320 pt requested for olanzapine 10 mg po and given for agitation. Pt stated medication helped. Pt went to bed and was awake at 0430, goes in and out of his room. Pt slept approximately 4 hours. No angry out burst or safety concerns.

## 2024-01-16 NOTE — PLAN OF CARE
Patient has spent majority of the shift in the milieu. Social with peers and staff. Denies suicidal ideation and self injurious thoughts. Reports being depressed and anxious.  Denies auditory and visual hallucinations. Did not request any prn's. Med compliant. Ate dinner and snack. Attended the evening group. Cooperative on the unit.     Patient evaluation continues. Assessed mood,anxiety,thoughts and behavior.     Patient gradually progressing towards goals.    Patient is encouraged to participate in groups and assisted to develop healthy coping skills.     VS reviewed: /71   Pulse 72   Temp 98  F (36.7  C) (Temporal)   Resp 18   Wt 118.9 kg (262 lb 1.6 oz)   SpO2 96%   BMI 34.58 kg/m      Length of stay: 8    Refer to daily team meeting notes for individualized plan of care. Nursing will continue to assess.

## 2024-01-25 ENCOUNTER — TRANSFERRED RECORDS (OUTPATIENT)
Dept: HEALTH INFORMATION MANAGEMENT | Facility: CLINIC | Age: 49
End: 2024-01-25

## 2024-02-02 NOTE — PROGRESS NOTES
Prior Authorization **APPROVED**    Authorization Effective Date: 1/1/2024  Authorization Expiration Date: 2/14/2024  Medication: BUPRENORPHINE HCL 2 MG SL SUBL  Approved Dose/Quantity: up to 900 tabs per 90 days  Reference #: CoverMyMeds Key: [id number] - PA Case ID: [id number]   Insurance Company: [employer] PMAP - Phone [phone] Fax [phone]  Expected CoPay: $ 0    CoPay Card Available: No    Foundation Assistance Needed: n/a  Which Pharmacy is filling the prescription (Not needed for infusion/clinic administered): [facility] [place], [place] - [address]  Pharmacy Notified: Yes  Patient Notified: Yes  Comments:  Insurance prefers Suboxone.SL films (Buprenorphine-Naloxone). Naltrexone noted on allergy list with headache as reaction.          Elizabeth Lentz [facility]  Discharge Pharmacy Liaison  [facility] Discharge Pharmacy  Pronouns: She/Her/Hers    Securely message with PersonSpot, Epic Secure Chat, or CallVU  Phone: [phone]  Fax: [phone]  [email or web address]

## 2024-02-02 NOTE — PROGRESS NOTES
Updated approval fax extending date to 2/16/25:        Elizabeth Lentz CPhT  Discharge Pharmacy Liaison  [facility] Discharge Pharmacy  Pronouns: She/Her/Hers    Securely message with opentabs, Epic Secure Chat, or [employer]  Phone: [phone]  Fax: [phone]  [email or web address]

## 2024-02-12 ENCOUNTER — TRANSFERRED RECORDS (OUTPATIENT)
Dept: HEALTH INFORMATION MANAGEMENT | Facility: CLINIC | Age: 49
End: 2024-02-12

## 2024-02-29 ENCOUNTER — TRANSFERRED RECORDS (OUTPATIENT)
Dept: HEALTH INFORMATION MANAGEMENT | Facility: CLINIC | Age: 49
End: 2024-02-29

## 2024-03-22 ENCOUNTER — TRANSFERRED RECORDS (OUTPATIENT)
Dept: HEALTH INFORMATION MANAGEMENT | Facility: CLINIC | Age: 49
End: 2024-03-22

## 2024-04-12 ENCOUNTER — OFFICE VISIT (OUTPATIENT)
Dept: FAMILY MEDICINE | Facility: CLINIC | Age: 49
End: 2024-04-12
Payer: COMMERCIAL

## 2024-04-12 VITALS
OXYGEN SATURATION: 99 % | WEIGHT: 282.3 LBS | SYSTOLIC BLOOD PRESSURE: 118 MMHG | TEMPERATURE: 98.4 F | RESPIRATION RATE: 20 BRPM | HEIGHT: 73 IN | BODY MASS INDEX: 37.41 KG/M2 | HEART RATE: 102 BPM | DIASTOLIC BLOOD PRESSURE: 83 MMHG

## 2024-04-12 DIAGNOSIS — F43.10 PTSD (POST-TRAUMATIC STRESS DISORDER): ICD-10-CM

## 2024-04-12 DIAGNOSIS — F90.9 ATTENTION DEFICIT HYPERACTIVITY DISORDER (ADHD), UNSPECIFIED ADHD TYPE: ICD-10-CM

## 2024-04-12 DIAGNOSIS — F11.20 CONTINUOUS OPIOID DEPENDENCE (H): Primary | ICD-10-CM

## 2024-04-12 DIAGNOSIS — F33.2 SEVERE EPISODE OF RECURRENT MAJOR DEPRESSIVE DISORDER, WITHOUT PSYCHOTIC FEATURES (H): ICD-10-CM

## 2024-04-12 DIAGNOSIS — F41.1 GAD (GENERALIZED ANXIETY DISORDER): ICD-10-CM

## 2024-04-12 PROCEDURE — 99204 OFFICE O/P NEW MOD 45 MIN: CPT

## 2024-04-12 RX ORDER — GABAPENTIN 600 MG/1
600 TABLET ORAL 3 TIMES DAILY
Qty: 90 TABLET | Refills: 0 | Status: SHIPPED | OUTPATIENT
Start: 2024-04-12

## 2024-04-12 RX ORDER — LITHIUM CARBONATE 300 MG/1
300 TABLET, FILM COATED, EXTENDED RELEASE ORAL DAILY
Qty: 30 TABLET | Refills: 0 | Status: SHIPPED | OUTPATIENT
Start: 2024-04-12 | End: 2024-07-11

## 2024-04-12 ASSESSMENT — PATIENT HEALTH QUESTIONNAIRE - PHQ9
10. IF YOU CHECKED OFF ANY PROBLEMS, HOW DIFFICULT HAVE THESE PROBLEMS MADE IT FOR YOU TO DO YOUR WORK, TAKE CARE OF THINGS AT HOME, OR GET ALONG WITH OTHER PEOPLE: EXTREMELY DIFFICULT
SUM OF ALL RESPONSES TO PHQ QUESTIONS 1-9: 8
SUM OF ALL RESPONSES TO PHQ QUESTIONS 1-9: 8

## 2024-04-12 ASSESSMENT — ASTHMA QUESTIONNAIRES
QUESTION_3 LAST FOUR WEEKS HOW OFTEN DID YOUR ASTHMA SYMPTOMS (WHEEZING, COUGHING, SHORTNESS OF BREATH, CHEST TIGHTNESS OR PAIN) WAKE YOU UP AT NIGHT OR EARLIER THAN USUAL IN THE MORNING: NOT AT ALL
QUESTION_1 LAST FOUR WEEKS HOW MUCH OF THE TIME DID YOUR ASTHMA KEEP YOU FROM GETTING AS MUCH DONE AT WORK, SCHOOL OR AT HOME: A LITTLE OF THE TIME
QUESTION_5 LAST FOUR WEEKS HOW WOULD YOU RATE YOUR ASTHMA CONTROL: WELL CONTROLLED
ACT_TOTALSCORE: 23
QUESTION_2 LAST FOUR WEEKS HOW OFTEN HAVE YOU HAD SHORTNESS OF BREATH: NOT AT ALL
QUESTION_4 LAST FOUR WEEKS HOW OFTEN HAVE YOU USED YOUR RESCUE INHALER OR NEBULIZER MEDICATION (SUCH AS ALBUTEROL): NOT AT ALL
ACT_TOTALSCORE: 23

## 2024-04-12 NOTE — PROGRESS NOTES
Assessment & Plan     Continuous opioid dependence (H)  Patient at [facility] for methamphetamine and alcohol abuse history.  Patient is on Subutex since hospitalization approximately 4 months ago at [facility] in [place].  Patient has since been seeing provider at [facility] and reports that dosage of Subutex is supposed to be different than what he is being prescribed and is visible in the PDMP.  We will request records so that we can get him on appropriate dose of Subutex.  Patient was given 15-day supply 4 days ago on 4/8/2024 and does have current medications.    KANE (generalized anxiety disorder)  Patient reports he has been off of his lithium for approximately 1.5 weeks and is having severe anxiety.  - gabapentin (NEURONTIN) 600 MG tablet; Take 1 tablet (600 mg) by mouth 3 times daily    PTSD (post-traumatic stress disorder)  Patient reports he has a history of severe PTSD.  Patient needs psychiatric stabilization after being started on new medications and inpatient treatment approximately 4 months ago.  Patient has [place] Medicaid for insurance.  He does have transportation with his current treatment facility if he needs to get to an appointment in [place].  Patient referred to behavioral health care coordinator to help get him connected with services in a timely matter.  - Adult Mental Health  Referral; Future    Attention deficit hyperactivity disorder (ADHD), unspecified ADHD type  Patient with history of ADHD.  He reports that he takes Strattera, this is not on his medication list.  Will request records and work to get medication list corrected.  - Adult Mental Health  Referral; Future    Severe episode of recurrent major depressive disorder, without psychotic features (H)  Patient was placed on lithium approximately 4 months ago in [facility].  There was some confusion about needing labs drawn and due to this confusion he  "stopped taking the medication 1.5 weeks ago, although he does still have the medication available.  It is unclear whether he was directed by a provider to stop this medication or self stopped the medication.  - Adult Mental Health  Referral; Future  - lithium ER (LITHOBID) 300 MG CR tablet; Take 1 tablet (300 mg) by mouth daily            Nicotine/Tobacco Cessation  He reports that he has been smoking cigarettes. He has been exposed to tobacco smoke. He uses smokeless tobacco.  Nicotine/Tobacco Cessation Plan  Information offered: Patient not interested at this time      BMI  Estimated body mass index is 37.24 kg/m  as calculated from the following:    Height as of this encounter: 1.854 m (6' 1\").    Weight as of this encounter: 128.1 kg (282 lb 4.8 oz).             Lizzette Amador is a 49 year old, presenting for the following health issues:  Recheck Medication (Medication follow up)        4/12/2024    10:05 AM   Additional Questions   Roomed by SILVIA Gee     Has been using [facility] in [place] for subutex. Also seeing them for psychiatry. Only seeing psychiatry virtually for current concerns.     Was previously at [facility] for inpatient treatment and prescribed medications by psychiatry there. Has been out of medications (lithium) for 1.5 weeks. Was a meth and alcohol user previously, has 4 months clean. Medicaid BCBS and has been told insurance has been a problem with getting subutex filled.     History of Present Illness       Reason for visit:  Medications    He eats 0-1 servings of fruits and vegetables daily.He consumes 2 sweetened beverage(s) daily.He exercises with enough effort to increase his heart rate 9 or less minutes per day.  He exercises with enough effort to increase his heart rate 3 or less days per week. He is missing 2 dose(s) of medications per week.                     Objective    /83 (BP Location: Right arm, Patient Position: Sitting, Cuff Size: Adult Large)   " "Pulse 102   Temp 98.4  F (36.9  C) (Oral)   Resp 20   Ht 1.854 m (6' 1\")   Wt 128.1 kg (282 lb 4.8 oz)   SpO2 99%   BMI 37.24 kg/m    Body mass index is 37.24 kg/m .  Physical Exam  HENT:      Head: Normocephalic.      Mouth/Throat:      Mouth: Mucous membranes are moist.   Eyes:      Extraocular Movements: Extraocular movements intact.      Conjunctiva/sclera: Conjunctivae normal.   Cardiovascular:      Rate and Rhythm: Normal rate.   Pulmonary:      Effort: Pulmonary effort is normal.   Musculoskeletal:         General: Normal range of motion.   Skin:     General: Skin is warm and dry.      Capillary Refill: Capillary refill takes less than 2 seconds.   Neurological:      Mental Status: He is alert and oriented to person, place, and time.   Psychiatric:         Attention and Perception: Attention normal.         Mood and Affect: Mood is anxious.         Speech: Speech normal.         Behavior: Behavior normal. Behavior is not agitated or aggressive. Behavior is cooperative.         Thought Content: Thought content normal.                  Signed Electronically by: WOLF Kenney CNP    "

## 2024-04-12 NOTE — COMMUNITY RESOURCES LIST (ENGLISH)
April 12, 2024           YOUR PERSONALIZED LIST OF SERVICES & PROGRAMS           & SHELTER    Case Management      [employer] - Housing Stabilization Services  Phone: [phone]  Website: https://homebasemn.com/  Language: English  Hours: Mon 8:00 AM - 4:00 PM Tue 8:00 AM - 4:00 PM Wed 8:00 AM - 4:00 PM Thu 8:00 AM - 4:00 PM Fri 8:00 AM - 4:00 PM  Fee: Free  Accessibility: Blind accommodation, Deaf or hard of hearing  Transportation Options: Free transportation    Payment Assistance      [employer] - Rent and mortgage payment assistance  [address] [place] [id number] (Distance: 17.1 miles)  Phone: [phone]) [phone]  Website: http://www.[facility].peerTransfer  Language: English  Fee: Free      and [facility] - Emergency Housing Assistance  [address] [place] [address] (Distance: 0.2 miles)  Website: https://www.[facility].org/emergency-housing-  Language: English  Fee: Free      [employer] - Keep the Key  Phone: (976) 230-4896  Website: https://www.tkr85-jsotvtgdxciiss.org/programs.html  Language: English  Hours: Mon 7:00 AM - 7:00 PM Tue 7:00 AM - 7:00 PM Wed 7:00 AM - 7:00 PM Thu 7:00 AM - 7:00 PM Fri 7:00 AM - 7:00 PM  Fee: Free    Mediation & Eviction Prevention      Line - Tenant Rights / Eviction Prevention  Website: https://"[employer].peerTransfer/y-wmzr-ad-/  Language: English, Serbian               IMPORTANT NUMBERS & WEBSITES        Emergency Services  911  .   United Way  211 http://211unitedway.org  .   Poison Control  (834) 394-3208 http://[place]poison.org http://[place]poison.org  .     Suicide and Crisis Lifeline  988 http://988lifeline.org  .   Childhelp National Child Abuse Hotline  539.929.1556 http://Childhelphotline.org   .   National Sexual Assault Hotline  (849) 888-2401 (HOPE) http://Rainn.org   .     National Runaway Safeline  (904) 882-7292 (RUNAWAY) http://[facility].org  .   Pregnancy & Postpartum  Support  Call/text 846-769-8924  [place]: http://ppsupportmn.org  [place]: http://[employer] party supplies & rentals.com/wi  .   Substance Abuse National Helpline ([facility])  922-378-HELP (5673) http://Findtreatment.gov   .                DISCLAIMER: These resources have been generated via the PrePayMe Platform. [employer] does not endorse any service providers mentioned in this resource list. [employer] does not guarantee that the services mentioned in this resource list will be available to you or will improve your health or wellness.    [facility]

## 2024-04-22 ENCOUNTER — TELEPHONE (OUTPATIENT)
Dept: FAMILY MEDICINE | Facility: CLINIC | Age: 49
End: 2024-04-22
Payer: COMMERCIAL

## 2024-04-22 NOTE — TELEPHONE ENCOUNTER
Medication Question or Refill    Contacts         Type Contact Phone/Fax    04/22/2024 12:21 PM CDT Phone (Incoming) Perry Preciado Jr. (Self) [phone] (M)            What medication are you calling about (include dose and sig)?:   buprenorphine (SUBUTEX) 2 MG SUBL sublingual tablet   And inhaler    Preferred Pharmacy:   [address] [address] [phone]    Controlled Substance Agreement on file:   CSA -- Patient Level:     [Media Unavailable] Controlled Substance Agreement - Opioid - Scan on 5/10/2019  3:21 PM       Who prescribed the medication?:   Khurram Dawn MD   Patient reported Marta Gomez as PCP and is asking her to refill this    Do you need a refill? Yes    When did you use the medication last? Today, is completely out    Patient offered an appointment? No, patient said he is in treatment and unable to come in for an appointment right now.    Do you have any questions or concerns?  No      Okay to leave a detailed message?: Yes at Cell number on file:    Telephone Information:   Mobile [phone]

## 2024-04-23 ENCOUNTER — TELEPHONE (OUTPATIENT)
Dept: FAMILY MEDICINE | Facility: CLINIC | Age: 49
End: 2024-04-23
Payer: COMMERCIAL

## 2024-04-26 ENCOUNTER — TRANSFERRED RECORDS (OUTPATIENT)
Dept: HEALTH INFORMATION MANAGEMENT | Facility: CLINIC | Age: 49
End: 2024-04-26

## 2024-05-08 ENCOUNTER — TELEPHONE (OUTPATIENT)
Dept: FAMILY MEDICINE | Facility: CLINIC | Age: 49
End: 2024-05-08
Payer: COMMERCIAL

## 2024-05-08 NOTE — TELEPHONE ENCOUNTER
Patient called and not identifying message left informing patient that records from previous facility have been received and inquiring as to whether patient has been receiving needed prescriptions from other provider.  Patient advised to call clinic and schedule visit if he continues to have needs for Suboxone prescribing.    WOLF Kenney CNP on 5/8/2024 at 2:08 PM

## 2024-05-15 ENCOUNTER — HOSPITAL ENCOUNTER (OUTPATIENT)
Facility: CLINIC | Age: 49
Setting detail: OBSERVATION
Discharge: STILL ADMITTED OR ADMINISTRATIVE | End: 2024-05-19
Attending: FAMILY MEDICINE | Admitting: FAMILY MEDICINE
Payer: COMMERCIAL

## 2024-05-15 DIAGNOSIS — F32.A DEPRESSION, UNSPECIFIED DEPRESSION TYPE: ICD-10-CM

## 2024-05-15 DIAGNOSIS — F19.10 POLYSUBSTANCE ABUSE (H): ICD-10-CM

## 2024-05-15 DIAGNOSIS — R45.851 SUICIDAL IDEATION: ICD-10-CM

## 2024-05-15 DIAGNOSIS — F15.10 METHAMPHETAMINE USE (H): ICD-10-CM

## 2024-05-15 LAB
ALBUMIN SERPL BCG-MCNC: 3.8 G/DL (ref 3.5–5.2)
ALCOHOL BREATH TEST: 0 (ref 0–0.01)
ALP SERPL-CCNC: 122 U/L (ref 40–150)
ALT SERPL W P-5'-P-CCNC: 46 U/L (ref 0–70)
ANION GAP SERPL CALCULATED.3IONS-SCNC: 9 MMOL/L (ref 7–15)
AST SERPL W P-5'-P-CCNC: 48 U/L (ref 0–45)
BASOPHILS # BLD AUTO: 0.1 10E3/UL (ref 0–0.2)
BASOPHILS NFR BLD AUTO: 0 %
BILIRUB SERPL-MCNC: 0.4 MG/DL
BUN SERPL-MCNC: 21.8 MG/DL (ref 6–20)
CALCIUM SERPL-MCNC: 8.6 MG/DL (ref 8.6–10)
CHLORIDE SERPL-SCNC: 101 MMOL/L (ref 98–107)
CREAT SERPL-MCNC: 1.01 MG/DL (ref 0.67–1.17)
DEPRECATED HCO3 PLAS-SCNC: 25 MMOL/L (ref 22–29)
EGFRCR SERPLBLD CKD-EPI 2021: >90 ML/MIN/1.73M2
EOSINOPHIL # BLD AUTO: 0.1 10E3/UL (ref 0–0.7)
EOSINOPHIL NFR BLD AUTO: 1 %
ERYTHROCYTE [DISTWIDTH] IN BLOOD BY AUTOMATED COUNT: 13.3 % (ref 10–15)
GLUCOSE SERPL-MCNC: 134 MG/DL (ref 70–99)
HCT VFR BLD AUTO: 39.2 % (ref 40–53)
HGB BLD-MCNC: 13.2 G/DL (ref 13.3–17.7)
IMM GRANULOCYTES # BLD: 0 10E3/UL
IMM GRANULOCYTES NFR BLD: 0 %
LIPASE SERPL-CCNC: 17 U/L (ref 13–60)
LYMPHOCYTES # BLD AUTO: 2.5 10E3/UL (ref 0.8–5.3)
LYMPHOCYTES NFR BLD AUTO: 22 %
MAGNESIUM SERPL-MCNC: 2 MG/DL (ref 1.7–2.3)
MCH RBC QN AUTO: 29.3 PG (ref 26.5–33)
MCHC RBC AUTO-ENTMCNC: 33.7 G/DL (ref 31.5–36.5)
MCV RBC AUTO: 87 FL (ref 78–100)
MONOCYTES # BLD AUTO: 0.9 10E3/UL (ref 0–1.3)
MONOCYTES NFR BLD AUTO: 8 %
NEUTROPHILS # BLD AUTO: 7.8 10E3/UL (ref 1.6–8.3)
NEUTROPHILS NFR BLD AUTO: 69 %
NRBC # BLD AUTO: 0 10E3/UL
NRBC BLD AUTO-RTO: 0 /100
PLATELET # BLD AUTO: 248 10E3/UL (ref 150–450)
POTASSIUM SERPL-SCNC: 3.4 MMOL/L (ref 3.4–5.3)
PROT SERPL-MCNC: 6.5 G/DL (ref 6.4–8.3)
RBC # BLD AUTO: 4.5 10E6/UL (ref 4.4–5.9)
SODIUM SERPL-SCNC: 135 MMOL/L (ref 135–145)
WBC # BLD AUTO: 11.4 10E3/UL (ref 4–11)

## 2024-05-15 PROCEDURE — 83690 ASSAY OF LIPASE: CPT | Performed by: FAMILY MEDICINE

## 2024-05-15 PROCEDURE — 82040 ASSAY OF SERUM ALBUMIN: CPT | Performed by: FAMILY MEDICINE

## 2024-05-15 PROCEDURE — G0378 HOSPITAL OBSERVATION PER HR: HCPCS

## 2024-05-15 PROCEDURE — 85025 COMPLETE CBC W/AUTO DIFF WBC: CPT | Performed by: FAMILY MEDICINE

## 2024-05-15 PROCEDURE — 36415 COLL VENOUS BLD VENIPUNCTURE: CPT | Performed by: FAMILY MEDICINE

## 2024-05-15 PROCEDURE — 82075 ASSAY OF BREATH ETHANOL: CPT | Performed by: EMERGENCY MEDICINE

## 2024-05-15 PROCEDURE — 250N000013 HC RX MED GY IP 250 OP 250 PS 637: Performed by: FAMILY MEDICINE

## 2024-05-15 PROCEDURE — 99223 1ST HOSP IP/OBS HIGH 75: CPT | Performed by: EMERGENCY MEDICINE

## 2024-05-15 PROCEDURE — 99285 EMERGENCY DEPT VISIT HI MDM: CPT | Performed by: EMERGENCY MEDICINE

## 2024-05-15 PROCEDURE — 83735 ASSAY OF MAGNESIUM: CPT | Performed by: FAMILY MEDICINE

## 2024-05-15 RX ORDER — OLANZAPINE 10 MG/1
10 TABLET, ORALLY DISINTEGRATING ORAL ONCE
Status: COMPLETED | OUTPATIENT
Start: 2024-05-15 | End: 2024-05-15

## 2024-05-15 RX ADMIN — OLANZAPINE 10 MG: 10 TABLET, ORALLY DISINTEGRATING ORAL at 22:06

## 2024-05-15 ASSESSMENT — COLUMBIA-SUICIDE SEVERITY RATING SCALE - C-SSRS
3. HAVE YOU BEEN THINKING ABOUT HOW YOU MIGHT KILL YOURSELF?: YES
2. HAVE YOU ACTUALLY HAD ANY THOUGHTS OF KILLING YOURSELF IN THE PAST MONTH?: YES
6. HAVE YOU EVER DONE ANYTHING, STARTED TO DO ANYTHING, OR PREPARED TO DO ANYTHING TO END YOUR LIFE?: YES
5. HAVE YOU STARTED TO WORK OUT OR WORKED OUT THE DETAILS OF HOW TO KILL YOURSELF? DO YOU INTEND TO CARRY OUT THIS PLAN?: YES
4. HAVE YOU HAD THESE THOUGHTS AND HAD SOME INTENTION OF ACTING ON THEM?: NO
1. IN THE PAST MONTH, HAVE YOU WISHED YOU WERE DEAD OR WISHED YOU COULD GO TO SLEEP AND NOT WAKE UP?: YES

## 2024-05-15 ASSESSMENT — ACTIVITIES OF DAILY LIVING (ADL)
ADLS_ACUITY_SCORE: 35
ADLS_ACUITY_SCORE: 35

## 2024-05-16 ENCOUNTER — TELEPHONE (OUTPATIENT)
Dept: BEHAVIORAL HEALTH | Facility: CLINIC | Age: 49
End: 2024-05-16
Payer: COMMERCIAL

## 2024-05-16 LAB
AMPHETAMINES UR QL SCN: ABNORMAL
BARBITURATES UR QL SCN: ABNORMAL
BENZODIAZ UR QL SCN: ABNORMAL
BZE UR QL SCN: ABNORMAL
CANNABINOIDS UR QL SCN: ABNORMAL
FENTANYL UR QL: ABNORMAL
OPIATES UR QL SCN: ABNORMAL
PCP QUAL URINE (ROCHE): ABNORMAL

## 2024-05-16 PROCEDURE — G0378 HOSPITAL OBSERVATION PER HR: HCPCS

## 2024-05-16 PROCEDURE — 99232 SBSQ HOSP IP/OBS MODERATE 35: CPT | Performed by: PSYCHIATRY & NEUROLOGY

## 2024-05-16 PROCEDURE — 250N000013 HC RX MED GY IP 250 OP 250 PS 637: Performed by: PSYCHIATRY & NEUROLOGY

## 2024-05-16 PROCEDURE — 80307 DRUG TEST PRSMV CHEM ANLYZR: CPT | Performed by: FAMILY MEDICINE

## 2024-05-16 RX ORDER — HYDROXYZINE HYDROCHLORIDE 50 MG/1
100 TABLET, FILM COATED ORAL 2 TIMES DAILY PRN
Status: ON HOLD | COMMUNITY
End: 2024-06-04

## 2024-05-16 RX ORDER — LITHIUM CARBONATE 300 MG/1
300 TABLET, FILM COATED, EXTENDED RELEASE ORAL 2 TIMES DAILY
Status: DISCONTINUED | OUTPATIENT
Start: 2024-05-16 | End: 2024-05-19 | Stop reason: HOSPADM

## 2024-05-16 RX ORDER — ATORVASTATIN CALCIUM 20 MG/1
20 TABLET, FILM COATED ORAL EVERY EVENING
Status: DISCONTINUED | OUTPATIENT
Start: 2024-05-16 | End: 2024-05-19 | Stop reason: HOSPADM

## 2024-05-16 RX ORDER — ATOMOXETINE 60 MG/1
60 CAPSULE ORAL DAILY
Status: ON HOLD | COMMUNITY
End: 2024-06-04

## 2024-05-16 RX ORDER — ATOMOXETINE 60 MG/1
60 CAPSULE ORAL DAILY
Status: DISCONTINUED | OUTPATIENT
Start: 2024-05-16 | End: 2024-05-19 | Stop reason: HOSPADM

## 2024-05-16 RX ORDER — QUETIAPINE FUMARATE 100 MG/1
200 TABLET, FILM COATED ORAL AT BEDTIME
Status: DISCONTINUED | OUTPATIENT
Start: 2024-05-16 | End: 2024-05-19 | Stop reason: HOSPADM

## 2024-05-16 RX ORDER — OLANZAPINE 20 MG/1
10 TABLET ORAL 2 TIMES DAILY PRN
Status: ON HOLD | COMMUNITY
End: 2024-06-04

## 2024-05-16 RX ORDER — HYDROXYZINE HYDROCHLORIDE 50 MG/1
100 TABLET, FILM COATED ORAL 2 TIMES DAILY PRN
Status: DISCONTINUED | OUTPATIENT
Start: 2024-05-16 | End: 2024-05-19 | Stop reason: HOSPADM

## 2024-05-16 RX ORDER — BUPRENORPHINE 8 MG/1
8 TABLET SUBLINGUAL 2 TIMES DAILY
Status: ON HOLD | COMMUNITY
End: 2024-06-04

## 2024-05-16 RX ORDER — ATORVASTATIN CALCIUM 20 MG/1
20 TABLET, FILM COATED ORAL EVERY EVENING
Status: ON HOLD | COMMUNITY
End: 2024-06-04

## 2024-05-16 RX ORDER — QUETIAPINE FUMARATE 200 MG/1
200 TABLET, FILM COATED ORAL AT BEDTIME
Status: ON HOLD | COMMUNITY
End: 2024-06-04

## 2024-05-16 RX ORDER — GABAPENTIN 600 MG/1
600 TABLET ORAL 3 TIMES DAILY
Status: DISCONTINUED | OUTPATIENT
Start: 2024-05-16 | End: 2024-05-19 | Stop reason: HOSPADM

## 2024-05-16 RX ORDER — PRAZOSIN HYDROCHLORIDE 1 MG/1
3 CAPSULE ORAL AT BEDTIME
Status: ON HOLD | COMMUNITY
End: 2024-06-04

## 2024-05-16 RX ORDER — FLUOXETINE 10 MG/1
60 TABLET, FILM COATED ORAL DAILY
Status: DISCONTINUED | OUTPATIENT
Start: 2024-05-16 | End: 2024-05-19 | Stop reason: HOSPADM

## 2024-05-16 RX ORDER — LITHIUM CARBONATE 300 MG/1
300 TABLET, FILM COATED, EXTENDED RELEASE ORAL 2 TIMES DAILY
Status: ON HOLD | COMMUNITY
End: 2024-06-04

## 2024-05-16 RX ORDER — BUPRENORPHINE 2 MG/1
8 TABLET SUBLINGUAL 2 TIMES DAILY
Status: DISCONTINUED | OUTPATIENT
Start: 2024-05-16 | End: 2024-05-19 | Stop reason: HOSPADM

## 2024-05-16 RX ORDER — GABAPENTIN 600 MG/1
600 TABLET ORAL 3 TIMES DAILY
Status: ON HOLD | COMMUNITY
End: 2024-06-04

## 2024-05-16 RX ORDER — OLANZAPINE 10 MG/1
10 TABLET ORAL 2 TIMES DAILY PRN
Status: DISCONTINUED | OUTPATIENT
Start: 2024-05-16 | End: 2024-05-19 | Stop reason: HOSPADM

## 2024-05-16 RX ORDER — FLUOXETINE 20 MG/1
60 TABLET, FILM COATED ORAL DAILY
Status: ON HOLD | COMMUNITY
End: 2024-06-04

## 2024-05-16 RX ADMIN — ATORVASTATIN CALCIUM 20 MG: 20 TABLET, FILM COATED ORAL at 19:11

## 2024-05-16 RX ADMIN — FLUOXETINE HYDROCHLORIDE 60 MG: 10 TABLET, COATED ORAL at 19:14

## 2024-05-16 RX ADMIN — LITHIUM CARBONATE 300 MG: 300 TABLET, EXTENDED RELEASE ORAL at 19:09

## 2024-05-16 RX ADMIN — GABAPENTIN 600 MG: 600 TABLET, FILM COATED ORAL at 19:10

## 2024-05-16 RX ADMIN — PRAZOSIN HYDROCHLORIDE 3 MG: 2 CAPSULE ORAL at 21:50

## 2024-05-16 RX ADMIN — BUPRENORPHINE 8 MG: 8 TABLET SUBLINGUAL at 21:11

## 2024-05-16 RX ADMIN — QUETIAPINE FUMARATE 200 MG: 100 TABLET ORAL at 21:50

## 2024-05-16 RX ADMIN — ATOMOXETINE 60 MG: 60 CAPSULE ORAL at 19:10

## 2024-05-16 ASSESSMENT — COLUMBIA-SUICIDE SEVERITY RATING SCALE - C-SSRS
2. HAVE YOU ACTUALLY HAD ANY THOUGHTS OF KILLING YOURSELF?: YES
ATTEMPT SINCE LAST CONTACT: NO
1. SINCE LAST CONTACT, HAVE YOU WISHED YOU WERE DEAD OR WISHED YOU COULD GO TO SLEEP AND NOT WAKE UP?: YES
TOTAL  NUMBER OF INTERRUPTED ATTEMPTS SINCE LAST CONTACT: NO
REASONS FOR IDEATION SINCE LAST CONTACT: COMPLETELY TO END OR STOP THE PAIN (YOU COULDN'T GO ON LIVING WITH THE PAIN OR HOW YOU WERE FEELING)
TOTAL  NUMBER OF ABORTED OR SELF INTERRUPTED ATTEMPTS SINCE LAST CONTACT: NO
5. HAVE YOU STARTED TO WORK OUT OR WORKED OUT THE DETAILS OF HOW TO KILL YOURSELF? DO YOU INTEND TO CARRY OUT THIS PLAN?: YES
6. HAVE YOU EVER DONE ANYTHING, STARTED TO DO ANYTHING, OR PREPARED TO DO ANYTHING TO END YOUR LIFE?: NO

## 2024-05-16 NOTE — ED NOTES
"Writer met with the patient once up and about. Pt affect presents as calm \"tired.\" Pt sleeping through much of the morning. Pt reports addiction with meth. Pt states has used Subutex for long term therapy. Pt tolerated some breakfast, toileted and returned to bed, sleeping. Vital signs stable.  "

## 2024-05-16 NOTE — MEDICATION SCRIBE - ADMISSION MEDICATION HISTORY
Medication Scribe Admission Medication History    Admission medication history is complete. The information provided in this note is only as accurate as the sources available at the time of the update.    Information Source(s): Patient and CareEverywhere/SureScripts via in-person    Pertinent Information: Contacted pt pharmacy, [email or web address] ([phone]).    Changes made to PTA medication list:  Added:   All meds below   Deleted: None  Changed: None    Allergies reviewed with patient and updates made in EHR: yes    Medication History Completed By: Shoshana Slaazar 5/16/2024 12:25 PM    PTA Med List   Medication Sig Last Dose    atomoxetine (STRATTERA) 60 MG capsule Take 60 mg by mouth daily Unknown    atorvastatin (LIPITOR) 20 MG tablet Take 20 mg by mouth every evening Unknown    buprenorphine (SUBUTEX) 8 MG SUBL sublingual tablet Place 8 mg under the tongue 2 times daily Unknown    FLUoxetine 20 MG tablet Take 60 mg by mouth daily Unknown    gabapentin (NEURONTIN) 600 MG tablet Take 600 mg by mouth 3 times daily Unknown    hydrOXYzine HCl (ATARAX) 50 MG tablet Take 100 mg by mouth 2 times daily as needed for itching Unknown    lithium ER (LITHOBID) 300 MG CR tablet Take 300 mg by mouth 2 times daily Unknown    OLANZapine (ZYPREXA) 20 MG tablet Take 30 mg by mouth 2 times daily as needed Unknown    prazosin (MINIPRESS) 1 MG capsule Take 3 mg by mouth at bedtime Unknown    QUEtiapine (SEROQUEL) 200 MG tablet Take 200 mg by mouth at bedtime Unknown

## 2024-05-16 NOTE — ED NOTES
0150 - Patient arrived on unit escorted via ED staff x2.  Patient friendly and cooperative.  Oriented to unit, bathroom, water, staff.  Provided snack per request.  Understands to make needs known to staff.  No current needs at this time.  Patient states just feeling sleepy and did not want full interview requesting to return to bed.  Patient assigned to [facility] 08M.  No further needs at this time.  UA collected and sent to lab.    0624  Patient rested latter part of shift after admission with no awakenings or staff interactions.  Pt currently resting with eyes closed, no signs of distress or pain.  No labored breathing, chest rise visualized.
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
## 2024-05-16 NOTE — MEDICATION SCRIBE - ADMISSION MEDICATION HISTORY
Pt marked for merge, contacted registration who referred me to HIM. HIM said they don't merge- unable to access pt records. Pt also sleeping at this time, will try to access medications later if patient is awake, alert and knowledge about meds.

## 2024-05-16 NOTE — PLAN OF CARE
"Perry Preciado  May 16, 2024  Plan of Care Hand-off Note     Patient Care Path: observation    Plan for Care:     The pt arrived via friends / family due to suicidal ideation and substance abuse. Pt reported he is here because he \"is really suicidal\". Pt reported he was at a party earlier [facility], and only knew one person there, which was a woman he graduated high school with. He reported he used meth and \"other drugs he isn't sure of\". He reported the woman he knew there is \"wanted for murder\" and is \"a well known meth dealer\" and he reported feeling uncomfortable and left the party. He stated after leaving the party, someone followed him. Pt reported he called 911. Pt reported he has also felt suicidal for \"the past couple years\", more so the last year after his mom passed away. Pt then reported he was at a sober home for one week and left voluntarily on 5/10/24 because he was \"going to go kill himself\". He reported he didn't end up doing it because he \"couldn't make up his mind\". Pt reported he currently has a plan to shoot himself. When writer asked about access to firearms, he stated, \"I have access, but I don't have them. They are my guns that I gave to a triston, about 20 of them, and I know how to get them\". When writer asked pt why he gave away his guns in the first place, he stated, \"I don't know, I did a lot of things that didn't make sense the past year\". Pt then stated, \"my brain hurts\". Pt appeared under the influence, was slurring his words, and falling asleep intermittently in assessment. Writer and MD both agreed to keep pt overnight as observation status, with a reassessment in the AM to determine discharge vs. IP MH. Pt agreeable to plan.    Legal Status:  Vol    Psychiatry Consult: No     Updated  RN, MD regarding plan of care.      Lorelei Shea, LGSW        "
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
## 2024-05-16 NOTE — PLAN OF CARE
Perry Preciado  May 16, 2024  Plan of Care Hand-off Note     Patient Care Path: inpatient mental health    Plan for Care:   He reports SI with plan to blow his head off with a gun. He identifies access to a gun. He has hx of suicide attempts and has lost two family member (father and sister) in the last year to suicide. Patient denies SIB or thoughts of harming others. He reports he had visual hallucinations yesterday, denies them today.  Off medications for 5 days.    Identified Goals and Safety Issues:  Further evaluation and stabilization    Legal Status: Legal Status at Admission: Voluntary/Patient has signed consent for treatment    Psychiatry Consult: not at time time       Updated Dr Eduardo  regarding plan of care.           DREW FrancoSW

## 2024-05-16 NOTE — ED NOTES
Attempted to meet with patient for reassessment.  Found patient resting/steeping in [facility] lounge area.  Patient requested clinician come back later for reassessment.    DREW LaSW
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
## 2024-05-16 NOTE — ED TRIAGE NOTES
Patient reports feeling suicidal, was wondering outside when someone found him and brought him in. Plan to shoot himself, reports has guns in the house. Patient also reports using meth/fentanyl yesterday.     Triage Assessment (Adult)       Row Name 05/15/24 4549          Triage Assessment    Airway WDL WDL        Respiratory WDL    Respiratory WDL WDL        Skin Circulation/Temperature WDL    Skin Circulation/Temperature WDL WDL        Cardiac WDL    Cardiac WDL WDL        Peripheral/Neurovascular WDL    Peripheral Neurovascular WDL WDL        Cognitive/Neuro/Behavioral WDL    Cognitive/Neuro/Behavioral WDL WDL

## 2024-05-17 ENCOUNTER — TELEPHONE (OUTPATIENT)
Dept: BEHAVIORAL HEALTH | Facility: CLINIC | Age: 49
End: 2024-05-17
Payer: COMMERCIAL

## 2024-05-17 PROCEDURE — 99231 SBSQ HOSP IP/OBS SF/LOW 25: CPT | Performed by: FAMILY MEDICINE

## 2024-05-17 PROCEDURE — G0378 HOSPITAL OBSERVATION PER HR: HCPCS

## 2024-05-17 PROCEDURE — 99255 IP/OBS CONSLTJ NEW/EST HI 80: CPT

## 2024-05-17 PROCEDURE — 250N000013 HC RX MED GY IP 250 OP 250 PS 637: Performed by: PSYCHIATRY & NEUROLOGY

## 2024-05-17 RX ADMIN — BUPRENORPHINE 8 MG: 8 TABLET SUBLINGUAL at 20:09

## 2024-05-17 RX ADMIN — GABAPENTIN 600 MG: 600 TABLET, FILM COATED ORAL at 20:09

## 2024-05-17 RX ADMIN — PRAZOSIN HYDROCHLORIDE 3 MG: 2 CAPSULE ORAL at 21:01

## 2024-05-17 RX ADMIN — QUETIAPINE FUMARATE 200 MG: 100 TABLET ORAL at 21:00

## 2024-05-17 RX ADMIN — LITHIUM CARBONATE 300 MG: 300 TABLET, EXTENDED RELEASE ORAL at 20:09

## 2024-05-17 RX ADMIN — GABAPENTIN 600 MG: 600 TABLET, FILM COATED ORAL at 09:23

## 2024-05-17 RX ADMIN — FLUOXETINE HYDROCHLORIDE 60 MG: 10 TABLET, COATED ORAL at 09:25

## 2024-05-17 RX ADMIN — BUPRENORPHINE 8 MG: 8 TABLET SUBLINGUAL at 09:24

## 2024-05-17 RX ADMIN — ATOMOXETINE 60 MG: 60 CAPSULE ORAL at 09:24

## 2024-05-17 RX ADMIN — LITHIUM CARBONATE 300 MG: 300 TABLET, EXTENDED RELEASE ORAL at 09:24

## 2024-05-17 RX ADMIN — GABAPENTIN 600 MG: 600 TABLET, FILM COATED ORAL at 13:27

## 2024-05-17 RX ADMIN — ATORVASTATIN CALCIUM 20 MG: 20 TABLET, FILM COATED ORAL at 20:12

## 2024-05-17 NOTE — TELEPHONE ENCOUNTER
R: [place]  Access Inpatient Bed Call Log  5/17/2024 12:00 AM  Intake has called facilities that have not updated their bed status within the last 12 hours.??      ADULTS:     *[facility]: @ cap per website.     Pt remains on waitlist pending appropriate placement availability.

## 2024-05-17 NOTE — ED NOTES
12:00 pm I took over care of this patient alert x 4. Has been resting most of the shift he can voice his needs, take medications with no issues.He can voice his needs.

## 2024-05-17 NOTE — CONSULTS
"  Perry Preciado MRN# [id number]   Age: 49 year old YOB: 1975   Date of Admission to ED: 5/15/2024    In person visit Details:     Patient was assessed and interviewed face-to-face in person with this writer [place]. Patient was observed to be able to participate in the assessment as evidenced by verbal consent. Assessment methods included conducting a formal interview with patient, review of medical records, collaboration with medical staff, and obtaining relevant collateral information from family and community providers when available.        Reason for Consult:   This note is being entered to supplement the psychiatry consultation note that was completed on May 16, 2024 by the licensed mental health professional Lorelei Shea LGSW  have reviewed the pertinent clinical details related to their encounter. I am being consulted to offer additional guidance on psychiatric pharmacological interventions      Writer met patient by himself pleasant and cooperative during assessment and interview patient continued to endorse suicidal ideation by using guns he said to me \" I have nothing to live for I just relapsed on methamphetamine and fentanyl I rather ended all, my sister committed suicide 3 months ago my mother committed suicide 3 months ago.\"  This writer was familiar with this patient he was inpatient mental health unit in January 2024, at that time patient told me his father committed suicide, his sister committed suicide 1 week ago but today patient said \" my mother committed suicide 3 months ago my sister also committed suicide 3 months ago.\"  Writer asked him for clarification January 2024 he said his father and his sister committed suicide.  Currently patient is homeless he was kicked out of sober housing due to relapsing on fentanyl and methamphetamine he told me he stopped taking his prescription medication just started yesterday.    There is genetic loading for suicidal completion in the " family Per patient.  Medical history does not appear to be significant.  Substance use does appear to be playing a contributing role in the patient's presentation.,  Severe opiate use disorder and severe stimulant use disorder patient appears to cope with stress/frustration/emotion by withdrawing.  Stressors include chronic mental health issues, school issues, peer issues, and family dynamics.        I have reviewed the nursing notes. I have reviewed the findings, diagnosis, plan and need for follow up with the patient.         HPI:     Perry Preciado is a 49 year old male who presents the emergency room with somewhat altered mental status stating that he is feeling suicidal and has access to guns in his home. Patient states that he has a long history of chemical dependency issues and states that he had gone through treatment however ended up being with an individual that was using and immediately gave him drugs. Patient thinks that he has been using methamphetamine and possibly fentanyl as well as some alcohol. Patient feels at risk he is slightly agitated here and states that he feels as though he would try and harm himself if discharged.          Pt has not required locked seclusion or restraints in the past 24 hours to maintain safety, please refer to RN documentation for further details.  Substance use does appear to be playing a contributing role in the patient's presentation.  Brief Therapeutic Intervention(s):   Provided active listening, unconditional positive regard, and validation. Engaged in cognitive restructuring/ reframing, looked at common cognitive distortions and challenged negative thoughts. Engaged in guided discovery, explored patient's perspectives and helped expand them through socratic dialogue. Provided positive reinforcement for progress towards goals, gains in knowledge, and application of skills previously taught.  Engaged in social skills training. Explored and identified early  warning signs to anger*        Past Psychiatric History:     See DEC  note        Substance Use and History:     Severe opiate use use disorder severe stimulant use disorder        Past Medical History:   PAST MEDICAL HISTORY: History reviewed. No pertinent past medical history.    PAST SURGICAL HISTORY: History reviewed. No pertinent surgical history.            Allergies:     Allergies   Allergen Reactions    Wellbutrin [Bupropion] Difficulty breathing             Medications:   I have reviewed this patient's current medications  Current Facility-Administered Medications   Medication Dose Route Frequency Provider Last Rate Last Admin    atomoxetine (STRATTERA) capsule 60 mg  60 mg Oral Daily Piter Eduardo MD   60 mg at 05/17/24 0924    atorvastatin (LIPITOR) tablet 20 mg  20 mg Oral QPM Piter Eduardo MD   20 mg at 05/16/24 1911    buprenorphine (SUBUTEX) sublingual tablet 8 mg  8 mg Sublingual BID Piter Eduardo MD   8 mg at 05/17/24 0924    FLUoxetine (PROzac) tablet 60 mg  60 mg Oral Daily Piter Eduardo MD   60 mg at 05/17/24 0925    gabapentin (NEURONTIN) tablet 600 mg  600 mg Oral TID Piter Eduardo MD   600 mg at 05/17/24 0923    hydrOXYzine HCl (ATARAX) tablet 100 mg  100 mg Oral BID PRN Piter Eduardo MD        lithium ER (LITHOBID) CR tablet 300 mg  300 mg Oral BID Piter Eduardo MD   300 mg at 05/17/24 0924    OLANZapine (zyPREXA) tablet 10 mg  10 mg Oral BID PRN Piter Eduardo MD        prazosin (MINIPRESS) capsule 3 mg  3 mg Oral At Bedtime Piter Eduardo MD   3 mg at 05/16/24 2150    QUEtiapine (SEROquel) tablet 200 mg  200 mg Oral At Bedtime Piter Eduardo MD   200 mg at 05/16/24 2150     Current Outpatient Medications   Medication Sig Dispense Refill    atomoxetine (STRATTERA) 60 MG capsule Take 60 mg by mouth daily      atorvastatin (LIPITOR) 20 MG tablet Take 20 mg by mouth every evening      buprenorphine (SUBUTEX) 8 MG SUBL sublingual  tablet Place 8 mg under the tongue 2 times daily      FLUoxetine 20 MG tablet Take 60 mg by mouth daily      gabapentin (NEURONTIN) 600 MG tablet Take 600 mg by mouth 3 times daily      hydrOXYzine HCl (ATARAX) 50 MG tablet Take 100 mg by mouth 2 times daily as needed for itching      lithium ER (LITHOBID) 300 MG CR tablet Take 300 mg by mouth 2 times daily      OLANZapine (ZYPREXA) 20 MG tablet Take 10 mg by mouth 2 times daily as needed      prazosin (MINIPRESS) 1 MG capsule Take 3 mg by mouth at bedtime      QUEtiapine (SEROQUEL) 200 MG tablet Take 200 mg by mouth at bedtime                Family History:   FAMILY HISTORY: History reviewed. No pertinent family history.           Social History:   Upbringing: born and raised   Educational History:          - Collateral information from the famly/friend: none         PTA Medications:   (Not in a hospital admission)         Allergies:     Allergies   Allergen Reactions    Wellbutrin [Bupropion] Difficulty breathing          Labs:     Recent Results (from the past 48 hour(s))   Alcohol breath test POCT    Collection Time: 05/15/24 10:09 PM   Result Value Ref Range    Alcohol Breath Test 0.0 0.00 - 0.01   Comprehensive metabolic panel    Collection Time: 05/15/24 11:34 PM   Result Value Ref Range    Sodium 135 135 - 145 mmol/L    Potassium 3.4 3.4 - 5.3 mmol/L    Carbon Dioxide (CO2) 25 22 - 29 mmol/L    Anion Gap 9 7 - 15 mmol/L    Urea Nitrogen 21.8 (H) 6.0 - 20.0 mg/dL    Creatinine 1.01 0.67 - 1.17 mg/dL    GFR Estimate >90 >60 mL/min/1.73m2    Calcium 8.6 8.6 - 10.0 mg/dL    Chloride 101 98 - 107 mmol/L    Glucose 134 (H) 70 - 99 mg/dL    Alkaline Phosphatase 122 40 - 150 U/L    AST 48 (H) 0 - 45 U/L    ALT 46 0 - 70 U/L    Protein Total 6.5 6.4 - 8.3 g/dL    Albumin 3.8 3.5 - 5.2 g/dL    Bilirubin Total 0.4 <=1.2 mg/dL   Lipase    Collection Time: 05/15/24 11:34 PM   Result Value Ref Range    Lipase 17 13 - 60 U/L   Magnesium    Collection Time: 05/15/24 11:34  PM   Result Value Ref Range    Magnesium 2.0 1.7 - 2.3 mg/dL   CBC with platelets and differential    Collection Time: 05/15/24 11:34 PM   Result Value Ref Range    WBC Count 11.4 (H) 4.0 - 11.0 10e3/uL    RBC Count 4.50 4.40 - 5.90 10e6/uL    Hemoglobin 13.2 (L) 13.3 - 17.7 g/dL    Hematocrit 39.2 (L) 40.0 - 53.0 %    MCV 87 78 - 100 fL    MCH 29.3 26.5 - 33.0 pg    MCHC 33.7 31.5 - 36.5 g/dL    RDW 13.3 10.0 - 15.0 %    Platelet Count 248 150 - 450 10e3/uL    % Neutrophils 69 %    % Lymphocytes 22 %    % Monocytes 8 %    % Eosinophils 1 %    % Basophils 0 %    % Immature Granulocytes 0 %    NRBCs per 100 WBC 0 <1 /100    Absolute Neutrophils 7.8 1.6 - 8.3 10e3/uL    Absolute Lymphocytes 2.5 0.8 - 5.3 10e3/uL    Absolute Monocytes 0.9 0.0 - 1.3 10e3/uL    Absolute Eosinophils 0.1 0.0 - 0.7 10e3/uL    Absolute Basophils 0.1 0.0 - 0.2 10e3/uL    Absolute Immature Granulocytes 0.0 <=0.4 10e3/uL    Absolute NRBCs 0.0 10e3/uL   Urine Drug Screen Panel    Collection Time: 05/16/24  1:57 AM   Result Value Ref Range    Amphetamines Urine Screen Positive (A) Screen Negative    Barbituates Urine Screen Negative Screen Negative    Benzodiazepine Urine Screen Negative Screen Negative    Cannabinoids Urine Screen Positive (A) Screen Negative    Cocaine Urine Screen Negative Screen Negative    Fentanyl Qual Urine Screen Negative Screen Negative    Opiates Urine Screen Negative Screen Negative    PCP Urine Screen Negative Screen Negative          Physical and Psychiatric Examination:     /83   Pulse 87   Temp 97.6  F (36.4  C) (Oral)   Resp 16   Wt 123.1 kg (271 lb 6.4 oz)   SpO2 95%   Weight is 271 lbs 6.4 oz  There is no height or weight on file to calculate BMI.    Mental Status Exam:  Appearance: awake, alert  Attitude:  cooperative  Eye Contact:  good  Mood:  depressed  Affect:  appropriate and in normal range  Speech:  clear, coherent  Language: fluent and intact in English  Psychomotor, Gait, Musculoskeletal:   no evidence of tardive dyskinesia, dystonia, or tics  Thought Process:  logical and linear  Associations:  no loose associations  Thought Content:  active suicidal ideation present and passive suicidal ideation present  Insight:  limited  Judgement:  limited  Oriented to:  time, person, and place  Attention Span and Concentration:  limited  Recent and Remote Memory:  limited  Fund of Knowledge:  appropriate         Diagnoses:      Methamphetamine use (H)  Polysubstance abuse (H)  Depression, unspecified depression type  Suicidal ideation         Recommendations:     1.Pt displays the following risk factors that support IP admission: SI, with a plan to use a gun unable to contract for safety. Pt is unable to engage in safety planning to mitigate risk level in a non-secure setting. Lower levels of care have not been successful in mitigating risk. Due to this IP is the least restrictive option of care for pt. Pt should remain in IP until deemed safe to return to the community and engage in OP  supports    - Continue to recommend inpatient psychiatric hospitalizations for further stabilization     The patient contracts for safety he can be discharged    2.  Continue his current PTA medication  3 chemical dependency assessment  4.  Consult psychiatry as needed  4.   Refer to psychiatric provider for medication management. *   treatment per ED team    - Consulted with  [facility]  licensed mental health professional, ED physician asked if they would like this writer to enter orders in the EHR,  patient's ED RN regarding this case.    Please call [facility]/DEC at [phone] if you have follow-up questions or wish to place another consult.  Diego Dangelo, Psychiatric Nurse practitioner    Attestation:  Time with:  Patient: 25 minutes  Treatment Team: 25 Minutes  Chart Review: 40 minutes    Total time spent was 90 minutes. Over 50% of times was spent counseling and coordination of care.    I thank  primary ED  provider and extended care team very much for letting me participate in the care of this patient.    I, Diego Dangelo, ABHIJIT, APRN, Psychiatric Nurse Practitioner have personally performed an examination of this patient.  I have edited the note to reflect all relevant changes.  I have discussed this patient with the care team May 17, 2024.  I have reviewed all vitals and laboratory findings.    Disclaimer: This note consists of symbols derived from keyboarding,

## 2024-05-17 NOTE — ED PROVIDER NOTES
[facility] ED Mental Health Handoff Note:       Brief HPI:  This is a 49 year old male signed out to me by Dr. Baker.  See initial ED Provider note for full details of the presentation. Interval history is pertinent for no change, patient continuing to feel unsafe and threatened suicide if discharged.    Home meds reviewed and ordered/administered: Yes    Medically stable for inpatient mental health admission: Yes.    Evaluated by mental health: Yes. The recommendation is for inpatient mental health treatment. Bed search in process    Safety concerns: At the time I received sign out, there were no safety concerns.    Hold Status:  Active Orders   N/A            Exam:   Patient Vitals for the past 24 hrs:   BP   05/16/24 2150 127/83       General: Patient is in no acute distress and is resting comfortably.  HEENT: Normocephalic atraumatic  Neck: Supple  Cardiovascular: Heart rate normal  Pulmonary: Patient is in no respiratory distress  Extremities: No signs of any significant or life-threatening trauma.  Neurologic: No new focal neurologic deficits.  Psychiatric: Still reporting suicidal ideation with plan.    ED Course:    Medications   atorvastatin (LIPITOR) tablet 20 mg (20 mg Oral $Given 5/16/24 1911)   buprenorphine (SUBUTEX) sublingual tablet 8 mg (8 mg Sublingual $Given 5/17/24 0924)   FLUoxetine (PROzac) tablet 60 mg (60 mg Oral $Given 5/17/24 0925)   gabapentin (NEURONTIN) tablet 600 mg (600 mg Oral $Given 5/17/24 0923)   atomoxetine (STRATTERA) capsule 60 mg (60 mg Oral $Given 5/17/24 0924)   hydrOXYzine HCl (ATARAX) tablet 100 mg (has no administration in time range)   lithium ER (LITHOBID) CR tablet 300 mg (300 mg Oral $Given 5/17/24 0924)   OLANZapine (zyPREXA) tablet 10 mg (has no administration in time range)   prazosin (MINIPRESS) capsule 3 mg (3 mg Oral $Given 5/16/24 2150)   QUEtiapine (SEROquel) tablet 200 mg (200 mg Oral $Given 5/16/24 2150)   OLANZapine zydis (zyPREXA) ODT tab 10 mg (10  mg Oral $Given 5/15/24 2875)            There were no significant events during my shift.    Patient was signed out to the oncoming provider, Dr. Ahmadi      Impression:    ICD-10-CM    1. Methamphetamine use (H)  F15.10       2. Polysubstance abuse (H)  F19.10       3. Depression, unspecified depression type  F32.A       4. Suicidal ideation  R45.851           Plan:    Awaiting inpatient mental health admission/transfer.      RESULTS:   No results found for this visit on 05/15/24 (from the past 24 hour(s)).          MD Bridger Rodriguez David, MD  05/17/24 9449

## 2024-05-17 NOTE — PROGRESS NOTES
Triage & Transition Services, Extended Care     Client Name: Perry Preciado    Date: May 17, 2024    Service Type: (P) refused to attend group session  Session Start Time:  (P) 0915    Session End Time: (P) 0950  Session Length: (P) 35  Site Location: [facility] EMERGENCY DEPARTMENT                             [id number]  Total Number ofAttendees: (P) 2  Topic: (P) relaxation techniques  Response: (P) refused to participate     RADU Hartley   Licensed Mental Health Professional (LMHP), Extended Care  [phone]

## 2024-05-17 NOTE — ED NOTES
Patient was calm and cooperative during the shift. Patient denied pain, SI/HI/SIB. Patient contracted for safety. Patient ate 100 percent of his meals. Will continue to monitor for behaviors.

## 2024-05-17 NOTE — TELEPHONE ENCOUNTER
R:  No current bed availability within [facility] @ 8:30AM .  Pt prefers [facility]acement ONLY.     Pt to remain on worklist pending an available bed for review @ [facility].

## 2024-05-17 NOTE — PROGRESS NOTES
"Triage & Transition Services, Extended Care     Therapy Progress Note    Patient: Perry goes by \"Perry,\" uses he/him pronouns  Date of Service: May 17, 2024  Site of Service: [facility] EMERGENCY DEPARTMENT                             [id number]  Patient was seen yes  Mode of Assessment: In person    Presentation Summary: Patient is alert and oriented x 4.  Patient was calm and somewhat cooperative.  Patient engaged somewhat in the therapeutic check-in process.  Patient has labile affect and irritable mood.  Patient reports initially presenting to the emergency department because \"I was not feeling right in the head\".  Patient elaborates stating \"I do not feel like I want to be in this world anymore\".  Patient reports feeling as though he would \"end up dead if he did not coming here\".  Patient reports his mother completed suicide 3 months ago and life has all been \"downhill since\".  Previous medical records indicate discrepancy in patient's timeline of alleged and family suicides.  Patient reports he does not care about anything.  Patient endorses ongoing suicidal ideation in our emergency department.  Patient reports if discharged, it will be \"a matter of hours until I get a hold of a gun and kill myself\".  Patient reports his friend has his firearms and he intends to retrieve them if he leaves the hospital.    Therapeutic Intervention(s) Provided: Engaged in safety planning    Current Symptoms: anxious helplessness, hoplessness, thoughts of death/suicide, irritable anxious   loss of appetite    Mental Status Exam   Affect: Labile  Appearance: Appropriate  Attention Span/Concentration: Attentive  Eye Contact: Variable    Fund of Knowledge: Appropriate   Language /Speech Content: Fluent  Language /Speech Volume: Normal  Language /Speech Rate/Productions: Normal  Recent Memory: Variable  Remote Memory: Intact  Mood: Irritable  Orientation to Person: Yes   Orientation to Place: Yes  Orientation to Time of " "Day: Yes  Orientation to Date: Yes     Situation (Do they understand why they are here?): Yes  Psychomotor Behavior: Normal  Thought Content: Suicidal  Thought Form: Intact    Treatment Objective(s) Addressed: rapport building, orienting the patient to therapy, assessing safety, exploring obstacles to safety in the community    Patient Response to Interventions: verbalizes understanding    Progress Towards Goals: Patient Reports Symptoms Are: ongoing  Patient Progress Toward Goals: is not making progress  Comment: Patient endorses persistent suicidal ideation with plan and intent.  Next Step to Work Toward Discharge: symptom stabilization  Symptom Stabilization Comment: Patient requires symptom stabilization for suicidal ideation with plan and intent.    Plan: inpatient mental health    Clinical Substantiation: Patient continues to require inpatient mental health admission due to acute suicidal ideation with plan and intent.  Patient reports if discharged, he will \"get hold of a gun within a matter of hours\".  Patient reports intent to shoot himself.  Patient denies ability to maintain safety in the community.    Legal Status: Legal Status at Admission: Voluntary/Patient has signed consent for treatment    Session Status: Time session started: 1045  Time session ended: 1101  Session Duration (minutes): 16 minutes  Session Number: 2  Anticipated number of sessions or this episode of care: 3    Time Spent: 16 minutes    CPT Code: CPT Codes: 24191 - Psychotherapy (with patient) - 30 (16-37*) min    Diagnosis:   Patient Active Problem List   Diagnosis Code    Suicidal ideation R45.851    Methamphetamine use (H) F15.10    Polysubstance abuse (H) F19.10    Depression, unspecified depression type F32.A       Primary Problem This Admission: Active Hospital Problems    Suicidal ideation      Methamphetamine use (H)      Polysubstance abuse (H)      Depression, unspecified depression type    RADU Hartley   Licensed " Mental Health Professional (LMHP), [facility]  [phone]

## 2024-05-17 NOTE — PROGRESS NOTES
"Met with patient to discuss possible treatment options and to possibly complete an assessment. Patient states he is going to be \"going upstairs to mental health\".  Patient states he doesn't want do the assessment yet as he doesn't want to get the \"ball rolling too quickly\" as he would like to get his mental health addressed prior to treatment. Will try again Monday 5/20/24 if he remains in the hospital.     Maurice Edmonds [facility] on 5/17/2024 at 1:29 PM        "

## 2024-05-17 NOTE — ED NOTES
"0637 - Patient awoke briefly once during shift for hydration.  Stated \"I feel like I got hit by a truck\" but did not want any PRNS.  Went back to sleep fairly quickly.   Patient currently resting with eyes closed.  No signs of distress or labored breathing.  Chest rise visualized.    "

## 2024-05-17 NOTE — TELEPHONE ENCOUNTER
R: [place]  Access Inpatient Bed Call Log  5/16/2024 4:15PM  Intake has called facilities that have not updated their bed status within the last 12 hours.        ADULTS:    *[facility]  [place] [facility]: @ cap per website.      Pt remains on waitlist pending appropriate placement availability.

## 2024-05-17 NOTE — ED NOTES
9518 Patient chart reviewed.  Report rec'd from outgoing RN.  No new labs or orders at this time.  Patient currently resting with eyes closed.  No signs of distress or labored breathing.  Chest rise visualized.

## 2024-05-17 NOTE — TELEPHONE ENCOUNTER
R:  No Beds within [facility] [place] @ 5:00pm.  Pt wants [facility] Placement only.    Pt to remain on worklist until a bed is available for review.

## 2024-05-17 NOTE — PROGRESS NOTES
Extended care clinician approached patient for therapeutic check in at 9:05am. Patient was observed sleeping soundly.  will return once patient is awake.    RADU Hartley

## 2024-05-18 ENCOUNTER — TELEPHONE (OUTPATIENT)
Dept: BEHAVIORAL HEALTH | Facility: CLINIC | Age: 49
End: 2024-05-18
Payer: COMMERCIAL

## 2024-05-18 PROCEDURE — 99418 PROLNG IP/OBS E/M EA 15 MIN: CPT | Performed by: NURSE PRACTITIONER

## 2024-05-18 PROCEDURE — 99231 SBSQ HOSP IP/OBS SF/LOW 25: CPT | Performed by: STUDENT IN AN ORGANIZED HEALTH CARE EDUCATION/TRAINING PROGRAM

## 2024-05-18 PROCEDURE — G0378 HOSPITAL OBSERVATION PER HR: HCPCS

## 2024-05-18 PROCEDURE — 250N000013 HC RX MED GY IP 250 OP 250 PS 637: Performed by: PSYCHIATRY & NEUROLOGY

## 2024-05-18 PROCEDURE — 99233 SBSQ HOSP IP/OBS HIGH 50: CPT | Performed by: NURSE PRACTITIONER

## 2024-05-18 RX ADMIN — GABAPENTIN 600 MG: 600 TABLET, FILM COATED ORAL at 13:04

## 2024-05-18 RX ADMIN — LITHIUM CARBONATE 300 MG: 300 TABLET, EXTENDED RELEASE ORAL at 21:05

## 2024-05-18 RX ADMIN — PRAZOSIN HYDROCHLORIDE 3 MG: 2 CAPSULE ORAL at 21:06

## 2024-05-18 RX ADMIN — OLANZAPINE 10 MG: 10 TABLET, FILM COATED ORAL at 15:43

## 2024-05-18 RX ADMIN — BUPRENORPHINE 8 MG: 8 TABLET SUBLINGUAL at 07:45

## 2024-05-18 RX ADMIN — FLUOXETINE HYDROCHLORIDE 60 MG: 10 TABLET, COATED ORAL at 07:45

## 2024-05-18 RX ADMIN — LITHIUM CARBONATE 300 MG: 300 TABLET, EXTENDED RELEASE ORAL at 07:45

## 2024-05-18 RX ADMIN — GABAPENTIN 600 MG: 600 TABLET, FILM COATED ORAL at 21:06

## 2024-05-18 RX ADMIN — GABAPENTIN 600 MG: 600 TABLET, FILM COATED ORAL at 07:44

## 2024-05-18 RX ADMIN — BUPRENORPHINE 8 MG: 8 TABLET SUBLINGUAL at 21:06

## 2024-05-18 RX ADMIN — QUETIAPINE FUMARATE 200 MG: 100 TABLET ORAL at 21:05

## 2024-05-18 RX ADMIN — ATORVASTATIN CALCIUM 20 MG: 20 TABLET, FILM COATED ORAL at 21:06

## 2024-05-18 RX ADMIN — ATOMOXETINE 60 MG: 60 CAPSULE ORAL at 07:45

## 2024-05-18 NOTE — TELEPHONE ENCOUNTER
*[facility] ONLY*    R:  [place]  Access Inpatient Bed Call Log 5/18/2024 7:07 AM   Intake has called facilities that have not updated their bed status within the last 12 hours.     ADULTS:     [facility] is posting 0 beds.                  Pt remains on work list pending appropriate bed availability.

## 2024-05-18 NOTE — ED PROVIDER NOTES
[facility] ED Mental Health Handoff Note:       Brief HPI:  This is a 49 year old male signed out to me by the previous provider.  See initial ED Provider note for full details of the presentation.   Interval history is pertinent for no acute changes.    Home meds reviewed and ordered/administered: Yes    Medically stable for inpatient mental health admission: Yes    Evaluated by mental health: Yes    Safety concerns: At the time I received sign out, there were no safety concerns.    Hold Status:  Active Orders   N/A       Exam:   Patient Vitals for the past 24 hrs:   BP Temp Temp src Pulse Resp SpO2   05/18/24 0934 102/67 98  F (36.7  C) Oral 85 17 98 %   05/17/24 1757 113/77 98  F (36.7  C) -- 83 18 96 %       GEN: resting in bed, calm  PULM: breathing comfortably, in no respiratory distress  PSYCH: Calm and cooperative, interactive    ED Course:    Medications   atorvastatin (LIPITOR) tablet 20 mg (20 mg Oral $Given 5/17/24 2012)   buprenorphine (SUBUTEX) sublingual tablet 8 mg (8 mg Sublingual $Given 5/18/24 0745)   FLUoxetine (PROzac) tablet 60 mg (60 mg Oral $Given 5/18/24 0745)   gabapentin (NEURONTIN) tablet 600 mg (600 mg Oral $Given 5/18/24 0744)   atomoxetine (STRATTERA) capsule 60 mg (60 mg Oral $Given 5/18/24 0745)   hydrOXYzine HCl (ATARAX) tablet 100 mg (has no administration in time range)   lithium ER (LITHOBID) CR tablet 300 mg (300 mg Oral $Given 5/18/24 0745)   OLANZapine (zyPREXA) tablet 10 mg (has no administration in time range)   prazosin (MINIPRESS) capsule 3 mg (3 mg Oral $Given 5/17/24 2101)   QUEtiapine (SEROquel) tablet 200 mg (200 mg Oral $Given 5/17/24 2100)   OLANZapine zydis (zyPREXA) ODT tab 10 mg (10 mg Oral $Given 5/15/24 2206)            There were no significant events during my shift.    Patient was signed out to the oncoming provider.      Impression:    ICD-10-CM    1. Methamphetamine use (H)  F15.10       2. Polysubstance abuse (H)  F19.10       3. Depression,  unspecified depression type  F32.A       4. Suicidal ideation  R45.851           Plan:    Awaiting inpatient mental health admission/transfer.      RESULTS:   No results found for this visit on 05/15/24 (from the past 24 hour(s)).    MD Marina Jenkins Ashley, MD  05/18/24 0938

## 2024-05-18 NOTE — TELEPHONE ENCOUNTER
Bed search ([facility] only) @ 12:15AM:    [facility] @ cap    Pt remains on work list pending appropriate bed availability

## 2024-05-18 NOTE — PROGRESS NOTES
"Triage & Transition Services, Extended Care     Therapy Progress Note    Patient: Perry goes by \"Perry,\" uses he/him pronouns  Date of Service: May 18, 2024  Site of Service: [facility] EMERGENCY DEPARTMENT                             [id number]  Patient was seen yes  Mode of Assessment: In person    Presentation Summary: Patient was in his room and appeared to be nodding off sitting on his bed. Patient ws calm, cooperative, and conversational, albeit brief in response, advocating that his anxiety has increased since yesterday and is still having AH of a command nature telling him he's a loser, there is no love for him, calling him names over and over without relief. Patient agreed that staying the course to [facility] was the plan. Patient added that in regard to his suicidal ideations and AH, patient wished he had \"done it differently to die\" and is still advocating acquiring firearms and using them if discharged. Patient has a challenging trauma narrative, losing his mother to suicide three months ago. Patient presented as very flat. two dimensional, poor eye contact, and brief responses. Spoke to RN after to advocate for patient comfort measures as he is clearly struggling with anxiety and increased AH.    Therapeutic Intervention(s) Provided: Engaged in safety planning    Current Symptoms: anxious, racing thoughts helplessness, hoplessness, thoughts of death/suicide, irritable, negativistic anxious, racing thoughts impulsive, agitation, auditory hallucinations loss of appetite    Mental Status Exam   Affect: Flat  Appearance: Disheveled  Attention Span/Concentration: Attentive  Eye Contact: Variable    Fund of Knowledge: Appropriate   Language /Speech Content: Fluent  Language /Speech Volume: Normal  Language /Speech Rate/Productions: Normal  Recent Memory: Variable  Remote Memory: Intact  Mood: Irritable, Sad  Orientation to Person: Yes   Orientation to Place: Yes  Orientation to Time of Day: " Yes  Orientation to Date: Yes     Situation (Do they understand why they are here?): Yes  Psychomotor Behavior: Normal  Thought Content: Suicidal  Thought Form: Intact    Treatment Objective(s) Addressed: rapport building, assessing safety, identifying treatment goals, safety planning    Patient Response to Interventions: acceptance expressed, verbalizes understanding    Progress Towards Goals: Patient Reports Symptoms Are: worsening  Patient Progress Toward Goals: is not making progress  Comment: Patient endorses persistent suicidal ideation with plan and intent with no relief, in fact an increase.  Next Step to Work Toward Discharge: symptom stabilization  Symptom Stabilization Comment: Patient requires symptom stabilization for suicidal ideation with plan and intent.    Case Management: Case Management Included: skills work  Summary of Interaction: Discussed radical acceptance concept as it relates to patient going IP for MH. It was albeit a brief encounter as patient was not feeling well, requestion something for his anxiety as he was rocking on his bed during encounter. Patient advocated to remain on the IP list stating when asked if he was still having AH and feeling suicidal that he intends to suicide if discharged. RN then checked with patient to offer something for anxiety.    Plan: inpatient mental health  yes provider, RN n/a  yes    Clinical Substantiation: Patient reports heightened anxiety and an increase in AH and SI. Patient wishes to remain on the IP worklist for MH as he is still advocating suicide if discharged and an inability to be safe currently.    Legal Status: Legal Status at Admission: Voluntary/Patient has signed consent for treatment    Session Status: Time session started: 1520  Time session ended: 1540  Session Duration (minutes): 15 minutes  Session Number: 3  Anticipated number of sessions or this episode of care: 4  Date of most recent diagnostic assessment: 05/16/24    Time Spent:  20 minutes    CPT Code: CPT Codes: 56956 - Psychotherapy (with patient) - 30 (16-37*) min    Diagnosis:   Patient Active Problem List   Diagnosis Code    Suicidal ideation R45.851    Methamphetamine use (H) F15.10    Polysubstance abuse (H) F19.10    Depression, unspecified depression type F32.A       Primary Problem This Admission: Active Hospital Problems    Suicidal ideation      Methamphetamine use (H)      Polysubstance abuse (H)      Depression, unspecified depression type        Andreas Baker MA  Licensed Mental Health Professional (LMHP), [facility]  [phone]

## 2024-05-18 NOTE — CONSULTS
Adult Psychiatry Consultation     Perry Preciado MRN# [id number]   Age: 49 year old YOB: 1975   Date of Admission to ED: 5/15/2024    In person visit Details:     Patient was assessed and interviewed face-to-face in person with this writer   Assessment methods included conducting a formal interview with patient, review of medical records, collaboration with medical staff, and obtaining relevant collateral information from family and community providers when available.      WOLF Rodrigues CNP            Contacts:   Attending Physician: Renu Gray MD     Current Outpatient Psychiatrist:    Primary Care Provider: Eliz Ref-Primary, Physician  Family/friends/guardian: Ileana (Sister) [phone]            Reason for Consult:       Pediatric Psychiatry IP Consult: recommendations  Requesting Provider:  Other supervising provider, Janet Gunn       Subjective:   Perry Preciado is a 48 y/o with a past psychiatric history of depression, anxiety, polysubstance use, and suicidal ideation in the context of losing 3 immediate family member (dad, mom and sister) to completed suicide.  He presented to the ED on 5/15/2024 for SI and AMS.  His plan was to obtain his guns from a friend that was holding his guns for him so he could complete suicide.     Interview with patient today:   Perry reports he is still having SI and would not be safe to discharge.  He reports prior to his presenting to the ED he had been living at a sober home, the safe there were not giving him his medication Subutex on time and he was experiencing withdrawal. He endorses continuing to experience SI and reports he cannot be safe if he leaves.  He rates both depression and anxiety 10/10 with 10 being the worst. He denies AH/VH.  He reports he has been on his current medications for about 2 months.  He reports he was not taking them consistently at the sober home because the staff were not giving them to him. He was vague about  whether the current regimen works. He reports he is sleeping good, and his appetite is normal. Denies problems with elimination.              Objective:       Perry was minimally cooperative with writer.  He acknowledged this writer presence, but did not open his eyes. He did not sit up to have a conversation, although it was the afternoon when writer approached.  He had his TV on in his room watching/listening to a movie.  Writer turned the volume down while writer was present and turned it back up when writer was leaving.      Perry was somewhat irritable when answering questions, his irritation dissipated when writer started to wrap up the conversation.       His medications were restarted on 5/16 /2024.     Per RN notes: isolating to his room, poor hygiene, and declining to shower. .     Medications:    Current Facility-Administered Medications   Medication Dose Route Frequency Provider Last Rate Last Admin    atomoxetine (STRATTERA) capsule 60 mg  60 mg Oral Daily Piter Eduardo MD   60 mg at 05/18/24 0745    atorvastatin (LIPITOR) tablet 20 mg  20 mg Oral QPM Piter Eduardo MD   20 mg at 05/17/24 2012    buprenorphine (SUBUTEX) sublingual tablet 8 mg  8 mg Sublingual BID Piter Eduardo MD   8 mg at 05/18/24 0745    FLUoxetine (PROzac) tablet 60 mg  60 mg Oral Daily Piter Eduardo MD   60 mg at 05/18/24 0745    gabapentin (NEURONTIN) tablet 600 mg  600 mg Oral TID Piter Eduardo MD   600 mg at 05/18/24 0744    hydrOXYzine HCl (ATARAX) tablet 100 mg  100 mg Oral BID PRN Piter Eduardo MD        lithium ER (LITHOBID) CR tablet 300 mg  300 mg Oral BID Piter Eduardo MD   300 mg at 05/18/24 0745    OLANZapine (zyPREXA) tablet 10 mg  10 mg Oral BID PRN Piter Eduardo MD        prazosin (MINIPRESS) capsule 3 mg  3 mg Oral At Bedtime Piter Eduardo MD   3 mg at 05/17/24 2101    QUEtiapine (SEROquel) tablet 200 mg  200 mg Oral At Bedtime Piter Eduardo MD   200 mg at  "05/17/24 2100     Current Outpatient Medications   Medication Sig Dispense Refill    atomoxetine (STRATTERA) 60 MG capsule Take 60 mg by mouth daily      atorvastatin (LIPITOR) 20 MG tablet Take 20 mg by mouth every evening      buprenorphine (SUBUTEX) 8 MG SUBL sublingual tablet Place 8 mg under the tongue 2 times daily      FLUoxetine 20 MG tablet Take 60 mg by mouth daily      gabapentin (NEURONTIN) 600 MG tablet Take 600 mg by mouth 3 times daily      hydrOXYzine HCl (ATARAX) 50 MG tablet Take 100 mg by mouth 2 times daily as needed for itching      lithium ER (LITHOBID) 300 MG CR tablet Take 300 mg by mouth 2 times daily      OLANZapine (ZYPREXA) 20 MG tablet Take 10 mg by mouth 2 times daily as needed      prazosin (MINIPRESS) 1 MG capsule Take 3 mg by mouth at bedtime      QUEtiapine (SEROQUEL) 200 MG tablet Take 200 mg by mouth at bedtime                Collateral information from chart review and phone calls:     Reviewed DEC assessment, Initial ED consult, and ED notes.      Per Diego BLOOM CNP on 5/15/0224:  \"This writer was familiar with this patient he was inpatient mental health unit in January 2024, at that time patient told me his father committed suicide, his sister committed suicide 1 week ago but today patient said \" my mother committed suicide 3 months ago my sister also committed suicide 3 months ago.\"  Writer asked him for clarification January 2024 he said his father and his sister committed suicide.  Currently patient is homeless he was kicked out of sober housing due to relapsing on fentanyl and methamphetamine he told me he stopped taking his prescription medication just started yesterday.    Per DEC on 5/16/2024:  \" The pt arrived via friends / family due to suicidal ideation and substance abuse. The pt reported he called 911, however pt reportedly required assistance from staff upon arrival to get out of the vehicle from a friend. Upon assessment, pt requested writer come back " "later. Writer returned 10 minutes later and pt was agreeable then to meeting. Pt reported he is here because he \"is really suicidal\". Pt reported he was at a party earlier [facility], and only knew one person there, which was a woman he graduated high school with. He reported he used meth and \"other drugs he isn't sure of\". He reported the woman he knew there is \"wanted for murder\" and is \"a well known meth dealer\" and he reported feeling uncomfortable and left the party. He stated after leaving the party, someone followed him. Pt reported he called 911. Pt reported he has also felt suicidal for \"the past couple years\", more so the last year after his mom passed away. Pt then reported he was at a sober home for one week and left voluntarily on 5/10/24 because he was \"going to go kill himself\". He reported he didn't end up doing it because he \"couldn't make up his mind\". Pt reported he currently has a plan to shoot himself. When writer asked about access to firearms, he stated, \"I have access, but I don't have them. They are my guns that I gave to a triston, about 20 of them, and I know how to get them\". When writer asked pt why he gave away his guns in the first place, he stated, \"I don't know, I did a lot of things that didn't make sense the past year\". Pt then stated, \"my brain hurts\". Pt appeared under the influence, was slurring his words, and falling asleep intermittently in assessment. Pt was, however, calm and cooperative. Pt frequently stated his \"whole body was sore\" but was unsure why. He also reported he has not taken his MH medications in 5 days.\"       Collateral information from the famly/friend: none    Mental Status Exam:   Appearance:  dressed in hospital scrubs and lying in bed with covers over him, only briefly opened his eyes.   Attitude:   superficially cooperative  Eye Contact:  poor   Mood:  anxious and depressed  Affect:  intensity is blunted  Speech:  mumbling  Psychomotor Behavior:  no evidence of " tardive dyskinesia, dystonia, or tics  Thought Process:  linear and goal oriented - focused on being admitted for MH  Associations:  no loose associations  Thought Content:  no evidence of psychotic thought and active suicidal ideation present  Insight:  limited  Judgment:  limited  Oriented to:   person and place, time not assessed.   Attention Span and Concentration:  limited  Recent and Remote Memory:  limited  Fund of Knowledge: low-normal  Muscle Strength and Tone:  unable to assess  Gait and Station:  not observed.          Physical Exam:     /67   Pulse 85   Temp 98  F (36.7  C) (Oral)   Resp 17   Wt 123.1 kg (271 lb 6.4 oz)   SpO2 98%   Weight is 271 lbs 6.4 oz Data Unavailable There is no height or weight on file to calculate BMI.    QTc: no recent EKG available.    Laboratory/Imaging:    Recent Results (from the past 168 hour(s))   Alcohol breath test POCT    Collection Time: 05/15/24 10:09 PM   Result Value Ref Range    Alcohol Breath Test 0.0 0.00 - 0.01   Comprehensive metabolic panel    Collection Time: 05/15/24 11:34 PM   Result Value Ref Range    Sodium 135 135 - 145 mmol/L    Potassium 3.4 3.4 - 5.3 mmol/L    Carbon Dioxide (CO2) 25 22 - 29 mmol/L    Anion Gap 9 7 - 15 mmol/L    Urea Nitrogen 21.8 (H) 6.0 - 20.0 mg/dL    Creatinine 1.01 0.67 - 1.17 mg/dL    GFR Estimate >90 >60 mL/min/1.73m2    Calcium 8.6 8.6 - 10.0 mg/dL    Chloride 101 98 - 107 mmol/L    Glucose 134 (H) 70 - 99 mg/dL    Alkaline Phosphatase 122 40 - 150 U/L    AST 48 (H) 0 - 45 U/L    ALT 46 0 - 70 U/L    Protein Total 6.5 6.4 - 8.3 g/dL    Albumin 3.8 3.5 - 5.2 g/dL    Bilirubin Total 0.4 <=1.2 mg/dL   Lipase    Collection Time: 05/15/24 11:34 PM   Result Value Ref Range    Lipase 17 13 - 60 U/L   Magnesium    Collection Time: 05/15/24 11:34 PM   Result Value Ref Range    Magnesium 2.0 1.7 - 2.3 mg/dL   CBC with platelets and differential    Collection Time: 05/15/24 11:34 PM   Result Value Ref Range    WBC Count 11.4  (H) 4.0 - 11.0 10e3/uL    RBC Count 4.50 4.40 - 5.90 10e6/uL    Hemoglobin 13.2 (L) 13.3 - 17.7 g/dL    Hematocrit 39.2 (L) 40.0 - 53.0 %    MCV 87 78 - 100 fL    MCH 29.3 26.5 - 33.0 pg    MCHC 33.7 31.5 - 36.5 g/dL    RDW 13.3 10.0 - 15.0 %    Platelet Count 248 150 - 450 10e3/uL    % Neutrophils 69 %    % Lymphocytes 22 %    % Monocytes 8 %    % Eosinophils 1 %    % Basophils 0 %    % Immature Granulocytes 0 %    NRBCs per 100 WBC 0 <1 /100    Absolute Neutrophils 7.8 1.6 - 8.3 10e3/uL    Absolute Lymphocytes 2.5 0.8 - 5.3 10e3/uL    Absolute Monocytes 0.9 0.0 - 1.3 10e3/uL    Absolute Eosinophils 0.1 0.0 - 0.7 10e3/uL    Absolute Basophils 0.1 0.0 - 0.2 10e3/uL    Absolute Immature Granulocytes 0.0 <=0.4 10e3/uL    Absolute NRBCs 0.0 10e3/uL   Urine Drug Screen Panel    Collection Time: 05/16/24  1:57 AM   Result Value Ref Range    Amphetamines Urine Screen Positive (A) Screen Negative    Barbituates Urine Screen Negative Screen Negative    Benzodiazepine Urine Screen Negative Screen Negative    Cannabinoids Urine Screen Positive (A) Screen Negative    Cocaine Urine Screen Negative Screen Negative    Fentanyl Qual Urine Screen Negative Screen Negative    Opiates Urine Screen Negative Screen Negative    PCP Urine Screen Negative Screen Negative       ROS: 10 point ROS neg other than the symptoms noted above in the subjective.     I have reviewed the physical done by ED provider on 5/15/2024. Notable changes include: none              Impression:     Perry Preciado is a 50 y/o with a past psychiatric history of depression, anxiety, polysubstance use, and suicidal ideation in the context of losing 3 immediate family member (dad, mom and sister) to completed suicide.  He presented to the ED on 5/15/2024 for SI and AMS.  His plan was to obtain his guns from a friend that was holding his guns for him so he could complete suicide.  Prior to presenting to the ED patient was kicked out of his sober housing for  relapsing.      Significant symptoms are noted in the HPI with changes notes above in the subjective. There is genetic loading for suicide.  Medical history does appear to be significant for hyperlipidemia.  Substance use does appear to be playing a contributing role in the patient's presentation.  Patient appears to cope with stress/frustration/emotion by using substances.  Major stressors are loss and chronic mental health issues.  Patient's support system includes outpatient team.Protective factors:  help seeking.  Risk factors: SI, maladaptive coping, substance use, trauma, family history, family dynamics, impulsive, and past behaviors. Patient Strengths: help seeking, engaging with ED treatment team.    Based on interview with patient and patient's guardian/parent, record review, conversations ED staff, P staff and ED attending, the patient meets criteria for Depression (primary vs substance induced vs both) and polysubstance use disorder.  Current medications are listed below.  No medication changes recommended at this time d/t patient recently restarting back on his medications.   Acute inpatient stabilization is  needed as indicated by ongoing active SI and strong family history of completed suicides.  Other recommendations are listed below.    Risk for harm is moderate-high.    Brief Therapeutic Intervention(s):   Provided rappport building, active listening, unconditional positive regard, and validation.    Legal Status: Voluntary           Diagnoses:     Principal Diagnosis: Depression (primary vs substance induced vs both) and polysubstance use disord    Secondary psychiatric diagnoses of concern this admission:  anxiety    Current medical diagnosis being treated:   Hyperlipidemia - continue meds as PTA.          Recommendations:     Discussed the case and writer's recommendations with Dr Renu Gray, ED provider    Continue to recommend inpatient based on ongoing active SI and strong family history of  completed suicides.   2.   Continue:    Strattera 60 mg daily   Lipitor 20 mg in the evening   Subutex SL 8 mg bid   Prozac 60 mg daily   Nuerontin 600 mg tid   Lithium  mg tid   Prazosin 3 mg hs    Quetiapine 200 mg hs      PRN   Hydroxyzine 100 mg bid prn for anxiety   [id number] mg bid prn for agitation.   3.   Continue to consult psychiatry as needed.     Attestation:  Patient time: 15 minutes  Reviewed labs, EKG, and relevant imagin minutes  Family/guardian/other time: 0 minutes  Team/BHP/ED/EC staff time: 15 minutes  Chart review: 40 minutes  Documentation: 30 minutes  Total time: 105 minutes spent on the date of the encounter.     I have provided critical care services at the bedside in the [facility] adult behavioral emergency center, evaluating the patient, reviewing notes and laboratory values and directing care. I have discussed recommendation regarding whether or not hospitalization is needed and recommendations for medications and laboratory testing with the attending emergency department provider. Brielle Chaves, DNP, APRN, CNP May 18, 2024.    Disclaimer: This note consists of symbols derived from keyboarding, dictation, and/or voice recognition software. As a result, there may be errors in the script that have gone undetected.  Please consider this when interpreting information found in the chart.    Please call [facility]/DEC at [phone] if you have follow-up questions or wish to place another consult.

## 2024-05-18 NOTE — ED NOTES
Pt is isolative to his room. He presents with poor hygiene. Room smells so bad. Staff offered a shower but he declined. Was offered personal hygiene stuff to wipe himself down and change to clean scrubs. Staff will continue to encourage shower. He denies pain, VSS. Reports A/hallucinations voices telling him he is not good. He also endorses passive SI. Denies HI. He is med compliant. Good appetite observed. No behavior concerns. Staff will continue to monitor.

## 2024-05-18 NOTE — PROGRESS NOTES
Patient is pleasant and cooperative with care and staff. He was medication compliant. Pt contracted for safety. Denied pain, SI/HI but reported chronic hallucination. Voices were telling him he is going to die. Pt ate dinner and was offered snacks. Will continue to monitor pt.

## 2024-05-19 ENCOUNTER — HOSPITAL ENCOUNTER (INPATIENT)
Facility: CLINIC | Age: 49
LOS: 18 days | Discharge: PSYCHIATRIC OR SUBSTANCE USE FACILITY | End: 2024-06-06
Attending: PSYCHIATRY & NEUROLOGY | Admitting: PSYCHIATRY & NEUROLOGY
Payer: COMMERCIAL

## 2024-05-19 VITALS
RESPIRATION RATE: 18 BRPM | OXYGEN SATURATION: 97 % | TEMPERATURE: 97.9 F | DIASTOLIC BLOOD PRESSURE: 77 MMHG | HEART RATE: 81 BPM | SYSTOLIC BLOOD PRESSURE: 108 MMHG | WEIGHT: 271.4 LBS

## 2024-05-19 DIAGNOSIS — Z11.3 SCREEN FOR STD (SEXUALLY TRANSMITTED DISEASE): Primary | ICD-10-CM

## 2024-05-19 DIAGNOSIS — F32.A DEPRESSION, UNSPECIFIED DEPRESSION TYPE: ICD-10-CM

## 2024-05-19 DIAGNOSIS — F15.10 METHAMPHETAMINE USE (H): ICD-10-CM

## 2024-05-19 LAB
CHOLEST SERPL-MCNC: 133 MG/DL
FOLATE SERPL-MCNC: 11.5 NG/ML (ref 4.6–34.8)
HBA1C MFR BLD: 5.6 %
HDLC SERPL-MCNC: 32 MG/DL
LDLC SERPL CALC-MCNC: 66 MG/DL
NONHDLC SERPL-MCNC: 101 MG/DL
TRIGL SERPL-MCNC: 173 MG/DL
TSH SERPL DL<=0.005 MIU/L-ACNC: 1.97 UIU/ML (ref 0.3–4.2)
VIT B12 SERPL-MCNC: 635 PG/ML (ref 232–1245)
VIT D+METAB SERPL-MCNC: 23 NG/ML (ref 20–50)

## 2024-05-19 PROCEDURE — 83036 HEMOGLOBIN GLYCOSYLATED A1C: CPT

## 2024-05-19 PROCEDURE — 82746 ASSAY OF FOLIC ACID SERUM: CPT

## 2024-05-19 PROCEDURE — 250N000013 HC RX MED GY IP 250 OP 250 PS 637: Performed by: PSYCHIATRY & NEUROLOGY

## 2024-05-19 PROCEDURE — G0378 HOSPITAL OBSERVATION PER HR: HCPCS

## 2024-05-19 PROCEDURE — 80061 LIPID PANEL: CPT

## 2024-05-19 PROCEDURE — 82607 VITAMIN B-12: CPT

## 2024-05-19 PROCEDURE — 82306 VITAMIN D 25 HYDROXY: CPT

## 2024-05-19 PROCEDURE — 36415 COLL VENOUS BLD VENIPUNCTURE: CPT

## 2024-05-19 PROCEDURE — 250N000013 HC RX MED GY IP 250 OP 250 PS 637

## 2024-05-19 PROCEDURE — 84443 ASSAY THYROID STIM HORMONE: CPT

## 2024-05-19 PROCEDURE — 124N000002 HC R&B MH UMMC

## 2024-05-19 RX ORDER — POLYETHYLENE GLYCOL 3350 17 G/17G
17 POWDER, FOR SOLUTION ORAL DAILY PRN
Status: DISCONTINUED | OUTPATIENT
Start: 2024-05-19 | End: 2024-06-06 | Stop reason: HOSPADM

## 2024-05-19 RX ORDER — MAGNESIUM HYDROXIDE/ALUMINUM HYDROXICE/SIMETHICONE 120; 1200; 1200 MG/30ML; MG/30ML; MG/30ML
30 SUSPENSION ORAL EVERY 4 HOURS PRN
Status: CANCELLED | OUTPATIENT
Start: 2024-05-18

## 2024-05-19 RX ORDER — LANOLIN ALCOHOL/MO/W.PET/CERES
3 CREAM (GRAM) TOPICAL
Status: CANCELLED | OUTPATIENT
Start: 2024-05-19

## 2024-05-19 RX ORDER — BUPRENORPHINE 8 MG/1
8 TABLET SUBLINGUAL 2 TIMES DAILY
Status: DISCONTINUED | OUTPATIENT
Start: 2024-05-19 | End: 2024-05-21

## 2024-05-19 RX ORDER — NALOXONE HYDROCHLORIDE 0.4 MG/ML
0.4 INJECTION, SOLUTION INTRAMUSCULAR; INTRAVENOUS; SUBCUTANEOUS
Status: DISCONTINUED | OUTPATIENT
Start: 2024-05-19 | End: 2024-06-06 | Stop reason: HOSPADM

## 2024-05-19 RX ORDER — LITHIUM CARBONATE 300 MG/1
300 TABLET, FILM COATED, EXTENDED RELEASE ORAL 2 TIMES DAILY
Status: DISCONTINUED | OUTPATIENT
Start: 2024-05-19 | End: 2024-05-22

## 2024-05-19 RX ORDER — FLUOXETINE 10 MG/1
60 TABLET, FILM COATED ORAL DAILY
Status: DISCONTINUED | OUTPATIENT
Start: 2024-05-19 | End: 2024-05-19

## 2024-05-19 RX ORDER — GABAPENTIN 600 MG/1
600 TABLET ORAL 3 TIMES DAILY
Status: DISCONTINUED | OUTPATIENT
Start: 2024-05-19 | End: 2024-06-06 | Stop reason: HOSPADM

## 2024-05-19 RX ORDER — OLANZAPINE 10 MG/2ML
10 INJECTION, POWDER, FOR SOLUTION INTRAMUSCULAR 3 TIMES DAILY PRN
Status: DISCONTINUED | OUTPATIENT
Start: 2024-05-19 | End: 2024-05-19

## 2024-05-19 RX ORDER — OLANZAPINE 10 MG/2ML
10 INJECTION, POWDER, FOR SOLUTION INTRAMUSCULAR 3 TIMES DAILY PRN
Status: DISCONTINUED | OUTPATIENT
Start: 2024-05-19 | End: 2024-05-20

## 2024-05-19 RX ORDER — ACETAMINOPHEN 325 MG/1
650 TABLET ORAL EVERY 4 HOURS PRN
Status: DISCONTINUED | OUTPATIENT
Start: 2024-05-19 | End: 2024-06-06 | Stop reason: HOSPADM

## 2024-05-19 RX ORDER — HYDROXYZINE HYDROCHLORIDE 50 MG/1
100 TABLET, FILM COATED ORAL 2 TIMES DAILY PRN
Status: DISCONTINUED | OUTPATIENT
Start: 2024-05-19 | End: 2024-05-26

## 2024-05-19 RX ORDER — POLYETHYLENE GLYCOL 3350 17 G/17G
17 POWDER, FOR SOLUTION ORAL DAILY PRN
Status: CANCELLED | OUTPATIENT
Start: 2024-05-18

## 2024-05-19 RX ORDER — NALOXONE HYDROCHLORIDE 0.4 MG/ML
0.2 INJECTION, SOLUTION INTRAMUSCULAR; INTRAVENOUS; SUBCUTANEOUS
Status: DISCONTINUED | OUTPATIENT
Start: 2024-05-19 | End: 2024-06-06 | Stop reason: HOSPADM

## 2024-05-19 RX ORDER — OLANZAPINE 10 MG/1
10 TABLET ORAL 3 TIMES DAILY PRN
Status: DISCONTINUED | OUTPATIENT
Start: 2024-05-19 | End: 2024-05-20

## 2024-05-19 RX ORDER — LANOLIN ALCOHOL/MO/W.PET/CERES
3 CREAM (GRAM) TOPICAL
Status: DISCONTINUED | OUTPATIENT
Start: 2024-05-19 | End: 2024-06-06 | Stop reason: HOSPADM

## 2024-05-19 RX ORDER — MAGNESIUM HYDROXIDE/ALUMINUM HYDROXICE/SIMETHICONE 120; 1200; 1200 MG/30ML; MG/30ML; MG/30ML
30 SUSPENSION ORAL EVERY 4 HOURS PRN
Status: DISCONTINUED | OUTPATIENT
Start: 2024-05-19 | End: 2024-06-06 | Stop reason: HOSPADM

## 2024-05-19 RX ORDER — ATOMOXETINE 60 MG/1
60 CAPSULE ORAL DAILY
Status: DISCONTINUED | OUTPATIENT
Start: 2024-05-19 | End: 2024-05-22

## 2024-05-19 RX ORDER — OLANZAPINE 10 MG/1
10 TABLET ORAL 3 TIMES DAILY PRN
Status: DISCONTINUED | OUTPATIENT
Start: 2024-05-19 | End: 2024-05-19

## 2024-05-19 RX ORDER — QUETIAPINE FUMARATE 200 MG/1
200 TABLET, FILM COATED ORAL AT BEDTIME
Status: DISCONTINUED | OUTPATIENT
Start: 2024-05-19 | End: 2024-05-28

## 2024-05-19 RX ORDER — ATORVASTATIN CALCIUM 20 MG/1
20 TABLET, FILM COATED ORAL EVERY EVENING
Status: DISCONTINUED | OUTPATIENT
Start: 2024-05-19 | End: 2024-06-06 | Stop reason: HOSPADM

## 2024-05-19 RX ORDER — ACETAMINOPHEN 325 MG/1
650 TABLET ORAL EVERY 4 HOURS PRN
Status: CANCELLED | OUTPATIENT
Start: 2024-05-18

## 2024-05-19 RX ADMIN — GABAPENTIN 600 MG: 600 TABLET, FILM COATED ORAL at 08:08

## 2024-05-19 RX ADMIN — OLANZAPINE 10 MG: 10 TABLET, FILM COATED ORAL at 17:04

## 2024-05-19 RX ADMIN — FLUOXETINE HYDROCHLORIDE 60 MG: 20 CAPSULE ORAL at 09:26

## 2024-05-19 RX ADMIN — ATORVASTATIN CALCIUM 20 MG: 20 TABLET, FILM COATED ORAL at 20:39

## 2024-05-19 RX ADMIN — LITHIUM CARBONATE 300 MG: 300 TABLET, EXTENDED RELEASE ORAL at 08:08

## 2024-05-19 RX ADMIN — ATOMOXETINE 60 MG: 60 CAPSULE ORAL at 08:09

## 2024-05-19 RX ADMIN — ACETAMINOPHEN 650 MG: 325 TABLET, FILM COATED ORAL at 17:04

## 2024-05-19 RX ADMIN — GABAPENTIN 600 MG: 600 TABLET, FILM COATED ORAL at 14:33

## 2024-05-19 RX ADMIN — BUPRENORPHINE 8 MG: 8 TABLET SUBLINGUAL at 20:39

## 2024-05-19 RX ADMIN — GABAPENTIN 600 MG: 600 TABLET, FILM COATED ORAL at 20:39

## 2024-05-19 RX ADMIN — QUETIAPINE FUMARATE 200 MG: 200 TABLET ORAL at 21:09

## 2024-05-19 RX ADMIN — OLANZAPINE 10 MG: 10 TABLET, FILM COATED ORAL at 09:26

## 2024-05-19 RX ADMIN — BUPRENORPHINE 8 MG: 8 TABLET SUBLINGUAL at 08:08

## 2024-05-19 RX ADMIN — LITHIUM CARBONATE 300 MG: 300 TABLET, EXTENDED RELEASE ORAL at 20:39

## 2024-05-19 ASSESSMENT — ACTIVITIES OF DAILY LIVING (ADL)
ADLS_ACUITY_SCORE: 35
HYGIENE/GROOMING: INDEPENDENT
ADLS_ACUITY_SCORE: 30
LAUNDRY: WITH SUPERVISION
ADLS_ACUITY_SCORE: 30
ADLS_ACUITY_SCORE: 35
ADLS_ACUITY_SCORE: 30
DRESS: INDEPENDENT
ADLS_ACUITY_SCORE: 35
ADLS_ACUITY_SCORE: 30
ORAL_HYGIENE: INDEPENDENT
ADLS_ACUITY_SCORE: 30

## 2024-05-19 NOTE — TELEPHONE ENCOUNTER
23:32 - Dr. Cueva accepts for 20NB. Placed pt in queue    R: 20 / Demian    23:36 - Called unit; CRN still in shift report. PPS will callback later  23:58 - Notified unit BEKAH Foster. Unit will call [facility] for RN report  00:01 - Notified BEC FUNMILAYO arroyo
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
## 2024-05-19 NOTE — PHARMACY-ADMISSION MEDICATION HISTORY
"Admission Medication History Completed by Pharmacy    Please refer to medication scribe's progress note on 5/16/2024, \"Medication Scribe-Admission Medication History\" under previous encounter at [facility] ED Emergency Department for information regarding prior to admission medications.    Yodit Castillo, Pharm.D., [facility]  Behavioral Health Inpatient Pharmacist  [facility] ([facility])  Contact via Vocera or Teams      "

## 2024-05-19 NOTE — PLAN OF CARE
"  Problem: Suicide Risk  Goal: Absence of Self-Harm  5/19/2024 1316 by Renetta Khan, RN  Outcome: Progressing  5/19/2024 1316 by Renetta Khan, RN  Outcome: Progressing  Intervention: Assess Risk to Self and Maintain Safety  Recent Flowsheet Documentation  Taken 5/19/2024 1200 by Renetta Khan, RN  Enhanced Safety Measures: room near unit station   Goal Outcome Evaluation:    Plan of Care Reviewed With: patient    Patient withdrawn to room, avoids social interactions, is endorsing increase anxiety, depression, thoughts of SI, auditory and visual hallucinations; he is able to contract for safety. Isolative to room this shift requesting for staffs to bring meals to room, is eating 100% of meals, appeared sad and depress, observed looking on floor steering, when asked, he responded \" just checking to see what's on the floor\". Patient was reassured about safety, PRN Zyprexa 10 mg given at 09:26 for agitation and hallucinations, patient reported medication was slightly effective, he was offered music but declined. Patient declined EKG assessment stating \"everything is overwhelming\", he did agree to do test Monday morning. Compliant with medication regimen and observed sleeping between cares, no safety concern noted.  "

## 2024-05-19 NOTE — PROGRESS NOTES
Patient is pleasant and cooperative with care and staff. He was medication compliant. Pt contracted for safety. Denied pain,but reported chronic hallucination and SI with no plan. Voices were telling him to kill himself. Pt ate dinner and was offered snacks. Will continue to monitor pt.

## 2024-05-19 NOTE — ED NOTES
Patient accepted at [facility]. Ready for transfer from the [facility]. Transfer instructions discussed. Pleasant, calm and cooperative. No acute risk of harm to self or others. Denies suicidal/homicidal ideations and/or hallucinations. Verbally contracts for safety.  No inappropriate behavior. VSS--see flowsheet. Report given to Staff at [facility]. All belongings to be sent with patient. Would be leaving in a w/c with staff escort.

## 2024-05-19 NOTE — TELEPHONE ENCOUNTER
R: 8:30pm- Bed search: Pt wants [facility] only.  There are no appropriate beds at [facility].  Pt remains in waitlist pending appropriate bed availability.

## 2024-05-19 NOTE — PLAN OF CARE
"Pt is a 49 years old male admitted in station 20 for SI with a plan to shoot himself in the head. Per report,  pt has access to parents guns and states that 7 to 8 months ago, he tried to shoot himself but the gun misfired. His sister and father both  by suicide. Pt has Hx of MDD, KANE, ADHD and substance use disorder.     Pt was calm and cooperative with search and admission process. Pt did endorse Visual hallucination \" seeing people coming after him\" and also hearing them   \" shouting\". Endorsed anxiety 6/10 and depression 6/10 - refused PRN meds offered, denied pain as well. Pt had a flat blunted affect and appeared tired and sleepy during the interview. Pt requested and given a snack and went to bed. Pt is here on Voluntary status. Contracted for safety while in the unit. Pt appears to have slept for 2.75 hours; will continue to monitor and offer support.   "

## 2024-05-19 NOTE — PROVIDER NOTIFICATION
05/19/24 0723   Patient Belongings   Did you bring any home meds/supplements to the hospital?  No   Patient Belongings locker   Patient Belongings Put in Hospital Secure Location (Security or Locker, etc.) shoes;watch;cell phone/electronics;necklace   Belongings Search Yes   Clothing Search Yes   Second Staff Keith RN     A               LOCKER  1 Pair of grey shoes  1 pair of white soaks  1 pair of yellow gloves      2 silver neck chains  Red watch  Cell phone with a red cover  [place] Cigarette #4 piece in pack  A black bag with various tools in [employer] bag (un searched)    Admission:  I am responsible for any personal items that are not sent to the safe or pharmacy.  [facility] is not responsible for loss, theft or damage of any property in my possession.    Signature:  _________________________________ Date: _______  Time: _____                                              Staff Signature:  ____________________________ Date: ________  Time: _____      2nd Staff person, if patient is unable/unwilling to sign:    Signature: ________________________________ Date: ________  Time: _____     Discharge:  [facility] has returned all of my personal belongings:    Signature: _________________________________ Date: ________  Time: _____                                          Staff Signature:  ____________________________ Date: ________  Time: _____

## 2024-05-19 NOTE — PLAN OF CARE
" INITIAL PSYCHOSOCIAL ASSESSMENT AND NOTE    Information for assessment was obtained from:       [x]Patient     []Parent     []Community provider    [x]Hospital records   []Other     []Guardian       Presenting Problem:  Patient is a 49 year old male who uses he/him. Patient was admitted to [facility] on 5/19/2024 [address] voluntarily.    Presenting issues and presentation for admit: Pt reports he was experiencing weorsening depression, suicidal ideations with a plan to shoot himself, AH command in nature. Pt reports visual hallucinations fragmented things.     Per Hawa Nicholeon presents to the ED with family/friends. Patient is presenting to the ED for the following concerns: Suicidal ideation, Substance use.   Factors that make the mental health crisis life threatening or complex are:  The pt arrived via friends / family due to suicidal ideation and substance abuse. The pt reported he called 911, however pt reportedly required assistance from staff upon arrival to get out of the vehicle from a friend. Upon assessment, pt requested writer come back later. Writer returned 10 minutes later and pt was agreeable then to meeting. Pt reported he is here because he \"is really suicidal\". Pt reported he was at a party earlier [facility], and only knew one person there, which was a woman he graduated high school with. He reported he used meth and \"other drugs he isn't sure of\". He reported the woman he knew there is \"wanted for murder\" and is \"a well known meth dealer\" and he reported feeling uncomfortable and left the party. He stated after leaving the party, someone followed him. Pt reported he called 911. Pt reported he has also felt suicidal for \"the past couple years\", more so the last year after his mom passed away. Pt then reported he was at a sober home for one week and left voluntarily on 5/10/24 because he was \"going to go kill himself\". He reported he didn't " "end up doing it because he \"couldn't make up his mind\". Pt reported he currently has a plan to shoot himself. When writer asked about access to firearms, he stated, \"I have access, but I don't have them. They are my guns that I gave to a triston, about 20 of them, and I know how to get them\". When writer asked pt why he gave away his guns in the first place, he stated, \"I don't know, I did a lot of things that didn't make sense the past year\". Pt then stated, \"my brain hurts\". Pt appeared under the influence, was slurring his words, and falling asleep intermittently in assessment. Pt was, however, calm and cooperative. Pt frequently stated his \"whole body was sore\" but was unsure why. He also reported he has not taken his MH medications in 5 days.     Informed Consent and Assessment Methods  Explained the crisis assessment process, including applicable information disclosures and limits to confidentiality, assessed understanding of the process, and obtained consent to proceed with the assessment.  Assessment methods included conducting a formal interview with patient, review of medical records, collaboration with medical staff, and obtaining relevant collateral information from family and community providers when available.  : done       The following areas have been assessed:    History of Mental Health and Chemical Dependency:  Mental Health History:  Patient has a historical diagnosis of PTSD, Depression , Anxiety, and  Poly substance abuse.   The patient has a history of suicide attempts 3 last 1 with a firearm. Pt reports he does not have a gun and has access to get 1.  Patient has a history of engaging in non-suicidal self-injury as a teenager.  Pt reports no NSSI behaviors since then.     Previous psychiatric hospitalizations and treatments (including outpatient, residential, and inpatient care:  [facility]    Substance Use History  Methamphetamine, alcohol, heroin      Patient's current relationship status is   single.  "  Patient reported having three child(scott).       Family Description (Constellation, significant information and events, Family Psychiatric History):   Mother/Father-Depression, Sister-OCD    Significant Medical issues, Life events or Trauma history:   Adult & childhood trauma      Living Situation:  Patient's current living/housing situation is staying with girlfriend . They lived with their girlfriend and they report that housing is not stable and they are not able to return upon discharge. Pt reports his gf things he is out playing around and he has not told anyone where he is.        Educational Background:    Patient's highest education level was high school graduate. Patient reports they are  able to understand written materials.     Occupational and Financial Status:     Patient is currently unemployed.  Patient reports  income is obtained through general assistance.  Patient does identify finances as a current stressor. They are insured under [employer]. 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  Contextual influences on patient's health include severity of symptoms, housing instability, lack of social support, safety concerns, and level of functioning.   Patient identified their preferred language to be English. Patient reported they do not need the assistance of an .  Spiritual considerations include: None Identified    Social Functioning (organizations, interests, support system):   In their free time, patient reports they like to build bikes and cut hair.  Pt reports he does not have anyone he identities as a support system.      Patient identified no one as part of their support system.  Patient does not identify the quality of any relationships and is not informing anyone he is currently hospitalized.     Patient reports being open to providers and currently has none.    Current Treatment Providers are:   Primary Care Provider:  Name/Clinic:    Number:     Medication Management/Psychiatry:  Name/Clinic:    Number:     Therapist:   Name/Clinic:   Number:     /ACT Team:  Name/Clinic:    Number:     Group Home:  Name/Clinic:    Number:     Other contact information (family, friends, SO) and UMBERTO status:       GOALS FOR HOSPITALIZATION:  What do patient want to accomplish during this hospitalization to make things better for the patient.?   Patient priorities:  The patient reported that what is most important to them is his sanity and improving his mental health. They identified symptom stabilization as a goal of this hospitalization.    Social Service Assessment/Plan:  Patient view:   Patient reports it is important for the care team to know he is open to taking medications and having mental health providers.  Upon discharge, they anticipate needing psychiatry and therapy set up for them. Pt was calm, cooperative, and exhibits a flat affect. Pt appears depressed and reports he used methamphetamine last week. Pt may benefit from a CD assessment and treatment.      Strengths and Assets:  The patient uses these coping skills to help with stress and hard times: Pt reports he builds bikes, takes walks, and listen to music.          Patient will have psychiatric assessment and medication management by the psychiatrist. Medications will be reviewed and adjusted per /MD/APRN CNP as indicated. The treatment team will continue to assess and stabilize the patient's mental health symptoms with the use of medications and therapeutic programming. Hospital staff will  provide a safe environment and a therapeutic milieu. Staff will continue to assess patient as needed. Patient will participate in unit groups and activities. Patient will receive individual and group support on the unit.      [facility] will do individual inpatient treatment planning and after care planning. CTC will discuss options for increasing community supports with the patient. CTC will coordinate with outpatient providers and will place referrals to ensure appropriate follow up care is in place.

## 2024-05-20 PROCEDURE — 124N000002 HC R&B MH UMMC

## 2024-05-20 PROCEDURE — 250N000013 HC RX MED GY IP 250 OP 250 PS 637: Performed by: PSYCHIATRY & NEUROLOGY

## 2024-05-20 PROCEDURE — 250N000013 HC RX MED GY IP 250 OP 250 PS 637

## 2024-05-20 PROCEDURE — 99232 SBSQ HOSP IP/OBS MODERATE 35: CPT | Mod: GC | Performed by: PSYCHIATRY & NEUROLOGY

## 2024-05-20 RX ORDER — OLANZAPINE 10 MG/1
10 TABLET ORAL 4 TIMES DAILY PRN
Status: DISCONTINUED | OUTPATIENT
Start: 2024-05-20 | End: 2024-05-28

## 2024-05-20 RX ORDER — OLANZAPINE 10 MG/2ML
10 INJECTION, POWDER, FOR SOLUTION INTRAMUSCULAR 3 TIMES DAILY PRN
Status: DISCONTINUED | OUTPATIENT
Start: 2024-05-20 | End: 2024-05-28

## 2024-05-20 RX ADMIN — GABAPENTIN 600 MG: 600 TABLET, FILM COATED ORAL at 21:18

## 2024-05-20 RX ADMIN — GABAPENTIN 600 MG: 600 TABLET, FILM COATED ORAL at 16:46

## 2024-05-20 RX ADMIN — ATOMOXETINE 60 MG: 60 CAPSULE ORAL at 08:12

## 2024-05-20 RX ADMIN — BUPRENORPHINE 8 MG: 8 TABLET SUBLINGUAL at 21:18

## 2024-05-20 RX ADMIN — FLUOXETINE HYDROCHLORIDE 60 MG: 20 CAPSULE ORAL at 08:12

## 2024-05-20 RX ADMIN — OLANZAPINE 10 MG: 10 TABLET, FILM COATED ORAL at 11:52

## 2024-05-20 RX ADMIN — ATORVASTATIN CALCIUM 20 MG: 20 TABLET, FILM COATED ORAL at 21:18

## 2024-05-20 RX ADMIN — BUPRENORPHINE 8 MG: 8 TABLET SUBLINGUAL at 08:13

## 2024-05-20 RX ADMIN — LITHIUM CARBONATE 300 MG: 300 TABLET, EXTENDED RELEASE ORAL at 21:18

## 2024-05-20 RX ADMIN — GABAPENTIN 600 MG: 600 TABLET, FILM COATED ORAL at 08:13

## 2024-05-20 RX ADMIN — LITHIUM CARBONATE 300 MG: 300 TABLET, EXTENDED RELEASE ORAL at 08:12

## 2024-05-20 RX ADMIN — QUETIAPINE FUMARATE 200 MG: 200 TABLET ORAL at 21:18

## 2024-05-20 ASSESSMENT — ACTIVITIES OF DAILY LIVING (ADL)
DRESS: SCRUBS (BEHAVIORAL HEALTH);INDEPENDENT
ADLS_ACUITY_SCORE: 31
ADLS_ACUITY_SCORE: 31
ORAL_HYGIENE: INDEPENDENT;PROMPTS
ADLS_ACUITY_SCORE: 31
ADLS_ACUITY_SCORE: 30
ADLS_ACUITY_SCORE: 31
ADLS_ACUITY_SCORE: 31
LAUNDRY: WITH SUPERVISION
ADLS_ACUITY_SCORE: 31
ADLS_ACUITY_SCORE: 30
ADLS_ACUITY_SCORE: 31
HYGIENE/GROOMING: HANDWASHING;SHOWER;INDEPENDENT
ADLS_ACUITY_SCORE: 30
ADLS_ACUITY_SCORE: 30
ADLS_ACUITY_SCORE: 31
ADLS_ACUITY_SCORE: 30
ADLS_ACUITY_SCORE: 31
ADLS_ACUITY_SCORE: 31
ADLS_ACUITY_SCORE: 30
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
## 2024-05-20 NOTE — PLAN OF CARE
"Goal Outcome Evaluation:    Plan of Care Reviewed With: patient Plan of Care Reviewed With: patient    Overall Patient Progress: improvingOverall Patient Progress: improving       Problem: Adult Behavioral Health Plan of Care  Goal: Plan of Care Review  Outcome: Progressing  Flowsheets  Taken 5/20/2024 1212  Plan of Care Reviewed With: patient  Overall Patient Progress: improving  Patient Agreement with Plan of Care: agrees  Taken 5/20/2024 1000  Patient Agreement with Plan of Care: (\"I lost everything this is it im done\") agrees with comment (describe)     Pt described his mood as \"sad.\" Pt stated, \"I lost everything again, I relapsed in alcohol and drugs, now I am homeless.\" Pt feelings were acknowledged and re assurance was given. Endorsed anxiety 8/10, depression 10/10 and general body aches 7/10. Pt endorsed auditory hallucinations, stated, \"the voice are telling bad things.\" Pt was given PRN Zyprexa, which was somewhat effective. Pt denied SI/HI/SIB. Affect is flat and blunted. Hygiene and nutrition is adequate.  Pt has CMP and TSH w/free T4 reflex order for 5/22 and CD IP consult. Blood pressure 99/68, pulse 69, temperature 97.1  F (36.2  C), temperature source Temporal, resp. rate 18, height 1.905 m (6' 3\"), weight 122.2 kg (269 lb 6.4 oz), SpO2 98%.    "

## 2024-05-20 NOTE — CONSULTS
"Triage & Transition Services, Extended Care       Patient: Perry goes by \"Perry,\" uses he/him pronouns  Date of Service: May 20, 2024  Site of Service: [facility] MENTAL HE[facility] & ADDI[facility]                             N221-02  Patient was seen      Mode of Assessment:      Patient is followed related to: other  Notable observations from today's encounter include: NA  The care team is working towards the following:    Significant status changes: no  Case Management included: Case Management Included: collaborating with patient's support system  Details on Collaborating with Patient's Support System: Bridger has notified pt. care coordinator that JAQUELINE consult was completed.  Summary of Interaction: Writer met with pt to complete JAQUELINE consult, Pt reported he does eventually want to go to CD treatment however at this time he feels he really needs to just focus on his mental health. Pt shared he was sober for 5 months and relapsed just recently 1 time. Pt reported he is struggling with SI and can't understand why he can't stop thinking about it. He shared at this time he is not even feeling comfortable enough to complete the JAQUELINE assessment. Writer notified pt that when he is ready to let his care team know.    Recommendations:  Complete a JAQUELINE assessment and follow ant and all recommendations.     Summary: Writer met with pt to complete JAQUELINE consult, Pt reported he does eventually want to go to CD treatment however at this time he feels he really needs to just focus on his mental health. Pt shared he was sober for 5 months and relapsed just recently 1 time. Pt reported he is struggling with SI and can't understand why he can't stop thinking about it. He shared at this time he is not even feeling comfortable enough to complete the JAQUELINE assessment. Writer notified pt that when he is ready to let his care team know.    Legal Status:    Legal Status at Admission: Voluntary/Patient has signed consent for " treatment    Jeimy Lakhani, [facility]  [phone]

## 2024-05-20 NOTE — PLAN OF CARE
Problem: Adult Behavioral Health Plan of Care  Goal: Plan of Care Review  Outcome: Progressing  Flowsheets (Taken 5/19/2024 1704)  Patient Agreement with Plan of Care: agrees   Goal Outcome Evaluation:    Plan of Care Reviewed With: patient          Pt was sleeping beginning of the shift and woke up at about 1700 complaining of being very hungry. He was offered some snacks. His affect was flat and blunted but pleasant upon approach. He was calm and quiet, and kept to himself while out in the lounge. He was out watching the basket ball match with his peers but did not interact with them. He complained of pain on his back and sides. He rated pain 6/10. He was given prn Tylenol 650 mg which he said was helpful. He endorsed anxiety 10/10 and depression 7/10. He endorsed SI but said he has no plan while in the hospital. He denied HI/SIB. He endorsed AH/VH. He was given prn Zyprexa 10 mg which was helpful. He contracted for safety. He was able to make his needs known. Intake was adequate. He was out for dinner and snacks and ate 100%. His Vitals was WNL with b/p 92/68. He was encouraged to drink fluids and it was rechecked for 99/68. He was medication compliant but refused his Minipress 3 mg at bed time, stating that when he takes it, he feels like he cannot breathe so he has not been taken it even while at home. The residence doctor on call, Dr. Colbert was notified and he said, he will discontinued it. Pt has some Labs which are scheduled for Wednesday 5/22/24. Pt has Lithium level check, Comprehensive Methabolic Panel, and TSH with Free T4 reflex. No other concern at this time. Will continue to monitor and will assist if need arise.

## 2024-05-20 NOTE — PLAN OF CARE
"Initial meeting note:    Therapist introduced self to patient and discussed psychotherapy service available to patient.     Pt response: Pt expressed interest in meeting 1:1. Pt shared he is \"really depressed\" and endorses racing thoughts.    Plan: Made plan to meet with Pt later today or tomorrow.      YESSI Chi  Psychotherapist/    "

## 2024-05-20 NOTE — PLAN OF CARE
Problem: Sleep Disturbance  Goal: Adequate Sleep/Rest  Outcome: Progressing    Pt appears to have slept for 5.5 hours. Pt did not complain of pain or discomfort, and no PRN medications were given. 15 minutes safety checks were in place. Staff will continue to offer support to pt.

## 2024-05-20 NOTE — PLAN OF CARE
"Team Note Due:  Monday    Assessment/Intervention/Current Symtoms and Care Coordination:  Chart review and met with team, discussed pt progress, symptomology, and response to treatment.  Discussed the discharge plan and any potential impediments to discharge.    Consult for CD assessment placed as pt expressed an interest in MI/CD treatment. CD  attempted to meet with pt today but pt stated he \"eventually wants to go to treatment but wants to focus on MH right now.\" CD assessment not completed.    Met with pt and introduced self/role. Pt shared he's been \"really suicidal and not ready to think about discharge or what I need right now.\"     Discharge Plan or Goal:  Pending stabilization of symptoms and safe disposition planning.     Barriers to Discharge:  Patient requires further psychiatric stabilization due to current symptomology, medication management with changes subject to provider, coordination with outside supports, and aftercare planning.     Referral Status:  None at this time     Legal Status:  Voluntary     Contacts:  Ileana (Sister): [phone]       Upcoming Meetings and Dates/Important Information and next steps:  CD consult when pt ready for assessment (attempted on 5/17 and 5/20)  "

## 2024-05-20 NOTE — PROVIDER NOTIFICATION
05/20/24 1053   Individualization/Patient Specific Goals   Patient Personal Strengths expressive of emotions;expressive of needs;positive educational history;positive vocational history   Patient Vulnerabilities housing insecurity;substance abuse/addiction;history of unsuccessful treatment;occupational insecurity;limited support system;adverse childhood experience(s);traumatic event;recent loss   Interprofessional Rounds   Summary New admit. Admitted due to worsening SI and relapse on methamphetamine, fentanyl and alcohol.   Participants psychiatrist;CTC;other (see comments);nursing   Behavioral Team Discussion   Participants Dr. Demian MD; Marisa MELLO; [facility]; medical resident; medical students   Progress N/A - new admit   Anticipated length of stay 7-10 days   Continued Stay Criteria/Rationale Symptom stabilization, medication management, care coordination   Medical/Physical See H&P   Precautions See below   Plan Psychiatric assessment/Medication management. Therapeutic Milieu. Individual care planning and after care planning. Patient to participate in unit groups and activities. Individual and group support on unit.   Safety Plan Per unit protocol   Anticipated Discharge Disposition substance use treatment     PRECAUTIONS AND SAFETY    Behavioral Orders   Procedures    Code 1 - Restrict to Unit    Routine Programming     As clinically indicated    Status 15     Every 15 minutes.    Suicide precautions: Suicide Risk: MODERATE; Clinical rationale to override score: modification to the care environment     Patients on Suicide Precautions should have a Combination Diet ordered that includes a Diet selection(s) AND a Behavioral Tray selection for Safe Tray - with utensils, or Safe Tray - NO utensils       Order Specific Question:   Suicide Risk     Answer:   MODERATE     Order Specific Question:   Clinical rationale to override score:     Answer:   modification to the care environment       Safety  Safety  WDL: WDL  Patient Location: lounge, patient room, own, dining room  Observed Behavior: sitting, calm  Observed Behavior (Comment): sitting calm  Safety Measures: environmental rounds completed, safety rounds completed  Diversional Activity: art work, journaling, music, play, puzzles, reading, television  Suicidality: Status 15, Minimal personal belongings in room

## 2024-05-21 PROCEDURE — 250N000013 HC RX MED GY IP 250 OP 250 PS 637

## 2024-05-21 PROCEDURE — G0177 OPPS/PHP; TRAIN & EDUC SERV: HCPCS

## 2024-05-21 PROCEDURE — 124N000002 HC R&B MH UMMC

## 2024-05-21 PROCEDURE — 250N000013 HC RX MED GY IP 250 OP 250 PS 637: Performed by: PSYCHIATRY & NEUROLOGY

## 2024-05-21 PROCEDURE — 99232 SBSQ HOSP IP/OBS MODERATE 35: CPT | Mod: GC | Performed by: PSYCHIATRY & NEUROLOGY

## 2024-05-21 PROCEDURE — 90832 PSYTX W PT 30 MINUTES: CPT

## 2024-05-21 RX ORDER — BUPRENORPHINE 8 MG/1
8 TABLET SUBLINGUAL 2 TIMES DAILY
Status: DISCONTINUED | OUTPATIENT
Start: 2024-05-21 | End: 2024-06-06 | Stop reason: HOSPADM

## 2024-05-21 RX ORDER — CYCLOBENZAPRINE HCL 5 MG
5 TABLET ORAL 2 TIMES DAILY PRN
Status: DISCONTINUED | OUTPATIENT
Start: 2024-05-21 | End: 2024-06-06 | Stop reason: HOSPADM

## 2024-05-21 RX ORDER — BUPRENORPHINE 2 MG/1
4 TABLET SUBLINGUAL DAILY
Status: DISCONTINUED | OUTPATIENT
Start: 2024-05-21 | End: 2024-06-06 | Stop reason: HOSPADM

## 2024-05-21 RX ADMIN — GABAPENTIN 600 MG: 600 TABLET, FILM COATED ORAL at 14:20

## 2024-05-21 RX ADMIN — OLANZAPINE 10 MG: 10 TABLET, FILM COATED ORAL at 11:06

## 2024-05-21 RX ADMIN — ACETAMINOPHEN 650 MG: 325 TABLET, FILM COATED ORAL at 18:33

## 2024-05-21 RX ADMIN — CYCLOBENZAPRINE HYDROCHLORIDE 5 MG: 5 TABLET, FILM COATED ORAL at 14:20

## 2024-05-21 RX ADMIN — ATOMOXETINE 60 MG: 60 CAPSULE ORAL at 08:37

## 2024-05-21 RX ADMIN — HYDROXYZINE HYDROCHLORIDE 100 MG: 50 TABLET, FILM COATED ORAL at 08:42

## 2024-05-21 RX ADMIN — QUETIAPINE FUMARATE 200 MG: 200 TABLET ORAL at 21:16

## 2024-05-21 RX ADMIN — OLANZAPINE 10 MG: 10 TABLET, FILM COATED ORAL at 18:33

## 2024-05-21 RX ADMIN — BUPRENORPHINE 4 MG: 2 TABLET SUBLINGUAL at 20:57

## 2024-05-21 RX ADMIN — GABAPENTIN 600 MG: 600 TABLET, FILM COATED ORAL at 08:37

## 2024-05-21 RX ADMIN — ACETAMINOPHEN 650 MG: 325 TABLET, FILM COATED ORAL at 08:42

## 2024-05-21 RX ADMIN — FLUOXETINE HYDROCHLORIDE 60 MG: 20 CAPSULE ORAL at 08:36

## 2024-05-21 RX ADMIN — BUPRENORPHINE 8 MG: 8 TABLET SUBLINGUAL at 14:20

## 2024-05-21 RX ADMIN — BUPRENORPHINE 8 MG: 8 TABLET SUBLINGUAL at 08:37

## 2024-05-21 RX ADMIN — GABAPENTIN 600 MG: 600 TABLET, FILM COATED ORAL at 20:56

## 2024-05-21 RX ADMIN — LITHIUM CARBONATE 300 MG: 300 TABLET, EXTENDED RELEASE ORAL at 20:57

## 2024-05-21 RX ADMIN — ATORVASTATIN CALCIUM 20 MG: 20 TABLET, FILM COATED ORAL at 20:57

## 2024-05-21 RX ADMIN — LITHIUM CARBONATE 300 MG: 300 TABLET, EXTENDED RELEASE ORAL at 08:37

## 2024-05-21 ASSESSMENT — ACTIVITIES OF DAILY LIVING (ADL)
ADLS_ACUITY_SCORE: 31
HYGIENE/GROOMING: INDEPENDENT
ADLS_ACUITY_SCORE: 31
LAUNDRY: WITH SUPERVISION
ORAL_HYGIENE: INDEPENDENT
ADLS_ACUITY_SCORE: 31
ADLS_ACUITY_SCORE: 31
ORAL_HYGIENE: INDEPENDENT
ADLS_ACUITY_SCORE: 31
DRESS: INDEPENDENT
DRESS: INDEPENDENT
ADLS_ACUITY_SCORE: 31
HYGIENE/GROOMING: INDEPENDENT

## 2024-05-21 NOTE — PLAN OF CARE
"Problem: Adult Behavioral Health Plan of Care  Goal: Plan of Care Review  Outcome: Progressing  Flowsheets (Taken 5/20/2024 1646)  Patient Agreement with Plan of Care: agrees   Goal Outcome Evaluation:    Plan of Care Reviewed With: patient          Pt was isolative and withdrawn to his room most or the shift. Affect flat and blunted, looking very sad. He was calm, quiet, and guarded. Pt encouraged to come out in the milieu but he said he was tired. No interaction or socialization noted this shift with peers or staff. While out in the lounge, he kept to himself. He came out mostly for food and medications. He did not attend any of groups. He was able to make his needs known. Intake was adequate. He ate 100% of his dinner. Hygiene was adequate. Vitals were WNL.     He was cooperative with assessment. He endorsed pain on his left hip 7/10 and was given his scheduled gabapentin which was helpful. He endorsed anxiety 7/10 and depression 10/10. The scheduled gabapentin helped his anxiety per patient. He endorsed SI but said he has no plan while in the hospital. He denied HI. He endorsed auditory hallucination stating that the voices are telling him, \"I am not good and I am worthless.\" At that time patient said he will not act on what the voices says but if it get worse and he cannot handle it, he will let staff know. He contracted for safety. He was medication compliant. No prn given this shift. No medication side effect reported or noted this shift. Pt requested to take his Subutex earlier than than 8 pm. He said, at home he took it in the morning and afternoon. He was told to let the Team know in the morning. Will continue to monitor and will assist if need arise.          "

## 2024-05-21 NOTE — PLAN OF CARE
Team Note Due:  Monday    Assessment/Intervention/Current Symtoms and Care Coordination:  Chart review and met with team, discussed pt progress, symptomology, and response to treatment.  Discussed the discharge plan and any potential impediments to discharge.    Checked in with pt who shared he wants to continue focusing on his MH and discussed considering IRTS vs MI/CD treatment in the future. We agreed on the importance of stabilizing first and taking things day by day. Also discussed circumstances surrounding his recent relapse and the difficulties with addiction/recovery. Pt expressed appreciation for the team as he feels safe and supported.     Discharge Plan or Goal:  Pending stabilization of symptoms and safe disposition planning.     Barriers to Discharge:  Patient requires further psychiatric stabilization due to current symptomology, medication management with changes subject to provider, coordination with outside supports, and aftercare planning.     Referral Status:  None at this time     Legal Status:  Voluntary     Contacts:  Ileana (Sister): [phone]       Upcoming Meetings and Dates/Important Information and next steps:  CD consult when pt ready for assessment (attempted on 5/17 and 5/20)

## 2024-05-21 NOTE — PROGRESS NOTES
Rehab Group    Start time: 1315  End time: 1415  Patient time total: 60 minutes    attended full group    #4 attended   Group Type: occupational therapy   Group Topic Covered: balanced lifestyle, social skills, and healthy leisure time     Group Session Detail:  Self-reflection group game     Patient Response/Contribution:  Cooperative with task, Listened actively, Attentive, and Actively engaged     Patient Detail:    Pt actively participated in a structured occupational therapy group involving a self-reflecting, group leisure task. Pt appeared comfortable sharing positive past experiences and personal information with peers, and was respectful in listening and responding to peers. Pt was able to follow 3 step directions for the novel task with occasional cues and tracked the task consistently. Shared that he got a degree in psychology during a period of sobriety 5 years ago and expressed motivation for sobriety again. Affect appeared flat.         Train & Education Service Per Session 45 + Minutes () OT Group code    Patient Active Problem List   Diagnosis    Suicidal ideation    Methamphetamine use (H)    Polysubstance abuse (H)    Depression, unspecified depression type

## 2024-05-21 NOTE — PROGRESS NOTES
Rehab Group    Start time: 1015  End time: 1115  Patient time total: 30 minutes (no charge)    attended partial group    #6 attended   Group Type: occupational therapy   Group Topic Covered: coping skills     Group Session Detail:  OT clinic     Patient Response/Contribution:  Cooperative with task, Attentive, and Actively engaged     Patient Detail:    Pt actively participated in occupational therapy clinic to facilitate coping skill exploration, creative expression within personally meaningful activities, and clinical observation of social, cognitive, and kinesthetic performance skills. Pt response: Independent to initiate, gather materials, sequence, and adjust to workspace demands as needed. Demonstrated fair focus, planning, and attention to detail for selected creative expression task. Able to ask for assistance as needed, and appeared comfortable interacting with peers and staff. Shared that he selected a [employer] theme for his project, and was overheard discussing sports with a peer. Calm and cooperative.         No Charge    Patient Active Problem List   Diagnosis    Suicidal ideation    Methamphetamine use (H)    Polysubstance abuse (H)    Depression, unspecified depression type

## 2024-05-21 NOTE — PROGRESS NOTES
"  ----------------------------------------------------------------------------------------------------------  [facility]  Psychiatry Progress Note  Hospital Day #2     Interim History:     The patient's care was discussed with the treatment team and chart notes were reviewed.    Vitals: Vital signs:                    Height: 190.5 cm (6' 3\") Weight: 122.2 kg (269 lb 6.4 oz)  Estimated body mass index is 33.67 kg/m  as calculated from the following:    Height as of this encounter: 1.905 m (6' 3\").    Weight as of this encounter: 122.2 kg (269 lb 6.4 oz).    Sleep: 5.75 hours (05/21/24 0600)  Scheduled medications: Took all scheduled medications as prescribed  Psychiatric PRN medications: Tylenol 650mg, Zyprexa 10mg    Staff Report:   Perry was isolative and withdrawn to his room most or the shift. Affect flat and blunted, looking very sad.  No interaction or socialization noted. He was calm, quiet, and guarded. He endorsed pain on his left hip 7/10         Subjective:     Patient Interview:  Perry was lying in bed when the care team walked in. Says he's doing better, and mood wise, he's better than yesterday. He's still very hard on himself for relapsing. The writer updated perry that the care team was able to contact his sister, sandeep. He seemed initially worried that she hates him, but the writer reassured him that she seemed like she cared a lot for him and wants him to get the help he needs. Seems motivated to do what the care team asks and to take his medications. He says he's still is still feeling anxiety, and he appears anxious. Care team encouraged him to take his PRN olanzipine 10mg as much as needed up to 3 times daily. Requsted to move subutex to afternoon and up the dose, plan to do 8mg morning, 8mg afternoon, 4mg evening for total of 20mg. Still endorsing muscle pains, agreed to try Flexeril.       ROS:  Patient has pain    Patient denies acute " "concerns     Objective:     Vitals:  BP 99/68 (BP Location: Left arm, Patient Position: Sitting, Cuff Size: Adult Large)   Pulse 69   Temp 97.1  F (36.2  C) (Temporal)   Resp 18   Ht 1.905 m (6' 3\")   Wt 122.2 kg (269 lb 6.4 oz)   SpO2 98%   BMI 33.67 kg/m      Allergies:  Allergies   Allergen Reactions    Wellbutrin [Bupropion] Difficulty breathing       Current Medications:  Scheduled:  Current Facility-Administered Medications   Medication Dose Route Frequency Provider Last Rate Last Admin    acetaminophen (TYLENOL) tablet 650 mg  650 mg Oral Q4H PRN Mati Cueva MD   650 mg at 05/19/24 1704    alum & mag hydroxide-simethicone (MAALOX) suspension 30 mL  30 mL Oral Q4H PRN Mati Cueva MD        atomoxetine (STRATTERA) capsule 60 mg  60 mg Oral Daily Mati Cueva MD   60 mg at 05/20/24 0812    atorvastatin (LIPITOR) tablet 20 mg  20 mg Oral QPM Mati Cueva MD   20 mg at 05/20/24 2118    buprenorphine (SUBUTEX) sublingual tablet 8 mg  8 mg Sublingual BID Mati Cueva MD   8 mg at 05/20/24 2118    FLUoxetine (PROzac) capsule 60 mg  60 mg Oral Daily Pb Arciniega MD   60 mg at 05/20/24 0812    gabapentin (NEURONTIN) tablet 600 mg  600 mg Oral TID Mati Cueva MD   600 mg at 05/20/24 2118    hydrOXYzine HCl (ATARAX) tablet 100 mg  100 mg Oral BID PRN Mati Cueva MD        lithium ER (LITHOBID) CR tablet 300 mg  300 mg Oral BID Mati Cueva MD   300 mg at 05/20/24 2118    melatonin tablet 3 mg  3 mg Oral At Bedtime PRN Mati Cueva MD        naloxone (NARCAN) injection 0.2 mg  0.2 mg Intravenous Q2 Min PRN Pb Arciniega MD        Or    naloxone (NARCAN) injection 0.4 mg  0.4 mg Intravenous Q2 Min PRN Pb Arciniega MD        Or    naloxone (NARCAN) injection 0.2 mg  0.2 mg Intramuscular Q2 Min PRN Pb Arciniega MD        Or    naloxone (NARCAN) injection 0.4 mg  0.4 mg Intramuscular Q2 Min PRN Pb Arciniega MD        nicotine (NICORETTE) gum 2 mg  2 mg Buccal Q1H PRN Kirby Colbert MD   "      OLANZapine (zyPREXA) tablet 10 mg  10 mg Oral 4x Daily PRN Jeri Morse MD        Or    OLANZapine (zyPREXA) injection 10 mg  10 mg Intramuscular TID PRN Jeri Morse MD        polyethylene glycol (MIRALAX) Packet 17 g  17 g Oral Daily PRN Mati Cueva MD        QUEtiapine (SEROquel) tablet 200 mg  200 mg Oral At Bedtime Mati Cueva MD   200 mg at 05/20/24 2118       PRN:  Current Facility-Administered Medications   Medication Dose Route Frequency Provider Last Rate Last Admin    acetaminophen (TYLENOL) tablet 650 mg  650 mg Oral Q4H PRN Mati Cueva MD   650 mg at 05/19/24 1704    alum & mag hydroxide-simethicone (MAALOX) suspension 30 mL  30 mL Oral Q4H PRN Mati Cueva MD        atomoxetine (STRATTERA) capsule 60 mg  60 mg Oral Daily Mati Cueva MD   60 mg at 05/20/24 0812    atorvastatin (LIPITOR) tablet 20 mg  20 mg Oral QPM Mati Cueva MD   20 mg at 05/20/24 2118    buprenorphine (SUBUTEX) sublingual tablet 8 mg  8 mg Sublingual BID Mati Cueva MD   8 mg at 05/20/24 2118    FLUoxetine (PROzac) capsule 60 mg  60 mg Oral Daily Pb Arciniega MD   60 mg at 05/20/24 0812    gabapentin (NEURONTIN) tablet 600 mg  600 mg Oral TID Mati Cueva MD   600 mg at 05/20/24 2118    hydrOXYzine HCl (ATARAX) tablet 100 mg  100 mg Oral BID PRN Mati Cueva MD        lithium ER (LITHOBID) CR tablet 300 mg  300 mg Oral BID Mati Cueva MD   300 mg at 05/20/24 2118    melatonin tablet 3 mg  3 mg Oral At Bedtime PRN Mati Cueva MD        naloxone (NARCAN) injection 0.2 mg  0.2 mg Intravenous Q2 Min PRN Pb Arciniega MD        Or    naloxone (NARCAN) injection 0.4 mg  0.4 mg Intravenous Q2 Min PRN Pb Arciniega MD        Or    naloxone (NARCAN) injection 0.2 mg  0.2 mg Intramuscular Q2 Min PRN Pb Arciniega MD        Or    naloxone (NARCAN) injection 0.4 mg  0.4 mg Intramuscular Q2 Min PRN Pb Arciniega MD        nicotine (NICORETTE) gum 2 mg  2 mg Buccal Q1H PRN Filemon  "MD Kirby        OLANZapine (zyPREXA) tablet 10 mg  10 mg Oral 4x Daily PRN Jeri Morse MD        Or    OLANZapine (zyPREXA) injection 10 mg  10 mg Intramuscular TID PRN Jeri Morse MD        polyethylene glycol (MIRALAX) Packet 17 g  17 g Oral Daily PRN Mati Cueva MD        QUEtiapine (SEROquel) tablet 200 mg  200 mg Oral At Bedtime Mati Cueva MD   200 mg at 05/20/24 1474       Labs and Imaging:  New results:   No results found for this or any previous visit (from the past 24 hour(s)).    Data this admission:  - CBC: WBC 11.4, Hgb 13.2, hematocrit 39.2  - CMP: Glucose 134  - TSH normal  - UDS Positive for methamphetamines and cannabis  - Vit D normal  - Hgb A1c normal  - Lipids: Trig 173, HDL 32  - Vit B12 normal  - Folate normal  - EKG refused     Mental Status Exam:     Oriented to:  Grossly Oriented  General:  Awake and Alert, sitting on bed during encounter  Appearance:  appears stated age, Dressed in scrubs, Hair is kept and grooming is fair, and Tattoos on arms and neck , appears tired  Behavior/Attitude:  cooperative and engaged, anxious  Eye Contact:  downcast better today  Psychomotor: no evidence of tics, dystonia, or tardive dyskinesia   Speech:  appropriate volume/tone  Language: Fluent in English with appropriate syntax and vocabulary.  Mood:  \"doing better\"  Affect:  sad, anxious  Thought Process:  linear  Thought Content:  Suicidal ideation with plan and intent, No HI, says he did not have any AV hallucinations since last time we met   Associations:  intact  Insight:  good, voluntarily admit, is aware of his worsening psychosis in the context of substance use, seems to be willing to working with the care team to get better  Judgment:  fair , appropriate engagement with team, adherent to team recommendations   Impulse control: fair  Attention Span:  grossly intact  Concentration:  grossly intact  Recent and Remote Memory:  not formally assessed  Fund of Knowledge:   not " formally assessed  Muscle Strength and Tone: normal  Gait and Station: Normal     Psychiatric Assessment   Diagnosis:  Psychosis, unspecified  R/O Methamphetamine Use Disorder  R/O Alcohol Use Disorder   R/O Opioid Use Disorder   R/O Cannabis Use Disorder     Perry Preciado is a 49 year old male with previous psychiatric diagnoses of MDD, KANE, ADHD, and substance disorder (alcohol, meth, opiates) and medical history significant for TBI who was admitted from the ER on 2024 due to concern for SI and AVH in the context of medication non-adherence and substance use. Most recent psychiatric hospitalization was at [facility] in 2024 for similar concerns. Significant symptoms on admission include depression, SI, and AVH. The MSE on admission was pertinent for SI, AVH, and depressed affect. Biological contributions to mental health presentation include prior diagnoses of mental health disorders, multiple medication trials, long standing substance use history, hx of TBI, and genetic predisposition for suicide completion and substance use. Psychological contributions to mental health presentation include maladaptive coping and cluster B traits. Social factors contributing to mental health presentation include limited social supports, financial stressors, and recent family deaths who also  by suicide around this time of the year. Protective factors include engaged in treatment.      In summary, the patient's differential is still in evolution. Patient has reported symptoms of depression, SI, and AVH in the context of substance use and medication nonadherence. Patient per chart review has mentioned that he only has psychosis during periods of using substances however today he notes it happens even when he's sober. He also mentions some periods concerning for hypomania/mily but it's complicated by his substance use and cluster B traits. For now will consider Unspecified mood and unspecified psychosis until  collateral can be obtained. He will likely benefit from medication optimization and substance use treatment this admission.     Given that he currently has SI and psychosis, patient warrants inpatient psychiatric hospitalization to maintain his safety.      Psychiatric Plan by Diagnosis      Today's changes:  -Increase Subutex dose to 20mg (8 morning, 8 afternoon, 4 evening)  -Add Flexeril 5mg for muscle pain  -Plan to keep Ileana notified with updates, particularly with dispo plan.     #   1. Medications:  - Zyprexa 10 mg QID PRN ordered  - Fluoxetine 60mg  - Lithium 300mg BID  - Seroquel 200mg qpm     2. Pertinent Labs/Monitoring:   - EKG refused     3. Additional Plans:  - Patient will be treated in therapeutic milieu with appropriate individual and group therapies as described    Stimulant Use Disorder  #Opiate Use Disorder  #Nicotine Use Disorder  - Subutex 8mg BID  - Consider providing narcan on discharge  - Nicotine Gum 2mg q1hr prn  - Pending CD assessment. Patient prefers dual diagnosis.      #ADHD  - Atomoxetine 60mg daily     #PTSD  - Prazosin discontinued      Psychiatric Hospital Course:      Perry Preciado was admitted to [facility] as a voluntary patient.   Medications:  PTA atomoxetine, Subutex, Prozac, Gabapentin, Lithium, Prazosin, and seroquel were continued. Zyprexa 10mg TID PRN for anxiety, agitation related to psychosis. Prazosin discontinued -had been using for months, BP low, and pt reported difficulty breathing.      The risks, benefits, alternatives, and side effects were discussed and understood by the patient.     Medical Assessment and Plan     Medical diagnoses to be addressed this admission:    # TBI  - Monitor      #Hypertriglyceridemia  - Atorvastatin 20mg qpm     #Neuropathy  - 600mg TID        Clinically Significant Risk Factors Present on Admission                  # Hypertension: Home medication list includes antihypertensive(s)      # Obesity: Estimated body mass index is  "33.66 kg/m  as calculated from the following:    Height as of this encounter: 1.905 m (6' 3\").    Weight as of this encounter: 122.2 kg (269 lb 4.8 oz).       # Financial/Environmental Concerns:                 Medical course:  Patient was physically examined by the ED prior to being transferred to the unit and was found to be medically stable and appropriate for admission.      Consults:  none     Checklist     Legal Status: Voluntary     Safety Assessment:   Behavioral Orders   Procedures    Code 1 - Restrict to Unit    Routine Programming     As clinically indicated    Status 15     Every 15 minutes.    Suicide precautions: Suicide Risk: MODERATE; Clinical rationale to override score: modification to the care environment     Patients on Suicide Precautions should have a Combination Diet ordered that includes a Diet selection(s) AND a Behavioral Tray selection for Safe Tray - with utensils, or Safe Tray - NO utensils       Order Specific Question:   Suicide Risk     Answer:   MODERATE     Order Specific Question:   Clinical rationale to override score:     Answer:   modification to the care environment       Risk Assessment:  Risk for harm is elevated.  Risk factors: SI, substance use, trauma, family history, and past behaviors, maladaptive coping, impulsive   Protective factors: engaged in treatment , family support, help-seeking, future oriented     SIO: None    Disposition: TBD. Pending stabilization, medication optimization, & development of a safe discharge plan.     Attestations     This patient was seen and discussed with my attending physician, Dr. Arciniega.     Clarence Ellison, MS3  [employer] [employer]    I was present with the medical student who participated in the service and in the documentation of the note. I have verified the history and personally performed the physical exam and medical decision making. I agree with the assessment and plan of care as documented in the note and have made changes to the " note as appropriate.     I was present with the medical student who participated in the service and in the documentation of the note. I have verified the history and personally performed the exam and medical decision making. I agree with the assessment and plan of care as documented in the note. Pb Arciniega M.D., Ph.D.

## 2024-05-21 NOTE — PLAN OF CARE
"Pt this am requested a medication for anxiety.   Pt given PRN Hydroxyzine 100mg,  Pt encouraged to get out of room and got to group.  Pt did go to group. Later pt c/o of anxiety and said he felt like he was having a panic attack.  Pt denied auditory hallucinations.   Pt requested PRN. Pt given Zyprexa.  Pt encouraged to put  ice pack below his eyes.  Later pt stated he felt calmer.  Pt has gone to some groups.  Pt reassured phone to get number for sober house. Pt stated wanted to call them to make sure his belongings were safe.  Pt also wanted to get phone number for his .  Pt stated he was to have court tomorrow. Pt saw text from  that hearing had been moved.  Pt spent some time in afternoon in TV lounge watching TV.   Pt has little interaction with other patients.  Pt denies SI.  Problem: Adult Behavioral Health Plan of Care  Goal: Plan of Care Review  Outcome: Not Progressing  Goal: Patient-Specific Goal (Individualization)  Description: You can add care plan individualizations to a care plan. Examples of Individualization might be:  \"Parent requests to be called daily at 9am for status\", \"I have a hard time hearing out of my right ear\", or \"Do not touch me to wake me up as it startles  me\".  Outcome: Not Progressing  Goal: Adheres to Safety Considerations for Self and Others  Outcome: Not Progressing  Goal: Absence of New-Onset Illness or Injury  Outcome: Not Progressing  Goal: Optimized Coping Skills in Response to Life Stressors  Outcome: Not Progressing  Goal: Develops/Participates in Therapeutic [place] to Support Successful Transition  Outcome: Not Progressing   Goal Outcome Evaluation:                        "

## 2024-05-21 NOTE — PLAN OF CARE
"Individual Therapy Note      Date of Service: May 21, 2024    Patient: Perry goes by \"Perry,\" uses he/him pronouns    Individuals Present: Perry YESSI Whitehead    Session start: 1420   Session end: 1440  Session duration in minutes: 20      Modality Used: Person Centered, Rapport Building, and Motivational Interviewing    Goals: Maintain sobriety, develop alternative coping skills    Patient Description of current symptoms: Worthless, embarrassed      Mental Status Exam:   Attitude: cooperative  Eye Contact: fair  Mood: sad  and depressed  Affect: intensity is blunted  Speech: clear, coherent  Psychomotor Behavior: no evidence of tardive dyskinesia, dystonia, or tics  Thought Process:  logical and linear  Associations: no loose associations  Thought Content: no evidence of suicidal ideation or homicidal ideation  Insight: fair  Judgement: fair  Attention Span and Concentration: fair    Pt progress: Pt reports he is feeling worthless, embarassed d/t relapse. Pt reports he was successful maintaining sobriety for past 7 months, was doing ok mentally. Writer validated his feelings, normalized relapse and commended his length of abstinence. Reports he drank something out of his housemate's cup that led him to feel high, went on for two days willfully taking drugs after that to get high. Pt feels confident he can maintain sobriety again but feels stuck mentally, is almost resigned to not getting better and feels changing might be too hard. Engaged in safety planning. Pt insightful about his warning signs, does not have strong social support as they have distanced themselves from Pt d/t drug use.    Treatment Objective(s) Addressed:   The focus of this session was on rapport building, orienting the patient to therapy, identifying and practicing coping strategies, and safety planning     Progress Towards Goals and Assessment of Patient:   Unable to assess progress towards goals - first meeting with Pt.  "       Therapeutic Intervention(s):   Provided active listening, unconditional positive regard, and validation.   Understand and identify reasons for hospitalization, how to use the hospital to create change and prevent future hospitalizations , Explored motivation for maintaining sobriety. , and Explored motivation for behavioral change.       Plan/next step: Writer will continue to check in with Pt, encouraged Pt to attend group sessions.      [address]84 - Psychotherapy (with patient) - 30 (16-37*) min    Patient Active Problem List   Diagnosis    Suicidal ideation    Methamphetamine use (H)    Polysubstance abuse (H)    Depression, unspecified depression type

## 2024-05-21 NOTE — PLAN OF CARE
Problem: Sleep Disturbance  Goal: Adequate Sleep/Rest  Outcome: Progressing    Pt appears to have slept for  5.75 hours. Pt did not complain of pain or discomfort, and no PRN medications were given. 15 minutes safety checks were in place. Staff will continue to offer support to pt.

## 2024-05-21 NOTE — PLAN OF CARE
BEH IP Unit Acuity Rating Score (UARS)  Patient is given one point for every criteria they meet.    CRITERIA SCORING   On a 72 hour hold, court hold, committed, stay of commitment, or revocation. 0    Patient LOS on BEH unit exceeds 20 days. 0  LOS: 2   Patient under guardianship, 55+, otherwise medically complex, or under age 11. 0   Suicide ideation without relief of precipitating factors. 1   Current plan for suicide. 1   Current plan for homicide. 0   Imminent risk or actual attempt to seriously harm another without relief of factors precipitating the attempt. 0   Severe dysfunction in daily living (ex: complete neglect for self care, extreme disruption in vegetative function, extreme deterioration in social interactions). 1   Recent (last 7 days) or current physical aggression in the ED or on unit. 0   Restraints or seclusion episode in past 72 hours. 0   Recent (last 7 days) or current verbal aggression, agitation, yelling, etc., while in the ED or unit. 0   Active psychosis. 0   Need for constant or near constant redirection (from leaving, from others, etc).  0   Intrusive or disruptive behaviors. 0   Patient requires 3 or more hours of individualized nursing care per 8-hour shift (i.e. for ADLs, meds, therapeutic interventions). 0   TOTAL 3

## 2024-05-22 LAB
ALBUMIN SERPL BCG-MCNC: 3.9 G/DL (ref 3.5–5.2)
ALP SERPL-CCNC: 142 U/L (ref 40–150)
ALT SERPL W P-5'-P-CCNC: 91 U/L (ref 0–70)
ANION GAP SERPL CALCULATED.3IONS-SCNC: 8 MMOL/L (ref 7–15)
AST SERPL W P-5'-P-CCNC: 61 U/L (ref 0–45)
BILIRUB SERPL-MCNC: 0.2 MG/DL
BUN SERPL-MCNC: 13.8 MG/DL (ref 6–20)
CALCIUM SERPL-MCNC: 9.4 MG/DL (ref 8.6–10)
CHLORIDE SERPL-SCNC: 100 MMOL/L (ref 98–107)
CREAT SERPL-MCNC: 1.1 MG/DL (ref 0.67–1.17)
DEPRECATED HCO3 PLAS-SCNC: 33 MMOL/L (ref 22–29)
EGFRCR SERPLBLD CKD-EPI 2021: 83 ML/MIN/1.73M2
GLUCOSE SERPL-MCNC: 96 MG/DL (ref 70–99)
HIV 1+2 AB+HIV1 P24 AG SERPL QL IA: NONREACTIVE
HOLD SPECIMEN: NORMAL
LITHIUM SERPL-SCNC: 0.53 MMOL/L (ref 0.6–1.2)
POTASSIUM SERPL-SCNC: 5 MMOL/L (ref 3.4–5.3)
PROT SERPL-MCNC: 6.9 G/DL (ref 6.4–8.3)
SODIUM SERPL-SCNC: 141 MMOL/L (ref 135–145)
TSH SERPL DL<=0.005 MIU/L-ACNC: 2.36 UIU/ML (ref 0.3–4.2)

## 2024-05-22 PROCEDURE — 36415 COLL VENOUS BLD VENIPUNCTURE: CPT

## 2024-05-22 PROCEDURE — 84155 ASSAY OF PROTEIN SERUM: CPT

## 2024-05-22 PROCEDURE — 87491 CHLMYD TRACH DNA AMP PROBE: CPT

## 2024-05-22 PROCEDURE — 250N000013 HC RX MED GY IP 250 OP 250 PS 637: Performed by: PSYCHIATRY & NEUROLOGY

## 2024-05-22 PROCEDURE — 87389 HIV-1 AG W/HIV-1&-2 AB AG IA: CPT

## 2024-05-22 PROCEDURE — 250N000013 HC RX MED GY IP 250 OP 250 PS 637

## 2024-05-22 PROCEDURE — 86780 TREPONEMA PALLIDUM: CPT

## 2024-05-22 PROCEDURE — H2032 ACTIVITY THERAPY, PER 15 MIN: HCPCS

## 2024-05-22 PROCEDURE — 124N000002 HC R&B MH UMMC

## 2024-05-22 PROCEDURE — 80178 ASSAY OF LITHIUM: CPT

## 2024-05-22 PROCEDURE — 84443 ASSAY THYROID STIM HORMONE: CPT

## 2024-05-22 PROCEDURE — 84075 ASSAY ALKALINE PHOSPHATASE: CPT

## 2024-05-22 PROCEDURE — 99232 SBSQ HOSP IP/OBS MODERATE 35: CPT | Mod: GC | Performed by: PSYCHIATRY & NEUROLOGY

## 2024-05-22 RX ORDER — LITHIUM CARBONATE 300 MG/1
600 TABLET, FILM COATED, EXTENDED RELEASE ORAL AT BEDTIME
Status: DISCONTINUED | OUTPATIENT
Start: 2024-05-22 | End: 2024-05-24

## 2024-05-22 RX ORDER — ALBUTEROL SULFATE 90 UG/1
2 AEROSOL, METERED RESPIRATORY (INHALATION) EVERY 6 HOURS PRN
Status: DISCONTINUED | OUTPATIENT
Start: 2024-05-22 | End: 2024-06-06 | Stop reason: HOSPADM

## 2024-05-22 RX ORDER — ATORVASTATIN CALCIUM 20 MG/1
20 TABLET, FILM COATED ORAL EVERY EVENING
Status: CANCELLED | OUTPATIENT
Start: 2024-05-22

## 2024-05-22 RX ORDER — LITHIUM CARBONATE 300 MG/1
300 TABLET, FILM COATED, EXTENDED RELEASE ORAL EVERY MORNING
Status: DISCONTINUED | OUTPATIENT
Start: 2024-05-23 | End: 2024-05-24

## 2024-05-22 RX ADMIN — LITHIUM CARBONATE 600 MG: 300 TABLET, EXTENDED RELEASE ORAL at 21:22

## 2024-05-22 RX ADMIN — BUPRENORPHINE 8 MG: 8 TABLET SUBLINGUAL at 14:13

## 2024-05-22 RX ADMIN — QUETIAPINE FUMARATE 200 MG: 200 TABLET ORAL at 21:22

## 2024-05-22 RX ADMIN — GABAPENTIN 600 MG: 600 TABLET, FILM COATED ORAL at 20:24

## 2024-05-22 RX ADMIN — FLUOXETINE HYDROCHLORIDE 60 MG: 20 CAPSULE ORAL at 08:29

## 2024-05-22 RX ADMIN — OLANZAPINE 10 MG: 10 TABLET, FILM COATED ORAL at 11:34

## 2024-05-22 RX ADMIN — ATOMOXETINE 60 MG: 60 CAPSULE ORAL at 08:29

## 2024-05-22 RX ADMIN — NICOTINE POLACRILEX 2 MG: 2 GUM, CHEWING BUCCAL at 12:26

## 2024-05-22 RX ADMIN — GABAPENTIN 600 MG: 600 TABLET, FILM COATED ORAL at 08:29

## 2024-05-22 RX ADMIN — NICOTINE POLACRILEX 2 MG: 2 GUM, CHEWING BUCCAL at 16:55

## 2024-05-22 RX ADMIN — NICOTINE POLACRILEX 2 MG: 2 GUM, CHEWING BUCCAL at 19:59

## 2024-05-22 RX ADMIN — BUPRENORPHINE 4 MG: 2 TABLET SUBLINGUAL at 20:24

## 2024-05-22 RX ADMIN — ACETAMINOPHEN 650 MG: 325 TABLET, FILM COATED ORAL at 08:29

## 2024-05-22 RX ADMIN — LITHIUM CARBONATE 300 MG: 300 TABLET, EXTENDED RELEASE ORAL at 08:29

## 2024-05-22 RX ADMIN — BUPRENORPHINE 8 MG: 8 TABLET SUBLINGUAL at 08:29

## 2024-05-22 RX ADMIN — GABAPENTIN 600 MG: 600 TABLET, FILM COATED ORAL at 14:13

## 2024-05-22 ASSESSMENT — ACTIVITIES OF DAILY LIVING (ADL)
ADLS_ACUITY_SCORE: 31
ADLS_ACUITY_SCORE: 31
LAUNDRY: WITH SUPERVISION
ADLS_ACUITY_SCORE: 31
ORAL_HYGIENE: INDEPENDENT
ADLS_ACUITY_SCORE: 31
DRESS: INDEPENDENT
ADLS_ACUITY_SCORE: 31
HYGIENE/GROOMING: INDEPENDENT
ADLS_ACUITY_SCORE: 31

## 2024-05-22 NOTE — PLAN OF CARE
Problem: Adult Behavioral Health Plan of Care  Goal: Plan of Care Review  Outcome: Progressing  Flowsheets (Taken 5/21/2024 1833)  Patient Agreement with Plan of Care: agrees   Goal Outcome Evaluation:    Plan of Care Reviewed With: patient          Pt was sleeping beginning of the shift and got up at dinner time. He was calm, quiet, and guarded. His affect was flat and blunted but pleasant upon approach. He had minimal or no interaction with peers. He kept to himself while out in the lounge. He was observed watching a video with peers. He was able to make his needs known. Hygiene was good. Intake adequate. He was out for dinner and snacks and ate 100%. His Vitals were WNL. He was cooperative with assessment. Pt stated that he is feeling much better today. He complained of generalized body ache and rated pain 7/10. He was given prn Tylenol 650 mg at 1833 which he said was helpful. He endorsed anxiety 8/10 and depression 7/10. He was given prn Zyprexa 10 mg which he said was also helpful. He denied SI/SIB/HI/AH/VH. He contracted for safety. He was medication compliant. No medication side effect reported or noted this shift. No safety concern noted this shift. Will continue to monitor and will assist if need arise.

## 2024-05-22 NOTE — PLAN OF CARE
Problem: Sleep Disturbance  Goal: Adequate Sleep/Rest  Outcome: Progressing   Goal Outcome Evaluation:    Patient appears to have slept a total of 5.25 hours. Safety/environment checks conducted every 15 minutes with no concerns noted. No complaints of pain/discomfort.

## 2024-05-22 NOTE — PLAN OF CARE
"Pt was up when this shift started but pt appeared to be sleeping in his chiar.  Pt also sleeping in lounge later in the shift.  Pt denies SI.  Pt states that people are after him.  But as long as he does not use drugs he's fine.  Pt also reported that his bike is at a [employer] in [facility].  Pt concerned about it being okay.   Pt given phone numbers to [employer] in [facility].  Pt sates will call them.  Pt complained of his room smelling. Sprayed pts room with B9 that was mixed and lavender essential oil and cleaned his bathroom floor.  Pt generally keeps to self. Pt pleasant upon approach.  Pt denies IS, denies auditory hallucinations.    Problem: Adult Behavioral Health Plan of Care  Goal: Plan of Care Review  Outcome: Not Progressing  Goal: Patient-Specific Goal (Individualization)  Description: You can add care plan individualizations to a care plan. Examples of Individualization might be:  \"Parent requests to be called daily at 9am for status\", \"I have a hard time hearing out of my right ear\", or \"Do not touch me to wake me up as it startles  me\".  Outcome: Not Progressing  Goal: Adheres to Safety Considerations for Self and Others  Outcome: Not Progressing  Intervention: Develop and Maintain Individualized Safety Plan  Recent Flowsheet Documentation  Taken 5/22/2024 1200 by Melinda Marlow RN  Safety Measures:   environmental rounds completed   safety rounds completed  Goal: Absence of New-Onset Illness or Injury  Outcome: Not Progressing  Intervention: Identify and Manage Fall Risk  Recent Flowsheet Documentation  Taken 5/22/2024 1200 by Melinda Marlow RN  Safety Measures:   environmental rounds completed   safety rounds completed  Goal: Optimized Coping Skills in Response to Life Stressors  Outcome: Not Progressing  Goal: Develops/Participates in Therapeutic [place] to Support Successful Transition  Outcome: Not Progressing   Goal Outcome Evaluation:                        "

## 2024-05-22 NOTE — PROGRESS NOTES
"  ----------------------------------------------------------------------------------------------------------  [facility]  Psychiatry Progress Note  Hospital Day #3     Interim History:     The patient's care was discussed with the treatment team and chart notes were reviewed.    Vitals: Vital signs:  Temp: 98  F (36.7  C) Temp src: Oral BP: 106/67 Pulse: 75   Resp: 16 SpO2: 96 % O2 Device: None (Room air)   Height: 190.5 cm (6' 3\") Weight: 123.8 kg (273 lb)  Estimated body mass index is 34.12 kg/m  as calculated from the following:    Height as of this encounter: 1.905 m (6' 3\").    Weight as of this encounter: 123.8 kg (273 lb).    Sleep: 5.25 hours (05/22/24 0600)  Scheduled medications: Took all scheduled medications as prescribed  Psychiatric PRN medications: Tylenol 650mg, Zyprexa 10mg    Staff Report:   Perry was calm, quiet, and guarded but pleasant upon approach. He has minimal interactions with peers but went to groups yesterday. He denied SI/SIB/HI/AH/VH. Says he's doing better.     Subjective:     Patient Interview:  Perry says he is doing alright, appears less anxious since admission. He still rates his depression a 10/10, as he is depressed about his recent drug use, and says hopefully this is the last time he is in here because he doesn't want to keep coming back in cycles. Reassured him that he has improved mentally since admission, and that he should take it step by step, he is grateful for the care teams help. He is having some anxiety about his belongings at the sober house. He left a voicemail but he will call again today to see if his stuff is still safe. Discussed medications: potentially removing atomoxetine due to current anxiety levels-pt also reported he hasn't noticed anything with it, adding albuterol inhaler prn, he thinks lithium is helping (reports that it \"keeps him leveled\") and wants to potentially raise it plan for total 900mg (300mg " "qam, 600mg qpm), holding statin due to slightly elevate AST and ALT.     Discussed more in depth about his substance use with the full care team and motivation to keep going. Encouraged him to write down his goals and what keeps him going in a journal, he agreed, we will review what he has written together tomorrow. Discussed future discharge goals, seems to be interested in IRTS and wants to focus more on mental health.    He would like essential oil to freshen up his room, double portion meals, and his original clothes      ROS:  Patient has pain    Patient denies acute concerns     Objective:     Vitals:  /67 (BP Location: Right arm)   Pulse 75   Temp 98  F (36.7  C) (Oral)   Resp 16   Ht 1.905 m (6' 3\")   Wt 123.8 kg (273 lb)   SpO2 96%   BMI 34.12 kg/m      Allergies:  Allergies   Allergen Reactions    Wellbutrin [Bupropion] Difficulty breathing       Current Medications:  Scheduled:  Current Facility-Administered Medications   Medication Dose Route Frequency Provider Last Rate Last Admin    acetaminophen (TYLENOL) tablet 650 mg  650 mg Oral Q4H PRN Mati Cueva MD   650 mg at 05/21/24 1833    alum & mag hydroxide-simethicone (MAALOX) suspension 30 mL  30 mL Oral Q4H PRN Mati Cueva MD        atomoxetine (STRATTERA) capsule 60 mg  60 mg Oral Daily Mati Cueva MD   60 mg at 05/21/24 0837    atorvastatin (LIPITOR) tablet 20 mg  20 mg Oral QPM Mati Cueva MD   20 mg at 05/21/24 2057    buprenorphine (SUBUTEX) sublingual tablet 4 mg  4 mg Sublingual Daily Pb Arciniega MD   4 mg at 05/21/24 2057    buprenorphine (SUBUTEX) sublingual tablet 8 mg  8 mg Sublingual BID Pb Arciniega MD   8 mg at 05/21/24 1420    cyclobenzaprine (FLEXERIL) tablet 5 mg  5 mg Oral BID PRN Pb Arciniega MD   5 mg at 05/21/24 1420    FLUoxetine (PROzac) capsule 60 mg  60 mg Oral Daily Pb Arciniega MD   60 mg at 05/21/24 0836    gabapentin (NEURONTIN) tablet 600 mg  600 mg Oral TID Mati Cueva MD   600 mg at " 05/21/24 2056    hydrOXYzine HCl (ATARAX) tablet 100 mg  100 mg Oral BID PRN Mati Cueva MD   100 mg at 05/21/24 0842    lithium ER (LITHOBID) CR tablet 300 mg  300 mg Oral BID Mati Cueva MD   300 mg at 05/21/24 2057    melatonin tablet 3 mg  3 mg Oral At Bedtime PRN Mati Cueva MD        naloxone (NARCAN) injection 0.2 mg  0.2 mg Intravenous Q2 Min PRN Pb Arciniega MD        Or    naloxone (NARCAN) injection 0.4 mg  0.4 mg Intravenous Q2 Min PRN Pb Arciniega MD        Or    naloxone (NARCAN) injection 0.2 mg  0.2 mg Intramuscular Q2 Min PRN Pb Arciniega MD        Or    naloxone (NARCAN) injection 0.4 mg  0.4 mg Intramuscular Q2 Min PRN Pb Arciniega MD        nicotine (NICORETTE) gum 2 mg  2 mg Buccal Q1H PRN Kirby Colbert MD        OLANZapine (zyPREXA) tablet 10 mg  10 mg Oral 4x Daily PRN Jeri Mrose MD   10 mg at 05/21/24 1833    Or    OLANZapine (zyPREXA) injection 10 mg  10 mg Intramuscular TID PRN Jeri Morse MD        polyethylene glycol (MIRALAX) Packet 17 g  17 g Oral Daily PRN Mati Cueva MD        QUEtiapine (SEROquel) tablet 200 mg  200 mg Oral At Bedtime Mati Cueva MD   200 mg at 05/21/24 2116       PRN:  Current Facility-Administered Medications   Medication Dose Route Frequency Provider Last Rate Last Admin    acetaminophen (TYLENOL) tablet 650 mg  650 mg Oral Q4H PRN Mati Cueva MD   650 mg at 05/21/24 1833    alum & mag hydroxide-simethicone (MAALOX) suspension 30 mL  30 mL Oral Q4H PRN Mati Cueva MD        atomoxetine (STRATTERA) capsule 60 mg  60 mg Oral Daily Mati Cueva MD   60 mg at 05/21/24 0837    atorvastatin (LIPITOR) tablet 20 mg  20 mg Oral QPM Mati Cueva MD   20 mg at 05/21/24 2057    buprenorphine (SUBUTEX) sublingual tablet 4 mg  4 mg Sublingual Daily Pb Arciniega MD   4 mg at 05/21/24 2057    buprenorphine (SUBUTEX) sublingual tablet 8 mg  8 mg Sublingual BID Pb Arciniega MD   8 mg at 05/21/24 1420    cyclobenzaprine  (FLEXERIL) tablet 5 mg  5 mg Oral BID PRN Pb Arciniega MD   5 mg at 05/21/24 1420    FLUoxetine (PROzac) capsule 60 mg  60 mg Oral Daily Pb Arciniega MD   60 mg at 05/21/24 0836    gabapentin (NEURONTIN) tablet 600 mg  600 mg Oral TID Mati Cueva MD   600 mg at 05/21/24 2056    hydrOXYzine HCl (ATARAX) tablet 100 mg  100 mg Oral BID PRN Mati Cueva MD   100 mg at 05/21/24 0842    lithium ER (LITHOBID) CR tablet 300 mg  300 mg Oral BID Mati Cueva MD   300 mg at 05/21/24 2057    melatonin tablet 3 mg  3 mg Oral At Bedtime PRN Mati Cueva MD        naloxone (NARCAN) injection 0.2 mg  0.2 mg Intravenous Q2 Min PRN Pb Arciniega MD        Or    naloxone (NARCAN) injection 0.4 mg  0.4 mg Intravenous Q2 Min PRN Pb Arciniega MD        Or    naloxone (NARCAN) injection 0.2 mg  0.2 mg Intramuscular Q2 Min PRN Pb Arciniega MD        Or    naloxone (NARCAN) injection 0.4 mg  0.4 mg Intramuscular Q2 Min PRN Pb Arciniega MD        nicotine (NICORETTE) gum 2 mg  2 mg Buccal Q1H PRN Kirby Colbert MD        OLANZapine (zyPREXA) tablet 10 mg  10 mg Oral 4x Daily PRN Jeri Morse MD   10 mg at 05/21/24 1833    Or    OLANZapine (zyPREXA) injection 10 mg  10 mg Intramuscular TID PRN Jeri Morse MD        polyethylene glycol (MIRALAX) Packet 17 g  17 g Oral Daily PRN Mati Cueva MD        QUEtiapine (SEROquel) tablet 200 mg  200 mg Oral At Bedtime Mati Cueva MD   200 mg at 05/21/24 2116       Labs and Imaging:  New results:   No results found for this or any previous visit (from the past 24 hour(s)).    Data this admission:  - CBC: WBC 11.4, Hgb 13.2, hematocrit 39.2  - CMP (latest 5/22/14): AST 61, ALT 91, CO2 33  - TSH normal  - UDS Positive for methamphetamines and cannabis  - Vit D normal  - Hgb A1c normal  - Lipids: Trig 173, HDL 32  - Vit B12 normal  - Folate normal  - Lithium 0.53 mmol  - EKG refused     Mental Status Exam:     Oriented to:  Grossly Oriented  General:  Awake  "and Alert, sitting on bed during encounter  Appearance:  appears stated age, Dressed in on clothes, Hair is kept and grooming is fair, and Tattoos on arms and neck , appears tired  Behavior/Attitude:  cooperative and engaged, anxious  Eye Contact:  downcast better today  Psychomotor: no evidence of tics, dystonia, or tardive dyskinesia   Speech:  appropriate volume/tone  Language: Fluent in English with appropriate syntax and vocabulary.  Mood:  \"doing alright\"  Affect:  sad, anxious  Thought Process:  linear  Thought Content:  had suicidal ideation without plan or intent this morning but not present at the time of interviewing, No HI, says AH has mostly resolved since he has not been using and he hasn't had any since coming to the hospital   Associations:  intact  Insight:  good, voluntarily admit, is aware of his worsening psychosis in the context of substance use, seems to be willing to working with the care team to get better  Judgment:  fair , appropriate engagement with team, adherent to team recommendations, wants to be clean before appearing before his son   Impulse control: fair  Attention Span:  grossly intact  Concentration:  grossly intact  Recent and Remote Memory:  not formally assessed  Fund of Knowledge:   not formally assessed  Muscle Strength and Tone: normal  Gait and Station: Normal     Psychiatric Assessment   Diagnosis:  Psychosis, unspecified  R/O Methamphetamine Use Disorder  R/O Alcohol Use Disorder   R/O Opioid Use Disorder   R/O Cannabis Use Disorder     Perry Preciado is a 49 year old male with previous psychiatric diagnoses of MDD, KANE, ADHD, and substance disorder (alcohol, meth, opiates) and medical history significant for TBI who was admitted from the ER on 05/19/2024 due to concern for SI and AVH in the context of medication non-adherence and substance use. Most recent psychiatric hospitalization was at [facility] in January 2024 for similar concerns. Significant symptoms on admission " include depression, SI, and AVH. The MSE on admission was pertinent for SI, AVH, and depressed affect. Biological contributions to mental health presentation include prior diagnoses of mental health disorders, multiple medication trials, long standing substance use history, hx of TBI, and genetic predisposition for suicide completion and substance use. Psychological contributions to mental health presentation include maladaptive coping and cluster B traits. Social factors contributing to mental health presentation include limited social supports, financial stressors, and recent family deaths who also  by suicide around this time of the year. Protective factors include engaged in treatment.      In summary, the patient's differential is still in evolution. Patient has reported symptoms of depression, SI, and AVH in the context of substance use and medication nonadherence. Patient per chart review has mentioned that he only has psychosis during periods of using substances however today he notes it happens even when he's sober. He also mentions some periods concerning for hypomania/mily but it's complicated by his substance use and cluster B traits. For now will consider Unspecified mood and unspecified psychosis until collateral can be obtained. He will likely benefit from medication optimization and substance use treatment this admission.     Given that he currently has SI and psychosis, patient warrants inpatient psychiatric hospitalization to maintain his safety.      Psychiatric Plan by Diagnosis      Today's changes:  -Atomoxetine discontinued  -Lithium increased to total 900mg (300mg qam, 600mg qpm)  -Albuterol inhaler PRN ordered  -Hold Atorvastatin   -STD blood testing panel for 24    -Plan to keep Ileana notified with updates, particularly with dispo plan.     #   1. Medications:  - Zyprexa 10 mg QID PRN ordered  - Fluoxetine 60mg  - Lithium 300mg BID  - Seroquel 200mg qpm     2. Pertinent  "Labs/Monitoring:   - EKG refused     3. Additional Plans:  - Patient will be treated in therapeutic milieu with appropriate individual and group therapies as described    Stimulant Use Disorder  #Opiate Use Disorder  #Nicotine Use Disorder  - Subutex 20mg daily total  - Consider providing narcan on discharge  - Nicotine Gum 2mg q1hr prn  - Will reconsider CD assessment once mental health sxs above stabilizes.      #ADHD  - Atomoxetine discontinued     #PTSD  - Prazosin discontinued      Psychiatric Hospital Course:      Perry Preciado was admitted to [facility] as a voluntary patient.   Medications:  PTA atomoxetine, Subutex, Prozac, Gabapentin, Lithium, Prazosin, and seroquel were continued. Zyprexa 10mg TID PRN for anxiety, agitation related to psychosis. Prazosin discontinued -had been using for months, BP low, and pt reported difficulty breathing.   Atomoxetine discontinued, due to current anxiety levels. Atorvastatin held due to slightly elevated liver enzymes. Albuterol inhaler PRN ordered.     The risks, benefits, alternatives, and side effects were discussed and understood by the patient.     Medical Assessment and Plan     Medical diagnoses to be addressed this admission:    # TBI  - Monitor      #Hypertriglyceridemia  - Atorvastatin 20mg qpm     #Neuropathy  - 600mg TID        Clinically Significant Risk Factors Present on Admission                  # Hypertension: Home medication list includes antihypertensive(s)      # Obesity: Estimated body mass index is 33.66 kg/m  as calculated from the following:    Height as of this encounter: 1.905 m (6' 3\").    Weight as of this encounter: 122.2 kg (269 lb 4.8 oz).       # Financial/Environmental Concerns:                 Medical course:  Patient was physically examined by the ED prior to being transferred to the unit and was found to be medically stable and appropriate for admission.      Consults:  none     Checklist     Legal Status: Voluntary     Safety " Assessment:   Behavioral Orders   Procedures    Code 1 - Restrict to Unit    Routine Programming     As clinically indicated    Status 15     Every 15 minutes.    Suicide precautions: Suicide Risk: MODERATE; Clinical rationale to override score: modification to the care environment     Patients on Suicide Precautions should have a Combination Diet ordered that includes a Diet selection(s) AND a Behavioral Tray selection for Safe Tray - with utensils, or Safe Tray - NO utensils       Order Specific Question:   Suicide Risk     Answer:   MODERATE     Order Specific Question:   Clinical rationale to override score:     Answer:   modification to the care environment       Risk Assessment:  Risk for harm is elevated.  Risk factors: SI, substance use, trauma, family history, and past behaviors, maladaptive coping, impulsive   Protective factors: engaged in treatment , family support, help-seeking, future oriented     SIO: None    Disposition: TBD. Pending stabilization, medication optimization, & development of a safe discharge plan.     Attestations     This patient was seen and discussed with my attending physician, Dr. Arciniega.     Clarence Ellison, MS3  [employer] [facility] [employer]    I was present with the medical student who participated in the service and in the documentation of the note. I have verified the history and personally performed the physical exam and medical decision making. I agree with the assessment and plan of care as documented in the note and have made changes to the note as appropriate.     Jeri Morse, PGY-2 Psychiatry       Attestation:  This patient has been seen and evaluated by me, Pb Arciniega MD.  I have discussed this patient with the house staff team including the resident and medical student and I agree with the findings and plan in this note.    I have reviewed today's vital signs, medications, labs and imaging. Pb Arciniega MD , PhD.

## 2024-05-22 NOTE — PLAN OF CARE
BEH IP Unit Acuity Rating Score (UARS)  Patient is given one point for every criteria they meet.    CRITERIA SCORING   On a 72 hour hold, court hold, committed, stay of commitment, or revocation. 0    Patient LOS on BEH unit exceeds 20 days. 0  LOS: 3   Patient under guardianship, 55+, otherwise medically complex, or under age 11. 0   Suicide ideation without relief of precipitating factors. 1   Current plan for suicide. 1   Current plan for homicide. 0   Imminent risk or actual attempt to seriously harm another without relief of factors precipitating the attempt. 0   Severe dysfunction in daily living (ex: complete neglect for self care, extreme disruption in vegetative function, extreme deterioration in social interactions). 1   Recent (last 7 days) or current physical aggression in the ED or on unit. 0   Restraints or seclusion episode in past 72 hours. 0   Recent (last 7 days) or current verbal aggression, agitation, yelling, etc., while in the ED or unit. 0   Active psychosis. 0   Need for constant or near constant redirection (from leaving, from others, etc).  0   Intrusive or disruptive behaviors. 0   Patient requires 3 or more hours of individualized nursing care per 8-hour shift (i.e. for ADLs, meds, therapeutic interventions). 0   TOTAL 3

## 2024-05-22 NOTE — DISCHARGE INSTRUCTIONS
Behavioral Discharge Planning and Instructions    Summary: You were admitted on 5/19/2024  due to concern for suicidal ideations and auditory/visual hallucinations in the context of substance use and medication non-adherence.  You were treated by Pb Arciniega MD and [facility] on 6/6/24 [facility] [address], [address], [facility] [address]    Main Diagnosis:   Psychosis, unspecified   Stimulant use disorder  ADHD  PTSD  MDD    Case Management:  You were referred through [facility] for targeted mental health case management services. [facility] contracts with [facility] and will be your assigned target case management provider. Once a  has been assigned within [facility], they will reach out to you directly to begin coordinating services. If you have any questions before your  is assigned, you can call [facility] Case Management directly at [phone].    Health Care Follow-up:   Primary Care Follow-up: Wednesday June 12, 2024 at 9:40 AM with KERWIN Teixeira  Location: [facility]: [address] [place] [address], [facility]  Phone: [phone]    Subutex Follow-up: Friday June 14, 2024 at 10:15 AM with Iliana Allen NP  Location: [address] [address], [address].  Phone: [phone]  *Please note the address change from your last visit. The clinic is located on the first floor of this building in [address]. If you need to change or reschedule your appointment, please call the phone number listed above.      Appointment: Psychiatry   Date/time: Friday June 28th, 2024 @ 11:00 AM Virtual  Provider:  JODY SCHOENECKER APRN, CNP  Address: [facility] [id number] [place] [address], [facility]  Phone: [phone]  Fax: [phone]  Note:  New client information/ instructions and intake paperwork to be  completed beforehand will be sent to [email or web address].   ** HUC to fax AVS upon discharge, please.       Information will be faxed to your outpatient providers to ensure a healthy continuity of care for you.     Attend all scheduled appointments with your outpatient providers. Call at least 24 hours in advance if you need to reschedule an appointment to ensure continued access to your outpatient providers.     Major Treatments, Procedures and Findings:  You were provided with: a psychiatric assessment, assessed for medical stability, medication evaluation and/or management, group therapy, individual therapy, milieu management, and medical interventions    Symptoms to Report: feeling more aggressive, increased confusion, losing more sleep, mood getting worse, or thoughts of suicide    Early warning signs can include: increased depression or anxiety sleep disturbances increased thoughts or behaviors of suicide or self-harm  increased unusual thinking, such as paranoia or hearing voices    Safety and Wellness:  Take all medicines as directed.  Make no changes unless your doctor suggests them.      Follow treatment recommendations.  Refrain from alcohol and non-prescribed drugs.  If there is a concern for safety, call 911.    Resources:   Mental Health Crisis Resources  Throughout [place]: call **CRISIS (**863765)  Crisis Text Line: is available for free, 24/7 by texting [place] to 563091  Suicide Awareness Voices of Education (SAVE) (www.save.org): 433-756-WOYU (1360)  The National Suicide Prevention Lifeline is now: 988 Suicide and Crisis Lifeline. Call 988 anytime.  National [place] on Mental Illness (www.[place].naldo.org): 837.424.2324 or 852-433-4132.  Jvko0spas: text the word LIFE to 60957 for immediate support and crisis intervention  Mental Health Consumer/Survivor Network of [place] (www.mhcsn.net): 645.270.8881 or 010-145-0328  Mental Health Association of [place] (www.mentalhealth.org): 777.513.4725 or 068-782-6550  Peer  Support Connection [place] Warmline ([facility]) 1-183.203.1233 Available from 5pm - 9am (7 days a week/365 days a year)  [facility] 1-245.273.5928      General Medication Instructions:   See your medication sheet(s) for instructions.   Take all medicines as directed.  Make no changes unless your doctor suggests them.   Go to all your doctor visits.  Be sure to have all your required lab tests. This way, your medicines can be refilled on time.  Do not use any drugs not prescribed by your doctor.  Avoid alcohol.    Advance Directives:   Scanned document on file with [facility]? No scanned doc  Is document scanned? Pt states no documents  Honoring Choices Your Rights Handout: Informed and given  Was more information offered? Pt declined    The Treatment team has appreciated the opportunity to work with you. If you have any questions or concerns about your recent admission, you can contact the unit which can receive your call 24 hours a day, 7 days a week. They will be able to get in touch with a Provider if needed. The unit number is [phone] .

## 2024-05-22 NOTE — PLAN OF CARE
Team Note Due:  Monday    Assessment/Intervention/Current Symtoms and Care Coordination:  Chart review and met with team, discussed pt progress, symptomology, and response to treatment.  Discussed the discharge plan and any potential impediments to discharge.    Checked in with pt who shared his biggest stressor at the moment is not being able to reach the sober house he was at where his belongings still are. He's called a couple of times but hasn't had luck. Writer offered assistance with calls at any point if he'd like.     Discharge Plan or Goal:  Pending stabilization of symptoms and safe disposition planning.     Barriers to Discharge:  Patient requires further psychiatric stabilization due to current symptomology, medication management with changes subject to provider, coordination with outside supports, and aftercare planning.     Referral Status:  None at this time     Legal Status:  Voluntary     Contacts:  Ileana (Sister): [phone]       Upcoming Meetings and Dates/Important Information and next steps:  CD consult when pt ready for assessment (attempted on 5/17 and 5/20)

## 2024-05-22 NOTE — PROGRESS NOTES
Rehab Group     Start time: 1315  End time: 1430  Patient time total: 60 minutes     attended partial group     #5 attended   Group Type: music   Group Topic Covered: self-care, balanced lifestyle, coping skills, relaxation skills , and emotional regulation   Group Session Detail: Cooperatively engaged in Evening Music Relaxation group to decrease anxiety and promote   Mindfulness and self-expression.          Patient Response/Contribution:  Positive Affect and Listened actively   Patient Detail:  Calm affect, appropriately engaged in session, responding well to the music.   Pt participated earnestly, sharing what his songs meant to him about perseverance.  Focused, intense affect.  Expressed gratitude for session.       Activity Therapy Per 15 minutes () Other Rehab therapies    Patient Active Problem List   Diagnosis    Suicidal ideation    Methamphetamine use (H)    Polysubstance abuse (H)    Depression, unspecified depression type

## 2024-05-23 LAB
C TRACH DNA SPEC QL PROBE+SIG AMP: NEGATIVE
N GONORRHOEA DNA SPEC QL NAA+PROBE: NEGATIVE
T PALLIDUM AB SER QL: NONREACTIVE

## 2024-05-23 PROCEDURE — 250N000013 HC RX MED GY IP 250 OP 250 PS 637

## 2024-05-23 PROCEDURE — 99232 SBSQ HOSP IP/OBS MODERATE 35: CPT | Mod: GC | Performed by: PSYCHIATRY & NEUROLOGY

## 2024-05-23 PROCEDURE — 124N000002 HC R&B MH UMMC

## 2024-05-23 PROCEDURE — 250N000013 HC RX MED GY IP 250 OP 250 PS 637: Performed by: PSYCHIATRY & NEUROLOGY

## 2024-05-23 PROCEDURE — G0177 OPPS/PHP; TRAIN & EDUC SERV: HCPCS

## 2024-05-23 RX ADMIN — NICOTINE POLACRILEX 2 MG: 2 GUM, CHEWING BUCCAL at 17:59

## 2024-05-23 RX ADMIN — GABAPENTIN 600 MG: 600 TABLET, FILM COATED ORAL at 08:29

## 2024-05-23 RX ADMIN — GABAPENTIN 600 MG: 600 TABLET, FILM COATED ORAL at 21:00

## 2024-05-23 RX ADMIN — QUETIAPINE FUMARATE 200 MG: 200 TABLET ORAL at 21:00

## 2024-05-23 RX ADMIN — BUPRENORPHINE 8 MG: 8 TABLET SUBLINGUAL at 08:29

## 2024-05-23 RX ADMIN — LITHIUM CARBONATE 300 MG: 300 TABLET, EXTENDED RELEASE ORAL at 08:29

## 2024-05-23 RX ADMIN — FLUOXETINE HYDROCHLORIDE 60 MG: 20 CAPSULE ORAL at 08:29

## 2024-05-23 RX ADMIN — BUPRENORPHINE 8 MG: 8 TABLET SUBLINGUAL at 13:24

## 2024-05-23 RX ADMIN — HYDROXYZINE HYDROCHLORIDE 100 MG: 50 TABLET, FILM COATED ORAL at 10:45

## 2024-05-23 RX ADMIN — OLANZAPINE 10 MG: 10 TABLET, FILM COATED ORAL at 13:22

## 2024-05-23 RX ADMIN — ACETAMINOPHEN 650 MG: 325 TABLET, FILM COATED ORAL at 17:26

## 2024-05-23 RX ADMIN — HYDROXYZINE HYDROCHLORIDE 100 MG: 50 TABLET, FILM COATED ORAL at 17:24

## 2024-05-23 RX ADMIN — BUPRENORPHINE 4 MG: 2 TABLET SUBLINGUAL at 21:00

## 2024-05-23 RX ADMIN — OLANZAPINE 10 MG: 10 TABLET, FILM COATED ORAL at 09:19

## 2024-05-23 RX ADMIN — LITHIUM CARBONATE 600 MG: 300 TABLET, EXTENDED RELEASE ORAL at 21:00

## 2024-05-23 RX ADMIN — GABAPENTIN 600 MG: 600 TABLET, FILM COATED ORAL at 13:23

## 2024-05-23 ASSESSMENT — ACTIVITIES OF DAILY LIVING (ADL)
ORAL_HYGIENE: INDEPENDENT
ADLS_ACUITY_SCORE: 31
LAUNDRY: WITH SUPERVISION
ADLS_ACUITY_SCORE: 31
LAUNDRY: WITH SUPERVISION
ADLS_ACUITY_SCORE: 31
ORAL_HYGIENE: INDEPENDENT;PROMPTS
ADLS_ACUITY_SCORE: 31
HYGIENE/GROOMING: INDEPENDENT
ADLS_ACUITY_SCORE: 31
HYGIENE/GROOMING: HANDWASHING;SHOWER;INDEPENDENT
DRESS: SCRUBS (BEHAVIORAL HEALTH);INDEPENDENT;STREET CLOTHES
ADLS_ACUITY_SCORE: 31
DRESS: INDEPENDENT
ADLS_ACUITY_SCORE: 31

## 2024-05-23 NOTE — PROGRESS NOTES
"  ----------------------------------------------------------------------------------------------------------  [facility]  Psychiatry Progress Note  Hospital Day #4     Interim History:     The patient's care was discussed with the treatment team and chart notes were reviewed.    Vitals: Vital signs:  Temp: 98.4  F (36.9  C) Temp src: Oral BP: 123/80 Pulse: 57   Resp: 16 SpO2: 97 % O2 Device: None (Room air)   Height: 190.5 cm (6' 3\") Weight: 123.8 kg (273 lb)  Estimated body mass index is 34.12 kg/m  as calculated from the following:    Height as of this encounter: 1.905 m (6' 3\").    Weight as of this encounter: 123.8 kg (273 lb).    Sleep: 5.25 hours (05/23/24 0600)  Scheduled medications: Took all scheduled medications as prescribed  Psychiatric PRN medications: Tylenol 650mg, Zyprexa 10mg    Staff Report:   Perry \"felt a little bit of happiness today for the first time in a long time.\" Pt denied suicidal ideation in the evening albeit did confirm experiencing these feelings earlier in the day. Denies AVH.        Subjective:     Patient Interview:  Perry is feeling \"panic\" because his motorcycle is likely at the impound and is worried about how much it would cost to pick it up, but he is trying to not let it bother him. Team discussed addressing theses issues (making calls, etc) rather than avoiding.   Perry shared some of his earlier childhood traumas , difficulty with substance use and team provided reassurance, validation and encouraged him to build on good practices and utilize outpatient resources. He has been journaling, going to groups, and motivating himself to say on course.Discussed plan to set up a plan to meet with an outpatient psychiatrist and therapist.     He thinks the new dose of lithium is already working. Woke up feeling better this am. Requested his shoes (no laces) and watch.  He expressed interest again in IRTS , also shared some CD treatment " "centers including [facility].   Team encouraged him to talk with [facility] further as well.     ROS:  Patient has pain    Patient denies acute concerns     Objective:     Vitals:  /80 (BP Location: Left arm, Patient Position: Sitting, Cuff Size: Adult Large)   Pulse 57   Temp 98.4  F (36.9  C) (Oral)   Resp 16   Ht 1.905 m (6' 3\")   Wt 123.8 kg (273 lb)   SpO2 97%   BMI 34.12 kg/m      Allergies:  Allergies   Allergen Reactions    Wellbutrin [Bupropion] Difficulty breathing       Current Medications:  Scheduled:  Current Facility-Administered Medications   Medication Dose Route Frequency Provider Last Rate Last Admin    acetaminophen (TYLENOL) tablet 650 mg  650 mg Oral Q4H PRN Mati Cueva MD   650 mg at 05/22/24 0829    albuterol (PROVENTIL HFA/VENTOLIN HFA) inhaler  2 puff Inhalation Q6H PRN Jeri Morse MD        alum & mag hydroxide-simethicone (MAALOX) suspension 30 mL  30 mL Oral Q4H PRN Mati Cueva MD        [Held by provider] atorvastatin (LIPITOR) tablet 20 mg  20 mg Oral QPM Mati Cueva MD   20 mg at 05/21/24 2057    buprenorphine (SUBUTEX) sublingual tablet 4 mg  4 mg Sublingual Daily Pb Arciniega MD   4 mg at 05/22/24 2024    buprenorphine (SUBUTEX) sublingual tablet 8 mg  8 mg Sublingual BID Pb Arciniega MD   8 mg at 05/22/24 1413    cyclobenzaprine (FLEXERIL) tablet 5 mg  5 mg Oral BID PRN Pb Arciniega MD   5 mg at 05/21/24 1420    FLUoxetine (PROzac) capsule 60 mg  60 mg Oral Daily Pb Arciniega MD   60 mg at 05/22/24 0829    gabapentin (NEURONTIN) tablet 600 mg  600 mg Oral TID Mati Cueva MD   600 mg at 05/22/24 2024    hydrOXYzine HCl (ATARAX) tablet 100 mg  100 mg Oral BID PRN Mati Cueva MD   100 mg at 05/21/24 0842    lithium ER (LITHOBID) CR tablet 300 mg  300 mg Oral QAM Jeri Morse MD        lithium ER (LITHOBID) CR tablet 600 mg  600 mg Oral At Bedtime Jeri Morse MD   600 mg at 05/22/24 2122    melatonin tablet 3 mg  3 mg Oral At " Bedtime PRN Mati Cueva MD        naloxone (NARCAN) injection 0.2 mg  0.2 mg Intravenous Q2 Min PRN Pb Arciniega MD        Or    naloxone (NARCAN) injection 0.4 mg  0.4 mg Intravenous Q2 Min PRN Pb Arciniega MD        Or    naloxone (NARCAN) injection 0.2 mg  0.2 mg Intramuscular Q2 Min PRN Pb Arciniega MD        Or    naloxone (NARCAN) injection 0.4 mg  0.4 mg Intramuscular Q2 Min PRN bP Arciniega MD        nicotine (NICORETTE) gum 2 mg  2 mg Buccal Q1H PRN Kirby Colbert MD   2 mg at 05/22/24 1959    OLANZapine (zyPREXA) tablet 10 mg  10 mg Oral 4x Daily PRN Jeri Morse MD   10 mg at 05/22/24 1134    Or    OLANZapine (zyPREXA) injection 10 mg  10 mg Intramuscular TID PRN Jeri Morse MD        polyethylene glycol (MIRALAX) Packet 17 g  17 g Oral Daily PRN Mati Cueva MD        QUEtiapine (SEROquel) tablet 200 mg  200 mg Oral At Bedtime Mati Cueva MD   200 mg at 05/22/24 2122       PRN:  Current Facility-Administered Medications   Medication Dose Route Frequency Provider Last Rate Last Admin    acetaminophen (TYLENOL) tablet 650 mg  650 mg Oral Q4H PRN Mati Cueva MD   650 mg at 05/22/24 0829    albuterol (PROVENTIL HFA/VENTOLIN HFA) inhaler  2 puff Inhalation Q6H PRN Jeri Morse MD        alum & mag hydroxide-simethicone (MAALOX) suspension 30 mL  30 mL Oral Q4H PRN Mati Cueva MD        [Held by provider] atorvastatin (LIPITOR) tablet 20 mg  20 mg Oral QPM Mati Cueva MD   20 mg at 05/21/24 2057    buprenorphine (SUBUTEX) sublingual tablet 4 mg  4 mg Sublingual Daily Pb Arciniega MD   4 mg at 05/22/24 2024    buprenorphine (SUBUTEX) sublingual tablet 8 mg  8 mg Sublingual BID Pb Arciniega MD   8 mg at 05/22/24 1413    cyclobenzaprine (FLEXERIL) tablet 5 mg  5 mg Oral BID PRN Pb Arciniega MD   5 mg at 05/21/24 1420    FLUoxetine (PROzac) capsule 60 mg  60 mg Oral Daily Pb Arciniega MD   60 mg at 05/22/24 0829    gabapentin (NEURONTIN) tablet 600  mg  600 mg Oral TID Mati Cueva MD   600 mg at 05/22/24 2024    hydrOXYzine HCl (ATARAX) tablet 100 mg  100 mg Oral BID PRN Mati Cueva MD   100 mg at 05/21/24 0842    lithium ER (LITHOBID) CR tablet 300 mg  300 mg Oral QAM Jeri Morse MD        lithium ER (LITHOBID) CR tablet 600 mg  600 mg Oral At Bedtime Jeri Morse MD   600 mg at 05/22/24 2122    melatonin tablet 3 mg  3 mg Oral At Bedtime PRN Mati Cueva MD        naloxone (NARCAN) injection 0.2 mg  0.2 mg Intravenous Q2 Min PRN Pb Arciniega MD        Or    naloxone (NARCAN) injection 0.4 mg  0.4 mg Intravenous Q2 Min PRN Pb Arciniega MD        Or    naloxone (NARCAN) injection 0.2 mg  0.2 mg Intramuscular Q2 Min PRN Pb Arciniega MD        Or    naloxone (NARCAN) injection 0.4 mg  0.4 mg Intramuscular Q2 Min PRN Pb Arciniega MD        nicotine (NICORETTE) gum 2 mg  2 mg Buccal Q1H PRN Kirby Colbert MD   2 mg at 05/22/24 1959    OLANZapine (zyPREXA) tablet 10 mg  10 mg Oral 4x Daily PRN Jeri Morse MD   10 mg at 05/22/24 1134    Or    OLANZapine (zyPREXA) injection 10 mg  10 mg Intramuscular TID PRN Jeri Morse MD        polyethylene glycol (MIRALAX) Packet 17 g  17 g Oral Daily PRN Mati Cueva MD        QUEtiapine (SEROquel) tablet 200 mg  200 mg Oral At Bedtime Mati Cueva MD   200 mg at 05/22/24 2122       Labs and Imaging:  New results:   No results found for this or any previous visit (from the past 24 hour(s)).    Data this admission:  - CBC: WBC 11.4, Hgb 13.2, hematocrit 39.2  - CMP (latest 5/22/14): AST 61, ALT 91, CO2 33  - TSH normal  - UDS Positive for methamphetamines and cannabis  - Vit D normal  - Hgb A1c normal  - Lipids: Trig 173, HDL 32  - Vit B12 normal  - Folate normal  - Lithium 0.53 mmol  - HIV neg  - EKG refused     Mental Status Exam:     Oriented to:  Grossly Oriented  General:  Awake and Alert, sitting on bed during encounter  Appearance:  appears stated age, Dressed  "in on clothes, Hair is kept and grooming is fair, and Tattoos on arms and neck , appears tired  Behavior/Attitude:  cooperative and engaged, anxious  Eye Contact:  downcast eyes looked closed at times  Psychomotor: no evidence of tics, dystonia, or tardive dyskinesia   Speech:  appropriate volume/tone  Language: Fluent in English with appropriate syntax and vocabulary.  Mood:  \"doing better\", \"panicking\" in regards to motorbike at impound  Affect:  anxious-improving, appears calmer today  Thought Process:  linear  Thought Content:  had suicidal ideation without plan or intent this morning but not present at the time of interviewing, No HI, says AH has mostly resolved since he has not been using and he hasn't had any since coming to the hospital , perseverates on relapse at times but redirectable and seems to respond well to motivational interviewing  Associations:  intact  Insight:  good, voluntarily admit, is aware of his worsening psychosis in the context of substance use, seems to be willing to working with the care team to get better  Judgment:  fair , appropriate engagement with team, adherent to team recommendations, wants to be clean before appearing before his son   Impulse control: fair  Attention Span:  grossly intact  Concentration:  grossly intact  Recent and Remote Memory:  not formally assessed  Fund of Knowledge:   not formally assessed  Muscle Strength and Tone: normal  Gait and Station: Normal     Psychiatric Assessment   Diagnosis:  Psychosis, unspecified  R/O Methamphetamine Use Disorder  R/O Alcohol Use Disorder   R/O Opioid Use Disorder   R/O Cannabis Use Disorder     Perry Preciado is a 49 year old male with previous psychiatric diagnoses of MDD, KANE, ADHD, and substance disorder (alcohol, meth, opiates) and medical history significant for TBI who was admitted from the ER on 05/19/2024 due to concern for SI and AVH in the context of medication non-adherence and substance use. Most recent " psychiatric hospitalization was at [facility] in 2024 for similar concerns. Significant symptoms on admission include depression, SI, and AVH. The MSE on admission was pertinent for SI, AVH, and depressed affect. Biological contributions to mental health presentation include prior diagnoses of mental health disorders, multiple medication trials, long standing substance use history, hx of TBI, and genetic predisposition for suicide completion and substance use. Psychological contributions to mental health presentation include maladaptive coping and cluster B traits. Social factors contributing to mental health presentation include limited social supports, financial stressors, and recent family deaths who also  by suicide around this time of the year. Protective factors include engaged in treatment.      In summary, the patient's differential is still in evolution. Patient has reported symptoms of depression, SI, and AVH in the context of substance use and medication nonadherence. Patient per chart review has mentioned that he only has psychosis during periods of using substances however today he notes it happens even when he's sober. He also mentions some periods concerning for hypomania/mily but it's complicated by his substance use and cluster B traits. For now will consider Unspecified mood and unspecified psychosis until collateral can be obtained. He will likely benefit from medication optimization and substance use treatment this admission.     Given that he currently has SI and psychosis, patient warrants inpatient psychiatric hospitalization to maintain his safety.      Psychiatric Plan by Diagnosis      Today's changes:  -Plan to keep Ileana notified with updates, particularly with dispo plan.   -Plan for potential discharge to [facility] v dual treatment facility.     #   1. Medications:  - Zyprexa 10 mg QID PRN ordered  - Fluoxetine 60mg  - Lithium 900mg (300mg qam, 600mg qpm)  - Seroquel 200mg qpm     2.  "Pertinent Labs/Monitoring:   - EKG refused     3. Additional Plans:  - Patient will be treated in therapeutic milieu with appropriate individual and group therapies as described    Stimulant Use Disorder  #Opiate Use Disorder  #Nicotine Use Disorder  - Subutex 20mg daily total  - Consider providing narcan on discharge  - Nicotine Gum 2mg q1hr prn  - Will reconsider CD assessment once mental health sxs above stabilizes.      #ADHD  - Atomoxetine discontinued     #PTSD  - Prazosin discontinued      Psychiatric Hospital Course:      Perry Preciado was admitted to [facility] as a voluntary patient.   Medications:  PTA atomoxetine, Subutex, Prozac, Gabapentin, Lithium, Prazosin, and seroquel were continued. Zyprexa 10mg TID PRN for anxiety, agitation related to psychosis. Prazosin discontinued -had been using for months, BP low, and pt reported difficulty breathing.  Subutex increased to 20mg total daily dosing further manage opioid withdrawal symptoms    Atomoxetine discontinued, due to current anxiety levels. Atorvastatin held due to slightly elevated liver enzymes. Albuterol inhaler PRN ordered.     The risks, benefits, alternatives, and side effects were discussed and understood by the patient.     Medical Assessment and Plan     Medical diagnoses to be addressed this admission:    # TBI  - Monitor      #Hypertriglyceridemia  - Atorvastatin 20mg qpm     #Neuropathy  - 600mg TID        Clinically Significant Risk Factors Present on Admission                  # Hypertension: Home medication list includes antihypertensive(s)      # Obesity: Estimated body mass index is 33.66 kg/m  as calculated from the following:    Height as of this encounter: 1.905 m (6' 3\").    Weight as of this encounter: 122.2 kg (269 lb 4.8 oz).       # Financial/Environmental Concerns:                 Medical course:  Patient was physically examined by the ED prior to being transferred to the unit and was found to be medically stable and " appropriate for admission.      Consults:  none     Checklist     Legal Status: Voluntary     Safety Assessment:   Behavioral Orders   Procedures    Code 1 - Restrict to Unit    Routine Programming     As clinically indicated    Status 15     Every 15 minutes.    Suicide precautions: Suicide Risk: MODERATE; Clinical rationale to override score: modification to the care environment     Patients on Suicide Precautions should have a Combination Diet ordered that includes a Diet selection(s) AND a Behavioral Tray selection for Safe Tray - with utensils, or Safe Tray - NO utensils       Order Specific Question:   Suicide Risk     Answer:   MODERATE     Order Specific Question:   Clinical rationale to override score:     Answer:   modification to the care environment       Risk Assessment:  Risk for harm is elevated.  Risk factors: SI, substance use, trauma, family history, and past behaviors, maladaptive coping, impulsive   Protective factors: engaged in treatment , family support, help-seeking, future oriented     SIO: None    Disposition: TBD. Pending stabilization, medication optimization, & development of a safe discharge plan.     Attestations     This patient was seen and discussed with my attending physician, Dr. Arciniega.     Clarence Ellison, MS3  [employer]    I was present with the medical student who participated in the service and in the documentation of the note. I have verified the history and personally performed the physical exam and medical decision making. I agree with the assessment and plan of care as documented in the note and have made changes to the note as appropriate.     Jeri Morse, PGY-2 Psychiatry     Attestation:  This patient has been seen and evaluated by me, Pb Arciniega MD.  I have discussed this patient with the house staff team including the resident and medical student and I agree with the findings and plan in this note.    I have reviewed today's vital signs,  medications, labs and imaging. Pb Arciniega MD , PhD.

## 2024-05-23 NOTE — PLAN OF CARE
"  Problem: Suicide Risk  Goal: Absence of Self-Harm  Outcome: Progressing  Intervention: Promote Psychosocial Wellbeing  Recent Flowsheet Documentation  Taken 5/22/2024 2126 by Reid Lieberman RN  Supportive Measures:   active listening utilized   positive reinforcement provided   self-care encouraged   Goal Outcome Evaluation:    Plan of Care Reviewed With: patient        Pt spent much of shift in lounge watching TV but was withdrawn to self. Pt did brighten  on approach from writer. Pt continues to endorse depression and anxiety but described an improvement today, \"I felt a little bit of happiness today for the first time in a long time.\" Pt denied suicidal ideation this evening albeit did confirm experiencing these feelings earlier in the day. Pt expressed remorse for his recent relapse. Writer validated pt's feelings while also encouraging pt to give himself credit for seeking help. Pt denied experiencing hallucinations this evening. Pt ate dinner, shaved, showered, and took all scheduled medication. Pt reported scheduled medications were helpful in reducing symptoms of anxiety. Pt reported generalized body pain this afternoon which resolved following the administration of scheduled buprenorphine.         "

## 2024-05-23 NOTE — PLAN OF CARE
Team Note Due:  Monday    Assessment/Intervention/Current Symtoms and Care Coordination:  Chart review and met with team, discussed pt progress, symptomology, and response to treatment.  Discussed the discharge plan and any potential impediments to discharge.    Met with pt to discuss IRTS referrals. Pt shared he'd like to be in [place] or close to [place] if possible but is open to any location/program. Discussed having psychiatry and therapy set up as well. Let pt know I'd start making referrals and discuss updates/looking further into locations as we hear back on referrals made.     Sent referral to [facility]. Noted [place] would be preferred location.    Discharge Plan or Goal:  Pending stabilization of symptoms and safe disposition planning.     Barriers to Discharge:  Patient requires further psychiatric stabilization due to current symptomology, medication management with changes subject to provider, coordination with outside supports, and aftercare planning.     Referral Status:  [facility] - referral sent 5/23     Legal Status:  Voluntary     Contacts:  Ileana (Sister): [phone]       Upcoming Meetings and Dates/Important Information and next steps:  IRTS referrals  CD consult when pt ready for assessment (attempted on 5/17 and 5/20)

## 2024-05-23 NOTE — PLAN OF CARE
Rehab Group    Start time: 1500  End time: 1545  Patient time total: 45 minutes    attended full group    #1 attended   Group Type: occupational therapy   Group Topic Covered: coping skills, problem solving, social skills, and healthy leisure time  OT Clinic     Group Session Detail:  Occupational therapy clinic. Purpose of structured group: exploration/development of positive coping skills, engagement in creative expression and clinical observation of social, cognitive, and kinesthetic performance skills.     Patient Response/Contribution:  Cooperative with task, Listened actively, Organized, and Attentive     Patient Detail:  Pt actively participated in occupational therapy clinic. Chosen activity: a multi-step creative expression task. Independent to initiate, gather materials, sequence and adjust to workspace demands as needed. Demonstrated good focus (30 min without interruption), planning, and attention to detail for this moderately complex task. Able to ask for assistance and socialized with peers and staff. Shared that having time to work creatively is a positive coping opportunity for him while in the hospital. Future-oriented in conversation, and discussed plans for continuing work on his project during group tomorrow. Affect appeared to brighten in interactions.          Train & Education Service Per Session 45 + Minutes () OT Group code    Patient Active Problem List   Diagnosis    Suicidal ideation    Methamphetamine use (H)    Polysubstance abuse (H)    Depression, unspecified depression type

## 2024-05-23 NOTE — PROGRESS NOTES
Rehab Group    Start time: 1015  End time: 1115  Patient time total: 55 minutes    attended full group    #4 attended   Group Type: occupational therapy   Group Topic Covered: coping skills     Group Session Detail:  OT clinic     Patient Response/Contribution:  Cooperative with task, Organized, Attentive, and Actively engaged     Patient Detail:    Pt actively participated in occupational therapy clinic to facilitate coping skill exploration, creative expression within personally meaningful activities, and clinical observation of social, cognitive, and kinesthetic performance skills. Pt response: Independent to initiate, gather materials, sequence, and adjust to workspace demands as needed. Demonstrated good focus, planning, and attention to detail for selected creative expression task. Able to ask for assistance as needed, and socialized with peers and staff. Appeared somewhat restless and was observed standing for a majority of group. Calm and cooperative.        Train & Education Service Per Session 45 + Minutes () OT Group code    Patient Active Problem List   Diagnosis    Suicidal ideation    Methamphetamine use (H)    Polysubstance abuse (H)    Depression, unspecified depression type

## 2024-05-23 NOTE — PROGRESS NOTES
"  Rehab Group    Start time: 1115  End time: 1200  Patient time total: 45 minutes    attended full group    #2 attended   Group Type: occupational therapy   Group Topic Covered: balanced lifestyle, coping skills, and social skills     Group Session Detail:  Dice game - discussion questions & gentle movement     Patient Response/Contribution:  Cooperative with task, Attentive, and Actively engaged     Patient Detail:    Pt actively participated in a structured occupational therapy group with a focus on social engagement and movement. Pt had the option to choose a self-reflection question or a gentle exercise/stretching movement. Pt appeared comfortable responding to discussion prompts, and also followed along with all guided movements. Pt was receptive to learning a new breathing technique to implement as a coping skill. When prompted to identify what's most important to him, he identified \"my family and friends, my sobriety, and my health.\" Identified playing [employer] as a new hobby he would be interested in learning. Pleasant and engaged.        Train & Education Service Per Session 45 + Minutes () OT Group code    Patient Active Problem List   Diagnosis    Suicidal ideation    Methamphetamine use (H)    Polysubstance abuse (H)    Depression, unspecified depression type       "

## 2024-05-23 NOTE — PLAN OF CARE
"Goal Outcome Evaluation:    Plan of Care Reviewed With: patient Plan of Care Reviewed With: patient    Overall Patient Progress: improvingOverall Patient Progress: improving       Problem: Adult Behavioral Health Plan of Care  Goal: Plan of Care Review  Outcome: Progressing  Flowsheets  Taken 5/23/2024 1242  Plan of Care Reviewed With: patient  Overall Patient Progress: improving  Patient Agreement with Plan of Care: agrees  Taken 5/23/2024 1100  Patient Agreement with Plan of Care: agrees     Behavioral  Pt slept 7 hours overnight; eating and hydrating adequately. Compliant with medications. Attending to ADL's independently; no behavioral escalation or safety concerns noted this shift. pleasant and cooperative upon approach; speech fluent, appropriate and able to make needs known; Pt is minimally socialize with staff and peers; attended groups; denied SI, SIB, HI, and hallucinations; affect flat, blunted and guarded; mood is anxious and depressed. Endorsed \"some\" depression and anxiety 7-8. Pt is frustrated due to his bike being taken to impound. Encouraged pt to discuss with .     Medical    No complaints of physical pain/discomfort this shift. Vital signs stable.     PRNS:  - Hydroxyzine  - Zyprexa x 2    Plan  Possible discharge to IRT's       "

## 2024-05-23 NOTE — PROGRESS NOTES
Rehab Group    Start time: 1315  End time: 1415  Patient time total: 50 minutes    attended full group    #5 attended   Group Type: occupational therapy   Group Topic Covered: balanced lifestyle, coping skills, and social skills     Group Session Detail:  Life skills discussion/game     Patient Response/Contribution:  Cooperative with task, Listened actively, Attentive, and Actively engaged     Patient Detail:    Pt actively participated in a structured occupational therapy group with a discussion focused on various mental health topics, including mental health management, social situations, healthy support systems, healthy mind/body, and activities of daily living. Pt was able to follow 3 step directions for the novel task and was observed tracking the task consistently. Demonstrated active listening when peers were sharing, and responses to prompts were thoughtful and appropriate to topic. Discussed the importance of taking medications and having a trusting relationship with his doctor. Continues to appear motivated for sobriety in conversation.        Train & Education Service Per Session 45 + Minutes () OT Group code    Patient Active Problem List   Diagnosis    Suicidal ideation    Methamphetamine use (H)    Polysubstance abuse (H)    Depression, unspecified depression type

## 2024-05-24 PROCEDURE — 250N000013 HC RX MED GY IP 250 OP 250 PS 637

## 2024-05-24 PROCEDURE — 99232 SBSQ HOSP IP/OBS MODERATE 35: CPT | Mod: GC | Performed by: PSYCHIATRY & NEUROLOGY

## 2024-05-24 PROCEDURE — 124N000002 HC R&B MH UMMC

## 2024-05-24 PROCEDURE — 90853 GROUP PSYCHOTHERAPY: CPT

## 2024-05-24 PROCEDURE — 250N000013 HC RX MED GY IP 250 OP 250 PS 637: Performed by: PSYCHIATRY & NEUROLOGY

## 2024-05-24 RX ORDER — LITHIUM CARBONATE 450 MG
900 TABLET, EXTENDED RELEASE ORAL AT BEDTIME
Status: DISCONTINUED | OUTPATIENT
Start: 2024-05-24 | End: 2024-06-06 | Stop reason: HOSPADM

## 2024-05-24 RX ADMIN — BUPRENORPHINE 4 MG: 2 TABLET SUBLINGUAL at 21:11

## 2024-05-24 RX ADMIN — BUPRENORPHINE 8 MG: 8 TABLET SUBLINGUAL at 13:04

## 2024-05-24 RX ADMIN — FLUOXETINE HYDROCHLORIDE 60 MG: 20 CAPSULE ORAL at 07:41

## 2024-05-24 RX ADMIN — HYDROXYZINE HYDROCHLORIDE 100 MG: 50 TABLET, FILM COATED ORAL at 09:50

## 2024-05-24 RX ADMIN — GABAPENTIN 600 MG: 600 TABLET, FILM COATED ORAL at 07:41

## 2024-05-24 RX ADMIN — GABAPENTIN 600 MG: 600 TABLET, FILM COATED ORAL at 13:03

## 2024-05-24 RX ADMIN — QUETIAPINE FUMARATE 200 MG: 200 TABLET ORAL at 21:11

## 2024-05-24 RX ADMIN — OLANZAPINE 10 MG: 10 TABLET, FILM COATED ORAL at 08:24

## 2024-05-24 RX ADMIN — GABAPENTIN 600 MG: 600 TABLET, FILM COATED ORAL at 21:11

## 2024-05-24 RX ADMIN — NICOTINE POLACRILEX 2 MG: 2 GUM, CHEWING BUCCAL at 20:49

## 2024-05-24 RX ADMIN — BUPRENORPHINE 8 MG: 8 TABLET SUBLINGUAL at 07:41

## 2024-05-24 RX ADMIN — LITHIUM CARBONATE 300 MG: 300 TABLET, EXTENDED RELEASE ORAL at 07:41

## 2024-05-24 RX ADMIN — OLANZAPINE 10 MG: 10 TABLET, FILM COATED ORAL at 14:43

## 2024-05-24 RX ADMIN — LITHIUM CARBONATE 900 MG: 450 TABLET, EXTENDED RELEASE ORAL at 21:10

## 2024-05-24 RX ADMIN — ALUMINUM HYDROXIDE, MAGNESIUM HYDROXIDE, AND SIMETHICONE 30 ML: 1200; 120; 1200 SUSPENSION ORAL at 20:07

## 2024-05-24 RX ADMIN — ACETAMINOPHEN 650 MG: 325 TABLET, FILM COATED ORAL at 21:10

## 2024-05-24 ASSESSMENT — ACTIVITIES OF DAILY LIVING (ADL)
ADLS_ACUITY_SCORE: 31
HYGIENE/GROOMING: INDEPENDENT
ADLS_ACUITY_SCORE: 31
ORAL_HYGIENE: INDEPENDENT
ADLS_ACUITY_SCORE: 31
ORAL_HYGIENE: INDEPENDENT
ADLS_ACUITY_SCORE: 31
HYGIENE/GROOMING: INDEPENDENT
ADLS_ACUITY_SCORE: 31
DRESS: INDEPENDENT;STREET CLOTHES
ADLS_ACUITY_SCORE: 31
DRESS: INDEPENDENT;STREET CLOTHES
ADLS_ACUITY_SCORE: 31

## 2024-05-24 NOTE — PLAN OF CARE
"  Group Attendance:  attended partial group    Time session began: 1015  Time session ended: 1100  Patient's total time in group: 30    Total # Attendees   5   Group Type psychotherapeutic     Group Topic Covered symptom management and healthy coping skills      Group Session Detail Introduced Spoon Theory - a metaphor used to explain the limited energy and resources that individuals with chronic illnesses or disabilities have to manage their daily lives. Group discussed the complexities of managing energy levels and the importance of self-care and pacing in maintaining well-being.  Participants completed energy management worksheet.      Patient's response to the group topic/interactions:  Expressed understanding of topic, Closed eyes for most of session, and Distracted     Patient Details: Pt checked in feeling anxious, intense feeling in his chest. Pt listened actively, excused himself stating he is \"crawling out of his skin\" d/t anxiety. Pt returned later and appeared calmer, participated positively in group discussion but opted out of independent activity.           [id number] - Group psychotherapy - 1 Session    Patient Active Problem List   Diagnosis    Suicidal ideation    Methamphetamine use (H)    Polysubstance abuse (H)    Depression, unspecified depression type         "

## 2024-05-24 NOTE — PROGRESS NOTES
"  ----------------------------------------------------------------------------------------------------------  [facility]  Psychiatry Progress Note  Hospital Day #5     Interim History:     The patient's care was discussed with the treatment team and chart notes were reviewed.    Vitals: Vital signs:  Temp: 98.2  F (36.8  C) Temp src: Oral BP: 105/71 Pulse: 62     SpO2: 96 % O2 Device: None (Room air)   Height: 190.5 cm (6' 3\") Weight: 126.2 kg (278 lb 3.2 oz)  Estimated body mass index is 34.77 kg/m  as calculated from the following:    Height as of this encounter: 1.905 m (6' 3\").    Weight as of this encounter: 126.2 kg (278 lb 3.2 oz).    Sleep: 3.25 hours (05/24/24 0600)  Scheduled medications: Took all scheduled medications as prescribed  Psychiatric PRN medications: Tylenol 650mg, Zyprexa 10mg    Staff Report:   Perry had 3.25 hours of sleep. He went to several groups yesterday and was an active participant. Compliant with scheduled medications. PRN Hydroxyzine, Tylenol was administered    Subjective:     Patient Interview:  This writer met with Perry in his room. Prior to encounter Perry was in deep sleep in the common area. Notable drifting off on occasion through out encounter.     Perry said that he was more depressed this am-thinking about his drug use and things going on PTA. Reported thoughts/dream of SI earlier, did not endorse active plan. This writer encouraged him to write down positive things that he has done-such as coming to the hospital earlier, engaging in cares to see if that helps with rumination. Again discussed following up with a therapist down the road. Provided reassurance and validation.   In regards to his sleep , Perry said overall his sleep at night hasn't been good but last night was the best sleep he's had. Reviewed sleep hygiene. Thinks sedation may be from medication.  Perry expressed notable concern with bike at impound lot. He " "said that he doesn't have enough money to get it out.     Discussed changing lithium to all at night time.   Discussed lab results.   Discussed plan for IRTS and possibly case management on discharge.     ROS:  Patient has pain    Patient denies acute concerns     Objective:     Vitals:  /71 (BP Location: Right arm, Patient Position: Sitting, Cuff Size: Adult Large)   Pulse 62   Temp 98.2  F (36.8  C) (Oral)   Resp 16   Ht 1.905 m (6' 3\")   Wt 126.2 kg (278 lb 3.2 oz)   SpO2 96%   BMI 34.77 kg/m      Allergies:  Allergies   Allergen Reactions    Wellbutrin [Bupropion] Difficulty breathing       Current Medications:  Scheduled:  Current Facility-Administered Medications   Medication Dose Route Frequency Provider Last Rate Last Admin    acetaminophen (TYLENOL) tablet 650 mg  650 mg Oral Q4H PRN Mati Cueva MD   650 mg at 05/23/24 1726    albuterol (PROVENTIL HFA/VENTOLIN HFA) inhaler  2 puff Inhalation Q6H PRN Jeri Morse MD        alum & mag hydroxide-simethicone (MAALOX) suspension 30 mL  30 mL Oral Q4H PRN Mati Cueva MD        [Held by provider] atorvastatin (LIPITOR) tablet 20 mg  20 mg Oral QPM Mati Cueva MD   20 mg at 05/21/24 2057    buprenorphine (SUBUTEX) sublingual tablet 4 mg  4 mg Sublingual Daily Pb Arciniega MD   4 mg at 05/23/24 2100    buprenorphine (SUBUTEX) sublingual tablet 8 mg  8 mg Sublingual BID Pb Arciniega MD   8 mg at 05/24/24 0741    cyclobenzaprine (FLEXERIL) tablet 5 mg  5 mg Oral BID PRN Pb Arciniega MD   5 mg at 05/21/24 1420    FLUoxetine (PROzac) capsule 60 mg  60 mg Oral Daily Pb Arciniega MD   60 mg at 05/24/24 0741    gabapentin (NEURONTIN) tablet 600 mg  600 mg Oral TID Mati Cueva MD   600 mg at 05/24/24 0741    hydrOXYzine HCl (ATARAX) tablet 100 mg  100 mg Oral BID PRN Mati Cueva MD   100 mg at 05/23/24 1724    lithium ER (LITHOBID) CR tablet 300 mg  300 mg Oral Jeri Aguilar MD   300 mg at 05/24/24 0741    lithium " ER (LITHOBID) CR tablet 600 mg  600 mg Oral At Bedtime Jeri Morse MD   600 mg at 05/23/24 2100    melatonin tablet 3 mg  3 mg Oral At Bedtime PRN Mati Cueva MD        naloxone (NARCAN) injection 0.2 mg  0.2 mg Intravenous Q2 Min PRN Pb Arciniega MD        Or    naloxone (NARCAN) injection 0.4 mg  0.4 mg Intravenous Q2 Min PRN Pb Arciniega MD        Or    naloxone (NARCAN) injection 0.2 mg  0.2 mg Intramuscular Q2 Min PRN Pb Arciniega MD        Or    naloxone (NARCAN) injection 0.4 mg  0.4 mg Intramuscular Q2 Min PRN Pb Arciniega MD        nicotine (NICORETTE) gum 2 mg  2 mg Buccal Q1H PRN Kirby Colbert MD   2 mg at 05/23/24 1759    OLANZapine (zyPREXA) tablet 10 mg  10 mg Oral 4x Daily PRN Jeri Morse MD   10 mg at 05/23/24 1322    Or    OLANZapine (zyPREXA) injection 10 mg  10 mg Intramuscular TID PRN Jeri Morse MD        polyethylene glycol (MIRALAX) Packet 17 g  17 g Oral Daily PRN Mati Cueva MD        QUEtiapine (SEROquel) tablet 200 mg  200 mg Oral At Bedtime Mati Cueva MD   200 mg at 05/23/24 2100       PRN:  Current Facility-Administered Medications   Medication Dose Route Frequency Provider Last Rate Last Admin    acetaminophen (TYLENOL) tablet 650 mg  650 mg Oral Q4H PRN Mati Cueva MD   650 mg at 05/23/24 1726    albuterol (PROVENTIL HFA/VENTOLIN HFA) inhaler  2 puff Inhalation Q6H PRN Jeri Morse MD        alum & mag hydroxide-simethicone (MAALOX) suspension 30 mL  30 mL Oral Q4H PRN Mati Cueva MD        [Held by provider] atorvastatin (LIPITOR) tablet 20 mg  20 mg Oral QPM Mati Cueva MD   20 mg at 05/21/24 2057    buprenorphine (SUBUTEX) sublingual tablet 4 mg  4 mg Sublingual Daily Pb Arciniega MD   4 mg at 05/23/24 2100    buprenorphine (SUBUTEX) sublingual tablet 8 mg  8 mg Sublingual BID Pb Arciniega MD   8 mg at 05/24/24 0741    cyclobenzaprine (FLEXERIL) tablet 5 mg  5 mg Oral BID PRN Pb Arciniega MD   5 mg at  05/21/24 1420    FLUoxetine (PROzac) capsule 60 mg  60 mg Oral Daily Pb Arciniega MD   60 mg at 05/24/24 0741    gabapentin (NEURONTIN) tablet 600 mg  600 mg Oral TID Mati Cueva MD   600 mg at 05/24/24 0741    hydrOXYzine HCl (ATARAX) tablet 100 mg  100 mg Oral BID PRN Mati Cueva MD   100 mg at 05/23/24 1724    lithium ER (LITHOBID) CR tablet 300 mg  300 mg Oral QAM Jeri Morse MD   300 mg at 05/24/24 0741    lithium ER (LITHOBID) CR tablet 600 mg  600 mg Oral At Bedtime Jeri Morse MD   600 mg at 05/23/24 2100    melatonin tablet 3 mg  3 mg Oral At Bedtime PRN Mati Cueva MD        naloxone (NARCAN) injection 0.2 mg  0.2 mg Intravenous Q2 Min PRN Pb Arciniega MD        Or    naloxone (NARCAN) injection 0.4 mg  0.4 mg Intravenous Q2 Min PRN Pb Arciniega MD        Or    naloxone (NARCAN) injection 0.2 mg  0.2 mg Intramuscular Q2 Min PRN bP Arciniega MD        Or    naloxone (NARCAN) injection 0.4 mg  0.4 mg Intramuscular Q2 Min PRN Pb Arciniega MD        nicotine (NICORETTE) gum 2 mg  2 mg Buccal Q1H PRN Kirby Colbert MD   2 mg at 05/23/24 1759    OLANZapine (zyPREXA) tablet 10 mg  10 mg Oral 4x Daily PRN Jeri Morse MD   10 mg at 05/23/24 1322    Or    OLANZapine (zyPREXA) injection 10 mg  10 mg Intramuscular TID PRN Jeri Morse MD        polyethylene glycol (MIRALAX) Packet 17 g  17 g Oral Daily PRN Mati Cueva MD        QUEtiapine (SEROquel) tablet 200 mg  200 mg Oral At Bedtime Mati Cueva MD   200 mg at 05/23/24 2100       Labs and Imaging:  New results:   No results found for this or any previous visit (from the past 24 hour(s)).    Data this admission:  - CBC: WBC 11.4, Hgb 13.2, hematocrit 39.2  - CMP (latest 5/22/14): AST 61, ALT 91, CO2 33  - TSH normal  - UDS Positive for methamphetamines and cannabis  - Vit D normal  - Hgb A1c normal  - Lipids: Trig 173, HDL 32  - Vit B12 normal  - Folate normal  - Lithium 0.53 mmol  - HIV neg  -  "EKG refused     Mental Status Exam:     Oriented to:  Grossly Oriented  General:  Awake and Alert, deep sleep in milieu prior to encounter  Appearance:  appears stated age, Dressed in on clothes, Hair is kept and grooming is fair, and Tattoos on arms and neck , appears tired  Behavior/Attitude:  cooperative and engaged, anxious at times  Eye Contact:  downcast eyes looked closed at times  Psychomotor: no evidence of tics, dystonia, or tardive dyskinesia   Speech:  appropriate volume/tone  Language: Fluent in English with appropriate syntax and vocabulary.  Mood:  \"depressed earlier\",  Affect:  dysphoric, appears more down today  Thought Process:  linear  Thought Content:  had suicidal ideation without plan or intent this morning but not present at the time of interviewing, No HI, says AH has mostly resolved since he has not been using and he hasn't had any since coming to the hospital , perseverates on relapse at times but redirectable and seems to respond well to motivational interviewing  Associations:  intact  Insight:  good, voluntarily admit, is aware of his worsening psychosis in the context of substance use, seems to be willing to working with the care team to get better  Judgment:  fair , appropriate engagement with team, adherent to team recommendations, wants to be clean before appearing before his son   Impulse control: fair  Attention Span:  grossly intact  Concentration:  grossly intact  Recent and Remote Memory:  not formally assessed  Fund of Knowledge:   not formally assessed  Muscle Strength and Tone: normal  Gait and Station: Normal     Psychiatric Assessment   Diagnosis:  Psychosis, unspecified  R/O Methamphetamine Use Disorder  R/O Alcohol Use Disorder   R/O Opioid Use Disorder   R/O Cannabis Use Disorder     Perry Preciado is a 49 year old male with previous psychiatric diagnoses of MDD, KANE, ADHD, and substance disorder (alcohol, meth, opiates) and medical history significant for TBI who was " admitted from the ER on 2024 due to concern for SI and AVH in the context of medication non-adherence and substance use. Most recent psychiatric hospitalization was at [facility] in 2024 for similar concerns. Significant symptoms on admission include depression, SI, and AVH. The MSE on admission was pertinent for SI, AVH, and depressed affect. Biological contributions to mental health presentation include prior diagnoses of mental health disorders, multiple medication trials, long standing substance use history, hx of TBI, and genetic predisposition for suicide completion and substance use. Psychological contributions to mental health presentation include maladaptive coping and cluster B traits. Social factors contributing to mental health presentation include limited social supports, financial stressors, and recent family deaths who also  by suicide around this time of the year. Protective factors include engaged in treatment.      In summary, the patient's differential is still in evolution. Patient has reported symptoms of depression, SI, and AVH in the context of substance use and medication nonadherence. Patient per chart review has mentioned that he only has psychosis during periods of using substances however today he notes it happens even when he's sober. He also mentions some periods concerning for hypomania/mily but it's complicated by his substance use and cluster B traits. For now will consider Unspecified mood and unspecified psychosis until collateral can be obtained. He will likely benefit from medication optimization and substance use treatment this admission.     Given that he currently has SI and psychosis, patient warrants inpatient psychiatric hospitalization to maintain his safety.      Psychiatric Plan by Diagnosis      Today's changes:  -Change lithium to all at bedtime (so 900mg at bedtime). [place] labs next week.   -Plan to keep Ileana notified with updates, particularly with  "dispo plan.   -Plan for potential discharge to [facility] v dual treatment facility. CTC to also look into case management on discharge.     #   1. Medications:  - Zyprexa 10 mg QID PRN ordered  - Fluoxetine 60mg  - Lithium 900mg qhs  - Seroquel 200mg qpm     2. Pertinent Labs/Monitoring:   - EKG refused     3. Additional Plans:  - Patient will be treated in therapeutic milieu with appropriate individual and group therapies as described    Stimulant Use Disorder  #Opiate Use Disorder  #Nicotine Use Disorder  - Subutex 20mg daily total  - Consider providing narcan on discharge  - Nicotine Gum 2mg q1hr prn  - Will reconsider CD assessment once mental health sxs above stabilizes.      #ADHD  - Atomoxetine discontinued     #PTSD  - Prazosin discontinued      Psychiatric Hospital Course:      Perry Preciado was admitted to [facility] as a voluntary patient.   Medications:  PTA atomoxetine, Subutex, Prozac, Gabapentin, Lithium, Prazosin, and seroquel were continued. Zyprexa 10mg TID PRN for anxiety, agitation related to psychosis. Prazosin discontinued -had been using for months, BP low, and pt reported difficulty breathing.  Subutex increased to 20mg total daily dosing further manage opioid withdrawal symptoms    Atomoxetine discontinued, due to current anxiety levels. Atorvastatin held due to slightly elevated liver enzymes. Albuterol inhaler PRN ordered.     The risks, benefits, alternatives, and side effects were discussed and understood by the patient.     Medical Assessment and Plan     Medical diagnoses to be addressed this admission:    # TBI  - Monitor      #Hypertriglyceridemia  - Atorvastatin 20mg qpm     #Neuropathy  - 600mg TID        Clinically Significant Risk Factors Present on Admission                  # Hypertension: Home medication list includes antihypertensive(s)      # Obesity: Estimated body mass index is 33.66 kg/m  as calculated from the following:    Height as of this encounter: 1.905 m (6' 3\").    " Weight as of this encounter: 122.2 kg (269 lb 4.8 oz).       # Financial/Environmental Concerns:                 Medical course:  Patient was physically examined by the ED prior to being transferred to the unit and was found to be medically stable and appropriate for admission.      Consults:  none     Checklist     Legal Status: Voluntary     Safety Assessment:   Behavioral Orders   Procedures    Code 1 - Restrict to Unit    Routine Programming     As clinically indicated    Status 15     Every 15 minutes.    Suicide precautions: Suicide Risk: MODERATE; Clinical rationale to override score: modification to the care environment     Patients on Suicide Precautions should have a Combination Diet ordered that includes a Diet selection(s) AND a Behavioral Tray selection for Safe Tray - with utensils, or Safe Tray - NO utensils       Order Specific Question:   Suicide Risk     Answer:   MODERATE     Order Specific Question:   Clinical rationale to override score:     Answer:   modification to the care environment       Risk Assessment:  Risk for harm is elevated.  Risk factors: SI, substance use, trauma, family history, and past behaviors, maladaptive coping, impulsive   Protective factors: engaged in treatment , family support, help-seeking, future oriented     SIO: None    Disposition: TBD. Pending stabilization, medication optimization, & development of a safe discharge plan.     Attestations     This patient was seen and discussed with my attending physician, Dr. Arciniega.     Clarence Ellison, MS3  [employer]    I was present with the medical student who participated in the service and in the documentation of the note. I have verified the history and personally performed the physical exam and medical decision making. I agree with the assessment and plan of care as documented in the note and have made changes to the note as appropriate.     Jeri Morse, PGY-2 Psychiatry     Attestation:  This patient has  been seen and evaluated by me, Pb Arciniega MD.  I have discussed this patient with the house staff team including the resident and medical student and I agree with the findings and plan in this note.    I have reviewed today's vital signs, medications, labs and imaging. Pb Arciniega MD , PhD.

## 2024-05-24 NOTE — PLAN OF CARE
BEH IP Unit Acuity Rating Score (UARS)  Patient is given one point for every criteria they meet.    CRITERIA SCORING   On a 72 hour hold, court hold, committed, stay of commitment, or revocation. 0    Patient LOS on BEH unit exceeds 20 days. 0  LOS: 5   Patient under guardianship, 55+, otherwise medically complex, or under age 11. 0   Suicide ideation without relief of precipitating factors. 1   Current plan for suicide. 1   Current plan for homicide. 0   Imminent risk or actual attempt to seriously harm another without relief of factors precipitating the attempt. 0   Severe dysfunction in daily living (ex: complete neglect for self care, extreme disruption in vegetative function, extreme deterioration in social interactions). 1   Recent (last 7 days) or current physical aggression in the ED or on unit. 0   Restraints or seclusion episode in past 72 hours. 0   Recent (last 7 days) or current verbal aggression, agitation, yelling, etc., while in the ED or unit. 0   Active psychosis. 0   Need for constant or near constant redirection (from leaving, from others, etc).  0   Intrusive or disruptive behaviors. 0   Patient requires 3 or more hours of individualized nursing care per 8-hour shift (i.e. for ADLs, meds, therapeutic interventions). 0   TOTAL 3

## 2024-05-24 NOTE — PLAN OF CARE
Team Note Due:  Monday    Assessment/Intervention/Current Symtoms and Care Coordination:  Chart review and met with team, discussed pt progress, symptomology, and response to treatment.  Discussed the discharge plan and any potential impediments to discharge.    Received confirmation the [facility] referral was received and pt is on the wait list.    Sent referrals to [facility], [facility], and [facility].    Met with pt to provide an update on IRTS referrals and being added to wait lists. Also discussed MH CM through [facility]. Pt is agreeable to this. Submitted referral for MH CM through [facility].    Discharge Plan or Goal:  Pending stabilization of symptoms and safe disposition planning.     Barriers to Discharge:  Patient requires further psychiatric stabilization due to current symptomology, medication management with changes subject to provider, coordination with outside supports, and aftercare planning.     Referral Status:  [facility] - referral sent 5/23, on wait list 5/24  [facility] - referral sent 5/24  [facility] - referral sent 5/24  [facility] - referral faxed 5/24, on wait list 5/24. Estimated 1-2 week wait     Legal Status:  Voluntary     Contacts:  Ileana (Sister): [phone]       Upcoming Meetings and Dates/Important Information and next steps:  Follow up on IRTS referrals  Follow up on [facility] referral

## 2024-05-24 NOTE — PLAN OF CARE
Pt was visible in the milieu. Pt was sitting in the lounge watching tv most of the evening. Pt was observed dosing off in the lounge. Presents with flat affect. Pt reported anxiety 8/10 and depression 8/10 and prn Hydroxyzine was administered. Denied for SI/SIB/AH/VH/HI. Complained of body ache and prn Tylenol was administered and was effective. Ate adequately for supper.     Goal Outcome Evaluation:  Problem: Adult Behavioral Health Plan of Care  Goal: Adheres to Safety Considerations for Self and Others  Intervention: Develop and Maintain Individualized Safety Plan  Recent Flowsheet Documentation  Taken 5/23/2024 1810 by Demetrius Love, RN  Safety Measures:   safety rounds completed   environmental rounds completed  Goal: Absence of New-Onset Illness or Injury  Intervention: Identify and Manage Fall Risk  Recent Flowsheet Documentation  Taken 5/23/2024 1810 by Demetrius Love, RN  Safety Measures:   safety rounds completed   environmental rounds completed

## 2024-05-24 NOTE — PLAN OF CARE
Problem: Sleep Disturbance  Goal: Adequate Sleep/Rest  5/24/2024 0622 by Amber Harmon, RN  Outcome: Progressing   Goal Outcome Evaluation:    Patient appears to have slept a total of 3.25 hours. Noted to be dosing off in lounge while sitting a couple of times. Safety/environment checks conducted every 15 minutes with no concerns noted. No complaints of pain/discomfort.

## 2024-05-24 NOTE — PLAN OF CARE
"Pt denies SI,denies hallucinations, denies paranoia.  Pt reports high anxiety 6/10.  Pt reports anxious about his bike and his belongings at the sober house he was at.   Pt noted asleep several times in the lounge.  After lunch pt  requesting something for anxiety.  Explained to him he can have his 2pm meds in 10 minutes.  10 minutes later approached pt to give him his 2pm meds and pt asleep in chair in lounge.  Pt stated he was forcing himself to sleep.  Pt social with select peers.    Problem: Adult Behavioral Health Plan of Care  Goal: Plan of Care Review  Outcome: Not Progressing  Goal: Patient-Specific Goal (Individualization)  Description: You can add care plan individualizations to a care plan. Examples of Individualization might be:  \"Parent requests to be called daily at 9am for status\", \"I have a hard time hearing out of my right ear\", or \"Do not touch me to wake me up as it startles  me\".  Outcome: Not Progressing  Goal: Adheres to Safety Considerations for Self and Others  Outcome: Not Progressing  Intervention: Develop and Maintain Individualized Safety Plan  Recent Flowsheet Documentation  Taken 5/24/2024 1300 by Melinda Marlow RN  Safety Measures:   environmental rounds completed   safety rounds completed  Goal: Absence of New-Onset Illness or Injury  Outcome: Not Progressing  Intervention: Identify and Manage Fall Risk  Recent Flowsheet Documentation  Taken 5/24/2024 1300 by Melinda Marlow, RN  Safety Measures:   environmental rounds completed   safety rounds completed  Goal: Optimized Coping Skills in Response to Life Stressors  Outcome: Not Progressing  Goal: Develops/Participates in Therapeutic [place] to Support Successful Transition  Outcome: Not Progressing   Goal Outcome Evaluation:                        "

## 2024-05-25 PROCEDURE — 250N000013 HC RX MED GY IP 250 OP 250 PS 637

## 2024-05-25 PROCEDURE — 124N000002 HC R&B MH UMMC

## 2024-05-25 PROCEDURE — 250N000013 HC RX MED GY IP 250 OP 250 PS 637: Performed by: PSYCHIATRY & NEUROLOGY

## 2024-05-25 RX ADMIN — NICOTINE POLACRILEX 2 MG: 2 GUM, CHEWING BUCCAL at 11:30

## 2024-05-25 RX ADMIN — BUPRENORPHINE 4 MG: 2 TABLET SUBLINGUAL at 19:08

## 2024-05-25 RX ADMIN — GABAPENTIN 600 MG: 600 TABLET, FILM COATED ORAL at 19:08

## 2024-05-25 RX ADMIN — OLANZAPINE 10 MG: 10 TABLET, FILM COATED ORAL at 08:22

## 2024-05-25 RX ADMIN — GABAPENTIN 600 MG: 600 TABLET, FILM COATED ORAL at 13:06

## 2024-05-25 RX ADMIN — OLANZAPINE 10 MG: 10 TABLET, FILM COATED ORAL at 18:46

## 2024-05-25 RX ADMIN — BUPRENORPHINE 8 MG: 8 TABLET SUBLINGUAL at 08:19

## 2024-05-25 RX ADMIN — ACETAMINOPHEN 650 MG: 325 TABLET, FILM COATED ORAL at 18:47

## 2024-05-25 RX ADMIN — HYDROXYZINE HYDROCHLORIDE 100 MG: 50 TABLET, FILM COATED ORAL at 04:08

## 2024-05-25 RX ADMIN — GABAPENTIN 600 MG: 600 TABLET, FILM COATED ORAL at 08:19

## 2024-05-25 RX ADMIN — BUPRENORPHINE 8 MG: 8 TABLET SUBLINGUAL at 13:06

## 2024-05-25 RX ADMIN — LITHIUM CARBONATE 900 MG: 450 TABLET, EXTENDED RELEASE ORAL at 21:24

## 2024-05-25 RX ADMIN — HYDROXYZINE HYDROCHLORIDE 100 MG: 50 TABLET, FILM COATED ORAL at 11:31

## 2024-05-25 RX ADMIN — QUETIAPINE FUMARATE 200 MG: 200 TABLET ORAL at 21:24

## 2024-05-25 RX ADMIN — FLUOXETINE HYDROCHLORIDE 60 MG: 20 CAPSULE ORAL at 08:19

## 2024-05-25 ASSESSMENT — ACTIVITIES OF DAILY LIVING (ADL)
ADLS_ACUITY_SCORE: 31

## 2024-05-25 NOTE — PLAN OF CARE
Rehab Group    Start time: 1315  End time: 1410  Patient time total: 25 minutes    attended partial group    #4 attended   Group Type: occupational therapy   Group Topic Covered: coping skills, problem solving, social skills, healthy leisure time, and cognitive activities   Group Session Detail:  Patient engaged in a leisure activity with a visuospatial and cognitive component in order to promote: problem solving skills, improve attention, emphasize new learning, exercise cognitive skills, and foster leisure and relaxation.    Patient Response/Contribution:  Cooperative with task, Confronted peers appropriately , and Distracted   Patient Detail:pt arrived late for group. Pt polite during interaction with therapist. Pt agreeable to participate in group cognitive activity. Pt easily distractible. Pt engaged in somewhat inappropriate conversation with peers. Pt was redirectable. Pt demonstrated difficulty with focus and concentration throughout activity-pt focused for approx half of each 7 minute round. Pt shared he was having a hard time staying awake as well. Pt left group to continue watching baseball.        No Charge    Patient Active Problem List   Diagnosis    Suicidal ideation    Methamphetamine use (H)    Polysubstance abuse (H)    Depression, unspecified depression type

## 2024-05-25 NOTE — PLAN OF CARE
"Pt sitting in lounge sleeping in chair at beginning of shift.  Pt continues to report anxiety related to if his belongings are safe at the sober house and his bike that is impounded.  Pt has been social with peers watching the basketball game.  Pt requesting snacks.  Pt c/o of heartburn requested and rec'd PRN Maalox.  Pt denies SI,denies any psychotic sx's.  Problem: Adult Behavioral Health Plan of Care  Goal: Plan of Care Review  5/24/2024 2117 by Melinda Marlow RN  Outcome: Not Progressing  5/24/2024 1515 by Melinda Marlow RN  Outcome: Not Progressing  Goal: Patient-Specific Goal (Individualization)  Description: You can add care plan individualizations to a care plan. Examples of Individualization might be:  \"Parent requests to be called daily at 9am for status\", \"I have a hard time hearing out of my right ear\", or \"Do not touch me to wake me up as it startles  me\".  5/24/2024 2117 by Melinda Marlow RN  Outcome: Not Progressing  5/24/2024 1515 by Melinda Marlow RN  Outcome: Not Progressing  Goal: Adheres to Safety Considerations for Self and Others  5/24/2024 2117 by Melinda Marlow RN  Outcome: Not Progressing  5/24/2024 1515 by Melinda Marlow RN  Outcome: Not Progressing  Intervention: Develop and Maintain Individualized Safety Plan  Recent Flowsheet Documentation  Taken 5/24/2024 2114 by Melinda Marlow RN  Safety Measures:   environmental rounds completed   safety rounds completed  Taken 5/24/2024 1300 by Melinda Marlow RN  Safety Measures:   environmental rounds completed   safety rounds completed  Goal: Absence of New-Onset Illness or Injury  5/24/2024 2117 by Melnida Marlow RN  Outcome: Not Progressing  5/24/2024 1515 by Melinda Marlow RN  Outcome: Not Progressing  Intervention: Identify and Manage Fall Risk  Recent Flowsheet Documentation  Taken 5/24/2024 2114 by Melinda Marlow RN  Safety Measures:   environmental rounds completed   safety rounds completed  Taken 5/24/2024 1300 by Melinda Marlow RN  Safety " Measures:   environmental rounds completed   safety rounds completed  Goal: Optimized Coping Skills in Response to Life Stressors  5/24/2024 2117 by Melinda Marlow RN  Outcome: Not Progressing  5/24/2024 1515 by Melinda Marlow RN  Outcome: Not Progressing  Goal: Develops/Participates in Therapeutic [place] to Support Successful Transition  5/24/2024 2117 by Melinda Marlow, RN  Outcome: Not Progressing  5/24/2024 1515 by Melinda Marlow RN  Outcome: Not Progressing   Goal Outcome Evaluation:

## 2024-05-25 NOTE — PLAN OF CARE
Goal Outcome Evaluation:    Plan of Care Reviewed With: patient      Problem: Adult Behavioral Health Plan of Care  Goal: Plan of Care Review  Outcome: Progressing  Flowsheets (Taken 5/25/2024 1100)  Patient Agreement with Plan of Care: agrees  Pt was up on the unit most of the day, and was social with peers and staff. He was calm with a bright affect. He denied pain or discomfort, but did endorse anxiety for which he was medicated with prn medications with relief stated. He denied SI and HI and he contracted for safety. His day has been spent watching TV as well as socializing with other peers. Although he asks for antianxiety medications, pt has some times when he he is sleepy and napping while siting in the launch. He was medication compliant and he denied side effects. Pt is eating and drinking adequately. He offered no complaints and no behavioral issue were noted. Will continue to monitor and with plan of care.

## 2024-05-26 PROCEDURE — 250N000013 HC RX MED GY IP 250 OP 250 PS 637: Performed by: PSYCHIATRY & NEUROLOGY

## 2024-05-26 PROCEDURE — 124N000002 HC R&B MH UMMC

## 2024-05-26 PROCEDURE — 250N000013 HC RX MED GY IP 250 OP 250 PS 637

## 2024-05-26 PROCEDURE — 250N000013 HC RX MED GY IP 250 OP 250 PS 637: Performed by: NURSE PRACTITIONER

## 2024-05-26 RX ORDER — HYDROXYZINE HYDROCHLORIDE 50 MG/1
100 TABLET, FILM COATED ORAL 2 TIMES DAILY PRN
Status: DISCONTINUED | OUTPATIENT
Start: 2024-05-26 | End: 2024-06-06 | Stop reason: HOSPADM

## 2024-05-26 RX ADMIN — OLANZAPINE 10 MG: 10 TABLET, FILM COATED ORAL at 08:33

## 2024-05-26 RX ADMIN — HYDROXYZINE HYDROCHLORIDE 100 MG: 50 TABLET, FILM COATED ORAL at 11:28

## 2024-05-26 RX ADMIN — NICOTINE POLACRILEX 2 MG: 2 GUM, CHEWING BUCCAL at 09:46

## 2024-05-26 RX ADMIN — FLUOXETINE HYDROCHLORIDE 60 MG: 20 CAPSULE ORAL at 08:28

## 2024-05-26 RX ADMIN — GABAPENTIN 600 MG: 600 TABLET, FILM COATED ORAL at 13:44

## 2024-05-26 RX ADMIN — ALUMINUM HYDROXIDE, MAGNESIUM HYDROXIDE, AND SIMETHICONE 30 ML: 1200; 120; 1200 SUSPENSION ORAL at 11:57

## 2024-05-26 RX ADMIN — HYDROXYZINE HYDROCHLORIDE 100 MG: 50 TABLET, FILM COATED ORAL at 03:21

## 2024-05-26 RX ADMIN — ACETAMINOPHEN 650 MG: 325 TABLET, FILM COATED ORAL at 14:56

## 2024-05-26 RX ADMIN — OLANZAPINE 10 MG: 10 TABLET, FILM COATED ORAL at 16:07

## 2024-05-26 RX ADMIN — BUPRENORPHINE 8 MG: 8 TABLET SUBLINGUAL at 13:44

## 2024-05-26 RX ADMIN — LITHIUM CARBONATE 900 MG: 450 TABLET, EXTENDED RELEASE ORAL at 21:02

## 2024-05-26 RX ADMIN — QUETIAPINE FUMARATE 200 MG: 200 TABLET ORAL at 21:02

## 2024-05-26 RX ADMIN — NICOTINE POLACRILEX 2 MG: 2 GUM, CHEWING BUCCAL at 14:56

## 2024-05-26 RX ADMIN — BUPRENORPHINE 8 MG: 8 TABLET SUBLINGUAL at 08:30

## 2024-05-26 RX ADMIN — NICOTINE POLACRILEX 2 MG: 2 GUM, CHEWING BUCCAL at 18:36

## 2024-05-26 RX ADMIN — GABAPENTIN 600 MG: 600 TABLET, FILM COATED ORAL at 19:07

## 2024-05-26 RX ADMIN — GABAPENTIN 600 MG: 600 TABLET, FILM COATED ORAL at 08:28

## 2024-05-26 RX ADMIN — BUPRENORPHINE 4 MG: 2 TABLET SUBLINGUAL at 19:07

## 2024-05-26 ASSESSMENT — ACTIVITIES OF DAILY LIVING (ADL)
ORAL_HYGIENE: INDEPENDENT
ADLS_ACUITY_SCORE: 31
DRESS: INDEPENDENT
ADLS_ACUITY_SCORE: 31
HYGIENE/GROOMING: INDEPENDENT
ADLS_ACUITY_SCORE: 31
HYGIENE/GROOMING: INDEPENDENT
ADLS_ACUITY_SCORE: 31
ORAL_HYGIENE: INDEPENDENT
ADLS_ACUITY_SCORE: 31
ADLS_ACUITY_SCORE: 31
DRESS: INDEPENDENT
ADLS_ACUITY_SCORE: 31
ADLS_ACUITY_SCORE: 31

## 2024-05-26 NOTE — PLAN OF CARE
Problem: Sleep Disturbance  Goal: Adequate Sleep/Rest  Outcome: Progressing  Patient sleeps on and off this shift. Patient endorsed anxiety rate 9/10-Hydroxyzine given and effective. Patient continues on q15 minutes safety checks, no behavioral issues noted. Will continue to monitor the patient and provide therapeutic intervention as needed. Will continue with current plan of care. Notify MD with any concerns. The patient had 5 total hours of sleep this shift.

## 2024-05-26 NOTE — CARE PLAN
Rehab Group    Start time: 2015  End time: 2100  Patient time total: 10 minutes    attended partial group    #4 attended   Group Type: occupational therapy   Group Topic Covered: coping skills, social skills, self-esteem, and healthy leisure time       Group Session Detail:   Hands on healthy leisure exploration with Watercoloring activity for concentration, creative expression, follow through, relaxation, coping, healthy leisure, mood stabilization, reality-based activity, building self-esteem, and quiet socialization.          Patient Response/Contribution:  Actively engaged       Patient Detail:  Pt was pleasant, recalled working with this writer in the past and engaged in group.  He arrived with a peer whom he was quite social with.  Pt participated for about 10 min, then stated he needed to use the restroom and did not return.  While present pt shared he had relapsed recently and was quite angry with himself for doing so.  He also added that he is looking forward to attending an IRTS facility upon discharge as he thinks it will be helpful for him.  No charge.              Patient Active Problem List   Diagnosis    Suicidal ideation    Methamphetamine use (H)    Polysubstance abuse (H)    Depression, unspecified depression type

## 2024-05-26 NOTE — PLAN OF CARE
Problem: Adult Behavioral Health Plan of Care  Goal: Adheres to Safety Considerations for Self and Others  Outcome: Progressing  Goal: Optimized Coping Skills in Response to Life Stressors  Outcome: Progressing   Goal Outcome Evaluation:         Patient had flat affect. Mood was anxious. Was visible in the milieu and socialized with peers watching TV. Was noted sometimes sleeping while sitting on the chair in the lounge area Verbalized anxiety 10/10 and depression 8/10. Denied pain, Covid 19 symptoms, SI/HI/SIB/AH/VH, and contracted for safety.  Was given Zyprexa 10 mg for anxiety, Nicotine gum 2 mg, and scheduled medications. Had good appetite. Will continue to monitor and assist patient.

## 2024-05-26 NOTE — PLAN OF CARE
RN Assessment:    Pt presented with euthymic affect. Pt was cooperative while interacting with writer. Pt was alert and oriented x 4. Pt denied having SI, HI, thoughts of SIB, and hallucinations. Pt endorsed having headache pain. Pt given PRN medication for pain. Pt had no new medical concerns. Pt endorsed sleeping well last night. Pt feels the medications that are currently ordered are working well. No medication side effects endorsed by pt or observed by writer. Pt was present in the milieu. Continue to monitor for safety and changes in medical condition.       PRN Medications passed this shift:    0833: Olanzapine 10 mg PO for anxiety. This medication was effective per pt report.     0946: Nicotine gum 2 mg PO for nicotine withdrawal symptoms.    1128: Hydroxyzine 100 mg PO for anxiety. This medication was effective per pt report.     1157: Maalox suspension 30 mL PO for indigestion. This medication was effective per pt report.     1456: Acetaminophen 650 mg PO for headache pain.     1456: Nicotine gum 2 mg PO for nicotine withdrawal symptoms.

## 2024-05-27 LAB
ALBUMIN SERPL BCG-MCNC: 4 G/DL (ref 3.5–5.2)
ALP SERPL-CCNC: 118 U/L (ref 40–150)
ALT SERPL W P-5'-P-CCNC: 56 U/L (ref 0–70)
ANION GAP SERPL CALCULATED.3IONS-SCNC: 11 MMOL/L (ref 7–15)
AST SERPL W P-5'-P-CCNC: 40 U/L (ref 0–45)
BILIRUB SERPL-MCNC: 0.2 MG/DL
BUN SERPL-MCNC: 14.8 MG/DL (ref 6–20)
CALCIUM SERPL-MCNC: 9.5 MG/DL (ref 8.6–10)
CHLORIDE SERPL-SCNC: 96 MMOL/L (ref 98–107)
CREAT SERPL-MCNC: 1.04 MG/DL (ref 0.67–1.17)
DEPRECATED HCO3 PLAS-SCNC: 30 MMOL/L (ref 22–29)
EGFRCR SERPLBLD CKD-EPI 2021: 89 ML/MIN/1.73M2
GLUCOSE SERPL-MCNC: 133 MG/DL (ref 70–99)
LITHIUM SERPL-SCNC: 0.85 MMOL/L (ref 0.6–1.2)
POTASSIUM SERPL-SCNC: 4.5 MMOL/L (ref 3.4–5.3)
PROT SERPL-MCNC: 7.2 G/DL (ref 6.4–8.3)
SODIUM SERPL-SCNC: 137 MMOL/L (ref 135–145)

## 2024-05-27 PROCEDURE — 82040 ASSAY OF SERUM ALBUMIN: CPT

## 2024-05-27 PROCEDURE — 250N000013 HC RX MED GY IP 250 OP 250 PS 637: Performed by: PSYCHIATRY & NEUROLOGY

## 2024-05-27 PROCEDURE — 250N000013 HC RX MED GY IP 250 OP 250 PS 637

## 2024-05-27 PROCEDURE — 86695 HERPES SIMPLEX TYPE 1 TEST: CPT

## 2024-05-27 PROCEDURE — 250N000013 HC RX MED GY IP 250 OP 250 PS 637: Performed by: NURSE PRACTITIONER

## 2024-05-27 PROCEDURE — 124N000002 HC R&B MH UMMC

## 2024-05-27 PROCEDURE — 36415 COLL VENOUS BLD VENIPUNCTURE: CPT

## 2024-05-27 PROCEDURE — 80178 ASSAY OF LITHIUM: CPT

## 2024-05-27 PROCEDURE — 86696 HERPES SIMPLEX TYPE 2 TEST: CPT

## 2024-05-27 PROCEDURE — 80074 ACUTE HEPATITIS PANEL: CPT

## 2024-05-27 RX ADMIN — LITHIUM CARBONATE 900 MG: 450 TABLET, EXTENDED RELEASE ORAL at 22:01

## 2024-05-27 RX ADMIN — BUPRENORPHINE 4 MG: 2 TABLET SUBLINGUAL at 19:17

## 2024-05-27 RX ADMIN — QUETIAPINE FUMARATE 200 MG: 200 TABLET ORAL at 22:01

## 2024-05-27 RX ADMIN — GABAPENTIN 600 MG: 600 TABLET, FILM COATED ORAL at 19:16

## 2024-05-27 RX ADMIN — GABAPENTIN 600 MG: 600 TABLET, FILM COATED ORAL at 07:36

## 2024-05-27 RX ADMIN — BUPRENORPHINE 8 MG: 8 TABLET SUBLINGUAL at 13:05

## 2024-05-27 RX ADMIN — ALUMINUM HYDROXIDE, MAGNESIUM HYDROXIDE, AND SIMETHICONE 30 ML: 1200; 120; 1200 SUSPENSION ORAL at 16:02

## 2024-05-27 RX ADMIN — ACETAMINOPHEN 650 MG: 325 TABLET, FILM COATED ORAL at 07:37

## 2024-05-27 RX ADMIN — BUPRENORPHINE 8 MG: 8 TABLET SUBLINGUAL at 07:36

## 2024-05-27 RX ADMIN — HYDROXYZINE HYDROCHLORIDE 100 MG: 50 TABLET, FILM COATED ORAL at 05:33

## 2024-05-27 RX ADMIN — FLUOXETINE HYDROCHLORIDE 60 MG: 20 CAPSULE ORAL at 07:36

## 2024-05-27 RX ADMIN — GABAPENTIN 600 MG: 600 TABLET, FILM COATED ORAL at 13:05

## 2024-05-27 RX ADMIN — OLANZAPINE 10 MG: 10 TABLET, FILM COATED ORAL at 16:10

## 2024-05-27 RX ADMIN — OLANZAPINE 10 MG: 10 TABLET, FILM COATED ORAL at 09:56

## 2024-05-27 RX ADMIN — NICOTINE POLACRILEX 2 MG: 2 GUM, CHEWING BUCCAL at 22:18

## 2024-05-27 RX ADMIN — HYDROXYZINE HYDROCHLORIDE 100 MG: 50 TABLET, FILM COATED ORAL at 12:44

## 2024-05-27 ASSESSMENT — ACTIVITIES OF DAILY LIVING (ADL)
ADLS_ACUITY_SCORE: 31
ORAL_HYGIENE: INDEPENDENT
ADLS_ACUITY_SCORE: 31
DRESS: INDEPENDENT
HYGIENE/GROOMING: INDEPENDENT
ADLS_ACUITY_SCORE: 31

## 2024-05-27 NOTE — PLAN OF CARE
"Pt denies hallucinations pt denies SI.  Pt reports high anxiety.  Pt requested and received  PRN Zyprexa 10mg at 10am and PRN Hydroxyzine 100mg at 12:44pm.  Pt observed sleeping at times in the lounge.  Pt reports \"paranoia about going to IRTS.  Asked more about paranoia but was instant.  Pt asking a lot of questions about IRTS. PT stated he has friends who have found housing thru IRTS. Pt wanting the same.  Pt has been social with peers, eating meals.    Problem: Adult Behavioral Health Plan of Care  Goal: Plan of Care Review  Outcome: Not Progressing  Goal: Patient-Specific Goal (Individualization)  Description: You can add care plan individualizations to a care plan. Examples of Individualization might be:  \"Parent requests to be called daily at 9am for status\", \"I have a hard time hearing out of my right ear\", or \"Do not touch me to wake me up as it startles  me\".  Outcome: Not Progressing  Goal: Adheres to Safety Considerations for Self and Others  Outcome: Not Progressing  Goal: Absence of New-Onset Illness or Injury  Outcome: Not Progressing  Goal: Optimized Coping Skills in Response to Life Stressors  Outcome: Not Progressing  Goal: Develops/Participates in Therapeutic [place] to Support Successful Transition  Outcome: Not Progressing   Goal Outcome Evaluation:                        "

## 2024-05-27 NOTE — PLAN OF CARE
Rehab Group    Start time: 10:15   End time: 11:10  Patient time total: 50 minutes    attended full group    #8 attended   Group Type: occupational therapy   Group Topic Covered: coping skills, emotional regulation, social skills, and healthy leisure time     Group Session Detail:  Patient engaged in group leisure activity (Family Feud) with the focus being: building interpersonal skills, encouraging team collaboration, and promoting overall prosocial engagement and interaction.      Patient Response/Contribution:  Cooperative with task, Attentive, and Actively engaged     Patient Detail:  Patient remained an active and engaged participant throughout full duration of group. Patient appeared anxious and restless as evidenced by slight pacing and standing throughout full duration of group. Patient appeared to work well in a team dynamic and worked collaboratively with peers to reach a common goal. Patient was quite social throughout group; and appeared easily distracted by conversation with others. Benefited from redirection to the task at hand when engaging in side-conversations with others.         Patient Active Problem List   Diagnosis    Suicidal ideation    Methamphetamine use (H)    Polysubstance abuse (H)    Depression, unspecified depression type

## 2024-05-27 NOTE — PLAN OF CARE
Problem: Sleep Disturbance  Goal: Adequate Sleep/Rest  Outcome: Not Progressing  Patient alert and oriented but forgetful. Patient endorsed anxiety 9/10 Hydroxyzine given and effective. . No complaints of pain and discomfort. Patient continues on q15 minutes safety checks, no behavioral issues noted. Will continue to monitor the patient and provide therapeutic intervention as needed. Will continue with current plan of care. Notify MD with any concerns. The patient sleeps on and off, paced the hallway- had 4 total hours of sleep this shift.

## 2024-05-27 NOTE — PLAN OF CARE
"  Rehab Group    Start time: 11:15  End time: 12:05  Patient time total: 30 minutes    attended partial group    #4 attended   Group Type: occupational therapy   Group Topic Covered: balanced lifestyle, coping skills, relaxation skills , and emotional regulation     Group Session Detail:  Patient actively participated in a relaxation skills group.  facilitated a sampler of three various relaxation techniques: deep breathing, imagery, and progressive muscle relaxation. Group was facilitated in order to promote: positive relaxation and coping skills, encourage insight and self-reflection, promote a positive change, manage behaviors, and improve focus.      Patient Response/Contribution:  Listened actively and Attentive     Patient Detail:  Patient responded appropriately to check-in question (What is an activity you do to promote relaxation for your mind and/or body?): \"listen to relaxing music.\" Patient appeared to listen actively to verbal educational content as group progressed; patient appeared to intermittently engage in guided exercises. No social interaction with peers during group. Patient left group early; no charge.        Patient Active Problem List   Diagnosis    Suicidal ideation    Methamphetamine use (H)    Polysubstance abuse (H)    Depression, unspecified depression type        "

## 2024-05-27 NOTE — PLAN OF CARE
Team Note Due:  Monday    Assessment/Intervention/Current Symtoms and Care Coordination:  Chart review and met with team, discussed pt progress, symptomology, and response to treatment.  Discussed the discharge plan and any potential impediments to discharge.    Met with pt as he was wondering if there were any updates on IRTS referrals. Confirmed pt is on wait list for [facility] but waiting to hear back from [facility]. Also confirmed pt's [facility] referral was made.    Discharge Plan or Goal:  Pending stabilization of symptoms and safe disposition planning.     Barriers to Discharge:  Patient requires further psychiatric stabilization due to current symptomology, medication management with changes subject to provider, coordination with outside supports, and aftercare planning.     Referral Status:  [facility] - referral sent 5/23, on wait list 5/24  [facility] - referral sent 5/24  [facility] - referral sent 5/24  [facility] - referral faxed 5/24, on wait list 5/24. Estimated 1-2 week wait     Legal Status:  Voluntary     Contacts:  Ileana (Sister): [phone]       Upcoming Meetings and Dates/Important Information and next steps:  Follow up on IRTS referrals  Follow up on [facility] referral

## 2024-05-28 LAB
HSV1 IGG SERPL QL IA: 12.8 INDEX
HSV1 IGG SERPL QL IA: ABNORMAL
HSV2 IGG SERPL QL IA: 4.72 INDEX
HSV2 IGG SERPL QL IA: ABNORMAL

## 2024-05-28 PROCEDURE — 250N000013 HC RX MED GY IP 250 OP 250 PS 637

## 2024-05-28 PROCEDURE — 250N000013 HC RX MED GY IP 250 OP 250 PS 637: Performed by: NURSE PRACTITIONER

## 2024-05-28 PROCEDURE — 250N000013 HC RX MED GY IP 250 OP 250 PS 637: Performed by: PSYCHIATRY & NEUROLOGY

## 2024-05-28 PROCEDURE — 124N000002 HC R&B MH UMMC

## 2024-05-28 PROCEDURE — 99232 SBSQ HOSP IP/OBS MODERATE 35: CPT | Mod: GC | Performed by: PSYCHIATRY & NEUROLOGY

## 2024-05-28 RX ORDER — QUETIAPINE FUMARATE 50 MG/1
50 TABLET, FILM COATED ORAL 2 TIMES DAILY PRN
Status: DISCONTINUED | OUTPATIENT
Start: 2024-05-28 | End: 2024-06-03

## 2024-05-28 RX ORDER — QUETIAPINE FUMARATE 300 MG/1
300 TABLET, FILM COATED ORAL AT BEDTIME
Status: DISCONTINUED | OUTPATIENT
Start: 2024-05-28 | End: 2024-06-03

## 2024-05-28 RX ORDER — OLANZAPINE 10 MG/1
10 TABLET ORAL 4 TIMES DAILY PRN
Status: DISCONTINUED | OUTPATIENT
Start: 2024-05-28 | End: 2024-06-03

## 2024-05-28 RX ORDER — OLANZAPINE 10 MG/2ML
10 INJECTION, POWDER, FOR SOLUTION INTRAMUSCULAR 3 TIMES DAILY PRN
Status: DISCONTINUED | OUTPATIENT
Start: 2024-05-28 | End: 2024-06-06 | Stop reason: HOSPADM

## 2024-05-28 RX ADMIN — HYDROXYZINE HYDROCHLORIDE 100 MG: 50 TABLET, FILM COATED ORAL at 02:52

## 2024-05-28 RX ADMIN — GABAPENTIN 600 MG: 600 TABLET, FILM COATED ORAL at 08:13

## 2024-05-28 RX ADMIN — BUPRENORPHINE 4 MG: 2 TABLET SUBLINGUAL at 19:20

## 2024-05-28 RX ADMIN — ALUMINUM HYDROXIDE, MAGNESIUM HYDROXIDE, AND SIMETHICONE 30 ML: 1200; 120; 1200 SUSPENSION ORAL at 16:51

## 2024-05-28 RX ADMIN — OLANZAPINE 10 MG: 10 TABLET, FILM COATED ORAL at 06:32

## 2024-05-28 RX ADMIN — HYDROXYZINE HYDROCHLORIDE 100 MG: 50 TABLET, FILM COATED ORAL at 18:36

## 2024-05-28 RX ADMIN — GABAPENTIN 600 MG: 600 TABLET, FILM COATED ORAL at 13:27

## 2024-05-28 RX ADMIN — FLUOXETINE HYDROCHLORIDE 60 MG: 20 CAPSULE ORAL at 08:12

## 2024-05-28 RX ADMIN — BUPRENORPHINE 8 MG: 8 TABLET SUBLINGUAL at 13:27

## 2024-05-28 RX ADMIN — QUETIAPINE FUMARATE 300 MG: 300 TABLET ORAL at 21:02

## 2024-05-28 RX ADMIN — BUPRENORPHINE 8 MG: 8 TABLET SUBLINGUAL at 08:12

## 2024-05-28 RX ADMIN — GABAPENTIN 600 MG: 600 TABLET, FILM COATED ORAL at 19:20

## 2024-05-28 RX ADMIN — LITHIUM CARBONATE 900 MG: 450 TABLET, EXTENDED RELEASE ORAL at 21:02

## 2024-05-28 RX ADMIN — QUETIAPINE FUMARATE 50 MG: 50 TABLET ORAL at 14:07

## 2024-05-28 ASSESSMENT — ACTIVITIES OF DAILY LIVING (ADL)
HYGIENE/GROOMING: INDEPENDENT
HYGIENE/GROOMING: INDEPENDENT
ADLS_ACUITY_SCORE: 31
ADLS_ACUITY_SCORE: 30
ADLS_ACUITY_SCORE: 30
LAUNDRY: WITH SUPERVISION
ADLS_ACUITY_SCORE: 31
ADLS_ACUITY_SCORE: 30
DRESS: INDEPENDENT;STREET CLOTHES
ADLS_ACUITY_SCORE: 31
DRESS: INDEPENDENT
ORAL_HYGIENE: INDEPENDENT
ADLS_ACUITY_SCORE: 31
ORAL_HYGIENE: INDEPENDENT
ADLS_ACUITY_SCORE: 30
ADLS_ACUITY_SCORE: 31
ADLS_ACUITY_SCORE: 31
ADLS_ACUITY_SCORE: 30
ADLS_ACUITY_SCORE: 31

## 2024-05-28 NOTE — PLAN OF CARE
Problem: Sleep Disturbance  Goal: Adequate Sleep/Rest  Outcome: Not Progressing   Patient sleeps on and off but awake most of the shift. No complaints of pain. Patient endorsed high anxiety-had Hydroxyzine, effective, Patient continues on q15 minutes safety checks, no behavioral issues noted. Will continue to monitor the patient and provide therapeutic intervention as needed. Will continue with current plan of care. Notify MD with any concerns. The patient had 3 total hours of sleep this shift.

## 2024-05-28 NOTE — PLAN OF CARE
Group Attendance:  attended partial group    Time session began: 1415  Time session ended: 1500  Patient's total time in group: 10    Total # Attendees   4   Group Type psychotherapeutic and life skills     Group Topic Covered relationships and incorporating skill sets      Group Session Detail Discussed communicating boundaries and address conflict using  I  statements. As group, brainstormed  I  statements and role played using  I  statements to use in common situations.      Patient's response to the group topic/interactions:  Cooperative with task and Closed eyes for most of session     Patient Details: Pt joined group partway through activity but quickly became a somewhat engaged participant by engaging in check in discussion. Pt checked in feeling content with a side of sadness. Pt was tangential and off-topic, left group after check in.           No Charge (0-15 min)    Patient Active Problem List   Diagnosis    Suicidal ideation    Methamphetamine use (H)    Polysubstance abuse (H)    Depression, unspecified depression type

## 2024-05-28 NOTE — PLAN OF CARE
BEH IP Unit Acuity Rating Score (UARS)  Patient is given one point for every criteria they meet.    CRITERIA SCORING   On a 72 hour hold, court hold, committed, stay of commitment, or revocation. 0    Patient LOS on BEH unit exceeds 20 days. 0  LOS: 9   Patient under guardianship, 55+, otherwise medically complex, or under age 11. 0   Suicide ideation without relief of precipitating factors. 1   Current plan for suicide. 0   Current plan for homicide. 0   Imminent risk or actual attempt to seriously harm another without relief of factors precipitating the attempt. 0   Severe dysfunction in daily living (ex: complete neglect for self care, extreme disruption in vegetative function, extreme deterioration in social interactions). 1   Recent (last 7 days) or current physical aggression in the ED or on unit. 0   Restraints or seclusion episode in past 72 hours. 0   Recent (last 7 days) or current verbal aggression, agitation, yelling, etc., while in the ED or unit. 0   Active psychosis. 0   Need for constant or near constant redirection (from leaving, from others, etc).  0   Intrusive or disruptive behaviors. 0   Patient requires 3 or more hours of individualized nursing care per 8-hour shift (i.e. for ADLs, meds, therapeutic interventions). 0   TOTAL 2

## 2024-05-28 NOTE — PLAN OF CARE
Goal Outcome Evaluation:      Rehab Group    Start time: 1315  End time: 1415  Patient time total: 60 minutes    attended full group    #5 attended   Group Type: art   Group Topic Covered: cognitive activities       Group Session Detail:  Art Therapy directive was to create a visual self narrative expressing aspects of self through creating a personal shied/crest.  Goals of directive: identifying personal strengths and goals, expressing aspects of self, emotional expression, media exploration.     Patient Response/Contribution:  Cooperative with task       Patient Detail:    Pt was an engaged participant, finished early and listened to relaxation music for the remainder of group. Pt briefly verbally processed his artwork at the end of group. Pt identified positive personal interests and personal characteristics/aspects of self.  Pts mood was calm, pleasant participant.        Group Therapy ([id number])    Patient Active Problem List   Diagnosis    Suicidal ideation    Methamphetamine use (H)    Polysubstance abuse (H)    Depression, unspecified depression type

## 2024-05-28 NOTE — PLAN OF CARE
Team Note Due:  Monday    Assessment/Intervention/Current Symtoms and Care Coordination:  Chart review and met with team, discussed pt progress, symptomology, and response to treatment.  Discussed the discharge plan and any potential impediments to discharge.    Received email from intake with [facility] wanting to schedule an intake interview with pt. Requested an interview today or tomorrow if possible.    Received call from [facility]  intake requesting a DA for CM intake. DA completed and faxed to [phone]. Provided unit phone number to contact pt for intake.    Met with pt to provide an update on pending interview with [facility] and [facility].    Discharge Plan or Goal:  Pending stabilization of symptoms and safe disposition planning.     Barriers to Discharge:  Patient requires further psychiatric stabilization due to current symptomology, medication management with changes subject to provider, coordination with outside supports, and aftercare planning.     Referral Status:  [facility] - referral sent 5/23, on wait list 5/24. Interview pending 5/28  [facility] - referral sent 5/24  [facility] - referral sent 5/24  [facility] - referral faxed 5/24, on wait list 5/24. Estimated 1-2 week wait     Legal Status:  Voluntary     Contacts:  Ileana (Sister): [phone]       Upcoming Meetings and Dates/Important Information and next steps:  Follow up on IRTS referrals  Follow up on [facility] referral

## 2024-05-28 NOTE — PLAN OF CARE
Problem: Adult Behavioral Health Plan of Care  Goal: Plan of Care Review  Outcome: Progressing  Flowsheets (Taken 5/27/2024 1610)  Patient Agreement with Plan of Care: agrees   Goal Outcome Evaluation:    Plan of Care Reviewed With: patient          Pt was out in the lounge most of the shift. He was out socializing and interacting with selected peers. He interacted with staff. He was loud and talkative this shift. He was very restless and seeking out for staff with lots of demands.  He was able to make his needs known. Hygiene was good. He is eating and drinking okay. He was out for dinner and snacks and ate 100%. Vitals were WNL. He was cooperative with assessment. He complained of indigestion beginning of the shift and was given prn Maalox which was helpful. He denied pain all shift. He complained of anxiety and said it was 9/10 and depression 7/10. He was given prn Zyprexa 10 mg which he said was helpful. He denied SI/HI/AH/VH. He contracted for safety. He was medication compliant. Prn Nicotine gum was given this shift. He took a nap this shift. He did not attend any group this evening shift. No safety issues noted this shift. No medication side effect reported or noted this shift. Will continue to monitor and will assist if need arise.

## 2024-05-28 NOTE — PLAN OF CARE
05/28/24 1400   General Information   Art Directive other (see comments)     Goal Outcome Evaluation:      Rehab Group    Start time: 1015   End time: 1215  Patient time total: 120 minutes    attended full group    #5 attended   Group Type: art   Group Topic Covered: balanced lifestyle       Group Session Detail:  Art Therapy directive was to create a collage vision board using magazines and mixed media art materials. Goals of directive: to identify personal strengths and goals, to express aspects of self, emotional expression, future focused directive.     Patient Response/Contribution:  Cooperative with task       Patient Detail:    Pt was an engaged participant, finished task early and stayed to listen to relaxation music. Pt briefly verbally processed with author and group. Pt found imagery to represent what pt described as leisure activities (images of camping, motorcycles, road trips) Pt talked about hopefulness for maintaining sobriety and wanting to be involved in a sober house program. Pt talked about wanting a new home and also a cabin,  Pt was restless during group, though eager to engage in task and create a vision board. Pt identified several positive goals.  Pts mood was calm.            Group Therapy ([id number])    Patient Active Problem List   Diagnosis    Suicidal ideation    Methamphetamine use (H)    Polysubstance abuse (H)    Depression, unspecified depression type

## 2024-05-28 NOTE — PROGRESS NOTES
"  ----------------------------------------------------------------------------------------------------------  [facility]  Psychiatry Progress Note  Hospital Day #9     Interim History:     The patient's care was discussed with the treatment team and chart notes were reviewed.    Vitals: Vital signs:  Temp: 98.3  F (36.8  C) Temp src: Oral BP: 118/76 Pulse: 74     SpO2: 96 % O2 Device: None (Room air)     Weight: 127.9 kg (282 lb)  Estimated body mass index is 35.25 kg/m  as calculated from the following:    Height as of this encounter: 1.905 m (6' 3\").    Weight as of this encounter: 127.9 kg (282 lb).    Sleep: 3 hours (05/28/24 0600)  Scheduled medications: Took all scheduled medications as prescribed  Psychiatric PRN medications: Tylenol 650mg, Zyprexa 10mg, Hydroxyzine 100 mg, Maalox, Nicotine gum      Staff Report:   Perry has been social with peers and staff but also intermittently sleepy. He spends most of his time in the [facility] area watching TV and going to groups. Pt is still endorsing high levels of anxiety. Pt seems to be interested in IRTS, asking many questions about IRTS. Patient got 3 hours of sleep last shift.        Subjective:     Patient Interview:  Perry says he's doing good, appears to be doing better. Perry started the interview saying that he's been feeling high levels of anxiety and that he hasn't been sleeping well the past few nights. Discussed changing his medication regiment. Plan to increase Seroquel to 300mg at bed time with an additional 50mg PRN two times daily, informed him that he should use this PRN before his zyprexa in efforts to go towards monotherapy. Discussed lithium level and his thoughts on its effectiveness, thinks its working well and makes him \"more level headed\". Discussed CMP results, liver enzymes back down to normal limits after holding statin, will likely hold statin throughout hospital admission and restart in " "outpatient setting.     Perry later endorsed he currently has testicular pain, says he has had this pain for at least one month. He says he has history of epididymitis and    also wants to be screened for cancer.     ROS:  Patient has pain    Patient denies acute concerns     Objective:     Vitals:  /76 (BP Location: Left arm)   Pulse 74   Temp 98.3  F (36.8  C) (Oral)   Resp 16   Ht 1.905 m (6' 3\")   Wt 127.9 kg (282 lb)   SpO2 96%   BMI 35.25 kg/m      Allergies:  Allergies   Allergen Reactions    Wellbutrin [Bupropion] Difficulty breathing       Current Medications:  Scheduled:  Current Facility-Administered Medications   Medication Dose Route Frequency Provider Last Rate Last Admin    acetaminophen (TYLENOL) tablet 650 mg  650 mg Oral Q4H PRN Mati Cueva MD   650 mg at 05/27/24 0737    albuterol (PROVENTIL HFA/VENTOLIN HFA) inhaler  2 puff Inhalation Q6H PRN Jeri Morse MD        alum & mag hydroxide-simethicone (MAALOX) suspension 30 mL  30 mL Oral Q4H PRN Mati Cueva MD   30 mL at 05/27/24 1602    [Held by provider] atorvastatin (LIPITOR) tablet 20 mg  20 mg Oral QPM Mati Cueva MD   20 mg at 05/21/24 2057    buprenorphine (SUBUTEX) sublingual tablet 4 mg  4 mg Sublingual Daily Pb Arciniega MD   4 mg at 05/27/24 1917    buprenorphine (SUBUTEX) sublingual tablet 8 mg  8 mg Sublingual BID Pb Arciniega MD   8 mg at 05/28/24 0812    cyclobenzaprine (FLEXERIL) tablet 5 mg  5 mg Oral BID PRN Pb Arciniega MD   5 mg at 05/21/24 1420    FLUoxetine (PROzac) capsule 60 mg  60 mg Oral Daily Pb Arciniega MD   60 mg at 05/28/24 0812    gabapentin (NEURONTIN) tablet 600 mg  600 mg Oral TID Mati Cueva MD   600 mg at 05/28/24 0813    hydrOXYzine HCl (ATARAX) tablet 100 mg  100 mg Oral BID PRN Leni Carias APRN CNP   100 mg at 05/28/24 0252    lithium (ESKALITH CR/LITHOBID) CR tablet 900 mg  900 mg Oral At Bedtime Jeri Morse MD   900 mg at 05/27/24 2201 "    melatonin tablet 3 mg  3 mg Oral At Bedtime PRN Mati Cueva MD        naloxone (NARCAN) injection 0.2 mg  0.2 mg Intravenous Q2 Min PRN Pb Arciniega MD        Or    naloxone (NARCAN) injection 0.4 mg  0.4 mg Intravenous Q2 Min PRN Pb Arciniega MD        Or    naloxone (NARCAN) injection 0.2 mg  0.2 mg Intramuscular Q2 Min PRN Pb Arciniega MD        Or    naloxone (NARCAN) injection 0.4 mg  0.4 mg Intramuscular Q2 Min PRN Pb Arciniega MD        nicotine (NICORETTE) gum 2 mg  2 mg Buccal Q1H PRN Kirby Colbert MD   2 mg at 05/27/24 2218    OLANZapine (zyPREXA) tablet 10 mg  10 mg Oral 4x Daily PRN Jeri Morse MD   10 mg at 05/28/24 0632    Or    OLANZapine (zyPREXA) injection 10 mg  10 mg Intramuscular TID PRN Jeri Morse MD        polyethylene glycol (MIRALAX) Packet 17 g  17 g Oral Daily PRN Mati Cueva MD        QUEtiapine (SEROquel) tablet 200 mg  200 mg Oral At Bedtime Mati Cueva MD   200 mg at 05/27/24 2201       PRN:  Current Facility-Administered Medications   Medication Dose Route Frequency Provider Last Rate Last Admin    acetaminophen (TYLENOL) tablet 650 mg  650 mg Oral Q4H PRN Mati Cueva MD   650 mg at 05/27/24 0737    albuterol (PROVENTIL HFA/VENTOLIN HFA) inhaler  2 puff Inhalation Q6H PRN Jeri Morse MD        alum & mag hydroxide-simethicone (MAALOX) suspension 30 mL  30 mL Oral Q4H PRN Mati Cueva MD   30 mL at 05/27/24 1602    [Held by provider] atorvastatin (LIPITOR) tablet 20 mg  20 mg Oral QPM Mati Cueva MD   20 mg at 05/21/24 2057    buprenorphine (SUBUTEX) sublingual tablet 4 mg  4 mg Sublingual Daily Pb Arciniega MD   4 mg at 05/27/24 1917    buprenorphine (SUBUTEX) sublingual tablet 8 mg  8 mg Sublingual BID Pb Arciniega MD   8 mg at 05/28/24 0812    cyclobenzaprine (FLEXERIL) tablet 5 mg  5 mg Oral BID PRN Pb Arciniega MD   5 mg at 05/21/24 1420    FLUoxetine (PROzac) capsule 60 mg  60 mg Oral Daily Pb Arciniega MD    60 mg at 05/28/24 0812    gabapentin (NEURONTIN) tablet 600 mg  600 mg Oral TID Mati Cueva MD   600 mg at 05/28/24 0813    hydrOXYzine HCl (ATARAX) tablet 100 mg  100 mg Oral BID PRN Leni Carias APRN CNP   100 mg at 05/28/24 0252    lithium (ESKALITH CR/LITHOBID) CR tablet 900 mg  900 mg Oral At Bedtime Jeri Morse MD   900 mg at 05/27/24 2201    melatonin tablet 3 mg  3 mg Oral At Bedtime PRN Mati Cueva MD        naloxone (NARCAN) injection 0.2 mg  0.2 mg Intravenous Q2 Min PRN Pb Arciniega MD        Or    naloxone (NARCAN) injection 0.4 mg  0.4 mg Intravenous Q2 Min PRN Pb Arciniega MD        Or    naloxone (NARCAN) injection 0.2 mg  0.2 mg Intramuscular Q2 Min PRN Pb Arciniega MD        Or    naloxone (NARCAN) injection 0.4 mg  0.4 mg Intramuscular Q2 Min PRN Pb Arciniega MD        nicotine (NICORETTE) gum 2 mg  2 mg Buccal Q1H PRN Kirby Colbert MD   2 mg at 05/27/24 2218    OLANZapine (zyPREXA) tablet 10 mg  10 mg Oral 4x Daily PRN Jeri Morse MD   10 mg at 05/28/24 0632    Or    OLANZapine (zyPREXA) injection 10 mg  10 mg Intramuscular TID PRN Jeri Morse MD        polyethylene glycol (MIRALAX) Packet 17 g  17 g Oral Daily PRN Mati Cueva MD        QUEtiapine (SEROquel) tablet 200 mg  200 mg Oral At Bedtime Mati Cueva MD   200 mg at 05/27/24 2201       Labs and Imaging:  New results:   No results found for this or any previous visit (from the past 24 hour(s)).    Data this admission:  - CBC: WBC 11.4, Hgb 13.2, hematocrit 39.2  - CMP (latest 5/27/24): AST 40, ALT 56, CO2 30  - TSH normal  - UDS Positive for methamphetamines and cannabis  - Vit D normal  - Hgb A1c normal  - Lipids: Trig 173, HDL 32  - Vit B12 normal  - Folate normal  - Lithium (latest 5/27/24) 0.85 mmol  - HIV neg  - EKG refused     Mental Status Exam:     Oriented to:  Grossly Oriented  General:  Awake and Alert, light sleep in milieu prior to encounter  Appearance:   "appears stated age, Dressed in on clothes, Hair is kept and grooming is fair, and Tattoos on arms and neck , appears tired  Behavior/Attitude:  cooperative and engaged, anxious at times  Eye Contact:  appropriate today  Psychomotor: no evidence of tics, dystonia, or tardive dyskinesia   Speech:  appropriate volume/tone  Language: Fluent in English with appropriate syntax and vocabulary.  Mood:  \"doing good\"  Affect:  euthymic, still anxious  Thought Process:  linear  Thought Content:  had suicidal ideation without plan or intent this morning but not present at the time of interviewing, No HI, says AH has mostly resolved since he has not been using and he hasn't had any since coming to the hospital , perseverates on relapse at times but redirectable and seems to respond well to motivational interviewing  Associations:  intact  Insight:  good, voluntarily admit, is aware of his worsening psychosis in the context of substance use, seems to be willing to working with the care team to get better  Judgment:  fair , appropriate engagement with team, adherent to team recommendations, wants to be clean before appearing before his son   Impulse control: fair  Attention Span:  grossly intact  Concentration:  grossly intact  Recent and Remote Memory:  not formally assessed  Fund of Knowledge:   not formally assessed  Muscle Strength and Tone: normal  Gait and Station: Normal     Psychiatric Assessment   Diagnosis:  Psychosis, unspecified  R/O Methamphetamine Use Disorder  R/O Alcohol Use Disorder   R/O Opioid Use Disorder   R/O Cannabis Use Disorder     Perry Preciado is a 49 year old male with previous psychiatric diagnoses of MDD, KANE, ADHD, and substance disorder (alcohol, meth, opiates) and medical history significant for TBI who was admitted from the ER on 05/19/2024 due to concern for SI and AVH in the context of medication non-adherence and substance use. Most recent psychiatric hospitalization was at [facility] in January "  for similar concerns. Significant symptoms on admission include depression, SI, and AVH. The MSE on admission was pertinent for SI, AVH, and depressed affect. Biological contributions to mental health presentation include prior diagnoses of mental health disorders, multiple medication trials, long standing substance use history, hx of TBI, and genetic predisposition for suicide completion and substance use. Psychological contributions to mental health presentation include maladaptive coping and cluster B traits. Social factors contributing to mental health presentation include limited social supports, financial stressors, and recent family deaths who also  by suicide around this time of the year. Protective factors include engaged in treatment.      In summary, the patient's differential is still in evolution. Patient has reported symptoms of depression, SI, and AVH in the context of substance use and medication nonadherence. Patient per chart review has mentioned that he only has psychosis during periods of using substances however today he notes it happens even when he's sober. He also mentions some periods concerning for hypomania/mily but it's complicated by his substance use and cluster B traits. For now will consider Unspecified mood and unspecified psychosis until collateral can be obtained. He will likely benefit from medication optimization and substance use treatment this admission.     Given that he currently has SI and psychosis, patient warrants inpatient psychiatric hospitalization to maintain his safety.      Psychiatric Plan by Diagnosis      Today's changes:  -Increase Seroquel to 300mg at bed time with an additional 50mg PRN two times daily.   -Plan to keep sister Ileana notified with updates, particularly with dispo plan.   -Plan for potential discharge to IR v dual treatment facility. CTC to also look into case management on discharge.     #   1. Medications:  - Zyprexa 10 mg QID PRN  "ordered  - Fluoxetine 60mg  - Lithium 900mg qhs  - Seroquel 300mg qpm     2. Pertinent Labs/Monitoring:   - EKG refused     3. Additional Plans:  - Patient will be treated in therapeutic milieu with appropriate individual and group therapies as described    Stimulant Use Disorder  #Opiate Use Disorder  #Nicotine Use Disorder  - Subutex 20mg daily total  - Consider providing narcan on discharge  - Nicotine Gum 2mg q1hr prn  - Will reconsider CD assessment once mental health sxs above stabilizes.      #ADHD  - Atomoxetine discontinued     #PTSD  - Prazosin discontinued      Psychiatric Hospital Course:      Perry Preciado was admitted to [facility] as a voluntary patient.   Medications:  PTA atomoxetine, Subutex, Prozac, Gabapentin, Lithium, Prazosin, and seroquel were continued. Zyprexa 10mg TID PRN for anxiety, agitation related to psychosis. Prazosin discontinued -had been using for months, BP low, and pt reported difficulty breathing. Bedtime Seroquel increased from 200 to 300mg in addition to 50mg BID prn added in to better address psychosis and anxiety.   Subutex increased to 20mg total daily dosing further manage opioid withdrawal symptoms    Atomoxetine discontinued, due to current anxiety levels. Atorvastatin held due to slightly elevated liver enzymes. Albuterol inhaler PRN ordered.     The risks, benefits, alternatives, and side effects were discussed and understood by the patient.     Medical Assessment and Plan     Medical diagnoses to be addressed this admission:    # TBI  - Monitor      #Hypertriglyceridemia  - Atorvastatin 20mg qpm     #Neuropathy  - 600mg TID        Clinically Significant Risk Factors Present on Admission                  # Hypertension: Home medication list includes antihypertensive(s)      # Obesity: Estimated body mass index is 33.66 kg/m  as calculated from the following:    Height as of this encounter: 1.905 m (6' 3\").    Weight as of this encounter: 122.2 kg (269 lb 4.8 oz).  "      # Financial/Environmental Concerns:                 Medical course:  Patient was physically examined by the ED prior to being transferred to the unit and was found to be medically stable and appropriate for admission.      Consults:  none     Checklist     Legal Status: Voluntary     Safety Assessment:   Behavioral Orders   Procedures    Code 1 - Restrict to Unit    Routine Programming     As clinically indicated    Status 15     Every 15 minutes.    Suicide precautions: Suicide Risk: MODERATE; Clinical rationale to override score: modification to the care environment     Patients on Suicide Precautions should have a Combination Diet ordered that includes a Diet selection(s) AND a Behavioral Tray selection for Safe Tray - with utensils, or Safe Tray - NO utensils       Order Specific Question:   Suicide Risk     Answer:   MODERATE     Order Specific Question:   Clinical rationale to override score:     Answer:   modification to the care environment       Risk Assessment:  Risk for harm is elevated.  Risk factors: SI, substance use, trauma, family history, and past behaviors, maladaptive coping, impulsive   Protective factors: engaged in treatment , family support, help-seeking, future oriented     SIO: None    Disposition: TBD. Pending stabilization, medication optimization, & development of a safe discharge plan.     Attestations     This patient was seen and discussed with my attending physician, Dr. Arciniega.     Clarence Ellison, MS3  [employer]    I was present with the medical student who participated in the service and in the documentation of the note. I have verified the history and personally performed the physical exam and medical decision making. I agree with the assessment and plan of care as documented in the note and have made changes to the note as appropriate.     Jeri Morse, PGY-2 Psychiatry     Attestation:  This patient has been seen and evaluated by me, Pb Arciniega MD.  I have  discussed this patient with the house staff team including the resident and medical student and I agree with the findings and plan in this note.    I have reviewed today's vital signs, medications, labs and imaging. Pb Arciniega MD , PhD.

## 2024-05-28 NOTE — PLAN OF CARE
"Pt denies SI.  Reports high anxiety.  Pt reports feeling paranoid that people are after him.  At beginning of shift pt irritated related to another patient watching a channel he did not want to watch.  Staff suggested he ride the bike.  Pt did ride the bike but only for a few minutes.  Pt appears to get easily distracted.  But pt has been attending groups, filled out his menu for tomorrow.  Social with peers.  Pt has been attending groups. Pt  requested and rec'd PRN Seroquel 50 at 1400.  Pt states anxious related to getting into an IRTS and wanting to know how the IRTS works.  Pt appeared more awake today.  Problem: Adult Behavioral Health Plan of Care  Goal: Plan of Care Review  Outcome: Not Progressing  Goal: Patient-Specific Goal (Individualization)  Description: You can add care plan individualizations to a care plan. Examples of Individualization might be:  \"Parent requests to be called daily at 9am for status\", \"I have a hard time hearing out of my right ear\", or \"Do not touch me to wake me up as it startles  me\".  Outcome: Not Progressing  Goal: Adheres to Safety Considerations for Self and Others  Outcome: Not Progressing  Intervention: Develop and Maintain Individualized Safety Plan  Recent Flowsheet Documentation  Taken 5/28/2024 1200 by Melinda Marlow RN  Safety Measures:   environmental rounds completed   safety rounds completed  Goal: Absence of New-Onset Illness or Injury  Outcome: Not Progressing  Intervention: Identify and Manage Fall Risk  Recent Flowsheet Documentation  Taken 5/28/2024 1200 by Melinda Marlow RN  Safety Measures:   environmental rounds completed   safety rounds completed  Goal: Optimized Coping Skills in Response to Life Stressors  Outcome: Not Progressing  Goal: Develops/Participates in Therapeutic [place] to Support Successful Transition  Outcome: Not Progressing   Goal Outcome Evaluation:                        "

## 2024-05-29 ENCOUNTER — APPOINTMENT (OUTPATIENT)
Dept: ULTRASOUND IMAGING | Facility: CLINIC | Age: 49
End: 2024-05-29
Payer: COMMERCIAL

## 2024-05-29 LAB
ERYTHROCYTE [DISTWIDTH] IN BLOOD BY AUTOMATED COUNT: 13.2 % (ref 10–15)
HCT VFR BLD AUTO: 40.1 % (ref 40–53)
HGB BLD-MCNC: 12.9 G/DL (ref 13.3–17.7)
MCH RBC QN AUTO: 29.3 PG (ref 26.5–33)
MCHC RBC AUTO-ENTMCNC: 32.2 G/DL (ref 31.5–36.5)
MCV RBC AUTO: 91 FL (ref 78–100)
PLATELET # BLD AUTO: 258 10E3/UL (ref 150–450)
RBC # BLD AUTO: 4.4 10E6/UL (ref 4.4–5.9)
WBC # BLD AUTO: 8.3 10E3/UL (ref 4–11)

## 2024-05-29 PROCEDURE — 93976 VASCULAR STUDY: CPT | Mod: 26 | Performed by: RADIOLOGY

## 2024-05-29 PROCEDURE — 250N000013 HC RX MED GY IP 250 OP 250 PS 637: Performed by: PSYCHIATRY & NEUROLOGY

## 2024-05-29 PROCEDURE — 85027 COMPLETE CBC AUTOMATED: CPT

## 2024-05-29 PROCEDURE — 250N000013 HC RX MED GY IP 250 OP 250 PS 637

## 2024-05-29 PROCEDURE — 99222 1ST HOSP IP/OBS MODERATE 55: CPT

## 2024-05-29 PROCEDURE — 84153 ASSAY OF PSA TOTAL: CPT

## 2024-05-29 PROCEDURE — 99232 SBSQ HOSP IP/OBS MODERATE 35: CPT | Mod: GC | Performed by: PSYCHIATRY & NEUROLOGY

## 2024-05-29 PROCEDURE — 250N000011 HC RX IP 250 OP 636: Performed by: PSYCHIATRY & NEUROLOGY

## 2024-05-29 PROCEDURE — 250N000009 HC RX 250: Performed by: PSYCHIATRY & NEUROLOGY

## 2024-05-29 PROCEDURE — 76870 US EXAM SCROTUM: CPT | Mod: 26 | Performed by: RADIOLOGY

## 2024-05-29 PROCEDURE — 87086 URINE CULTURE/COLONY COUNT: CPT

## 2024-05-29 PROCEDURE — 124N000002 HC R&B MH UMMC

## 2024-05-29 PROCEDURE — 93976 VASCULAR STUDY: CPT

## 2024-05-29 PROCEDURE — 76870 US EXAM SCROTUM: CPT

## 2024-05-29 PROCEDURE — 36415 COLL VENOUS BLD VENIPUNCTURE: CPT

## 2024-05-29 RX ORDER — AZITHROMYCIN 500 MG/1
1000 TABLET, FILM COATED ORAL ONCE
Status: DISCONTINUED | OUTPATIENT
Start: 2024-05-29 | End: 2024-05-29

## 2024-05-29 RX ORDER — AZITHROMYCIN 500 MG/1
500 TABLET, FILM COATED ORAL ONCE
Status: DISCONTINUED | OUTPATIENT
Start: 2024-05-29 | End: 2024-05-29

## 2024-05-29 RX ORDER — LEVOFLOXACIN 500 MG/1
500 TABLET, FILM COATED ORAL DAILY
Status: DISCONTINUED | OUTPATIENT
Start: 2024-05-29 | End: 2024-06-06 | Stop reason: HOSPADM

## 2024-05-29 RX ORDER — CEFTRIAXONE SODIUM 1 G
500 VIAL (EA) INJECTION ONCE
Status: DISCONTINUED | OUTPATIENT
Start: 2024-05-29 | End: 2024-05-29

## 2024-05-29 RX ORDER — CEFTRIAXONE SODIUM 1 G
500 VIAL (EA) INJECTION ONCE
Qty: 1.43 ML | Refills: 0 | Status: DISCONTINUED | OUTPATIENT
Start: 2024-05-29 | End: 2024-05-29

## 2024-05-29 RX ADMIN — BUPRENORPHINE 4 MG: 2 TABLET SUBLINGUAL at 19:05

## 2024-05-29 RX ADMIN — LEVOFLOXACIN 500 MG: 500 TABLET, FILM COATED ORAL at 17:00

## 2024-05-29 RX ADMIN — LITHIUM CARBONATE 900 MG: 450 TABLET, EXTENDED RELEASE ORAL at 21:30

## 2024-05-29 RX ADMIN — NICOTINE POLACRILEX 2 MG: 2 GUM, CHEWING BUCCAL at 19:15

## 2024-05-29 RX ADMIN — BUPRENORPHINE 8 MG: 8 TABLET SUBLINGUAL at 07:51

## 2024-05-29 RX ADMIN — QUETIAPINE FUMARATE 50 MG: 50 TABLET ORAL at 14:51

## 2024-05-29 RX ADMIN — BUPRENORPHINE 8 MG: 8 TABLET SUBLINGUAL at 13:05

## 2024-05-29 RX ADMIN — GABAPENTIN 600 MG: 600 TABLET, FILM COATED ORAL at 07:50

## 2024-05-29 RX ADMIN — FLUOXETINE HYDROCHLORIDE 60 MG: 20 CAPSULE ORAL at 07:50

## 2024-05-29 RX ADMIN — QUETIAPINE FUMARATE 50 MG: 50 TABLET ORAL at 05:19

## 2024-05-29 RX ADMIN — ACETAMINOPHEN 650 MG: 325 TABLET, FILM COATED ORAL at 16:41

## 2024-05-29 RX ADMIN — GABAPENTIN 600 MG: 600 TABLET, FILM COATED ORAL at 13:05

## 2024-05-29 RX ADMIN — GABAPENTIN 600 MG: 600 TABLET, FILM COATED ORAL at 19:04

## 2024-05-29 RX ADMIN — LIDOCAINE HYDROCHLORIDE 500 MG: 10 INJECTION, SOLUTION EPIDURAL; INFILTRATION; INTRACAUDAL; PERINEURAL at 18:38

## 2024-05-29 RX ADMIN — QUETIAPINE FUMARATE 300 MG: 300 TABLET ORAL at 21:30

## 2024-05-29 RX ADMIN — HYDROXYZINE HYDROCHLORIDE 100 MG: 50 TABLET, FILM COATED ORAL at 12:46

## 2024-05-29 ASSESSMENT — ACTIVITIES OF DAILY LIVING (ADL)
ADLS_ACUITY_SCORE: 30
ORAL_HYGIENE: INDEPENDENT
ADLS_ACUITY_SCORE: 30
HYGIENE/GROOMING: INDEPENDENT
ADLS_ACUITY_SCORE: 30
LAUNDRY: WITH SUPERVISION
ADLS_ACUITY_SCORE: 30
DRESS: INDEPENDENT
ADLS_ACUITY_SCORE: 30

## 2024-05-29 NOTE — PLAN OF CARE
"  Problem: Adult Behavioral Health Plan of Care  Goal: Plan of Care Review  Outcome: Progressing  Flowsheets (Taken 5/29/2024 0914)  Plan of Care Reviewed With: patient  Overall Patient Progress: improving  Patient Agreement with Plan of Care: agrees  Goal: Patient-Specific Goal (Individualization)  Description: You can add care plan individualizations to a care plan. Examples of Individualization might be:  \"Parent requests to be called daily at 9am for status\", \"I have a hard time hearing out of my right ear\", or \"Do not touch me to wake me up as it startles  me\".  Outcome: Progressing  Goal: Adheres to Safety Considerations for Self and Others  Outcome: Progressing  Goal: Absence of New-Onset Illness or Injury  Outcome: Progressing  Goal: Optimized Coping Skills in Response to Life Stressors  Outcome: Progressing  Goal: Develops/Participates in Therapeutic [place] to Support Successful Transition  Outcome: Progressing     Problem: Sleep Disturbance  Goal: Adequate Sleep/Rest  Outcome: Progressing     Problem: Anxiety  Goal: Anxiety Reduction or Resolution  Outcome: Progressing     Problem: Depression  Goal: Improved Mood  Outcome: Progressing     Problem: Pain Acute  Goal: Optimal Pain Control and Function  Outcome: Progressing  Intervention: Prevent or Manage Pain  Recent Flowsheet Documentation  Taken 5/29/2024 0800 by Rayshawn Berumen RN  Medication Review/Management: medications reviewed   Goal Outcome Evaluation:      Plan of Care Reviewed With: patient    Overall Patient Progress: improving  Perry has been visible in the milieu.Sociable with select peers.Compliant with medication.No side effect noted. PRN Hydroxyzine given given this shift plus Seroquel.Patient asking for Seroquel before time.Anxiety 8/10 per patient.Patient was seen by PA medicine regarding his urination concerns.Ultra sound done.urine specimen ordered & send.No escalation of behaviors.No safety concerns.Patient denies  other psych " symptoms,SI,SIB,AH & VH.Hygiene  appropriate.Adequate food and fluid intake.VSS.  Temp: 98.4  F (36.9  C) Temp src: Tympanic BP: 111/73 Pulse: 64   Resp: 18 SpO2: 96 % O2 Device: None (Room air)

## 2024-05-29 NOTE — PLAN OF CARE
Problem: Sleep Disturbance  Goal: Adequate Sleep/Rest  5/29/2024 0658 by Armen Keene, RN  Outcome: Not Progressing   Patient sleeps on and off but awake most of the shift. No complaints of pain. Patient endorsed high anxiety-had Seroquel, effective, Patient continues on q15 minutes safety checks, no behavioral issues noted. Will continue to monitor the patient and provide therapeutic intervention as needed. Will continue with current plan of care. Notify MD with any concerns. The patient had 4 total hours of sleep this shift.

## 2024-05-29 NOTE — CONSULTS
"[facility]  Consult Note - Hospitalist Service  Date of Admission:  5/19/2024  Consult Requested by: Jeri Morse MD   Reason for Consult: Testicular pain    Assessment & Plan   Perry Preciado is a 49 year old male admitted on 5/19/2024.  Medical history notable for methamphetamine use , polysubstance abuse, depression, suicidal ideation.  Presented to the emergency department with concern of suicidal ideation.      #Testicular pain  Reports 1 month of right testicular pain that radiates to back.  Additionally notes swelling of right epididymis and \"lumps\" on testicular. G/C testing on 5/22 negative.  Last sexual encounter 1 month ago. Denies fever, discharge.  Testicular exam revealed some unilateral swelling of epididymis/ vas deferens on right side, no obvious lumps or hernias ,although exam was not definitive due to patient anatomy.     Due to timeframe with recent sexual activity, pain and swelling is likely due to acute epididymitis although cannot rule out variceal or other etiologies.  Engages in anal insertive sex practices, will order abx's accordingly.     -Empirically treat with IM Ceftriaxone 500 mg IM x 1 and levofloxacin 500 mg PO daily x 10 days  -CBC ordered   -UA ordered  -Will await ultrasound results  -Monitor for improvement with initiation of Abx's       #Urinary hesitancy  Reports 1 year of urinary hesitancy.  Denies nocturia, pain with urination, frequency. May be secondary to anticholinergic effects from Seroquel. Cannot rule out benign prostatic hyperplasia or other etiologies.     -BPH ordered   -UA ordered.   -Patient does not currently have rentention, patient should follow up with PCP for further management          The patient's care was discussed with the Bedside Nurse and Patient.    Clinically Significant Risk Factors                         # Obesity: Estimated body mass index is 35.25 kg/m  as calculated from the " "following:    Height as of this encounter: 1.905 m (6' 3\").    Weight as of this encounter: 127.9 kg (282 lb).      # Financial/Environmental Concerns:           Daniel Castro  Hospitalist Service  Securely message with [employer] (more info)  Text page via [facility] Paging/Directory   ______________________________________________________________________    Chief Complaint   Testicular pain, urinary retention    History is obtained from the patient    History of Present Illness   Perry Preciado is a 49 year old male who presented to ED with suicidal ideation. Medical history notable for methamphetamine use , polysubstance abuse, depression, suicidal ideation.      Patient reported to staff concern of urinary hesitancy and right-sided testicular pain.  Urinary concern has been present for approximately 1 year.  Denies pain with urination and nocturia.  States he typically waits until his bladder is full because of difficulty initiating urination.  Reports testicular pain started approximately 1 month ago.  No swelling of the vas deferens/epididymis on the right side with a few associated bumps.  Has had 1 sexual partner in the last 8 months, last sexual encounter 1 month ago.  Reports pain at 8 out of 10, radiates around to the flank.  Denies trauma to the area, fever/chills, dysuria, penile discharge.  He is unsure if he has had cloudy urine.        Past Medical History    No past medical history on file.    Past Surgical History   No past surgical history on file.    Medications   I have reviewed this patient's current medications          Physical Exam   Vital Signs: Temp: 98.4  F (36.9  C) Temp src: Tympanic BP: 111/73 Pulse: 64   Resp: 18 SpO2: 96 % O2 Device: None (Room air)    Weight: 282 lbs 0 oz    General: No acute distress,   Resp: No rate of breathing  Neuro: Freely moves extremities, walks around unit   : No penile discharge, redness or lesions noted. Upon palpation of scrotum noted some possible " epididymis enlargement on right side. No large apparent masses, unable to detect hernia but cannot exclude to due limitations during exam.         Medical Decision Making       60 MINUTES SPENT BY ME on the date of service doing chart review, history, exam, documentation & further activities per the note.      Data

## 2024-05-29 NOTE — PLAN OF CARE
BEH IP Unit Acuity Rating Score (UARS)  Patient is given one point for every criteria they meet.    CRITERIA SCORING   On a 72 hour hold, court hold, committed, stay of commitment, or revocation. 0    Patient LOS on BEH unit exceeds 20 days. 0  LOS: 10   Patient under guardianship, 55+, otherwise medically complex, or under age 11. 0   Suicide ideation without relief of precipitating factors. 1   Current plan for suicide. 0   Current plan for homicide. 0   Imminent risk or actual attempt to seriously harm another without relief of factors precipitating the attempt. 0   Severe dysfunction in daily living (ex: complete neglect for self care, extreme disruption in vegetative function, extreme deterioration in social interactions). 1   Recent (last 7 days) or current physical aggression in the ED or on unit. 0   Restraints or seclusion episode in past 72 hours. 0   Recent (last 7 days) or current verbal aggression, agitation, yelling, etc., while in the ED or unit. 0   Active psychosis. 0   Need for constant or near constant redirection (from leaving, from others, etc).  0   Intrusive or disruptive behaviors. 0   Patient requires 3 or more hours of individualized nursing care per 8-hour shift (i.e. for ADLs, meds, therapeutic interventions). 0   TOTAL 2

## 2024-05-29 NOTE — PROGRESS NOTES
"  ----------------------------------------------------------------------------------------------------------  [facility]  Psychiatry Progress Note  Hospital Day #10     Interim History:     The patient's care was discussed with the treatment team and chart notes were reviewed.    Vitals: Vital signs:  Temp: 98.4  F (36.9  C) Temp src: Tympanic BP: 111/73 Pulse: 64   Resp: 18 SpO2: 96 % O2 Device: None (Room air)     Weight: 127.9 kg (282 lb)  Estimated body mass index is 35.25 kg/m  as calculated from the following:    Height as of this encounter: 1.905 m (6' 3\").    Weight as of this encounter: 127.9 kg (282 lb).    Sleep: 3 hours (05/28/24 0600)  Scheduled medications: Took all scheduled medications as prescribed  Psychiatric PRN medications: Zyprexa 10mg, Seroquel 50mg, Hydroxyzine 100 mg, Maalox, Nicotine gum      Staff Report:   Perry was out in the milieu most of the shift. He socialized and interacted with selected peers. He was very restless and seeking out for staff with lots of requested. Perry states he is anxious about getting into an IRTS and wanting to know how the IRTS works. He attended several groups. He got 3 hours of sleep. Intake and hydration adequate.     HSV-1 and 2 returned positive. Pending medicine consult.   Subjective:     Patient Interview:  Perry states he is doing \"okay\" today but having worsening paranoia. For the last week, he reports hearing voices from outside his head which induce the paranoia. Voices say \"there is someone out there who is going to get you, Kb.\" They tell the patient these people are going to hurt him, that he is no good and people will find out. He states now that he is sober, he recognizes the voices are not real and are instead happening because of the drugs. Discussed starting the Seroquel yesterday which patient states has overall been effective (though he still experiences the voices). His goal is to have the " "voices resolve completely. He states the voices have been keeping him awake which has been affecting his sleep. Sleep was improved last night which he attributes to the Seroquel. Patient did initially have SI on admission, though these have improved and he currently denies SI. He says he feels safe on the unit.    Patient also endorses testicular pain, discussed medicine workup going forward.    ROS:  Patient has pain (testicular)  Patient denies acute concerns     Objective:     Vitals:  /73 (BP Location: Left arm)   Pulse 64   Temp 98.4  F (36.9  C) (Tympanic)   Resp 18   Ht 1.905 m (6' 3\")   Wt 127.9 kg (282 lb)   SpO2 96%   BMI 35.25 kg/m      Allergies:  Allergies   Allergen Reactions    Wellbutrin [Bupropion] Difficulty breathing       Current Medications:  Scheduled:  Current Facility-Administered Medications   Medication Dose Route Frequency Provider Last Rate Last Admin    acetaminophen (TYLENOL) tablet 650 mg  650 mg Oral Q4H PRN Mati Cueva MD   650 mg at 05/27/24 0737    albuterol (PROVENTIL HFA/VENTOLIN HFA) inhaler  2 puff Inhalation Q6H PRN Jeri Morse MD        alum & mag hydroxide-simethicone (MAALOX) suspension 30 mL  30 mL Oral Q4H PRN Mati uCeva MD   30 mL at 05/28/24 1651    [Held by provider] atorvastatin (LIPITOR) tablet 20 mg  20 mg Oral QPM Mati Cueva MD   20 mg at 05/21/24 2057    buprenorphine (SUBUTEX) sublingual tablet 4 mg  4 mg Sublingual Daily Pb Arciniega MD   4 mg at 05/28/24 1920    buprenorphine (SUBUTEX) sublingual tablet 8 mg  8 mg Sublingual BID Pb Arciniega MD   8 mg at 05/29/24 0751    cyclobenzaprine (FLEXERIL) tablet 5 mg  5 mg Oral BID PRN Pb Arciniega MD   5 mg at 05/21/24 1420    FLUoxetine (PROzac) capsule 60 mg  60 mg Oral Daily Pb Arciniega MD   60 mg at 05/29/24 0750    gabapentin (NEURONTIN) tablet 600 mg  600 mg Oral TID Mati Cueva MD   600 mg at 05/29/24 0750    hydrOXYzine HCl (ATARAX) tablet 100 mg  100 mg Oral BID " PRN Jeri Morse MD   100 mg at 05/28/24 1836    lithium (ESKALITH CR/LITHOBID) CR tablet 900 mg  900 mg Oral At Bedtime Jeri Morse MD   900 mg at 05/28/24 2102    melatonin tablet 3 mg  3 mg Oral At Bedtime PRN Mati Cueva MD        naloxone (NARCAN) injection 0.2 mg  0.2 mg Intravenous Q2 Min PRN Pb Arciniega MD        Or    naloxone (NARCAN) injection 0.4 mg  0.4 mg Intravenous Q2 Min PRN Pb Arciniega MD        Or    naloxone (NARCAN) injection 0.2 mg  0.2 mg Intramuscular Q2 Min PRN Pb Arciniega MD        Or    naloxone (NARCAN) injection 0.4 mg  0.4 mg Intramuscular Q2 Min PRN Pb Arciniega MD        nicotine (NICORETTE) gum 2 mg  2 mg Buccal Q1H PRN Kirby Colbert MD   2 mg at 05/27/24 2218    OLANZapine (zyPREXA) tablet 10 mg  10 mg Oral 4x Daily PRN Pb Arciniega MD        Or    OLANZapine (zyPREXA) injection 10 mg  10 mg Intramuscular TID PRN Pb Arciniega MD        polyethylene glycol (MIRALAX) Packet 17 g  17 g Oral Daily PRN Mati Cueva MD        QUEtiapine (SEROquel) tablet 300 mg  300 mg Oral At Bedtime Jeri Morse MD   300 mg at 05/28/24 2102    QUEtiapine (SEROquel) tablet 50 mg  50 mg Oral BID PRN Jeri Morse MD   50 mg at 05/29/24 0519       PRN:  Current Facility-Administered Medications   Medication Dose Route Frequency Provider Last Rate Last Admin    acetaminophen (TYLENOL) tablet 650 mg  650 mg Oral Q4H PRN Mati Cueva MD   650 mg at 05/27/24 0737    albuterol (PROVENTIL HFA/VENTOLIN HFA) inhaler  2 puff Inhalation Q6H PRN Jeri Morse MD        alum & mag hydroxide-simethicone (MAALOX) suspension 30 mL  30 mL Oral Q4H PRN Mati Cueva MD   30 mL at 05/28/24 1651    [Held by provider] atorvastatin (LIPITOR) tablet 20 mg  20 mg Oral QPM Mati Cueva MD   20 mg at 05/21/24 2057    buprenorphine (SUBUTEX) sublingual tablet 4 mg  4 mg Sublingual Daily Pb Arciniega MD   4 mg at 05/28/24 1920    buprenorphine (SUBUTEX)  sublingual tablet 8 mg  8 mg Sublingual BID Pb Arciniega MD   8 mg at 05/29/24 0751    cyclobenzaprine (FLEXERIL) tablet 5 mg  5 mg Oral BID PRN Pb Arciniega MD   5 mg at 05/21/24 1420    FLUoxetine (PROzac) capsule 60 mg  60 mg Oral Daily Pb Arciniega MD   60 mg at 05/29/24 0750    gabapentin (NEURONTIN) tablet 600 mg  600 mg Oral TID Mati Cueva MD   600 mg at 05/29/24 0750    hydrOXYzine HCl (ATARAX) tablet 100 mg  100 mg Oral BID PRN Jeri Morse MD   100 mg at 05/28/24 1836    lithium (ESKALITH CR/LITHOBID) CR tablet 900 mg  900 mg Oral At Bedtime Jeri Morse MD   900 mg at 05/28/24 2102    melatonin tablet 3 mg  3 mg Oral At Bedtime PRN Mati Cueva MD        naloxone (NARCAN) injection 0.2 mg  0.2 mg Intravenous Q2 Min PRN Pb Arciniega MD        Or    naloxone (NARCAN) injection 0.4 mg  0.4 mg Intravenous Q2 Min PRN Pb Arciniega MD        Or    naloxone (NARCAN) injection 0.2 mg  0.2 mg Intramuscular Q2 Min PRN Pb Arciniega MD        Or    naloxone (NARCAN) injection 0.4 mg  0.4 mg Intramuscular Q2 Min PRN Pb Arciniega MD        nicotine (NICORETTE) gum 2 mg  2 mg Buccal Q1H PRN Kirby Colbert MD   2 mg at 05/27/24 2218    OLANZapine (zyPREXA) tablet 10 mg  10 mg Oral 4x Daily PRN Pb Arciniega MD        Or    OLANZapine (zyPREXA) injection 10 mg  10 mg Intramuscular TID PRN Pb Arciniega MD        polyethylene glycol (MIRALAX) Packet 17 g  17 g Oral Daily PRN Mati Cueva MD        QUEtiapine (SEROquel) tablet 300 mg  300 mg Oral At Bedtime Jeri Morse MD   300 mg at 05/28/24 2102    QUEtiapine (SEROquel) tablet 50 mg  50 mg Oral BID PRN Jeri Morse MD   50 mg at 05/29/24 0519       Labs and Imaging:  New results:   No results found for this or any previous visit (from the past 24 hour(s)).    Data this admission:  - CBC: WBC 11.4, Hgb 13.2, hematocrit 39.2  - CMP (latest 5/27/24): AST 40, ALT 56, CO2 30  - TSH normal  - UDS Positive for  "methamphetamines and cannabis  - Vit D normal  - Hgb A1c normal  - Lipids: Trig 173, HDL 32  - Vit B12 normal  - Folate normal  - Lithium (latest 5/27/24) 0.85 mmol  - HIV neg  - EKG refused     Mental Status Exam:     Oriented to:  Grossly Oriented  General:  Awake and Alert, standing in milieu talking to peers prior to encounter  Appearance:  appears stated age, Dressed in t-shirt and shorts, Hair is kept and grooming is fair, and Tattoos on arms and neck , appears tired  Behavior/Attitude:  cooperative and engaged, anxious at times  Eye Contact:  appropriate today  Psychomotor: restless, no evidence of tics, dystonia, or tardive dyskinesia   Speech:  appropriate volume/tone  Language: Fluent in English with appropriate syntax and vocabulary.  Mood:  \"doing okay\"  Affect:  euthymic, still anxious  Thought Process:  linear  Thought Content:  had suicidal ideation without plan or intent this morning but not present at the time of interviewing, No HI, says AH has been there for about a week and he has been too embarrassed to bring it up to the care team, perseverates on relapse at times but redirectable and seems to respond well to motivational interviewing. Persecutory delusions with hallucinatory mechanism and partial insight.  Associations:  intact  Insight:  good, voluntarily admit, is aware of his worsening psychosis in the context of substance use, seems to be willing to working with the care team to get better  Judgment:  fair , appropriate engagement with team, adherent to team recommendations  Impulse control: fair  Attention Span:  grossly intact  Concentration:  grossly intact  Recent and Remote Memory:  not formally assessed  Fund of Knowledge:   not formally assessed  Muscle Strength and Tone: normal  Gait and Station: Normal     Psychiatric Assessment   Diagnosis:  Psychosis, unspecified  R/O Methamphetamine Use Disorder  R/O Alcohol Use Disorder   R/O Opioid Use Disorder   R/O Cannabis Use Disorder "     Perry Preciado is a 49 year old male with previous psychiatric diagnoses of MDD, KANE, ADHD, and substance disorder (alcohol, meth, opiates) and medical history significant for TBI who was admitted from the ER on 2024 due to concern for SI and AVH in the context of medication non-adherence and substance use. Most recent psychiatric hospitalization was at [facility] in 2024 for similar concerns. Significant symptoms on admission include depression, SI, and AVH. The MSE on admission was pertinent for SI, AVH, and depressed affect. Biological contributions to mental health presentation include prior diagnoses of mental health disorders, multiple medication trials, long standing substance use history, hx of TBI, and genetic predisposition for suicide completion and substance use. Psychological contributions to mental health presentation include maladaptive coping and cluster B traits. Social factors contributing to mental health presentation include limited social supports, financial stressors, and recent family deaths who also  by suicide around this time of the year. Protective factors include being engaged in treatment.     In summary, the patient's differential is still in evolution. Patient has reported symptoms of depression, SI, and AVH in the context of substance use and medication nonadherence. Patient per chart review has mentioned that he only has psychosis during periods of using substances however during this current hospital stay he notes it happens even when he's sober. He also mentions some periods concerning for hypomania/mily but it's complicated by his substance use and cluster B traits. For now considering unspecified mood and unspecified psychosis until collateral can be obtained. He will likely benefit from medication optimization and substance use treatment this admission.     Given that he currently has SI and psychosis, patient warrants inpatient psychiatric hospitalization to  maintain his safety.      Psychiatric Plan by Diagnosis      Today's changes:  -HSV 1,2 positive, medicine consulted  -Plan to keep sister Ileana notified with updates, particularly with dispo plan.   -Plan for potential discharge [facility] v dual treatment facility. CTC to also look into case management on discharge.     #   1. Medications:  - Zyprexa 10 mg QID PRN ordered  - Fluoxetine 60mg  - Lithium 900mg qhs  - Seroquel 300mg qpm     2. Pertinent Labs/Monitoring:   - EKG declined     3. Additional Plans:  - Patient will be treated in therapeutic milieu with appropriate individual and group therapies as described    Stimulant Use Disorder  #Opiate Use Disorder  #Nicotine Use Disorder  - Subutex 20mg daily total  - Consider providing narcan on discharge  - Nicotine Gum 2mg q1hr prn  - Will reconsider CD assessment once mental health sxs above stabilizes.      #ADHD  - Atomoxetine discontinued     #PTSD  - Prazosin discontinued      Psychiatric Hospital Course:      Perry Preciado was admitted to [facility] as a voluntary patient.   Medications:  PTA atomoxetine, Subutex, Prozac, Gabapentin, Lithium, Prazosin, and seroquel were continued. Zyprexa 10mg TID PRN for anxiety, agitation related to psychosis. Prazosin discontinued -had been using for months, BP low, and pt reported difficulty breathing. Bedtime Seroquel increased from 200 to 300mg on 05/28 in addition to 50mg BID prn added in to better address psychosis and anxiety.   Subutex increased to 20mg total daily dosing further manage opioid withdrawal symptoms    Atomoxetine discontinued, due to current anxiety levels. Atorvastatin held due to slightly elevated liver enzymes. Albuterol inhaler PRN ordered.     The risks, benefits, alternatives, and side effects were discussed and understood by the patient.     Medical Assessment and Plan     Medical diagnoses to be addressed this admission:    # TBI  - Monitor      #Hypertriglyceridemia  - Atorvastatin 20mg  "qpm     #Neuropathy  - 600mg TID        Clinically Significant Risk Factors Present on Admission                  # Hypertension: Home medication list includes antihypertensive(s)      # Obesity: Estimated body mass index is 33.66 kg/m  as calculated from the following:    Height as of this encounter: 1.905 m (6' 3\").    Weight as of this encounter: 122.2 kg (269 lb 4.8 oz).           Medical course:  Patient was physically examined by the ED prior to being transferred to the unit and was found to be medically stable and appropriate for admission.      Consults:  none     Checklist     Legal Status: Voluntary     Safety Assessment:   Behavioral Orders   Procedures    Code 1 - Restrict to Unit    Routine Programming     As clinically indicated    Status 15     Every 15 minutes.    Suicide precautions: Suicide Risk: MODERATE; Clinical rationale to override score: modification to the care environment     Patients on Suicide Precautions should have a Combination Diet ordered that includes a Diet selection(s) AND a Behavioral Tray selection for Safe Tray - with utensils, or Safe Tray - NO utensils       Order Specific Question:   Suicide Risk     Answer:   MODERATE     Order Specific Question:   Clinical rationale to override score:     Answer:   modification to the care environment       Risk Assessment:  Risk for harm is elevated.  Risk factors: SI, substance use, trauma, family history, and past behaviors, maladaptive coping, impulsive   Protective factors: engaged in treatment , family support, help-seeking, future oriented     SIO: None    Disposition: TBD. Pending stabilization, medication optimization, & development of a safe discharge plan.     Attestations     This patient was seen and discussed with my attending physician, Dr. Arciniega.     Clarence Ellison, MS3  [employer]    Attestation:  I was present with the medical student who participated in the service and in the documentation of the note. I have " verified the history and personally performed the physical exam and medical decision making. I agree with the assessment and plan of care as documented in the note.    Ramez Dagher, MD  Psychiatry Resident Physician     Attestation:  This patient has been seen and evaluated by me, Pb Arciniega MD.  I have discussed this patient with the house staff team including the resident and medical student and I agree with the findings and plan in this note.    I have reviewed today's vital signs, medications, labs and imaging. Pb Arciniega MD , PhD.

## 2024-05-29 NOTE — PLAN OF CARE
Problem: Adult Behavioral Health Plan of Care  Goal: Plan of Care Review  Outcome: Progressing   Goal Outcome Evaluation:           Pt was out in the milieu most of the shift. He socialized and interacted with selected peers. His affect was flat and blunted. He was hyper verbal. He was very restless and seeking out for staff with lots of demands.  He was able to make his needs known. Hygiene was good. Eating and drinking okay. He was out for dinner and snacks and ate 100%. Vitals were WNL. He was observed some few times pacing hallway with headphone. He was cooperative with assessment. He denied pain all shift. He denied anxiety, depression, SI/HI/AH/VH. Later in the shift, pt complained of anxiety 7/10 and was given prn Hydroxyzine 100 mg which was helpful. He contracted for safety. He was medication compliant. Prn Maalox was given to patient per his request for indigestion and upon reassessment, pt said it was helpful. He did not attend OT evening group at 8 pm but attended Community meeting at 4 pm. No safety concern noted this shift. No medication side effect reported or noted this shift. Pt has orders for internal medicine consult for testicular pain and difficulty urinating. He did not complained of having any testicular pain this shift or difficulty urinating this shift. Will continue to monitor and will assist if need arise.

## 2024-05-29 NOTE — PLAN OF CARE
"  Rehab Group    Start time: 1115   End time: 1200  Patient time total: 25 minutes    came in and out of group session    #2 attended   Group Type: occupational therapy   Group Topic Covered: coping skills, healthy leisure time, and cognitive activities   Group Session Detail: OT: Education on healthy relaxation and creative hands-on endeavor (bookmarks) to increase concentration, focus, attention to task/detail, decision making, problem solving, frustration tolerance, task follow through, coping with stress, relaxation healthy leisure engagement, creative expression, and social engagement    Patient Response/Contribution:  Cooperative with task, Left the group on several occasions , Safe use of materials/group supplies, Actively engaged, and Other Semi-hyperverbal; Restless   Patient Detail: Pt initially arrived to group late and then immediately left to \"check-in with my \". Pt returned several minutes later, left the group area, and then returned. Pt sat with peer to complete presented activity and engaged in reciprocal social interactions with peer and therapist. At times pt was semi-hyperverbal when discussing his meth addiction, sobriety, and girlfriend. Pt intermittently took breaks from the conversation or the task, but began to pace around when listening to therapist and/or peer. Pt worked in a quick semi-neat manner to complete project. Pt reported he is looking forward to his [facility] interview this afternoon and hoping to find a sober environment for after discharge.      No Charge    Patient Active Problem List   Diagnosis    Suicidal ideation    Methamphetamine use (H)    Polysubstance abuse (H)    Depression, unspecified depression type           "

## 2024-05-29 NOTE — PLAN OF CARE
Team Note Due:  Monday    Assessment/Intervention/Current Symtoms and Care Coordination:  Chart review and met with team, discussed pt progress, symptomology, and response to treatment.  Discussed the discharge plan and any potential impediments to discharge.    Interview scheduled for tomorrow morning at 9am with Cami.    Received call from Cici Youngblood  intake stating she'll call pt on the unit today to complete TCM intake.    Met with pt to provide updates on IRTS and TCM intakes.    Pt completed TCM intake.    Discharge Plan or Goal:  Pending stabilization of symptoms and safe disposition planning.     Barriers to Discharge:  Patient requires further psychiatric stabilization due to current symptomology, medication management with changes subject to provider, coordination with outside supports, and aftercare planning.     Referral Status:  [facility] - referral sent 5/23. Interview 5/30 for [facility]  [facility] - referral sent 5/24  [facility] - referral sent 5/24  [facility] - referral faxed 5/24, on wait list 5/24. Estimated 1-2 week wait     Legal Status:  Voluntary     Contacts:  Ileana (Sister): [phone]       Upcoming Meetings and Dates/Important Information and next steps:  Schedule psychiatry, therapy and PCP  Subutex referral needed?

## 2024-05-29 NOTE — PLAN OF CARE
Care Coordinator Note(s):    Care Request(s):   Psychiatry   Preferences: Time Frame: 3 Weeks, Any Appointment Type (In Person, Virtual, Telephone)  Notes: [place]        Care Outcome(s):    CC Progress Note(s)/ Documentation:     [facility]   IP for mental health dx in last 2 years?  Yes  Current ? No  Previous medication management provider? Yes  Attended or been referred to chemical/ substance use program? Yes    Attendance discharge cannot schedule until after Feb 6th, 2025. Previously saw Asif ROWLAND.      [facility]   P: [phone]   F: [phone]  [place] location        Appointment: Psychiatry   Date/time: Friday June 28th, 2024 @ 11:00 AM Virtual  Provider:  JODY SCHOENECKER APRN, CNP  Address: [facility] [address] [place] [address]  Phone: [phone]  Fax: [phone]  Note:  New client information/ instructions and intake paperwork to be completed beforehand will be sent to [email or web address].        -Helena Cardoso  Adult Behavioral Health Care Coordinator

## 2024-05-29 NOTE — PLAN OF CARE
Pt was visible in the milieu. Presents with flat affect. Pt was in the lounge watching tv. Pt was restless walking around in the lounge. Pt is social with peers. Pt reported anxiety 8/10 and depression 6/10. Pt was given scheduled medications for anxiety. Pt denied for suicidal ideations and hallucinations. Pt requested and was given Tylenol for groin pain. Compliant with with medication. Ate adequate for supper.     Goal Outcome Evaluation:  Problem: Adult Behavioral Health Plan of Care  Goal: Adheres to Safety Considerations for Self and Others  Intervention: Develop and Maintain Individualized Safety Plan  Recent Flowsheet Documentation  Taken 5/29/2024 1729 by Demetrius Love, RN  Safety Measures:   safety rounds completed   environmental rounds completed  Goal: Absence of New-Onset Illness or Injury  Intervention: Identify and Manage Fall Risk  Recent Flowsheet Documentation  Taken 5/29/2024 1729 by Demertius Love, RN  Safety Measures:   safety rounds completed   environmental rounds completed     Problem: Anxiety  Goal: Anxiety Reduction or Resolution  Outcome: Progressing     Problem: Depression  Goal: Improved Mood  Outcome: Progressing

## 2024-05-30 LAB
ALBUMIN UR-MCNC: NEGATIVE MG/DL
APPEARANCE UR: CLEAR
BACTERIA UR CULT: NO GROWTH
BILIRUB UR QL STRIP: NEGATIVE
COLOR UR AUTO: NORMAL
GLUCOSE UR STRIP-MCNC: NEGATIVE MG/DL
HAV IGM SERPL QL IA: NONREACTIVE
HBV CORE IGM SERPL QL IA: NONREACTIVE
HBV SURFACE AG SERPL QL IA: NONREACTIVE
HCV AB SERPL QL IA: NONREACTIVE
HGB UR QL STRIP: NEGATIVE
KETONES UR STRIP-MCNC: NEGATIVE MG/DL
LEUKOCYTE ESTERASE UR QL STRIP: NEGATIVE
NITRATE UR QL: NEGATIVE
PH UR STRIP: 7 [PH] (ref 5–7)
PSA SERPL DL<=0.01 NG/ML-MCNC: 0.42 NG/ML (ref 0–2.5)
SP GR UR STRIP: 1.01 (ref 1–1.03)
UROBILINOGEN UR STRIP-MCNC: NORMAL MG/DL

## 2024-05-30 PROCEDURE — 81003 URINALYSIS AUTO W/O SCOPE: CPT

## 2024-05-30 PROCEDURE — 250N000013 HC RX MED GY IP 250 OP 250 PS 637

## 2024-05-30 PROCEDURE — G0177 OPPS/PHP; TRAIN & EDUC SERV: HCPCS

## 2024-05-30 PROCEDURE — 124N000002 HC R&B MH UMMC

## 2024-05-30 PROCEDURE — 99232 SBSQ HOSP IP/OBS MODERATE 35: CPT | Mod: GC | Performed by: PSYCHIATRY & NEUROLOGY

## 2024-05-30 PROCEDURE — 250N000013 HC RX MED GY IP 250 OP 250 PS 637: Performed by: PSYCHIATRY & NEUROLOGY

## 2024-05-30 PROCEDURE — H2032 ACTIVITY THERAPY, PER 15 MIN: HCPCS

## 2024-05-30 PROCEDURE — 99232 SBSQ HOSP IP/OBS MODERATE 35: CPT

## 2024-05-30 RX ORDER — PROPRANOLOL HYDROCHLORIDE 10 MG/1
10 TABLET ORAL ONCE
Status: COMPLETED | OUTPATIENT
Start: 2024-05-30 | End: 2024-05-30

## 2024-05-30 RX ORDER — PROPRANOLOL HYDROCHLORIDE 10 MG/1
10 TABLET ORAL 3 TIMES DAILY
Status: DISCONTINUED | OUTPATIENT
Start: 2024-05-30 | End: 2024-06-06 | Stop reason: HOSPADM

## 2024-05-30 RX ORDER — ACYCLOVIR 400 MG/1
400 TABLET ORAL 3 TIMES DAILY
Status: DISCONTINUED | OUTPATIENT
Start: 2024-05-30 | End: 2024-06-05

## 2024-05-30 RX ADMIN — BUPRENORPHINE 4 MG: 2 TABLET SUBLINGUAL at 19:14

## 2024-05-30 RX ADMIN — PROPRANOLOL HYDROCHLORIDE 10 MG: 10 TABLET ORAL at 16:34

## 2024-05-30 RX ADMIN — BUPRENORPHINE 8 MG: 8 TABLET SUBLINGUAL at 13:14

## 2024-05-30 RX ADMIN — LEVOFLOXACIN 500 MG: 500 TABLET, FILM COATED ORAL at 08:00

## 2024-05-30 RX ADMIN — GABAPENTIN 600 MG: 600 TABLET, FILM COATED ORAL at 19:15

## 2024-05-30 RX ADMIN — FLUOXETINE HYDROCHLORIDE 60 MG: 20 CAPSULE ORAL at 08:00

## 2024-05-30 RX ADMIN — QUETIAPINE FUMARATE 50 MG: 50 TABLET ORAL at 17:41

## 2024-05-30 RX ADMIN — QUETIAPINE FUMARATE 300 MG: 300 TABLET ORAL at 21:18

## 2024-05-30 RX ADMIN — QUETIAPINE FUMARATE 50 MG: 50 TABLET ORAL at 08:00

## 2024-05-30 RX ADMIN — PROPRANOLOL HYDROCHLORIDE 10 MG: 10 TABLET ORAL at 12:46

## 2024-05-30 RX ADMIN — GABAPENTIN 600 MG: 600 TABLET, FILM COATED ORAL at 13:15

## 2024-05-30 RX ADMIN — LITHIUM CARBONATE 900 MG: 450 TABLET, EXTENDED RELEASE ORAL at 21:18

## 2024-05-30 RX ADMIN — HYDROXYZINE HYDROCHLORIDE 100 MG: 50 TABLET, FILM COATED ORAL at 11:19

## 2024-05-30 RX ADMIN — ACYCLOVIR 400 MG: 400 TABLET ORAL at 19:16

## 2024-05-30 RX ADMIN — BUPRENORPHINE 8 MG: 8 TABLET SUBLINGUAL at 08:01

## 2024-05-30 RX ADMIN — PROPRANOLOL HYDROCHLORIDE 10 MG: 10 TABLET ORAL at 21:20

## 2024-05-30 RX ADMIN — ACYCLOVIR 400 MG: 400 TABLET ORAL at 13:41

## 2024-05-30 RX ADMIN — GABAPENTIN 600 MG: 600 TABLET, FILM COATED ORAL at 08:01

## 2024-05-30 RX ADMIN — NICOTINE POLACRILEX 2 MG: 2 GUM, CHEWING BUCCAL at 22:10

## 2024-05-30 ASSESSMENT — ACTIVITIES OF DAILY LIVING (ADL)
ADLS_ACUITY_SCORE: 30
HYGIENE/GROOMING: INDEPENDENT
ADLS_ACUITY_SCORE: 30
ADLS_ACUITY_SCORE: 30
ORAL_HYGIENE: INDEPENDENT
LAUNDRY: WITH SUPERVISION
ADLS_ACUITY_SCORE: 30
DRESS: INDEPENDENT
ADLS_ACUITY_SCORE: 30

## 2024-05-30 NOTE — CONSULTS
[facility]  Consult Note - Hospitalist Service  Date of Admission:  5/19/2024  Consult Requested by: Jeri Morse MD   Reason for Consult: Testicular pain    Assessment & Plan   Perry Preciado is a 49 year old male admitted on 5/19/2024.  Medical history notable for methamphetamine use , polysubstance abuse, depression, suicidal ideation.  Presented to the emergency department with concern of suicidal ideation.      #Testicular pain  #Acute epididymitis  Reports 1 month of right testicular pain with associated swelling. Last sexual encounter 1 month ago, does engage in anal insertion practices. Testicular exam revealed some unilateral swelling of epididymis/ vas deferens on right side. WBC and UA normal. No concerning finding of testicular ultrasound, slightly tortuous engorged appearance of the right spermatic cord noted. Clinical picture consistent with acute epididymitis. Started on Abx, Patient noted improvement in pain    -1 time IM dose of Ceftriaxone given 5/29  -Continue levofloxacin PO  -Tylenol as needed for pain   -Educated patient to inform nursing staff if pain does not continue to improve        #Urinary hesitancy  Reports 1 year of urinary hesitancy.  Denies nocturia, pain with urination, frequency. PSA and UA normal. May be secondary to anticholinergic effects from Seroquel. Patient does not currently have rentention.    -Educated patient that concern is mostly likely associated with medication side effect  -Recommend follow up with PCP upon discharge to address concern    #Positive HSV1/HSV2 Antibody  #Possible herpes simplex lesion   HSV1/2 ordered upon admission. Denies history of oral or genital lesions. No genital lesions noted on physical exam performed for testicular pain. Noted small lesion on the corner of the patient mouth. Denied any current pain, prodromal neurologic pain, discharge or crusting of lesion. Initially seemed more  "consistent with Angular cheilitis, however with recent positive for HSV serology, reasonable to start antiviral.     -Ordered Acyclovir 400 mg 3 times daily x 7 days  -Advised patient not to touch lesions and to use good hand hygiene       The patient's care was discussed with the patient and bedside nurse     Medicine will sign off      Clinically Significant Risk Factors                         # Obesity: Estimated body mass index is 36 kg/m  as calculated from the following:    Height as of this encounter: 1.905 m (6' 3\").    Weight as of this encounter: 130.6 kg (288 lb).        # Financial/Environmental Concerns:           Daniel Castro PA-C  Hospitalist Service  Securely message with [employer] (more info)  Text page via [facility] Paging/Directory   ______________________________________________________________________    Chief Complaint   Testicular pain, urinary retention    History is obtained from the patient    History of Present Illness   Perry Preciado is a 49 year old male who presented to ED with suicidal ideation. Medical history notable for methamphetamine use, polysubstance abuse, depression, suicidal ideation.      Reports improvement in testicular pain. Upon interview denies h/o or current cold sores/genital lesions Did note one ulceration on edge of mouth he said was from dry/cracked skin/shaving. Denies crusting, discharge, prodromal neuro pain, current pain. Later expressed to nursing that he thinks he may have two cold sores.      Past Medical History    No past medical history on file.    Past Surgical History   No past surgical history on file.    Medications   I have reviewed this patient's current medications          Physical Exam   Vital Signs: Temp: 97  F (36.1  C) Temp src: Temporal BP: 121/78 Pulse: 70   Resp: 18 SpO2: 96 % O2 Device: None (Room air)    Weight: 288 lbs 0 oz    General: No acute distress,   Resp: No rate of breathing  Neuro: Freely moves extremities, walks around unit "   Derm: Noted one small ulceration at vermilion border at corner of mouth on left side. No discharge or crusting noted.      Medical Decision Making       60 MINUTES SPENT BY ME on the date of service doing chart review, history, exam, documentation & further activities per the note.      Data

## 2024-05-30 NOTE — PLAN OF CARE
Team Note Due:  Monday    Assessment/Intervention/Current Symtoms and Care Coordination:  Chart review and met with team, discussed pt progress, symptomology, and response to treatment.  Discussed the discharge plan and any potential impediments to discharge.    Pt completed intake interview with [facility]. Sent follow up to intake requesting an update on availability and next steps. Received confirmation pt's referral was submitted to a few locations and she will follow up once she hears back.    Received vm from Cristian with [facility] ([phone]). Returned vm indicating pt is still in need of placement.    Sent follow up to [facility] and [facility] regarding status of referral. No current male openings at [facility] but treatment director will follow up next week.    Met with pt to discuss updates and scheduling Subutex follow up. Pt okay with returning to [facility]. Scheduled appt for 6/14. AVS updated    Discharge Plan or Goal:  Pending stabilization of symptoms and safe disposition planning.  [facility] - referrals made  Psychiatry - appt pending  Subutex - scheduled     Barriers to Discharge:  Patient requires further psychiatric stabilization due to current symptomology, medication management with changes subject to provider, coordination with outside supports, and aftercare planning.     Referral Status:  [facility] - referral sent 5/23. Interviewed 5/30  [facility] - referral sent 5/24  [facility] - referral sent 5/24, no openings as of 5/30  [facility] - referral faxed 5/24, on wait list 5/24. Estimated 1-2 week wait     Legal Status:  Voluntary     Contacts:  Ileana (Sister): [phone]       Upcoming Meetings and Dates/Important Information and next steps:  Follow up on [facility] referrals

## 2024-05-30 NOTE — PLAN OF CARE
BE[facility] IP Unit Acuity Rating Score (UARS)  Patient is given one point for every criteria they meet.    CRITERIA SCORING   On a 72 hour hold, court hold, committed, stay of commitment, or revocation. 0    Patient LOS on BEH unit exceeds 20 days. 0  LOS: 11   Patient under guardianship, 55+, otherwise medically complex, or under age 11. 0   Suicide ideation without relief of precipitating factors. 1   Current plan for suicide. 0   Current plan for homicide. 0   Imminent risk or actual attempt to seriously harm another without relief of factors precipitating the attempt. 0   Severe dysfunction in daily living (ex: complete neglect for self care, extreme disruption in vegetative function, extreme deterioration in social interactions). 1   Recent (last 7 days) or current physical aggression in the ED or on unit. 0   Restraints or seclusion episode in past 72 hours. 0   Recent (last 7 days) or current verbal aggression, agitation, yelling, etc., while in the ED or unit. 0   Active psychosis. 0   Need for constant or near constant redirection (from leaving, from others, etc).  0   Intrusive or disruptive behaviors. 0   Patient requires 3 or more hours of individualized nursing care per 8-hour shift (i.e. for ADLs, meds, therapeutic interventions). 0   TOTAL 2

## 2024-05-30 NOTE — PLAN OF CARE
Problem: Anxiety  Goal: Anxiety Reduction or Resolution  Outcome: Not Progressing   Goal Outcome Evaluation:    Patient out in Milieu socially interactive with peers affect is flat but brightens up on approach, he endorsed increase anxiety 10/10 and depression 7/10. Patient requested or and took PRN Seroquel 50 mg at 0800 and later took PRN Hydroxyzine 100 mg at 11:20 AM. Patient restless and described feeling as if he is fighting inside his body, Provider started patient on schedule Propanol 10 mg. After first dose, patient reported feeling calmer, observed patient able to sit still in lounge. Patient took a shower, intake is adequate. He had an interview with [facility] facility.
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
## 2024-05-30 NOTE — PLAN OF CARE
"   05/30/24 1500   General Information   Art Directive other (see comments)     Goal Outcome Evaluation:    Rehab Group    Start time: 1015  End time: 1115  Patient time total: 45 minutes    attended full group     #3 attended   Group Type: art   Group Topic Covered: emotional regulation       Group Session Detail:  Art Therapy directive was to create a paper mask with the theme of strength. Pts were encouraged to express personal strength and/or pts idea of what strength is using lines, shapes, colors, imagery, words ect.      Patient Response/Contribution:  Cooperative with task       Patient Detail:    Pt was an engaged participant, focused on task for the full duration of group. Pt finished artwork (mask) and briefly verbally processed with author and group. Pt created a personal narrative with mask, sharing how pt has struggled with addiction and served group home time. Pt described his mask (self) as \"coming from a place of happiness\" and striving to help others.          Group Therapy (19150)    Patient Active Problem List   Diagnosis    Suicidal ideation    Methamphetamine use (H)    Polysubstance abuse (H)    Depression, unspecified depression type                          "

## 2024-05-30 NOTE — PLAN OF CARE
Problem: Sleep Disturbance  Goal: Adequate Sleep/Rest  Outcome: Not Progressing          Patient awake most of the shift. Patient complained of chest pain- unable to describe pain- denies heavy feeling in the sternal area. He stated he will wait a little bit and see if it gets worst and will tell the staff. Chest pain subsided, no respiratory distress noted. Patient conversant with his peers, paced the hallway. Patient continues on q15 minutes safety checks, no behavioral issues noted. Will continue to monitor the patient and provide therapeutic intervention as needed. Will continue with current plan of care. Notify MD with any concerns. The patient had 4.75 total hours of sleep this shift.

## 2024-05-30 NOTE — PLAN OF CARE
Rehab Group    Start time: 1315  End time: 1415  Patient time total: 45 minutes    attended full group    #2 attended   Group Type: OT Clinic   Group Topic Covered: coping skills         Group Session Detail:    Intervention: Pt participated in a OT Clinic group to facilitate coping skills exploration and creative expression through personally meaningful activities, and to encourage utilization of these healthy coping skills to promote overall health and wellness. Group included clinical observation of social, cognitive and kinesthetic performance skills to inform treatment and safe discharge planning.    Cognition:  Restless       Mood/Affect:  Bright, Pleasant       Plan: Patient encouraged to maintain attendance for continued ongoing support in working towards occupational therapy goals to support overall treatment/care.      Patient Detail:    Eager to join at start of group to continue working on a project started in a previous group. Exhibited high energy, stood to work on projects during time in group. Left group to use restroom and came back and was dancing and signing for period of time while working on projects. Was social with writer and other peer in the group during this time, asking their opinion on their project. Ind to clean up supplies and thanked writer for their time.        Patient Active Problem List   Diagnosis    Suicidal ideation    Methamphetamine use (H)    Polysubstance abuse (H)    Depression, unspecified depression type

## 2024-05-31 PROCEDURE — 250N000013 HC RX MED GY IP 250 OP 250 PS 637

## 2024-05-31 PROCEDURE — 99232 SBSQ HOSP IP/OBS MODERATE 35: CPT | Mod: GC | Performed by: PSYCHIATRY & NEUROLOGY

## 2024-05-31 PROCEDURE — 90853 GROUP PSYCHOTHERAPY: CPT

## 2024-05-31 PROCEDURE — 250N000013 HC RX MED GY IP 250 OP 250 PS 637: Performed by: PSYCHIATRY & NEUROLOGY

## 2024-05-31 PROCEDURE — 124N000002 HC R&B MH UMMC

## 2024-05-31 RX ADMIN — ACYCLOVIR 400 MG: 400 TABLET ORAL at 14:37

## 2024-05-31 RX ADMIN — FLUOXETINE HYDROCHLORIDE 60 MG: 20 CAPSULE ORAL at 08:17

## 2024-05-31 RX ADMIN — QUETIAPINE FUMARATE 50 MG: 50 TABLET ORAL at 16:17

## 2024-05-31 RX ADMIN — GABAPENTIN 600 MG: 600 TABLET, FILM COATED ORAL at 08:18

## 2024-05-31 RX ADMIN — NICOTINE POLACRILEX 2 MG: 2 GUM, CHEWING BUCCAL at 16:20

## 2024-05-31 RX ADMIN — PROPRANOLOL HYDROCHLORIDE 10 MG: 10 TABLET ORAL at 16:15

## 2024-05-31 RX ADMIN — HYDROXYZINE HYDROCHLORIDE 100 MG: 50 TABLET, FILM COATED ORAL at 04:21

## 2024-05-31 RX ADMIN — GABAPENTIN 600 MG: 600 TABLET, FILM COATED ORAL at 19:12

## 2024-05-31 RX ADMIN — LITHIUM CARBONATE 900 MG: 450 TABLET, EXTENDED RELEASE ORAL at 20:45

## 2024-05-31 RX ADMIN — LEVOFLOXACIN 500 MG: 500 TABLET, FILM COATED ORAL at 08:18

## 2024-05-31 RX ADMIN — BUPRENORPHINE 4 MG: 2 TABLET SUBLINGUAL at 19:15

## 2024-05-31 RX ADMIN — PROPRANOLOL HYDROCHLORIDE 10 MG: 10 TABLET ORAL at 20:44

## 2024-05-31 RX ADMIN — BUPRENORPHINE 8 MG: 8 TABLET SUBLINGUAL at 14:37

## 2024-05-31 RX ADMIN — QUETIAPINE FUMARATE 50 MG: 50 TABLET ORAL at 10:56

## 2024-05-31 RX ADMIN — ACYCLOVIR 400 MG: 400 TABLET ORAL at 08:22

## 2024-05-31 RX ADMIN — QUETIAPINE FUMARATE 300 MG: 300 TABLET ORAL at 20:44

## 2024-05-31 RX ADMIN — ACYCLOVIR 400 MG: 400 TABLET ORAL at 19:12

## 2024-05-31 RX ADMIN — GABAPENTIN 600 MG: 600 TABLET, FILM COATED ORAL at 14:37

## 2024-05-31 RX ADMIN — BUPRENORPHINE 8 MG: 8 TABLET SUBLINGUAL at 08:18

## 2024-05-31 RX ADMIN — NICOTINE POLACRILEX 2 MG: 2 GUM, CHEWING BUCCAL at 20:46

## 2024-05-31 RX ADMIN — PROPRANOLOL HYDROCHLORIDE 10 MG: 10 TABLET ORAL at 08:18

## 2024-05-31 ASSESSMENT — ACTIVITIES OF DAILY LIVING (ADL)
ADLS_ACUITY_SCORE: 30
HYGIENE/GROOMING: INDEPENDENT
ADLS_ACUITY_SCORE: 30
ADLS_ACUITY_SCORE: 30
ORAL_HYGIENE: INDEPENDENT
ADLS_ACUITY_SCORE: 30
HYGIENE/GROOMING: HANDWASHING;INDEPENDENT
ADLS_ACUITY_SCORE: 30
DRESS: INDEPENDENT
ADLS_ACUITY_SCORE: 30
DRESS: INDEPENDENT
ADLS_ACUITY_SCORE: 30
ADLS_ACUITY_SCORE: 30
ORAL_HYGIENE: INDEPENDENT
ADLS_ACUITY_SCORE: 30

## 2024-05-31 NOTE — PLAN OF CARE
Group Attendance:  attended partial group    Time session began: 1015  Time session ended: 1100  Patient's total time in group: 15    Total # Attendees   7   Group Type psychotherapeutic and life skills     Group Topic Covered healthy coping skills      Group Session Detail As a group brainstormed coping strategies, discussed healthy versus unhealthy coping. Used individual examples to analyze coping strategies including identifying barriers, benefits, and choosing coping strategies to use while in the hospital.      Patient's response to the group topic/interactions:  Did not share thoughts verbally and Distracted     Patient Details: Pt joined group for a brief time, was distracted by another patient's discharge that was happening outside of group room. Pt preferred to stand during group, reports that movement and play are helpful coping skills.           No Charge (0-15 min)    Patient Active Problem List   Diagnosis    Suicidal ideation    Methamphetamine use (H)    Polysubstance abuse (H)    Depression, unspecified depression type

## 2024-05-31 NOTE — PLAN OF CARE
"Pt was visible in the milieu and social with select peers. Pt was watching tv in the lounge majority of the time this shift. Pt reported feeling irritable and sad at the beginning of the shift. Pt said in his mind he was \"struggling staying in the present and not going back to the past\". Pt endorsed anxiety 6/10 and prn Seroquel was administered  as well as the scheduled propranolol. Pt reported it was helpful and was much calm. Endorsed depression 6/10. Denied for SI/SIB/AH/VH. Ate supper adequately and was compliant with medications.    Goal Outcome Evaluation:  Problem: Anxiety  Goal: Anxiety Reduction or Resolution  Outcome: Progressing     Problem: Depression  Goal: Improved Mood  Outcome: Progressing                           "

## 2024-05-31 NOTE — PLAN OF CARE
"  Problem: Anxiety  Goal: Anxiety Reduction or Resolution  Outcome: Progressing   Goal Outcome Evaluation:       Pt was up to the desk this morning asking for his a.m medications. Writer couldn't help him immediately so he appeared to be upset and asked another RN. When writer was able to help him writer apologized for the wait and pt said, \"no, no, don't apologize it's fine.\" Pt said his anxiety was better this morning. Pt appeared upset with another pt who was very loud on the unit. When that pt discharged this pt requested seroquel d/t the anxiety. PRN Seroquel given. Pt was able to attend groups. Writer saw pt in the lounge multiple times falling asleep, appears to be over sedated ( notified), probably d/t Subxone. Pt denies SI/SIB/HI and hallucinations. No behavioral concerns.                  "

## 2024-05-31 NOTE — PLAN OF CARE
Problem: Pain Acute  Goal: Optimal Pain Control and Function  Outcome: Progressing     Problem: Anxiety  Goal: Anxiety Reduction or Resolution  Outcome: Progressing     Problem: Adult Behavioral Health Plan of Care  Goal: Absence of New-Onset Illness or Injury  Outcome: Progressing  Intervention: Identify and Manage Fall Risk  Recent Flowsheet Documentation  Taken 5/31/2024 1700 by Akhil Cuenca RN  Safety Measures:   environmental rounds completed   safety rounds completed   Goal Outcome Evaluation:    Plan of Care Reviewed With: patient        Alert and oriented, able to communicate needs. Milieu visibility, interactive with peers and staff members, observed intermittently dosing off and sleeping in lounge. Pleasant, cooperative and compliant with medication. He denied  any suicidal/homicidal ideation, anxiety rated at 9, depression 8, anxiety managed with PRN Seroquel. ADLs WNL, he denied pain or discomfort. Food and fluid intake WNL. Attended/participated in group activity.

## 2024-05-31 NOTE — PLAN OF CARE
BEH IP Unit Acuity Rating Score (UARS)  Patient is given one point for every criteria they meet.    CRITERIA SCORING   On a 72 hour hold, court hold, committed, stay of commitment, or revocation. 0    Patient LOS on BEH unit exceeds 20 days. 0  LOS: 12   Patient under guardianship, 55+, otherwise medically complex, or under age 11. 0   Suicide ideation without relief of precipitating factors. 1   Current plan for suicide. 0   Current plan for homicide. 0   Imminent risk or actual attempt to seriously harm another without relief of factors precipitating the attempt. 0   Severe dysfunction in daily living (ex: complete neglect for self care, extreme disruption in vegetative function, extreme deterioration in social interactions). 1   Recent (last 7 days) or current physical aggression in the ED or on unit. 0   Restraints or seclusion episode in past 72 hours. 0   Recent (last 7 days) or current verbal aggression, agitation, yelling, etc., while in the ED or unit. 0   Active psychosis. 0   Need for constant or near constant redirection (from leaving, from others, etc).  0   Intrusive or disruptive behaviors. 0   Patient requires 3 or more hours of individualized nursing care per 8-hour shift (i.e. for ADLs, meds, therapeutic interventions). 0   TOTAL 2

## 2024-05-31 NOTE — PROGRESS NOTES
"  ----------------------------------------------------------------------------------------------------------  [facility]  Psychiatry Progress Note  Hospital Day #12     Interim History:     The patient's care was discussed with the treatment team and chart notes were reviewed.    Vitals: Vital signs:  Temp: 98.8  F (37.1  C) Temp src: Oral BP: 100/67 Pulse: 65     SpO2: 95 % O2 Device: None (Room air)     Weight: 130.6 kg (288 lb)  Estimated body mass index is 36 kg/m  as calculated from the following:    Height as of this encounter: 1.905 m (6' 3\").    Weight as of this encounter: 130.6 kg (288 lb).    Sleep: 4.75 hours (05/31/24 0700)  Scheduled medications: Took all scheduled medications as prescribed  Psychiatric PRN medications: Tylenol, Hydroxyzine 100mg, Nicotine gum, Seroquel 100mg      Staff Report:   Perry was visibile in the milieu and social with select peers, spent most of his time watching TV. He was somewhat sad at the beginning of the shift, stated that he was \"struggling staying in the present and not going back to the past\". Said new propranolol was helpful and was much more calm, endorses 6/10 anxiety and depression.     Last 24H PRN:     hydrOXYzine HCl (ATARAX) tablet 100 mg, 100 mg at 05/31/24 0421    nicotine (NICORETTE) gum 2 mg, 2 mg at 05/30/24 2210    QUEtiapine (SEROquel) tablet 50 mg, 50 mg at 05/30/24 1741        Subjective:     Patient Interview:  Team interviewed Perry (JULIAN) in the conference room. He states he is \"irritated\" with a peer on the unit who was being intrusive. He reports overall feeling \"apprehensive\" about what is going to happen. Discussed the disposition and timeline, hoping to have a bed open with a week or two. Patient started the propranolol yesterday which he states has helped significantly with the physical anxiety. He feels more \"level now,\" rates anxiety at 6-7/10 due to uncertainty of disposition, reports it would " "be as low as 3/10 if he knew where he was going. Has not taken his Seroquel yet today to see how propranolol feels. Reports being thankful to care team and is hopeful about continuing to stay clean and doing well in the future. Overall doing better and denies any questions or concerns.    ROS:  Patient has pain (testicular)  Patient denies acute concerns     Objective:     Vitals:  /67   Pulse 65   Temp 98.8  F (37.1  C) (Oral)   Resp 18   Ht 1.905 m (6' 3\")   Wt 130.6 kg (288 lb)   SpO2 95%   BMI 36.00 kg/m      Allergies:  Allergies   Allergen Reactions    Wellbutrin [Bupropion] Difficulty breathing       Current Medications:  Scheduled:  Current Facility-Administered Medications   Medication Dose Route Frequency Provider Last Rate Last Admin    acetaminophen (TYLENOL) tablet 650 mg  650 mg Oral Q4H PRN Mati Cueva MD   650 mg at 05/29/24 1641    acyclovir (ZOVIRAX) tablet 400 mg  400 mg Oral TID Daniel Castro   400 mg at 05/30/24 1916    albuterol (PROVENTIL HFA/VENTOLIN HFA) inhaler  2 puff Inhalation Q6H PRN Jeri Morse MD        alum & mag hydroxide-simethicone (MAALOX) suspension 30 mL  30 mL Oral Q4H PRN Mati Cueva MD   30 mL at 05/28/24 1651    [Held by provider] atorvastatin (LIPITOR) tablet 20 mg  20 mg Oral QPM Mati Cueva MD   20 mg at 05/21/24 2057    buprenorphine (SUBUTEX) sublingual tablet 4 mg  4 mg Sublingual Daily Pb Arciniega MD   4 mg at 05/30/24 1914    buprenorphine (SUBUTEX) sublingual tablet 8 mg  8 mg Sublingual BID Pb Arciniega MD   8 mg at 05/30/24 1314    cyclobenzaprine (FLEXERIL) tablet 5 mg  5 mg Oral BID PRN Pb Arciniega MD   5 mg at 05/21/24 1420    FLUoxetine (PROzac) capsule 60 mg  60 mg Oral Daily Pb Arciniega MD   60 mg at 05/30/24 0800    gabapentin (NEURONTIN) tablet 600 mg  600 mg Oral TID Mati Cueva MD   600 mg at 05/30/24 1915    hydrOXYzine HCl (ATARAX) tablet 100 mg  100 mg Oral BID PRN Jeri Morse MD   100 mg at " 05/31/24 0421    levofloxacin (LEVAQUIN) tablet 500 mg  500 mg Oral Daily Daniel Castro   500 mg at 05/30/24 0800    lithium (ESKALITH CR/LITHOBID) CR tablet 900 mg  900 mg Oral At Bedtime Jeri Morse MD   900 mg at 05/30/24 2118    melatonin tablet 3 mg  3 mg Oral At Bedtime PRN Mati Cueva MD        naloxone (NARCAN) injection 0.2 mg  0.2 mg Intravenous Q2 Min PRN Pb Arciniega MD        Or    naloxone (NARCAN) injection 0.4 mg  0.4 mg Intravenous Q2 Min PRN Pb Arciniega MD        Or    naloxone (NARCAN) injection 0.2 mg  0.2 mg Intramuscular Q2 Min PRN Pb Arciniega MD        Or    naloxone (NARCAN) injection 0.4 mg  0.4 mg Intramuscular Q2 Min PRN Pb Arciniega MD        nicotine (NICORETTE) gum 2 mg  2 mg Buccal Q1H PRN Kirby Colbert MD   2 mg at 05/30/24 2210    OLANZapine (zyPREXA) tablet 10 mg  10 mg Oral 4x Daily PRN Pb Arciniega MD        Or    OLANZapine (zyPREXA) injection 10 mg  10 mg Intramuscular TID PRN Pb Arciniega MD        polyethylene glycol (MIRALAX) Packet 17 g  17 g Oral Daily PRN Mati Cueva MD        propranolol (INDERAL) tablet 10 mg  10 mg Oral TID Dagher, Ramez, MD   10 mg at 05/30/24 2120    QUEtiapine (SEROquel) tablet 300 mg  300 mg Oral At Bedtime Jeri Morse MD   300 mg at 05/30/24 2118    QUEtiapine (SEROquel) tablet 50 mg  50 mg Oral BID PRN Jeri Morse MD   50 mg at 05/30/24 1741       PRN:  Current Facility-Administered Medications   Medication Dose Route Frequency Provider Last Rate Last Admin    acetaminophen (TYLENOL) tablet 650 mg  650 mg Oral Q4H PRN Mati Cueva MD   650 mg at 05/29/24 1641    acyclovir (ZOVIRAX) tablet 400 mg  400 mg Oral TID Daniel Castro   400 mg at 05/30/24 1916    albuterol (PROVENTIL HFA/VENTOLIN HFA) inhaler  2 puff Inhalation Q6H PRN Jeri Morse MD        alum & mag hydroxide-simethicone (MAALOX) suspension 30 mL  30 mL Oral Q4H PRN Mati Cueva MD   30 mL at 05/28/24 4337     [Held by provider] atorvastatin (LIPITOR) tablet 20 mg  20 mg Oral QPM Mati Cueva MD   20 mg at 05/21/24 2057    buprenorphine (SUBUTEX) sublingual tablet 4 mg  4 mg Sublingual Daily Pb Arciniega MD   4 mg at 05/30/24 1914    buprenorphine (SUBUTEX) sublingual tablet 8 mg  8 mg Sublingual BID Pb Arciniega MD   8 mg at 05/30/24 1314    cyclobenzaprine (FLEXERIL) tablet 5 mg  5 mg Oral BID PRN Pb Arciniega MD   5 mg at 05/21/24 1420    FLUoxetine (PROzac) capsule 60 mg  60 mg Oral Daily Pb Arciniega MD   60 mg at 05/30/24 0800    gabapentin (NEURONTIN) tablet 600 mg  600 mg Oral TID Mati Cueva MD   600 mg at 05/30/24 1915    hydrOXYzine HCl (ATARAX) tablet 100 mg  100 mg Oral BID PRN Jeri Morse MD   100 mg at 05/31/24 0421    levofloxacin (LEVAQUIN) tablet 500 mg  500 mg Oral Daily Daniel Castro   500 mg at 05/30/24 0800    lithium (ESKALITH CR/LITHOBID) CR tablet 900 mg  900 mg Oral At Bedtime Jeri Morse MD   900 mg at 05/30/24 2118    melatonin tablet 3 mg  3 mg Oral At Bedtime PRN Mati Cueva MD        naloxone (NARCAN) injection 0.2 mg  0.2 mg Intravenous Q2 Min PRN Pb Arciniega MD        Or    naloxone (NARCAN) injection 0.4 mg  0.4 mg Intravenous Q2 Min PRN Pb Arciniega MD        Or    naloxone (NARCAN) injection 0.2 mg  0.2 mg Intramuscular Q2 Min PRN Pb Arciniega MD        Or    naloxone (NARCAN) injection 0.4 mg  0.4 mg Intramuscular Q2 Min PRN Pb Arciniega MD        nicotine (NICORETTE) gum 2 mg  2 mg Buccal Q1H PRN Kirby Colbert MD   2 mg at 05/30/24 2210    OLANZapine (zyPREXA) tablet 10 mg  10 mg Oral 4x Daily PRN Pb Arciniega MD        Or    OLANZapine (zyPREXA) injection 10 mg  10 mg Intramuscular TID PRN Pb Arciniega MD        polyethylene glycol (MIRALAX) Packet 17 g  17 g Oral Daily PRN Mati Cueva MD        propranolol (INDERAL) tablet 10 mg  10 mg Oral TID Dagher, Ramez, MD   10 mg at 05/30/24 2120    QUEtiapine (SEROquel) tablet 300 mg  " 300 mg Oral At Bedtime Jeri Morse MD   300 mg at 05/30/24 2118    QUEtiapine (SEROquel) tablet 50 mg  50 mg Oral BID PRN Jeri Morse MD   50 mg at 05/30/24 1741       Labs and Imaging:  New results:   Recent Results (from the past 24 hour(s))   Urine Macroscopic with reflex to Microscopic    Collection Time: 05/30/24 10:46 AM   Result Value Ref Range    Color Urine Straw Colorless, Straw, Light Yellow, Yellow    Appearance Urine Clear Clear    Glucose Urine Negative Negative mg/dL    Bilirubin Urine Negative Negative    Ketones Urine Negative Negative mg/dL    Specific Gravity Urine 1.008 1.003 - 1.035    Blood Urine Negative Negative    pH Urine 7.0 5.0 - 7.0    Protein Albumin Urine Negative Negative mg/dL    Urobilinogen Urine Normal Normal, 2.0 mg/dL    Nitrite Urine Negative Negative    Leukocyte Esterase Urine Negative Negative       Data this admission:  - CBC (latest 5/29/24): Hgb 12.9  - CMP (latest 5/27/24): AST 40, ALT 56, CO2 30  - TSH normal  - UDS Positive for methamphetamines and cannabis  - Vit D normal  - Hgb A1c normal  - Lipids: Trig 173, HDL 32  - Vit B12 normal  - Folate normal  - Lithium (latest 5/27/24) 0.85 mmol  - HIV neg  - PSA WNL  -Hepatitis panel negative  - HSV antibodies positive  - Urine culture No growth  - Urine Macroscopic normal  - EKG refused     Mental Status Exam:     Oriented to:  Grossly Oriented  General:  Awake and Alert, standing in milieu talking to peers prior to encounter  Appearance:  appears stated age, Dressed in t-shirt and shorts, Hair is kept and grooming is fair, and Tattoos on arms and neck  Behavior/Attitude:  cooperative and engaged, anxious at times  Eye Contact: good  Psychomotor: appears significantly less restless than yesterday, no evidence of tics, dystonia, or tardive dyskinesia   Speech:  appropriate volume/tone  Language: Fluent in English with appropriate syntax and vocabulary.  Mood:  \"irritated\"  Affect:  euthymic, still " anxious  Thought Process:  linear  Thought Content: No SI spontaneously mentioned on interview  Associations:  intact  Insight:  good, voluntarily admit, is aware of his worsening psychosis in the context of substance use, seems to be willing to working with the care team to get better  Judgment:  fair , appropriate engagement with team, adherent to team recommendations  Impulse control: fair  Attention Span:  grossly intact  Concentration:  grossly intact  Recent and Remote Memory:  not formally assessed  Fund of Knowledge:   not formally assessed  Muscle Strength and Tone: normal  Gait and Station: Normal     Psychiatric Assessment   Diagnosis:  Psychosis, unspecified  R/O Methamphetamine Use Disorder  R/O Alcohol Use Disorder   R/O Opioid Use Disorder   R/O Cannabis Use Disorder     Perry Preciado is a 49 year old male with previous psychiatric diagnoses of MDD, KANE, ADHD, and substance disorder (alcohol, meth, opiates) and medical history significant for TBI who was admitted from the ER on 2024 due to concern for SI and AVH in the context of medication non-adherence and substance use. Most recent psychiatric hospitalization was at [facility] in 2024 for similar concerns. Significant symptoms on admission included depression, SI, and AVH. The MSE on admission was pertinent for SI, AVH, and depressed affect. Biological contributions to mental health presentation included prior diagnoses of mental health disorders, multiple medication trials, long standing substance use history, hx of TBI, and genetic predisposition for suicide completion and substance use. Psychological contributions to mental health presentation included maladaptive coping and cluster B traits. Social factors contributing to mental health presentation include limited social supports, financial stressors, and recent family deaths who also  by suicide around this time of the year. Protective factors included being engaged in  treatment.     In summary, the patient's differential is still in evolution. Patient has reported symptoms of depression, SI, and AVH in the context of substance use and medication nonadherence. Patient per chart review has mentioned that he only has psychosis during periods of using substances however during this current hospital stay he notes it happens even when he's sober. He also mentions some periods concerning for hypomania/mily but it's complicated by his substance use and cluster B traits. For now considering unspecified mood and unspecified psychosis. He will likely benefit from medication optimization and substance use treatment this admission.     Given that he currently has SI and psychosis, patient warrants inpatient psychiatric hospitalization to maintain his safety.      Psychiatric Plan by Diagnosis      Today's changes:  -Plan to keep sister Ileana notified with updates, particularly with dispo plan.   -Plan for potential discharge to [facility] dual treatment facility. CTC to also look into case management on discharge.    # Psychosis  #Mood Disorder  1. Medications:  - Zyprexa 10 mg QID PRN ordered  - Fluoxetine 60mg  - Lithium 900mg qhs  - Seroquel 300mg qpm     2. Pertinent Labs/Monitoring:   - EKG declined     3. Additional Plans:  - Patient will be treated in therapeutic milieu with appropriate individual and group therapies as described    Stimulant Use Disorder  #Opiate Use Disorder  #Nicotine Use Disorder  - Subutex 20mg daily total  - Consider providing narcan on discharge  - Nicotine Gum 2mg q1hr prn  - Will reconsider CD assessment once mental health sxs above stabilizes.      #ADHD  - Atomoxetine discontinued     #PTSD  - Prazosin discontinued     #Anxiety  -  Added Propranolol 10mg TID for anxiety on 05/30     Psychiatric Hospital Course:      Perry Preciado was admitted to [facility] as a voluntary patient.   Medications:  PTA atomoxetine, Subutex, Prozac, Gabapentin, Lithium,  "Prazosin, and seroquel were continued. Zyprexa 10mg TID PRN for anxiety, agitation related to psychosis. Prazosin discontinued -had been using for months, BP low, and pt reported difficulty breathing. Bedtime Seroquel increased from 200 to 300mg on 05/28 in addition to 50mg BID prn added in to better address psychosis and anxiety.   Subutex increased to 20mg total daily dosing further manage opioid withdrawal symptoms    Atomoxetine discontinued, due to current anxiety levels. Atorvastatin held due to slightly elevated liver enzymes. Albuterol inhaler PRN ordered.  Added Propranolol 10mg TID for anxiety on 05/30     The risks, benefits, alternatives, and side effects were discussed and understood by the patient.     Medical Assessment and Plan     Medical diagnoses to be addressed this admission:    # TBI  - Monitor      #Hypertriglyceridemia  - Atorvastatin 20mg qpm     #Neuropathy  - 600mg TID        Clinically Significant Risk Factors Present on Admission                  # Hypertension: Home medication list includes antihypertensive(s)      # Obesity: Estimated body mass index is 33.66 kg/m  as calculated from the following:    Height as of this encounter: 1.905 m (6' 3\").    Weight as of this encounter: 122.2 kg (269 lb 4.8 oz).           Medical course:  Patient was physically examined by the ED prior to being transferred to the unit and was found to be medically stable and appropriate for admission.      Consults, Medicine - Recommendations:    #Testicular pain  #Acute epididymitis  Reports 1 month of right testicular pain with associated swelling. Last sexual encounter 1 month ago, does engage in anal insertion practices. Testicular exam revealed some unilateral swelling of epididymis/ vas deferens on right side. WBC and UA normal. No concerning finding of testicular ultrasound, slightly tortuous engorged appearance of the right spermatic cord noted. Clinical picture consistent with acute epididymitis. " Started on Abx, Patient noted improvement in pain     -1 time IM dose of Ceftriaxone given 5/29  -Continue levofloxacin PO  -Tylenol as needed for pain   -Educated patient to inform nursing staff if pain does not continue to improve         #Urinary hesitancy  Reports 1 year of urinary hesitancy.  Denies nocturia, pain with urination, frequency. PSA and UA normal. May be secondary to anticholinergic effects from Seroquel. Patient does not currently have rentention.     -Educated patient that concern is mostly likely associated with medication side effect  -Recommend follow up with PCP upon discharge to address concern     #Positive HSV1/HSV2 Antibody  #Possible herpes simplex lesion   HSV1/2 ordered upon admission. Denies history of oral or genital lesions. No genital lesions noted on physical exam performed for testicular pain. Noted small lesion on the corner of the patient mouth. Denied any current pain, prodromal neurologic pain, discharge or crusting of lesion. Initially seemed more consistent with Angular cheilitis, however with recent positive for HSV serology, reasonable to start antiviral.      -Ordered Acyclovir 400 mg 3 times daily x 7 days  -Advised patient not to touch lesions and to use good hand hygiene       Checklist     Legal Status: Voluntary     Safety Assessment:   Behavioral Orders   Procedures    Code 2    Routine Programming     As clinically indicated    Status 15     Every 15 minutes.    Suicide precautions: Suicide Risk: MODERATE; Clinical rationale to override score: modification to the care environment     Patients on Suicide Precautions should have a Combination Diet ordered that includes a Diet selection(s) AND a Behavioral Tray selection for Safe Tray - with utensils, or Safe Tray - NO utensils       Order Specific Question:   Suicide Risk     Answer:   MODERATE     Order Specific Question:   Clinical rationale to override score:     Answer:   modification to the care environment        Risk Assessment:  Risk for harm is elevated.  Risk factors: SI, substance use, trauma, family history, and past behaviors, maladaptive coping, impulsive   Protective factors: engaged in treatment , family support, help-seeking, future oriented     SIO: None    Disposition: TBD. Pending stabilization, medication optimization, & development of a safe discharge plan.     Attestations     This patient was seen and discussed with my attending physician, Dr. Arciniega.     Clarence Ellison, MS3  [employer]    Attestation:  I was present with the medical student who participated in the service and in the documentation of the note. I have verified the history and personally performed the physical exam and medical decision making. I agree with the assessment and plan of care as documented in the note.    Ramez Dagher, MD  Psychiatry Resident Physician     Attestation:  This patient has been seen and evaluated by me, Pb Arciniega MD.  I have discussed this patient with the house staff team including the resident and medical student and I agree with the findings and plan in this note.    I have reviewed today's vital signs, medications, labs and imaging. Pb Arciniega MD , PhD.

## 2024-06-01 PROCEDURE — 250N000013 HC RX MED GY IP 250 OP 250 PS 637: Performed by: PSYCHIATRY & NEUROLOGY

## 2024-06-01 PROCEDURE — 250N000013 HC RX MED GY IP 250 OP 250 PS 637

## 2024-06-01 PROCEDURE — 124N000002 HC R&B MH UMMC

## 2024-06-01 PROCEDURE — G0177 OPPS/PHP; TRAIN & EDUC SERV: HCPCS

## 2024-06-01 RX ADMIN — GABAPENTIN 600 MG: 600 TABLET, FILM COATED ORAL at 08:02

## 2024-06-01 RX ADMIN — FLUOXETINE HYDROCHLORIDE 60 MG: 20 CAPSULE ORAL at 08:02

## 2024-06-01 RX ADMIN — QUETIAPINE FUMARATE 50 MG: 50 TABLET ORAL at 09:18

## 2024-06-01 RX ADMIN — BUPRENORPHINE 8 MG: 8 TABLET SUBLINGUAL at 08:02

## 2024-06-01 RX ADMIN — ACYCLOVIR 400 MG: 400 TABLET ORAL at 13:12

## 2024-06-01 RX ADMIN — BUPRENORPHINE 8 MG: 8 TABLET SUBLINGUAL at 13:13

## 2024-06-01 RX ADMIN — PROPRANOLOL HYDROCHLORIDE 10 MG: 10 TABLET ORAL at 16:23

## 2024-06-01 RX ADMIN — LITHIUM CARBONATE 900 MG: 450 TABLET, EXTENDED RELEASE ORAL at 21:22

## 2024-06-01 RX ADMIN — OLANZAPINE 10 MG: 10 TABLET, FILM COATED ORAL at 11:04

## 2024-06-01 RX ADMIN — LEVOFLOXACIN 500 MG: 500 TABLET, FILM COATED ORAL at 08:02

## 2024-06-01 RX ADMIN — PROPRANOLOL HYDROCHLORIDE 10 MG: 10 TABLET ORAL at 21:19

## 2024-06-01 RX ADMIN — GABAPENTIN 600 MG: 600 TABLET, FILM COATED ORAL at 13:13

## 2024-06-01 RX ADMIN — BUPRENORPHINE 4 MG: 2 TABLET SUBLINGUAL at 19:03

## 2024-06-01 RX ADMIN — ACYCLOVIR 400 MG: 400 TABLET ORAL at 19:03

## 2024-06-01 RX ADMIN — ACYCLOVIR 400 MG: 400 TABLET ORAL at 08:03

## 2024-06-01 RX ADMIN — QUETIAPINE FUMARATE 300 MG: 300 TABLET ORAL at 21:22

## 2024-06-01 RX ADMIN — GABAPENTIN 600 MG: 600 TABLET, FILM COATED ORAL at 19:03

## 2024-06-01 RX ADMIN — PROPRANOLOL HYDROCHLORIDE 10 MG: 10 TABLET ORAL at 08:02

## 2024-06-01 ASSESSMENT — ACTIVITIES OF DAILY LIVING (ADL)
HYGIENE/GROOMING: INDEPENDENT;SHOWER
ADLS_ACUITY_SCORE: 30
ORAL_HYGIENE: INDEPENDENT
ADLS_ACUITY_SCORE: 30
DRESS: STREET CLOTHES;INDEPENDENT
DRESS: INDEPENDENT
ADLS_ACUITY_SCORE: 30
HYGIENE/GROOMING: HANDWASHING;INDEPENDENT
ORAL_HYGIENE: INDEPENDENT
ADLS_ACUITY_SCORE: 30

## 2024-06-01 NOTE — PLAN OF CARE
Problem: Pain Acute  Goal: Optimal Pain Control and Function  Outcome: Progressing     Problem: Adult Behavioral Health Plan of Care  Goal: Absence of New-Onset Illness or Injury  Outcome: Progressing  Intervention: Identify and Manage Fall Risk  Recent Flowsheet Documentation  Taken 6/1/2024 1630 by Akhil Cuenca RN  Safety Measures:   environmental rounds completed   safety rounds completed   Goal Outcome Evaluation:    Plan of Care Reviewed With: patient        Alert and oriented, able to communicate needs.  Visible in milieu, interactive with peers and staff members. Spent most of the shift watching tv, intermittently sleeping in lounge. Pleasant, cooperative and medication compliant. Patient rated anxiety 7 and depression 7, he denied any suicidal ideation/homicidal ideation, contracted for safety. ADLs WNL, Denied pain or discomfort. Food and fluid intake WNL. No PRN administered. Participated in community meeting.

## 2024-06-01 NOTE — PLAN OF CARE
Rehab Group    Start time: 13:15  End time: 14:10  Patient time total: 50 minutes    attended full group     #3 attended   Group Type: occupational therapy   Group Topic Covered: coping skills, emotional regulation, social skills, and healthy leisure time     Group Session Detail:  Patient actively engaged in therapeutic leisure activity (Sequence) in order to promote: cognitive skills (attention, planning, sequencing), leisure exploration, and structured socialization.       Patient Response/Contribution:  Cooperative with task, Confronted peers appropriately , Listened actively, Attentive, and Actively engaged     Patient Detail:  Patient presented as somewhat anxious in group; oftentimes, moving/pacing about group room. Patient was social and pleasant in interactions; spoke briefly about future goals and hopes for the future. Patient made positive comments about potential IRTS placements with the hope of being better able to manage his mental health. Patient assisted with inviting and recruiting peer's to attend group. Patient listened actively to instructions of activity; had a good return demonstration of understanding and strategy as game progressed. Patient was appropriate and socially engaged with peers; incorporating and responding well to humor. Patient remained in group for full duration and completed the organized activity. Patient endorsed a positive response to activity at conclusion of group and thanked writer for facilitating.         Patient Active Problem List   Diagnosis    Suicidal ideation    Methamphetamine use (H)    Polysubstance abuse (H)    Depression, unspecified depression type

## 2024-06-01 NOTE — PLAN OF CARE
"Pt denies SI.  Pt reports high anxiety but states does not know why,  Pt requested and rec'd PRN Seroquel 50mg at 0918 and PRN Zyprexa 10mg at 1141. Pt reports Zyprexa helps the most for his anxiety.   He states he is baseline anxious.  Pt social with peers, watching TV, and went to groups.  Problem: Adult Behavioral Health Plan of Care  Goal: Plan of Care Review  Outcome: Not Progressing  Goal: Patient-Specific Goal (Individualization)  Description: You can add care plan individualizations to a care plan. Examples of Individualization might be:  \"Parent requests to be called daily at 9am for status\", \"I have a hard time hearing out of my right ear\", or \"Do not touch me to wake me up as it startles  me\".  Outcome: Not Progressing  Goal: Adheres to Safety Considerations for Self and Others  Outcome: Not Progressing  Intervention: Develop and Maintain Individualized Safety Plan  Recent Flowsheet Documentation  Taken 6/1/2024 1245 by Melinad Marlow, RN  Safety Measures:   environmental rounds completed   safety rounds completed  Goal: Absence of New-Onset Illness or Injury  Outcome: Not Progressing  Intervention: Identify and Manage Fall Risk  Recent Flowsheet Documentation  Taken 6/1/2024 1245 by Melinda Marlow, RN  Safety Measures:   environmental rounds completed   safety rounds completed  Goal: Optimized Coping Skills in Response to Life Stressors  Outcome: Not Progressing  Goal: Develops/Participates in Therapeutic [place] to Support Successful Transition  Outcome: Not Progressing   Goal Outcome Evaluation:                        "

## 2024-06-01 NOTE — PLAN OF CARE
No PRNs given or requested this shift. Safety checks completed every 15 minutes; no concerns noted. Pt appears to have slept for 4.25 hours; will continue to monitor and offer support.       Problem: Sleep Disturbance  Goal: Adequate Sleep/Rest  Outcome: Progressing   Goal Outcome Evaluation:

## 2024-06-01 NOTE — PLAN OF CARE
Group Attendance:  attended full group    Time session began: 1915  Time session ended: 2000  Patient's total time in group: 45    Total # Attendees   4   Group Type psychotherapeutic     Group Topic Covered insight improvement        Group Session Detail Group members checked in with how they are feeling today. The group then answered and discussed questions relating to self-knowledge and self-awareness, goals and life purpose, self-esteem, and self-confidence.      Patient's response to the group topic/interactions:  Able to recall/repeat information presented, Cooperative with task, Listened actively, Expressed understanding of topic, and Organized     Patient Details: Pt attended group and checked in as feeling sad. Pt shared some of the struggle he was having with a recent break up. Pt shared his thoughts with pt's who had a discussion about staff. Pt was hyper verbal at times but also listened to peers. Pt was cooperative and engaged in the activity.            72082 - Group psychotherapy - 1 Session    Patient Active Problem List   Diagnosis    Suicidal ideation    Methamphetamine use (H)    Polysubstance abuse (H)    Depression, unspecified depression type

## 2024-06-02 PROCEDURE — 250N000013 HC RX MED GY IP 250 OP 250 PS 637

## 2024-06-02 PROCEDURE — 90853 GROUP PSYCHOTHERAPY: CPT

## 2024-06-02 PROCEDURE — 250N000013 HC RX MED GY IP 250 OP 250 PS 637: Performed by: PSYCHIATRY & NEUROLOGY

## 2024-06-02 PROCEDURE — 124N000002 HC R&B MH UMMC

## 2024-06-02 RX ADMIN — PROPRANOLOL HYDROCHLORIDE 10 MG: 10 TABLET ORAL at 21:41

## 2024-06-02 RX ADMIN — NICOTINE POLACRILEX 2 MG: 2 GUM, CHEWING BUCCAL at 20:45

## 2024-06-02 RX ADMIN — LITHIUM CARBONATE 900 MG: 450 TABLET, EXTENDED RELEASE ORAL at 21:40

## 2024-06-02 RX ADMIN — LEVOFLOXACIN 500 MG: 500 TABLET, FILM COATED ORAL at 07:49

## 2024-06-02 RX ADMIN — BUPRENORPHINE 8 MG: 8 TABLET SUBLINGUAL at 07:49

## 2024-06-02 RX ADMIN — PROPRANOLOL HYDROCHLORIDE 10 MG: 10 TABLET ORAL at 07:49

## 2024-06-02 RX ADMIN — ACYCLOVIR 400 MG: 400 TABLET ORAL at 19:05

## 2024-06-02 RX ADMIN — QUETIAPINE FUMARATE 300 MG: 300 TABLET ORAL at 21:41

## 2024-06-02 RX ADMIN — NICOTINE POLACRILEX 2 MG: 2 GUM, CHEWING BUCCAL at 21:40

## 2024-06-02 RX ADMIN — FLUOXETINE HYDROCHLORIDE 60 MG: 20 CAPSULE ORAL at 07:49

## 2024-06-02 RX ADMIN — ACYCLOVIR 400 MG: 400 TABLET ORAL at 13:22

## 2024-06-02 RX ADMIN — ACYCLOVIR 400 MG: 400 TABLET ORAL at 07:49

## 2024-06-02 RX ADMIN — BUPRENORPHINE 8 MG: 8 TABLET SUBLINGUAL at 13:23

## 2024-06-02 RX ADMIN — HYDROXYZINE HYDROCHLORIDE 100 MG: 50 TABLET, FILM COATED ORAL at 09:47

## 2024-06-02 RX ADMIN — GABAPENTIN 600 MG: 600 TABLET, FILM COATED ORAL at 07:49

## 2024-06-02 RX ADMIN — GABAPENTIN 600 MG: 600 TABLET, FILM COATED ORAL at 13:22

## 2024-06-02 RX ADMIN — BUPRENORPHINE 4 MG: 2 TABLET SUBLINGUAL at 19:06

## 2024-06-02 RX ADMIN — PROPRANOLOL HYDROCHLORIDE 10 MG: 10 TABLET ORAL at 16:18

## 2024-06-02 RX ADMIN — QUETIAPINE FUMARATE 50 MG: 50 TABLET ORAL at 19:05

## 2024-06-02 RX ADMIN — QUETIAPINE FUMARATE 50 MG: 50 TABLET ORAL at 05:50

## 2024-06-02 RX ADMIN — GABAPENTIN 600 MG: 600 TABLET, FILM COATED ORAL at 19:05

## 2024-06-02 RX ADMIN — HYDROXYZINE HYDROCHLORIDE 100 MG: 50 TABLET, FILM COATED ORAL at 16:18

## 2024-06-02 ASSESSMENT — ACTIVITIES OF DAILY LIVING (ADL)
ADLS_ACUITY_SCORE: 30
DRESS: INDEPENDENT
ADLS_ACUITY_SCORE: 30
ORAL_HYGIENE: INDEPENDENT
ADLS_ACUITY_SCORE: 30
DRESS: INDEPENDENT;STREET CLOTHES
HYGIENE/GROOMING: INDEPENDENT
ADLS_ACUITY_SCORE: 30
ORAL_HYGIENE: INDEPENDENT
ADLS_ACUITY_SCORE: 30
LAUNDRY: WITH SUPERVISION
ADLS_ACUITY_SCORE: 30
HYGIENE/GROOMING: INDEPENDENT
ADLS_ACUITY_SCORE: 30

## 2024-06-02 NOTE — PLAN OF CARE
"Pt denies SI, denies hallucinations.  Pt reports high anxiety.  There is a particular patient that is annoying Perry.  Ie snapping his fingers in front of his room, singing in front of his room.  Perry has told this patient to leave him alone.  But pt has several times walked up to home and looked him up and down then nodded at him, snapped his fingers then pointed his finger at him.  Perry has been frustrated and feels targeted by this patient.  Perry has done good job of distracting self, talking to staff.    Problem: Adult Behavioral Health Plan of Care  Goal: Plan of Care Review  Outcome: Not Progressing  Goal: Patient-Specific Goal (Individualization)  Description: You can add care plan individualizations to a care plan. Examples of Individualization might be:  \"Parent requests to be called daily at 9am for status\", \"I have a hard time hearing out of my right ear\", or \"Do not touch me to wake me up as it startles  me\".  Outcome: Not Progressing  Goal: Adheres to Safety Considerations for Self and Others  Outcome: Not Progressing  Intervention: Develop and Maintain Individualized Safety Plan  Recent Flowsheet Documentation  Taken 6/2/2024 1300 by Melinda Marlow, RN  Safety Measures:   environmental rounds completed   safety rounds completed  Goal: Absence of New-Onset Illness or Injury  Outcome: Not Progressing  Intervention: Identify and Manage Fall Risk  Recent Flowsheet Documentation  Taken 6/2/2024 1300 by Melinda Marlow, RN  Safety Measures:   environmental rounds completed   safety rounds completed  Goal: Optimized Coping Skills in Response to Life Stressors  Outcome: Not Progressing  Goal: Develops/Participates in Therapeutic [place] to Support Successful Transition  Outcome: Not Progressing   Goal Outcome Evaluation:                        "

## 2024-06-02 NOTE — PLAN OF CARE
Towards the end of the shift, pt endorsed anxiety 6/10 - requested and given Seroquel  50 mg for anxiety as ordered - was helpful.  Denied depression , denied SI/AH/VH. Denied pain as well. Safety checks completed every 15 minutes; no safety concerns noted. Pt appears to have slept for 5 hours; will continue to monitor and offer support.     Problem: Sleep Disturbance  Goal: Adequate Sleep/Rest  Outcome: Progressing   Goal Outcome Evaluation:

## 2024-06-03 PROCEDURE — 99232 SBSQ HOSP IP/OBS MODERATE 35: CPT | Mod: GC | Performed by: STUDENT IN AN ORGANIZED HEALTH CARE EDUCATION/TRAINING PROGRAM

## 2024-06-03 PROCEDURE — 124N000002 HC R&B MH UMMC

## 2024-06-03 PROCEDURE — 250N000013 HC RX MED GY IP 250 OP 250 PS 637

## 2024-06-03 PROCEDURE — 250N000013 HC RX MED GY IP 250 OP 250 PS 637: Performed by: STUDENT IN AN ORGANIZED HEALTH CARE EDUCATION/TRAINING PROGRAM

## 2024-06-03 PROCEDURE — G0177 OPPS/PHP; TRAIN & EDUC SERV: HCPCS

## 2024-06-03 PROCEDURE — 250N000013 HC RX MED GY IP 250 OP 250 PS 637: Performed by: PSYCHIATRY & NEUROLOGY

## 2024-06-03 RX ORDER — QUETIAPINE FUMARATE 100 MG/1
100 TABLET, FILM COATED ORAL 2 TIMES DAILY PRN
Status: DISCONTINUED | OUTPATIENT
Start: 2024-06-03 | End: 2024-06-06 | Stop reason: HOSPADM

## 2024-06-03 RX ORDER — QUETIAPINE FUMARATE 400 MG/1
400 TABLET, FILM COATED ORAL AT BEDTIME
Status: DISCONTINUED | OUTPATIENT
Start: 2024-06-03 | End: 2024-06-06 | Stop reason: HOSPADM

## 2024-06-03 RX ADMIN — PROPRANOLOL HYDROCHLORIDE 10 MG: 10 TABLET ORAL at 21:12

## 2024-06-03 RX ADMIN — GABAPENTIN 600 MG: 600 TABLET, FILM COATED ORAL at 08:53

## 2024-06-03 RX ADMIN — NICOTINE POLACRILEX 2 MG: 2 GUM, CHEWING BUCCAL at 14:53

## 2024-06-03 RX ADMIN — GABAPENTIN 600 MG: 600 TABLET, FILM COATED ORAL at 19:19

## 2024-06-03 RX ADMIN — PROPRANOLOL HYDROCHLORIDE 10 MG: 10 TABLET ORAL at 08:53

## 2024-06-03 RX ADMIN — HYDROXYZINE HYDROCHLORIDE 100 MG: 50 TABLET, FILM COATED ORAL at 14:53

## 2024-06-03 RX ADMIN — ACYCLOVIR 400 MG: 400 TABLET ORAL at 13:36

## 2024-06-03 RX ADMIN — ACYCLOVIR 400 MG: 400 TABLET ORAL at 08:53

## 2024-06-03 RX ADMIN — LITHIUM CARBONATE 900 MG: 450 TABLET, EXTENDED RELEASE ORAL at 21:11

## 2024-06-03 RX ADMIN — QUETIAPINE FUMARATE 400 MG: 400 TABLET ORAL at 21:12

## 2024-06-03 RX ADMIN — FLUOXETINE HYDROCHLORIDE 60 MG: 20 CAPSULE ORAL at 08:53

## 2024-06-03 RX ADMIN — ACYCLOVIR 400 MG: 400 TABLET ORAL at 19:19

## 2024-06-03 RX ADMIN — Medication 3 MG: at 03:35

## 2024-06-03 RX ADMIN — BUPRENORPHINE 4 MG: 2 TABLET SUBLINGUAL at 19:19

## 2024-06-03 RX ADMIN — GABAPENTIN 600 MG: 600 TABLET, FILM COATED ORAL at 13:36

## 2024-06-03 RX ADMIN — BUPRENORPHINE 8 MG: 8 TABLET SUBLINGUAL at 13:36

## 2024-06-03 RX ADMIN — QUETIAPINE FUMARATE 50 MG: 50 TABLET ORAL at 03:35

## 2024-06-03 RX ADMIN — BUPRENORPHINE 8 MG: 8 TABLET SUBLINGUAL at 08:53

## 2024-06-03 RX ADMIN — LEVOFLOXACIN 500 MG: 500 TABLET, FILM COATED ORAL at 08:53

## 2024-06-03 ASSESSMENT — ACTIVITIES OF DAILY LIVING (ADL)
ADLS_ACUITY_SCORE: 36
ADLS_ACUITY_SCORE: 30
ADLS_ACUITY_SCORE: 36
ORAL_HYGIENE: INDEPENDENT
ADLS_ACUITY_SCORE: 30
ADLS_ACUITY_SCORE: 36
DRESS: INDEPENDENT
HYGIENE/GROOMING: INDEPENDENT
ADLS_ACUITY_SCORE: 30
ADLS_ACUITY_SCORE: 30
LAUNDRY: WITH SUPERVISION
ADLS_ACUITY_SCORE: 30
ADLS_ACUITY_SCORE: 36
ADLS_ACUITY_SCORE: 30

## 2024-06-03 NOTE — PLAN OF CARE
06/02/24 2107   Group Therapy Session   Group Attendance attended group session   Time Session Began 2000   Time Session Ended 2100   Total Time (minutes) 50   Total # Attendees 6   Group Type psychotherapeutic   Group Topic Covered coping skills/lifestyle management;emotions/expression   Group Session Detail CTC-Group members completed/discussed worksheets on job needs inventor. Each participant listed their physical, social, emotional, and cognitive needs.   Patient Response/Contribution discussed personal experience with topic;cooperative with task;listened actively;offered helpful suggestions to peers   Patient Participation Detail Patient presented to group was pleasant, engaged, and checked in feeling optimistic about his future. Pt expressed having some fears with his discharge plan to IRTS.

## 2024-06-03 NOTE — PLAN OF CARE
Team Note Due:  Monday    Assessment/Intervention/Current Symtoms and Care Coordination:  Chart review and met with team, discussed pt progress, symptomology, and response to treatment.  Discussed the discharge plan and any potential impediments to discharge.    Met with pt to discuss IRTS placement and reassured pt his referrals remain pending and he's on the wait list for multiple locations. Pt expressed appreciation for reassurance.    Discharge Plan or Goal:  Pending stabilization of symptoms and safe disposition planning.  [facility] - referrals made  Psychiatry - appt pending  Subutex - scheduled     Barriers to Discharge:  Patient requires further psychiatric stabilization due to current symptomology, medication management with changes subject to provider, coordination with outside supports, and aftercare planning.     Referral Status:  [facility] - referral sent 5/23. Interviewed 5/30  [facility] - referral sent 5/24, no openings as of 5/31  [facility] - referral sent 5/24, no openings as of 5/30  [facility] - referral faxed 5/24, on wait list 5/24. Estimated 1-2 week wait     Legal Status:  Voluntary     Contacts:  Ileana (Sister): [phone]       Upcoming Meetings and Dates/Important Information and next steps:  Follow up on IRTS referrals

## 2024-06-03 NOTE — PLAN OF CARE
BEH IP Unit Acuity Rating Score (UARS)  Patient is given one point for every criteria they meet.    CRITERIA SCORING   On a 72 hour hold, court hold, committed, stay of commitment, or revocation. 0    Patient LOS on BEH unit exceeds 20 days. 0  LOS: 15   Patient under guardianship, 55+, otherwise medically complex, or under age 11. 0   Suicide ideation without relief of precipitating factors. 1   Current plan for suicide. 0   Current plan for homicide. 0   Imminent risk or actual attempt to seriously harm another without relief of factors precipitating the attempt. 0   Severe dysfunction in daily living (ex: complete neglect for self care, extreme disruption in vegetative function, extreme deterioration in social interactions). 1   Recent (last 7 days) or current physical aggression in the ED or on unit. 0   Restraints or seclusion episode in past 72 hours. 0   Recent (last 7 days) or current verbal aggression, agitation, yelling, etc., while in the ED or unit. 0   Active psychosis. 0   Need for constant or near constant redirection (from leaving, from others, etc).  0   Intrusive or disruptive behaviors. 0   Patient requires 3 or more hours of individualized nursing care per 8-hour shift (i.e. for ADLs, meds, therapeutic interventions). 0   TOTAL 2

## 2024-06-03 NOTE — PROGRESS NOTES
Rehab Group    Start time: 1405  End time: 1500  Patient time total: 25 minutes (no charge)    attended partial group    #4 attended   Group Type: occupational therapy   Group Topic Covered: problem solving, healthy leisure time, and cognitive activities     Group Session Detail:  Visual-spatial group game     Patient Response/Contribution:  Cooperative with task, Attentive, and Actively engaged     Patient Detail:    Pt actively participated in a structured occupational therapy group with a focus on a visual-spatial leisure task. Pt was able to follow 2-step directions of the novel task, and demonstrated strategic planning and problem solving throughout the task. Pt remained focused and engaged for approximately half of group. Observed actively planning ahead and tracking the task consistently. Playfully competitive with peers. Left group early and did not return (no charge).        No Charge    Patient Active Problem List   Diagnosis    Suicidal ideation    Methamphetamine use (H)    Polysubstance abuse (H)    Depression, unspecified depression type

## 2024-06-03 NOTE — PLAN OF CARE
"Pt was visible in the milieu throughout the shift. Pt was in the lounge watching movies with peers. Pt was interacting well with peers. Pt reported that there was one particular peer that was \"trying to get in to me but I will never hurt him\". Pt saying that he has been ignoring the other peer and \"trying not to give him anything to feed off by getting me upset\". Pt reported anxiety 8/10 surrounding not known where and when he will go from the hospital. Pt also reported feeling sad. Prn Hydroxyzine was given and later Seroquel. This helped greatly with the anxiety. Pt denied for depression, SI/SIB/AH/VH. Ate supper adequately. No behavior concerns noted.     Goal Outcome Evaluation:  Problem: Anxiety  Goal: Anxiety Reduction or Resolution  Outcome: Progressing     Problem: Depression  Goal: Improved Mood  Outcome: Progressing                           "

## 2024-06-03 NOTE — PROGRESS NOTES
"  ----------------------------------------------------------------------------------------------------------  [facility]  Psychiatry Progress Note  Hospital Day #15     Interim History:     The patient's care was discussed with the treatment team and chart notes were reviewed.    Vitals: Vital signs:  Temp: 98.6  F (37  C) Temp src: Oral BP: 119/74 Pulse: 63   Resp: 16 SpO2: 94 % O2 Device: None (Room air)     Weight: 131.2 kg (289 lb 4.8 oz)  Estimated body mass index is 36.16 kg/m  as calculated from the following:    Height as of this encounter: 1.905 m (6' 3\").    Weight as of this encounter: 131.2 kg (289 lb 4.8 oz).    Sleep: 2 hours (06/03/24 0615)  Scheduled medications: Took all scheduled medications as prescribed  Psychiatric PRN medications: Tylenol, Hydroxyzine 100mg, Nicotine gum, Seroquel 100mg          Staff Report:      Last 24H PRN:     hydrOXYzine HCl (ATARAX) tablet 100 mg, 100 mg at 06/02/24 1618    melatonin tablet 3 mg, 3 mg at 06/03/24 0335    nicotine (NICORETTE) gum 2 mg, 2 mg at 06/02/24 2140    QUEtiapine (SEROquel) tablet 50 mg, 50 mg at 06/03/24 0335     Over the weekend, patient endorsed anxiety 7/1  and depression 5/10.  There is a particular patient that is annoying Perry. Ie snapping his fingers in front of his room, singing in front of his room. Perry has told this patient to leave him alone. But pt has several times walked up to home and looked him up and down then nodded at him, snapped his fingers then pointed his finger at him. Perry has been frustrated and feels targeted by this patient. Perry has done good job of distracting self, talking to staff. Denied depression , denied SI/AH/VH, slept for only two hours last night.     Subjective:     Patient Interview:  Team interviewed Perry (JULIAN) in the conference room. Says he is feeling sad, depressed, lonely. Feels this way from being in hospital. Misses family--close friends and " "sister. Some live in [place]  Says he spoke with ex weeks ago when experiencing psychosis--she did not want to see him in that state. Called again yesterday, and she wanted to say hi. Not sure he wants to be with her anymore as he needs to focus on sobriety.  Otherwise well, but wants to up Seroquel in the morning for anxiety. Wants to increase the PRN for the morning. Feels it would help in the morning for stress and anxiety. Agrees that also increasing night dose would be good  Asking about olanzapine, but agreed when told that should stay the same.  Bothered by AH telling him he is not okay, will not make it. Says he argues with AH saying he will remain sober. AH is external to him, irritating to him. Able to make it go away by focusing on breathing, closing eyes. Says voices have been occurring less and less. Does not recognize voice as his own or anyone he knows.   No thoughts of hurting self for past week. Says Never had thoughts of harming others.  Voices significant frustration with voice telling him his efforts are futile.  Hasn't yet heard back about the IRTS interview. Feeling optimistic and grateful, but also nervous.    ROS:  Patient has pain (testicular)  Patient denies acute concerns     Objective:     Vitals:  /74   Pulse 63   Temp 98.6  F (37  C) (Oral)   Resp 16   Ht 1.905 m (6' 3\")   Wt 131.2 kg (289 lb 4.8 oz)   SpO2 94%   BMI 36.16 kg/m      Allergies:  Allergies   Allergen Reactions    Wellbutrin [Bupropion] Difficulty breathing       Current Medications:  Scheduled:  Current Facility-Administered Medications   Medication Dose Route Frequency Provider Last Rate Last Admin    acetaminophen (TYLENOL) tablet 650 mg  650 mg Oral Q4H PRN Mati Cueva MD   650 mg at 05/29/24 1641    acyclovir (ZOVIRAX) tablet 400 mg  400 mg Oral TID Daniel Castro   400 mg at 06/02/24 1905    albuterol (PROVENTIL HFA/VENTOLIN HFA) inhaler  2 puff Inhalation Q6H PRN Jeri Morse MD        alum & " mag hydroxide-simethicone (MAALOX) suspension 30 mL  30 mL Oral Q4H PRN Mati Cueva MD   30 mL at 05/28/24 1651    [Held by provider] atorvastatin (LIPITOR) tablet 20 mg  20 mg Oral QPM Mati Cueva MD   20 mg at 05/21/24 2057    buprenorphine (SUBUTEX) sublingual tablet 4 mg  4 mg Sublingual Daily Pb Arciniega MD   4 mg at 06/02/24 1906    buprenorphine (SUBUTEX) sublingual tablet 8 mg  8 mg Sublingual BID Pb Arciniega MD   8 mg at 06/02/24 1323    cyclobenzaprine (FLEXERIL) tablet 5 mg  5 mg Oral BID PRN Pb Arciniega MD   5 mg at 05/21/24 1420    FLUoxetine (PROzac) capsule 60 mg  60 mg Oral Daily Pb Arciniega MD   60 mg at 06/02/24 0749    gabapentin (NEURONTIN) tablet 600 mg  600 mg Oral TID Mati Cueva MD   600 mg at 06/02/24 1905    hydrOXYzine HCl (ATARAX) tablet 100 mg  100 mg Oral BID PRN Jeri Morse MD   100 mg at 06/02/24 1618    levofloxacin (LEVAQUIN) tablet 500 mg  500 mg Oral Daily Daniel Castro   500 mg at 06/02/24 0749    lithium (ESKALITH CR/LITHOBID) CR tablet 900 mg  900 mg Oral At Bedtime Jeri Morse MD   900 mg at 06/02/24 2140    melatonin tablet 3 mg  3 mg Oral At Bedtime PRN Mati Cueva MD   3 mg at 06/03/24 0335    naloxone (NARCAN) injection 0.2 mg  0.2 mg Intravenous Q2 Min PRN Pb Arcinieag MD        Or    naloxone (NARCAN) injection 0.4 mg  0.4 mg Intravenous Q2 Min PRN Pb Arciniega MD        Or    naloxone (NARCAN) injection 0.2 mg  0.2 mg Intramuscular Q2 Min PRN Pb Arciniega MD        Or    naloxone (NARCAN) injection 0.4 mg  0.4 mg Intramuscular Q2 Min PRN Pb Arciniega MD        nicotine (NICORETTE) gum 2 mg  2 mg Buccal Q1H PRN Kirby Colbert MD   2 mg at 06/02/24 2140    OLANZapine (zyPREXA) tablet 10 mg  10 mg Oral 4x Daily PRN Pb Arciniega MD   10 mg at 06/01/24 1104    Or    OLANZapine (zyPREXA) injection 10 mg  10 mg Intramuscular TID PRN Pb Arciniega MD        polyethylene glycol (MIRALAX) Packet 17 g  17 g Oral Daily  PRN Mati Cueva MD        propranolol (INDERAL) tablet 10 mg  10 mg Oral TID Dagher, Ramez, MD   10 mg at 06/02/24 2141    QUEtiapine (SEROquel) tablet 300 mg  300 mg Oral At Bedtime Jeri Morse MD   300 mg at 06/02/24 2141    QUEtiapine (SEROquel) tablet 50 mg  50 mg Oral BID PRN Jeri Morse MD   50 mg at 06/03/24 0335       PRN:  Current Facility-Administered Medications   Medication Dose Route Frequency Provider Last Rate Last Admin    acetaminophen (TYLENOL) tablet 650 mg  650 mg Oral Q4H PRN Mati Cueva MD   650 mg at 05/29/24 1641    acyclovir (ZOVIRAX) tablet 400 mg  400 mg Oral TID Daniel Castro   400 mg at 06/02/24 1905    albuterol (PROVENTIL HFA/VENTOLIN HFA) inhaler  2 puff Inhalation Q6H PRN Jeri Morse MD        alum & mag hydroxide-simethicone (MAALOX) suspension 30 mL  30 mL Oral Q4H PRN Mati Cueva MD   30 mL at 05/28/24 1651    [Held by provider] atorvastatin (LIPITOR) tablet 20 mg  20 mg Oral QPM Mati Cueva MD   20 mg at 05/21/24 2057    buprenorphine (SUBUTEX) sublingual tablet 4 mg  4 mg Sublingual Daily Pb Arciniega MD   4 mg at 06/02/24 1906    buprenorphine (SUBUTEX) sublingual tablet 8 mg  8 mg Sublingual BID Pb Arciniega MD   8 mg at 06/02/24 1323    cyclobenzaprine (FLEXERIL) tablet 5 mg  5 mg Oral BID PRN Pb Arciniega MD   5 mg at 05/21/24 1420    FLUoxetine (PROzac) capsule 60 mg  60 mg Oral Daily Pb Arciniega MD   60 mg at 06/02/24 0749    gabapentin (NEURONTIN) tablet 600 mg  600 mg Oral TID Mati Cueva MD   600 mg at 06/02/24 1905    hydrOXYzine HCl (ATARAX) tablet 100 mg  100 mg Oral BID PRN Jeri Morse MD   100 mg at 06/02/24 1618    levofloxacin (LEVAQUIN) tablet 500 mg  500 mg Oral Daily Daniel Castro   500 mg at 06/02/24 0749    lithium (ESKALITH CR/LITHOBID) CR tablet 900 mg  900 mg Oral At Bedtime Jeri Morse MD   900 mg at 06/02/24 2140    melatonin tablet 3 mg  3 mg Oral At Bedtime PRN Anay,  MD Mati   3 mg at 06/03/24 0335    naloxone (NARCAN) injection 0.2 mg  0.2 mg Intravenous Q2 Min PRN Pb Arciniega MD        Or    naloxone (NARCAN) injection 0.4 mg  0.4 mg Intravenous Q2 Min PRN Pb Arciniega MD        Or    naloxone (NARCAN) injection 0.2 mg  0.2 mg Intramuscular Q2 Min PRN Pb Arciniega MD        Or    naloxone (NARCAN) injection 0.4 mg  0.4 mg Intramuscular Q2 Min PRN Pb Arciniega MD        nicotine (NICORETTE) gum 2 mg  2 mg Buccal Q1H PRN Kirby Colbert MD   2 mg at 06/02/24 2140    OLANZapine (zyPREXA) tablet 10 mg  10 mg Oral 4x Daily PRN Pb Arciniega MD   10 mg at 06/01/24 1104    Or    OLANZapine (zyPREXA) injection 10 mg  10 mg Intramuscular TID PRN Pb Arciniega MD        polyethylene glycol (MIRALAX) Packet 17 g  17 g Oral Daily PRN Mati Cueva MD        propranolol (INDERAL) tablet 10 mg  10 mg Oral TID Dagher, Ramez, MD   10 mg at 06/02/24 2141    QUEtiapine (SEROquel) tablet 300 mg  300 mg Oral At Bedtime Jeri Morse MD   300 mg at 06/02/24 2141    QUEtiapine (SEROquel) tablet 50 mg  50 mg Oral BID PRN Jeri Morse MD   50 mg at 06/03/24 0335       Labs and Imaging:  New results:   No results found for this or any previous visit (from the past 24 hour(s)).      Data this admission:  - CBC (latest 5/29/24): Hgb 12.9  - CMP (latest 5/27/24): AST 40, ALT 56, CO2 30  - TSH normal  - UDS Positive for methamphetamines and cannabis  - Vit D normal  - Hgb A1c normal  - Lipids: Trig 173, HDL 32  - Vit B12 normal  - Folate normal  - Lithium (latest 5/27/24) 0.85 mmol  - HIV neg  - PSA WNL  -Hepatitis panel negative  - HSV antibodies positive  - Urine culture No growth  - Urine Macroscopic normal  - EKG refused     Mental Status Exam:     Oriented to:  Grossly Oriented  General:  Awake and Alert, standing in milieu talking to peers prior to encounter  Appearance:  appears stated age, Hair is kept and grooming is fair, and Tattoos on arms and  "neck  Behavior/Attitude:  cooperative and engaged, anxious at times  Eye Contact: good  Psychomotor: appears significantly less restless than last week, no evidence of tics, dystonia, or tardive dyskinesia   Speech:  appropriate volume/tone  Language: Fluent in English with appropriate syntax and vocabulary.  Mood:  \"sad\"  Affect:  depressed  Thought Process:  linear  Thought Content: No SI  Associations:  intact  Insight:  good, voluntarily admit, is aware of his worsening psychosis in the context of substance use, seems to be willing to working with the care team to get better  Judgment:  fair , appropriate engagement with team, adherent to team recommendations  Impulse control: fair  Attention Span:  grossly intact  Concentration:  grossly intact  Recent and Remote Memory:  not formally assessed  Fund of Knowledge:   not formally assessed  Muscle Strength and Tone: normal  Gait and Station: Normal     Psychiatric Assessment   Diagnosis:  Psychosis, unspecified  R/O Methamphetamine Use Disorder  R/O Alcohol Use Disorder   R/O Opioid Use Disorder   R/O Cannabis Use Disorder     Perry Preciado is a 49 year old male with previous psychiatric diagnoses of MDD, KANE, ADHD, and substance disorder (alcohol, meth, opiates) and medical history significant for TBI who was admitted from the ER on 05/19/2024 due to concern for SI and AVH in the context of medication non-adherence and substance use. Most recent psychiatric hospitalization was at [facility] in January 2024 for similar concerns. Significant symptoms on admission included depression, SI, and AVH. The MSE on admission was pertinent for SI, AVH, and depressed affect. Biological contributions to mental health presentation included prior diagnoses of mental health disorders, multiple medication trials, long standing substance use history, hx of TBI, and genetic predisposition for suicide completion and substance use. Psychological contributions to mental health " presentation included maladaptive coping and cluster B traits. Social factors contributing to mental health presentation include limited social supports, financial stressors, and recent family deaths who also  by suicide around this time of the year. Protective factors included being engaged in treatment.     In summary, the patient's differential is still in evolution. Patient has reported symptoms of depression, SI, and AVH in the context of substance use and medication nonadherence. Patient per chart review has mentioned that he only has psychosis during periods of using substances however during this current hospital stay he notes it happens even when he's sober. He also mentions some periods concerning for hypomania/mily but it's complicated by his substance use and cluster B traits. For now considering unspecified mood and unspecified psychosis. He will likely benefit from medication optimization and substance use treatment this admission.     Given that he currently has SI and psychosis, patient warrants inpatient psychiatric hospitalization to maintain his safety.      Psychiatric Plan by Diagnosis      Today's changes:  - Increased Quetiapine to 400mg  -Plan for potential discharge to [facility] v dual treatment facility. CTC to also look into case management on discharge.    # Psychosis  #Mood Disorder  1. Medications:  - Zyprexa 10 mg QID PRN ordered  - Fluoxetine 60mg  - Lithium 900mg qhs  - Seroquel 300mg qpm, increased to 400mg on      2. Pertinent Labs/Monitoring:   - EKG declined     3. Additional Plans:  - Patient will be treated in therapeutic milieu with appropriate individual and group therapies as described    Stimulant Use Disorder  #Opiate Use Disorder  #Nicotine Use Disorder  - Subutex 20mg daily total  - Consider providing narcan on discharge  - Nicotine Gum 2mg q1hr prn  - Will reconsider CD assessment once mental health sxs above stabilizes.      #ADHD  - Atomoxetine discontinued    "  #PTSD  - Prazosin discontinued     #Anxiety  -  Added Propranolol 10mg TID for anxiety on 05/30     Psychiatric Hospital Course:      Perry Preciado was admitted to [facility] as a voluntary patient.   Medications:  PTA atomoxetine, Subutex, Prozac, Gabapentin, Lithium, Prazosin, and seroquel were continued. Zyprexa 10mg TID PRN for anxiety, agitation related to psychosis. Prazosin discontinued -had been using for months, BP low, and pt reported difficulty breathing. Bedtime Seroquel increased from 200 to 300mg on 05/28 to 400mg on 06/03 to better address psychosis and anxiety.   Subutex increased to 20mg total daily dosing further manage opioid withdrawal symptoms    Atomoxetine discontinued, due to current anxiety levels. Atorvastatin held due to slightly elevated liver enzymes. Albuterol inhaler PRN ordered.  Added Propranolol 10mg TID for anxiety on 05/30     The risks, benefits, alternatives, and side effects were discussed and understood by the patient.     Medical Assessment and Plan     Medical diagnoses to be addressed this admission:    # TBI  - Monitor      #Hypertriglyceridemia  - Atorvastatin 20mg qpm     #Neuropathy  - 600mg TID        Clinically Significant Risk Factors Present on Admission                 # Hypertension: Home medication list includes antihypertensive(s)      # Obesity: Estimated body mass index is 33.66 kg/m  as calculated from the following:    Height as of this encounter: 1.905 m (6' 3\").    Weight as of this encounter: 122.2 kg (269 lb 4.8 oz).           Medical course:  Patient was physically examined by the ED prior to being transferred to the unit and was found to be medically stable and appropriate for admission.      Consults, Medicine - Recommendations:    #Testicular pain  #Acute epididymitis  Reports 1 month of right testicular pain with associated swelling. Last sexual encounter 1 month ago, does engage in anal insertion practices. Testicular exam revealed some " unilateral swelling of epididymis/ vas deferens on right side. WBC and UA normal. No concerning finding of testicular ultrasound, slightly tortuous engorged appearance of the right spermatic cord noted. Clinical picture consistent with acute epididymitis. Started on Abx, Patient noted improvement in pain     -1 time IM dose of Ceftriaxone given 5/29  -Continue levofloxacin PO  -Tylenol as needed for pain   -Educated patient to inform nursing staff if pain does not continue to improve         #Urinary hesitancy  Reports 1 year of urinary hesitancy.  Denies nocturia, pain with urination, frequency. PSA and UA normal. May be secondary to anticholinergic effects from Seroquel. Patient does not currently have rentention.     -Educated patient that concern is mostly likely associated with medication side effect  -Recommend follow up with PCP upon discharge to address concern     #Positive HSV1/HSV2 Antibody  #Possible herpes simplex lesion   HSV1/2 ordered upon admission. Denies history of oral or genital lesions. No genital lesions noted on physical exam performed for testicular pain. Noted small lesion on the corner of the patient mouth. Denied any current pain, prodromal neurologic pain, discharge or crusting of lesion. Initially seemed more consistent with Angular cheilitis, however with recent positive for HSV serology, reasonable to start antiviral.      -Ordered Acyclovir 400 mg 3 times daily x 7 days  -Advised patient not to touch lesions and to use good hand hygiene       Checklist     Legal Status: Voluntary     Safety Assessment:   Behavioral Orders   Procedures    Code 2    Routine Programming     As clinically indicated    Status 15     Every 15 minutes.    Suicide precautions: Suicide Risk: MODERATE; Clinical rationale to override score: modification to the care environment     Patients on Suicide Precautions should have a Combination Diet ordered that includes a Diet selection(s) AND a Behavioral Tray  selection for Safe Tray - with utensils, or Safe Tray - NO utensils       Order Specific Question:   Suicide Risk     Answer:   MODERATE     Order Specific Question:   Clinical rationale to override score:     Answer:   modification to the care environment       Risk Assessment:  Risk for harm is elevated.  Risk factors: SI, substance use, trauma, family history, and past behaviors, maladaptive coping, impulsive   Protective factors: engaged in treatment , family support, help-seeking, future oriented     SIO: None    Disposition: TBD. Pending stabilization, medication optimization, & development of a safe discharge plan.     Attestations     This patient was seen and discussed with my attending physician, Dr. Arciniega.     Tripp Guy, MS3  [employer]    Attestation:  I was present with the medical student who participated in the service and in the documentation of the note. I have verified the history and personally performed the physical exam and medical decision making. I agree with the assessment and plan of care as documented in the note.    Ramez Dagher, MD  Psychiatry Resident Physician

## 2024-06-03 NOTE — PLAN OF CARE
Pt was up intermittently during the shift; occasionally pacing calmly up and down the halls. Endorsed anxiety 7/10 and depression 5/10. Denied pain at this time. Scheduled Seroquel 50 mg was administered for anxiety; pt also requested and given Melatonin 3 mg to promote sleep.  Meds given were helpful as pt was able to fall asleep there after. Pt appears to have slept for 2 hours; will continue to monitor and offer support.

## 2024-06-03 NOTE — PROGRESS NOTES
Rehab Group    Start time: 1015  End time: 1215  Patient time total: 50 minutes    attended full group    #5 attended   Group Type: occupational therapy   Group Topic Covered: coping skills     Group Session Detail:  OT clinic     Patient Response/Contribution:  Cooperative with task, Attentive, and Actively engaged     Patient Detail:    Pt actively participated in occupational therapy clinic to facilitate coping skill exploration, creative expression within personally meaningful activities, and clinical observation of social, cognitive, and kinesthetic performance skills. Pt response: Independent to initiate, gather materials, sequence, and adjust to workspace demands as needed. Demonstrated fair focus, planning, and attention to detail for selected creative expression task. Able to ask for assistance as needed, and socialized with peers and staff. Shared about what he plans to do with each of his finished projects. In and out of group several times and stood while working on his tasks, appearing restless. Calm and cooperative.         Train & Education Service Per Session 45 + Minutes () OT Group code    Patient Active Problem List   Diagnosis    Suicidal ideation    Methamphetamine use (H)    Polysubstance abuse (H)    Depression, unspecified depression type

## 2024-06-03 NOTE — PROVIDER NOTIFICATION
06/03/24 0949   Individualization/Patient Specific Goals   Patient Personal Strengths expressive of emotions;expressive of needs;positive educational history;positive vocational history   Patient Vulnerabilities housing insecurity;substance abuse/addiction;history of unsuccessful treatment;occupational insecurity;limited support system;adverse childhood experience(s);traumatic event;recent loss   Interprofessional Rounds   Summary Discussed pt progress. IRTS placement pending   Participants psychiatrist;CTC;other (see comments);nursing   Behavioral Team Discussion   Participants Dr. Demian MD; Rayshawn MELLO; Daisy SAL; medical resident; medical students   Progress Pt is progressing   Anticipated length of stay 10-15 days   Continued Stay Criteria/Rationale Symptom stabilization, medication management, care coordination   Medical/Physical See H&P   Precautions See below   Plan Psychiatric assessment/Medication management. Therapeutic Milieu. Individual care planning and after care planning. Patient to participate in unit groups and activities. Individual and group support on unit.   Safety Plan Per unit protocol   Anticipated Discharge Disposition IRTS     PRECAUTIONS AND SAFETY    Behavioral Orders   Procedures    Code 2    Routine Programming     As clinically indicated    Status 15     Every 15 minutes.    Suicide precautions: Suicide Risk: MODERATE; Clinical rationale to override score: modification to the care environment     Patients on Suicide Precautions should have a Combination Diet ordered that includes a Diet selection(s) AND a Behavioral Tray selection for Safe Tray - with utensils, or Safe Tray - NO utensils       Order Specific Question:   Suicide Risk     Answer:   MODERATE     Order Specific Question:   Clinical rationale to override score:     Answer:   modification to the care environment       Safety  Safety WDL: WDL  Patient Location: lounge, hallway, patient room, own  Observed Behavior:  calm, sitting  Observed Behavior (Comment): Watching tv  Safety Measures: safety rounds completed, environmental rounds completed  Diversional Activity: television  Suicidality: Status 15

## 2024-06-03 NOTE — PLAN OF CARE
"  Problem: Adult Behavioral Health Plan of Care  Goal: Plan of Care Review  Outcome: Progressing  Flowsheets (Taken 6/3/2024 1140)  Plan of Care Reviewed With: patient  Overall Patient Progress: improving  Patient Agreement with Plan of Care: agrees  Goal: Patient-Specific Goal (Individualization)  Description: You can add care plan individualizations to a care plan. Examples of Individualization might be:  \"Parent requests to be called daily at 9am for status\", \"I have a hard time hearing out of my right ear\", or \"Do not touch me to wake me up as it startles  me\".  Outcome: Progressing  Goal: Adheres to Safety Considerations for Self and Others  Outcome: Progressing  Goal: Absence of New-Onset Illness or Injury  Outcome: Progressing  Intervention: Prevent Infection  Recent Flowsheet Documentation  Taken 6/3/2024 1038 by Rayshawn Berumen RN  Infection Prevention:   hand hygiene promoted   personal protective equipment utilized  Goal: Optimized Coping Skills in Response to Life Stressors  Outcome: Progressing  Goal: Develops/Participates in Therapeutic [place] to Support Successful Transition  Outcome: Progressing     Problem: Sleep Disturbance  Goal: Adequate Sleep/Rest  Outcome: Progressing     Problem: Anxiety  Goal: Anxiety Reduction or Resolution  Outcome: Progressing     Problem: Depression  Goal: Improved Mood  Outcome: Progressing     Problem: Pain Acute  Goal: Optimal Pain Control and Function  Outcome: Progressing  Intervention: Prevent or Manage Pain  Recent Flowsheet Documentation  Taken 6/3/2024 1040 by Rayshawn Breumen RN  Medication Review/Management: medications reviewed   Goal Outcome Evaluation:      Plan of Care Reviewed With: patient    Overall Patient Progress: improving  Patient has been visible in the milieu by lounge area.Patient appears to be lethargic & sleepy than usual.Mood sad.Affect flat and blunted.Patient had 2 hours of sleep last night.Resident informed of patient status,maybe " medications need to be reviewed.Adequate food and fluid intake.Hygiene is appropriate.No escalation of behaviors noted.No safety concerns.Sociable with select peers.Denies all psych issues,SI/SIB/AH/VH.Denies anxiety and depression.VSS.  Temp: 97.7  F (36.5  C) Temp src: Oral BP: 106/74 Pulse: 62   Resp: 16 SpO2: 97 % O2 Device: None (Room air)

## 2024-06-04 ENCOUNTER — APPOINTMENT (OUTPATIENT)
Dept: GENERAL RADIOLOGY | Facility: CLINIC | Age: 49
End: 2024-06-04
Attending: PSYCHIATRY & NEUROLOGY
Payer: COMMERCIAL

## 2024-06-04 PROCEDURE — 73630 X-RAY EXAM OF FOOT: CPT | Mod: RT

## 2024-06-04 PROCEDURE — 250N000013 HC RX MED GY IP 250 OP 250 PS 637: Performed by: STUDENT IN AN ORGANIZED HEALTH CARE EDUCATION/TRAINING PROGRAM

## 2024-06-04 PROCEDURE — 250N000013 HC RX MED GY IP 250 OP 250 PS 637

## 2024-06-04 PROCEDURE — 90853 GROUP PSYCHOTHERAPY: CPT

## 2024-06-04 PROCEDURE — 99231 SBSQ HOSP IP/OBS SF/LOW 25: CPT | Mod: GC | Performed by: PSYCHIATRY & NEUROLOGY

## 2024-06-04 PROCEDURE — G0177 OPPS/PHP; TRAIN & EDUC SERV: HCPCS

## 2024-06-04 PROCEDURE — 250N000013 HC RX MED GY IP 250 OP 250 PS 637: Performed by: PSYCHIATRY & NEUROLOGY

## 2024-06-04 PROCEDURE — 124N000002 HC R&B MH UMMC

## 2024-06-04 RX ORDER — LEVOFLOXACIN 500 MG/1
500 TABLET, FILM COATED ORAL DAILY
Qty: 2 TABLET | Refills: 0 | Status: SHIPPED | OUTPATIENT
Start: 2024-06-05 | End: 2024-06-07

## 2024-06-04 RX ORDER — LITHIUM CARBONATE 450 MG
900 TABLET, EXTENDED RELEASE ORAL AT BEDTIME
Qty: 60 TABLET | Refills: 0 | Status: SHIPPED | OUTPATIENT
Start: 2024-06-04 | End: 2024-07-04

## 2024-06-04 RX ORDER — QUETIAPINE FUMARATE 100 MG/1
100 TABLET, FILM COATED ORAL 2 TIMES DAILY PRN
Qty: 60 TABLET | Refills: 0 | Status: SHIPPED | OUTPATIENT
Start: 2024-06-04 | End: 2024-07-04

## 2024-06-04 RX ORDER — PROPRANOLOL HYDROCHLORIDE 10 MG/1
10 TABLET ORAL 3 TIMES DAILY
Qty: 90 TABLET | Refills: 0 | Status: SHIPPED | OUTPATIENT
Start: 2024-06-04 | End: 2024-07-04

## 2024-06-04 RX ORDER — QUETIAPINE FUMARATE 400 MG/1
400 TABLET, FILM COATED ORAL AT BEDTIME
Qty: 30 TABLET | Refills: 0 | Status: SHIPPED | OUTPATIENT
Start: 2024-06-04 | End: 2024-07-04

## 2024-06-04 RX ORDER — BUPRENORPHINE 8 MG/1
8 TABLET SUBLINGUAL 2 TIMES DAILY
Qty: 60 TABLET | Refills: 0 | Status: SHIPPED | OUTPATIENT
Start: 2024-06-04 | End: 2024-07-04

## 2024-06-04 RX ORDER — BUPRENORPHINE 2 MG/1
4 TABLET SUBLINGUAL DAILY
Qty: 60 TABLET | Refills: 0 | Status: SHIPPED | OUTPATIENT
Start: 2024-06-04 | End: 2024-07-04

## 2024-06-04 RX ORDER — HYDROXYZINE HYDROCHLORIDE 50 MG/1
100 TABLET, FILM COATED ORAL 2 TIMES DAILY PRN
Qty: 60 TABLET | Refills: 0 | Status: SHIPPED | OUTPATIENT
Start: 2024-06-04 | End: 2024-07-04

## 2024-06-04 RX ORDER — GABAPENTIN 600 MG/1
600 TABLET ORAL 3 TIMES DAILY
Qty: 90 TABLET | Refills: 0 | Status: SHIPPED | OUTPATIENT
Start: 2024-06-04 | End: 2024-07-11

## 2024-06-04 RX ADMIN — BUPRENORPHINE 8 MG: 8 TABLET SUBLINGUAL at 07:46

## 2024-06-04 RX ADMIN — QUETIAPINE FUMARATE 100 MG: 100 TABLET ORAL at 07:50

## 2024-06-04 RX ADMIN — GABAPENTIN 600 MG: 600 TABLET, FILM COATED ORAL at 13:10

## 2024-06-04 RX ADMIN — ACYCLOVIR 400 MG: 400 TABLET ORAL at 19:49

## 2024-06-04 RX ADMIN — QUETIAPINE FUMARATE 400 MG: 400 TABLET ORAL at 22:06

## 2024-06-04 RX ADMIN — NICOTINE POLACRILEX 2 MG: 2 GUM, CHEWING BUCCAL at 12:19

## 2024-06-04 RX ADMIN — GABAPENTIN 600 MG: 600 TABLET, FILM COATED ORAL at 07:46

## 2024-06-04 RX ADMIN — LITHIUM CARBONATE 900 MG: 450 TABLET, EXTENDED RELEASE ORAL at 22:06

## 2024-06-04 RX ADMIN — BUPRENORPHINE 4 MG: 2 TABLET SUBLINGUAL at 19:48

## 2024-06-04 RX ADMIN — NICOTINE POLACRILEX 2 MG: 2 GUM, CHEWING BUCCAL at 10:17

## 2024-06-04 RX ADMIN — ACETAMINOPHEN 650 MG: 325 TABLET, FILM COATED ORAL at 16:06

## 2024-06-04 RX ADMIN — FLUOXETINE HYDROCHLORIDE 60 MG: 20 CAPSULE ORAL at 07:46

## 2024-06-04 RX ADMIN — LEVOFLOXACIN 500 MG: 500 TABLET, FILM COATED ORAL at 07:47

## 2024-06-04 RX ADMIN — PROPRANOLOL HYDROCHLORIDE 10 MG: 10 TABLET ORAL at 07:47

## 2024-06-04 RX ADMIN — PROPRANOLOL HYDROCHLORIDE 10 MG: 10 TABLET ORAL at 20:06

## 2024-06-04 RX ADMIN — ACYCLOVIR 400 MG: 400 TABLET ORAL at 07:47

## 2024-06-04 RX ADMIN — BUPRENORPHINE 8 MG: 8 TABLET SUBLINGUAL at 13:10

## 2024-06-04 RX ADMIN — HYDROXYZINE HYDROCHLORIDE 100 MG: 50 TABLET, FILM COATED ORAL at 16:06

## 2024-06-04 RX ADMIN — GABAPENTIN 600 MG: 600 TABLET, FILM COATED ORAL at 19:48

## 2024-06-04 RX ADMIN — ACYCLOVIR 400 MG: 400 TABLET ORAL at 13:10

## 2024-06-04 ASSESSMENT — ACTIVITIES OF DAILY LIVING (ADL)
ADLS_ACUITY_SCORE: 36
ADLS_ACUITY_SCORE: 36
ADLS_ACUITY_SCORE: 30
ADLS_ACUITY_SCORE: 30
ADLS_ACUITY_SCORE: 36
DRESS: SCRUBS (BEHAVIORAL HEALTH);INDEPENDENT
ADLS_ACUITY_SCORE: 30
ADLS_ACUITY_SCORE: 36
LAUNDRY: WITH SUPERVISION
ADLS_ACUITY_SCORE: 36
ADLS_ACUITY_SCORE: 30
HYGIENE/GROOMING: INDEPENDENT
ADLS_ACUITY_SCORE: 36
HYGIENE/GROOMING: INDEPENDENT
ADLS_ACUITY_SCORE: 36
LAUNDRY: WITH SUPERVISION
ADLS_ACUITY_SCORE: 30
ADLS_ACUITY_SCORE: 36
ADLS_ACUITY_SCORE: 30
ADLS_ACUITY_SCORE: 36
DRESS: INDEPENDENT
ADLS_ACUITY_SCORE: 36
ADLS_ACUITY_SCORE: 30
ADLS_ACUITY_SCORE: 36
ORAL_HYGIENE: INDEPENDENT
ADLS_ACUITY_SCORE: 36
ORAL_HYGIENE: INDEPENDENT

## 2024-06-04 NOTE — PROGRESS NOTES
"  ----------------------------------------------------------------------------------------------------------  [facility]  Psychiatry Progress Note  Hospital Day #16     Interim History:     The patient's care was discussed with the treatment team and chart notes were reviewed.    Vitals: Vital signs:  Temp: 98.5  F (36.9  C) Temp src: Oral BP: 113/75 Pulse: 68   Resp: 16 SpO2: 100 % O2 Device: None (Room air)     Weight: 131.2 kg (289 lb 4.8 oz)  Estimated body mass index is 36.16 kg/m  as calculated from the following:    Height as of this encounter: 1.905 m (6' 3\").    Weight as of this encounter: 131.2 kg (289 lb 4.8 oz).    Sleep: 6.5 hours (06/04/24 0600)  Scheduled medications: Took all scheduled medications as prescribed  Psychiatric PRN medications: Tylenol, Hydroxyzine 100mg, Nicotine gum, Seroquel 100mg      Last 24H PRN:     hydrOXYzine HCl (ATARAX) tablet 100 mg, 100 mg at 06/03/24 1453    nicotine (NICORETTE) gum 2 mg, 2 mg at 06/03/24 1453    QUEtiapine (SEROquel) tablet 100 mg, 100 mg at 06/04/24 0750       Staff Report:      Overnight, Perry was visible in the lounge most of the afternoon watching TV with peers.    He was observed sleeping on and off in the lounge. Please see staff notes for more information.   Subjective:     Patient Interview:  Team interviewed Perry (JULIAN) in his hospital room.     Pt says that this morning he has been  struggling really hard with anxiety  and voice telling him he is  never going to be good enough,  and that he is  not going to make it . He found out he was accepted to an [facility] this morning and says he is excited to go somewhere where he can get help, but is having some anxiety about it, saying he does not want to  mess up in life anymore,  and feels like he is  fighting with his thoughts . Pt says that as soon as he wants to go somewhere,  the enemy wants to take me out . Pt says that the voices he hears are not his " "own voice. Pt says he believes in Jonh and that by louann in his God when he tells those voices to leave that they will leave. Pt says that he feels 95% better, compared to his best day. Pt says he feels that the team here has been so helpful and expresses gratitude, but says he is worried he will  lose  the team he has here when he leaves. Pt expresses interest in increasing his gabapentin dose for neuropathic pain, as well as Subutex.    ROS:  Patient denies acute concerns     Objective:     Vitals:  /75 (BP Location: Right arm, Patient Position: Sitting, Cuff Size: Adult Large)   Pulse 68   Temp 98.5  F (36.9  C)   Resp 16   Ht 1.905 m (6' 3\")   Wt 131.2 kg (289 lb 4.8 oz)   SpO2 100%   BMI 36.16 kg/m      Allergies:  Allergies   Allergen Reactions    Wellbutrin [Bupropion] Difficulty breathing       Current Medications:  Scheduled:  Current Facility-Administered Medications   Medication Dose Route Frequency Provider Last Rate Last Admin    acetaminophen (TYLENOL) tablet 650 mg  650 mg Oral Q4H PRN Mati Cueva MD   650 mg at 05/29/24 1641    acyclovir (ZOVIRAX) tablet 400 mg  400 mg Oral TID Daniel Castro   400 mg at 06/04/24 0747    albuterol (PROVENTIL HFA/VENTOLIN HFA) inhaler  2 puff Inhalation Q6H PRN Jeri Morse MD        alum & mag hydroxide-simethicone (MAALOX) suspension 30 mL  30 mL Oral Q4H PRN Mati Cueva MD   30 mL at 05/28/24 1651    [Held by provider] atorvastatin (LIPITOR) tablet 20 mg  20 mg Oral QPM Mati Cueva MD   20 mg at 05/21/24 2057    buprenorphine (SUBUTEX) sublingual tablet 4 mg  4 mg Sublingual Daily Pb Arciniega MD   4 mg at 06/03/24 1919    buprenorphine (SUBUTEX) sublingual tablet 8 mg  8 mg Sublingual BID Pb Arciniega MD   8 mg at 06/04/24 0746    cyclobenzaprine (FLEXERIL) tablet 5 mg  5 mg Oral BID PRN Pb Arciniega MD   5 mg at 05/21/24 1420    FLUoxetine (PROzac) capsule 60 mg  60 mg Oral Daily Pb Arciniega MD   60 mg at 06/04/24 0746 "    gabapentin (NEURONTIN) tablet 600 mg  600 mg Oral TID Mati Cueva MD   600 mg at 06/04/24 0746    hydrOXYzine HCl (ATARAX) tablet 100 mg  100 mg Oral BID PRN Jeri Morse MD   100 mg at 06/03/24 1453    levofloxacin (LEVAQUIN) tablet 500 mg  500 mg Oral Daily Daniel Castro   500 mg at 06/04/24 0747    lithium (ESKALITH CR/LITHOBID) CR tablet 900 mg  900 mg Oral At Bedtime Jeri Morse MD   900 mg at 06/03/24 2111    melatonin tablet 3 mg  3 mg Oral At Bedtime PRN Mati Cueva MD   3 mg at 06/03/24 0335    naloxone (NARCAN) injection 0.2 mg  0.2 mg Intravenous Q2 Min PRN Pb Arciniega MD        Or    naloxone (NARCAN) injection 0.4 mg  0.4 mg Intravenous Q2 Min PRN Pb Arciniega MD        Or    naloxone (NARCAN) injection 0.2 mg  0.2 mg Intramuscular Q2 Min PRN Pb Arciniega MD        Or    naloxone (NARCAN) injection 0.4 mg  0.4 mg Intramuscular Q2 Min PRN Pb Arciniega MD        nicotine (NICORETTE) gum 2 mg  2 mg Buccal Q1H PRN Kirby Colbert MD   2 mg at 06/03/24 1453    OLANZapine (zyPREXA) injection 10 mg  10 mg Intramuscular TID PRN Pb Arciniega MD        polyethylene glycol (MIRALAX) Packet 17 g  17 g Oral Daily PRN Mati Cueva MD        propranolol (INDERAL) tablet 10 mg  10 mg Oral TID Dagher, Ramez, MD   10 mg at 06/04/24 0747    QUEtiapine (SEROquel) tablet 100 mg  100 mg Oral BID PRN Alyssia Cueva MD   100 mg at 06/04/24 0750    QUEtiapine (SEROquel) tablet 400 mg  400 mg Oral At Bedtime Alyssia Cueva MD   400 mg at 06/03/24 2112       PRN:  Current Facility-Administered Medications   Medication Dose Route Frequency Provider Last Rate Last Admin    acetaminophen (TYLENOL) tablet 650 mg  650 mg Oral Q4H PRN Mati Cueva MD   650 mg at 05/29/24 1641    acyclovir (ZOVIRAX) tablet 400 mg  400 mg Oral TID Daniel aCstro   400 mg at 06/04/24 0747    albuterol (PROVENTIL HFA/VENTOLIN HFA) inhaler  2 puff Inhalation Q6H PRN Jeri Morse MD        alum & mag  hydroxide-simethicone (MAALOX) suspension 30 mL  30 mL Oral Q4H PRN Mati Cueva MD   30 mL at 05/28/24 1651    [Held by provider] atorvastatin (LIPITOR) tablet 20 mg  20 mg Oral QPM Mati Cueva MD   20 mg at 05/21/24 2057    buprenorphine (SUBUTEX) sublingual tablet 4 mg  4 mg Sublingual Daily Pb Arciniega MD   4 mg at 06/03/24 1919    buprenorphine (SUBUTEX) sublingual tablet 8 mg  8 mg Sublingual BID Pb Arciniega MD   8 mg at 06/04/24 0746    cyclobenzaprine (FLEXERIL) tablet 5 mg  5 mg Oral BID PRN Pb Arciniega MD   5 mg at 05/21/24 1420    FLUoxetine (PROzac) capsule 60 mg  60 mg Oral Daily Pb Arciniega MD   60 mg at 06/04/24 0746    gabapentin (NEURONTIN) tablet 600 mg  600 mg Oral TID Mati Cueva MD   600 mg at 06/04/24 0746    hydrOXYzine HCl (ATARAX) tablet 100 mg  100 mg Oral BID PRN Jeri Morse MD   100 mg at 06/03/24 1453    levofloxacin (LEVAQUIN) tablet 500 mg  500 mg Oral Daily Daniel Castro   500 mg at 06/04/24 0747    lithium (ESKALITH CR/LITHOBID) CR tablet 900 mg  900 mg Oral At Bedtime Jeri Morse MD   900 mg at 06/03/24 2111    melatonin tablet 3 mg  3 mg Oral At Bedtime PRN Mati Cueva MD   3 mg at 06/03/24 0335    naloxone (NARCAN) injection 0.2 mg  0.2 mg Intravenous Q2 Min PRN Pb Arciniega MD        Or    naloxone (NARCAN) injection 0.4 mg  0.4 mg Intravenous Q2 Min PRN Pb Arciniega MD        Or    naloxone (NARCAN) injection 0.2 mg  0.2 mg Intramuscular Q2 Min PRN Pb Arciniega MD        Or    naloxone (NARCAN) injection 0.4 mg  0.4 mg Intramuscular Q2 Min PRN Pb Arciniega MD        nicotine (NICORETTE) gum 2 mg  2 mg Buccal Q1H PRN Kirby Colbert MD   2 mg at 06/03/24 1453    OLANZapine (zyPREXA) injection 10 mg  10 mg Intramuscular TID PRN Pb Arciniega MD        polyethylene glycol (MIRALAX) Packet 17 g  17 g Oral Daily PRN Mati Cueva MD        propranolol (INDERAL) tablet 10 mg  10 mg Oral TID Dagher, Ramez, MD   10 mg at 06/04/24  "0747    QUEtiapine (SEROquel) tablet 100 mg  100 mg Oral BID PRN Alyssia Cueva MD   100 mg at 06/04/24 0750    QUEtiapine (SEROquel) tablet 400 mg  400 mg Oral At Bedtime Alyssia Cueva MD   400 mg at 06/03/24 2112       Labs and Imaging:  New results:   No results found for this or any previous visit (from the past 24 hour(s)).      Data this admission:  - CBC (latest 5/29/24): Hgb 12.9  - CMP (latest 5/27/24): AST 40, ALT 56, CO2 30  - TSH normal  - UDS Positive for methamphetamines and cannabis  - Vit D normal  - Hgb A1c normal  - Lipids: Trig 173, HDL 32  - Vit B12 normal  - Folate normal  - Lithium (latest 5/27/24) 0.85 mmol  - HIV neg  - PSA WNL  -Hepatitis panel negative  - HSV antibodies positive  - Urine culture No growth  - Urine Macroscopic normal  - EKG refused     Mental Status Exam:     Oriented to:  Grossly Oriented  General:  Awake and Alert, standing in milieu talking to peers prior to encounter  Appearance:  appears stated age, Hair is kept and grooming is fair, and Tattoos on arms and neck  Behavior/Attitude:  cooperative and engaged, anxious at times  Eye Contact: good  Psychomotor: appears significantly less restless than last week, no evidence of tics, dystonia, or tardive dyskinesia   Speech:  appropriate volume/tone  Language: Fluent in English with appropriate syntax and vocabulary.  Mood:  \"struggling hard with anxiety\"  Affect:  anxious  Thought Process:  linear  Thought Content: No SI  Associations:  intact  Insight:  good, voluntarily admit, is aware of his worsening psychosis in the context of substance use, seems to be willing to working with the care team to get better  Judgment:  fair , appropriate engagement with team, adherent to team recommendations  Impulse control: fair  Attention Span:  grossly intact  Concentration:  grossly intact  Recent and Remote Memory:  not formally assessed  Fund of Knowledge:   not formally assessed  Muscle Strength and Tone: normal  Gait and Station: " Normal     Psychiatric Assessment   Diagnosis:  Psychosis, unspecified  R/O Methamphetamine Use Disorder  R/O Alcohol Use Disorder   R/O Opioid Use Disorder   R/O Cannabis Use Disorder     Perry Preciado is a 49 year old male with previous psychiatric diagnoses of MDD, KANE, ADHD, and substance disorder (alcohol, meth, opiates) and medical history significant for TBI who was admitted from the ER on 2024 due to concern for SI and AVH in the context of medication non-adherence and substance use. Most recent psychiatric hospitalization was at [facility] in 2024 for similar concerns. Significant symptoms on admission included depression, SI, and AVH. The MSE on admission was pertinent for SI, AVH, and depressed affect. Biological contributions to mental health presentation included prior diagnoses of mental health disorders, multiple medication trials, long standing substance use history, hx of TBI, and genetic predisposition for suicide completion and substance use. Psychological contributions to mental health presentation included maladaptive coping and cluster B traits. Social factors contributing to mental health presentation include limited social supports, financial stressors, and recent family deaths who also  by suicide around this time of the year. Protective factors included being engaged in treatment.     In summary, the patient's differential is still in evolution. Patient has reported symptoms of depression, SI, and AVH in the context of substance use and medication nonadherence. Patient per chart review has mentioned that he only has psychosis during periods of using substances however during this current hospital stay he notes it happens even when he's sober. He also mentions some periods concerning for hypomania/mily but it's complicated by his substance use and cluster B traits. For now considering unspecified mood and unspecified psychosis. He will likely benefit from medication  optimization and substance use treatment this admission.     Given that he currently has SI and psychosis, patient warrants inpatient psychiatric hospitalization to maintain his safety.      Psychiatric Plan by Diagnosis      Today's changes:  -Plan for potential discharge to [facility] on Thursday.    # Psychosis  #Mood Disorder  1. Medications:  - Zyprexa 10 mg QID PRN ordered  - Fluoxetine 60mg  - Lithium 900mg qhs  - Seroquel 300mg qpm, increased to 400mg on 06/03     2. Pertinent Labs/Monitoring:   - EKG declined     3. Additional Plans:  - Patient will be treated in therapeutic milieu with appropriate individual and group therapies as described    Stimulant Use Disorder  #Opiate Use Disorder  #Nicotine Use Disorder  - Subutex 20mg daily total  - Consider providing narcan on discharge  - Nicotine Gum 2mg q1hr prn  - Will reconsider CD assessment once mental health sxs above stabilizes.      #ADHD  - Atomoxetine discontinued     #PTSD  - Prazosin discontinued     #Anxiety  -  Added Propranolol 10mg TID for anxiety on 05/30     Psychiatric Hospital Course:      Perry Preciado was admitted to [facility] as a voluntary patient.   Medications:  PTA atomoxetine, Subutex, Prozac, Gabapentin, Lithium, Prazosin, and seroquel were continued. Zyprexa 10mg TID PRN for anxiety, agitation related to psychosis. Prazosin discontinued -had been using for months, BP low, and pt reported difficulty breathing. Bedtime Seroquel increased from 200 to 300mg on 05/28 to 400mg on 06/03 to better address psychosis and anxiety.   Subutex increased to 20mg total daily dosing further manage opioid withdrawal symptoms    Atomoxetine discontinued, due to current anxiety levels. Atorvastatin held due to slightly elevated liver enzymes. Albuterol inhaler PRN ordered.  Added Propranolol 10mg TID for anxiety on 05/30     The risks, benefits, alternatives, and side effects were discussed and understood by the patient.     Medical Assessment and Plan  "    Medical diagnoses to be addressed this admission:    # TBI  - Monitor      #Hypertriglyceridemia  - Atorvastatin 20mg qpm     #Neuropathy  - 600mg TID                 # Hypertension: Home medication list includes antihypertensive(s)      # Obesity: Estimated body mass index is 33.66 kg/m  as calculated from the following:    Height as of this encounter: 1.905 m (6' 3\").    Weight as of this encounter: 122.2 kg (269 lb 4.8 oz).           Medical course:  Patient was physically examined by the ED prior to being transferred to the unit and was found to be medically stable and appropriate for admission.      Consults, Medicine - Recommendations:    #Testicular pain  #Acute epididymitis  Reports 1 month of right testicular pain with associated swelling. Last sexual encounter 1 month ago, does engage in anal insertion practices. Testicular exam revealed some unilateral swelling of epididymis/ vas deferens on right side. WBC and UA normal. No concerning finding of testicular ultrasound, slightly tortuous engorged appearance of the right spermatic cord noted. Clinical picture consistent with acute epididymitis. Started on Abx, Patient noted improvement in pain     -1 time IM dose of Ceftriaxone given 5/29  -Continue levofloxacin PO  -Tylenol as needed for pain   -Educated patient to inform nursing staff if pain does not continue to improve         #Urinary hesitancy  Reports 1 year of urinary hesitancy.  Denies nocturia, pain with urination, frequency. PSA and UA normal. May be secondary to anticholinergic effects from Seroquel. Patient does not currently have rentention.     -Educated patient that concern is mostly likely associated with medication side effect  -Recommend follow up with PCP upon discharge to address concern     #Positive HSV1/HSV2 Antibody  #Possible herpes simplex lesion   HSV1/2 ordered upon admission. Denies history of oral or genital lesions. No genital lesions noted on physical exam performed for " testicular pain. Noted small lesion on the corner of the patient mouth. Denied any current pain, prodromal neurologic pain, discharge or crusting of lesion. Initially seemed more consistent with Angular cheilitis, however with recent positive for HSV serology, reasonable to start antiviral.      - Ordered Acyclovir 400 mg 3 times daily x 7 days  - Advised patient not to touch lesions and to use good hand hygiene       Checklist     Legal Status: Voluntary     Safety Assessment:   Behavioral Orders   Procedures    Code 2    Routine Programming     As clinically indicated    Status 15     Every 15 minutes.    Suicide precautions: Suicide Risk: MODERATE; Clinical rationale to override score: modification to the care environment     Patients on Suicide Precautions should have a Combination Diet ordered that includes a Diet selection(s) AND a Behavioral Tray selection for Safe Tray - with utensils, or Safe Tray - NO utensils       Order Specific Question:   Suicide Risk     Answer:   MODERATE     Order Specific Question:   Clinical rationale to override score:     Answer:   modification to the care environment       Risk Assessment:  Risk for harm is elevated.  Risk factors: SI, substance use, trauma, family history, and past behaviors, maladaptive coping, impulsive   Protective factors: engaged in treatment , family support, help-seeking, future oriented     SIO: None    Disposition: TBD. Pending stabilization, medication optimization, & development of a safe discharge plan.     Attestations     This patient was seen and discussed with my attending physician, Dr. Arciniega.     Tripp Guy, MS3  [employer]    Attestation:  I was present with the medical student who participated in the service and in the documentation of the note. I have verified the history and personally performed the physical exam and medical decision making. I agree with the assessment and plan of care as documented in the  note.    Ramez Dagher, MD  Psychiatry Resident Physician     Attestation:  This patient has been seen and evaluated by me, Pb Arciniega MD.  I have discussed this patient with the house staff team including the resident and medical student and I agree with the findings and plan in this note.    I have reviewed today's vital signs, medications, labs and imaging. Pb Arciniega MD , PhD.

## 2024-06-04 NOTE — PROGRESS NOTES
Rehab Group    Start time: 2000  End time: 2100  Patient time total: 20 minutes    attended partial group    #4 attended   Group Type: OT Clinic   Group Topic Covered: coping skills and activity therapy       Group Session Detail:  OT Clinic     Patient Response/Contribution:  Cooperative with task, Positive Affect, Listened actively, and Organized       Patient Detail:    Pt actively participated in occupational therapy clinic to facilitate coping skill exploration, creative expression within personally meaningful activities, and clinical observation of social, cognitive, and kinesthetic performance skills. Pt response: Independent to initiate, gather materials, sequence, and adjust to workspace demands as needed. Demonstrated fair focus, planning, and problem solving for selected wood project.  . Able to ask for assistance as needed, and appropriately social with peers and staff. Overall attention span limited to about 20 min while working on a task.  Pt will continue to be encouraged to attend groups for further asssesssment and to address goals identified on plan of care.          No Charge    Patient Active Problem List   Diagnosis    Suicidal ideation    Methamphetamine use (H)    Polysubstance abuse (H)    Depression, unspecified depression type

## 2024-06-04 NOTE — PLAN OF CARE
Problem: Anxiety  Goal: Anxiety Reduction or Resolution  6/4/2024 1424 by Bhumi Farmer RN  Outcome: Progressing  6/4/2024 1423 by Bhumi Farmer RN  Outcome: Progressing  Intervention: Promote Anxiety Reduction  Recent Flowsheet Documentation  Taken 6/4/2024 1254 by Bhumi Farmer RN  Supportive Measures:   self-responsibility promoted   positive reinforcement provided   goal-setting facilitated   active listening utilized   decision-making supported   self-care encouraged   verbalization of feelings encouraged   relaxation techniques promoted  Family/Support System Care: self-care encouraged  Taken 6/4/2024 1200 by Bhumi Farmer RN  Supportive Measures:   verbalization of feelings encouraged   positive reinforcement provided   active listening utilized     Problem: Depression  Goal: Improved Mood  6/4/2024 1424 by Bhumi Farmer RN  Outcome: Progressing  6/4/2024 1423 by Bhumi Farmer RN  Outcome: Progressing  Intervention: Monitor and Manage Depressive Symptoms  Recent Flowsheet Documentation  Taken 6/4/2024 1254 by Bhumi Farmer RN  Supportive Measures:   self-responsibility promoted   positive reinforcement provided   goal-setting facilitated   active listening utilized   decision-making supported   self-care encouraged   verbalization of feelings encouraged   relaxation techniques promoted  Family/Support System Care: self-care encouraged  Taken 6/4/2024 1200 by Bhumi Farmer RN  Supportive Measures:   verbalization of feelings encouraged   positive reinforcement provided   active listening utilized     Problem: Suicide Risk  Goal: Absence of Self-Harm  Outcome: Progressing  Intervention: Assess Risk to Self and Maintain Safety  Recent Flowsheet Documentation  Taken 6/4/2024 1254 by Bhumi Farmer RN  Behavior Management:   impulse control promoted   behavioral plan reviewed  Taken 6/4/2024 1200 by Bhumi Farmer RN  Behavior Management: impulse control promoted  Self-Harm  "Prevention: environmental self-harm risks assessed  Intervention: Promote Psychosocial Wellbeing  Recent Flowsheet Documentation  Taken 6/4/2024 1254 by Bhumi Farmer RN  Supportive Measures:   self-responsibility promoted   positive reinforcement provided   goal-setting facilitated   active listening utilized   decision-making supported   self-care encouraged   verbalization of feelings encouraged   relaxation techniques promoted  Family/Support System Care: self-care encouraged  Taken 6/4/2024 1200 by Bhumi Farmer RN  Supportive Measures:   verbalization of feelings encouraged   positive reinforcement provided   active listening utilized  Sleep/Rest Enhancement: regular sleep/rest pattern promoted  Intervention: Establish Safety Plan and Continuity of Care  Recent Flowsheet Documentation  Taken 6/4/2024 1254 by Bhumi Farmer RN  Safe Transition Promotion: support system integrity appraised  Taken 6/4/2024 1200 by Bhumi Farmer RN  Safe Transition Promotion: protective factors promoted   Goal Outcome Evaluation:    Plan of Care Reviewed With: patient      Pt was visible in the milieu, watched TV, and participated in groups. He described his mood as disheveled, in the middle of good and bad. He rated anxiety at 8/10 and depression at 5/10. Pt endorsed AH, where voices were telling him, \"You are worthless, and you will not accomplish anything nor succeed.\" Pt stated the voices were less in intensity and were \"fucking wrong\" Pt denied SI, SIB, VH, and HA. His goal was to get through the day with positivity. He was cooperative and compliant with his meds. His discharge planning was discussed during the rounding in the AM, and he will be discharged Thursday morning to [facility]. The patient's appetite was great, and he ate 100% of his meals. PRN was given Seroquel 100mg for anxiety and nicotine gum. The plan of care is ongoing.          "

## 2024-06-04 NOTE — PROGRESS NOTES
Rehab Group    Start time: 1015  End time: 1215  Patient time total: 50 minutes    attended full group    #6 attended   Group Type: occupational therapy   Group Topic Covered: coping skills     Group Session Detail:  OT clinic     Patient Response/Contribution:  Cooperative with task, Attentive, and Actively engaged     Patient Detail:    Pt actively participated in occupational therapy clinic to facilitate coping skill exploration, creative expression within personally meaningful activities, and clinical observation of social, cognitive, and kinesthetic performance skills. Pt response: Independent to initiate, gather materials, sequence, and adjust to workspace demands as needed. Demonstrated fair focus, planning, and attention to detail for selected goal-directed task. Able to ask for assistance as needed, and socialized with peers and staff. Reported that he is looking forward to discharging later this week while also expressing some anxiety over this transition. Calm and cooperative.         Train & Education Service Per Session 45 + Minutes () OT Group code    Patient Active Problem List   Diagnosis    Suicidal ideation    Methamphetamine use (H)    Polysubstance abuse (H)    Depression, unspecified depression type

## 2024-06-04 NOTE — PLAN OF CARE
Problem: Adult Behavioral Health Plan of Care  Goal: Plan of Care Review  Outcome: Progressing  Flowsheets (Taken 6/3/2024 1713)  Patient Agreement with Plan of Care: agrees   Goal Outcome Evaluation:    Plan of Care Reviewed With: patient          Pt was visible in the lounge most of the shift watching TV with peers. He was observed sleeping on and off in the lounge. He was encouraged to go take a nap in his room but he refused. He kept to himself while out in the milieu. He had minimal interaction with peers. His affect was flat and blunted but pleasant upon approach. Vitals were WNL. He was able to make his needs known. Intake was adequate. He was out for dinner and snacks and had good food and fluids intake. Hygiene was good. He was cooperative with assessment. He denied pain all shift. He denied anxiety beginning of the shift but later in the shift, he rated anxiety 7/10 and was given scheduled anxiety medication which he said was helpful. He rated depression 6/10. He denied SI/SIB/HI/AH/VH. He contracted for safety. He was medication compliant. His 4 pm Propranolol was not given because order parameter was not met. His b/p at that time was 98/65. Pt was encouraged to drink fluids. B/p was later checked for 113/75 at bed time and his bed time dose of Propranolol was given. Pt attended community meeting at 4 pm but did not attend OT evening group. No medication side effect was reported or noted this shift. No safety concern noted this shift. Will continue to monitor and will assist if need arise.

## 2024-06-04 NOTE — PROGRESS NOTES
"  Rehab Group    Start time: 1320  End time: 1415  Patient time total: 50 minutes    attended full group    #5 attended   Group Type: occupational therapy   Group Topic Covered: social skills and self-esteem     Group Session Detail:  Self-esteem group game     Patient Response/Contribution:  Cooperative with task, Attentive, and Actively engaged     Patient Detail:    Pt actively participated in a structured occupational therapy group with a focus on facilitating self-esteem via self-reflection questions. Pt shared thoughtful responses regarding past achievements, positive social supports, and hobbies/skills. Shared about feeling down on himself when thinking about his limited involvement in his children's lives, though stated \"I'm a good man.\" Identified \"patience\" as something he would like to improve about himself. Pleasant and engaged.        Train & Education Service Per Session 45 + Minutes () OT Group code    Patient Active Problem List   Diagnosis    Suicidal ideation    Methamphetamine use (H)    Polysubstance abuse (H)    Depression, unspecified depression type       "

## 2024-06-04 NOTE — PLAN OF CARE
No PRNs given or requested this shift. Safety checks completed every 15 minutes; no concerns noted. Pt appears to have slept for 6.50  hours; will continue to monitor and offer support.     Problem: Sleep Disturbance  Goal: Adequate Sleep/Rest  Outcome: Progressing   Goal Outcome Evaluation:

## 2024-06-04 NOTE — PLAN OF CARE
Team Note Due:  Monday    Assessment/Intervention/Current Symtoms and Care Coordination:  Chart review and met with team, discussed pt progress, symptomology, and response to treatment.  Discussed the discharge plan and any potential impediments to discharge.    Spoke with Cristian at [facility] and confirmed admission Thursday 6/6 to their [place] location. Cristian will send preadmission paperwork for provider to complete. Will need 30 day supply of meds. Pt will arrive at [facility] at 10am.    Provider completed preadmission documents. Returned to [facility].    Followed up on status of county CM. Awaiting confirmation on if a CM has been assigned. [facility] intake will call writer before Thursday morning if pt has been assigned a CM and if not, pt will be contacted directly when one has been assigned (it may be another 1-2 weeks).    Discharge Plan or Goal:  Pending stabilization of symptoms and safe disposition planning.  [facility] - [facility]  Psychiatry - scheduled  Subutex - scheduled     Barriers to Discharge:  Patient requires further psychiatric stabilization due to current symptomology, medication management with changes subject to provider, coordination with outside supports, and aftercare planning.     Referral Status:  [facility] - referral sent 5/23. Interviewed 5/30  [facility] - referral sent 5/24, no openings as of 5/31  [facility] - referral sent 5/24, no openings as of 5/30  [facility] - referral faxed 5/24, on wait list 5/24. Admit confirmed for 6/6     Legal Status:  Voluntary     Contacts:  Ileana (Sister): [phone]       Upcoming Meetings and Dates/Important Information and next steps:

## 2024-06-04 NOTE — PLAN OF CARE
Group Attendance:  attended partial group    Time session began: 1420  Time session ended: 1500  Patient's total time in group: 40    Total # Attendees   4   Group Type psychotherapeutic and life skills     Group Topic Covered healthy coping skills      Group Session Detail Discussed types of coping strategies, created list of personal coping skills that work, do not work, and want to learn.      Patient's response to the group topic/interactions:  Cooperative with task, Organized, and Closed eyes for some of session     Patient Details: Pt checked in feeling fair, processed feelings around discharge with group; Pt vulnerable sharing that he made connections here that he will miss. Reported coping skills include drawing, working on bikes. Pt appeared to struggle to connect with other patients, was restless, closed his eyes for some of session.           12709 - Group psychotherapy - 1 Session    Patient Active Problem List   Diagnosis    Suicidal ideation    Methamphetamine use (H)    Polysubstance abuse (H)    Depression, unspecified depression type

## 2024-06-04 NOTE — PLAN OF CARE
BEH IP Unit Acuity Rating Score (UARS)  Patient is given one point for every criteria they meet.    CRITERIA SCORING   On a 72 hour hold, court hold, committed, stay of commitment, or revocation. 0    Patient LOS on BEH unit exceeds 20 days. 0  LOS: 16   Patient under guardianship, 55+, otherwise medically complex, or under age 11. 0   Suicide ideation without relief of precipitating factors. 1   Current plan for suicide. 0   Current plan for homicide. 0   Imminent risk or actual attempt to seriously harm another without relief of factors precipitating the attempt. 0   Severe dysfunction in daily living (ex: complete neglect for self care, extreme disruption in vegetative function, extreme deterioration in social interactions). 1   Recent (last 7 days) or current physical aggression in the ED or on unit. 0   Restraints or seclusion episode in past 72 hours. 0   Recent (last 7 days) or current verbal aggression, agitation, yelling, etc., while in the ED or unit. 0   Active psychosis. 0   Need for constant or near constant redirection (from leaving, from others, etc).  0   Intrusive or disruptive behaviors. 0   Patient requires 3 or more hours of individualized nursing care per 8-hour shift (i.e. for ADLs, meds, therapeutic interventions). 0   TOTAL 2

## 2024-06-05 ENCOUNTER — APPOINTMENT (OUTPATIENT)
Dept: ULTRASOUND IMAGING | Facility: CLINIC | Age: 49
End: 2024-06-05
Attending: PSYCHIATRY & NEUROLOGY
Payer: COMMERCIAL

## 2024-06-05 PROCEDURE — 250N000013 HC RX MED GY IP 250 OP 250 PS 637

## 2024-06-05 PROCEDURE — 99231 SBSQ HOSP IP/OBS SF/LOW 25: CPT | Performed by: PSYCHIATRY & NEUROLOGY

## 2024-06-05 PROCEDURE — 124N000002 HC R&B MH UMMC

## 2024-06-05 PROCEDURE — 93970 EXTREMITY STUDY: CPT | Mod: 26 | Performed by: RADIOLOGY

## 2024-06-05 PROCEDURE — G0177 OPPS/PHP; TRAIN & EDUC SERV: HCPCS

## 2024-06-05 PROCEDURE — 250N000013 HC RX MED GY IP 250 OP 250 PS 637: Performed by: PSYCHIATRY & NEUROLOGY

## 2024-06-05 PROCEDURE — 250N000013 HC RX MED GY IP 250 OP 250 PS 637: Performed by: STUDENT IN AN ORGANIZED HEALTH CARE EDUCATION/TRAINING PROGRAM

## 2024-06-05 PROCEDURE — 93970 EXTREMITY STUDY: CPT

## 2024-06-05 PROCEDURE — H2032 ACTIVITY THERAPY, PER 15 MIN: HCPCS

## 2024-06-05 RX ORDER — SULINDAC 200 MG/1
200 TABLET ORAL 2 TIMES DAILY PRN
Status: DISCONTINUED | OUTPATIENT
Start: 2024-06-05 | End: 2024-06-06 | Stop reason: HOSPADM

## 2024-06-05 RX ORDER — SULINDAC 200 MG/1
200 TABLET ORAL 2 TIMES DAILY
Status: DISCONTINUED | OUTPATIENT
Start: 2024-06-05 | End: 2024-06-05

## 2024-06-05 RX ADMIN — SULINDAC 200 MG: 200 TABLET ORAL at 12:01

## 2024-06-05 RX ADMIN — PROPRANOLOL HYDROCHLORIDE 10 MG: 10 TABLET ORAL at 08:09

## 2024-06-05 RX ADMIN — ACYCLOVIR 400 MG: 400 TABLET ORAL at 08:09

## 2024-06-05 RX ADMIN — BUPRENORPHINE 8 MG: 8 TABLET SUBLINGUAL at 13:24

## 2024-06-05 RX ADMIN — ACETAMINOPHEN 650 MG: 325 TABLET, FILM COATED ORAL at 09:58

## 2024-06-05 RX ADMIN — HYDROXYZINE HYDROCHLORIDE 100 MG: 50 TABLET, FILM COATED ORAL at 02:04

## 2024-06-05 RX ADMIN — BUPRENORPHINE 8 MG: 8 TABLET SUBLINGUAL at 08:09

## 2024-06-05 RX ADMIN — GABAPENTIN 600 MG: 600 TABLET, FILM COATED ORAL at 08:09

## 2024-06-05 RX ADMIN — HYDROXYZINE HYDROCHLORIDE 100 MG: 50 TABLET, FILM COATED ORAL at 17:01

## 2024-06-05 RX ADMIN — QUETIAPINE FUMARATE 400 MG: 400 TABLET ORAL at 21:27

## 2024-06-05 RX ADMIN — PROPRANOLOL HYDROCHLORIDE 10 MG: 10 TABLET ORAL at 21:30

## 2024-06-05 RX ADMIN — GABAPENTIN 600 MG: 600 TABLET, FILM COATED ORAL at 13:24

## 2024-06-05 RX ADMIN — ACETAMINOPHEN 650 MG: 325 TABLET, FILM COATED ORAL at 16:58

## 2024-06-05 RX ADMIN — BUPRENORPHINE 4 MG: 2 TABLET SUBLINGUAL at 19:01

## 2024-06-05 RX ADMIN — GABAPENTIN 600 MG: 600 TABLET, FILM COATED ORAL at 19:01

## 2024-06-05 RX ADMIN — LITHIUM CARBONATE 900 MG: 450 TABLET, EXTENDED RELEASE ORAL at 21:30

## 2024-06-05 RX ADMIN — QUETIAPINE FUMARATE 100 MG: 100 TABLET ORAL at 09:58

## 2024-06-05 RX ADMIN — FLUOXETINE HYDROCHLORIDE 60 MG: 20 CAPSULE ORAL at 08:09

## 2024-06-05 RX ADMIN — LEVOFLOXACIN 500 MG: 500 TABLET, FILM COATED ORAL at 08:09

## 2024-06-05 RX ADMIN — PROPRANOLOL HYDROCHLORIDE 10 MG: 10 TABLET ORAL at 16:58

## 2024-06-05 RX ADMIN — Medication 3 MG: at 02:04

## 2024-06-05 ASSESSMENT — ACTIVITIES OF DAILY LIVING (ADL)
ADLS_ACUITY_SCORE: 30
ORAL_HYGIENE: PROMPTS;INDEPENDENT
ADLS_ACUITY_SCORE: 30
LAUNDRY: WITH SUPERVISION
ADLS_ACUITY_SCORE: 30
DRESS: SCRUBS (BEHAVIORAL HEALTH)
ADLS_ACUITY_SCORE: 30
ORAL_HYGIENE: INDEPENDENT;PROMPTS
ADLS_ACUITY_SCORE: 30
LAUNDRY: WITH SUPERVISION
ADLS_ACUITY_SCORE: 30
ADLS_ACUITY_SCORE: 30
HYGIENE/GROOMING: HANDWASHING;SHOWER;INDEPENDENT
ADLS_ACUITY_SCORE: 30
DRESS: SCRUBS (BEHAVIORAL HEALTH);INDEPENDENT;STREET CLOTHES
ADLS_ACUITY_SCORE: 30
ADLS_ACUITY_SCORE: 30

## 2024-06-05 NOTE — PLAN OF CARE
"Goal Outcome Evaluation:    Plan of Care Reviewed With: patient Plan of Care Reviewed With: patient    Overall Patient Progress: improvingOverall Patient Progress: improving       Problem: Adult Behavioral Health Plan of Care  Goal: Plan of Care Review  Outcome: Progressing  Flowsheets  Taken 6/5/2024 1346  Plan of Care Reviewed With: patient  Overall Patient Progress: improving  Patient Agreement with Plan of Care: agrees  Taken 6/5/2024 1100  Patient Agreement with Plan of Care: agrees     Pt presented with, \"my legs hurts a lot.\" Pt has lower extremities bilaterally edema. Pt had xray done yesterday and was negative for fx. Pt had ultrasound done today. Results are pending. Pt was given some PRN pain medicine and cold pack with minimal effect. Pt mood is depressed and anxious. Denied all other psychs symptoms. Pt is contract for safety. Spent most of the shift sitting in the lounge dosing on and off. Pt attended group. Pt is cooperative and medications complaint. Pt will be discharged tomorrow 6/6  at 0915. Pt will discharged to [facility]. Discharge medications are in the med room in the lock cabinet.  Blood pressure 108/70, pulse 60, temperature 97.4  F (36.3  C), temperature source Oral, resp. rate 16, height 1.905 m (6' 3\"), weight 131.2 kg (289 lb 4.8 oz), SpO2 96%.    "

## 2024-06-05 NOTE — PLAN OF CARE
Team Note Due:  Monday    Assessment/Intervention/Current Symtoms and Care Coordination:  Chart review and met with team, discussed pt progress, symptomology, and response to treatment.  Discussed the discharge plan and any potential impediments to discharge.    Received update from [facility]  intake pt was referred to [facility] for TCM services. Updated pt and AVS with information.    Scheduled transportation for 9:15am  tomorrow. [employer] - [phone]    Discharge Plan or Goal:  [facility]  Psychiatry - scheduled  Subutex - scheduled     Barriers to Discharge:  None at this time     Referral Status:  [facility] - referral sent 5/23. Interviewed 5/30  [facility] - referral sent 5/24, no openings as of 5/31  [facility] - referral sent 5/24, no openings as of 5/30  [facility] - referral faxed 5/24, on wait list 5/24. Admit confirmed for 6/6     Legal Status:  Voluntary     Contacts:  Ileana (Sister): [phone]       Upcoming Meetings and Dates/Important Information and next steps:

## 2024-06-05 NOTE — PLAN OF CARE
Rehab Group    Start time: 1700  End time: 1745  Patient time total: 30 minutes    attended partial group    #5 attended   Group Type: occupational therapy   Group Topic Covered: healthy leisure time    Doodle Dice    Group Session Detail:    Patient Response: Pt participated in a group dice activity for leisure exploration and participation as a healthy coping skill, socialization, problem solving and sequencing.     Mood/Affect: pleasant overall, appeared frustrated and anxious d/t swelling in R LE.     Plan: Patient encouraged to maintain attendance for continued ongoing support in working towards occupational therapy goals to support overall treatment/care.        Patient Detail:    Joined for first part of group. Was an active participant during his turn, however outside of this time appeared preoccupied. In previous interactions with this patient, he has been very social. Upon checking in, patient shared he is having pain in R foot d/t swelling. Had ice pack on but did not feel he was getting relief from this. Writer offered to have patient elevate foot on chair during group which patient declined. Left briefly to request new ice pack, returning to group briefly after this , however was too distracted by his discomfort to continue participating an elected to leave group at this time.       No Charge    Patient Active Problem List   Diagnosis    Suicidal ideation    Methamphetamine use (H)    Polysubstance abuse (H)    Depression, unspecified depression type

## 2024-06-05 NOTE — PLAN OF CARE
Problem: Adult Behavioral Health Plan of Care  Goal: Plan of Care Review  Outcome: Progressing  Flowsheets (Taken 6/5/2024 1700)  Patient Agreement with Plan of Care: agrees     Problem: Depression  Goal: Improved Mood  Outcome: Progressing   Goal Outcome Evaluation:    Plan of Care Reviewed With: patient      Pt has been visible in the milieu, more awake for the first few hours than compared to previously. C/O pain on bilateral legs more especially on right foot rated 8/10, PRN Tylenol was given with alternating ice packs, pt encouraged also to elevate legs.  on-call was notified and teds knee high was ordered. Pt has been drowsy for the last 3 hours, sleeping on the lounge. Endorsed having anxiety 6/10 due to swollen legs, PRN Hydroxyzine was given and pt verbalized it was effective, denied all other MH symptoms. Pt is compliant with medication, hygiene is ok, no behavior issues noted

## 2024-06-05 NOTE — PLAN OF CARE
BEH IP Unit Acuity Rating Score (UARS)  Patient is given one point for every criteria they meet.    CRITERIA SCORING   On a 72 hour hold, court hold, committed, stay of commitment, or revocation. 0    Patient LOS on BEH unit exceeds 20 days. 0  LOS: 17   Patient under guardianship, 55+, otherwise medically complex, or under age 11. 0   Suicide ideation without relief of precipitating factors. 1   Current plan for suicide. 0   Current plan for homicide. 0   Imminent risk or actual attempt to seriously harm another without relief of factors precipitating the attempt. 0   Severe dysfunction in daily living (ex: complete neglect for self care, extreme disruption in vegetative function, extreme deterioration in social interactions). 1   Recent (last 7 days) or current physical aggression in the ED or on unit. 0   Restraints or seclusion episode in past 72 hours. 0   Recent (last 7 days) or current verbal aggression, agitation, yelling, etc., while in the ED or unit. 0   Active psychosis. 0   Need for constant or near constant redirection (from leaving, from others, etc).  0   Intrusive or disruptive behaviors. 0   Patient requires 3 or more hours of individualized nursing care per 8-hour shift (i.e. for ADLs, meds, therapeutic interventions). 0   TOTAL 2

## 2024-06-05 NOTE — PLAN OF CARE
Problem: Adult Behavioral Health Plan of Care  Goal: Plan of Care Review  Outcome: Progressing  Flowsheets (Taken 6/4/2024 1606)  Patient Agreement with Plan of Care: agrees   Goal Outcome Evaluation:    Plan of Care Reviewed With: patient          Pt was visible in the milieu most of the shift socializing and interacting with peers. His affect was flat and blunted but pleasant upon approach. Vitals were WNL. He was observed sleeping on and off in the lounge. He was encouraged to go take a nap in his room but he refused. He was able to make his needs known. Intake was adequate. He was out for dinner and snacks and had good food and fluids intake. Hygiene was good.     He was cooperative with assessment. He complained of pain on his right foot second toe. Pt said he hit the foot on the door. It was swollen and he also complained of swollen bilateral legs. He said the pain was 9/10. Dr Byrd was still on the unit and he was notified. He looked at the feet and he put in orders for x-ray on right foot. This was done during this shift and the result are pending. He was given prn Tylenol 650 mg and also cold pack was applied on the toe. Pt said it was helpful. He endorsed anxiety 8/10 and depression 9/10. He was given prn Hydroxyzine 100 mg which he said was helpful. Pt was also encouraged to elevate his feet while sitting or laying in bed to help for the swelling. He denied SI/SIB/HI/AH/VH. He contracted for safety. He was medication compliant. His 4 pm Propranolol was not given because order parameter was not met. His b/p at that time was 98/68. Pt was encouraged to drink fluids and B/p was later rechecked for 110/71 at bed time and his bed time dose of Propranolol was given. Pt attended community meeting at 4 pm but did not attend OT evening group. No medication side effect was reported or noted this shift. No safety concern noted this shift. Will continue to monitor and will assist if need arise.

## 2024-06-05 NOTE — PLAN OF CARE
Rehab Group    Start time: 10:30  End time: 11:45  Patient time total: 65 minutes    attended full group    #7 attended   Group Type: OT Clinic   Group Topic Covered: balanced lifestyle, coping skills, relaxation skills , emotional regulation, social skills, and healthy leisure time     Group Session Detail:  Pt actively participated in occupational therapy clinic to facilitate coping skill exploration, creative expression within personally meaningful activities, and clinical observation of social, cognitive, and kinesthetic performance skills.     Patient Response/Contribution:  Cooperative with task, Confronted peers appropriately , Safe use of materials/group supplies, Listened actively, Attentive, and Actively engaged     Patient Detail:  Pt response: supervision/ind to initiate, gather materials, sequence, and adjust to workspace demands as needed. Demonstrated good focus, planning, and problem solving for selected wooden decor design task. Able to ask for assistance as needed, and appropriate with peers and staff. Patient remained standing/pacing for majority of group; appearing, anxious at times. Patient assisted peers with clean-up of workstations at conclusion of group without prompting. Positive and pleasant group participant.          Patient Active Problem List   Diagnosis    Suicidal ideation    Methamphetamine use (H)    Polysubstance abuse (H)    Depression, unspecified depression type

## 2024-06-05 NOTE — PLAN OF CARE
"Goal Outcome Evaluation:  Pt was observed in the lounge area at the beginning of the shift. Pt was observed pacing in and out of his room most of the shift. Pt presented as drowsy but always reported \" I can't sleep.\"      Pt slept on and off this shift for 6 hours.    Pt received PRN hydroxyzine for anxiety and Melatonin and was observed sleeping on and off this shift. Pt requested for snacks x2 this shift. .    Staff will continue to monitor ands support with current POC      "

## 2024-06-05 NOTE — PROGRESS NOTES
"  ----------------------------------------------------------------------------------------------------------  [facility]  Psychiatry Progress Note  Hospital Day #17     Interim History:     The patient's care was discussed with the treatment team and chart notes were reviewed.    Vitals: Vital signs:  Temp: 97.4  F (36.3  C) Temp src: Oral BP: 108/70 Pulse: 60   Resp: 16 SpO2: 96 % O2 Device: None (Room air)     Weight: 131.2 kg (289 lb 4.8 oz)  Estimated body mass index is 36.16 kg/m  as calculated from the following:    Height as of this encounter: 1.905 m (6' 3\").    Weight as of this encounter: 131.2 kg (289 lb 4.8 oz).    Sleep: 6 hours (06/05/2024 0532)  Scheduled medications: Took all scheduled medications as prescribed  Psychiatric PRN medications: Tylenol, Hydroxyzine 100mg, Nicotine gum, Seroquel 100mg      Last 24H PRN:     acetaminophen (TYLENOL) tablet 650 mg, 650 mg at 06/04/24 1606    hydrOXYzine HCl (ATARAX) tablet 100 mg, 100 mg at 06/05/24 0204    melatonin tablet 3 mg, 3 mg at 06/05/24 0204    nicotine (NICORETTE) gum 2 mg, 2 mg at 06/04/24 1219       Staff Report:      Yesterday, Perry was visible in the lounge most of the afternoon socializing with peers. Pt noted pain in right lower extremity after hitting door with foot. Pt given Tylenol and ice pack. Pt seen by MD. Afternoon propranolol held for low BP, but given in evening. Pt was seen sleeping on and off in the lounge and noted some difficulty with sleep overnight.     Please see staff notes for more information.   Subjective:     Patient Interview:  Team interviewed Perry (JULIAN) in the conference room.     Pt says that this morning he has been in pain from stubbing his foot on a door last night. Says that his cold sores are gone.     Pt says that the voices he usually hears are \"dormant\" today. Pt says, he \"knows why the voice is there\" telling him to \"run\" and to \"fight\". Says that he went " "through many hard things in childhood and became a \"fighter\", that this voice advocated for him to do drugs. However, the pt says he knows he is not better off doing drugs. Pt says he used to be a \"mean violent man\", but that he changed because of a \"Jonh moment\". Pt says that he wants to be a \"good man\" and to be a peer counselor one day.     ROS:  MSK: Pain in both calves and feet as well as swelling (greater in the R leg than L)      Objective:     Vitals:  /70   Pulse 60   Temp 97.4  F (36.3  C) (Oral)   Resp 16   Ht 1.905 m (6' 3\")   Wt 131.2 kg (289 lb 4.8 oz)   SpO2 96%   BMI 36.16 kg/m      Allergies:  Allergies   Allergen Reactions    Wellbutrin [Bupropion] Difficulty breathing       Current Medications:  Scheduled:  Current Facility-Administered Medications   Medication Dose Route Frequency Provider Last Rate Last Admin    acetaminophen (TYLENOL) tablet 650 mg  650 mg Oral Q4H PRN Mati Cueva MD   650 mg at 06/04/24 1606    acyclovir (ZOVIRAX) tablet 400 mg  400 mg Oral TID Daniel Castro   400 mg at 06/05/24 0809    albuterol (PROVENTIL HFA/VENTOLIN HFA) inhaler  2 puff Inhalation Q6H PRN Jeri Morse MD        alum & mag hydroxide-simethicone (MAALOX) suspension 30 mL  30 mL Oral Q4H PRN Mati Cueva MD   30 mL at 05/28/24 1651    [Held by provider] atorvastatin (LIPITOR) tablet 20 mg  20 mg Oral QPM Mati Cueva MD   20 mg at 05/21/24 2057    buprenorphine (SUBUTEX) sublingual tablet 4 mg  4 mg Sublingual Daily Pb Arciniega MD   4 mg at 06/04/24 1948    buprenorphine (SUBUTEX) sublingual tablet 8 mg  8 mg Sublingual BID Pb Arciniega MD   8 mg at 06/05/24 0809    cyclobenzaprine (FLEXERIL) tablet 5 mg  5 mg Oral BID PRN Pb Arciniega MD   5 mg at 05/21/24 1420    FLUoxetine (PROzac) capsule 60 mg  60 mg Oral Daily Pb Arciniega MD   60 mg at 06/05/24 0809    gabapentin (NEURONTIN) tablet 600 mg  600 mg Oral TID Mati Cueva MD   600 mg at 06/05/24 0809    " hydrOXYzine HCl (ATARAX) tablet 100 mg  100 mg Oral BID PRN Jeri Morse MD   100 mg at 06/05/24 0204    levofloxacin (LEVAQUIN) tablet 500 mg  500 mg Oral Daily Daniel Castro   500 mg at 06/05/24 0809    lithium (ESKALITH CR/LITHOBID) CR tablet 900 mg  900 mg Oral At Bedtime Jeri Morse MD   900 mg at 06/04/24 2206    melatonin tablet 3 mg  3 mg Oral At Bedtime PRN Mati Cueva MD   3 mg at 06/05/24 0204    naloxone (NARCAN) injection 0.2 mg  0.2 mg Intravenous Q2 Min PRN Pb Arciniega MD        Or    naloxone (NARCAN) injection 0.4 mg  0.4 mg Intravenous Q2 Min PRN Pb Arciniega MD        Or    naloxone (NARCAN) injection 0.2 mg  0.2 mg Intramuscular Q2 Min PRN Pb Arciniega MD        Or    naloxone (NARCAN) injection 0.4 mg  0.4 mg Intramuscular Q2 Min PRN Pb Arciniega MD        nicotine (NICORETTE) gum 2 mg  2 mg Buccal Q1H PRN Kirby Colbert MD   2 mg at 06/04/24 1219    OLANZapine (zyPREXA) injection 10 mg  10 mg Intramuscular TID PRN Pb Arciniega MD        polyethylene glycol (MIRALAX) Packet 17 g  17 g Oral Daily PRN Mati Cueva MD        propranolol (INDERAL) tablet 10 mg  10 mg Oral TID Dagher, Ramez, MD   10 mg at 06/05/24 0809    QUEtiapine (SEROquel) tablet 100 mg  100 mg Oral BID PRN Alyssia Cueva MD   100 mg at 06/04/24 0750    QUEtiapine (SEROquel) tablet 400 mg  400 mg Oral At Bedtime Alyssia Cueva MD   400 mg at 06/04/24 2206       PRN:  Current Facility-Administered Medications   Medication Dose Route Frequency Provider Last Rate Last Admin    acetaminophen (TYLENOL) tablet 650 mg  650 mg Oral Q4H PRN Mati Cueva MD   650 mg at 06/04/24 1606    acyclovir (ZOVIRAX) tablet 400 mg  400 mg Oral TID Daniel Castro   400 mg at 06/05/24 0809    albuterol (PROVENTIL HFA/VENTOLIN HFA) inhaler  2 puff Inhalation Q6H PRN Jeri Morse MD        alum & mag hydroxide-simethicone (MAALOX) suspension 30 mL  30 mL Oral Q4H PRN Mati Cueva MD   30 mL at 05/28/24  1651    [Held by provider] atorvastatin (LIPITOR) tablet 20 mg  20 mg Oral QPM Mati Cueva MD   20 mg at 05/21/24 2057    buprenorphine (SUBUTEX) sublingual tablet 4 mg  4 mg Sublingual Daily Pb Arciniega MD   4 mg at 06/04/24 1948    buprenorphine (SUBUTEX) sublingual tablet 8 mg  8 mg Sublingual BID Pb Arciniega MD   8 mg at 06/05/24 0809    cyclobenzaprine (FLEXERIL) tablet 5 mg  5 mg Oral BID PRN Pb Arciniega MD   5 mg at 05/21/24 1420    FLUoxetine (PROzac) capsule 60 mg  60 mg Oral Daily Pb Arciniega MD   60 mg at 06/05/24 0809    gabapentin (NEURONTIN) tablet 600 mg  600 mg Oral TID Mati Cueva MD   600 mg at 06/05/24 0809    hydrOXYzine HCl (ATARAX) tablet 100 mg  100 mg Oral BID PRN Jeri Morse MD   100 mg at 06/05/24 0204    levofloxacin (LEVAQUIN) tablet 500 mg  500 mg Oral Daily Daniel Castro   500 mg at 06/05/24 0809    lithium (ESKALITH CR/LITHOBID) CR tablet 900 mg  900 mg Oral At Bedtime Jeri Morse MD   900 mg at 06/04/24 2206    melatonin tablet 3 mg  3 mg Oral At Bedtime PRN Mati Cueva MD   3 mg at 06/05/24 0204    naloxone (NARCAN) injection 0.2 mg  0.2 mg Intravenous Q2 Min PRN Pb Arciniega MD        Or    naloxone (NARCAN) injection 0.4 mg  0.4 mg Intravenous Q2 Min PRN Pb Arciniega MD        Or    naloxone (NARCAN) injection 0.2 mg  0.2 mg Intramuscular Q2 Min PRN Pb Arciniega MD        Or    naloxone (NARCAN) injection 0.4 mg  0.4 mg Intramuscular Q2 Min PRN Pb Arciniega MD        nicotine (NICORETTE) gum 2 mg  2 mg Buccal Q1H PRN Kirby Colbert MD   2 mg at 06/04/24 1219    OLANZapine (zyPREXA) injection 10 mg  10 mg Intramuscular TID PRN Pb Arciniega MD        polyethylene glycol (MIRALAX) Packet 17 g  17 g Oral Daily PRN Mati Cueva MD        propranolol (INDERAL) tablet 10 mg  10 mg Oral TID Dagher, Ramez, MD   10 mg at 06/05/24 0809    QUEtiapine (SEROquel) tablet 100 mg  100 mg Oral BID PRN Alyssia Cueva MD   100 mg at 06/04/24  "0750    QUEtiapine (SEROquel) tablet 400 mg  400 mg Oral At Bedtime Alyssia Cueva MD   400 mg at 06/04/24 3611       Labs and Imaging:  New results:   XR R foot 3 views showed moderate hallux valgus, mild multifocal degenerative arthrosis of midfoot and forefoot, with no evidence of acute fracture. (06/04/2024 6019)      Data this admission:  - CBC (latest 5/29/24): Hgb 12.9  - CMP (latest 5/27/24): AST 40, ALT 56, CO2 30  - TSH normal  - UDS Positive for methamphetamines and cannabis  - Vit D normal  - Hgb A1c normal  - Lipids: Trig 173, HDL 32  - Vit B12 normal  - Folate normal  - Lithium (latest 5/27/24) 0.85 mmol  - HIV neg  - PSA WNL  -Hepatitis panel negative  - HSV antibodies positive  - Urine culture No growth  - Urine Macroscopic normal  - EKG refused     Mental Status Exam:     Oriented to:  Grossly Oriented  General:  Awake and Alert, sitting in milieu talking to peers prior to encounter  Appearance:  appears stated age, Hair is kept and grooming is fair, and Tattoos on arms and neck  Behavior/Attitude:  cooperative and engaged, anxious at times  Eye Contact: good  Psychomotor: appears significantly less restless than last week, no evidence of tics, dystonia, or tardive dyskinesia   Speech:  appropriate volume/tone  Language: Fluent in English with appropriate syntax and vocabulary.  Mood:  \"I'm doing better. My legs hurt\"  Affect:  anxious  Thought Process:  linear  Thought Content: No SI  Associations:  intact  Insight:  good, voluntarily admit, is aware of his worsening psychosis in the context of substance use, seems to be willing to working with the care team to get better  Judgment:  fair , appropriate engagement with team, adherent to team recommendations  Impulse control: fair  Attention Span:  grossly intact  Concentration:  grossly intact  Recent and Remote Memory:  not formally assessed  Fund of Knowledge:   not formally assessed  Muscle Strength and Tone: normal  Gait and Station: Normal     " Psychiatric Assessment   Diagnosis:  Psychosis, unspecified  R/O Methamphetamine Use Disorder  R/O Alcohol Use Disorder   R/O Opioid Use Disorder   R/O Cannabis Use Disorder     Perry Preciado is a 49 year old male with previous psychiatric diagnoses of MDD, KANE, ADHD, and substance disorder (alcohol, meth, opiates) and medical history significant for TBI who was admitted from the ER on 2024 due to concern for SI and AVH in the context of medication non-adherence and substance use. Most recent psychiatric hospitalization was at [facility] in 2024 for similar concerns. Significant symptoms on admission included depression, SI, and AVH. The MSE on admission was pertinent for SI, AVH, and depressed affect. Biological contributions to mental health presentation included prior diagnoses of mental health disorders, multiple medication trials, long standing substance use history, hx of TBI, and genetic predisposition for suicide completion and substance use. Psychological contributions to mental health presentation included maladaptive coping and cluster B traits. Social factors contributing to mental health presentation include limited social supports, financial stressors, and recent family deaths who also  by suicide around this time of the year. Protective factors included being engaged in treatment.     In summary, the patient's differential is still in evolution. Patient has reported symptoms of depression, SI, and AVH in the context of substance use and medication nonadherence. Patient per chart review has mentioned that he only has psychosis during periods of using substances however during this current hospital stay he notes it happens even when he's sober. He also mentions some periods concerning for hypomania/mily but it's complicated by his substance use and cluster B traits. For now considering unspecified mood and unspecified psychosis. He will likely benefit from medication optimization and  substance use treatment this admission.     Given that he currently has SI and psychosis, patient warrants inpatient psychiatric hospitalization to maintain his safety.      Psychiatric Plan by Diagnosis      Today's changes:  -Doppler ultrasound of bilateral lower extremities ordered for concern over possible DVTs  -Discontinue acyclovir as pt's cold sores have improved and is no experiencing leg pain/swelling  -Sulindac 200 mg BID PO ordered for leg pain control in the setting of lithium use  -No changes to IRTS discharge tomorrow (06/06/2024)      # Psychosis  #Mood Disorder  1. Medications:  - Zyprexa 10 mg QID PRN ordered  - Fluoxetine 60mg  - Lithium 900mg qhs  - Seroquel 300mg qpm, increased to 400mg on 06/03     2. Pertinent Labs/Monitoring:   - EKG declined     3. Additional Plans:  - Patient will be treated in therapeutic milieu with appropriate individual and group therapies as described    Stimulant Use Disorder  #Opiate Use Disorder  #Nicotine Use Disorder  - Subutex 20mg daily total  - Consider providing narcan on discharge  - Nicotine Gum 2mg q1hr prn  - Will reconsider CD assessment once mental health sxs above stabilizes.      #ADHD  - Atomoxetine discontinued     #PTSD  - Prazosin discontinued     #Anxiety  -  Added Propranolol 10mg TID for anxiety on 05/30     Psychiatric Hospital Course:      Perry Preciado was admitted to [facility] as a voluntary patient.   Medications:  PTA atomoxetine, Subutex, Prozac, Gabapentin, Lithium, Prazosin, and seroquel were continued. Zyprexa 10mg TID PRN for anxiety, agitation related to psychosis. Prazosin discontinued -had been using for months, BP low, and pt reported difficulty breathing. Bedtime Seroquel increased from 200 to 300mg on 05/28 to 400mg on 06/03 to better address psychosis and anxiety.   Subutex increased to 20mg total daily dosing further manage opioid withdrawal symptoms    Atomoxetine discontinued, due to current anxiety levels. Atorvastatin  "held due to slightly elevated liver enzymes. Albuterol inhaler PRN ordered.  Added Propranolol 10mg TID for anxiety on 05/30     The risks, benefits, alternatives, and side effects were discussed and understood by the patient.     Medical Assessment and Plan     Medical diagnoses to be addressed this admission:    # TBI  - Monitor      #Hypertriglyceridemia  - Atorvastatin 20mg qpm     #Neuropathy  - 600mg TID    # foot pain - start sulindac 200mg BID -discussed risks and benefits including risk of mild increase in Li level              # Hypertension: Home medication list includes antihypertensive(s)      # Obesity: Estimated body mass index is 33.66 kg/m  as calculated from the following:    Height as of this encounter: 1.905 m (6' 3\").    Weight as of this encounter: 122.2 kg (269 lb 4.8 oz).           Medical course:  Patient was physically examined by the ED prior to being transferred to the unit and was found to be medically stable and appropriate for admission.      Consults, Medicine - Recommendations:    #Testicular pain  #Acute epididymitis  Reports 1 month of right testicular pain with associated swelling. Last sexual encounter 1 month ago, does engage in anal insertion practices. Testicular exam revealed some unilateral swelling of epididymis/ vas deferens on right side. WBC and UA normal. No concerning finding of testicular ultrasound, slightly tortuous engorged appearance of the right spermatic cord noted. Clinical picture consistent with acute epididymitis. Started on Abx, Patient noted improvement in pain     -1 time IM dose of Ceftriaxone given 5/29  -Continue levofloxacin PO  -Tylenol as needed for pain   -Educated patient to inform nursing staff if pain does not continue to improve         #Urinary hesitancy  Reports 1 year of urinary hesitancy.  Denies nocturia, pain with urination, frequency. PSA and UA normal. May be secondary to anticholinergic effects from Seroquel. Patient does not currently " have rentention.     -Educated patient that concern is mostly likely associated with medication side effect  -Recommend follow up with PCP upon discharge to address concern     #Positive HSV1/HSV2 Antibody  #Possible herpes simplex lesion   HSV1/2 ordered upon admission. Denies history of oral or genital lesions. No genital lesions noted on physical exam performed for testicular pain. Noted small lesion on the corner of the patient mouth. Denied any current pain, prodromal neurologic pain, discharge or crusting of lesion. Initially seemed more consistent with Angular cheilitis, however with recent positive for HSV serology, reasonable to start antiviral.      - Ordered Acyclovir 400 mg 3 times daily x 7 days  - Advised patient not to touch lesions and to use good hand hygiene       Checklist     Legal Status: Voluntary     Safety Assessment:   Behavioral Orders   Procedures    Code 2    Routine Programming     As clinically indicated    Status 15     Every 15 minutes.    Suicide precautions: Suicide Risk: MODERATE; Clinical rationale to override score: modification to the care environment     Patients on Suicide Precautions should have a Combination Diet ordered that includes a Diet selection(s) AND a Behavioral Tray selection for Safe Tray - with utensils, or Safe Tray - NO utensils       Order Specific Question:   Suicide Risk     Answer:   MODERATE     Order Specific Question:   Clinical rationale to override score:     Answer:   modification to the care environment       Risk Assessment:  Risk for harm is elevated.  Risk factors: SI, substance use, trauma, family history, and past behaviors, maladaptive coping, impulsive   Protective factors: engaged in treatment , family support, help-seeking, future oriented     SIO: None    Disposition: TBD. Pending stabilization, medication optimization, & development of a safe discharge plan.     Attestations     This patient was seen and discussed with my attending  physician, Dr. Arciniega.     Tripp Guy, MS3  [employer]    I was present with the medical student who participated in the service and in the documentation of the note. I have verified the history and personally performed the exam and medical decision making. I agree with the assessment and plan of care as documented in the note. Pb Arciniega M.D., Ph.D.

## 2024-06-06 VITALS
OXYGEN SATURATION: 98 % | SYSTOLIC BLOOD PRESSURE: 126 MMHG | DIASTOLIC BLOOD PRESSURE: 78 MMHG | RESPIRATION RATE: 16 BRPM | BODY MASS INDEX: 36.83 KG/M2 | WEIGHT: 296.2 LBS | HEIGHT: 75 IN | HEART RATE: 53 BPM | TEMPERATURE: 97.8 F

## 2024-06-06 PROCEDURE — 250N000013 HC RX MED GY IP 250 OP 250 PS 637

## 2024-06-06 PROCEDURE — 250N000013 HC RX MED GY IP 250 OP 250 PS 637: Performed by: PSYCHIATRY & NEUROLOGY

## 2024-06-06 PROCEDURE — 99238 HOSP IP/OBS DSCHRG MGMT 30/<: CPT | Mod: GC | Performed by: PSYCHIATRY & NEUROLOGY

## 2024-06-06 RX ADMIN — GABAPENTIN 600 MG: 600 TABLET, FILM COATED ORAL at 08:03

## 2024-06-06 RX ADMIN — LEVOFLOXACIN 500 MG: 500 TABLET, FILM COATED ORAL at 08:03

## 2024-06-06 RX ADMIN — FLUOXETINE HYDROCHLORIDE 60 MG: 20 CAPSULE ORAL at 08:02

## 2024-06-06 RX ADMIN — BUPRENORPHINE 8 MG: 8 TABLET SUBLINGUAL at 08:02

## 2024-06-06 RX ADMIN — PROPRANOLOL HYDROCHLORIDE 10 MG: 10 TABLET ORAL at 08:03

## 2024-06-06 ASSESSMENT — ACTIVITIES OF DAILY LIVING (ADL)
ADLS_ACUITY_SCORE: 30

## 2024-06-06 NOTE — DISCHARGE SUMMARY
"                                                                                                                 ----------------------------------------------------------------------------------------------------------  [facility]   Psychiatric Discharge Summary      Perry Preciado MRN# [id number]   Age: 49 year old YOB: 1975     Date of Admission:  5/19/2024  Date of Discharge:  6/6/2024  Admitting Physician:  Pb Arciniega MD  Discharge Physician:  Pb Arciniega MD    This document serves as a transfer of care to Perry Preciado's outpatient providers.     Events Leading to Hospitalization:     \"Perry Preciado is a 49 year old male with previous psychiatric diagnoses of MDD, KANE, ADHD, and substance disorder (alcohol, meth, opiates) and medical history significant for TBI who was admitted from the ER on 05/19/2024 due to concern for SI and AVH in the context of medication non-adherence and substance use.     [facility]/DEC Assessment:  \"Perry Preciado presents to the ED with family/friends. Patient is presenting to the ED for the following concerns: Suicidal ideation, Substance use.   Factors that make the mental health crisis life threatening or complex are:  The pt arrived via friends / family due to suicidal ideation and substance abuse. The pt reported he called 911, however pt reportedly required assistance from staff upon arrival to get out of the vehicle from a friend. Upon assessment, pt requested writer come back later. Writer returned 10 minutes later and pt was agreeable then to meeting. Pt reported he is here because he \"is really suicidal\". Pt reported he was at a party earlier [facility], and only knew one person there, which was a woman he graduated high school with. He reported he used meth and \"other drugs he isn't sure of\". He reported the woman he knew there is \"wanted for murder\" and is \"a well known meth dealer\" and he reported " "feeling uncomfortable and left the party. He stated after leaving the party, someone followed him. Pt reported he called 911. Pt reported he has also felt suicidal for \"the past couple years\", more so the last year after his mom passed away. Pt then reported he was at a sober home for one week and left voluntarily on 5/10/24 because he was \"going to go kill himself\". He reported he didn't end up doing it because he \"couldn't make up his mind\". Pt reported he currently has a plan to shoot himself. When writer asked about access to firearms, he stated, \"I have access, but I don't have them. They are my guns that I gave to a triston, about 20 of them, and I know how to get them\". When writer asked pt why he gave away his guns in the first place, he stated, \"I don't know, I did a lot of things that didn't make sense the past year\". Pt then stated, \"my brain hurts\". Pt appeared under the influence, was slurring his words, and falling asleep intermittently in assessment. Pt was, however, calm and cooperative. Pt frequently stated his \"whole body was sore\" but was unsure why. He also reported he has not taken his MH medications in 5 days. \"     ED/Hospital Course:  Perry Preciado was medically cleared for admission to inpatient psychiatric unit. In the ED, CD was consulted however patient declined due to not wanting things to move too quickly with discharge. They will follow up on Monday. Patient was restarted on PTA medications. \"    See H&P by Kirby Colbert MD on 05/19 for additional details.      Diagnoses:   Primary Psychiatric Diagnosis  Unspecified psychosis  Substance use disorder  Major Depressive disorder with psychotic features    Psychiatric Assessment:     Perry Preciado is a 49 year old male with previous psychiatric diagnoses of MDD, KANE, ADHD, and substance disorder (alcohol, meth, opiates) and medical history significant for TBI who was admitted from the ER on 05/19/2024 due to concern for SI and AVH in " the context of medication non-adherence and substance use. Most recent psychiatric hospitalization was at [facility] in 2024 for similar concerns. Significant symptoms on admission included depression, SI, and AVH. The MSE on admission was pertinent for SI, AVH, and depressed affect. Biological contributions to mental health presentation included prior diagnoses of mental health disorders, multiple medication trials, long standing substance use history, hx of TBI, and genetic predisposition for suicide completion and substance use. Psychological contributions to mental health presentation included maladaptive coping and cluster B traits. Social factors contributing to mental health presentation included limited social supports, financial stressors, and recent family deaths who also  by suicide around this time of the year. Protective factors included being engaged in treatment.      Patient has reported symptoms of depression, SI, and AVH in the context of substance use and medication nonadherence. Patient per chart review has mentioned that he only has psychosis during periods of using substances however he noted during his hospital stay it happens even when he's sober. He also mentions some periods concerning for hypomania/mily but it's complicated by his substance use and cluster B traits. We have considered unspecified psychosis with comorbid depression vas MDD with psychotic features as his current diagnosis. He benefited from medication optimization and substance use treatment this admission.     Given that he had SI and psychosis on admission, patient warranted inpatient psychiatric hospitalization to maintain his safety.        Psychiatric Hospital Course:     Perry Preciado was admitted to [facility] as a voluntary patient. The patient was placed under status 15 (15 minute checks) to ensure patient safety.  Medication Trials and Changes: risks, benefits, alternatives, and side effects were  discussed and understood by the patient .  PTA atomoxetine, Subutex, Prozac, Gabapentin, Lithium, Prazosin, and seroquel were continued. Zyprexa 10mg TID PRN for anxiety, agitation related to psychosis. Prazosin discontinued -had been using for months, BP low, and pt reported difficulty breathing. Bedtime Seroquel increased from 200 to 300mg on 05/28 and then to 400mg on 06/03 to better address psychosis and anxiety. He notes a partial improvement in symptoms of mood and auditory hallucinationswith these medications by the time he was discharged, and will follow up in the outpatient setting.  Subutex increased to 20mg total daily dosing further manage opioid withdrawal symptoms    Added Propranolol 10mg TID for anxiety on 05/30  Atomoxetine discontinued, due to current anxiety levels. Atorvastatin held due to slightly elevated liver enzymes. Albuterol inhaler PRN ordered.  Level of medication adherence:  Behaviors: The patient was safe and appropriate and did not require chemical/physical restraints during admission. He was cooperative with cares, had good group attendance, and was visible in the milieu.  Change in psychiatric symptoms: Over the course of this hospitalization the patient's symptoms of psychosis and depression improved.  Perry was released to  [facility] . At the time of discharge he was determined to not be a danger to himself or others.     Risk Assessment:      Today Perry Preciado denies suicidal ideations. Patient has notable risk factors for self-harm, including age, single status, anxiety, psychosis, substance abuse, and previous suicide attempts. However, risk is mitigated by sobriety, ability to volunteer a safety plan, and history of seeking help when needed. Therefore, based on all available evidence including the factors cited above, he does not appear to be at imminent risk for self-harm, does not meet criteria for a 72-hr hold, and therefore remains appropriate for ongoing outpatient  "level of care. Additional steps taken to minimize risk include: medication optimization, close psychiatric follow up and provision of crisis resources. Voluntary referral for [facility] and outpatient treatment was offered, he accepted this offer.     Psychiatric Examination:     Mental Status Exam:  Oriented to:  Grossly Oriented  General:  Awake and Alert  Appearance:  appears stated age, Hair is kept and grooming is fair, and Tattoos on arms and neck  Behavior/Attitude:  cooperative and engaged, anxious at times  Eye Contact: good  Psychomotor: appears significantly less restless than last week, no evidence of tics, dystonia, or tardive dyskinesia   Speech:  appropriate volume/tone  Language: Fluent in English with appropriate syntax and vocabulary.  Mood:  \"Good\"  Affect:  euthymic  Thought Process:  linear  Thought Content: No SI  Associations:  intact  Insight:  Good  Judgment:  Good  Impulse control: Good  Attention Span:  grossly intact  Concentration:  grossly intact  Recent and Remote Memory:  not formally assessed  Fund of Knowledge:   not formally assessed  Muscle Strength and Tone: normal  Gait and Station: Normal     Medical Hospital Course:   Perry Preciado was medically cleared by the ED prior to admission to the unit. PTA medications were continued on admission.     Medical Diagnoses addressed:      #Hypertriglyceridemia  - Atorvastatin 20mg qpm was held due to elevated liver enzymes  - Follow up in outpatient with PCP     #Neuropathy  - 600mg TID     # foot pain   - Start sulindac 200mg BID -discussed risks and benefits including risk of mild increase in Li level              # Hypertension: Home medication list includes antihypertensive(s)      # Obesity: Estimated body mass index is 33.66 kg/m  as calculated from the following:    Height as of this encounter: 1.905 m (6' 3\").    Weight as of this encounter: 122.2 kg (269 lb 4.8 oz).           Medical course:  Patient was physically examined by the " ED prior to being transferred to the unit and was found to be medically stable and appropriate for admission.      Consults, Medicine - Recommendations:     #Testicular pain  #Acute epididymitis  Reports 1 month of right testicular pain with associated swelling. Last sexual encounter 1 month ago, does engage in anal insertion practices. Testicular exam revealed some unilateral swelling of epididymis/ vas deferens on right side. WBC and UA normal. No concerning finding of testicular ultrasound, slightly tortuous engorged appearance of the right spermatic cord noted. Clinical picture consistent with acute epididymitis. Started on Abx, Patient noted improvement in pain     -1 time IM dose of Ceftriaxone given 5/29  -Continue levofloxacin PO  -Tylenol as needed for pain   -Educated patient to inform nursing staff if pain does not continue to improve         #Urinary hesitancy  Reports 1 year of urinary hesitancy.  Denies nocturia, pain with urination, frequency. PSA and UA normal. May be secondary to anticholinergic effects from Seroquel. Patient does not currently have rentention.     -Educated patient that concern is mostly likely associated with medication side effect  -Recommend follow up with PCP upon discharge to address concern     #Positive HSV1/HSV2 Antibody  #Possible herpes simplex lesion   HSV1/2 ordered upon admission. Denies history of oral or genital lesions. No genital lesions noted on physical exam performed for testicular pain. Noted small lesion on the corner of the patient mouth. Denied any current pain, prodromal neurologic pain, discharge or crusting of lesion. Initially seemed more consistent with Angular cheilitis, however with recent positive for HSV serology, reasonable to start antiviral.      - Ordered Acyclovir 400 mg 3 times daily x 7 days, held prior to discharge due to Lower extremity edema (ultrasound was WNL)  - Advised patient not to touch lesions and to use good hand hygiene    #LE  edema  - Follow up with outpatient  - Ultrasound showed swollen inguinal lymph nodes, follow up with outpatient PCP     Discharge Medications:     Current Discharge Medication List        START taking these medications    Details   FLUoxetine (PROZAC) 20 MG capsule Take 3 capsules (60 mg) by mouth daily for 30 days  Qty: 90 capsule, Refills: 0    Associated Diagnoses: Depression, unspecified depression type      levofloxacin (LEVAQUIN) 500 MG tablet Take 1 tablet (500 mg) by mouth daily for 2 days  Qty: 2 tablet, Refills: 0    Associated Diagnoses: Screen for STD (sexually transmitted disease)      propranolol (INDERAL) 10 MG tablet Take 1 tablet (10 mg) by mouth 3 times daily for 30 days  Qty: 90 tablet, Refills: 0    Associated Diagnoses: Depression, unspecified depression type           CONTINUE these medications which have CHANGED    Details   !! buprenorphine (SUBUTEX) 2 MG SUBL sublingual tablet Place 2 tablets (4 mg) under the tongue daily for 30 days  Qty: 60 tablet, Refills: 0    Associated Diagnoses: Methamphetamine use (H)      !! buprenorphine (SUBUTEX) 8 MG SUBL sublingual tablet Place 1 tablet (8 mg) under the tongue 2 times daily for 30 days  Qty: 60 tablet, Refills: 0    Associated Diagnoses: Methamphetamine use (H)      gabapentin (NEURONTIN) 600 MG tablet Take 1 tablet (600 mg) by mouth 3 times daily for 30 days  Qty: 90 tablet, Refills: 0    Associated Diagnoses: Depression, unspecified depression type      hydrOXYzine HCl (ATARAX) 50 MG tablet Take 2 tablets (100 mg) by mouth 2 times daily as needed for anxiety  Qty: 60 tablet, Refills: 0    Associated Diagnoses: Depression, unspecified depression type      lithium (ESKALITH CR/LITHOBID) 450 MG CR tablet Take 2 tablets (900 mg) by mouth at bedtime for 30 days  Qty: 60 tablet, Refills: 0    Associated Diagnoses: Depression, unspecified depression type      !! QUEtiapine (SEROQUEL) 100 MG tablet Take 1 tablet (100 mg) by mouth 2 times daily as  needed (for anxiety)  Qty: 60 tablet, Refills: 0    Associated Diagnoses: Depression, unspecified depression type      !! QUEtiapine (SEROQUEL) 400 MG tablet Take 1 tablet (400 mg) by mouth at bedtime for 30 days  Qty: 30 tablet, Refills: 0    Associated Diagnoses: Depression, unspecified depression type       !! - Potential duplicate medications found. Please discuss with provider.        STOP taking these medications       atomoxetine (STRATTERA) 60 MG capsule Comments:   Reason for Stopping:         atorvastatin (LIPITOR) 20 MG tablet Comments:   Reason for Stopping:         FLUoxetine 20 MG tablet Comments:   Reason for Stopping:         OLANZapine (ZYPREXA) 20 MG tablet Comments:   Reason for Stopping:         prazosin (MINIPRESS) 1 MG capsule Comments:   Reason for Stopping:                Discharge Plan:   Medications as above  2.   Psychiatric Appointments and Referrals:    Subutex Follow-up: Friday June 14, 2024 at 10:15 AM with Iliana Allen NP  Location: [place] [address], [address].  Phone: [phone]  Please note the address change from your last visit. The clinic is located on the first floor of this building in [address]. If you need to change or reschedule your appointment, please call the phone number listed above.   Appointment: Psychiatry   Date/time: Friday June 28th, 2024 @ 11:00 AM Virtual  Provider:  JODY SCHOENECKER APRN, CNP  Address: [facility] [address] [place] [address]  Phone: [phone]  Fax: [phone]  Note:  New client information/ instructions and intake paperwork to be completed beforehand will be sent to [email or web address].  3.  Medical follow up: Follow up with Primary Care Physician, [facility] to set up appointment.     Attestations:      Tripp Guy, MS3  N Medical Student     I was present with the medical student who participated in the service and in the documentation of  the note.  I have verified the history and personally performed the physical exam and medical decision making. I agree with the assessment and plan of care as documented in the note.    This patient was seen and discussed with my attending physician.    Ramez Dagher, MD  Psychiatry Resident Physician     Attestation:   The patient has been seen and evaluated by me,  Pb Arciniega MD. I have examined the patient today and reviewed the discharge plan with the resident and medical student. I agree with the final assessment and plan, as noted in the discharge summary. I have reviewed today's vital signs, medications, labs and imaging.  Total time discharge plannin minutes  Pb Arciniega MD ,Ph.D.

## 2024-06-06 NOTE — PROGRESS NOTES
Rehab Group    Start time: 2000  End time: 2050  Patient time total: 30 minutes    attended partial group    #5 attended   Group Type: recreation   Group Topic Covered: relaxation skills        Group Session Detail:  Stress relief     Patient Response/Contribution:  Cooperative with task       Patient Detail:    Pt attended the structured Therapeutic Recreation group with a focus on leisure participation, socializing, and exercise. Pt participated in the guided exercise for the for approximately half of the group, leaving after 30 minutes. Pt followed along to some of the guided chair exercise routine, but did not attempt any of the lower body gentle movements. Pt added to the discussion prompts throughout the routine.  Pt was encouraged to use positive imagery with the deep breathing and stretching to foster relaxation, improves focus, and reduce stress.        Activity Therapy Per 15 minutes () Other Rehab therapies    Patient Active Problem List   Diagnosis    Suicidal ideation    Methamphetamine use (H)    Polysubstance abuse (H)    Depression, unspecified depression type

## 2024-06-06 NOTE — PLAN OF CARE
Problem: Sleep Disturbance  Goal: Adequate Sleep/Rest  Outcome: Progressing   Goal Outcome Evaluation:    Patient appears to have slept a total of 4.5 hours. Slept in lounge for much of the shift and intermittently went back to room. Given snacks as requested. Denied other requests or concerns.     Safety/environment checks conducted every 15 minutes with no concerns noted. No complaints of pain/discomfort.

## 2024-06-06 NOTE — DISCHARGE SUMMARY
Pt stable for discharge by the Provider's approval. Pt was given copy of their discharge instructions and medication administration instructions. All discharge plans and labs were discussed with patient. Pt reports no questions at this time regarding discharge plans, labs or medications. Pt denies any suicidal ideation, plans or intent at this time. Patient discharge assisted via P.A to the [facility] where the transport from [facility] picked him up. Patient plan is to go to [facility]. Patient is discharged at this time of 0925.

## 2024-06-06 NOTE — PLAN OF CARE
"Pt stable for discharge by the Provider's approval. Pt was given copy of their discharge instructions and medication administration instructions. All discharge plans and labs were discussed with patient. Pt reports no questions at this time regarding discharge plans, labs or medications. Pt denies any suicidal ideation, plans or intent at this time. Patient discharge assisted via P.A to the [facility] where the transport from [facility] picked him up. Patient plan is to go to [facility]. Patient is discharged at this time of 0925.     Goal Outcome Evaluation:    Plan of Care Reviewed With: patient Plan of Care Reviewed With: patient    Overall Patient Progress: improvingOverall Patient Progress: improving       Problem: Adult Behavioral Health Plan of Care  Goal: Plan of Care Review  Outcome: Met  Goal: Patient-Specific Goal (Individualization)  Description: You can add care plan individualizations to a care plan. Examples of Individualization might be:  \"Parent requests to be called daily at 9am for status\", \"I have a hard time hearing out of my right ear\", or \"Do not touch me to wake me up as it startles  me\".  Outcome: Met  Goal: Adheres to Safety Considerations for Self and Others  Outcome: Met  Goal: Absence of New-Onset Illness or Injury  Outcome: Met  Goal: Optimized Coping Skills in Response to Life Stressors  Outcome: Met  Goal: Develops/Participates in Therapeutic [place] to Support Successful Transition  Outcome: Met     Problem: Sleep Disturbance  Goal: Adequate Sleep/Rest  Outcome: Met     Problem: Anxiety  Goal: Anxiety Reduction or Resolution  Outcome: Met     Problem: Depression  Goal: Improved Mood  Outcome: Met     Problem: Pain Acute  Goal: Optimal Pain Control and Function  Outcome: Met     Problem: Suicide Risk  Goal: Absence of Self-Harm  Outcome: Met     "

## 2024-06-14 ENCOUNTER — TRANSFERRED RECORDS (OUTPATIENT)
Dept: HEALTH INFORMATION MANAGEMENT | Facility: CLINIC | Age: 49
End: 2024-06-14

## 2024-07-11 ENCOUNTER — OFFICE VISIT (OUTPATIENT)
Dept: FAMILY MEDICINE | Facility: CLINIC | Age: 49
End: 2024-07-11
Payer: COMMERCIAL

## 2024-07-11 VITALS
TEMPERATURE: 98 F | WEIGHT: 272.4 LBS | HEIGHT: 75 IN | OXYGEN SATURATION: 94 % | BODY MASS INDEX: 33.87 KG/M2 | DIASTOLIC BLOOD PRESSURE: 68 MMHG | SYSTOLIC BLOOD PRESSURE: 97 MMHG | HEART RATE: 74 BPM | RESPIRATION RATE: 16 BRPM

## 2024-07-11 DIAGNOSIS — E78.1 HYPERTRIGLYCERIDEMIA: ICD-10-CM

## 2024-07-11 DIAGNOSIS — F15.21 METHAMPHETAMINE USE DISORDER, SEVERE, IN EARLY REMISSION (H): ICD-10-CM

## 2024-07-11 DIAGNOSIS — Z12.11 SCREENING FOR COLORECTAL CANCER: ICD-10-CM

## 2024-07-11 DIAGNOSIS — F11.21: ICD-10-CM

## 2024-07-11 DIAGNOSIS — Z12.12 SCREENING FOR COLORECTAL CANCER: ICD-10-CM

## 2024-07-11 DIAGNOSIS — G56.03 BILATERAL CARPAL TUNNEL SYNDROME: ICD-10-CM

## 2024-07-11 DIAGNOSIS — Z76.89 ENCOUNTER TO ESTABLISH CARE: Primary | ICD-10-CM

## 2024-07-11 DIAGNOSIS — Z87.898 HX OF INTRAVENOUS DRUG USE IN REMISSION: ICD-10-CM

## 2024-07-11 PROBLEM — F19.10 POLYSUBSTANCE ABUSE (H): Status: RESOLVED | Noted: 2023-12-14 | Resolved: 2024-07-11

## 2024-07-11 PROBLEM — F15.20 METHAMPHETAMINE USE DISORDER, SEVERE, DEPENDENCE (H): Chronic | Status: ACTIVE | Noted: 2018-02-23

## 2024-07-11 PROBLEM — F11.90 OPIOID USE DISORDER: Status: RESOLVED | Noted: 2024-01-07 | Resolved: 2024-07-11

## 2024-07-11 PROBLEM — F15.20 METHAMPHETAMINE USE DISORDER, SEVERE, DEPENDENCE (H): Chronic | Status: RESOLVED | Noted: 2018-02-23 | Resolved: 2024-07-11

## 2024-07-11 PROBLEM — R45.851 SUICIDAL IDEATION: Status: RESOLVED | Noted: 2018-02-23 | Resolved: 2024-07-11

## 2024-07-11 PROBLEM — R45.851 SUICIDAL IDEATION: Status: RESOLVED | Noted: 2024-05-15 | Resolved: 2024-07-11

## 2024-07-11 PROBLEM — F15.10 METHAMPHETAMINE USE (H): Status: RESOLVED | Noted: 2024-05-15 | Resolved: 2024-07-11

## 2024-07-11 PROBLEM — F19.10 SUBSTANCE ABUSE (H): Status: RESOLVED | Noted: 2018-05-07 | Resolved: 2024-07-11

## 2024-07-11 PROBLEM — F41.8 DEPRESSION WITH ANXIETY: Status: RESOLVED | Noted: 2020-05-05 | Resolved: 2024-07-11

## 2024-07-11 PROBLEM — Z86.19 HX OF SEPSIS: Status: ACTIVE | Noted: 2024-07-11

## 2024-07-11 PROBLEM — F29 PSYCHOSIS (H): Status: RESOLVED | Noted: 2020-05-04 | Resolved: 2024-07-11

## 2024-07-11 PROBLEM — F11.10 HEROIN ABUSE (H): Status: RESOLVED | Noted: 2018-05-10 | Resolved: 2024-07-11

## 2024-07-11 PROBLEM — F32.A DEPRESSION, UNSPECIFIED DEPRESSION TYPE: Status: RESOLVED | Noted: 2024-05-15 | Resolved: 2024-07-11

## 2024-07-11 PROBLEM — M51.379 DDD (DEGENERATIVE DISC DISEASE), LUMBOSACRAL: Status: RESOLVED | Noted: 2020-05-05 | Resolved: 2024-07-11

## 2024-07-11 PROBLEM — F19.10 IV DRUG ABUSE (H): Status: RESOLVED | Noted: 2018-05-10 | Resolved: 2024-07-11

## 2024-07-11 PROBLEM — F15.10 METHAMPHETAMINE ABUSE (H): Status: RESOLVED | Noted: 2018-02-23 | Resolved: 2024-07-11

## 2024-07-11 PROCEDURE — 99214 OFFICE O/P EST MOD 30 MIN: CPT

## 2024-07-11 RX ORDER — ALBUTEROL SULFATE 90 UG/1
2 AEROSOL, METERED RESPIRATORY (INHALATION) EVERY 6 HOURS PRN
Qty: 18 G | Status: CANCELLED | OUTPATIENT
Start: 2024-07-11

## 2024-07-11 RX ORDER — ATORVASTATIN CALCIUM 20 MG/1
20 TABLET, FILM COATED ORAL EVERY EVENING
Qty: 90 TABLET | Refills: 1 | Status: SHIPPED | OUTPATIENT
Start: 2024-07-11

## 2024-07-11 RX ORDER — MULTIPLE VITAMINS W/ MINERALS TAB 9MG-400MCG
1 TAB ORAL DAILY
Qty: 90 TABLET | Refills: 1 | Status: SHIPPED | OUTPATIENT
Start: 2024-07-11

## 2024-07-11 RX ORDER — NICOTINE 21 MG/24HR
1 PATCH, TRANSDERMAL 24 HOURS TRANSDERMAL EVERY 24 HOURS
Qty: 30 PATCH | Refills: 1 | Status: CANCELLED | OUTPATIENT
Start: 2024-07-11

## 2024-07-11 ASSESSMENT — PATIENT HEALTH QUESTIONNAIRE - PHQ9
10. IF YOU CHECKED OFF ANY PROBLEMS, HOW DIFFICULT HAVE THESE PROBLEMS MADE IT FOR YOU TO DO YOUR WORK, TAKE CARE OF THINGS AT HOME, OR GET ALONG WITH OTHER PEOPLE: SOMEWHAT DIFFICULT
SUM OF ALL RESPONSES TO PHQ QUESTIONS 1-9: 6
SUM OF ALL RESPONSES TO PHQ QUESTIONS 1-9: 6

## 2024-07-11 ASSESSMENT — ASTHMA QUESTIONNAIRES: ACT_TOTALSCORE: 24

## 2024-07-11 NOTE — PROGRESS NOTES
Assessment & Plan     Encounter to establish care    Bilateral carpal tunnel syndrome  - Occupational Therapy  Referral  - plant to engage with surg team after treatment program discharge     Hypertriglyceridemia  - atorvastatin (LIPITOR) 20 MG tablet  Dispense: 90 tablet; Refill: 1    Hx of intravenous drug use in early remission, Moderate opioid dependence in early remission (H), Methamphetamine use disorder, severe, in early remission (H)  -8 months clean, patient congratulated  - Currently in residential treatment program, discharging 4 SEP  -[facility] initiated for records transfer    Screening for colorectal cancer  -FIT test       MED REC REQUIRED  Post Medication Reconciliation Status: medications reconciled, continue medications without change      Follow up prior to treatment program discharge for care coordination or sooner with concerns       Subjective   Perry is a 49 year old, presenting for the following health issues:    Pt reports to establish care in [facility]. Discharged from ED in May for SI and polysubstance abuse (meth, IV opioids, ETOH). Completed inpt rehab and currently in residential treatment facility ([facility]). Progressing well, in house psych provider managing.  Med rec done    Patient additionally concerned for ongoing bilateral carpal tunnel.  Reports this has been diagnosed several years ago but was unable to schedule surgery.  Would like to reengage with surgical team after completion of treatment program, open to OT at this time.      Hospital F/U (5/19/24) and Establish Care        7/11/2024    10:38 AM   Additional Questions   Roomed by gino         7/11/2024   Forms   Any forms needing to be completed Yes        HPI     Review of Systems  Constitutional, HEENT, cardiovascular, pulmonary, gi and gu systems are negative, except as otherwise noted.      Objective    BP 97/68   Pulse 74   Temp 98  F (36.7  C) (Temporal)   Resp 16   Ht 1.91 m (6'  "3.2\")   Wt 123.6 kg (272 lb 6.4 oz)   SpO2 94%   BMI 33.87 kg/m    Body mass index is 33.87 kg/m .  Physical Exam   GENERAL: healthy, alert and no distress  EYES: Eyes grossly normal to inspection, PERRL and conjunctivae and sclerae normal  Neck: No visible JVD or lymphadenopathy   RESP: symmetrical rise in chest   CV: No peripheral edema notable   MS: no gross musculoskeletal defects noted  SKIN: no suspicious lesions or rashes  PSYCH: mentation appears normal, affect normal/bright          Signed Electronically by: WOLF Aguirre CNP    Answers submitted by the patient for this visit:  Patient Health Questionnaire (Submitted on 7/11/2024)  If you checked off any problems, how difficult have these problems made it for you to do your work, take care of things at home, or get along with other people?: Somewhat difficult  PHQ9 TOTAL SCORE: 6    "

## 2024-07-23 ENCOUNTER — TRANSFERRED RECORDS (OUTPATIENT)
Dept: HEALTH INFORMATION MANAGEMENT | Facility: CLINIC | Age: 49
End: 2024-07-23

## 2024-08-06 ENCOUNTER — TRANSFERRED RECORDS (OUTPATIENT)
Dept: HEALTH INFORMATION MANAGEMENT | Facility: CLINIC | Age: 49
End: 2024-08-06

## 2024-08-23 ENCOUNTER — TRANSFERRED RECORDS (OUTPATIENT)
Dept: HEALTH INFORMATION MANAGEMENT | Facility: CLINIC | Age: 49
End: 2024-08-23

## 2024-08-28 ENCOUNTER — TELEPHONE (OUTPATIENT)
Dept: FAMILY MEDICINE | Facility: CLINIC | Age: 49
End: 2024-08-28

## 2024-10-11 ENCOUNTER — TRANSFERRED RECORDS (OUTPATIENT)
Dept: HEALTH INFORMATION MANAGEMENT | Facility: CLINIC | Age: 49
End: 2024-10-11

## 2024-10-18 NOTE — PLAN OF CARE
"Social Service Psychosocial Assessment  Presenting Problem:   Patient was admitted with SI and a plan to shoot himself. Intake note states pt admits to /.   Marital Status:    - though has been \"on and off\" with her  Spouse / Children:    3 children- 2 younger ones were adopted and the oldest is in [place]   Psychiatric TX HX:   History of depression, anxiety, ptsd, and schizophrenia. [facility] most recent being ten years ago at [facility]- was also hospitalized when he was 13 or 14.   Suicide Risk Assessment:  Patient was admitted with SI and a plan to shoot self. Previous suicide attempts by overdose- 3 years ago. Denies SI today. Says he is \"45 yrs old and I'm sick and tired of the ups and downs.\"   Access to Lethal Means (explain):   Denies access to lethal means   Family Psych HX:   Unknown   A & Ox:   x3  Medication Adherence:   Unknown   Medical Issues:   See H&P  Visual -Motor Functioning:   Ok  Communication Skills /Needs:   Ok  Ethnicity:   White     Spirituality/Sabianist Affiliation:   Worship    Clergy Request:   No   History:   Unknown   Living Situation:    Homeless- Has been staying with his mom and dad in [place] or with a friend   ADL s:  Independent   Education:  Graduated HS- reports having help for 1 hour to get caught up in classes   Financial Situation:   States he is wanting to get on disability   Occupation: Unemployed   Leisure & Recreation:  MMA fighting   Childhood History:   Raised by mom and dad in [place]. Reports having a younger sister that he is close with- says he is also close with his mom and dad.   Trauma Abuse HX:   Reports a history of childhood trauma   Relationship / Sexuality:   Intake note states pt and his fiance recently broke up after he found out she was cheating   Substance Use/ Abuse:   U tox positive for amphetamines. Daily IV meth use. Has also been using marijuana and alcohol. States he was sober almost two " "years and relapsed due to depression and finding out about his fiance cheating on him  Chemical Dependency Treatment HX: Hx of multiple treatment programs in the past-  went to [facility] for treatment in the past and [facility] in [place] in 2019 for 6 months. Says he was sober a year after going to treatment. States he is interested in a rule 25 and treatment.   Legal Issues:   Unknown   Significant Life Events:   Unknown   Strengths:    Supportive family, wants CD treatment  Challenges /Limitation:   Substance abuse, Housing, Lack of positive coping skills   Patient Support Contact (Include name, relationship, number, and summary of conversation):   Pt has a release signed for his  sister Alla [phone] and sister Cecily [phone].   Interventions:      Community-Based Programs- [facility]- Would benefit     Medical/Dental Care- [facility]     CD Evaluation/Rule 25/Aftercare- Interested in a Rule 25- [facility] has been notified and will meet with pt tomorrow     Medication Management- Jayce Flores- in the past     Individual Therapy- Monica Martinez- in the past     Insurance Coverage- Blue Plus     Suicide Risk Assessment- Patient was admitted with SI and a plan to shoot self. Previous suicide attempts by overdose- 3 years ago. Denies SI today. Says he is \"45 yrs old and I'm sick and tired of the ups and downs.\"     High Risk Safety Plan- Talk to supports; Call crisis lines; Go to local ER if feeling suicidal.  Meenakshi Ferrell, LUCIO  5/4/2020  2:29 PM    " yes

## 2024-10-24 ENCOUNTER — HOSPITAL ENCOUNTER (INPATIENT)
Facility: CLINIC | Age: 49
LOS: 10 days | Discharge: ANOTHER HOSPITAL | End: 2024-11-08
Attending: STUDENT IN AN ORGANIZED HEALTH CARE EDUCATION/TRAINING PROGRAM | Admitting: PSYCHIATRY & NEUROLOGY
Payer: COMMERCIAL

## 2024-10-24 DIAGNOSIS — Z86.19 HX OF SEPSIS: ICD-10-CM

## 2024-10-24 DIAGNOSIS — F32.A DEPRESSION, UNSPECIFIED DEPRESSION TYPE: ICD-10-CM

## 2024-10-24 DIAGNOSIS — R45.851 SUICIDAL IDEATION: ICD-10-CM

## 2024-10-24 DIAGNOSIS — F17.200 TOBACCO USE DISORDER: ICD-10-CM

## 2024-10-24 DIAGNOSIS — F25.0 SCHIZOAFFECTIVE DISORDER, BIPOLAR TYPE (H): ICD-10-CM

## 2024-10-24 DIAGNOSIS — F11.21: Primary | ICD-10-CM

## 2024-10-24 DIAGNOSIS — R45.1 AGITATED: ICD-10-CM

## 2024-10-24 DIAGNOSIS — F41.1 GAD (GENERALIZED ANXIETY DISORDER): ICD-10-CM

## 2024-10-24 DIAGNOSIS — F22 PARANOIA (H): ICD-10-CM

## 2024-10-24 DIAGNOSIS — R00.0 TACHYCARDIA: ICD-10-CM

## 2024-10-24 DIAGNOSIS — T78.40XA ALLERGIC REACTION, INITIAL ENCOUNTER: ICD-10-CM

## 2024-10-24 PROCEDURE — 250N000011 HC RX IP 250 OP 636: Performed by: STUDENT IN AN ORGANIZED HEALTH CARE EDUCATION/TRAINING PROGRAM

## 2024-10-24 PROCEDURE — 99285 EMERGENCY DEPT VISIT HI MDM: CPT | Performed by: STUDENT IN AN ORGANIZED HEALTH CARE EDUCATION/TRAINING PROGRAM

## 2024-10-24 PROCEDURE — 250N000013 HC RX MED GY IP 250 OP 250 PS 637: Performed by: STUDENT IN AN ORGANIZED HEALTH CARE EDUCATION/TRAINING PROGRAM

## 2024-10-24 PROCEDURE — 96372 THER/PROPH/DIAG INJ SC/IM: CPT | Performed by: STUDENT IN AN ORGANIZED HEALTH CARE EDUCATION/TRAINING PROGRAM

## 2024-10-24 RX ORDER — HALOPERIDOL 5 MG/ML
5 INJECTION INTRAMUSCULAR ONCE
Status: COMPLETED | OUTPATIENT
Start: 2024-10-24 | End: 2024-10-24

## 2024-10-24 RX ORDER — OLANZAPINE 10 MG/1
10 TABLET, ORALLY DISINTEGRATING ORAL ONCE
Status: COMPLETED | OUTPATIENT
Start: 2024-10-24 | End: 2024-10-24

## 2024-10-24 RX ORDER — ACETAMINOPHEN 500 MG
1000 TABLET ORAL ONCE
Status: COMPLETED | OUTPATIENT
Start: 2024-10-24 | End: 2024-10-24

## 2024-10-24 RX ADMIN — MIDAZOLAM 5 MG: 1 INJECTION INTRAMUSCULAR; INTRAVENOUS at 23:53

## 2024-10-24 RX ADMIN — OLANZAPINE 10 MG: 10 TABLET, ORALLY DISINTEGRATING ORAL at 23:30

## 2024-10-24 RX ADMIN — HALOPERIDOL LACTATE 5 MG: 5 INJECTION, SOLUTION INTRAMUSCULAR at 23:53

## 2024-10-24 RX ADMIN — ACETAMINOPHEN 1000 MG: 500 TABLET ORAL at 23:30

## 2024-10-24 ASSESSMENT — ACTIVITIES OF DAILY LIVING (ADL)
ADLS_ACUITY_SCORE: 0
ADLS_ACUITY_SCORE: 0

## 2024-10-24 ASSESSMENT — COLUMBIA-SUICIDE SEVERITY RATING SCALE - C-SSRS
3. HAVE YOU BEEN THINKING ABOUT HOW YOU MIGHT KILL YOURSELF?: YES
6. HAVE YOU EVER DONE ANYTHING, STARTED TO DO ANYTHING, OR PREPARED TO DO ANYTHING TO END YOUR LIFE?: NO
4. HAVE YOU HAD THESE THOUGHTS AND HAD SOME INTENTION OF ACTING ON THEM?: NO
2. HAVE YOU ACTUALLY HAD ANY THOUGHTS OF KILLING YOURSELF IN THE PAST MONTH?: YES
1. IN THE PAST MONTH, HAVE YOU WISHED YOU WERE DEAD OR WISHED YOU COULD GO TO SLEEP AND NOT WAKE UP?: YES
5. HAVE YOU STARTED TO WORK OUT OR WORKED OUT THE DETAILS OF HOW TO KILL YOURSELF? DO YOU INTEND TO CARRY OUT THIS PLAN?: NO

## 2024-10-25 ENCOUNTER — TELEPHONE (OUTPATIENT)
Dept: BEHAVIORAL HEALTH | Facility: CLINIC | Age: 49
End: 2024-10-25

## 2024-10-25 PROCEDURE — 250N000013 HC RX MED GY IP 250 OP 250 PS 637: Performed by: EMERGENCY MEDICINE

## 2024-10-25 PROCEDURE — 99255 IP/OBS CONSLTJ NEW/EST HI 80: CPT | Performed by: PSYCHIATRY & NEUROLOGY

## 2024-10-25 RX ORDER — LITHIUM CARBONATE 450 MG
900 TABLET, EXTENDED RELEASE ORAL AT BEDTIME
Status: ON HOLD | COMMUNITY
End: 2024-11-07

## 2024-10-25 RX ORDER — BUPRENORPHINE 8 MG/1
8 TABLET SUBLINGUAL 2 TIMES DAILY
Status: ON HOLD | COMMUNITY
End: 2024-11-07

## 2024-10-25 RX ORDER — BUPRENORPHINE AND NALOXONE 8; 2 MG/1; MG/1
1 FILM, SOLUBLE BUCCAL; SUBLINGUAL EVERY 24 HOURS
Status: DISCONTINUED | OUTPATIENT
Start: 2024-10-26 | End: 2024-10-30

## 2024-10-25 RX ORDER — PROPRANOLOL HYDROCHLORIDE 10 MG/1
5 TABLET ORAL 3 TIMES DAILY
Status: ON HOLD | COMMUNITY
End: 2024-11-07

## 2024-10-25 RX ORDER — ATORVASTATIN CALCIUM 20 MG/1
20 TABLET, FILM COATED ORAL EVERY EVENING
Status: DISCONTINUED | OUTPATIENT
Start: 2024-10-25 | End: 2024-11-08 | Stop reason: HOSPADM

## 2024-10-25 RX ORDER — QUETIAPINE FUMARATE 50 MG/1
50 TABLET, FILM COATED ORAL 2 TIMES DAILY
Status: DISCONTINUED | OUTPATIENT
Start: 2024-10-25 | End: 2024-11-08 | Stop reason: HOSPADM

## 2024-10-25 RX ORDER — HYDROXYZINE HYDROCHLORIDE 50 MG/1
50 TABLET, FILM COATED ORAL 3 TIMES DAILY PRN
Status: DISCONTINUED | OUTPATIENT
Start: 2024-10-25 | End: 2024-10-30

## 2024-10-25 RX ORDER — QUETIAPINE FUMARATE 100 MG/1
100 TABLET, FILM COATED ORAL AT BEDTIME
Status: DISCONTINUED | OUTPATIENT
Start: 2024-10-25 | End: 2024-10-30

## 2024-10-25 RX ORDER — ATOMOXETINE 80 MG/1
80 CAPSULE ORAL DAILY
Status: ON HOLD | COMMUNITY
End: 2024-11-12

## 2024-10-25 RX ORDER — BUPRENORPHINE AND NALOXONE 2; .5 MG/1; MG/1
1 FILM, SOLUBLE BUCCAL; SUBLINGUAL
Status: ACTIVE | OUTPATIENT
Start: 2024-10-25 | End: 2024-10-26

## 2024-10-25 RX ORDER — QUETIAPINE FUMARATE 400 MG/1
400 TABLET, FILM COATED ORAL AT BEDTIME
Status: ON HOLD | COMMUNITY
End: 2024-11-07

## 2024-10-25 RX ORDER — HYDROXYZINE HYDROCHLORIDE 50 MG/1
100 TABLET, FILM COATED ORAL 3 TIMES DAILY PRN
Status: DISCONTINUED | OUTPATIENT
Start: 2024-10-25 | End: 2024-10-25

## 2024-10-25 RX ORDER — QUETIAPINE FUMARATE 50 MG/1
50 TABLET, FILM COATED ORAL 2 TIMES DAILY PRN
Status: ON HOLD | COMMUNITY
End: 2024-11-07

## 2024-10-25 RX ADMIN — FLUOXETINE HYDROCHLORIDE 20 MG: 20 CAPSULE ORAL at 13:58

## 2024-10-25 RX ADMIN — QUETIAPINE FUMARATE 50 MG: 50 TABLET ORAL at 13:58

## 2024-10-25 ASSESSMENT — LIFESTYLE VARIABLES: TOTAL_SCORE: 0

## 2024-10-25 NOTE — ED NOTES
Pt has been sleeping due to medication administration. When pt does wake up, pt removes EtCO2/nasal cannula from nose, staff able to put nasal cannula back on pt with pt permission. Pt has not been violent, but was agitated once when waking up. Pt able to self calm in minutes. 1:1 remains at bedside, rr wnl and unlabored.

## 2024-10-25 NOTE — ED TRIAGE NOTES
Pt brought in by EMS from his sober living home stating he has been off his medications for 10days. Reports 11 months of sobriety until 3 days ago when he used meth. He is regretful in his decision but now Increasingly paranoid about people finding him and trying to kill him. Reports SI thoughts since using meth, but denies plan in place.      Triage Assessment (Adult)       Row Name 10/24/24 7822          Triage Assessment    Airway WDL WDL        Respiratory WDL    Respiratory WDL WDL        Skin Circulation/Temperature WDL    Skin Circulation/Temperature WDL WDL        Cardiac WDL    Cardiac WDL WDL

## 2024-10-25 NOTE — CONSULTS
"[facility]  Department of Psychiatry  Consultation note    Perry Preciado Jr. MRN: [id number]   Age: 49 year old YOB: 1975     History     Chief Complaint   Patient presents with    Paranoid    Psychiatric Evaluation     HPI  Perry Preciado Jr. is a 49 year old male with a history of MDD, KANE, TBI, substance abuse here with SI with plan to cut his wrists or run into traffic. SI started when he relapsed on methamphetamine and fentanyl about a week ago after 11 months sobriety. He has been off medications for about 10 days, says he started forgetting to take them. He feels \"embarrassed\" about relapsing. He has been feeling depressed, has little motivation, poor appetite, sleep has been \"ok\", feels more anxious than usual. He has been paranoid and hallucinating. He sees shadows which he feels are demons threatening to kill him. Symptoms started about a week ago. He had also recently been taking strattera 80 mg daily, gabapentin 600 mg TID, hydroxyzine 100 mg TID PRN, propranolol 10 mg TID. Has been hospitalized before, most recent a year ago. History of 2 suicide attempts.    He came in last night via EMS from sober living. At one point got agitated and got versed, olanzapine and haldol. He became very somnolent with those medications. When I tried to see him around 16:45 I was unable to wake him. An hour later he was able to stay awake and have a conversation but will still pretty somnolent. He reports that he feels the same now as he did when he came in. Outpatient meds have been restarted including fluoxetine 20 mg daily, seroquel 50 mg BID and 100 mg at bedtime (seroquel dose was 50 mg BID and 400 mg at bedtime when he stopped taking it). UDS ordered but hasn't been collected yet.    Past Medical History  Past Medical History:   Diagnosis Date    Acute bilateral low back pain with right-sided sciatica 06/07/2016    Acute pain of right shoulder due to " trauma     Adult antisocial behavior     long term incarceration: 16 years    Aspiration pneumonia (H) 02/24/2014    CARDIOVASCULAR SCREENING; LDL GOAL LESS THAN 130 06/07/2016    Carpal tunnel syndrome of right wrist 06/07/2016    Chest pain 02/19/2014    Chest trauma 02/19/2014    Chronic pain syndrome 09/30/2019    Chronic right-sided low back pain with right-sided sciatica 05/10/2018    DDD (degenerative disc disease), lumbosacral 05/05/2020    Depression with anxiety 05/05/2020    Displacement of lumbar intervertebral disc without myelopathy 05/16/2012    KANE (generalized anxiety disorder) 07/07/2017    Heroin abuse (H) 05/10/2018    History of ADHD 01/06/2014    History of drug abuse (H) 01/06/2014    History of suicide attempt 05/10/2018    HTN (hypertension) 02/26/2014    Hyperglycemia 02/23/2014    Hypertriglyceridemia 11/02/2015    IV drug abuse (H) 05/10/2018    Major depressive disorder, recurrent (H) 02/23/2018    Major depressive disorder, recurrent episode, moderate (H) 06/06/2016    Methamphetamine abuse (H) 02/23/2018    Overview:  see [facility] H&P 11/27/2009, [facility] admit 9/2/2010    Mild intermittent asthma without complication 05/10/2018    Opiate overdose (H) 02/22/2014    Positive D dimer 11/02/2015    Psychosis (H) 05/04/2020    Sepsis (H) 02/22/2014    SIRS (systemic inflammatory response syndrome) (H) 11/02/2015    Substance abuse (H) 05/07/2018    Suicidal ideation 02/23/2018    Tobacco use disorder 05/10/2018     Past Surgical History:   Procedure Laterality Date    BACK SURGERY       atomoxetine (STRATTERA) 80 MG capsule  atorvastatin (LIPITOR) 20 MG tablet  buprenorphine (SUBUTEX) 8 MG SUBL sublingual tablet  FLUoxetine (PROZAC) 20 MG capsule  gabapentin (NEURONTIN) 600 MG tablet  hydrOXYzine HCl (ATARAX) 50 MG tablet  lithium (ESKALITH CR/LITHOBID) 450 MG CR tablet  multivitamin w/minerals (THERA-VIT-M) tablet  propranolol (INDERAL) 10 MG tablet  QUEtiapine (SEROQUEL) 400 MG  tablet  QUEtiapine (SEROQUEL) 50 MG tablet      Allergies   Allergen Reactions    Wellbutrin [Bupropion] Anaphylaxis and Swelling     Facial swelling    Wellbutrin [Bupropion]     Wellbutrin [Bupropion] Difficulty breathing    Naltrexone Other (See Comments)     Headaches  Headaches       Family History  No family history on file.  Social History   Social History     Tobacco Use    Smoking status: Every Day     Current packs/day: 0.50     Types: Cigarettes     Passive exposure: Current    Smokeless tobacco: Never   Vaping Use    Vaping status: Never Used   Substance Use Topics    Alcohol use: Yes    Drug use: Yes     Types: Fentanyl, Methamphetamines     Comment: last use 2 weeks ago          Review of Systems  A medically appropriate review of systems was performed with pertinent positives and negatives noted in the HPI, and all other systems negative.    Physical Examination   BP: 116/81  Pulse: 113  Temp: 98  F (36.7  C)  Resp: 20  SpO2: 97 %    Physical Exam  General: Appears stated age.   Neuro: Alert and fully oriented. Extremities appear to demonstrate normal strength on visual inspection.   Integumentary/Skin: no rash visualized, normal color    Psychiatric Examination   Appearance:  somnolent, in hospital attire  Attitude:  cooperative  Eye Contact:  fair  Mood:  depressed  Affect:  mood congruent  Speech:  clear, coherent  Psychomotor Behavior:  no evidence of tardive dyskinesia, dystonia, or tics  Thought Process:  goal oriented  Associations:  no loose associations  Thought Content:   reports SI, AVH, paranoia and delusions. Denies HI  Insight:  fair  Judgement:  fair  Oriented to:  time, person, and place  Attention Span and Concentration:  fair  Recent and Remote Memory:  intact  Language: able to name/identify objects without impairment  Fund of Knowledge: intact with awareness of current and past events    ED Course        Labs Ordered and Resulted from Time of ED Arrival to Time of ED Departure - No  data to display    Assessments & Plan (with Medical Decision Making)   Patient presenting with depression, SI, paranoia and hallucinations after stopping medications and relapsing on methamphetamine and fentanyl. Was agitated shortly after arrival, got medications and has been cooperative since. Initial agitation possibly had a substance induced component. He is still feeling depressed and suicidal. He wants help and is appropriate for inpatient care.     I have reviewed the assessment completed by the [facility].     Preliminary diagnosis:    ICD-10-CM    1. MDD recurrent severe        2. Suicidal ideation      3.      Polysubstance abuse  4.      Psychosis nos, r/o substance induced        Treatment Plan:  -Patient is on the list for an inpatient bed  -Would hold off on increasing doses or restarting any more outpatient medications until he is less sedated as most of them are sedating    --  Emili Montes MD   [facility] EMERGENCY DEPARTMENT
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
## 2024-10-25 NOTE — ED NOTES
Medications effective and patient resting in bed, pulse ox applied to pt O2 reading 94%, then dropping to 83% on RA, rr 22. Pt then quickly moved to medical room 9 and placed on 4L nc with saturations improving to 94%, rr 24 and shallow. MD Dr. Stephens then called to bedside for evaluation of patient. Report given to FUNMILAYO Regalado to assume care of patient.     Jazmyne Drake RN on 10/25/2024 at 1:10 AM

## 2024-10-25 NOTE — ED NOTES
"Perry Preciado Jr.  October 25, 2024  Plan of Care Hand-off Note     Patient Care Path: inpatient mental health    Plan for Care:   Pt arrived at the ED via EMS. Pt reporting paranoia with auditory and visual hallucinations. Pt reports these hallucinations are demons or shadows that say, \"they are going to kill pt\". Pt also endorses SI, does not have specific plan but states intent and if he left \"he would kill himself\". Pt has a Hx of 2 suicide attempts which he states were impulsive where he had a thought and acted on it. Pt expressed feeling anxious, depressed, some irritability, short tempered, and lack of motivation. Pt reports they stopped taking medication about a week ago. Pt reported sleep was  ok  and when asked about appetite, pt said  not really   Pt reports thoughts that  just want it to be over . Pt denies HI and SIB. Pt was sober for 11 months and began using Meth a week ago. Pt reporting living in an IRTS facility and transiting about three weeks ago to a boarding house. Pt has DxHx of MDD, KANE, ADHD, Polysub, and TBI.    Identified Goals and Safety Issues: Reduce SI and hallucinations.    Overview:  sister Gold [phone]            Legal Status: Legal Status at Admission: Voluntary/Patient has signed consent for treatment    Psychiatry Consult: yes       Updated provider  regarding plan of care.           María Mcdowell       "

## 2024-10-25 NOTE — ED NOTES
Bed: ED16  Expected date: 10/24/24  Expected time: 9:46 PM  Means of arrival:   Comments:  [id number]  To triage

## 2024-10-25 NOTE — ED NOTES
IP MH Referral Acuity Rating Score (RARS)    LMHP complete at referral to IP MH, with DEC; and, daily while awaiting IP MH placement. Call score to PPS.  CRITERIA SCORING   New 73 HH and Involuntary for IP MH (not adolescent) 0/1   Boarding over 24 hours 0/1   Vulnerable adult at least 55+ with multiple co morbidities; or, Patient age 11 or under 0/1   Suicide ideation without relief of precipitating factors 1/1   Current plan for suicide 0/1   Current plan for homicide 0/1   Imminent risk or actual attempt to seriously harm another without relief of factors precipitating the attempt 0/1   Severe dysfunction in daily living (ex: complete neglect for self care, extreme disruption in vegetative function, extreme deterioration in social interactions) 1/1   Recent (last 2 weeks) or current physical aggression in the ED 0/1   Restraints or seclusion episode in ED 0/1   Verbal aggression, agitation, yelling, etc., while in the ED 1/1   Active psychosis with psychomotor agitation or catatonia 1/1   Need for constant or near constant redirection (from leaving, from others, etc).  0/1   Intrusive or disruptive behaviors 0/1   TOTAL Acuity Total Score: 4

## 2024-10-25 NOTE — ED NOTES
Pt in hallway becoming increasingly more agitated and paranoid. SUSIE team was called to assist in deescalating pt at 2347.   MD arrival at 2347 at that time due to pt requesting him. Pt still increasingly agitated and paranoid about being killed by demons. Pt escalating and code 21 was called at 2349.   Code team arrived and stood by for further instructions.   Through talking with patient and ensuring staff safety pt then agreed to IM injections of Haldol and Versed. 1:1 ongoing.       Jazmyne Drake RN on 10/25/2024 at 12:37 AM

## 2024-10-25 NOTE — ED NOTES
Pt not ready at 0153 due to being medicated and sleeping. Ed staff will call and update DEC when ready in the AM.     Phuong Durham

## 2024-10-25 NOTE — ED PROVIDER NOTES
Emergency Department I-PASS Sign-out      Illness Severity: Watcher    Patient Summary:  49 year old male with pertinent PMH of MDD, KANE, ADHD, polysubstance use disorder, TBI who presented with suicidal ideation.  Presented from a sober living facility.  Recently relapsed on meth/fentanyl    ED Course/treatment plan: Was felt to be manic on arrival.  Became very agitated in the ED and required code 21/sedation with olanzapine, Haldol, and Versed.  Monitored during the overnight shift and was extremely somnolent.  Intermittently required supplemental O2.  End-tidal is in place.  Will need mental health assessment when awake.    Clinical Impression:  (F22) Paranoia (H)    (R45.427) Suicidal ideation      Edited by: Arnold Stephens DO at 10/25/2024 0607    Action List:  Tests to Follow-up:  None    Medications Reconciled/Ordered:  No    ED Mental Health Boarding Order Set Used for Diet/PRNs/Other:  No    DEC, Extended Care, Psych Consult Orders:  DEC assessment ordered and pending.     Situational Awareness & Contingency Plannin Hour Hold Status:  Voluntary, would reassess if wanting to leave.  Active Orders  N/A    Disposition:  Awaiting Behavioral Health DEC assessment    Boarding subsequent shift/day updates:  10/25/24 DEC assessment complete.  Patient continues to endorse suicidal ideations.  Mental health admission.  Meds ordered but at lower dose as he has been off them for at least 1-1/2 weeks.  Seroquel and fluoxetine can be titrated as appropriate.    Edited by: Petr Rocha MD at 10/25/2024 7495    Synthesis & Events after sign-out:  DEC assessment complete.  Patient continues to endorse suicidal ideations.  Mental health admission.  Meds ordered but at lower dose as he has been off them for at least 1-1/2 weeks.  Seroquel and fluoxetine can be titrated as appropriate.        PETR ROCHA MD, MD   Emergency Medicine     Petr Rocha MD  10/25/24 6124

## 2024-10-25 NOTE — CONSULTS
"Diagnostic Evaluation Consultation  Crisis Assessment    Patient Name: Perry Preciado Jr.  Age:  49 year old  Legal Sex: male  Gender Identity: male  Pronouns:   Race: White  Ethnicity: Not  or   Language: English      Patient was assessed: In person   Crisis Assessment Start Date: 10/25/24  Crisis Assessment Start Time: 1255  Crisis Assessment Stop Time: 1325  Patient location: [facility] EMERGENCY DEPARTMENT                             ED08    Referral Data and Chief Complaint  Perry Preciado Jr. presents to the ED via EMS. Patient is presenting to the ED for the following concerns: Paranoia, Suicidal ideation.   Factors that make the mental health crisis life threatening or complex are:  Pt arrived at the ED via EMS. Pt reporting paranoia with auditory and visual hallucinations. Pt reports these hallucinations are demons or shadows that say, \"they are going to kill pt\". Pt also endorses SI, does not have specific plan but states intent and if he left \"he would kill himself\".Pt expressed feeling anxious, depressed, some irritability, short tempered, and lack of motivation. Pt reports they stopped taking medication about a week ago. Pt reported sleep was  ok  and when asked about appetite, pt said  not really   Pt has a Hx of 2 suicide attempts which pt states were impulsive act where the pt had a thought and acted on it immediately. Pt denies HI and SIB. Pt was sober for 11 months and began using Meth a week ago. Pt reporting living in an IRTS facility and transiting about three weeks ago to a boarding house. Pt has DxHx of MDD, KANE, ADHD, Polysub, and TBI.      Informed Consent and Assessment Methods  Explained the crisis assessment process, including applicable information disclosures and limits to confidentiality, assessed understanding of the process, and obtained consent to proceed with the assessment.  Assessment methods included conducting a formal interview with patient, " "review of medical records, collaboration with medical staff, and obtaining relevant collateral information from family and community providers when available.  : done     Patient response to interventions: acceptance expressed  Coping skills were attempted to reduce the crisis:  help seeking     History of the Crisis   Pt reports they started using meth about a week ago, with most recent use 2-3 days ago. Pt also reports auditory and visual hallucinations. The visual hallucination are  shadows  or demons  that say they are  going to kill  pt . PT denies any HI or SIB.  Pt says that they do not have a specific plan for suicide but if they left the hospital, they  would hurt themself  as his thoughts are  just wanting it to be over . Pt has a history of suicide attempts that were first thoughts and then the pt acted impulsively. The two attempts were by gun and the second was ingestion. Pt reports that they have a Hx of CD treatment and MHIP stays. Pt has past Dx of MDD, KANE, ADHD, Polybus, and TBI. Pt reports most recent MHIP about a year ago.  Pt was sober for the past 11 months but used about a week ago in the boarding house. Pt reports he moved into the boarding house 3 weeks ago. Prior to the boarding house Pt was in an IRTS facility.    Brief Psychosocial History  Family:   , Children yes  Support System:  Sibling(s), Children  Employment Status:     Source of Income:     Financial Environmental Concerns:     Current Hobbies:     Barriers in Personal Life:       Significant Clinical History  Current Anxiety Symptoms:  anxious  Current Depression/Trauma:  hopelessness, sadness, thoughts of death/suicide  Current Somatic Symptoms:  anxious  Current Psychosis/Thought Disturbance:  auditory hallucinations, visual hallucinations  Current Eating Symptoms:   (Pt reports \"not really\" having much of an appetite.)  Chemical Use History:  Alcohol: None  Benzodiazepines: None  Opiates: None  Cocaine: None  Marijuana: " None  Other Use: Methamphetamines, Other (comments) (Pt reports last use 2-3 days ago. Meth with fentanyl)  Last Use:: 10/22/24 (2-3 days ago)   Past diagnosis:  ADHD, Anxiety Disorder, Depression, Substance Use Disorder, Other (TBI, Polysub)  Family history:   (not able to assess)  Past treatment:  Residential Treatment, Inpatient Hospitalization, Supportive Living Environment (group home, [facility] house, etc), Psychiatric Medication Management, AA/NA, Other (multiple CD treatments)  Details of most recent treatment:  Pt reports MHIP about a year ago.  Other relevant history:  Pt reports that they have been sober for the last 11 months and began using Meth about a week ago, with lst use 2-3 days ago. Pt was living in an IRTS facilty until about 3 weeks ago, when pt moved into a boarding house. Pt also reports that they stop taking  meds about a week ago.       Collateral Information  Is there collateral information: No (Attempted to call Alla (sister) [phone])     Collateral information name, relationship, phone number:       Risk Assessment  [place] Suicide Severity Rating Scale Full Clinical Version:  Suicidal Ideation  Q1 Wish to be Dead (Lifetime): Yes  Q2 Non-Specific Active Suicidal Thoughts (Lifetime): Yes  3. Active Suicidal Ideation with any Methods (Not Plan) Without Intent to Act (Lifetime): Yes  Q4 Active Suicidal Ideation with Some Intent to Act, Without Specific Plan (Lifetime): Yes  Q5 Active Suicidal Ideation with Specific Plan and Intent (Lifetime): Yes  Q6 Suicide Behavior (Lifetime): yes  Intensity of Ideation (Lifetime)  Most Severe Ideation Rating (Lifetime): 3  Suicidal Behavior (Lifetime)  Actual Attempt (Lifetime): Yes  Total Number of Actual Attempts (Lifetime): 2  Actual Attempt Description (Lifetime): Attempt one year ago by firearm and another previous attempt by overdose    [place] Suicide Severity Rating Scale Recent:   Suicidal Ideation (Recent)  Q1 Wished to be Dead (Past  Month): yes  Q2 Suicidal Thoughts (Past Month): yes  Q3 Suicidal Thought Method: no  Q4 Suicidal Intent without Specific Plan: yes  Q5 Suicide Intent with Specific Plan: no  Level of Risk per Screen: high risk  Intensity of Ideation (Recent)  Most Severe Ideation Rating (Past 1 Month): 3  Frequency (Past 1 Month): Daily or almost daily  Duration (Past 1 Month): Less than 1 hour/some of the time  Controllability (Past 1 Month): Can control thoughts with some difficulty  Deterrents (Past 1 Month): Deterrents probably stopped you  Reasons for Ideation (Past 1 Month): Completely to end or stop the pain (You couldn't go on living with the pain or how you were feeling)  Suicidal Behavior (Recent)  Actual Attempt (Past 3 Months): No  Interrupted Attempts (Past 3 Months): No  Aborted or Self-Interrupted Attempt (Past 3 Months): No  Preparatory Acts or Behavior (Past 3 Months): No    Environmental or Psychosocial Events: other (see comment), ongoing abuse of substances, history of TBI (Pt moved from [facility] to a boarding house 3 weeks ago.)  Protective Factors: Protective Factors: strong bond to family unit, community support, or employment, sense of importance of health and wellness, help seeking (Pt reports that his family including children and sister are protective factors)    Does the patient have thoughts of harming others? Feels Like Hurting Others: no  Previous Attempt to Hurt Others: no  Is the patient engaging in sexually inappropriate behavior?: no    Is the patient engaging in sexually inappropriate behavior?  no        Mental Status Exam   Affect: Appropriate, Flat  Appearance: Appropriate  Attention Span/Concentration: Attentive  Eye Contact: Variable    Fund of Knowledge: Appropriate   Language /Speech Content: Fluent  Language /Speech Volume: Normal  Language /Speech Rate/Productions: Normal  Recent Memory: Variable, Intact  Remote Memory: Variable, Intact  Mood: Depressed, Anxious  Orientation to Person: Yes    Orientation to Place: Yes  Orientation to Time of Day: Yes  Orientation to Date: Yes     Situation (Do they understand why they are here?): Yes  Psychomotor Behavior: Normal  Thought Content: Hallucinations, Paranoia, Suicidal  Thought Form: Intact, Paranoia        Medication  Psychotropic medications:   Medication Orders - Psychiatric (From admission, onward)      Start     Dose/Rate Route Frequency Ordered Stop    10/25/24 2200  QUEtiapine (SEROquel) tablet 100 mg         100 mg Oral AT BEDTIME 10/25/24 1331      10/25/24 1348  hydrOXYzine HCl (ATARAX) tablet 50 mg         50 mg Oral 3 TIMES DAILY PRN 10/25/24 1348      10/25/24 1340  FLUoxetine (PROzac) capsule 20 mg         20 mg Oral DAILY 10/25/24 1331      10/25/24 1335  QUEtiapine (SEROquel) tablet 50 mg         50 mg Oral 2 TIMES DAILY 10/25/24 1331               Current Care Team  Patient Care Team:  No Ref-Primary, Physician as PCP - General  Monica Tomlinson LICSW as  ( - Clinical)  Jayce Flores MD as MD (Psychiatry)  No Ref-Primary, Physician  Andrea Jones, WOLF CNP as Assigned PCP    Diagnosis  Patient Active Problem List   Diagnosis Code    Bilateral carpal tunnel syndrome G56.03    KANE (generalized anxiety disorder) F41.1    Chronic pain syndrome G89.4    Hyperglycemia R73.9    Hypertriglyceridemia E78.1    Lung nodule R91.1    Major depressive disorder, recurrent (H) F33.9    Mild intermittent asthma without complication J45.20    Tobacco use disorder F17.200    Displacement of lumbar intervertebral disc without myelopathy M51.26    History of ADHD Z86.59    History of suicide attempt Z91.51    Chronic right-sided low back pain with right-sided sciatica M54.41, G89.29    Psychosis (H) F29    Suicidal behavior without attempted self-injury R45.89    Methamphetamine use disorder, severe, in early remission (H) F15.21    Polysubstance abuse (H) F19.10    Moderate opioid dependence in early remission (H)  "F11.21    Hx of sepsis Z86.19    Hx of intravenous drug use in remission Z87.898       Primary Problem This Admission  Active Hospital Problems    Polysubstance abuse (H)      *Major depressive disorder, recurrent (H)      KANE (generalized anxiety disorder)      History of ADHD        Clinical Summary and Substantiation of Recommendations   Pt arrived at the ED via EMS. Pt reporting paranoia with auditory and visual hallucinations. Pt reports these hallucinations are demons or shadows that say, \"they are going to kill pt\". Pt also endorses SI, does not have specific plan but states intent and if he left \"he would kill himself\". Pt has a Hx of 2 suicide attempts which he states were impulsive where he had a thought and acted on it. Pt expressed feeling anxious, depressed, some irritability, short tempered, and lack of motivation. Pt reports they stopped taking medication about a week ago. Pt reported sleep was  ok  and when asked about appetite, pt said  not really   Pt reports thoughts that  just want it to be over . Pt denies HI and SIB. Pt was sober for 11 months and began using Meth a week ago. Pt reporting living in an IRTS facility and transiting about three weeks ago to a boarding house. Pt has DxHx of MDD, KANE, ADHD, Polysub, and TBI. Pt is unable to commit to safety and has stopped taking medication. Pt is living in an unsafe and unstable living environment and pt will be best supported by [facility] level of care.       Imminent risk of harm: Suicidal Behavior  Severe psychiatric, behavioral or other comorbid conditions are appropriate for management at inpatient mental health as indicated by at least one of the following: Comorbid substance use disorder, Psychiatric Symptoms  Severe dysfunction in daily living is present as indicated by at least one of the following: Complete neglect of self care with associated impairment in physical status (Neglect of self care to include not taking medication and not able to " stay sober.)  Situation and expectations are appropriate for inpatient care: Voluntary treatment at lower level of care is not feasible, Patient management/treatment at lower level of care is not feasible or is inappropriate  Inpatient mental health services are necessary to meet patient needs and at least one of the following: Specific condition related to admission diagnosis is present and judged likely to further improve at proposed level of care, Specific condition related to admission diagnosis is present and judged likely to deteriorate in absence of treatment at proposed level of care      Patient coping skills attempted to reduce the crisis:  help seeking    Disposition  Recommended disposition: Inpatient Mental Health        Reviewed case and recommendations with attending provider. Attending Name: Dr. Crawford       Attending concurs with disposition: yes       Patient and/or validated legal guardian concurs with disposition:   yes       Final disposition:  inpatient mental health    Legal status on admission: Voluntary/Patient has signed consent for treatment    Assessment Details   Total duration spent with the patient: 30 min     CPT code(s) utilized: 57408    María Mcdowell Psychotherapist  DEC - Triage & Transition Services  Callback: [phone]

## 2024-10-25 NOTE — ED NOTES
Pt in room when he became agitated and broke his phone, he started disassembly it into pieces and it started smoking. The phone was then submerged in water into near by sink to put out smoke. When it was cooled it was collected and put into the garbage.     Jazmyne Drake RN on 10/25/2024 at 12:33 AM

## 2024-10-25 NOTE — ED PROVIDER NOTES
Emergency Department I-PASS Sign-out      Illness Severity: Watcher    Patient Summary:  49 year old male with pertinent PMH of MDD, KANE, ADHD, polysubstance use disorder, TBI who presented with suicidal ideation.  Presented from a sober living facility.  Recently relapsed on meth/fentanyl    ED Course/treatment plan: Was felt to be manic on arrival.  Became very agitated in the ED and required code 21/sedation with olanzapine, Haldol, and Versed.  Monitored during the overnight shift and was extremely somnolent.  Intermittently required supplemental O2.  End-tidal is in place.  Will need mental health assessment when awake.    Clinical Impression:  (F22) Paranoia (H)    (R45.105) Suicidal ideation      Edited by: Arnold Stephens DO at 10/25/2024 06    Action List:  Tests to Follow-up:  None    Medications Reconciled/Ordered:  No    ED Mental Health Boarding Order Set Used for Diet/PRNs/Other:  No    DEC, Extended Care, Psych Consult Orders:  DEC assessment ordered and pending.     Situational Awareness & Contingency Plannin Hour Hold Status:  Voluntary, would reassess if wanting to leave.  Active Orders  N/A    Disposition:  Awaiting Behavioral Health DEC assessment    Boarding subsequent shift/day updates:  Monitored in the ED overnight.  Significantly somnolent from meds during code 21.  Intermittently required supplemental O2.  Currently on cardiac monitoring and end-tidal.    Edited by: Arnold Stephens DO at 10/25/2024 0607    Synthesis & Events after sign-out:  Patient presents from sober living, recently relapsed on meth/fentanyl, presents with suicidal ideation.  Code 21 prior to my shift last night.  Still somnolent from medications administered.  Awaiting mental health assessment.  Signed out to morning provider.        Arnold Stephens DO   Emergency Medicine     Arnold Stephens DO  10/25/24 0608

## 2024-10-25 NOTE — TELEPHONE ENCOUNTER
S: [facility] ALEC Monterroso  María  calling at 1:30 pm about a 49 year old/Male presenting with SI, paranoia and duggan's.      B: Pt arrived via EMS. Presenting problem, stressors: pt says he started using meth 1 wk ago. Prior to that was sober for 11 months. He has paranoia, duggan's (visual and aud) and SI w/ no plan but has intent. He reports demens that are saying they want to kill him.     Pt affect in ED: Depressed  Pt Dx: Major Depressive Disorder, Generalized Anxiety Disorder, and ADHD, polysubstance and a TBI  Previous IPMH hx? Yes: unk where, about 1 yr ago  Pt  endorses SI w/ no plan but has intent    Hx of suicide attempt? Yes: 2; last was 1 yr ago  Pt denies SIB  Pt denies HI   Pt endorses auditory hallucinations  and endorses visual hallucination .   Pt RARS Score: 4    Hx of aggression/violence, sexual offenses, legal concerns, Epic care plan? describe: no  Current concerns for aggression this visit? No  Does pt have a history of Civil Commitment? No  Is Pt their own guardian? Yes    Pt is prescribed medication. Is patient medication compliant? No; stopped taking 1 wk ago   Unk if pt has outpt providers  CD concerns:  pt says he starting using meth again 1 wk ago   Acute or chronic medical concerns:  no  Does Pt present with specific needs, assistive devices, or exclusionary criteria? None      Pt is ambulatory  Pt is able to perform ADLs independently      A: Pt to be reviewed for IP admission. Pt is Voluntary  Preferred placement: [facility]    COVID Symptoms: No  If yes, COVID test required   Utox: Ordered, not yet collected   CMP: N/A  CBC: N/A  HCG: N/A    R: Patient cleared and ready for behavioral bed placement: Yes  Pt placed on IP worklist? Yes    Does Patient need a Transfer Center request created? No, Pt is located within [facility] ED, [facility] ED, or [facility]

## 2024-10-25 NOTE — PHARMACY-ADMISSION MEDICATION HISTORY
Admission Medication History Completed by Pharmacy    See [facility] Admission Navigator for allergy information, preferred outpatient pharmacy, prior to admission medications and immunization status.     Medication history sources:  Surescripts; Chart Review     Pertinent changes made to PTA medication list:  Added: N/A  Deleted: N/A  Changed:   - Buprenorphine SL tablets 10 mg BID >> 8 mg BID (per surescripts)     Additional medication history information:   - Attempted to interview patient x2. He was either sleeping or declined to speak with me. Completed medication history based on fill history. Per chart, he has been off his medications x10 days. Additional OTC medications/supplements may not be reflected in the list below.     Prior to Admission medications    Medication Sig Last Dose Taking? Auth Provider Long Term End Date   atomoxetine (STRATTERA) 80 MG capsule Take 80 mg by mouth daily. Past Month Yes Unknown, Entered By History No    atorvastatin (LIPITOR) 20 MG tablet Take 1 tablet (20 mg) by mouth every evening Past Month Yes Andrea Jones APRN CNP Yes    buprenorphine (SUBUTEX) 8 MG SUBL sublingual tablet Place 8 mg under the tongue 2 times daily. Past Month Yes Unknown, Entered By History No    FLUoxetine (PROZAC) 20 MG capsule Take 3 capsules (60 mg) by mouth daily Past Month Yes Khurram Dawn MD Yes    gabapentin (NEURONTIN) 600 MG tablet Take 1 tablet (600 mg) by mouth 3 times daily Past Month Yes Marta Gomez APRN CNP Yes    hydrOXYzine HCl (ATARAX) 50 MG tablet Take 2 tablets (100 mg) by mouth 3 times daily as needed for anxiety Past Month Yes Khurram Dawn MD No    lithium (ESKALITH CR/LITHOBID) 450 MG CR tablet Take 900 mg by mouth at bedtime. Past Month Yes Unknown, Entered By History No    multivitamin w/minerals (THERA-VIT-M) tablet Take 1 tablet by mouth daily Past Month Yes Andrea Jones APRN CNP     propranolol (INDERAL) 10 MG tablet Take 5 mg by mouth 3 times daily. Past  Month Yes Unknown, Entered By History No    QUEtiapine (SEROQUEL) 400 MG tablet Take 400 mg by mouth at bedtime. Past Month Yes Unknown, Entered By History No    QUEtiapine (SEROQUEL) 50 MG tablet Take 50 mg by mouth 2 times daily as needed (agitation, anxiety). Past Month Yes Unknown, Entered By History No           Date completed: 10/25/24    Medication history completed by:   Liyah Soria, PharmD  *[id number]

## 2024-10-25 NOTE — ED NOTES
Attempted to wean pt off oxygen, MD aware. In weaning process, pt woke up and removed face mask for oxygen. Pt remained calm in bed, keeps nasal cannula in. On room air, pt's oxygen saturation dropped to 90%. Pt on 4L nasal cannula at 97%. 1:1 remains at bedside.

## 2024-10-26 ENCOUNTER — TELEPHONE (OUTPATIENT)
Dept: BEHAVIORAL HEALTH | Facility: CLINIC | Age: 49
End: 2024-10-26
Payer: COMMERCIAL

## 2024-10-26 PROCEDURE — 250N000013 HC RX MED GY IP 250 OP 250 PS 637: Performed by: EMERGENCY MEDICINE

## 2024-10-26 PROCEDURE — 80307 DRUG TEST PRSMV CHEM ANLYZR: CPT | Performed by: STUDENT IN AN ORGANIZED HEALTH CARE EDUCATION/TRAINING PROGRAM

## 2024-10-26 PROCEDURE — 250N000012 HC RX MED GY IP 250 OP 636 PS 637: Performed by: EMERGENCY MEDICINE

## 2024-10-26 RX ORDER — BUPRENORPHINE HYDROCHLORIDE AND NALOXONE HYDROCHLORIDE DIHYDRATE 2; .5 MG/1; MG/1
1 TABLET SUBLINGUAL DAILY
Status: DISCONTINUED | OUTPATIENT
Start: 2024-10-26 | End: 2024-10-30 | Stop reason: DRUGHIGH

## 2024-10-26 RX ADMIN — ATORVASTATIN CALCIUM 20 MG: 20 TABLET, FILM COATED ORAL at 01:18

## 2024-10-26 RX ADMIN — QUETIAPINE FUMARATE 50 MG: 50 TABLET ORAL at 11:41

## 2024-10-26 RX ADMIN — QUETIAPINE FUMARATE 100 MG: 100 TABLET ORAL at 01:17

## 2024-10-26 RX ADMIN — BUPRENORPHINE AND NALOXONE 1 FILM: 8; 2 FILM BUCCAL; SUBLINGUAL at 13:28

## 2024-10-26 RX ADMIN — HYDROXYZINE HYDROCHLORIDE 50 MG: 50 TABLET, FILM COATED ORAL at 21:18

## 2024-10-26 RX ADMIN — ATORVASTATIN CALCIUM 20 MG: 20 TABLET, FILM COATED ORAL at 21:19

## 2024-10-26 RX ADMIN — QUETIAPINE FUMARATE 100 MG: 100 TABLET ORAL at 21:19

## 2024-10-26 RX ADMIN — FLUOXETINE HYDROCHLORIDE 20 MG: 20 CAPSULE ORAL at 11:40

## 2024-10-26 ASSESSMENT — COLUMBIA-SUICIDE SEVERITY RATING SCALE - C-SSRS
3. HAVE YOU BEEN THINKING ABOUT HOW YOU MIGHT KILL YOURSELF?: YES
2. HAVE YOU ACTUALLY HAD ANY THOUGHTS OF KILLING YOURSELF IN THE PAST MONTH?: YES
4. HAVE YOU HAD THESE THOUGHTS AND HAD SOME INTENTION OF ACTING ON THEM?: NO
5. HAVE YOU STARTED TO WORK OUT OR WORKED OUT THE DETAILS OF HOW TO KILL YOURSELF? DO YOU INTEND TO CARRY OUT THIS PLAN?: YES
1. IN THE PAST MONTH, HAVE YOU WISHED YOU WERE DEAD OR WISHED YOU COULD GO TO SLEEP AND NOT WAKE UP?: YES
3. HAVE YOU BEEN THINKING ABOUT HOW YOU MIGHT KILL YOURSELF?: YES
6. HAVE YOU EVER DONE ANYTHING, STARTED TO DO ANYTHING, OR PREPARED TO DO ANYTHING TO END YOUR LIFE?: YES
BASED ON RESPONSES TO C-SSRS QS 1-6, WHAT IS THE PATIENT'S OVERALL RISK RATING FOR SUICIDE: HIGH RISK
1. IN THE PAST MONTH, HAVE YOU WISHED YOU WERE DEAD OR WISHED YOU COULD GO TO SLEEP AND NOT WAKE UP?: YES
4. HAVE YOU HAD THESE THOUGHTS AND HAD SOME INTENTION OF ACTING ON THEM?: NO
BASED ON RESPONSES TO C-SSRS QS 1-6, WHAT IS THE PATIENT'S OVERALL RISK RATING FOR SUICIDE: HIGH RISK
5. HAVE YOU STARTED TO WORK OUT OR WORKED OUT THE DETAILS OF HOW TO KILL YOURSELF? DO YOU INTEND TO CARRY OUT THIS PLAN?: YES
6. HAVE YOU EVER DONE ANYTHING, STARTED TO DO ANYTHING, OR PREPARED TO DO ANYTHING TO END YOUR LIFE?: YES
2. HAVE YOU ACTUALLY HAD ANY THOUGHTS OF KILLING YOURSELF IN THE PAST MONTH?: YES

## 2024-10-26 NOTE — TELEPHONE ENCOUNTER
R: [place]  Access Inpatient Bed Call Log 10/26/24 3:30PM   Intake has called facilities that have not updated the bed status within the last 12 hours.                                 [facility] is at capacity.             [facility] is posting 2 beds. [phone] Per call at 7:48am to [facility] at capacity.  Waitlist for ICU and private beds.  Availability shared low acuity needs.   [facility] is posting 0 beds. Negative covid required.    [facility] is posting 0 beds. Neg covid. No high school/Mem[facility]-psych. Per call at 7:49am to [facility] at capacity for the weekend.   [facility] is posting 0 beds. [phone]    [facility] is posting 0 beds. [phone]     [facility] is posting 5 beds. Negative covid [phone]. Per call at 7:50am to [facility] 1 adol, 6 YA and no child beds available.    [facility] is posting 0 beds [phone].

## 2024-10-26 NOTE — ED NOTES
No behavioral concerns this shift. Pt slept most of shift. Easy to arouse. Pt declined offered toileting, urinal at bedside. Remains on 1:1 for SI. Pending urine collection, pt aware.

## 2024-10-26 NOTE — TELEPHONE ENCOUNTER
R: [place]  Access Inpatient Bed Call Log 10/26/24 7:47 AM   Intake has called facilities that have not updated the bed status within the last 12 hours.                                 [facility] is at capacity.             [facility] is posting 2 beds. [phone] Per call at 7:48am to [facility] at capacity.    [facility] is posting 0 beds. Negative covid required.    [facility] is posting 0 beds. Neg covid. No high school/Meme-psych. Per call at 7:49am to [facility]  at capacity for the weekend.   [facility] is posting 0 beds. [phone]    [facility] is posting 0 beds. [phone]  available.    [facility] is posting 3 beds [phone].              Pt remains on the work list pending appropriate bed availability.

## 2024-10-26 NOTE — TELEPHONE ENCOUNTER
R:[place]  Access Inpatient Bed Call Log 10/25/2024 8PM  Intake has called facilities that have not updated the bed status within the last 12 hours.           Pt wants Metro only.     (Adults);        METRO:                [facility] is posting 0 beds.             [facility] is posting 3 beds. [phone]:   [facility] is posting 0 beds. Negative covid required.   [facility] is posting 0 beds. Neg covid. No high school/Mmee-psych. [phone] 4:28 PM Per Mariama they are at capacity.  [facility] is posting 0 beds. [phone]   [facility] is posting 0 bed. [phone]    [facility] is posting 7 beds. (Ages 18-35) Negative covid. [phone]  [facility] is posting 0 beds.    [facility] is posting 0 beds [phone]         Pt remains on the work list pending appropriate bed availability.

## 2024-10-26 NOTE — ED PROVIDER NOTES
[facility] ED Mental Health Handoff Note:       Brief HPI:  This is a 49 year old male signed out to me by Dr. Lopez.  See initial ED Provider note for full details of the presentation. Interval history is pertinent for acute issue.  Patient was sent over from sober living facility.  Recent substance abuse.  Patient's been sleeping in the ER since the morning.  Patient is awaiting inpatient mental health bed..    Home meds reviewed and ordered/administered: Yes    Medically stable for inpatient mental health admission: Yes.    Evaluated by mental health: Yes. The recommendation is for inpatient mental health treatment. Bed search in process    Safety concerns: At the time I received sign out, there were no safety concerns.    Hold Status:  Active Orders   N/A            Exam:   Patient Vitals for the past 24 hrs:   BP Temp Temp src Pulse Resp SpO2   10/26/24 1143 109/74 97.8  F (36.6  C) Oral -- 16 --   10/26/24 0630 110/79 -- -- 65 -- 93 %   10/26/24 0615 -- -- -- 65 -- 92 %   10/26/24 0600 102/70 -- -- 67 -- 93 %   10/26/24 0545 -- -- -- 60 -- 93 %   10/26/24 0530 100/68 -- -- 59 -- 92 %   10/26/24 0515 -- -- -- 58 -- 93 %   10/26/24 0500 126/85 -- -- 71 -- 97 %   10/26/24 0445 -- -- -- 64 -- 95 %   10/26/24 0330 114/66 -- -- 62 -- 90 %   10/26/24 0300 99/70 -- -- 65 -- 91 %   10/26/24 0230 113/80 -- -- 60 -- 93 %   10/26/24 0200 109/70 -- -- 59 -- 94 %   10/26/24 0130 108/69 -- -- 61 -- 93 %   10/26/24 0100 94/66 -- -- 61 -- --   10/26/24 0045 101/70 -- -- 63 -- --   10/26/24 0030 101/69 -- -- 61 -- --   10/26/24 0000 104/71 -- -- 66 -- --   10/25/24 2330 106/74 -- -- 69 -- --   10/25/24 2300 103/68 -- -- 64 -- --   10/25/24 2200 102/69 -- -- 62 -- --   10/25/24 2100 105/73 -- -- 74 -- --   10/25/24 2045 101/66 -- -- 62 -- --   10/25/24 1945 102/62 -- -- 61 -- --   10/25/24 1730 117/77 -- -- 63 -- 94 %   10/25/24 1515 109/75 -- -- 64 -- 91 %           ED Course:    Medications   atorvastatin (LIPITOR)  tablet 20 mg (20 mg Oral $Given 10/26/24 0118)   FLUoxetine (PROzac) capsule 20 mg (20 mg Oral $Given 10/26/24 1140)   QUEtiapine (SEROquel) tablet 50 mg (50 mg Oral $Given 10/26/24 1141)   QUEtiapine (SEROquel) tablet 100 mg (100 mg Oral $Given 10/26/24 0117)   hydrOXYzine HCl (ATARAX) tablet 50 mg (has no administration in time range)   buprenorphine HCl-naloxone HCl (SUBOXONE) 2-0.5 MG per film 1 Film (has no administration in time range)     Followed by   buprenorphine HCl-naloxone HCl (SUBOXONE) 8-2 MG per film 1 Film (has no administration in time range)   acetaminophen (TYLENOL) tablet 1,000 mg (1,000 mg Oral $Given 10/24/24 2330)   OLANZapine zydis (zyPREXA) ODT tab 10 mg (10 mg Oral $Given 10/24/24 2330)   haloperidol lactate (HALDOL) injection 5 mg (5 mg Intramuscular $Given 10/24/24 2353)   midazolam (VERSED) injection 5 mg (5 mg Intramuscular $Given 10/24/24 2353)            There were no significant events during my shift.    Patient was signed out to the oncoming provider.       Impression:    ICD-10-CM    1. Paranoia (H)  F22       2. Suicidal ideation  R45.851           Plan:    Awaiting inpatient mental health admission/transfer.      RESULTS:   Results for orders placed or performed during the hospital encounter of 10/24/24 (from the past 24 hours)   Psychiatry IP Consult: Needs to resume medication; Consultant may enter orders: Yes; Requesting provider? ED Provider     Status: None ()    Collection Time: 10/25/24  4:33 PM    Narrative    Emili Montes MD     10/25/2024  6:19 PM  [facility] ED  Department of Psychiatry  Consultation note    Perry Preciado Jr. MRN: [id number]   Age: 49 year old YOB: 1975     History     Chief Complaint   Patient presents with    Paranoid    Psychiatric Evaluation     HPI  Perry Preciado Jr. is a 49 year old male with a history of   MDD, KANE, TBI, substance abuse here with SI with plan to cut his  "  wrists or run into traffic. SI started when he relapsed on   methamphetamine and fentanyl about a week ago after 11 months   sobriety. He has been off medications for about 10 days, says he   started forgetting to take them. He feels \"embarrassed\" about   relapsing. He has been feeling depressed, has little motivation,   poor appetite, sleep has been \"ok\", feels more anxious than   usual. He has been paranoid and hallucinating. He sees shadows   which he feels are demons threatening to kill him. Symptoms   started about a week ago. He had also recently been taking   strattera 80 mg daily, gabapentin 600 mg TID, hydroxyzine 100 mg   TID PRN, propranolol 10 mg TID. Has been hospitalized before,   most recent a year ago. History of 2 suicide attempts.    He came in last night via EMS from sober living. At one point got   agitated and got versed, olanzapine and haldol. He became very   somnolent with those medications. When I tried to see him around   16:45 I was unable to wake him. An hour later he was able to stay   awake and have a conversation but will still pretty somnolent. He   reports that he feels the same now as he did when he came in.   Outpatient meds have been restarted including fluoxetine 20 mg   daily, seroquel 50 mg BID and 100 mg at bedtime (seroquel dose   was 50 mg BID and 400 mg at bedtime when he stopped taking it).   UDS ordered but hasn't been collected yet.    Past Medical History  Past Medical History:   Diagnosis Date    Acute bilateral low back pain with right-sided sciatica   06/07/2016    Acute pain of right shoulder due to trauma     Adult antisocial behavior     intermediate incarceration: 16 years    Aspiration pneumonia (H) 02/24/2014    CARDIOVASCULAR SCREENING; LDL GOAL LESS THAN 130 06/07/2016    Carpal tunnel syndrome of right wrist 06/07/2016    Chest pain 02/19/2014    Chest trauma 02/19/2014    Chronic pain syndrome 09/30/2019    Chronic right-sided low back pain with right-sided " sciatica   05/10/2018    DDD (degenerative disc disease), lumbosacral 05/05/2020    Depression with anxiety 05/05/2020    Displacement of lumbar intervertebral disc without myelopathy   05/16/2012    KANE (generalized anxiety disorder) 07/07/2017    Heroin abuse (H) 05/10/2018    History of ADHD 01/06/2014    History of drug abuse (H) 01/06/2014    History of suicide attempt 05/10/2018    HTN (hypertension) 02/26/2014    Hyperglycemia 02/23/2014    Hypertriglyceridemia 11/02/2015    IV drug abuse (H) 05/10/2018    Major depressive disorder, recurrent (H) 02/23/2018    Major depressive disorder, recurrent episode, moderate (H)   06/06/2016    Methamphetamine abuse (H) 02/23/2018    Overview:  see [facility] H&P 11/27/2009, [facility] admit 9/2/2010    Mild intermittent asthma without complication 05/10/2018    Opiate overdose (H) 02/22/2014    Positive D dimer 11/02/2015    Psychosis (H) 05/04/2020    Sepsis (H) 02/22/2014    SIRS (systemic inflammatory response syndrome) (H) 11/02/2015    Substance abuse (H) 05/07/2018    Suicidal ideation 02/23/2018    Tobacco use disorder 05/10/2018     Past Surgical History:   Procedure Laterality Date    BACK SURGERY       atomoxetine (STRATTERA) 80 MG capsule  atorvastatin (LIPITOR) 20 MG tablet  buprenorphine (SUBUTEX) 8 MG SUBL sublingual tablet  FLUoxetine (PROZAC) 20 MG capsule  gabapentin (NEURONTIN) 600 MG tablet  hydrOXYzine HCl (ATARAX) 50 MG tablet  lithium (ESKALITH CR/LITHOBID) 450 MG CR tablet  multivitamin w/minerals (THERA-VIT-M) tablet  propranolol (INDERAL) 10 MG tablet  QUEtiapine (SEROQUEL) 400 MG tablet  QUEtiapine (SEROQUEL) 50 MG tablet      Allergies   Allergen Reactions    Wellbutrin [Bupropion] Anaphylaxis and Swelling     Facial swelling    Wellbutrin [Bupropion]     Wellbutrin [Bupropion] Difficulty breathing    Naltrexone Other (See Comments)     Headaches  Headaches       Family History  No family history on file.  Social History   Social History     Tobacco  Use    Smoking status: Every Day     Current packs/day: 0.50     Types: Cigarettes     Passive exposure: Current    Smokeless tobacco: Never   Vaping Use    Vaping status: Never Used   Substance Use Topics    Alcohol use: Yes    Drug use: Yes     Types: Fentanyl, Methamphetamines     Comment: last use 2 weeks ago          Review of Systems  A medically appropriate review of systems was performed with   pertinent positives and negatives noted in the HPI, and all other   systems negative.    Physical Examination   BP: 116/81  Pulse: 113  Temp: 98  F (36.7  C)  Resp: 20  SpO2: 97 %    Physical Exam  General: Appears stated age.   Neuro: Alert and fully oriented. Extremities appear to   demonstrate normal strength on visual inspection.   Integumentary/Skin: no rash visualized, normal color    Psychiatric Examination   Appearance:  somnolent, in hospital attire  Attitude:  cooperative  Eye Contact:  fair  Mood:  depressed  Affect:  mood congruent  Speech:  clear, coherent  Psychomotor Behavior:  no evidence of tardive dyskinesia,   dystonia, or tics  Thought Process:  goal oriented  Associations:  no loose associations  Thought Content:   reports SI, AVH, paranoia and delusions.   Denies HI  Insight:  fair  Judgement:  fair  Oriented to:  time, person, and place  Attention Span and Concentration:  fair  Recent and Remote Memory:  intact  Language: able to name/identify objects without impairment  Fund of Knowledge: intact with awareness of current and past   events    ED Course        Labs Ordered and Resulted from Time of ED Arrival to Time of ED   Departure - No data to display    Assessments & Plan (with Medical Decision Making)   Patient presenting with depression, SI, paranoia and   hallucinations after stopping medications and relapsing on   methamphetamine and fentanyl. Was agitated shortly after arrival,   got medications and has been cooperative since. Initial agitation   possibly had a substance induced  component. He is still feeling   depressed and suicidal. He wants help and is appropriate for   inpatient care.     I have reviewed the assessment completed by the [facility].     Preliminary diagnosis:    ICD-10-CM    1. MDD recurrent severe        2. Suicidal ideation      3.      Polysubstance abuse  4.      Psychosis nos, r/o substance induced        Treatment Plan:  -Patient is on the list for an inpatient bed  -Would hold off on increasing doses or restarting any more   outpatient medications until he is less sedated as most of them   are sedating    --  Emili Montes MD   [facility] EMERGENCY DEPARTMENT       Urine Drug Screen     Status: Abnormal    Collection Time: 10/26/24 11:45 AM    Narrative    The following orders were created for panel order Urine Drug Screen.  Procedure                               Abnormality         Status                     ---------                               -----------         ------                     Urine Drug Screen Panel[[id number]]      Abnormal            Final result                 Please view results for these tests on the individual orders.   Urine Drug Screen Panel     Status: Abnormal    Collection Time: 10/26/24 11:45 AM   Result Value Ref Range    Amphetamines Urine Screen Positive (A) Screen Negative    Barbituates Urine Screen Negative Screen Negative    Benzodiazepine Urine Screen Positive (A) Screen Negative    Cannabinoids Urine Screen Positive (A) Screen Negative    Cocaine Urine Screen Negative Screen Negative    Fentanyl Qual Urine Screen Negative Screen Negative    Opiates Urine Screen Negative Screen Negative    PCP Urine Screen Negative Screen Negative             MD Telma Roberto Kruti Tripathi, MD  10/26/24 [id number]

## 2024-10-26 NOTE — CONSULTS
DEC Consult Order placed. DEC assessment completed by Maraí Mcdowell on 10/25/2024 at 1:35PM. Consult acknowledged and completed.     Tre Frye

## 2024-10-26 NOTE — TELEPHONE ENCOUNTER
[place]: [place]  Access Inpatient Bed Call Log  10/26/24 @ 1:00am   Intake has called facilities that have not updated their bed status within the last 12 hours.     *METRO:  [place] -- [facility]: @ capacity.  [facility]: POSTING 2 BEDS. Reporting no reviews overnight.  [place] -- [facility]: @ cap per website. Low acuity  [place] -- [facility]: @ cap per website. Low acuity only.  [place] -- [facility]: @ cap per website.  [facility]: @ cap per website.   [facility]/ beds: POSTING 7 BEDS. Ages 18-35, Voluntary only, NO aggression/physical/sexual assault, violence hx or drug abuse, or psychosis. Negative Covid  [place] -- [facility]: @ [facility] per website.   [place] -- [facility]: @ cap per website.  [place] -- [facility]: POSTING 3 BEDS. Do not review overnight.      Pt remains on waitlist pending appropriate placement availability.

## 2024-10-27 ENCOUNTER — TELEPHONE (OUTPATIENT)
Dept: BEHAVIORAL HEALTH | Facility: CLINIC | Age: 49
End: 2024-10-27
Payer: COMMERCIAL

## 2024-10-27 PROCEDURE — 250N000012 HC RX MED GY IP 250 OP 636 PS 637: Performed by: EMERGENCY MEDICINE

## 2024-10-27 PROCEDURE — 250N000013 HC RX MED GY IP 250 OP 250 PS 637: Performed by: EMERGENCY MEDICINE

## 2024-10-27 RX ADMIN — ATORVASTATIN CALCIUM 20 MG: 20 TABLET, FILM COATED ORAL at 20:19

## 2024-10-27 RX ADMIN — QUETIAPINE FUMARATE 100 MG: 100 TABLET ORAL at 22:34

## 2024-10-27 RX ADMIN — FLUOXETINE HYDROCHLORIDE 20 MG: 20 CAPSULE ORAL at 11:13

## 2024-10-27 RX ADMIN — HYDROXYZINE HYDROCHLORIDE 50 MG: 50 TABLET, FILM COATED ORAL at 13:04

## 2024-10-27 RX ADMIN — QUETIAPINE FUMARATE 50 MG: 50 TABLET ORAL at 11:13

## 2024-10-27 RX ADMIN — BUPRENORPHINE AND NALOXONE 1 FILM: 8; 2 FILM BUCCAL; SUBLINGUAL at 13:04

## 2024-10-27 RX ADMIN — QUETIAPINE FUMARATE 50 MG: 50 TABLET ORAL at 20:19

## 2024-10-27 RX ADMIN — BUPRENORPHINE AND NALOXONE 1 TABLET: 2; .5 TABLET SUBLINGUAL at 11:13

## 2024-10-27 NOTE — TELEPHONE ENCOUNTER
[place]: [place]  Access Inpatient Bed Call Log 10/27/24 3:15PM  Intake has called facilities that have not updated the bed status within the last 12 hours.                   Adults:   [facility] is at capacity.           [facility] is posting 0 beds. [phone]   [facility] is posting 0 beds. Negative covid required.  [facility] is posting 0 beds. Neg covid. No high school/Meme-psych.   [place] is posting 0 beds. [phone]  [facility] is posting 0 beds. [phone] [place] [address] is posting 4 beds. Negative covid. - Not age appropriate  [facility] is posting 0 beds [phone].      Pt remains on the work list pending appropriate bed availability.

## 2024-10-27 NOTE — TELEPHONE ENCOUNTER
[place]: [place]  Access Inpatient Bed Call Log  10/27/24 @ 1:00am   Intake has called facilities that have not updated their bed status within the last 12 hours.     *METRO:  [place] -- [facility]: @ capacity.  [facility]: POSTING 2 BEDS. Reporting no reviews overnight.  [place] -- [facility]: @ cap per website. Low acuity  [place] -- [facility]: @ cap per website. Low acuity only.  [place] -- [facility]: @ cap per website.  [facility]: @ cap per website.   [facility] beds: POSTING 5 BEDS. Ages 18-35, Voluntary only, NO aggression/physical/sexual assault, violence hx or drug abuse, or psychosis. Negative Covid  [place] -- [facility]: @ cap per website.   [place] -- [facility]: @ cap per website.  [place] -- [facility]: POSTING 3 BEDS. Do not review overnight.      Pt remains on waitlist pending appropriate placement availability.

## 2024-10-27 NOTE — ED PROVIDER NOTES
[facility] ED Mental Health Handoff Note:       Brief HPI:  This is a 49 year old male signed out to me by Dr. Joshua.  See initial ED Provider note for full details of the presentation. Interval history is pertinent for no acute concerns.  Patient is upset because we cannot find his dentures or his glasses.  No other complaints..    Home meds reviewed and ordered/administered: Yes    Medically stable for inpatient mental health admission: Yes.    Evaluated by mental health: Yes. The recommendation is for inpatient mental health treatment. Bed search in process    Safety concerns: At the time I received sign out, there were no safety concerns.    Hold Status:  Active Orders   N/A            Exam:   Patient Vitals for the past 24 hrs:   BP Temp Temp src Pulse Resp SpO2   10/27/24 1118 101/70 97.6  F (36.4  C) Oral 81 16 95 %   10/26/24 2127 (!) 142/76 -- -- 92 20 95 %           ED Course:    Medications   atorvastatin (LIPITOR) tablet 20 mg (20 mg Oral $Given 10/26/24 2119)   FLUoxetine (PROzac) capsule 20 mg (20 mg Oral $Given 10/27/24 1113)   QUEtiapine (SEROquel) tablet 50 mg (50 mg Oral $Given 10/27/24 1113)   QUEtiapine (SEROquel) tablet 100 mg (100 mg Oral $Given 10/26/24 2119)   hydrOXYzine HCl (ATARAX) tablet 50 mg (50 mg Oral $Given 10/27/24 1304)   buprenorphine HCl-naloxone HCl (SUBOXONE) 2-0.5 MG per film 1 Film (has no administration in time range)     Followed by   buprenorphine HCl-naloxone HCl (SUBOXONE) 8-2 MG per film 1 Film (1 Film Sublingual $Given 10/27/24 1304)   buprenorphine-naloxone (SUBOXONE) 2-0.5 MG sublingual tablet 1 tablet (1 tablet Sublingual $Given 10/27/24 1113)   acetaminophen (TYLENOL) tablet 1,000 mg (1,000 mg Oral $Given 10/24/24 2330)   OLANZapine zydis (zyPREXA) ODT tab 10 mg (10 mg Oral $Given 10/24/24 2330)   haloperidol lactate (HALDOL) injection 5 mg (5 mg Intramuscular $Given 10/24/24 2353)   midazolam (VERSED) injection 5 mg (5 mg Intramuscular $Given 10/24/24  2353)            There were no significant events during my shift.    Patient was signed out to the oncoming provider.       Impression:    ICD-10-CM    1. Paranoia (H)  F22       2. Suicidal ideation  R45.851           Plan:    Awaiting inpatient mental health admission/transfer.      RESULTS:   No results found for this visit on 10/24/24 (from the past 24 hours).          MD Telma Roberto Kruti Tripathi, MD  10/27/24 1559

## 2024-10-27 NOTE — TELEPHONE ENCOUNTER
[place]: [place]  Access Inpatient Bed Call Log 10/27/24 8:00 AM    Intake has called facilities that have not updated the bed status within the last 12 hours.  -[facility] is at capacity.           [facility] is posting 0 beds. [phone]   [facility] is posting 0 beds. Negative covid required.  [facility] is posting 0 beds. Neg covid. No high school/Meme-psych.   [place] is posting 0 beds. [phone]  [facility] is posting 0 beds. [phone] [place] [address] is posting 4 beds. Negative covid. PT NOT APPROPRIATE D/T UNIT RESTRICTIONS.  [facility] is posting 0 beds [phone]    Pt remains on the work list pending appropriate bed availability.

## 2024-10-27 NOTE — TELEPHONE ENCOUNTER
[place]: [place]  Access Inpatient Bed Call Log 10/27/24 8:00 AM    Intake has called facilities that have not updated the bed status within the last 12 hours.  -[facility] is at capacity.           [facility] is posting 0 beds. [phone]   [facility] is posting 0 beds. Negative covid required.  [facility] is posting 0 beds. Neg covid. No high school/Meme-psych.   [place] is posting 0 beds. [phone]  [facility] is posting 0 beds. [phone] [place] [address] is posting 4 beds. Negative covid.  PT NOT APPROPRIATE D/T UNIT RESTRICTIONS.  [facility] is posting 0 beds [phone].    Pt remains on the work list pending appropriate bed availability.

## 2024-10-28 ENCOUNTER — TELEPHONE (OUTPATIENT)
Dept: BEHAVIORAL HEALTH | Facility: CLINIC | Age: 49
End: 2024-10-28
Payer: COMMERCIAL

## 2024-10-28 PROCEDURE — 250N000012 HC RX MED GY IP 250 OP 636 PS 637: Performed by: EMERGENCY MEDICINE

## 2024-10-28 PROCEDURE — 250N000013 HC RX MED GY IP 250 OP 250 PS 637: Performed by: EMERGENCY MEDICINE

## 2024-10-28 RX ORDER — ACETAMINOPHEN 325 MG/1
650 TABLET ORAL EVERY 4 HOURS PRN
Status: DISCONTINUED | OUTPATIENT
Start: 2024-10-28 | End: 2024-11-08 | Stop reason: HOSPADM

## 2024-10-28 RX ADMIN — QUETIAPINE FUMARATE 50 MG: 50 TABLET ORAL at 10:43

## 2024-10-28 RX ADMIN — ATORVASTATIN CALCIUM 20 MG: 20 TABLET, FILM COATED ORAL at 20:31

## 2024-10-28 RX ADMIN — BUPRENORPHINE AND NALOXONE 1 TABLET: 2; .5 TABLET SUBLINGUAL at 10:43

## 2024-10-28 RX ADMIN — QUETIAPINE FUMARATE 50 MG: 50 TABLET ORAL at 20:35

## 2024-10-28 RX ADMIN — BUPRENORPHINE AND NALOXONE 1 FILM: 8; 2 FILM BUCCAL; SUBLINGUAL at 13:42

## 2024-10-28 RX ADMIN — QUETIAPINE FUMARATE 100 MG: 100 TABLET ORAL at 20:32

## 2024-10-28 RX ADMIN — FLUOXETINE HYDROCHLORIDE 20 MG: 20 CAPSULE ORAL at 10:44

## 2024-10-28 NOTE — TELEPHONE ENCOUNTER
12:58 PM Edenilson with [employer] will have Pt reviewed at [facility].    1:47 PM Fax sent.    R:  [place]  Access Inpatient Bed Call Log 10/28/2024 8:18:34 AM  Intake has called facilities that have not updated the bed status within the last 12 hours.         (Adults);        METRO:                [facility] is posting 0 beds.             [facility] is posting 0 beds. [phone]:   [facility] is posting 0 beds. Negative covid required.   [facility] is posting 0 beds. Neg covid. No high school/Meme-psych. [phone] 8:23AM PER JATIN, AT [facility]. CAN CB AFTER 10AM.  [facility] is posting 0 beds. [phone]   [facility] is posting 0 bed. [phone]    [facility] is posting 2 beds. (Ages 18-35) Negative covid. [phone] PT NOT APPROPRIATE D/T UNIT RESTRICTIONS.  [facility] is posting 0 beds.    [facility] is posting 0 beds [phone]          Pt remains on the work list pending appropriate bed availability.

## 2024-10-28 NOTE — TELEPHONE ENCOUNTER
R: per Dawn in EC at 11:06 am, pt is now willing to admit in the metro or 1 hour away. Author faisal Lucia in intake.

## 2024-10-28 NOTE — ED NOTES
IP MH Referral Acuity Rating Score (RARS)    LMHP complete at referral to IP MH, with DEC; and, daily while awaiting IP MH placement. Call score to PPS.  CRITERIA SCORING   New 73 HH and Involuntary for IP MH (not adolescent) 0/1   Boarding over 24 hours 1/1   Vulnerable adult at least 55+ with multiple co morbidities; or, Patient age 11 or under 0/1   Suicide ideation without relief of precipitating factors 1/1   Current plan for suicide 1/1   Current plan for homicide 0/1   Imminent risk or actual attempt to seriously harm another without relief of factors precipitating the attempt 0/1   Severe dysfunction in daily living (ex: complete neglect for self care, extreme disruption in vegetative function, extreme deterioration in social interactions) 1/1   Recent (last 2 weeks) or current physical aggression in the ED 0/1   Restraints or seclusion episode in ED 0/1   Verbal aggression, agitation, yelling, etc., while in the ED 1/1   Active psychosis with psychomotor agitation or catatonia 1/1   Need for constant or near constant redirection (from leaving, from others, etc).  0/1   Intrusive or disruptive behaviors 0/1   TOTAL Acuity Total Score: 6

## 2024-10-28 NOTE — TELEPHONE ENCOUNTER
[place]: [place]  Access Inpatient Bed Call Log  10/28/24 @ 1:00am   Intake has called facilities that have not updated their bed status within the last 12 hours.     *METRO:  [place] -- [facility]: @ capacity.  [facility]: @ cap per website. Reporting no reviews overnight.  [place] -- [facility]: @ cap per website. Low acuity  [place] -- [facility]: @ cap per website. Low acuity only.  [place] -- [facility]: @ cap per website.  [facility]: @ cap per website.   [facility] beds: POSTING 1 BED. Ages 18-35, Voluntary only, NO aggression/physical/sexual assault, violence hx or drug abuse, or psychosis. Negative Covid  [place] -- [facility]: @ cap per website.   [place] [facility]: @ cap per website.  [place] -- [facility]: @ cap per website. Do not review overnight.      Pt remains on waitlist pending appropriate placement availability.

## 2024-10-28 NOTE — PROGRESS NOTES
"Triage & Transition Services, Extended Care     Therapy Progress Note    Patient: Perry goes by \"Perry,\" uses he/him pronouns  Date of Service: October 28, 2024  Site of Service: [facility] EMERGENCY DEPARTMENT                             ED12  Patient was seen yes  Mode of Assessment: In person    Presentation Summary: Writer met with pt in his ED Room. He was sleeping as writer arrived, but he was calm and cooperative to meet with writer. Pt reports that he has been struggling to eat well since he arrived. He reports that has not been experiencing VH since being restarted on medications, but he is still hearing AH of voices that are threatening to kill him when he gets out. Pt reports still experiencing SI with intent. He states, \"I wish I was dead alerady. I just don't want to be alive anymore. I just wish you guys  would take me out of here and shoot me.\"  Pt also continues to present with paranoia and delusions. He believes writer and all other providers laugh and make fun of him when he is not in the room. He does not feel like his sober living at [facility] [address] [facility] (connected to [facility]) is safe. He asked writer to connect with his [facility] , Tyler Woodruff as he is worried about other residents taking his stuff. He also believes that they or someone is trying to steal his identity and stated \"I found my information on the dark web\". He also reported to writer that he lost his glasses and dentures in a \"scuffle with hospital staff\" an no one has been able to tell him where those things are. Writer checked with nurse and they are not in his belongings, plan to follow-up with sober living for them to check.    Since pt had initially arrived in the ED, he had become very agitated at one point and broke his own phone, it started smoking and nurses removed it and disposed of it. At another incident of increasing agitation and paranoia of being killed by demons " on Day Two of his arrival on 10/25, SUSIE team was called to assist in deescalating pt. Through talking with patient and ensuring staff safety pt then agreed to IM injections of Haldol and Versed. 1:1 ongoing.     Therapeutic Intervention(s) Provided: Engaged in cognitive restructuring/ reframing, looked at common cognitive distortions and challenged negative thoughts., Engaged in guided discovery, explored patient's perspectives and helped expand them through socratic dialogue., Explored motivation for behavioral change.    Current Symptoms: anxious, racing thoughts difficulty concentrating, negativistic, low self esteem, impaired decision making, irritable, helplessness, hoplessness, sadness, excessive guilt, thoughts of death/suicide racing thoughts, anxious impulsive, psychomotor retardation, agitation, auditory hallucinations loss of appetite    Mental Status Exam   Affect: Appropriate, Labile  Appearance: Disheveled  Attention Span/Concentration: Attentive  Eye Contact: Variable    Fund of Knowledge: Appropriate   Language /Speech Content: Fluent  Language /Speech Volume: Normal  Language /Speech Rate/Productions: Normal  Recent Memory: Variable, Intact  Remote Memory: Variable, Intact  Mood: Anxious, Depressed, Sad  Orientation to Person: Yes   Orientation to Place: Yes  Orientation to Time of Day: Yes  Orientation to Date: Yes     Situation (Do they understand why they are here?): Yes  Psychomotor Behavior: Normal  Thought Content: Hallucinations, Paranoia, Suicidal  Thought Form: Paranoia, Intact    Treatment Objective(s) Addressed: rapport building, processing feelings, identifying an appropriate aftercare plan, assessing safety, exploring obstacles to safety in the community    Patient Response to Interventions: acceptance expressed, verbalizes understanding    Progress Towards Goals: Patient Reports Symptoms Are: ongoing  Patient Progress Toward Goals: is making progress  Comment: Pt continues to endorse  "SI with a plan and intent without relief. He has a reduction in disorganization in behavior and thought, and VH, he continues to endorse AH, not command, but stating that someone wants to kill him.  Next Step to Work Toward Discharge: symptom stabilization, collaboration with OP team/family/friends    Case Management:   Details on Collaborating with Patient's Support System:   [facility]  via [employer], Tyler Woodruff, p. [phone], [email or web address];   Aayush [email or web address]  Summary of Interaction: Writer found CM's information after getting name from pt (pt had destroyed his phone earlier in his stay). Tyler reports that Perry was having issues with his provider and pharmacy delivering his medications. He was proactive on trying to work on it, but there continued to be issues. After a few days of being off of his meds which include suboxone, pt started to go into withdrawal and his mental health was decompensating, he relapsed on meth and fentanyl after being sober for 11 months. ROSS also reports that at pt's sober living, there are a number of other residents who continue to use and pt has not felt safe for awhile. After writer connected pt and CM on the phone, ROSS reported to writer via email (and connected newer MICHAEL HAWKINS), that pt will discharge from [facility], and that he is working to connect pt with [facility] which is his long-term desired placement. Pt's belongings at sober home will be placed into storage for 30 days and he plans to coordinate with Perry to be able to collect those for him. He supports an inpatient stay with pt and reports that after talking with him, \"with Perry he seems to be in the right mindset of regulating and getting back to his normal baseline while solely focusing on his mental health with no external forces. Hopefully within the next few days we can get Perry discharged from [place] and placed at [facility].\" " "He is agreeable to a Crisis Residence discharge (but not in the [facility] area) , if pt discharges from inpatient prior to having a bed available at [facility].    Plan: inpatient mental health  yes (Dr. Rey) provider Dr Rey  yes    Clinical Substantiation: Perry is a 49 year old white male who initially arrived to the ED via EMS experiencing paranoia, AH, VH, and increased suicidal ideation. Pt has DxHx of MDD, KANE, ADHD, Polysub, and TBI. He reported that these hallucinations are demons or shadows that say, \"they are going to kill pt\". Pt also endorsed SI, does not have specific plan but states intent and if he left \"he would kill himself\". Pt has a Hx of 2 suicide attempts which he states were impulsive where he had a thought and acted on it; however one attempt was as recent as one year ago with a gun. Pt was not able to get his medication (including suboxone) for a little over a week ago despite many attempts. Due to this, pt began experiencing withdrawal and decompensated mental healths and relapsed on meth 3 days prior to arrival in the ED after 11 months of sobriety.     Today, pt continues to endorse SI feeling like he is a waste of space, and continues to experiencing AH of hearing voices saying that they are going to kill him when he gets out of the hospital. He does present as more mentally organized than when he first arrived to the ED. He denies SIB and HI. After a brief therapeutic assessment, observation, and in consultation with attending provider, pt would be best supported by an Critical access hospital admission for further mental health stabilization and medication management. Risks that support this recommendation: pts recent suicide attempt by gun, his fast mental health decompensation after being off of medication, his continued SI while in the ED, and an unsafe and unsupportive environment.  Pt is voluntary for an Critical access hospital admission, but holdable if he chooses to live. Pt reports a good and " trusted relationship with his ongoing , Tyler who is working to find alternative housing for pt. At discharge, pt will need support in either being placed at a temporary crisis shelter until a bed is available, or to the long-term  is working to support. Pt's other mental health outpatient supports should also be reviewed and updated.       Legal Status: Legal Status at Admission: Voluntary/Patient has signed consent for treatment    Session Status: Time session started: 1000  Time session ended: 1020  Session Duration (minutes): 20 minutes  Session Number: 2  Anticipated number of sessions or this episode of care: 3    Time Spent: 20 minutes    CPT Code: CPT Codes: 52136 - Psychotherapy (with patient) - 30 (16-37*) min    Diagnosis:   Patient Active Problem List   Diagnosis Code    Bilateral carpal tunnel syndrome G56.03    KANE (generalized anxiety disorder) F41.1    Chronic pain syndrome G89.4    Hyperglycemia R73.9    Hypertriglyceridemia E78.1    Lung nodule R91.1    Major depressive disorder, recurrent (H) F33.9    Mild intermittent asthma without complication J45.20    Tobacco use disorder F17.200    Displacement of lumbar intervertebral disc without myelopathy M51.26    History of ADHD Z86.59    History of suicide attempt Z91.51    Chronic right-sided low back pain with right-sided sciatica M54.41, G89.29    Psychosis (H) F29    Suicidal behavior without attempted self-injury R45.89    Methamphetamine use disorder, severe, in early remission (H) F15.21    Polysubstance abuse (H) F19.10    Moderate opioid dependence in early remission (H) F11.21    Hx of sepsis Z86.19    Hx of intravenous drug use in remission Z87.898       Primary Problem This Admission: Active Hospital Problems    Polysubstance abuse (H)      Psychosis (H)      History of suicide attempt      *Major depressive disorder, recurrent (H)      KANE (generalized anxiety disorder)      History of ADHD        Dawn  YESSI Molina   Licensed Mental Health Professional (LMHP), [facility]  [phone]

## 2024-10-28 NOTE — TELEPHONE ENCOUNTER
6:08 PM  Intake received call from [facility] ED RN that Minda declined pt d/t acuity.    R:  [place]  Access Inpatient Bed Call Log 10/28/2024 @3:10 PM:  Intake has called facilities that have not updated the bed status within the last 12 hours.    [facility] +1 hr  (Adults);                 [facility] is posting 0 beds.             [facility] is posting 0 beds. [phone]:   [facility] is posting 0 beds. Negative covid required.   [facility] is posting 0 beds. Neg covid. No high school/Meme-psych. [phone]   [facility] is posting 0 beds. [phone]   [facility] is posting 0 bed. [phone]    [facility] is posting 1 Young Adult beds. (Ages 18-35) Negative covid. [phone] Pt not appropriate d/t unit restrictions.  [facility] is posting 0 beds.    [facility] ([facility]) is posting 0 beds [phone]       [facility] is posting 2 beds. LOW acuity ONLY. Mixed unit 12+. Negative covid- [phone] 10/28 Pt declined d/t acuity.  [facility] has 2 beds posted. No aggression. Negative Covid. Low acuity. Per call at 4:07 pm no beds for review.  [facility] ([place]) is posting 1 beds. Low acuity only. Neg covid.  [phone]        Pt remains on the work list pending appropriate bed availability.

## 2024-10-29 ENCOUNTER — TELEPHONE (OUTPATIENT)
Dept: BEHAVIORAL HEALTH | Facility: CLINIC | Age: 49
End: 2024-10-29
Payer: COMMERCIAL

## 2024-10-29 PROCEDURE — 124N000002 HC R&B MH UMMC

## 2024-10-29 PROCEDURE — 250N000013 HC RX MED GY IP 250 OP 250 PS 637: Performed by: EMERGENCY MEDICINE

## 2024-10-29 PROCEDURE — 250N000013 HC RX MED GY IP 250 OP 250 PS 637: Performed by: NURSE PRACTITIONER

## 2024-10-29 PROCEDURE — 250N000012 HC RX MED GY IP 250 OP 636 PS 637: Performed by: EMERGENCY MEDICINE

## 2024-10-29 RX ORDER — NICOTINE 21 MG/24HR
1 PATCH, TRANSDERMAL 24 HOURS TRANSDERMAL DAILY
Status: DISCONTINUED | OUTPATIENT
Start: 2024-10-29 | End: 2024-11-08 | Stop reason: HOSPADM

## 2024-10-29 RX ADMIN — BUPRENORPHINE AND NALOXONE 1 TABLET: 2; .5 TABLET SUBLINGUAL at 08:31

## 2024-10-29 RX ADMIN — BUPRENORPHINE AND NALOXONE 1 FILM: 8; 2 FILM BUCCAL; SUBLINGUAL at 14:32

## 2024-10-29 RX ADMIN — ACETAMINOPHEN 650 MG: 325 TABLET, FILM COATED ORAL at 11:18

## 2024-10-29 RX ADMIN — QUETIAPINE FUMARATE 50 MG: 50 TABLET ORAL at 20:52

## 2024-10-29 RX ADMIN — HYDROXYZINE HYDROCHLORIDE 50 MG: 50 TABLET, FILM COATED ORAL at 11:20

## 2024-10-29 RX ADMIN — QUETIAPINE FUMARATE 100 MG: 100 TABLET ORAL at 20:53

## 2024-10-29 RX ADMIN — QUETIAPINE FUMARATE 50 MG: 50 TABLET ORAL at 08:31

## 2024-10-29 RX ADMIN — NICOTINE 1 PATCH: 21 PATCH, EXTENDED RELEASE TRANSDERMAL at 18:01

## 2024-10-29 RX ADMIN — ATORVASTATIN CALCIUM 20 MG: 20 TABLET, FILM COATED ORAL at 20:53

## 2024-10-29 RX ADMIN — FLUOXETINE HYDROCHLORIDE 20 MG: 20 CAPSULE ORAL at 08:31

## 2024-10-29 ASSESSMENT — LIFESTYLE VARIABLES: SKIP TO QUESTIONS 9-10: 1

## 2024-10-29 NOTE — ED NOTES
I just got off the phone with pt's sister who wanted someone to tell him she loves him and gave her numbers for the inpatient unit... He was asking for his parents number if you are able to write it down and give it to him and give him the phone, I'll super appreciate it. [phone].

## 2024-10-29 NOTE — TELEPHONE ENCOUNTER
8:12 AM 12/CRN provided Pt MRN for review. Will follow up after team meeting approx 10 am.    10:19 AM Pt is appropriate for admit can page provider for review per CRN.    10:21 AM Sammi Carias.    10:31 AM Writer was informed by another Coordinator that Pt was accepted to unit 12/[facility] and will need SIO by Aretha.    10:39 AM Indicia complete.    10:39 AM 12/CRN informed of Pt in queue will need SIO. Nurse report approx 12 pm. Pt added to admit board.    10:41 AM [facility] ED notified of acceptance.        R:  [place]  Access Inpatient Bed Call Log 10/29/2024 8:05:35 AM  Intake has called facilities that have not updated the bed status within the last 12 hours.         (Adults);        [facility] + 1hr:                [facility] is posting 0 beds.             [facility] is posting 0 beds. [phone]:   [facility] is posting 0 beds. Negative covid required.   [facility] is posting 0 beds. Neg covid. No high school/Meme-psych. [phone] 8:11AM PER KAYCEE BOATENG.  [facility] is posting 0 beds. [phone]   [facility] is posting 0 bed. [phone]    [facility] is posting 2 beds. (Ages 18-35) Negative covid. [phone]  [facility] is posting 0 beds.    [facility]) is posting 0 beds [phone]   [facility] is posting 1 beds. LOW acuity ONLY. Mixed unit 12+. Negative covid- [phone] 10/28 PT DECLINED D/T ACUITY.  [facility] has 2 beds posted. No aggression. Negative Covid. Low acuity.   [facility] ([facility]) is posting 0 beds. Low acuity only. Neg covid.  [phone]        Pt remains on the work list pending appropriate bed availability.

## 2024-10-29 NOTE — PROGRESS NOTES
"Triage & Transition Services, Extended Care     Therapy Progress Note    Patient: Perry goes by \"Perry,\" uses he/him pronouns  Date of Service: October 29, 2024  Site of Service: [facility] EMERGENCY DEPARTMENT                             ED12  Patient was seen yes  Mode of Assessment: In person    Presentation Summary:   Writer met with pt in his ED room. He was sleeping, with the TV on loudly. Pt reports that he is still feeling awful, depressed, and like he is a waste of breath on this earth. Writer worked to both validate his feelings and to utilized perspective of how he would talk with friends or other people who are in recovery who relapse. Pt continues to be very hard and upset with himself about his relapse. Writer also used psychoeducation about how the majority of people who are on their recovery journey do relapse at some point and that it is very often a part of that journey. Pt was anxious about the plan for him. Writer laid out that the plan is a period of inpatient for further stabilization of his mental health, to potentially adjust his medication, and then to be discharged at some point to either an IRTS or a group home in an different area than he is now. This is why he was meeting with Judi Dyer by phone yesterday, a Long Term Services and Supports (LTSS)  at [facility]. After a thorough explanation of the plan, and encouragement, pt was able to calm down a little bit. Writer encouraged him to sign an updated UMBERTO for his sister, who was previously his emergency contact, so that writer can keep her up to date. He agreed and asked her for his parents number (during agitation and when he was at the peak of meth induced psychosis after he first arrived, pt destroyed his phone). Pt continues to endorse AH of  be Pt continues to be voluntary for inpatient. Pt    Therapeutic Intervention(s) Provided: Engaged in cognitive restructuring/ reframing, looked at common cognitive " distortions and challenged negative thoughts., Engaged in guided discovery, explored patient's perspectives and helped expand them through socratic dialogue., Explored motivation for behavioral change.    Current Symptoms: anxious, racing thoughts difficulty concentrating, negativistic, low self esteem, impaired decision making, irritable, helplessness, hoplessness, sadness, excessive guilt, thoughts of death/suicide racing thoughts, anxious, somatic symptoms (abdominal pain, headache, tension) (Pt requested tylenol and writer let nurse know.) impulsive, psychomotor retardation, agitation, auditory hallucinations loss of appetite    Mental Status Exam   Affect: Appropriate, Dramatic  Appearance: Disheveled  Attention Span/Concentration: Attentive  Eye Contact: Variable    Fund of Knowledge: Appropriate   Language /Speech Content: Fluent  Language /Speech Volume: Normal  Language /Speech Rate/Productions: Normal  Recent Memory: Variable, Intact  Remote Memory: Variable, Intact  Mood: Anxious, Depressed, Sad  Orientation to Person: Yes   Orientation to Place: Yes  Orientation to Time of Day: Yes  Orientation to Date: Yes     Situation (Do they understand why they are here?): Yes  Psychomotor Behavior: Normal  Thought Content: Hallucinations, Paranoia, Suicidal  Thought Form: Paranoia, Intact    Treatment Objective(s) Addressed: rapport building, processing feelings, identifying an appropriate aftercare plan, assessing safety, exploring obstacles to safety in the community, building self-esteem, building skills, building distress tolerance, safety planning, orienting the patient to therapy    Patient Response to Interventions: acceptance expressed, verbalizes understanding, needs reinforcement    Progress Towards Goals:   Patient Reports Symptoms Are: worsening  Patient Progress Toward Goals: is making progress  Comment: Pt continues to endorse SI with a plan and intent without relief. He has a reduction in  "disorganization in behavior and thought, and VH, he continues to endorse AH, not command, but stating that someone wants to kill him.  Next Step to Work Toward Discharge: symptom stabilization, collaboration with OP team/family/friends    Case Management:   Details on Collaborating with Patient's Support System:   [facility]  via [employer], Tyler Woodruff, p. [phone], [email or web address]; Aayush [email or web address] [email or web address] (UMBERTO on file);   Alla Preciado (Sister)  [phone] (UMBERTO on file)  Summary of Interaction: Writer spoke with pt's sister via phone. She reports it's been a lot of relapses and issues with her brother and very stressful, \"I'm the only left who's still showing up for him and Im really getting exhausted.\" Pt had disappeared for several days from his group home and pt was very worried for his safety or if he relapsed. Writer updated pts sister on pts current symptoms, that he is feeling very mad at himself for relapsing and suicidal, that the plan is inpatient and that his  is working on getting him Long Term Support Services at mentioned above. She appreciated the information, gave writer the information for pts parents, and asked me to let pt know that she loves pt (passed on this information nurses to provide to patient).    Plan:   inpatient mental health   yes provider: (Dr. Hollis and Dr. Montes, Psychiatry)  yes    Clinical Substantiation: Perry is a 49 year old white male who initially arrived to the ED via EMS experiencing paranoia, AH, VH, and increased suicidal ideation. Pt arrived at the ED via EMS. Pt reporting paranoia with auditory and visual hallucinations. Pt reports these hallucinations are demons or shadows that say, \"they are going to kill pt\". Pt also endorses SI, does not have specific plan but states intent and if he left \"he would kill himself\". Pt has a Hx of 2 suicide attempts which he states were impulsive where he had " a thought and acted on it. Pt expressed feeling anxious, depressed, some irritability, short tempered, and lack of motivation. Pt reports they stopped taking medication about a week ago. Pt reported sleep was  ok  and when asked about appetite, pt said  not really   Pt reports thoughts that  just want it to be over . Pt denies HI and SIB. Pt was sober for 11 months and began using Meth a week ago. Pt reporting living in an IRTS facility and transiting about three weeks ago to a boarding house. Pt has DxHx of MDD, KANE, ADHD, Polysub, and TBI.    Legal Status: Legal Status at Admission: Voluntary/Patient has signed consent for treatment    Session Status: Time session started: 1030  Time session ended: 1050  Session Duration (minutes): 20 minutes  Session Number: 3  Anticipated number of sessions or this episode of care: 4  Date of most recent diagnostic assessment: 05/16/24    Time Spent: 20 minutes    CPT Code: CPT Codes: 96535 - Psychotherapy (with patient) - 30 (16-37*) min    Diagnosis:   Patient Active Problem List   Diagnosis Code    Bilateral carpal tunnel syndrome G56.03    KANE (generalized anxiety disorder) F41.1    Chronic pain syndrome G89.4    Hyperglycemia R73.9    Hypertriglyceridemia E78.1    Lung nodule R91.1    Major depressive disorder, recurrent (H) F33.9    Mild intermittent asthma without complication J45.20    Tobacco use disorder F17.200    Displacement of lumbar intervertebral disc without myelopathy M51.26    History of ADHD Z86.59    History of suicide attempt Z91.51    Chronic right-sided low back pain with right-sided sciatica M54.41, G89.29    Psychosis (H) F29    Suicidal behavior without attempted self-injury R45.89    Methamphetamine use disorder, severe, in early remission (H) F15.21    Polysubstance abuse (H) F19.10    Moderate opioid dependence in early remission (H) F11.21    Hx of sepsis Z86.19    Hx of intravenous drug use in remission Z87.898       Primary Problem This  Admission: Active Hospital Problems    *Major depressive disorder, recurrent (H)      Polysubstance abuse (H)      Psychosis (H)      History of suicide attempt      KANE (generalized anxiety disorder)      History of ADHD        YESSI Pablo   Licensed Mental Health Professional (LMHP), [facility]  [phone]

## 2024-10-29 NOTE — TELEPHONE ENCOUNTER
01:09 - MRN provided to 12 CRN to review, as unit is case by case. Unit is down staffing tonight    06:31 - 12 CRN Evy reports day shift will continue reviewing to ensure pt is appropriate for current milieu and for the room that is available     [place]: [place]  Access Inpatient Bed Call Log  10/29/2024 12:38 AM  Intake has called facilities that have not updated their bed status within the last 12 hours.??      ADULTS:     *[facility] -- [facility]: Currently under review  [place] -- [facility]:  @ Cap per website.   [place] -- [facility]: @ Cap per website. -12:44 AM Per [place]ana, they are capped in the [facility] and [facility].  [place] -- [facility]: @ Cap per website.  -12:43am Per [place], they are capped.  [place] -- [facility]: @ Cap per website.  [facility] -- [facility]: @ Cap per website.  [facility] -- [facility]/YA beds @ Posting 1 beds. Ages 18-35, Voluntary only, COVID test req'd, NO aggression, physical or sexual assault, violence hx or drug abuse, or psychosis. Pt is not age appropriate  [place] -- [facility]: @ Cap per website.  [place] -- [facility]: @ cap per website.  [place] -- [facility]:  @ Cap per website.     *[facility]: @ Posting 1 bed. Mixed unit/Low acuity only. Declined 10/28 d/t acuity  [facility]: @ Posting 2 bed. Low acuity, No aggression -12:44 AM Per Latana, they are capped in the [facility] and regional.    Pt remains on waitlist pending appropriate placement availability.

## 2024-10-29 NOTE — ED NOTES
Emergency Department I-PASS Sign-out      Illness Severity: Watcher and HOLDABLE    Patient Summary:  49 year old male with pertinent PMH of MDD, KANE, ADHD, polysubstance use disorder, TBI who presented with suicidal ideation.  Presented from a sober living facility.  Recently relapsed on meth/fentanyl    ED Course/treatment plan: Was felt to be manic on arrival.  Became very agitated in the ED and required code 21/sedation with olanzapine, Haldol, and Versed.  Monitored during the overnight shift and was extremely somnolent.  Intermittently required supplemental O2.  End-tidal is in place.  Will need mental health assessment completed on10/25/24; Inpatient MH admit    Clinical Impression:  (F22) Paranoia (H)    (R45.851) Suicidal ideation      Edited by: Joao Rey MD at 10/28/2024 1054    Action List:  Tests to Follow-up:  None    Medications Reconciled/Ordered:  No    ED Mental Health Boarding Order Set Used for Diet/PRNs/Other:  No    DEC, Extended Care, Psych Consult Orders:  DEC assessment ordered and pending.     Situational Awareness & Contingency Plannin Hour Hold Status:  Voluntary, would reassess if wanting to leave.  Active Orders  N/A    Disposition:  Awaiting Behavioral Health DEC assessment    Boarding subsequent shift/day updates:  10/25/24 DEC assessment complete.  Patient continues to endorse suicidal ideations.  Mental health admission.  Meds ordered but at lower dose as he has been off them for at least 1-1/2 weeks.  Seroquel and fluoxetine can be titrated as appropriate.    Edited by: Petr Crawford MD at 10/25/2024 133    Synthesis & Events after sign-out  Awaiting inpatient mental health bed.          Tanna Hollis MD   Emergency Medicine     Tanna Hollis MD  10/29/24 1032

## 2024-10-29 NOTE — ED NOTES
IP MH Referral Acuity Rating Score (RARS)    LMHP complete at referral to IP MH, with DEC; and, daily while awaiting IP MH placement. Call score to PPS.  CRITERIA SCORING   New 72 HH and Involuntary for IP MH (not adolescent) 0/1   Boarding over 24 hours 1/1   Vulnerable adult at least 55+ with multiple co morbidities; or, Patient age 11 or under 0/1   Suicide ideation without relief of precipitating factors 1/1   Current plan for suicide 1/1   Current plan for homicide 0/1   Imminent risk or actual attempt to seriously harm another without relief of factors precipitating the attempt 0/1   Severe dysfunction in daily living (ex: complete neglect for self care, extreme disruption in vegetative function, extreme deterioration in social interactions) 1/1   Recent (last 2 weeks) or current physical aggression in the ED 0/1   Restraints or seclusion episode in ED 0/1   Verbal aggression, agitation, yelling, etc., while in the ED 0/1   Active psychosis with psychomotor agitation or catatonia 1/1   Need for constant or near constant redirection (from leaving, from others, etc).  0/1   Intrusive or disruptive behaviors 1/1   TOTAL Acuity Total Score: 6

## 2024-10-29 NOTE — TELEPHONE ENCOUNTER
R: Per Leni at 10:31 am, she accepts pt for #12 and said he will need an SIO. Author informed Rea in intake.

## 2024-10-30 PROBLEM — R45.851 SUICIDAL IDEATION: Status: ACTIVE | Noted: 2024-10-30

## 2024-10-30 PROBLEM — F22 PARANOIA (H): Status: ACTIVE | Noted: 2024-10-30

## 2024-10-30 LAB
ALBUMIN SERPL BCG-MCNC: 3.9 G/DL (ref 3.5–5.2)
ALP SERPL-CCNC: 114 U/L (ref 40–150)
ALT SERPL W P-5'-P-CCNC: 43 U/L (ref 0–70)
ANION GAP SERPL CALCULATED.3IONS-SCNC: 11 MMOL/L (ref 7–15)
AST SERPL W P-5'-P-CCNC: 32 U/L (ref 0–45)
BILIRUB SERPL-MCNC: 0.2 MG/DL
BUN SERPL-MCNC: 15.5 MG/DL (ref 6–20)
CALCIUM SERPL-MCNC: 9.6 MG/DL (ref 8.8–10.4)
CHLORIDE SERPL-SCNC: 102 MMOL/L (ref 98–107)
CREAT SERPL-MCNC: 1.07 MG/DL (ref 0.67–1.17)
EGFRCR SERPLBLD CKD-EPI 2021: 86 ML/MIN/1.73M2
GLUCOSE SERPL-MCNC: 139 MG/DL (ref 70–99)
HCO3 SERPL-SCNC: 26 MMOL/L (ref 22–29)
POTASSIUM SERPL-SCNC: 4.8 MMOL/L (ref 3.4–5.3)
PROT SERPL-MCNC: 7.2 G/DL (ref 6.4–8.3)
SODIUM SERPL-SCNC: 139 MMOL/L (ref 135–145)
TSH SERPL DL<=0.005 MIU/L-ACNC: 0.83 UIU/ML (ref 0.3–4.2)

## 2024-10-30 PROCEDURE — 124N000002 HC R&B MH UMMC

## 2024-10-30 PROCEDURE — 99222 1ST HOSP IP/OBS MODERATE 55: CPT | Mod: GC | Performed by: PSYCHIATRY & NEUROLOGY

## 2024-10-30 PROCEDURE — 250N000013 HC RX MED GY IP 250 OP 250 PS 637

## 2024-10-30 PROCEDURE — 250N000013 HC RX MED GY IP 250 OP 250 PS 637: Performed by: EMERGENCY MEDICINE

## 2024-10-30 PROCEDURE — 36415 COLL VENOUS BLD VENIPUNCTURE: CPT

## 2024-10-30 PROCEDURE — 250N000012 HC RX MED GY IP 250 OP 636 PS 637

## 2024-10-30 PROCEDURE — 84443 ASSAY THYROID STIM HORMONE: CPT

## 2024-10-30 PROCEDURE — 250N000013 HC RX MED GY IP 250 OP 250 PS 637: Performed by: NURSE PRACTITIONER

## 2024-10-30 PROCEDURE — 82040 ASSAY OF SERUM ALBUMIN: CPT

## 2024-10-30 RX ORDER — MULTIPLE VITAMINS W/ MINERALS TAB 9MG-400MCG
1 TAB ORAL DAILY
Status: DISCONTINUED | OUTPATIENT
Start: 2024-10-30 | End: 2024-11-08 | Stop reason: HOSPADM

## 2024-10-30 RX ORDER — BUPRENORPHINE AND NALOXONE 8; 2 MG/1; MG/1
1 FILM, SOLUBLE BUCCAL; SUBLINGUAL
Status: DISCONTINUED | OUTPATIENT
Start: 2024-10-30 | End: 2024-10-31

## 2024-10-30 RX ORDER — OLANZAPINE 10 MG/2ML
10 INJECTION, POWDER, FOR SOLUTION INTRAMUSCULAR 3 TIMES DAILY PRN
Status: DISCONTINUED | OUTPATIENT
Start: 2024-10-30 | End: 2024-11-05

## 2024-10-30 RX ORDER — BUPRENORPHINE HYDROCHLORIDE AND NALOXONE HYDROCHLORIDE DIHYDRATE 2; .5 MG/1; MG/1
1 TABLET SUBLINGUAL ONCE
Status: COMPLETED | OUTPATIENT
Start: 2024-10-30 | End: 2024-10-30

## 2024-10-30 RX ORDER — QUETIAPINE FUMARATE 200 MG/1
200 TABLET, FILM COATED ORAL AT BEDTIME
Status: DISCONTINUED | OUTPATIENT
Start: 2024-10-30 | End: 2024-10-31

## 2024-10-30 RX ORDER — HYDROXYZINE HYDROCHLORIDE 25 MG/1
50 TABLET, FILM COATED ORAL 3 TIMES DAILY PRN
Status: DISCONTINUED | OUTPATIENT
Start: 2024-10-30 | End: 2024-11-08 | Stop reason: HOSPADM

## 2024-10-30 RX ORDER — GABAPENTIN 600 MG/1
600 TABLET ORAL 3 TIMES DAILY
Status: DISCONTINUED | OUTPATIENT
Start: 2024-10-30 | End: 2024-11-06

## 2024-10-30 RX ORDER — ATOMOXETINE 80 MG/1
80 CAPSULE ORAL DAILY
Status: DISCONTINUED | OUTPATIENT
Start: 2024-10-30 | End: 2024-11-08 | Stop reason: HOSPADM

## 2024-10-30 RX ORDER — POLYETHYLENE GLYCOL 3350 17 G/17G
17 POWDER, FOR SOLUTION ORAL DAILY PRN
Status: DISCONTINUED | OUTPATIENT
Start: 2024-10-30 | End: 2024-11-08 | Stop reason: HOSPADM

## 2024-10-30 RX ORDER — ACETAMINOPHEN 325 MG/1
650 TABLET ORAL EVERY 4 HOURS PRN
Status: DISCONTINUED | OUTPATIENT
Start: 2024-10-30 | End: 2024-10-30

## 2024-10-30 RX ORDER — TRAZODONE HYDROCHLORIDE 50 MG/1
50 TABLET, FILM COATED ORAL
Status: DISCONTINUED | OUTPATIENT
Start: 2024-10-30 | End: 2024-11-08 | Stop reason: HOSPADM

## 2024-10-30 RX ORDER — POLYETHYLENE GLYCOL 3350 17 G
2-4 POWDER IN PACKET (EA) ORAL
Status: DISCONTINUED | OUTPATIENT
Start: 2024-10-30 | End: 2024-11-08 | Stop reason: HOSPADM

## 2024-10-30 RX ORDER — OLANZAPINE 10 MG/1
10 TABLET, ORALLY DISINTEGRATING ORAL 3 TIMES DAILY PRN
Status: DISCONTINUED | OUTPATIENT
Start: 2024-10-30 | End: 2024-11-05

## 2024-10-30 RX ORDER — LITHIUM CARBONATE 300 MG/1
600 TABLET, FILM COATED, EXTENDED RELEASE ORAL AT BEDTIME
Status: DISCONTINUED | OUTPATIENT
Start: 2024-10-30 | End: 2024-10-31

## 2024-10-30 RX ORDER — MAGNESIUM HYDROXIDE/ALUMINUM HYDROXICE/SIMETHICONE 120; 1200; 1200 MG/30ML; MG/30ML; MG/30ML
30 SUSPENSION ORAL EVERY 4 HOURS PRN
Status: DISCONTINUED | OUTPATIENT
Start: 2024-10-30 | End: 2024-11-08 | Stop reason: HOSPADM

## 2024-10-30 RX ORDER — GABAPENTIN 100 MG/1
100 CAPSULE ORAL EVERY 6 HOURS PRN
Status: DISCONTINUED | OUTPATIENT
Start: 2024-10-30 | End: 2024-11-08 | Stop reason: HOSPADM

## 2024-10-30 RX ORDER — BUPRENORPHINE AND NALOXONE 4; 1 MG/1; MG/1
1 FILM, SOLUBLE BUCCAL; SUBLINGUAL DAILY
Status: DISCONTINUED | OUTPATIENT
Start: 2024-10-31 | End: 2024-10-31

## 2024-10-30 RX ADMIN — QUETIAPINE FUMARATE 200 MG: 200 TABLET ORAL at 20:58

## 2024-10-30 RX ADMIN — QUETIAPINE FUMARATE 50 MG: 50 TABLET ORAL at 08:50

## 2024-10-30 RX ADMIN — ACETAMINOPHEN 650 MG: 325 TABLET, FILM COATED ORAL at 17:50

## 2024-10-30 RX ADMIN — NICOTINE 1 PATCH: 21 PATCH, EXTENDED RELEASE TRANSDERMAL at 08:50

## 2024-10-30 RX ADMIN — NICOTINE POLACRILEX 4 MG: 2 LOZENGE ORAL at 19:44

## 2024-10-30 RX ADMIN — NICOTINE POLACRILEX 4 MG: 2 GUM, CHEWING BUCCAL at 16:44

## 2024-10-30 RX ADMIN — HYDROXYZINE HYDROCHLORIDE 50 MG: 50 TABLET, FILM COATED ORAL at 15:09

## 2024-10-30 RX ADMIN — HYDROXYZINE HYDROCHLORIDE 50 MG: 50 TABLET, FILM COATED ORAL at 04:11

## 2024-10-30 RX ADMIN — GABAPENTIN 600 MG: 600 TABLET, FILM COATED ORAL at 19:43

## 2024-10-30 RX ADMIN — OLANZAPINE 10 MG: 10 TABLET, ORALLY DISINTEGRATING ORAL at 17:24

## 2024-10-30 RX ADMIN — ACETAMINOPHEN 650 MG: 325 TABLET, FILM COATED ORAL at 04:11

## 2024-10-30 RX ADMIN — LITHIUM CARBONATE 600 MG: 300 TABLET, EXTENDED RELEASE ORAL at 21:00

## 2024-10-30 RX ADMIN — BUPRENORPHINE HYDROCHLORIDE AND NALOXONE HYDROCHLORIDE DIHYDRATE 1 TABLET: 2; .5 TABLET SUBLINGUAL at 12:04

## 2024-10-30 RX ADMIN — Medication 1 TABLET: at 14:47

## 2024-10-30 RX ADMIN — NICOTINE POLACRILEX 4 MG: 2 LOZENGE ORAL at 20:58

## 2024-10-30 RX ADMIN — FLUOXETINE HYDROCHLORIDE 20 MG: 20 CAPSULE ORAL at 08:50

## 2024-10-30 RX ADMIN — GABAPENTIN 600 MG: 600 TABLET, FILM COATED ORAL at 14:47

## 2024-10-30 RX ADMIN — BUPRENORPHINE AND NALOXONE 1 TABLET: 2; .5 TABLET SUBLINGUAL at 08:50

## 2024-10-30 RX ADMIN — ATORVASTATIN CALCIUM 20 MG: 20 TABLET, FILM COATED ORAL at 19:43

## 2024-10-30 RX ADMIN — BUPRENORPHINE AND NALOXONE 1 FILM: 8; 2 FILM BUCCAL; SUBLINGUAL at 15:09

## 2024-10-30 RX ADMIN — QUETIAPINE FUMARATE 50 MG: 50 TABLET ORAL at 19:43

## 2024-10-30 ASSESSMENT — ACTIVITIES OF DAILY LIVING (ADL)
ADLS_ACUITY_SCORE: 0
DRESS: INDEPENDENT
HYGIENE/GROOMING: INDEPENDENT;PROMPTS
ADLS_ACUITY_SCORE: 0
ORAL_HYGIENE: INDEPENDENT
ADLS_ACUITY_SCORE: 0

## 2024-10-30 NOTE — PLAN OF CARE
"  Problem: Sleep Disturbance  Goal: Adequate Sleep/Rest  Outcome: Not Progressing   Goal Outcome Evaluation:    Perry just received Vistaril 50 mg for anxiety and Tylenol 650 mg for all over aching.  Would like his Vistaril increased as he states he normally takes 100 mg.  Denies any typical withdrawal s/sxs at this time.  He has been down in the ER \"several days\" before coming to station 12. States he has been sleeping \"a lot\".  Polite and cooperative.  Happy that he had been sober a year until very recently.    Perry appears to have slept a total of 5.25 hours this night shift.                        "

## 2024-10-30 NOTE — PLAN OF CARE
Problem: Adult Behavioral Health Plan of Care  Goal: Plan of Care Review  Outcome: Progressing  Flowsheets (Taken 10/29/2024 2479)  Patient Agreement with Plan of Care: agrees     Problem: Psychotic Signs/Symptoms  Goal: Improved Behavioral Control (Psychotic Signs/Symptoms)  Outcome: Progressing   Goal Outcome Evaluation:    Plan of Care Reviewed With: patient        Pt is a 49 year old male with past medical history significant for TBI and psychiatric history of MDD, KANE, ADHD, and substance disorder who presents to [facility] ED with concern for SI and AVH in the context of medication non-adherence and substance use. Per report, pt arrived via EMS and stated that he started using meth 1 wk ago. Prior to that, he  was sober for 11 months. He has paranoia, hallucinations (visual and aud) and SI with no  plan but has intent. During admission assessment, pt endorsed SI with no plans. He rated anxiety at 100/10, depression 10/10, but denied HI/AVH, and contracted for safety. Pt is compliant with the admission process. Pt is isolative and withdrawn to room all shift. He stated that he wears prescription glasses and dentures which he thought were in ED. This writer called ED and it was reported that pt did not come in with glasses and dentures. This was communicated to him and he was ok with it. Pt's sister Alla was notified of patient's admission into station 12. Pt is pleasant, calm, cooperative, and medication compliant. Pt is on 1:1 SIO for safety. No safety or behavioral concerns noted. Will continue with same plan of care.
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
## 2024-10-30 NOTE — ED NOTES
Writer attempted to call patient  Tyler at [phone] and was unable to connect. Email sent updating [facility]  and CADI via secure email with unit contact information: Phone: [phone]; Fax: [phone].    Elena Clark  Extended Care Coordinator  10/30/24 9:22 AM

## 2024-10-30 NOTE — CARE PLAN
"Pt was calm and cooperative this shift. Endorses passive SI thoughts but claims he has no intention of acting on it at this time. Talked about shame he feels from relapsing. Spent most of the shift sleeping. Endorses mild anxiety and depression. Denied SI,Hi,SIB, AVC  hallucinations, and paranoia. Education on med was given. Ate 100% of food . No signs of aggression this shift. No PRNS given. No med side effects observed. No medical concerns. No behavior concerns. At 3pm after talk with his  pt seemed more agitated and asked for PRN. Also asking to get PRN Zyprexa to help with agitation.   Still on 1 to 1 for SI  UMBERTO signed for    Cows this morning was 7 then 3. Pt still feels like he is in withdrawal.     BP 98/65   Pulse 68   Temp 98.1  F (36.7  C) (Temporal)   Resp 16   Ht 1.905 m (6' 3\")   Wt 114.2 kg (251 lb 12.8 oz)   SpO2 99%   BMI 31.47 kg/m         Last 24H PRN:     acetaminophen (TYLENOL) tablet 650 mg, 650 mg at 10/30/24 0411    hydrOXYzine HCl (ATARAX) tablet 50 mg, 50 mg at 10/30/24 5529   "

## 2024-10-30 NOTE — PLAN OF CARE
Team Note Due:  Wednesday     Assessment/Intervention/Current Symtoms and Care Coordination:  Chart review and met with team, discussed pt progress, symptomology, and response to treatment.  Discussed the discharge plan and any potential impediments to discharge.    Per team, Pt arrived with SI, hallucination, recent meth use one week ago he relapsed and prior reports being sober for 11 months, withdrawn, irritable, sweating, unkempt, nauseous, but calm cooperative, reports high anxiety and depression.    Per chart review, pt [facility] in Jan 2024 and May/June 2024 of this year both with relapses. He appears to be an inconsistent historian.  I completed his initial psychosocial assessment. He is open to completing a JAQUELINE consult.    I emailed his CM and CADI CM to ask about status of CADI and to refer to group home.   Tyler's email bounced back. I called the  #: [phone] and It was a busy signal.     Behavioral team note complete.     I heard back from Keyana Vickers, MICHAEL CM who states they are working with [facility] group home and waiting on discussing a rate. His CADI is open for 58 more days. I contacted [facility] to coordinate discharge there.     [facility]  [address] [facility]  [address], [place], [place], [address] [phone]  [place]    I asked Keyana if she had CM info as his contact is bouncing back and she provided:  Sunny Woodruff   Direct: [phone]  [email or web address]   Main: [phone]  Fax: [phone]    Discharge Plan or Goal:  Group home with [facility]    [facility]  [address], [place], [place], [address] [phone]  [email or web address]    Barriers to Discharge:  Symptoms, withdrawal sx      Referral Status:  TBD     Legal Status:  Pt is voluntary.      Contacts (include UMBERTO status):  Primary Care:    LASHA Teixeira-NP  Location: [facility]: [address], [place], [place], [address]  Phone: [phone]     Subutex:  Iliana Allen NP  Location: [facility] - [address] [address], [address].  Phone: [phone]     Psychiatry  Provider: JODY SCHOENECKER APRN, CNP  Address: [facility] [address] [place] [address]  Phone: [phone]  Fax: [phone]     *Pt reports this is now at [facility]      Therapist:  Howie at [facility] 2x/month      /ACT Team:  Sunny Woodruff   Direct: [phone]  [email or web address]   Main: [phone]  Fax: [phone]     CADI CM:  [email or web address] (UMBERTO on file)     Other contact information (family, friends, SO) and UMBERTO status:   Alla Preciado (Sister) [phone] (UMBERTO on file)      Upcoming Meetings and Dates/Important Information and next steps:  Coordinate care  Follow up with JAQUELINE consult - LADC .

## 2024-10-30 NOTE — PROGRESS NOTES
10/29/24 2303   Valuables   Patient Belongings locker   Patient Belongings Put in Hospital Secure Location (Security or Locker, etc.) keys;wallet   Did you bring any home meds/supplements to the hospital?  No     Locker:   Wallet   EBT card,Galax card, Social Security card, Venmo Card, Chime card   ID and Family fresh care  Shoes   Keys   Phone case   Belt   Kamlesh  Socks  Jacket  Mor shirt  A               Admission:  I am responsible for any personal items that are not sent to the safe or pharmacy.  [facility] is not responsible for loss, theft or damage of any property in my possession.    Signature:  _________________________________ Date: _______  Time: _____                                              Staff Signature:  ____________________________ Date: ________  Time: _____      2nd Staff person, if patient is unable/unwilling to sign:    Signature: ________________________________ Date: ________  Time: _____     Discharge:  [place] has returned all of my personal belongings:    Signature: _________________________________ Date: ________  Time: _____                                          Staff Signature:  ____________________________ Date: ________  Time: _____

## 2024-10-30 NOTE — PROVIDER NOTIFICATION
10/30/24 1228   Individualization/Patient Specific Goals   Patient Personal Strengths expressive of needs;family/social support   Behavioral Team Discussion   Participants Dr Crockett, attending MD, Dr Kohler resident MD, Elsie ROBLERO, Linda Brower RN   Anticipated length of stay 1-2 weeks   Medical/Physical Withdrawal symptoms   Precautions SI   Plan Pt arrived last evening and reports high anxiety, depression and SI thoughts. Denies having any current plan. Denies access to firearms. He is voluntary. Is open to JAQUELINE consult. Would like to go to group home. I reached out to his CM and CADI CM for discharge planning when he is ready. Still exhibiting withdrawal symptoms and working on med stabilization.   Safety Plan To be completed with unit therapist prior to discharge.   Anticipated Discharge Disposition group home;substance use treatment     Goal Outcome Evaluation:PRECAUTIONS AND SAFETY    Behavioral Orders   Procedures    Assault precautions    Cheeking Precautions (behavioral units)    Code 1 - Restrict to Unit    Routine Programming     As clinically indicated    Status 15     Every 15 minutes.    Status Individual Observation     Patient SIO status reviewed with team/RN.  Please also refer to RN/team documentation for add'l detail.    -SIO staff to monitor following which have contributed to patient being on SIO:  SI     Order Specific Question:   CONTINUOUS 24 hours / day     Answer:   Other     Order Specific Question:   Specify distance     Answer:   10 feet     Order Specific Question:   Indications for SIO     Answer:   Suicide risk    Suicide precautions: Suicide Risk: HIGH     Patients on Suicide Precautions should have a Combination Diet ordered that includes a Diet selection(s) AND a Behavioral Tray selection for Safe Tray - with utensils, or Safe Tray - NO utensils       Order Specific Question:   Suicide Risk     Answer:   HIGH       Safety  Safety WDL: WDL

## 2024-10-30 NOTE — PLAN OF CARE
BEH IP Unit Acuity Rating Score (UARS)  Patient is given one point for every criteria they meet.    CRITERIA SCORING   On a 72 hour hold, court hold, committed, stay of commitment, or revocation. 0    Patient LOS on BEH unit exceeds 20 days. 0  LOS: 1   Patient under guardianship, 55+, otherwise medically complex, or under age 11. 0   Suicide ideation without relief of precipitating factors. 1   Current plan for suicide. 0   Current plan for homicide. 0   Imminent risk or actual attempt to seriously harm another without relief of factors precipitating the attempt. 0   Severe dysfunction in daily living (ex: complete neglect for self care, extreme disruption in vegetative function, extreme deterioration in social interactions). 1   Recent (last 7 days) or current physical aggression in the ED or on unit. 0   Restraints or seclusion episode in past 72 hours. 0   Recent (last 7 days) or current verbal aggression, agitation, yelling, etc., while in the ED or unit. 0   Active psychosis. 0   Need for constant or near constant redirection (from leaving, from others, etc).  0   Intrusive or disruptive behaviors. 0   Patient requires 3 or more hours of individualized nursing care per 8-hour shift (i.e. for ADLs, meds, therapeutic interventions). 0   TOTAL 2
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
## 2024-10-31 PROCEDURE — 124N000002 HC R&B MH UMMC

## 2024-10-31 PROCEDURE — 250N000012 HC RX MED GY IP 250 OP 636 PS 637

## 2024-10-31 PROCEDURE — 99232 SBSQ HOSP IP/OBS MODERATE 35: CPT | Mod: GC | Performed by: PSYCHIATRY & NEUROLOGY

## 2024-10-31 PROCEDURE — 250N000013 HC RX MED GY IP 250 OP 250 PS 637: Performed by: NURSE PRACTITIONER

## 2024-10-31 PROCEDURE — 250N000013 HC RX MED GY IP 250 OP 250 PS 637: Performed by: EMERGENCY MEDICINE

## 2024-10-31 PROCEDURE — 250N000012 HC RX MED GY IP 250 OP 636 PS 637: Performed by: PSYCHIATRY & NEUROLOGY

## 2024-10-31 PROCEDURE — 250N000013 HC RX MED GY IP 250 OP 250 PS 637

## 2024-10-31 RX ORDER — BUPRENORPHINE AND NALOXONE 8; 2 MG/1; MG/1
1 FILM, SOLUBLE BUCCAL; SUBLINGUAL ONCE
Status: COMPLETED | OUTPATIENT
Start: 2024-10-31 | End: 2024-10-31

## 2024-10-31 RX ORDER — BUPRENORPHINE AND NALOXONE 8; 2 MG/1; MG/1
1 FILM, SOLUBLE BUCCAL; SUBLINGUAL 2 TIMES DAILY
Status: DISCONTINUED | OUTPATIENT
Start: 2024-11-01 | End: 2024-11-04

## 2024-10-31 RX ORDER — LITHIUM CARBONATE 450 MG
900 TABLET, EXTENDED RELEASE ORAL AT BEDTIME
Status: DISCONTINUED | OUTPATIENT
Start: 2024-10-31 | End: 2024-11-08 | Stop reason: HOSPADM

## 2024-10-31 RX ORDER — BUPRENORPHINE AND NALOXONE 4; 1 MG/1; MG/1
1 FILM, SOLUBLE BUCCAL; SUBLINGUAL ONCE
Status: COMPLETED | OUTPATIENT
Start: 2024-10-31 | End: 2024-10-31

## 2024-10-31 RX ORDER — QUETIAPINE FUMARATE 300 MG/1
300 TABLET, FILM COATED ORAL AT BEDTIME
Status: DISCONTINUED | OUTPATIENT
Start: 2024-10-31 | End: 2024-11-04

## 2024-10-31 RX ORDER — BUPRENORPHINE HYDROCHLORIDE AND NALOXONE HYDROCHLORIDE DIHYDRATE 8; 2 MG/1; MG/1
1 TABLET SUBLINGUAL ONCE
Status: DISCONTINUED | OUTPATIENT
Start: 2024-10-31 | End: 2024-10-31

## 2024-10-31 RX ADMIN — GABAPENTIN 600 MG: 600 TABLET, FILM COATED ORAL at 08:27

## 2024-10-31 RX ADMIN — GABAPENTIN 600 MG: 600 TABLET, FILM COATED ORAL at 19:05

## 2024-10-31 RX ADMIN — HYDROXYZINE HYDROCHLORIDE 50 MG: 50 TABLET, FILM COATED ORAL at 16:41

## 2024-10-31 RX ADMIN — NICOTINE POLACRILEX 4 MG: 2 LOZENGE ORAL at 17:55

## 2024-10-31 RX ADMIN — QUETIAPINE FUMARATE 50 MG: 50 TABLET ORAL at 19:05

## 2024-10-31 RX ADMIN — GABAPENTIN 100 MG: 100 CAPSULE ORAL at 10:43

## 2024-10-31 RX ADMIN — ACETAMINOPHEN 650 MG: 325 TABLET, FILM COATED ORAL at 16:40

## 2024-10-31 RX ADMIN — FLUOXETINE HYDROCHLORIDE 20 MG: 20 CAPSULE ORAL at 08:27

## 2024-10-31 RX ADMIN — BUPRENORPHINE AND NALOXONE 1 FILM: 4; 1 FILM, SOLUBLE BUCCAL; SUBLINGUAL at 08:26

## 2024-10-31 RX ADMIN — NICOTINE POLACRILEX 4 MG: 2 LOZENGE ORAL at 19:05

## 2024-10-31 RX ADMIN — GABAPENTIN 100 MG: 100 CAPSULE ORAL at 00:56

## 2024-10-31 RX ADMIN — LITHIUM CARBONATE 900 MG: 450 TABLET, EXTENDED RELEASE ORAL at 21:47

## 2024-10-31 RX ADMIN — Medication 1 TABLET: at 08:26

## 2024-10-31 RX ADMIN — QUETIAPINE FUMARATE 50 MG: 50 TABLET ORAL at 08:26

## 2024-10-31 RX ADMIN — BUPRENORPHINE AND NALOXONE 1 FILM: 4; 1 FILM, SOLUBLE BUCCAL; SUBLINGUAL at 11:30

## 2024-10-31 RX ADMIN — NICOTINE 1 PATCH: 21 PATCH, EXTENDED RELEASE TRANSDERMAL at 08:26

## 2024-10-31 RX ADMIN — BUPRENORPHINE AND NALOXONE 1 FILM: 8; 2 FILM BUCCAL; SUBLINGUAL at 16:32

## 2024-10-31 RX ADMIN — GABAPENTIN 600 MG: 600 TABLET, FILM COATED ORAL at 13:45

## 2024-10-31 RX ADMIN — QUETIAPINE FUMARATE 300 MG: 300 TABLET ORAL at 21:47

## 2024-10-31 RX ADMIN — ACETAMINOPHEN 650 MG: 325 TABLET, FILM COATED ORAL at 08:27

## 2024-10-31 RX ADMIN — ATORVASTATIN CALCIUM 20 MG: 20 TABLET, FILM COATED ORAL at 19:05

## 2024-10-31 ASSESSMENT — ACTIVITIES OF DAILY LIVING (ADL)
ADLS_ACUITY_SCORE: 0
HYGIENE/GROOMING: INDEPENDENT
ADLS_ACUITY_SCORE: 0
ORAL_HYGIENE: INDEPENDENT
ADLS_ACUITY_SCORE: 0
DRESS: INDEPENDENT
ADLS_ACUITY_SCORE: 0
DRESS: INDEPENDENT
ADLS_ACUITY_SCORE: 0
LAUNDRY: UNABLE TO COMPLETE
ADLS_ACUITY_SCORE: 0
ORAL_HYGIENE: INDEPENDENT
HYGIENE/GROOMING: INDEPENDENT

## 2024-10-31 NOTE — PROGRESS NOTES
Met with pt for CD Consult and completed JAQUELINE Comprehensive Assessment.  Pt reports he is open to either placement at a group home and attending IOP at [address] or returning to his previous IRTS facility through [facility].  Faxing referral to [facility] per pt's verbal consent, sending UMBERTO to unit [facility] for pt signature.    Recommendations:  1)  Complete an Intensive Outpatient MICD Treatment Program  2)  Abstain from all mood-altering chemicals unless prescribed by a licensed provider.   3)  Attend, at minimum, 2 weekly support group meetings, such as 12 step based (AA/NA), SMART Recovery, Health Realizations, and/or Refuge Recovery meetings.     4)  Actively work with a mentor/sponsor on a weekly basis.   5)  Follow all the recommendations of your treatment/medical providers.  6)  Patient may benefit from obtaining a full mental health evaluation.    Clinical Substantiation:  Patient has unstable housing, lacks a sober living environment, would benefit from continuing to develop long-term sober maintenance skills, would benefit from continuing to develop sober coping skills, would benefit from continuing to develop a sober peer support network, and has dual issues of mental health and substance abuse.    Referrals/ Alternatives:  [address]  [address] [place] [address]  P: [phone]  F: [phone]    DAANES Assessment ID: [id number]   /  JAQUELINE consult completed by:   CHINA Brown, [facility]  Substance Use Disorder Evaluation Counselor  E-mail Address: [email or web address] Consult & [place] [address]

## 2024-10-31 NOTE — PLAN OF CARE
"  Problem: Psychotic Signs/Symptoms  Goal: Improved Mood Symptoms (Psychotic Signs/Symptoms)  Outcome: Not Progressing   Goal Outcome Evaluation:    Patient is anxious, depressed and is irritable. He states that his mind is wandering around all over the place. He also stated that it is hard to keep steady. He is distracting himself from being anxious and irritable by talking to staff and he did mention that it helps. He states that he Is feeling paranoid and has some delusions but he is able to stop himself from having delusional thoughts. He denies SI/SIB/HI. He also denies AH/VH. He feels upset about using drugs and drinking after being sober for more than 11 months. He feels that he has no purpose anymore and can't stop thinking about the 11 months he was sober. Pt states that his whole body is in pain and is unrelieved with pain medication. COWS was done at 1600 and pt scored a 5. COWS was done again at 2000 and pt scored 5.  No other medical concerns noted or stated this shift.       /63   Pulse 70   Temp 97.1  F (36.2  C) (Temporal)   Resp 18   Ht 1.905 m (6' 3\")   Wt 114.2 kg (251 lb 12.8 oz)   SpO2 97%   BMI 31.47 kg/m     "

## 2024-10-31 NOTE — PLAN OF CARE
"Team Note Due:  Wednesday     Assessment/Intervention/Current Symtoms and Care Coordination:  Chart review and met with team, discussed pt progress, symptomology, and response to treatment.  Discussed the discharge plan and any potential impediments to discharge.    Per team, Pt calm, cooperative. Restless, anxious, shame about relapse, passive SI, no plan.     I called Tyler HAWKINS said she spoke to Brenda, manager at [facility] and is coordinating getting their docs sent.  One of their nurses on staff will complete a 6790 so they can send it to the [facility] Working on getting this expedited in the next couple of weeks.     I secure faxed: [phone] Pt's notes and MAR to this group home per CM request. (Pt signed UMBERTO).     Perry said he would be interested in [facility] in [place] if he cannot get into the group home in the meantime. [facility]. Pt signed UMBERTO for IRTS.     Received email back from [facility]: \"Elsie, Given Perry' ongoing substance use, I don't think we would consider him for readmission.  However, I'm checking with our [facility] site to see if they have any feedback.  I'll let you know what we determine\".     Had his JAQUELINE consult, was referred to Sarah.    Discharge Plan or Goal:  Group home with [facility]  [address], [place], [place], [address]  Ph: [phone]  [email or web address]    Barriers to Discharge:  Symptoms, withdrawal sx      Referral Status:  10/31/24: Referred to [facility]          Legal Status:  Pt is voluntary.      Contacts (include UMBERTO status):  Primary Care:   KERWIN Teixeira  Location: [facility]: [address] [place] [address]. [place], [place], [place], [address]  Phone: [phone]     Subutex:  Iliana Allen NP  Location: [address] [address], [address].  Phone: [phone]   "   Psychiatry  Provider: JODY SCHOENECKER APRN, CNP  Address: [facility] [address] [place] [address], [facility]  Phone: [phone]  Fax: [phone]     *Pt reports this is now at [facility]      Therapist:  Howie at [facility] 2x/month      /ACT Team:  Sunny Woodruff  Direct: [phone]  [email or web address]   Main: [phone]  Fax: [phone]     CADI CM:  [email or web address] (UMBERTO on file)     Other contact information (family, friends, SO) and UMBERTO status:   Alla Preciado (Sister) [phone] (UMBERTO on file)      Upcoming Meetings and Dates/Important Information and next steps:  Coordinate care  Follow up with JAQUELINE consult - LADC .

## 2024-10-31 NOTE — CONSULTS
CD Consult completed, see previous note.    CHINA Brown, [facility]  Substance Use Disorder Evaluation Counselor  Email: [email or web address]

## 2024-10-31 NOTE — PLAN OF CARE
Problem: Sleep Disturbance  Goal: Adequate Sleep/Rest  Outcome: Progressing   Goal Outcome Evaluation:    Perry appeared to sleep a total of 5.75 hours this night shift.  He was awake at the start of shift and given Neurontin for anxiety.  Has been sleeping since.  COWs recently have been 3-5 for restlessness, anxiety, achy bones, etc.  Remains on 1:1 staffing for now.  Has a Lithium level to be done at 8p on 11/3.

## 2024-10-31 NOTE — PLAN OF CARE
Patient alert and oriented to person, place, time, situation this morning. He appeared tense and restless--pacing during conversation with writer. Mood anxious, depressed. He endorsed some guilt and shame about recent relapse on alcohol, marijuana, fentanyl and methamphetamines. He endorsed passive thoughts of not wanting to be alive. He denied plan or intent to want to kill or hurt himself. He denied current auditory or visual hallucinations. Stated he slept poorly overnight, due to nightmares about using illicit substances and also due to diffuse generalized joint pain. COWS score was 7. Vital signs WNL. Patient talked about stressors related to difficulty filling medications and environment of substance use at [facility] [place] that contributed to recent relapse. He reported having positive conversations with family last night that helped with strengthening resolve to want to live and resume sobriety. Prn acetaminophen given for pain at 0830 along with scheduled Suboxone and gabapentin. Patient reported partial relief when rechecked for pain after medication administration. Patient concerned that medication doses currently lower than what he'd been on prior to admission. Information relayed to psychiatry team and medication doses adjusted. Patient reporting increased anxiety after discussing discharge planning with his outpatient . He requested and was given prn gabapentin 100 mg at 1044 with only minimal benefit to anxiety symptoms. Additional dose of Suboxone given and pain reduced to 3/10 (when rechecked (was 8/10 prior to any intervention this morning). Assault, suicide and cheeking precautions maintained. No aggressive behaviors. SIO discontinued at 0937 am. Will continue to assess for safety.         Problem: Suicide Risk  Goal: Absence of Self-Harm  Outcome: Progressing     Problem: Psychotic Signs/Symptoms  Goal: Improved Behavioral Control (Psychotic Signs/Symptoms)  Outcome: Progressing  Goal:  Optimal Cognitive Function (Psychotic Signs/Symptoms)  Outcome: Progressing  Goal: Improved Sleep (Psychotic Signs/Symptoms)  Outcome: Not Progressing  Flowsheets (Taken 10/31/2024 1100)  Mutually Determined Action Steps (Improved Sleep):   sleeps 4-6 hours at night   identifies disturbance factors  Goal: Enhanced Social, Occupational or Functional Skills (Psychotic Signs/Symptoms)  Intervention: Promote Social, Occupational and Functional Ability  Recent Flowsheet Documentation  Taken 10/31/2024 1031 by Jazmyne Hennessy RN  Trust Relationship/Rapport:   care explained   choices provided   emotional support provided   empathic listening provided   questions answered   questions encouraged   reassurance provided   thoughts/feelings acknowledged   Goal Outcome Evaluation:    Plan of Care Reviewed With: patient

## 2024-10-31 NOTE — PLAN OF CARE
BEH IP Unit Acuity Rating Score (UARS)  Patient is given one point for every criteria they meet.    CRITERIA SCORING   On a 72 hour hold, court hold, committed, stay of commitment, or revocation. 0    Patient LOS on BEH unit exceeds 20 days. 0  LOS: 2   Patient under guardianship, 55+, otherwise medically complex, or under age 11. 0   Suicide ideation without relief of precipitating factors. 1   Current plan for suicide. 0   Current plan for homicide. 0   Imminent risk or actual attempt to seriously harm another without relief of factors precipitating the attempt. 0   Severe dysfunction in daily living (ex: complete neglect for self care, extreme disruption in vegetative function, extreme deterioration in social interactions). 1   Recent (last 7 days) or current physical aggression in the ED or on unit. 0   Restraints or seclusion episode in past 72 hours. 0   Recent (last 7 days) or current verbal aggression, agitation, yelling, etc., while in the ED or unit. 0   Active psychosis. 0   Need for constant or near constant redirection (from leaving, from others, etc).  0   Intrusive or disruptive behaviors. 0   Patient requires 3 or more hours of individualized nursing care per 8-hour shift (i.e. for ADLs, meds, therapeutic interventions). 0   TOTAL 2

## 2024-10-31 NOTE — PROGRESS NOTES
"[facility], [place]   Psychiatric Progress Note  Hospital Day: 2        Interim History:   The patient's care was discussed with the treatment team during the daily team meeting and/or staff's chart notes were reviewed.       Per Staff report/notes:   -Perry appeared to sleep a total of 5.75 hours this night shift.  He was awake at the start of shift and given Neurontin for anxiety.  Has been sleeping since.  COWs recently have been 3-5 for restlessness, anxiety, achy bones, etc.  Remains on 1:1 staffing for now.   -Reported slept poorly due to body aches, appearing anxious.  -Patient is anxious, depressed and is irritable. He states that his mind is wandering around all over the place. He also stated that it is hard to keep steady. He is distracting himself from being anxious and irritable by talking to staff and he did mention that it helps. He states that he Is feeling paranoid and has some delusions but he is able to stop himself from having delusional thoughts. He denies SI/SIB/HI. He also denies AH/VH.   -COWS score 7 pts this AM  -Tolerating med recent changes well   - Reported feeling safe and denied active SI/plan or intent.    ----------------------  Upon interview,     Kb  was seen today in his room for our interview.   He reports has been feeling more \"depressed\" and disappointed in himself due to letting a \"a lot of people down\" including his family. He shares MH/CD burden over the years (struggling with MH symptoms as well as starting using meth since age 13 for instance) and that he wonders how long he will \"keep on doing this.\"   He is able to recognize nature of CD recovery and expresses interest in cntinuing working on sobriety. He shares he will ideally like to find a stable place to live and engage in an intensive day-treatment program. Open to consider CD treatment.    Reports he has been eating ok, sleep is somewhat fragmented. He reports has continuing " "experiencing passive suicidal thoughts in the form of not wanting to continue MH/CD struggles, although denies plan or intent. Feels safe in the hospital and reports will let staff know if this changes and feels needs help.     Main concerns today are withdrawal symptoms and mood changes.       Suicidal ideation: As above.     Homicidal ideation: denies current or recent homicidal ideation or behaviors. Shares \"I'm not violent, I don't want or intend to hurt anyone.    Psychotic symptoms:  Patient denies VH, paranoia, delusions. Reports baseline AH persist.    Medication side effects reported: No significant side effects.    Acute medical concerns: none    Other issues reported by patient:  Patient had no further questions or concerns.           Medications:     Current Facility-Administered Medications   Medication Dose Route Frequency Provider Last Rate Last Admin    [Held by provider] atomoxetine (STRATTERA) capsule 80 mg  80 mg Oral Daily Kulwant Kohler MD        atorvastatin (LIPITOR) tablet 20 mg  20 mg Oral QPM Petr Crawford MD   20 mg at 10/30/24 1943    [START ON 11/1/2024] buprenorphine HCl-naloxone HCl (SUBOXONE) 8-2 MG per film 1 Film  1 Film Sublingual BID Kulwant Kohler MD        [START ON 11/1/2024] FLUoxetine (PROzac) capsule 40 mg  40 mg Oral Daily Kulwant Kohler MD        gabapentin (NEURONTIN) tablet 600 mg  600 mg Oral TID Kulwant Kohler MD   600 mg at 10/31/24 1345    lithium (ESKALITH CR/LITHOBID) CR tablet 900 mg  900 mg Oral At Bedtime Kulwant Kohler MD        multivitamin w/minerals (THERA-VIT-M) tablet 1 tablet  1 tablet Oral Daily Kulwant Kohler MD   1 tablet at 10/31/24 0826    nicotine (NICODERM CQ) 21 MG/24HR 24 hr patch 1 patch  1 patch Transdermal Daily Leni Carias APRN CNP   1 patch at 10/31/24 0826    QUEtiapine (SEROquel) tablet 300 mg  300 mg Oral At Bedtime Kulwant Kohler MD        QUEtiapine (SEROquel) tablet 50 mg  50 mg Oral BID Petr Crawford MD   50 mg at 10/31/24 0826 " "         Allergies:     Allergies   Allergen Reactions    Wellbutrin [Bupropion] Anaphylaxis and Swelling     Facial swelling    Wellbutrin [Bupropion]     Wellbutrin [Bupropion] Difficulty breathing    Naltrexone Other (See Comments)     Headaches  Headaches            Labs:     Recent Results (from the past 24 hours)   Comprehensive metabolic panel    Collection Time: 10/30/24  7:47 PM   Result Value Ref Range    Sodium 139 135 - 145 mmol/L    Potassium 4.8 3.4 - 5.3 mmol/L    Carbon Dioxide (CO2) 26 22 - 29 mmol/L    Anion Gap 11 7 - 15 mmol/L    Urea Nitrogen 15.5 6.0 - 20.0 mg/dL    Creatinine 1.07 0.67 - 1.17 mg/dL    GFR Estimate 86 >60 mL/min/1.73m2    Calcium 9.6 8.8 - 10.4 mg/dL    Chloride 102 98 - 107 mmol/L    Glucose 139 (H) 70 - 99 mg/dL    Alkaline Phosphatase 114 40 - 150 U/L    AST 32 0 - 45 U/L    ALT 43 0 - 70 U/L    Protein Total 7.2 6.4 - 8.3 g/dL    Albumin 3.9 3.5 - 5.2 g/dL    Bilirubin Total 0.2 <=1.2 mg/dL   TSH with free T4 reflex    Collection Time: 10/30/24  7:47 PM   Result Value Ref Range    TSH 0.83 0.30 - 4.20 uIU/mL          Psychiatric Examination:     /70 (BP Location: Left arm, Patient Position: Sitting, Cuff Size: Adult Large)   Pulse 70   Temp 97.7  F (36.5  C)   Resp 16   Ht 1.905 m (6' 3\")   Wt 114.2 kg (251 lb 12.8 oz)   SpO2 97%   BMI 31.47 kg/m    Weight is 251 lbs 12.8 oz  Body mass index is 31.47 kg/m .    Weight over time:  Vitals:    10/29/24 1646   Weight: 114.2 kg (251 lb 12.8 oz)       Orthostatic Vitals         Most Recent      Sitting Orthostatic /70 10/31 0823    Sitting Orthostatic Pulse (bpm) 70 10/31 0823    Standing Orthostatic /69 10/31 0823    Standing Orthostatic Pulse (bpm) 76 10/31 0823              Cardiometabolic risk assessment. 10/31/24      Reviewed patient profile for cardiometabolic risk factors    Date taken /Value  REFERENCE RANGE   Abdominal Obesity  (Waist Circumference)   See nursing flowsheet Women >=35 in (88 cm) " "  Men >=40 in (102 cm)      Triglycerides  Triglycerides   Date Value Ref Range Status   05/19/2024 173 (H) <150 mg/dL Final   07/08/2021 112 <150 mg/dL Final       >=150 mg/dL (1.7 mmol/L) or current treatment for elevated triglycerides   HDL cholesterol  HDL Cholesterol   Date Value Ref Range Status   07/08/2021 33 (L) >39 mg/dL Final     Direct Measure HDL   Date Value Ref Range Status   05/19/2024 32 (L) >=40 mg/dL Final   ]   Women <50 mg/dL (1.3 mmol/L) in women or current treatment for low HDL cholesterol  Men <40 mg/dL (1 mmol/L) in men or current treatment for low HDL cholesterol     Fasting plasma glucose (FPG) Lab Results   Component Value Date     10/30/2024    GLC 90 07/08/2021      FPG >=100 mg/dL (5.6 mmol/L) or treatment for elevated blood glucose   Blood pressure  BP Readings from Last 3 Encounters:   10/31/24 105/70   07/11/24 97/68   06/06/24 126/78    Blood pressure >=130/85 mmHg or treatment for elevated blood pressure   Family History  See family history     Mental Status Exam:  Appearance: awake, alert, adequately groomed, and dressed in hospital scrubs  Attitude:  cooperative and seems very forward about struggles with CD/MH   Eye Contact:  good  Mood:  \"depressed\"  Affect:  mood congruent with anxious distress.   Speech:  clear, coherent and normal prosody  Language: fluent and intact in English  Psychomotor, Gait, Musculoskeletal:  no evidence of tardive dyskinesia, dystonia, or tics  Throught Process:  linear and goal oriented  Associations:  no loose associations  Thought Content:  no evidence of suicidal ideation or homicidal ideation  Insight:  good  Judgement:  fair  Oriented to:  time, person, and place  Attention Span and Concentration:  intact  Recent and Remote Memory:  intact  Fund of Knowledge:  appropriate           Precautions:     Behavioral Orders   Procedures    Assault precautions    Cheeking Precautions (behavioral units)    Code 1 - Restrict to Unit    Routine " Programming     As clinically indicated    Status 15     Every 15 minutes.    Status Individual Observation     Patient SIO status reviewed with team/RN.  Please also refer to RN/team documentation for add'l detail.    -SIO staff to monitor following which have contributed to patient being on SIO:  SI     Order Specific Question:   CONTINUOUS 24 hours / day     Answer:   Other     Order Specific Question:   Specify distance     Answer:   10 feet     Order Specific Question:   Indications for SIO     Answer:   Suicide risk    Suicide precautions: Suicide Risk: HIGH     Patients on Suicide Precautions should have a Combination Diet ordered that includes a Diet selection(s) AND a Behavioral Tray selection for Safe Tray - with utensils, or Safe Tray - NO utensils       Order Specific Question:   Suicide Risk     Answer:   HIGH          Diagnoses:     Schizoaffective Disorder, Bipolar Type  Stimulant use disorder (with +UDS for methamphetamine)  PTSD, historical   ADHD, historical   Toxic Encephalopathy, resolved          Assessment & Plan:     Assessment and hospital summary:  Perry Preciado is a 49 year old with history of MDD, ADHD, poly-substance use disorder (stimulants, alcohol, opioids) who presented to ED with  in context of worsened  psychosis  SI with plan in context of medications non-adherence due to no access to medications including Subotex, this leading to cravings and relapsing on methamphetamine (reported laced with Fentanyl).     Symptoms and presentation at this time is most consistent with primary psychotic disorder with decompensation in context of medication non-adherence and substance use relapse, per our conversation today (see further details in HPI section above) appears that hx of psychotic symptoms starting at a young age and mood changes are more consistent with schizoaffective disorder.     We have discussed the risks, benefits, and alternatives of recommendations for medication changes  (described in the plan section below) to target JAQUELINE and psychotic symptoms. Patient is in agreement with recommendations.      Identified psychosocial stressors include housing instability, unemployment and JAQUELINE. He is motivated to undergoing CD treatment once more stable. Agreeing with CD consult.       Inpatient psychiatric hospitalization is warranted at this time for safety, stabilization, and possible adjustment in medications.    Today,   Kb's main concerns are heightened anxiety, sleep disturbances, more depressed and irritable mood, increased body aches, headaches, diaphoresis, cravings which are most likely explained by sub-acute withdrawal syndrome although can also be partly explained by ongoing decompensated baseline mood/psychotic disorder (persisting AH).     Overall tolerating medications well without noticing side effects. We discussed medication indications, side effects, risks vs benefits and alternatives including changes reflected in plan section below and he agreed with recommendations. He expressed interest in continuing inpatient treatment for stabilization of symptoms.     Today's Changes:  -Increase buprenorphine HCl-naloxone HCl (SUBOXONE) to 8-2 MG film 1 Film sublingual BID.   -Continue COWS protocol for now and reassess.   -Increase lithium to 900 mg PO at bedtime   -Increase Seroquel to 300 mg PO at bedtime   -Increase Prozac to 40 mg PO qDay       Target psychiatric symptoms and interventions:  #Psychosis/depressive syndrome  -Seroquel 300 mg PO at bedtime and 50 mg PO BID  -Lithium at 900 mg PO at bedtime   -Prozac 40 mg PO qDay    Substance use with EDS+ for cannabinoids and methamphetamine  -Continue COWS protocol and reassess need   -Give Suboxone 4 mg ONCE for a total AM dose of 8 mg. Closely monitor COWS. If tolerating well, continue Suboxone 8 mg BID.   -Gabapentin 600 mg PO TID for heightened anxiety/withdrawal sx.     CD consult order placed, and completed on 10/31/24 by  Jaren Dai, [facility], appreciate recommendations that include:   1)  Complete an Intensive Outpatient MICD Treatment Program  2)  Abstain from all mood-altering chemicals unless prescribed by a licensed provider.   3)  Attend, at minimum, 2 weekly support group meetings, such as 12 step based (AA/NA), SMART Recovery, Health Realizations, and/or Refuge Recovery meetings.     4)  Actively work with a mentor/sponsor on a weekly basis.   5)  Follow all the recommendations of your treatment/medical providers.  6)  Patient may benefit from obtaining a full mental health evaluation.        Acute Medical Problems and Treatments:  Acute medical concerns:  - No acute medical concerns         Hospital course:   Perry Preciado presented to the ED via EMS on 10/24/24  from sober house with apparent substance intoxication with UDS+ for , psychosis and SI with plan.  cannabinoids, amphetamines, benzodiazepine (Versed administered in the ED). He did not exhibit violent/aggressive behaviors, and did not require restraints/seclusion. Pt admitted under a voluntary status.     - 10/30/24: Continued SIO 1:1 due to SI with plan. PTA meds were continued and changes made included: Increased Seroquel to 200 mg PO at bedtime (was taking 400 mg at bedtime, restarted in the ED at 100 mg at bedtime). Restarted PTA Lithium at 600 mg PO at bedtime for mood stabilization (was taking 900 mg PO at bedtime PTA). Ordered baseline TSH and CMP. Continued COWS protocol. Suboxone was increased to total daily dose of 12 mg. Gabapentin 600 mg PO TID for heightened anxiety/withdrawal sx. CD consult order placed, pt agreed with consult. Lithium level ordered for 11/4/24     - 10/31/24: Increase buprenorphine HCl-naloxone HCl (SUBOXONE) to 8-2 MG film 1 Film sublingual BID. Continue COWS protocol for now and reassess. Increase lithium to 900 mg PO at bedtime. Increase Seroquel to 300 mg PO at bedtime Increase Prozac to 40 mg PO qDay.       Pertinent  labs/imaging:  Recent Labs   Lab Test 10/30/24  1947 05/30/24  1046 05/27/24  0752 05/22/24  0756   LITHIUM  --   --  0.85 0.53*   CR 1.07  --  1.04 1.10   SG  --  1.008  --   --    TSH 0.83  --   --  2.36     Recent Labs   Lab Test 05/29/24  1443 05/15/24  2334 01/07/24  0242   WBC 8.3 11.4* 5.4   ANEUTAUTO  --  7.8 2.7      Recent Labs   Lab Test 10/30/24  1947 05/27/24  0752 05/22/24  0756 05/19/24  0732   * 133*   < >  --    A1C  --   --   --  5.6    < > = values in this interval not displayed.     Recent Labs   Lab Test 05/19/24 0732 07/08/21  0749   CHOL 133 152   TRIG 173* 112   LDL 66 97   HDL 32* 33*     Recent Labs   Lab Test 10/30/24  1947 05/27/24  0752   AST 32 40   ALT 43 56   ALKPHOS 114 118     Recent Labs   Lab Test 05/29/24  1443 05/15/24  2334 01/07/24  0242 12/19/23  0844 12/12/23  1738   WBC 8.3 11.4* 5.4   < > 8.5   ANEUTAUTO  --  7.8 2.7  --  4.3   HGB 12.9* 13.2* 13.5   < > 15.6    248 268   < > 264    < > = values in this interval not displayed.      Behavioral/Psychological/Social:  - Encourage unit programming    Safety:  - Continue precautions as noted above  - Status 15 minute checks  - Patient SIO status reviewed with team/RN. Discontinued 1:1 SIO.  Please also refer to RN/team documentation for add'l details.     Legal Status: voluntary    Disposition Plan   Reason for ongoing admission: poses an imminent risk to self, is unable to care for self due to severe psychosis or mily, and requires detoxification from substance that poses a risk of bodily harm during withdrawal period  Discharge location:  TBD  Discharge Medications: not ordered  Follow-up Appointments: not scheduled      --------------------------------  Kulwant Kohler MD.  Psychiatry Resident  [facility]     Patient seen and discussed with attending physician, Dr. Crockett, who is in agreement with assessment and plan.      This patient has been seen and evaluated by me, Temi Crockett MD on the  date of resident's note. I have discussed this patient with the the resident and I agree with the findings and plan in this note. I have reviewed today's vital signs, medications, labs and imaging.

## 2024-10-31 NOTE — PROGRESS NOTES
"[facility]  [address] [place] [id number]      Assessment and Placement Summary Update     Patient name:   Perry Preciado Jr.  \"TJ\" Patient phone:  E-mail: [phone] (home)   [email or web address]   Last 4 of SS#:   [id number]   : 1975      PMI #: [id number]   Patient address:   [address] [place], [place] [id number]     Date of Original Assessment / Last Update: 2023 Update Assessment Date: 10/31/2024   Updated by:   SVETLANA Urban    E-mail: [email or web address]   Referred by:   [address] Unit number: [phone]   Referral to:   IRTS vs Group Home w/ IOP   NPI: [id number] NPI #: [id number]   Summary:  This patient had a Full Comprehensive Substance Use Disorder assessment on 2023 at [facility] completed by SVETLANA France.  INSIDE: The patient's Full Comprehensive Substance Use Disorder assessment completed on 2023 is in the patient's electronic medical record in Epic in the Chart Review section under the Notes/Trans Tab.    Reason for today's update: Per H&P 10/30/2024:  This patient is a 49 year old male with history of MDD, ADHD, poly-substance use disorder (stimulants, alcohol, opioids) who presented to ED with  in context of worsened  psychosis  SI with plan in context of medications non-adherence due to no access to medications including Subotex, this leading to cravings and relapsing on methamphetamine (reported laced with Fentanyl).   Symptoms and presentation at this time is most consistent with primary psychotic disorder with decompensation in context of medication non-adherence and substance use relapse, per our conversation today (see further details in HPI section above) appears that hx of psychotic symptoms starting at a young age and mood changes are more consistent with schizoaffective " disorder.  We have discussed the risks, benefits, and alternatives of recommendations for medication changes (described in the plan section below) to target JAQUELINE and psychotic symptoms. Patient is in agreement with recommendations.   Identified psychosocial stressors include housing instability, unemployment and JAQUELINE. He is motivated to undergoing CD treatment once more stable. Agreeing with CD consult.    Inpatient psychiatric hospitalization is warranted at this time for safety, stabilization, and possible adjustment in medications.     Substance Use History Update:           X = Primary Drug Used   Age of First Use Most Recent Pattern of Use and Duration   Need enough information to show pattern (both frequency and amounts) and to show tolerance for each chemical that has a diagnosis   Date of last use and time, if needed   Withdrawal Potential? Requiring special care Method of use  (oral, smoked, snort, IV, etc)      Alcohol     13 Current:  Pt reports returning to use about 1 week prior to current admission after 11 months of recovery  Pt reports he was drinking 1L of vodka/or whiksey daily for 1 week prior to current admission    Per 12/13/23 Assessment:  1.75 of whiskey and 6-7 beers daily    Per 7/9/21 Assessment:  Pt reports using about a 1 bottle of whiskey a day for the last week.  10/24/24 No Oral      Marijuana/  Hashish   Unsp Current:  Pt reports he was smoking an unknown amount of cannabis daily for 1 week prior to current admission 10/24/24 No Smoked      Cocaine/Crack     No use          Meth/  Amphetamines   13 Current:  Pt reports smoking an unknown amount of meth twice in the last week during recent relapse prior to current admission    Per 12/13/23 Assessment:  1 gram 4 days a week     Per 7/9/21 Assessment:  About a 1.5 gram for last 7 days     Per 5/6/20 Assessment:  Pt states that he was doing up to 1.75 grams per day.  10/22/24 No IV, Smoked, Snorted, Oral      Heroin     16 Current:  Pt  denies recent use    Per 21 Assessment:  No current use.     Per 20 Assessment:  Pt states that he would use up to a gram per day.  2020 No IV, Smoked      Other Opiates/  Synthetics   46 Current:  Pt reports smoking an unknown amount of fentanyl twice in the last week during recent relapse prior to current admission    Pt is being titrated back on Suboxone during current admission    Per 21 Assessment:  Pt is on Suboxone currently. See med list.     Per 23 Assessment:  Fentanyl, unknown amount 3 times in the past month  Fentanyl:  10/22/24    Suboxone:  10/31/24 No Smoked, Oral      Inhalants     No use          Benzodiazepines     No use          Hallucinogens     No use          Barbiturates/  Sedatives/  Hypnotics No use          Over-the-Counter Drugs   No use          Other     No use          Nicotine     13 Current:  Pt continues to smoke cigarettes, is currently using patches and lozenges for NRT while admitted    Per 21 Assessment:  1.5 PPD     Per 20 Assessment:  1/2 pack per day.  10/31/24 Yes Smoked     Dimension: Severity Rating/ Reason for Changes from Previous Assessment:  Dimension I: Acute intoxication/Withdrawal potential     Previous ratin Current ratin   Comments:   No changes to this dimension     Dimension II: Biomedical Conditions and Complications     Previous ratin Current ratin   Comments:   No changes to this dimension     Dimension III: Emotional/Behavioral/Cognitive     Previous ratin Current ratin   Comments:   No changes to this dimension     Dimension IV: Readiness for Change     Previous ratin Current ratin   Comments:   No changes to this dimension     Dimension V: Relapse/Continued Use/Continued problem potential     Previous ratin Current ratin   Comments:   No changes to this dimension     Dimension VI: Recovery environment    Previous ratin Current ratin   Comments:   No changes to  this dimension       Summary of Assessment Update and Recommendations:   What was the outcome of last referral?  Pt was seen for original JAQUELINE Assessment 12/13/23 while in the emergency Department at [facility].  Pt reports following this he went to Tx at [facility] in [place], followed up by Tx at [facility] in [place], and then into a sober house.  Pt reports he was struggling with mental health at this time but did not relapse, and eventually sought care at the hospital again and was referred to IRTS. Pt reports he was at [facility] for 115 and felt very supported by that program. Pt reports this was followed by a [place] program called [facility] [place] [place] (pt describes this as not having staff other than a  present and did not feel like it was an environment supportive of his recovery).  Pt reports he didn't want to go to this facility but his CADI waiver was not active yet (pt reports it now is).    Pt reports he was attending outpatient T[facility] at [facility] while staying at [facility].  Pt reports he recently relapsed and used alcohol, cannabis, meth, and fentanyl.  Pt reports this relapse lasted about 1 week, drinking 1L of vodka and/or whiskey daily, smoking cannabis daily, and smoking meth and fentanyl 1-2 times each.  Pt reports he ultimately came to the hospital due to increasing paranoia, suicidal ideation, and lack of sleep.  Pt reports he is currently interested in pursuing either IRTS through [facility] or getting into a group home with his CADI waiver and starting back at [facility] through [facility].     Reason for changes in the Risk Description since last assessment? See above     Recommendation and rationale for current request and significant issues that need to be addressed:    Recommendations:  1)  Complete an Intensive Outpatient or Residential MICD Treatment Program  2)  Abstain from all mood-altering chemicals unless prescribed by a  licensed provider.   3)  Attend, at minimum, 2 weekly support group meetings, such as 12 step based (AA/NA), SMART Recovery, Health Realizations, and/or Refuge Recovery meetings.     4)  Actively work with a mentor/sponsor on a weekly basis.   5)  Follow all the recommendations of your treatment/medical providers.  6)  Patient may benefit from obtaining a full mental health evaluation.    Clinical Substantiation:  Patient has unstable housing, lacks a sober living environment, would benefit from continuing to develop long-term sober maintenance skills, would benefit from continuing to develop sober coping skills, would benefit from continuing to develop a sober peer support network, and has dual issues of mental health and substance abuse.    Referrals/ Alternatives:  [address] [address]  P: [phone]  F: [phone]    DAANES Assessment ID: [id number]   /  JAQUELINE consult completed by:   CHINA Brown, [facility][note 2]  Substance Use Disorder Evaluation Counselor  E-mail Address: [email or web address] [place] [address]     *Due to regulation of Title 42 of the Code of Federal Regulations (CFR) Part 2: Confidentiality laws apply to this note and the information wherein.  Thus, this note cannot be copy and pasted into any other health care staff's note nor can it be included in general medical records sent to ANY outside agency without the patient's written consent.

## 2024-11-01 PROCEDURE — 250N000013 HC RX MED GY IP 250 OP 250 PS 637: Performed by: EMERGENCY MEDICINE

## 2024-11-01 PROCEDURE — 250N000012 HC RX MED GY IP 250 OP 636 PS 637

## 2024-11-01 PROCEDURE — 250N000013 HC RX MED GY IP 250 OP 250 PS 637

## 2024-11-01 PROCEDURE — 99232 SBSQ HOSP IP/OBS MODERATE 35: CPT | Performed by: PSYCHIATRY & NEUROLOGY

## 2024-11-01 PROCEDURE — 124N000002 HC R&B MH UMMC

## 2024-11-01 PROCEDURE — 250N000013 HC RX MED GY IP 250 OP 250 PS 637: Performed by: NURSE PRACTITIONER

## 2024-11-01 RX ADMIN — GABAPENTIN 600 MG: 600 TABLET, FILM COATED ORAL at 20:45

## 2024-11-01 RX ADMIN — HYDROXYZINE HYDROCHLORIDE 50 MG: 50 TABLET, FILM COATED ORAL at 16:16

## 2024-11-01 RX ADMIN — Medication 1 TABLET: at 07:46

## 2024-11-01 RX ADMIN — ACETAMINOPHEN 650 MG: 325 TABLET, FILM COATED ORAL at 16:16

## 2024-11-01 RX ADMIN — GABAPENTIN 600 MG: 600 TABLET, FILM COATED ORAL at 07:46

## 2024-11-01 RX ADMIN — FLUOXETINE HYDROCHLORIDE 40 MG: 20 CAPSULE ORAL at 07:46

## 2024-11-01 RX ADMIN — OLANZAPINE 10 MG: 10 TABLET, ORALLY DISINTEGRATING ORAL at 10:48

## 2024-11-01 RX ADMIN — GABAPENTIN 600 MG: 600 TABLET, FILM COATED ORAL at 14:37

## 2024-11-01 RX ADMIN — NICOTINE 1 PATCH: 21 PATCH, EXTENDED RELEASE TRANSDERMAL at 08:09

## 2024-11-01 RX ADMIN — BUPRENORPHINE AND NALOXONE 1 FILM: 8; 2 FILM BUCCAL; SUBLINGUAL at 07:46

## 2024-11-01 RX ADMIN — NICOTINE POLACRILEX 4 MG: 2 LOZENGE ORAL at 07:45

## 2024-11-01 RX ADMIN — QUETIAPINE FUMARATE 50 MG: 50 TABLET ORAL at 07:46

## 2024-11-01 RX ADMIN — QUETIAPINE FUMARATE 300 MG: 300 TABLET ORAL at 20:45

## 2024-11-01 RX ADMIN — BUPRENORPHINE AND NALOXONE 1 FILM: 8; 2 FILM BUCCAL; SUBLINGUAL at 20:44

## 2024-11-01 RX ADMIN — ATORVASTATIN CALCIUM 20 MG: 20 TABLET, FILM COATED ORAL at 20:44

## 2024-11-01 RX ADMIN — QUETIAPINE FUMARATE 50 MG: 50 TABLET ORAL at 20:53

## 2024-11-01 RX ADMIN — NICOTINE POLACRILEX 4 MG: 2 LOZENGE ORAL at 16:16

## 2024-11-01 RX ADMIN — ACETAMINOPHEN 650 MG: 325 TABLET, FILM COATED ORAL at 03:51

## 2024-11-01 RX ADMIN — OLANZAPINE 10 MG: 10 TABLET, ORALLY DISINTEGRATING ORAL at 03:10

## 2024-11-01 RX ADMIN — LITHIUM CARBONATE 900 MG: 450 TABLET, EXTENDED RELEASE ORAL at 20:44

## 2024-11-01 ASSESSMENT — ACTIVITIES OF DAILY LIVING (ADL)
ADLS_ACUITY_SCORE: 0
LAUNDRY: WITH SUPERVISION
ADLS_ACUITY_SCORE: 0
ADLS_ACUITY_SCORE: 0
DRESS: INDEPENDENT
ADLS_ACUITY_SCORE: 0
ORAL_HYGIENE: INDEPENDENT
ADLS_ACUITY_SCORE: 0
LAUNDRY: UNABLE TO COMPLETE
ADLS_ACUITY_SCORE: 0
DRESS: INDEPENDENT;SCRUBS (BEHAVIORAL HEALTH)
ADLS_ACUITY_SCORE: 0
ORAL_HYGIENE: INDEPENDENT
ADLS_ACUITY_SCORE: 0
ADLS_ACUITY_SCORE: 0
HYGIENE/GROOMING: INDEPENDENT
ADLS_ACUITY_SCORE: 0
HYGIENE/GROOMING: INDEPENDENT
ADLS_ACUITY_SCORE: 0

## 2024-11-01 NOTE — PLAN OF CARE
Problem: Sleep Disturbance  Goal: Adequate Sleep/Rest  Outcome: Progressing   Goal Outcome Evaluation:  Patient in bed sleeping at the start of the shift in no apparent distress. Safety maintained. Woke up at 0340 with c/o increased anxiety, headache of 6/10, pacing back and forth in the hallway, obviously restless. Requested and received tylenol 650 mg for headache and xyprexa 10mg for increased anxiety/ restless and some agitaion with good effect as pt went back to sleep and slept for 5.25 hours. Safety maintained.Remains on COWS protocol and scored 4 at 0400. No obvious s/s withdrawal noted. Will continue to monitor per protocol.

## 2024-11-01 NOTE — PLAN OF CARE
Problem: Psychotic Signs/Symptoms  Goal: Improved Psychomotor Symptoms (Psychotic Signs/Symptoms)  Outcome: Progressing  Intervention: Manage Psychomotor Movement  Recent Flowsheet Documentation  Taken 10/31/2024 1720 by Nay Lopez RN  Patient Performed Hygiene: teeth brushed  Diversional Activity: reading  Activity (Behavioral Health):   activity encouraged   up ad shelley   Goal Outcome Evaluation:    Pt splits his time between his room and the pod 2 lounge. Reported body aches of 6/10. And anxiety of 8/10. He received tylenol 650 mg for pain and hydroxyzine for anxiety. Pt reported a partial relief. Pt continues to endorse suicidal thoughts but denies any plan to act on these thoughts. Pt denies HI, AV, and AH. Pt was visible in the milieu but kept to self. He used few times nicotine replacement. Good appetite. Medications compliant. Calm and cooperative with other nursing cares. No physical and behavioral problems reported or noticed.

## 2024-11-01 NOTE — PROGRESS NOTES
Additional referral faxed to [facility] and sent to [facility] via Epic per conversation with [facility].    [facility]  [address] [place]   [address]  P: [phone]  F: [phone]    [facility]  [address] [place] [address]  [facility] Waitlist: [phone]  [facility] Admissions: [phone]    CHINA Brown, [facility]  Substance Use Disorder Evaluation Counselor  Email: [email or web address]

## 2024-11-01 NOTE — PLAN OF CARE
"Pt had an uneventful shift this day. Pt mood was anxious with congruent affect. Pt remained isolated to room only coming out to request medications and phone call from CD . Pt was medication compliant with scheduled medications. PRN zyprexa given at 1050 for c/o anxiety rated 10/10 that \"just came out of nowhere\".     Pt endorses depression due to current situation (addiction and hospitalization). Pt endorses SI as thoughts only \"they come in my mind and leave\", pt denied plan or intent. Pt endorsed anxiety that fluctuates throughout the day. Pt is independent to ask for PRN medication intervention when necessary. Stressors include, \"chronic relapses\" of ETOH and illegal substances, the murder/suicide brother in law shot sister 3.5 weeks ago. Pt reports intense cravings for drugs and having \"dope dreams\". Pt endorses A/VH, mostly when he \"uses\" but sx are lingering even after using. Pt did not elaborate on AH, but described VH as \"demons\" and are scary to him. Pt also stated he sees the demons in his dreams where he tries to kill them but is unsuccessful.    Food and fluid intake adequate. Pt endorses mild pain. *See flowsheet for COWS scores.    VS reviewed: /70 (BP Location: Left arm, Patient Position: Sitting, Cuff Size: Adult Large)   Pulse 73   Temp 97.4  F (36.3  C) (Temporal)   Resp 16   Ht 1.905 m (6' 3\")   Wt 114.2 kg (251 lb 12.8 oz)   SpO2 98%   BMI 31.47 kg/m   . Patient reports pain. Declined PRN OTC analgesic.    Length of stay: 3  "

## 2024-11-01 NOTE — PLAN OF CARE
BEH IP Unit Acuity Rating Score (UARS)  Patient is given one point for every criteria they meet.    CRITERIA SCORING   On a 72 hour hold, court hold, committed, stay of commitment, or revocation. 0    Patient LOS on BEH unit exceeds 20 days. 0  LOS: 3   Patient under guardianship, 55+, otherwise medically complex, or under age 11. 0   Suicide ideation without relief of precipitating factors. 1   Current plan for suicide. 0   Current plan for homicide. 0   Imminent risk or actual attempt to seriously harm another without relief of factors precipitating the attempt. 0   Severe dysfunction in daily living (ex: complete neglect for self care, extreme disruption in vegetative function, extreme deterioration in social interactions). 1   Recent (last 7 days) or current physical aggression in the ED or on unit. 0   Restraints or seclusion episode in past 72 hours. 0   Recent (last 7 days) or current verbal aggression, agitation, yelling, etc., while in the ED or unit. 0   Active psychosis. 0   Need for constant or near constant redirection (from leaving, from others, etc).  0   Intrusive or disruptive behaviors. 0   Patient requires 3 or more hours of individualized nursing care per 8-hour shift (i.e. for ADLs, meds, therapeutic interventions). 0   TOTAL 2

## 2024-11-01 NOTE — PROGRESS NOTES
[facility], [place]   Psychiatric Progress Note  Hospital Day: 3        Interim History:   The patient's care was discussed with the treatment team during the daily team meeting and/or staff's chart notes were reviewed.       Per Staff report/notes:   Patient alert and oriented to person, place, time, situation this morning. He appeared tense and restless--pacing during conversation with writer. Mood anxious, depressed. He endorsed some guilt and shame about recent relapse on alcohol, marijuana, fentanyl and methamphetamines. He endorsed passive thoughts of not wanting to be alive. He denied plan or intent to want to kill or hurt himself. He denied current auditory or visual hallucinations. Stated he slept poorly overnight, due to nightmares about using illicit substances and also due to diffuse generalized joint pain. COWS score was 7. Vital signs WNL. Patient talked about stressors related to difficulty filling medications and environment of substance use at [facility] [place] that contributed to recent relapse. He reported having positive conversations with family last night that helped with strengthening resolve to want to live and resume sobriety. Prn acetaminophen given for pain at 0830 along with scheduled Suboxone and gabapentin. Patient reported partial relief when rechecked for pain after medication administration. Patient concerned that medication doses currently lower than what he'd been on prior to admission. Information relayed to psychiatry team and medication doses adjusted. Patient reporting increased anxiety after discussing discharge planning with his outpatient . He requested and was given prn gabapentin 100 mg at 1044 with only minimal benefit to anxiety symptoms. Additional dose of Suboxone given and pain reduced to 3/10 (when rechecked (was 8/10 prior to any intervention this morning). Assault, suicide and cheeking precautions maintained. No aggressive  "behaviors. SIO discontinued at 0937 am. Will continue to assess for safety.     Pt splits his time between his room and the pod 2 lounge. Reported body aches of 6/10. And anxiety of 8/10. He received tylenol 650 mg for pain and hydroxyzine for anxiety. Pt reported a partial relief. Pt continues to endorse suicidal thoughts but denies any plan to act on these thoughts. Pt denies HI, AV, and AH. Pt was visible in the milieu but kept to self. He used few times nicotine replacement. Good appetite. Medications compliant. Calm and cooperative with other nursing cares. No physical and behavioral problems reported or noticed.     Patient in bed sleeping at the start of the shift in no apparent distress. Safety maintained. Woke up at 0340 with c/o increased anxiety, headache of 6/10, pacing back and forth in the hallway, obviously restless. Requested and received tylenol 650 mg for headache and Zyprexa 10mg for increased anxiety/ restless and some agitaion with good effect as pt went back to sleep and slept for 5.25 hours. Safety maintained.        ----------------------  Upon interview,     Patient reports that his mood is \"a little better\" today. He is still feeling hopeless at times with passive SI thoughts but no SI intent or plan. States that sometimes, he has this feeling of \"impending doom.\" He said that he would feel \" a lot happier\" when it is confirmed that he can go to a new group home and have longer term stability wrt housing, and feels that this will help to maintain sobriety moving forward. Still reporting mild cravings but better since dose increase in Suboxone. Denies withdrawal symptoms. States that he has been experiencing very vivid dreams in the past few days. Does not believe they are trauma based, but noted that he has experienced this in the past in context of withdrawal/recent cessation from substances.       Suicidal ideation: As above.     Homicidal ideation: denies current or recent homicidal " "ideation or behaviors. Shares \"I'm not violent, I don't want or intend to hurt anyone.\"    Psychotic symptoms:  Patient denies VH, paranoia, delusions. Reports baseline AH persist.    Medication side effects reported: No significant side effects.    Acute medical concerns: none    Other issues reported by patient:  Patient had no further questions or concerns.           Medications:     Current Facility-Administered Medications   Medication Dose Route Frequency Provider Last Rate Last Admin    [Held by provider] atomoxetine (STRATTERA) capsule 80 mg  80 mg Oral Daily Kulwant Kohler MD        atorvastatin (LIPITOR) tablet 20 mg  20 mg Oral QPM Petr Crawford MD   20 mg at 10/31/24 1905    buprenorphine HCl-naloxone HCl (SUBOXONE) 8-2 MG per film 1 Film  1 Film Sublingual BID Kulwant Kohler MD   1 Film at 11/01/24 0746    FLUoxetine (PROzac) capsule 40 mg  40 mg Oral Daily Kulwant Kohler MD   40 mg at 11/01/24 0746    gabapentin (NEURONTIN) tablet 600 mg  600 mg Oral TID Kulwant Kohler MD   600 mg at 11/01/24 0746    lithium (ESKALITH CR/LITHOBID) CR tablet 900 mg  900 mg Oral At Bedtime Kulwant Kohler MD   900 mg at 10/31/24 2147    multivitamin w/minerals (THERA-VIT-M) tablet 1 tablet  1 tablet Oral Daily Kulwant Kohler MD   1 tablet at 11/01/24 0746    nicotine (NICODERM CQ) 21 MG/24HR 24 hr patch 1 patch  1 patch Transdermal Daily Leni Carias APRN CNP   1 patch at 11/01/24 0809    QUEtiapine (SEROquel) tablet 300 mg  300 mg Oral At Bedtime Kulwant Kohler MD   300 mg at 10/31/24 2147    QUEtiapine (SEROquel) tablet 50 mg  50 mg Oral BID Petr Crawford MD   50 mg at 11/01/24 0746          Allergies:     Allergies   Allergen Reactions    Wellbutrin [Bupropion] Anaphylaxis and Swelling     Facial swelling    Wellbutrin [Bupropion]     Wellbutrin [Bupropion] Difficulty breathing    Naltrexone Other (See Comments)     Headaches  Headaches            Labs:     No results found for this or any previous visit (from the " "past 24 hours).         Psychiatric Examination:     /69 (BP Location: Left arm, Patient Position: Sitting, Cuff Size: Adult Large)   Pulse 73   Temp 97.6  F (36.4  C) (Tympanic)   Resp 17   Ht 1.905 m (6' 3\")   Wt 114.2 kg (251 lb 12.8 oz)   SpO2 98%   BMI 31.47 kg/m    Weight is 251 lbs 12.8 oz  Body mass index is 31.47 kg/m .    Weight over time:  Vitals:    10/29/24 1646   Weight: 114.2 kg (251 lb 12.8 oz)       Orthostatic Vitals         Most Recent      Sitting Orthostatic /70 10/31 0823    Sitting Orthostatic Pulse (bpm) 70 10/31 0823    Standing Orthostatic /69 10/31 0823    Standing Orthostatic Pulse (bpm) 76 10/31 0823              Cardiometabolic risk assessment. 10/31/24      Reviewed patient profile for cardiometabolic risk factors    Date taken /Value  REFERENCE RANGE   Abdominal Obesity  (Waist Circumference)   See nursing flowsheet Women >=35 in (88 cm)   Men >=40 in (102 cm)      Triglycerides  Triglycerides   Date Value Ref Range Status   05/19/2024 173 (H) <150 mg/dL Final   07/08/2021 112 <150 mg/dL Final       >=150 mg/dL (1.7 mmol/L) or current treatment for elevated triglycerides   HDL cholesterol  HDL Cholesterol   Date Value Ref Range Status   07/08/2021 33 (L) >39 mg/dL Final     Direct Measure HDL   Date Value Ref Range Status   05/19/2024 32 (L) >=40 mg/dL Final   ]   Women <50 mg/dL (1.3 mmol/L) in women or current treatment for low HDL cholesterol  Men <40 mg/dL (1 mmol/L) in men or current treatment for low HDL cholesterol     Fasting plasma glucose (FPG) Lab Results   Component Value Date     10/30/2024    GLC 90 07/08/2021      FPG >=100 mg/dL (5.6 mmol/L) or treatment for elevated blood glucose   Blood pressure  BP Readings from Last 3 Encounters:   11/01/24 108/69   07/11/24 97/68   06/06/24 126/78    Blood pressure >=130/85 mmHg or treatment for elevated blood pressure   Family History  See family history     Mental Status Exam:  Appearance: " "Appeared slightly sedated today, and dressed in hospital scrubs  Attitude:  cooperative and seems very forward about struggles with CD/MH   Eye Contact:  good  Mood:  \"a little better\"  Affect:  mood congruent with constricted mobility  Speech:  clear, coherent and normal prosody  Language: fluent and intact in English  Psychomotor, Gait, Musculoskeletal:  no evidence of tardive dyskinesia, dystonia, or tics  Throught Process:  linear and goal oriented  Associations:  no loose associations  Thought Content:  no evidence of suicidal ideation or homicidal ideation  Insight:  good  Judgement:  fair  Oriented to:  time, person, and place  Attention Span and Concentration:  intact  Recent and Remote Memory:  intact  Fund of Knowledge:  appropriate           Precautions:     Behavioral Orders   Procedures    Assault precautions    Cheeking Precautions (behavioral units)    Code 1 - Restrict to Unit    Routine Programming     As clinically indicated    Status 15     Every 15 minutes.    Suicide precautions: Suicide Risk: HIGH     Patients on Suicide Precautions should have a Combination Diet ordered that includes a Diet selection(s) AND a Behavioral Tray selection for Safe Tray - with utensils, or Safe Tray - NO utensils       Order Specific Question:   Suicide Risk     Answer:   HIGH          Diagnoses:     Schizoaffective Disorder, Bipolar Type  Stimulant use disorder (with +UDS for methamphetamine)  PTSD, historical   ADHD, historical   Toxic Encephalopathy, resolved          Assessment & Plan:     Assessment and hospital summary:  Perry Preciado is a 49 year old with history of MDD, ADHD, poly-substance use disorder (stimulants, alcohol, opioids) who presented to ED with  in context of worsened  psychosis  SI with plan in context of medications non-adherence due to no access to medications including Subotex, this leading to cravings and relapsing on methamphetamine (reported laced with Fentanyl).     Symptoms and " presentation at this time is most consistent with primary psychotic disorder with decompensation in context of medication non-adherence and substance use relapse, per our conversation today (see further details in HPI section above) appears that hx of psychotic symptoms starting at a young age and mood changes are more consistent with schizoaffective disorder.     We have discussed the risks, benefits, and alternatives of recommendations for medication changes (described in the plan section below) to target JAQUELINE and psychotic symptoms. Patient is in agreement with recommendations.      Identified psychosocial stressors include housing instability, unemployment and JAQUELINE. He is motivated to undergoing CD treatment once more stable. Agreeing with CD consult.       Inpatient psychiatric hospitalization is warranted at this time for safety, stabilization, and possible adjustment in medications.    Today,   Kb reported distressing nightmares, slightly improved mood, and reduction in cravings/withdrawal. Appears slightly sedated following dose of Suboxone. Will continue to monitor.     Overall tolerating medications well without noticing side effects. We discussed medication indications, side effects, risks vs benefits and alternatives including changes reflected in plan section below and he agreed with recommendations. He expressed interest in continuing inpatient treatment for stabilization of symptoms.     Today's Changes:  Consider dose reduction in Suboxone if pt appears sedated. Monitor closely.   Lithium level ordered for 11/4      Target psychiatric symptoms and interventions:  #Psychosis/depressive syndrome  -Seroquel 300 mg PO at bedtime and 50 mg PO BID  -Lithium at 900 mg PO at bedtime   -Prozac 40 mg PO qDay    Substance use with EDS+ for cannabinoids and methamphetamine  -Continue COWS protocol and reassess need   -Give Suboxone 4 mg ONCE for a total AM dose of 8 mg. Closely monitor COWS. If tolerating well,  continue Suboxone 8 mg BID.   -Gabapentin 600 mg PO TID for heightened anxiety/withdrawal sx.     CD consult order placed, and completed on 10/31/24 by Jaren Dai, [facility], appreciate recommendations that include:   1)  Complete an Intensive Outpatient MICD Treatment Program  2)  Abstain from all mood-altering chemicals unless prescribed by a licensed provider.   3)  Attend, at minimum, 2 weekly support group meetings, such as 12 step based (AA/NA), SMART Recovery, Health Realizations, and/or Refuge Recovery meetings.     4)  Actively work with a mentor/sponsor on a weekly basis.   5)  Follow all the recommendations of your treatment/medical providers.  6)  Patient may benefit from obtaining a full mental health evaluation.        Acute Medical Problems and Treatments:  Acute medical concerns:  - No acute medical concerns         Hospital course:   Perry Preciado presented to the ED via EMS on 10/24/24  from sober house with apparent substance intoxication with UDS+ for , psychosis and SI with plan.  cannabinoids, amphetamines, benzodiazepine (Versed administered in the ED). He did not exhibit violent/aggressive behaviors, and did not require restraints/seclusion. Pt admitted under a voluntary status.     - 10/30/24: Continued SIO 1:1 due to SI with plan. PTA meds were continued and changes made included: Increased Seroquel to 200 mg PO at bedtime (was taking 400 mg at bedtime, restarted in the ED at 100 mg at bedtime). Restarted PTA Lithium at 600 mg PO at bedtime for mood stabilization (was taking 900 mg PO at bedtime PTA). Ordered baseline TSH and CMP. Continued COWS protocol. Suboxone was increased to total daily dose of 12 mg. Gabapentin 600 mg PO TID for heightened anxiety/withdrawal sx. CD consult order placed, pt agreed with consult. Lithium level ordered for 11/4/24     - 10/31/24: Increase buprenorphine HCl-naloxone HCl (SUBOXONE) to 8-2 MG film 1 Film sublingual BID. Continue COWS protocol for now  and reassess. Increase lithium to 900 mg PO at bedtime. Increase Seroquel to 300 mg PO at bedtime Increase Prozac to 40 mg PO qDay.       Pertinent labs/imaging:  Recent Labs   Lab Test 10/30/24  1947 05/30/24  1046 05/27/24 0752 05/22/24  0756   LITHIUM  --   --  0.85 0.53*   CR 1.07  --  1.04 1.10   SG  --  1.008  --   --    TSH 0.83  --   --  2.36     Recent Labs   Lab Test 05/29/24  1443 05/15/24  2334 01/07/24  0242   WBC 8.3 11.4* 5.4   ANEUTAUTO  --  7.8 2.7      Recent Labs   Lab Test 10/30/24  1947 05/27/24  0752 05/22/24  0756 05/19/24  0732   * 133*   < >  --    A1C  --   --   --  5.6    < > = values in this interval not displayed.     Recent Labs   Lab Test 05/19/24  0732 07/08/21  0749   CHOL 133 152   TRIG 173* 112   LDL 66 97   HDL 32* 33*     Recent Labs   Lab Test 10/30/24  1947 05/27/24  0752   AST 32 40   ALT 43 56   ALKPHOS 114 118     Recent Labs   Lab Test 05/29/24  1443 05/15/24  2334 01/07/24  0242 12/19/23  0844 12/12/23  1738   WBC 8.3 11.4* 5.4   < > 8.5   ANEUTAUTO  --  7.8 2.7  --  4.3   HGB 12.9* 13.2* 13.5   < > 15.6    248 268   < > 264    < > = values in this interval not displayed.      Behavioral/Psychological/Social:  - Encourage unit programming    Safety:  - Continue precautions as noted above  - Status 15 minute checks  - Patient SIO status reviewed with team/RN. Discontinued 1:1 SIO on 10/31 as pt denied SI intent or plan in hospital and contracted for safety.  Please also refer to RN/team documentation for add'l details.     Legal Status: voluntary    Disposition Plan   Reason for ongoing admission: poses an imminent risk to self, is unable to care for self due to severe psychosis or mily, and requires detoxification from substance that poses a risk of bodily harm during withdrawal period  Discharge location: Likely GH with OP CD treatment at [facility].   Discharge Medications: not ordered  Follow-up Appointments: not scheduled      Yolanda Crockett MD  [facility]iat[facility]

## 2024-11-01 NOTE — CARE PLAN
Checked in with pt as he was completing menu and invited to group. Briefly discussed having completed CD assessment yesterday, and likely plans to attend outpt treatment.  Pt expressed disappoint with recent relapse, but feeling hopeful.  Pt reported he would attend group on pod 1, though never did attend today.
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
## 2024-11-02 PROCEDURE — 250N000013 HC RX MED GY IP 250 OP 250 PS 637: Performed by: NURSE PRACTITIONER

## 2024-11-02 PROCEDURE — 250N000013 HC RX MED GY IP 250 OP 250 PS 637

## 2024-11-02 PROCEDURE — 250N000012 HC RX MED GY IP 250 OP 636 PS 637

## 2024-11-02 PROCEDURE — 250N000013 HC RX MED GY IP 250 OP 250 PS 637: Performed by: EMERGENCY MEDICINE

## 2024-11-02 PROCEDURE — 124N000002 HC R&B MH UMMC

## 2024-11-02 RX ADMIN — ALUMINUM HYDROXIDE, MAGNESIUM HYDROXIDE, AND SIMETHICONE 30 ML: 200; 200; 20 SUSPENSION ORAL at 18:18

## 2024-11-02 RX ADMIN — NICOTINE 1 PATCH: 21 PATCH, EXTENDED RELEASE TRANSDERMAL at 08:30

## 2024-11-02 RX ADMIN — Medication 1 TABLET: at 08:28

## 2024-11-02 RX ADMIN — HYDROXYZINE HYDROCHLORIDE 50 MG: 50 TABLET, FILM COATED ORAL at 14:32

## 2024-11-02 RX ADMIN — NICOTINE POLACRILEX 4 MG: 2 LOZENGE ORAL at 14:32

## 2024-11-02 RX ADMIN — LITHIUM CARBONATE 900 MG: 450 TABLET, EXTENDED RELEASE ORAL at 20:03

## 2024-11-02 RX ADMIN — QUETIAPINE FUMARATE 50 MG: 50 TABLET ORAL at 08:28

## 2024-11-02 RX ADMIN — FLUOXETINE HYDROCHLORIDE 40 MG: 20 CAPSULE ORAL at 08:28

## 2024-11-02 RX ADMIN — ACETAMINOPHEN 650 MG: 325 TABLET, FILM COATED ORAL at 08:30

## 2024-11-02 RX ADMIN — OLANZAPINE 10 MG: 10 TABLET, ORALLY DISINTEGRATING ORAL at 11:09

## 2024-11-02 RX ADMIN — BUPRENORPHINE AND NALOXONE 1 FILM: 8; 2 FILM BUCCAL; SUBLINGUAL at 08:28

## 2024-11-02 RX ADMIN — QUETIAPINE FUMARATE 50 MG: 50 TABLET ORAL at 20:14

## 2024-11-02 RX ADMIN — BUPRENORPHINE AND NALOXONE 1 FILM: 8; 2 FILM BUCCAL; SUBLINGUAL at 20:01

## 2024-11-02 RX ADMIN — GABAPENTIN 600 MG: 600 TABLET, FILM COATED ORAL at 14:32

## 2024-11-02 RX ADMIN — QUETIAPINE FUMARATE 300 MG: 300 TABLET ORAL at 20:02

## 2024-11-02 RX ADMIN — NICOTINE POLACRILEX 4 MG: 2 LOZENGE ORAL at 08:30

## 2024-11-02 RX ADMIN — ATORVASTATIN CALCIUM 20 MG: 20 TABLET, FILM COATED ORAL at 20:02

## 2024-11-02 RX ADMIN — OLANZAPINE 10 MG: 10 TABLET, ORALLY DISINTEGRATING ORAL at 17:43

## 2024-11-02 RX ADMIN — HYDROXYZINE HYDROCHLORIDE 50 MG: 50 TABLET, FILM COATED ORAL at 08:30

## 2024-11-02 RX ADMIN — GABAPENTIN 600 MG: 600 TABLET, FILM COATED ORAL at 08:28

## 2024-11-02 RX ADMIN — GABAPENTIN 600 MG: 600 TABLET, FILM COATED ORAL at 20:02

## 2024-11-02 ASSESSMENT — ACTIVITIES OF DAILY LIVING (ADL)
HYGIENE/GROOMING: INDEPENDENT
ADLS_ACUITY_SCORE: 0
LAUNDRY: UNABLE TO COMPLETE
ADLS_ACUITY_SCORE: 0
ORAL_HYGIENE: INDEPENDENT
LAUNDRY: UNABLE TO COMPLETE
ADLS_ACUITY_SCORE: 0
ORAL_HYGIENE: INDEPENDENT
ADLS_ACUITY_SCORE: 0
HYGIENE/GROOMING: INDEPENDENT
ADLS_ACUITY_SCORE: 0
DRESS: INDEPENDENT
DRESS: INDEPENDENT

## 2024-11-02 NOTE — PLAN OF CARE
Problem: Psychotic Signs/Symptoms  Goal: Optimal Cognitive Function (Psychotic Signs/Symptoms)  Outcome: Progressing  Intervention: Support and Promote Cognitive Ability  Recent Flowsheet Documentation  Taken 11/1/2024 2100 by Bhumi Farmer RN  Communication Support Strategies: active listening utilized  Goal: Improved Psychomotor Symptoms (Psychotic Signs/Symptoms)  Outcome: Progressing  Intervention: Manage Psychomotor Movement  Recent Flowsheet Documentation  Taken 11/1/2024 2100 by Bhumi Farmer RN  Diversional Activity:   reading   television  Activity (Behavioral Health):   activity encouraged   up ad shelley   Goal Outcome Evaluation:    Plan of Care Reviewed With: patient      Pt stated auditory hallucinations were present but minimal. He rated anxiety at 7/10 at the beginning of the shift, which improved to 3/10 with PRN hydroxyzine. He rated depression at 3/10. Pt denied VH/SI/HI. He ate 100% of his dinner and was medication-compliant. He was on suboxone for opiate withdrawal, and COWS was 3. Pt complained of left hip/side pain, rated pain at 7/10, and did not improve with prn Tylenol. Pt suspected the mattress could be causing the pain, and the egg-crate memory foam was added to his bed for comfort. Pt also requested personal shoes to ease the pressure on their feet and shoes offered without races. Plan of care ongoing.    Patient calls today yelling at writer stating he was in for STD testing a few days ago and is wondering why only gonnorhea and chlamydia tests were done because he also wanted every other STD tests. Writer informed him that per dictated provider note \"discussed testing options and he was agreeable to starting with gonorrhea/chlamydia testing via a urine sample which was provided today\" is what was dictated. Patient was not happy about the reply and hung up.

## 2024-11-02 NOTE — PLAN OF CARE
"Pt had an uneventful shift this day. Pt mood is anxious with flat affect. COWS score 2 and 3. *See flowsheet*. Pt stayed mostly isolated to room coming out for medications. Pt was visible in the milieu at times with appropriate socialization with select peers.    Pt continues to endorse depression, and anxiety. PRN hydroxyzine given at 0830 and 1400 as well as zyprexa at 1109. Despite PRN medication intervention, pt continues to struggle with anxiety and reports strong cravings for illegal drugs.    Food and fluid intake adequate.    VS reviewed: /71 (BP Location: Right arm, Patient Position: Sitting, Cuff Size: Adult Large)   Pulse 74   Temp 97.3  F (36.3  C) (Temporal)   Resp 16   Ht 1.905 m (6' 3\")   Wt 114.2 kg (251 lb 12.8 oz)   SpO2 95%   BMI 31.47 kg/m   .    Length of stay: 4  " Patient is scheduled for a/an Colonoscopy (41214) with SuPrep, under MAC, with Lawrence Root MD on 05/28/2021 at [facility].      Prep instructions were sent to patient via my chart message.    Nurse:   Please send the procedure prep of Costa-Prep and the anti-nausea medication to the patient's pharmacy and contact patient for any pre-procedure concerns or questions. Pre-admission orders also need to be placed with any additional needed pre-op testing per anesthesia guidelines.     [id number] - [place], [place] - [address] AT [facility] [address] [Patient Preferred]  [phone]

## 2024-11-02 NOTE — PLAN OF CARE
"  Problem: Sleep Disturbance  Goal: Adequate Sleep/Rest  Outcome: Progressing   Goal Outcome Evaluation:    Patient awake at the start of the shift sitting at the edge of the bed with a sad affect. On approach, verbalis ed generalized achy pains, but declined all interventions as\" Nothing works\" Offered other non pharmacological interventions, declined.Denied suicidal/ homicidal ideations at this time and consents for safety.No S/S of active hallucination noted. Scored 2 on COWS protocol and no S/S withdrawal noted. Safety maintained. Slept for 5.5 hours.  "

## 2024-11-03 PROCEDURE — 250N000012 HC RX MED GY IP 250 OP 636 PS 637

## 2024-11-03 PROCEDURE — 250N000013 HC RX MED GY IP 250 OP 250 PS 637: Performed by: EMERGENCY MEDICINE

## 2024-11-03 PROCEDURE — 250N000013 HC RX MED GY IP 250 OP 250 PS 637

## 2024-11-03 PROCEDURE — 124N000002 HC R&B MH UMMC

## 2024-11-03 PROCEDURE — 250N000013 HC RX MED GY IP 250 OP 250 PS 637: Performed by: NURSE PRACTITIONER

## 2024-11-03 RX ADMIN — NICOTINE POLACRILEX 4 MG: 2 LOZENGE ORAL at 10:53

## 2024-11-03 RX ADMIN — QUETIAPINE FUMARATE 50 MG: 50 TABLET ORAL at 08:58

## 2024-11-03 RX ADMIN — NICOTINE POLACRILEX 4 MG: 2 LOZENGE ORAL at 08:58

## 2024-11-03 RX ADMIN — HYDROXYZINE HYDROCHLORIDE 50 MG: 50 TABLET, FILM COATED ORAL at 08:58

## 2024-11-03 RX ADMIN — QUETIAPINE FUMARATE 300 MG: 300 TABLET ORAL at 19:33

## 2024-11-03 RX ADMIN — NICOTINE 1 PATCH: 21 PATCH, EXTENDED RELEASE TRANSDERMAL at 09:05

## 2024-11-03 RX ADMIN — BUPRENORPHINE AND NALOXONE 1 FILM: 8; 2 FILM BUCCAL; SUBLINGUAL at 19:33

## 2024-11-03 RX ADMIN — BUPRENORPHINE AND NALOXONE 1 FILM: 8; 2 FILM BUCCAL; SUBLINGUAL at 08:59

## 2024-11-03 RX ADMIN — QUETIAPINE FUMARATE 50 MG: 50 TABLET ORAL at 19:40

## 2024-11-03 RX ADMIN — GABAPENTIN 600 MG: 600 TABLET, FILM COATED ORAL at 08:58

## 2024-11-03 RX ADMIN — ACETAMINOPHEN 650 MG: 325 TABLET, FILM COATED ORAL at 08:58

## 2024-11-03 RX ADMIN — GABAPENTIN 600 MG: 600 TABLET, FILM COATED ORAL at 19:34

## 2024-11-03 RX ADMIN — Medication 1 TABLET: at 08:59

## 2024-11-03 RX ADMIN — OLANZAPINE 10 MG: 10 TABLET, ORALLY DISINTEGRATING ORAL at 10:53

## 2024-11-03 RX ADMIN — LITHIUM CARBONATE 900 MG: 450 TABLET, EXTENDED RELEASE ORAL at 19:33

## 2024-11-03 RX ADMIN — GABAPENTIN 600 MG: 600 TABLET, FILM COATED ORAL at 13:24

## 2024-11-03 RX ADMIN — ATORVASTATIN CALCIUM 20 MG: 20 TABLET, FILM COATED ORAL at 19:33

## 2024-11-03 RX ADMIN — NICOTINE POLACRILEX 4 MG: 2 LOZENGE ORAL at 13:24

## 2024-11-03 RX ADMIN — FLUOXETINE HYDROCHLORIDE 40 MG: 20 CAPSULE ORAL at 08:58

## 2024-11-03 ASSESSMENT — ACTIVITIES OF DAILY LIVING (ADL)
ADLS_ACUITY_SCORE: 0
ADLS_ACUITY_SCORE: 0
ORAL_HYGIENE: INDEPENDENT
ADLS_ACUITY_SCORE: 0
DRESS: INDEPENDENT;SCRUBS (BEHAVIORAL HEALTH)
ADLS_ACUITY_SCORE: 0
ORAL_HYGIENE: INDEPENDENT
HYGIENE/GROOMING: INDEPENDENT
ADLS_ACUITY_SCORE: 0
DRESS: INDEPENDENT;SCRUBS (BEHAVIORAL HEALTH)
ADLS_ACUITY_SCORE: 0
HYGIENE/GROOMING: INDEPENDENT
ADLS_ACUITY_SCORE: 0

## 2024-11-03 NOTE — PLAN OF CARE
Perry appeared sleeping for approximately 4.75 hours without acute distress.  No sign and symptoms of withdrawal noted tonight. Scored 1 on COWS at  0432  15 minutes safety checks completed and no issues identified  Continue  on SI and assault precautions with absence of relative behavior this shift.     Problem: Adult Behavioral Health Plan of Care  Goal: Plan of Care Review  Outcome: Progressing  Goal: Adheres to Safety Considerations for Self and Others  Intervention: Develop and Maintain Individualized Safety Plan  Recent Flowsheet Documentation  Taken 11/3/2024 0254 by Destiny Ruelas, RN  Safety Measures: safety rounds completed     Problem: Sleep Disturbance  Goal: Adequate Sleep/Rest  Outcome: Progressing   Goal Outcome Evaluation:

## 2024-11-03 NOTE — PLAN OF CARE
"  Group Attendance:  attended full group    Time session began: 1600  Time session ended: 1637  Patient's total time in group: 37    Total # Attendees   2   Group Type psychotherapeutic     Group Topic Covered incorporating skill sets, goals and motivation, and positive psychology       Group Session Detail Participants engaged in a structured activity to explore and promote insight into their strengths across several domains including abilities, positive effects on others, values, and resilience. Participants were encouraged to share their strengths with group members and discuss common experiences to promote community.       Patient's response to the group topic/interactions:  cooperative with task, supportive of peers, socially appropriate, listened actively, attentive, and actively engaged, eyes were closed most of the session     Patient Details: Perry \"TJ\" was an active participant in the discussion. He was able to name spirituality as a protective factor and connecting to God and other people as a strength. He had insight into his substance use being a barrier to his goals of living independently and working. He identified getting his 's license as a goal he wants to prioritize and named the steps he has to take to meet this goal. Of note, his eyes were closed for the entirety of the group (even when talking) and at times he was mumbling.         No Charge-less than 3 patients fully attended group  Patient Active Problem List   Diagnosis    Bilateral carpal tunnel syndrome    KANE (generalized anxiety disorder)    Chronic pain syndrome    Hyperglycemia    Hypertriglyceridemia    Lung nodule    Major depressive disorder, recurrent (H)    Mild intermittent asthma without complication    Tobacco use disorder    Displacement of lumbar intervertebral disc without myelopathy    History of ADHD    History of suicide attempt    Chronic right-sided low back pain with right-sided sciatica    Psychosis (H)    " Suicidal behavior without attempted self-injury    Methamphetamine use disorder, severe, in early remission (H)    Polysubstance abuse (H)    Moderate opioid dependence in early remission (H)    Hx of sepsis    Hx of intravenous drug use in remission    Suicidal ideation    Paranoia (H)

## 2024-11-03 NOTE — PLAN OF CARE
Problem: Adult Behavioral Health Plan of Care  Goal: Plan of Care Review  Outcome: Progressing  Flowsheets (Taken 11/3/2024 0900)  Patient Agreement with Plan of Care: agrees   Goal Outcome Evaluation:    Plan of Care Reviewed With: patient      Pt presents as anxious, tense and guarded, with a blunted affect. He denied SI/HI/AVH and did not appear to be responding. He did take Zyprexa and Hydroxyzine that appeared to help with the anxiety. He requested tylenol PRN for 5/10 generalized pain with his morning meds, and acknowledged that the medication helped as he denied pain with reassessment.    Pt was med complaint with all meds, and requested nicotine PRN (with above mentioned meds).   He interacted with staff and peers appropriately with no outbursts of aggression or agitation. He ate and drank well, and had no other acute physical or behavioral concerns this shift

## 2024-11-03 NOTE — PLAN OF CARE
Goal Outcome Evaluation:    Plan of Care Reviewed With: patient         Problem: Psychotic Signs/Symptoms  Goal: Improved Behavioral Control (Psychotic Signs/Symptoms)  Outcome: Progressing         The patient was observed to be engaged and social with both staff and peers. He slept for several hours during this shift, and his mood appeared bright and friendly. No behavioral issues were noted or reported. The patient expressed feelings of anxiety, for which PRN Zyprexa was administered. He also reported experiencing depression. During the Clinical Opiate Withdrawal (COW) assessment, he scored a two this shift. The patient complained of persistent lower back pain that has lasted for over six months. He declined pain medication and requested to see an internal medicine specialist before being discharged from the hospital. He took his scheduled medications without problems, and his vital signs were stable. His hygiene was assessed as fair.      Visit Vitals  /72 (BP Location: Right arm, Patient Position: Sitting, Cuff Size: Adult Large)   Pulse 74   Temp 97.6  F (36.4  C) (Oral)   Resp 18

## 2024-11-04 LAB
HOLD SPECIMEN: NORMAL
LITHIUM SERPL-SCNC: 0.88 MMOL/L (ref 0.6–1.2)

## 2024-11-04 PROCEDURE — 250N000012 HC RX MED GY IP 250 OP 636 PS 637

## 2024-11-04 PROCEDURE — 250N000013 HC RX MED GY IP 250 OP 250 PS 637

## 2024-11-04 PROCEDURE — 124N000002 HC R&B MH UMMC

## 2024-11-04 PROCEDURE — 250N000013 HC RX MED GY IP 250 OP 250 PS 637: Performed by: EMERGENCY MEDICINE

## 2024-11-04 PROCEDURE — 99232 SBSQ HOSP IP/OBS MODERATE 35: CPT | Mod: GC | Performed by: PSYCHIATRY & NEUROLOGY

## 2024-11-04 PROCEDURE — 36415 COLL VENOUS BLD VENIPUNCTURE: CPT

## 2024-11-04 PROCEDURE — 80178 ASSAY OF LITHIUM: CPT

## 2024-11-04 PROCEDURE — 250N000013 HC RX MED GY IP 250 OP 250 PS 637: Performed by: NURSE PRACTITIONER

## 2024-11-04 RX ORDER — BUPRENORPHINE AND NALOXONE 4; 1 MG/1; MG/1
1 FILM, SOLUBLE BUCCAL; SUBLINGUAL
Status: DISCONTINUED | OUTPATIENT
Start: 2024-11-04 | End: 2024-11-06

## 2024-11-04 RX ORDER — BUPRENORPHINE AND NALOXONE 8; 2 MG/1; MG/1
1 FILM, SOLUBLE BUCCAL; SUBLINGUAL 2 TIMES DAILY
Status: DISCONTINUED | OUTPATIENT
Start: 2024-11-05 | End: 2024-11-06

## 2024-11-04 RX ADMIN — ACETAMINOPHEN 650 MG: 325 TABLET, FILM COATED ORAL at 23:11

## 2024-11-04 RX ADMIN — NICOTINE POLACRILEX 4 MG: 2 LOZENGE ORAL at 20:49

## 2024-11-04 RX ADMIN — NICOTINE 1 PATCH: 21 PATCH, EXTENDED RELEASE TRANSDERMAL at 08:53

## 2024-11-04 RX ADMIN — ATORVASTATIN CALCIUM 20 MG: 20 TABLET, FILM COATED ORAL at 19:34

## 2024-11-04 RX ADMIN — OLANZAPINE 10 MG: 10 TABLET, ORALLY DISINTEGRATING ORAL at 13:05

## 2024-11-04 RX ADMIN — NICOTINE POLACRILEX 4 MG: 2 LOZENGE ORAL at 10:51

## 2024-11-04 RX ADMIN — GABAPENTIN 100 MG: 100 CAPSULE ORAL at 05:05

## 2024-11-04 RX ADMIN — BUPRENORPHINE AND NALOXONE 1 FILM: 4; 1 FILM, SOLUBLE BUCCAL; SUBLINGUAL at 17:09

## 2024-11-04 RX ADMIN — Medication 1 TABLET: at 08:56

## 2024-11-04 RX ADMIN — NICOTINE POLACRILEX 4 MG: 2 LOZENGE ORAL at 13:05

## 2024-11-04 RX ADMIN — QUETIAPINE FUMARATE 50 MG: 50 TABLET ORAL at 08:47

## 2024-11-04 RX ADMIN — NICOTINE POLACRILEX 4 MG: 2 LOZENGE ORAL at 14:34

## 2024-11-04 RX ADMIN — LITHIUM CARBONATE 900 MG: 450 TABLET, EXTENDED RELEASE ORAL at 20:49

## 2024-11-04 RX ADMIN — GABAPENTIN 600 MG: 600 TABLET, FILM COATED ORAL at 14:34

## 2024-11-04 RX ADMIN — GABAPENTIN 600 MG: 600 TABLET, FILM COATED ORAL at 08:42

## 2024-11-04 RX ADMIN — QUETIAPINE FUMARATE 50 MG: 50 TABLET ORAL at 19:34

## 2024-11-04 RX ADMIN — NICOTINE POLACRILEX 4 MG: 2 LOZENGE ORAL at 19:34

## 2024-11-04 RX ADMIN — FLUOXETINE HYDROCHLORIDE 40 MG: 20 CAPSULE ORAL at 08:43

## 2024-11-04 RX ADMIN — BUPRENORPHINE AND NALOXONE 1 FILM: 8; 2 FILM BUCCAL; SUBLINGUAL at 08:42

## 2024-11-04 RX ADMIN — QUETIAPINE FUMARATE 400 MG: 200 TABLET ORAL at 20:50

## 2024-11-04 RX ADMIN — HYDROXYZINE HYDROCHLORIDE 50 MG: 50 TABLET, FILM COATED ORAL at 10:50

## 2024-11-04 RX ADMIN — ACETAMINOPHEN 650 MG: 325 TABLET, FILM COATED ORAL at 14:34

## 2024-11-04 RX ADMIN — GABAPENTIN 600 MG: 600 TABLET, FILM COATED ORAL at 19:34

## 2024-11-04 ASSESSMENT — ACTIVITIES OF DAILY LIVING (ADL)
ADLS_ACUITY_SCORE: 0
HYGIENE/GROOMING: INDEPENDENT
ADLS_ACUITY_SCORE: 0
DRESS: SCRUBS (BEHAVIORAL HEALTH);INDEPENDENT
ADLS_ACUITY_SCORE: 0
ADLS_ACUITY_SCORE: 0
LAUNDRY: UNABLE TO COMPLETE
ADLS_ACUITY_SCORE: 0
ADLS_ACUITY_SCORE: 0
ORAL_HYGIENE: INDEPENDENT
ADLS_ACUITY_SCORE: 0

## 2024-11-04 NOTE — PROGRESS NOTES
"[facility], [place]   Psychiatric Progress Note  Hospital Day: 6        Interim History:   The patient's care was discussed with the treatment team during the daily team meeting and/or staff's chart notes were reviewed.     Per RN report/notes:  -Pt stated auditory hallucinations were present but minimal. He rated anxiety at 7/10 at the beginning of the shift, which improved to 3/10 with PRN hydroxyzine. He rated depression at 3/10. Pt denied VH/SI/HI. He ate 100% of his dinner and was medication-compliant.     - The patient was observed to be engaged and social with both staff and peers. He slept for several hours during this shift, and his mood appeared bright and friendly. No behavioral issues were noted or reported. The patient expressed feelings of anxiety, for which PRN Zyprexa was administered. He also reported experiencing depression. During the Clinical Opiate Withdrawal (COW) assessment, he scored a two this shift.     -COWS reported scores over the weekend were on average 1-2.     -Per staff, he has been observed more \"sleepy\" during the day.    ----------------------  Upon interview,     Kb was seen in his room today, he appeared in no acute distress during our conversation.   He reported no acute withdrawal symptoms. He shared anxiety has been more of an issue over past couple days and thinking about next steps (including court process coming up and concerns about being in CD residential tx interfering with court date) further worsen this.   He shares concerns related to some \"paranoid thoughts\" although reports AH have been more quiet over past couple days. Shares Seroquel helps with AH.   Denies noticing new side effects from  current medications other than some day-time sleepiness.    Suicidal ideation: denies current or recent suicidal ideation or behaviors.    Homicidal ideation: denies current or recent homicidal ideation or behaviors.    Psychotic symptoms:  VH, " "delusions. Reports some \"paranoid thoughts\" and baseline AH being more quiet. Denies command AH.     Medication side effects reported: No significant side effects outside of some increased day-time somnolence.     Acute medical concerns: none    Other issues reported by patient:  Patient had no further questions or concerns.           Medications:     Current Facility-Administered Medications   Medication Dose Route Frequency Provider Last Rate Last Admin    [Held by provider] atomoxetine (STRATTERA) capsule 80 mg  80 mg Oral Daily Kulwant Kohler MD        atorvastatin (LIPITOR) tablet 20 mg  20 mg Oral QPM Petr Crawford MD   20 mg at 11/03/24 1933    buprenorphine HCl-naloxone HCl (SUBOXONE) 8-2 MG per film 1 Film  1 Film Sublingual BID Kulwant Kohler MD   1 Film at 11/04/24 0842    FLUoxetine (PROzac) capsule 40 mg  40 mg Oral Daily Kulwant Kohler MD   40 mg at 11/04/24 0843    gabapentin (NEURONTIN) tablet 600 mg  600 mg Oral TID Kulwant Kohler MD   600 mg at 11/04/24 0842    lithium (ESKALITH CR/LITHOBID) CR tablet 900 mg  900 mg Oral At Bedtime Kulwant Kohler MD   900 mg at 11/03/24 1933    multivitamin w/minerals (THERA-VIT-M) tablet 1 tablet  1 tablet Oral Daily Kulwant Kohler MD   1 tablet at 11/04/24 0856    nicotine (NICODERM CQ) 21 MG/24HR 24 hr patch 1 patch  1 patch Transdermal Daily Leni Carias APRN CNP   1 patch at 11/04/24 0853    QUEtiapine (SEROquel) tablet 300 mg  300 mg Oral At Bedtime Kulwant Kohler MD   300 mg at 11/03/24 1933    QUEtiapine (SEROquel) tablet 50 mg  50 mg Oral BID Petr Crawford MD   50 mg at 11/04/24 0847          Allergies:     Allergies   Allergen Reactions    Wellbutrin [Bupropion] Anaphylaxis and Swelling     Facial swelling    Wellbutrin [Bupropion]     Wellbutrin [Bupropion] Difficulty breathing    Naltrexone Other (See Comments)     Headaches  Headaches            Labs:     No results found for this or any previous visit (from the past 24 hours).         Psychiatric " "Examination:     /71 (BP Location: Right arm, Patient Position: Sitting, Cuff Size: Adult Large)   Pulse 63   Temp 98.2  F (36.8  C) (Oral)   Resp 16   Ht 1.905 m (6' 3\")   Wt 114.2 kg (251 lb 12.8 oz)   SpO2 93%   BMI 31.47 kg/m    Weight is 251 lbs 12.8 oz  Body mass index is 31.47 kg/m .    Weight over time:  Vitals:    10/29/24 1646   Weight: 114.2 kg (251 lb 12.8 oz)       Orthostatic Vitals         Most Recent      Sitting Orthostatic /70 10/31 0823    Sitting Orthostatic Pulse (bpm) 70 10/31 0823    Standing Orthostatic /69 10/31 0823    Standing Orthostatic Pulse (bpm) 76 10/31 0823              Cardiometabolic risk assessment. 10/31/24      Reviewed patient profile for cardiometabolic risk factors    Date taken /Value  REFERENCE RANGE   Abdominal Obesity  (Waist Circumference)   See nursing flowsheet Women >=35 in (88 cm)   Men >=40 in (102 cm)      Triglycerides  Triglycerides   Date Value Ref Range Status   05/19/2024 173 (H) <150 mg/dL Final   07/08/2021 112 <150 mg/dL Final       >=150 mg/dL (1.7 mmol/L) or current treatment for elevated triglycerides   HDL cholesterol  HDL Cholesterol   Date Value Ref Range Status   07/08/2021 33 (L) >39 mg/dL Final     Direct Measure HDL   Date Value Ref Range Status   05/19/2024 32 (L) >=40 mg/dL Final   ]   Women <50 mg/dL (1.3 mmol/L) in women or current treatment for low HDL cholesterol  Men <40 mg/dL (1 mmol/L) in men or current treatment for low HDL cholesterol     Fasting plasma glucose (FPG) Lab Results   Component Value Date     10/30/2024    GLC 90 07/08/2021      FPG >=100 mg/dL (5.6 mmol/L) or treatment for elevated blood glucose   Blood pressure  BP Readings from Last 3 Encounters:   11/03/24 103/71   07/11/24 97/68   06/06/24 126/78    Blood pressure >=130/85 mmHg or treatment for elevated blood pressure   Family History  See family history     Mental Status Exam:  Appearance: awake, alert and adequately groomed  Attitude: " " cooperative  Eye Contact:  good  Mood:  \"anxious\"  Affect:  mood congruent  Speech:  clear, coherent  Language: fluent and intact in English  Psychomotor, Gait, Musculoskeletal:  no evidence of tardive dyskinesia, dystonia, or tics  Throught Process:  linear and goal oriented  Associations:  no loose associations  Thought Content:  no evidence of suicidal ideation or homicidal ideation and no visual hallucinations present. Reports baseline non-command AH and paranoid ideation.   Insight:  fair  Judgement:  fair  Oriented to:  time, person, and place  Attention Span and Concentration:  intact  Recent and Remote Memory:  intact  Fund of Knowledge:  appropriate         Precautions:     Behavioral Orders   Procedures    Assault precautions    Cheeking Precautions (behavioral units)    Code 1 - Restrict to Unit    Routine Programming     As clinically indicated    Status 15     Every 15 minutes.    Suicide precautions: Suicide Risk: HIGH     Patients on Suicide Precautions should have a Combination Diet ordered that includes a Diet selection(s) AND a Behavioral Tray selection for Safe Tray - with utensils, or Safe Tray - NO utensils       Order Specific Question:   Suicide Risk     Answer:   HIGH          Diagnoses:     Schizoaffective Disorder, Bipolar Type  Stimulant use disorder (with +UDS for methamphetamine)  PTSD, historical   ADHD, historical   Toxic Encephalopathy, resolved          Assessment & Plan:     Assessment and hospital summary:  Perry Preciado is a 49 year old with history of MDD, ADHD, poly-substance use disorder (stimulants, alcohol, opioids) who presented to ED with  in context of worsened  psychosis  SI with plan in context of medications non-adherence due to no access to medications including Subotex, this leading to cravings and relapsing on methamphetamine (reported laced with Fentanyl).     Symptoms and presentation at this time is most consistent with primary psychotic disorder with " decompensation in context of medication non-adherence and substance use relapse, per our conversation today (see further details in HPI section above) appears that hx of psychotic symptoms starting at a young age and mood changes are more consistent with schizoaffective disorder.     We have discussed the risks, benefits, and alternatives of recommendations for medication changes (described in the plan section below) to target JAQUELINE and psychotic symptoms. Patient is in agreement with recommendations.      Identified psychosocial stressors include housing instability, unemployment and JAQUELINE. He is motivated to undergoing CD treatment once more stable. Agreeing with CD consult.       Inpatient psychiatric hospitalization is warranted at this time for safety, stabilization, and possible adjustment in medications.    Today,   Kb reported residual and persisting AH that are less distressing as well as previously reported paranoid ideations driving heightened anxiety. Overall, although improved psychotic symptoms, which he attributes to his Seroquel helping,it appears that these are still driving heightened anxiety. It is also possible that subacute withdrawal syndrome symptoms are also contributing to increased anxiety.   We discussed medication changes including the changes reflected in the section below and Kb agreed with recommendations.       Today's Changes:  -Lithium level today, reviewed (0.88 mmol/L).   -Decrease Suboxone to 8-4 mg due to observed increase daytime sedation. No apparent withdrawal-like symptoms. Recent COWS scores 1-2. Discontinue COWS protocol.   -Change scheduled gabapentin to receive PO dosing minimum 2 hrs apart from Suboxone dose due to concerns for possible enhancement of Suboxone when co-administered.   -Increase Seroquel to PTA dose of 400 mg PO at bedtime.       Target psychiatric symptoms and interventions:  #Psychosis/depressive syndrome  -Seroquel 400 mg PO at bedtime and 50 mg PO  BID  -Lithium at 900 mg PO at bedtime   -Prozac 40 mg PO qDay    Substance use with EDS+ for cannabinoids and methamphetamine  -Gabapentin 600 mg PO TID for heightened anxiety/withdrawal sx.     CD consult order placed, and completed on 10/31/24 by Jaren Dai, [facility], appreciate recommendations that include:   1)  Complete an Intensive Outpatient MICD Treatment Program  2)  Abstain from all mood-altering chemicals unless prescribed by a licensed provider.   3)  Attend, at minimum, 2 weekly support group meetings, such as 12 step based (AA/NA), SMART Recovery, Health Realizations, and/or Refuge Recovery meetings.     4)  Actively work with a mentor/sponsor on a weekly basis.   5)  Follow all the recommendations of your treatment/medical providers.  6)  Patient may benefit from obtaining a full mental health evaluation.        Acute Medical Problems and Treatments:  Acute medical concerns:  - No acute medical concerns         Hospital course:   Perry Preciado presented to the ED via EMS on 10/24/24  from sober house with apparent substance intoxication with UDS+ for , psychosis and SI with plan.  cannabinoids, amphetamines, benzodiazepine (Versed administered in the ED). He did not exhibit violent/aggressive behaviors, and did not require restraints/seclusion. Pt admitted under a voluntary status.     - 10/30/24: Continued SIO 1:1 due to SI with plan. PTA meds were continued and changes made included: Increased Seroquel to 200 mg PO at bedtime (was taking 400 mg at bedtime, restarted in the ED at 100 mg at bedtime). Restarted PTA Lithium at 600 mg PO at bedtime for mood stabilization (was taking 900 mg PO at bedtime PTA). Ordered baseline TSH and CMP. Continued COWS protocol. Suboxone was increased to total daily dose of 12 mg. Gabapentin 600 mg PO TID for heightened anxiety/withdrawal sx. CD consult order placed, pt agreed with consult. Lithium level ordered for 11/4/24     - 10/31/24: Increase buprenorphine  HCl-naloxone HCl (SUBOXONE) to 8-2 MG film 1 Film sublingual BID. Continue COWS protocol for now and reassess. Increase lithium to 900 mg PO at bedtime. Increase Seroquel to 300 mg PO at bedtime Increase Prozac to 40 mg PO qDay.     - 11/4/24: -Lithium level reviewed today (0.88 mmol/L). Decreased Suboxone to 8-4 mg due to observed/reported increase daytime sedation. No apparent withdrawal-like symptoms. Recent COWS scores 1-2. Discontinue COWS protocol. Change scheduled gabapentin to receive PO dosing minimum 2 hrs apart from Suboxone dose due to concerns for possible enhancement of Suboxone when co-administered. Increase Seroquel to PTA dose of 400 mg PO at bedtime to better target paranoid thoughts and AH.       Pertinent labs/imaging:  Recent Labs   Lab Test 10/30/24  1947 05/30/24  1046 05/27/24 0752 05/22/24  0756   LITHIUM  --   --  0.85 0.53*   CR 1.07  --  1.04 1.10   SG  --  1.008  --   --    TSH 0.83  --   --  2.36     Recent Labs   Lab Test 05/29/24  1443 05/15/24  2334 01/07/24  0242   WBC 8.3 11.4* 5.4   ANEUTAUTO  --  7.8 2.7      Recent Labs   Lab Test 10/30/24  1947 05/27/24  0752 05/22/24  0756 05/19/24  0732   * 133*   < >  --    A1C  --   --   --  5.6    < > = values in this interval not displayed.     Recent Labs   Lab Test 05/19/24  0732 07/08/21  0749   CHOL 133 152   TRIG 173* 112   LDL 66 97   HDL 32* 33*     Recent Labs   Lab Test 10/30/24  1947 05/27/24  0752   AST 32 40   ALT 43 56   ALKPHOS 114 118     Recent Labs   Lab Test 05/29/24  1443 05/15/24  2334 01/07/24  0242 12/19/23  0844 12/12/23  1738   WBC 8.3 11.4* 5.4   < > 8.5   ANEUTAUTO  --  7.8 2.7  --  4.3   HGB 12.9* 13.2* 13.5   < > 15.6    248 268   < > 264    < > = values in this interval not displayed.      Behavioral/Psychological/Social:  - Encourage unit programming    Safety:  - Continue precautions as noted above  - Status 15 minute checks  - Patient SIO status reviewed with team/RN.  SIO was discontinued on  10/31 as pt denied SI intent or plan in hospital and contracted for safety.  Please also refer to RN/team documentation for add'l details.     Legal Status: voluntary    Disposition Plan   Reason for ongoing admission: poses an imminent risk to self, is unable to care for self due to severe psychosis or mily, and requires detoxification from substance that poses a risk of bodily harm during withdrawal period  Discharge location: Likely  with OP CD treatment at [facility]. Please see CTC notes for more details.   Discharge Medications: not ordered  Follow-up Appointments: not scheduled      --------------------------------  Kulwant Kohler MD.  Psychiatry Resident  [facility]     Patient seen with treatment team and discussed with attending physician, Dr. Crockett, who is in agreement with assessment and plan.

## 2024-11-04 NOTE — PLAN OF CARE
BEH IP Unit Acuity Rating Score (UARS)  Patient is given one point for every criteria they meet.    CRITERIA SCORING   On a 72 hour hold, court hold, committed, stay of commitment, or revocation. 0    Patient LOS on BEH unit exceeds 20 days. 0  LOS: 6   Patient under guardianship, 55+, otherwise medically complex, or under age 11. 0   Suicide ideation without relief of precipitating factors. 1   Current plan for suicide. 0   Current plan for homicide. 0   Imminent risk or actual attempt to seriously harm another without relief of factors precipitating the attempt. 0   Severe dysfunction in daily living (ex: complete neglect for self care, extreme disruption in vegetative function, extreme deterioration in social interactions). 1   Recent (last 7 days) or current physical aggression in the ED or on unit. 0   Restraints or seclusion episode in past 72 hours. 0   Recent (last 7 days) or current verbal aggression, agitation, yelling, etc., while in the ED or unit. 0   Active psychosis. 0   Need for constant or near constant redirection (from leaving, from others, etc).  0   Intrusive or disruptive behaviors. 0   Patient requires 3 or more hours of individualized nursing care per 8-hour shift (i.e. for ADLs, meds, therapeutic interventions). 0   TOTAL 2

## 2024-11-04 NOTE — CARE PLAN
"Pt was calm and cooperative but had an anxious affect this shift. Med complaint. Spent most of the shift pacing, watching tv or interacting with staff/peers . Endorses anxiety and depression. Denied SI,Hi,SIB, AVC hallucinations, and paranoia.  Agreed to have safe behavior in the unit. Ate 100% of breakfast and lunch. Declined taking a shower/completing hygiene.Pt appeared restless and worried. Talked to writer about his living situation and how he \"just wants to get into a group home\". Insight and judgment still poor.  No signs of aggression or agitation this shift.No med side effects observed. No medical concerns. No behavior concerns. Endorsed full body pain and Tylenol given with relief.   Hydroxyzine and Zyprexa given with minium relief.     /83 (BP Location: Left arm)   Pulse 62   Temp 97.3  F (36.3  C) (Temporal)   Resp 16   Ht 1.905 m (6' 3\")   Wt 114.2 kg (251 lb 12.8 oz)   SpO2 97%   BMI 31.47 kg/m         Last 24H PRN:     gabapentin (NEURONTIN) capsule 100 mg, 100 mg at 11/04/24 0505    hydrOXYzine HCl (ATARAX) tablet 50 mg, 50 mg at 11/04/24 1050    nicotine (COMMIT) lozenge 2-4 mg, 4 mg at 11/04/24 1305    OLANZapine zydis (zyPREXA) ODT tab 10 mg, 10 mg at 11/04/24 1305 **OR** OLANZapine (zyPREXA) injection 10 mg   "

## 2024-11-04 NOTE — PLAN OF CARE
"Individual Therapy Note    Writer checked in with Perry. He stated that he had a lot of anxiety because of a criminal court case that has been continued. He was encouraged to contact his county  despite feeling like he was \"bothering\" him. He agreed to do so.     He endorsed visual hallucinations (seeing \"shadow people\") two days ago and stated he didn't want to tell people because he didn't want to \"get laughed out of the room.\" He was strongly encouraged to notify staff when he experiences these symptoms. He stated that he has been having \"really bad nightmares\" and that he fell out of bed the other day, stating, \"I almost hit my head.\"    No Charge (0-15 min)          "

## 2024-11-04 NOTE — PLAN OF CARE
Perry appeared sleeping for 6 hours without respiratory issue  Endorsed anxiety and received PRN Gabapentin at 0505 which was effective.  No discomfort or pain endorsed  No harm/sexual  assault  to self and others  Pt stays calm and behaviorally controlled this shift.    Problem: Suicide Risk  Goal: Absence of Self-Harm  Outcome: Progressing     Problem: Adult Behavioral Health Plan of Care  Goal: Plan of Care Review  Outcome: Progressing  Goal: Adheres to Safety Considerations for Self and Others  Intervention: Develop and Maintain Individualized Safety Plan  Recent Flowsheet Documentation  Taken 11/4/2024 0215 by Destiny Ruelas, RN  Safety Measures: safety rounds completed   Goal Outcome Evaluation:

## 2024-11-04 NOTE — PLAN OF CARE
Team Note Due:  Wednesday     Assessment/Intervention/Current Symtoms and Care Coordination:  Chart review and met with team, discussed pt progress, symptomology, and response to treatment.  Discussed the discharge plan and any potential impediments to discharge.    I met with patient, to share that [facility] was willing to accept him, but now he says he is worried about his court end of November-December as he has to testify about a counselor, as I shared they may be able to get it virtual but he said he can't. He says he would rather just go to his group home, and would feel safer there. I shared I would check how long this would take. I emailed his CM Tyler Woodruff to ask him about this timeline.     Discharge Plan or Goal:  [facility] 3R's Declined - they recommend residential treatment first.    Residential treatment prior to group home with IOP?    Pending:  [facility]  [facility]  [address] [place] [address], [address]  Ph: [phone]  [email or web address]    Barriers to Discharge:  Symptoms, withdrawal sx      Referral Status:  10/31/24: [facility], recommend [facility] treatment       Legal Status:  Pt is voluntary.      Contacts (include UMBERTO status):  Primary Care:   KERWIN Teixeira  Location: [facility]: [address] [place] [address], [address]  Phone: [phone]     Subutex:  Iliana Allen NP  Location: [address] [address], [address].  Phone: [phone]     Psychiatry  Provider: JODY SCHOENECKER APRN, CNP  Address: [facility] [address] [place] [address], [facility]  Phone: [phone]  Fax: [phone]     *Pt reports this is now at [facility]      Therapist:  Howie at [facility] 2x/month      /ACT Team:  Sunny Woodruff  Direct: [phone]  [email or web address]   Main: [phone]  Fax:  [phone]     CADI CM:  [email or web address] (UMBERTO on file)     Other contact information (family, friends, SO) and UMBERTO status:   Alla Preciado (Sister) [phone] (UMBERTO on file)      Upcoming Meetings and Dates/Important Information and next steps:  Coordinate care, discharge plan.

## 2024-11-04 NOTE — PLAN OF CARE
Goal Outcome Evaluation:    Plan of Care Reviewed With: patient         Problem: Psychotic Signs/Symptoms  Goal: Improved Behavioral Control (Psychotic Signs/Symptoms)  Outcome: Progressing      The patient had a pleasant evening, spending most of his time in his room watching sports and occasionally visible in the milieu. He is alert and oriented to his name, place, and time, showing some insight into his current mental state. The patient expressed feelings of anxiety and depression, which are manageable and did not require any PRN medication during this shift.     He took his scheduled bedtime medication without any issues. The patient has a lithium level test scheduled for tomorrow morning, and he received his lithium dose this evening at 7:33 pm. His vital signs remain stable, his appetite is good, and his hygiene is fair.

## 2024-11-05 PROCEDURE — 250N000013 HC RX MED GY IP 250 OP 250 PS 637

## 2024-11-05 PROCEDURE — 250N000013 HC RX MED GY IP 250 OP 250 PS 637: Performed by: EMERGENCY MEDICINE

## 2024-11-05 PROCEDURE — 124N000002 HC R&B MH UMMC

## 2024-11-05 PROCEDURE — 250N000013 HC RX MED GY IP 250 OP 250 PS 637: Performed by: STUDENT IN AN ORGANIZED HEALTH CARE EDUCATION/TRAINING PROGRAM

## 2024-11-05 PROCEDURE — 97150 GROUP THERAPEUTIC PROCEDURES: CPT | Mod: GO

## 2024-11-05 PROCEDURE — 250N000013 HC RX MED GY IP 250 OP 250 PS 637: Performed by: NURSE PRACTITIONER

## 2024-11-05 PROCEDURE — 250N000012 HC RX MED GY IP 250 OP 636 PS 637

## 2024-11-05 RX ORDER — OLANZAPINE 10 MG/1
10 TABLET, ORALLY DISINTEGRATING ORAL 3 TIMES DAILY PRN
Status: DISCONTINUED | OUTPATIENT
Start: 2024-11-05 | End: 2024-11-06

## 2024-11-05 RX ORDER — OLANZAPINE 10 MG/2ML
10 INJECTION, POWDER, FOR SOLUTION INTRAMUSCULAR 3 TIMES DAILY PRN
Status: DISCONTINUED | OUTPATIENT
Start: 2024-11-05 | End: 2024-11-06

## 2024-11-05 RX ADMIN — QUETIAPINE FUMARATE 400 MG: 200 TABLET ORAL at 21:37

## 2024-11-05 RX ADMIN — BUPRENORPHINE AND NALOXONE 1 FILM: 8; 2 FILM BUCCAL; SUBLINGUAL at 19:23

## 2024-11-05 RX ADMIN — NICOTINE 1 PATCH: 21 PATCH, EXTENDED RELEASE TRANSDERMAL at 08:17

## 2024-11-05 RX ADMIN — FLUOXETINE HYDROCHLORIDE 40 MG: 20 CAPSULE ORAL at 08:10

## 2024-11-05 RX ADMIN — NICOTINE POLACRILEX 4 MG: 2 LOZENGE ORAL at 20:28

## 2024-11-05 RX ADMIN — ACETAMINOPHEN 650 MG: 325 TABLET, FILM COATED ORAL at 21:57

## 2024-11-05 RX ADMIN — NICOTINE POLACRILEX 4 MG: 2 LOZENGE ORAL at 17:48

## 2024-11-05 RX ADMIN — HYDROXYZINE HYDROCHLORIDE 50 MG: 50 TABLET, FILM COATED ORAL at 08:10

## 2024-11-05 RX ADMIN — GABAPENTIN 100 MG: 100 CAPSULE ORAL at 02:25

## 2024-11-05 RX ADMIN — LITHIUM CARBONATE 900 MG: 450 TABLET, EXTENDED RELEASE ORAL at 21:37

## 2024-11-05 RX ADMIN — GABAPENTIN 600 MG: 600 TABLET, FILM COATED ORAL at 13:09

## 2024-11-05 RX ADMIN — GABAPENTIN 600 MG: 600 TABLET, FILM COATED ORAL at 19:23

## 2024-11-05 RX ADMIN — QUETIAPINE FUMARATE 50 MG: 50 TABLET ORAL at 08:10

## 2024-11-05 RX ADMIN — NICOTINE POLACRILEX 4 MG: 2 LOZENGE ORAL at 21:38

## 2024-11-05 RX ADMIN — NICOTINE POLACRILEX 4 MG: 2 LOZENGE ORAL at 11:27

## 2024-11-05 RX ADMIN — Medication 1 TABLET: at 08:10

## 2024-11-05 RX ADMIN — ATORVASTATIN CALCIUM 20 MG: 20 TABLET, FILM COATED ORAL at 19:23

## 2024-11-05 RX ADMIN — ACETAMINOPHEN 650 MG: 325 TABLET, FILM COATED ORAL at 08:10

## 2024-11-05 RX ADMIN — OLANZAPINE 10 MG: 10 TABLET, ORALLY DISINTEGRATING ORAL at 14:51

## 2024-11-05 RX ADMIN — QUETIAPINE FUMARATE 50 MG: 50 TABLET ORAL at 19:23

## 2024-11-05 RX ADMIN — GABAPENTIN 600 MG: 600 TABLET, FILM COATED ORAL at 08:10

## 2024-11-05 RX ADMIN — NICOTINE POLACRILEX 4 MG: 2 LOZENGE ORAL at 09:33

## 2024-11-05 RX ADMIN — BUPRENORPHINE AND NALOXONE 1 FILM: 4; 1 FILM, SOLUBLE BUCCAL; SUBLINGUAL at 16:55

## 2024-11-05 RX ADMIN — BUPRENORPHINE AND NALOXONE 1 FILM: 8; 2 FILM BUCCAL; SUBLINGUAL at 08:09

## 2024-11-05 RX ADMIN — HYDROXYZINE HYDROCHLORIDE 50 MG: 50 TABLET, FILM COATED ORAL at 17:48

## 2024-11-05 ASSESSMENT — ACTIVITIES OF DAILY LIVING (ADL)
ADLS_ACUITY_SCORE: 0
ADLS_ACUITY_SCORE: 0
ORAL_HYGIENE: INDEPENDENT
ADLS_ACUITY_SCORE: 0
ADLS_ACUITY_SCORE: 0
LAUNDRY: UNABLE TO COMPLETE
ADLS_ACUITY_SCORE: 0
DRESS: SCRUBS (BEHAVIORAL HEALTH);INDEPENDENT
ADLS_ACUITY_SCORE: 0
HYGIENE/GROOMING: INDEPENDENT;PROMPTS
ADLS_ACUITY_SCORE: 0

## 2024-11-05 NOTE — PLAN OF CARE
Perry appeared sleeping intermittently for 4.75 hours without distress   Denies pain, SI, delusion and hallucination.  Endorsed anxiety and requested for PRN Zyprexa or 600 mg  gabapentin.   Pt was informed that his Zyprexa order is for agitation while his ordered PRN Gabapentin is 100 mg.  Perry accepted the 100 mg PRN at 0225 and informed the writer to tell his doctor that he will like his PRN Gabapentin increase to 600 mg and also  will like to take his Zyprexa for anxiety. Sticky note put in for provider and oncoming shift  updated in report.  No reported safety concern this shift.       Problem: Psychotic Signs/Symptoms  Goal: Improved Behavioral Control (Psychotic Signs/Symptoms)  Outcome: Progressing     Problem: Adult Behavioral Health Plan of Care  Goal: Plan of Care Review  Outcome: Progressing  Goal: Adheres to Safety Considerations for Self and Others  Intervention: Develop and Maintain Individualized Safety Plan  Recent Flowsheet Documentation  Taken 11/5/2024 2384 by Destiny Ruelas, RN  Safety Measures: safety rounds completed   Goal Outcome Evaluation:

## 2024-11-05 NOTE — CARE PLAN
Rehab Group    Start time: 1015  End time: 1100  Patient time total: 45 minutes    attended full group     #3 attended   Group Type: general health and coping   Group Topic Covered: coping skills     Group Session Detail:  Pt attended a self reflection OT discussion.  Participants were guided through discussion questions that reflected on the their challenges, lessons, mood, gratitude, and what they wanted to remember about this day/this period of time.     Patient Response/Contribution:  positive affect, cooperative with task, organized, supportive of peers, attentive, and actively engaged       Patient Detail:  Pt discussed long history with chemical dependency, starting at age 13.  Pt reports many traumas in his in his life, though taking responsibility for his use and relapses.  Shares success comes from trust in himself, humility, thinking about his bottoms with his use/relapses, facing fears, getting rid of contacts who are not good for him, and and reaching out for support.  Pt was an active participant.  Demonstrated active listening to others, provided helpful feedback.        [id number] OT Group (2 or more in attendance)      Patient Active Problem List   Diagnosis    Bilateral carpal tunnel syndrome    KANE (generalized anxiety disorder)    Chronic pain syndrome    Hyperglycemia    Hypertriglyceridemia    Lung nodule    Major depressive disorder, recurrent (H)    Mild intermittent asthma without complication    Tobacco use disorder    Displacement of lumbar intervertebral disc without myelopathy    History of ADHD    History of suicide attempt    Chronic right-sided low back pain with right-sided sciatica    Psychosis (H)    Suicidal behavior without attempted self-injury    Methamphetamine use disorder, severe, in early remission (H)    Polysubstance abuse (H)    Moderate opioid dependence in early remission (H)    Hx of sepsis    Hx of intravenous drug use in remission    Suicidal ideation    Paranoia (H)

## 2024-11-05 NOTE — PROGRESS NOTES
At 2310, requested Tylenol for pain 7-10 in both hips, radiating down to hamstring muscle. PRN administered per orders.

## 2024-11-05 NOTE — PLAN OF CARE
BEH IP Unit Acuity Rating Score (UARS)  Patient is given one point for every criteria they meet.    CRITERIA SCORING   On a 72 hour hold, court hold, committed, stay of commitment, or revocation. 0    Patient LOS on BEH unit exceeds 20 days. 0  LOS: 7   Patient under guardianship, 55+, otherwise medically complex, or under age 11. 0   Suicide ideation without relief of precipitating factors. 1   Current plan for suicide. 0   Current plan for homicide. 0   Imminent risk or actual attempt to seriously harm another without relief of factors precipitating the attempt. 0   Severe dysfunction in daily living (ex: complete neglect for self care, extreme disruption in vegetative function, extreme deterioration in social interactions). 1   Recent (last 7 days) or current physical aggression in the ED or on unit. 0   Restraints or seclusion episode in past 72 hours. 0   Recent (last 7 days) or current verbal aggression, agitation, yelling, etc., while in the ED or unit. 0   Active psychosis. 0   Need for constant or near constant redirection (from leaving, from others, etc).  0   Intrusive or disruptive behaviors. 0   Patient requires 3 or more hours of individualized nursing care per 8-hour shift (i.e. for ADLs, meds, therapeutic interventions). 0   TOTAL 2

## 2024-11-05 NOTE — PLAN OF CARE
"RN Assessment   The patient was social in a milieu, mildly intrusive, interrupting conversations with other patients and diverting attention to his concerns, which mostly included placement issues and questions regarding his CADI funding.   He denied most psychiatric symptoms, including hallucinations, delusions, paranoia, and referential thinking. Endorsed anxiety. He coped with anxiety through the administration of scheduled medications and non-pharmacologic coping strategies.   Expressed frustration that his Suboxone dose was lowered to 4 mg from 8 mg, he was expecting that it would be lowered to 6.He also reported that he has been itching from Suboxone and in the past had received Subutex, which did not cause an allergic reaction. The provider was paged and will review changes to Subutex with the patient tomorrow.   He stated that his symptoms of itching were not severe enough for him to cause concern at this time.   He denied any safety concerns, including active or passive suicidal ideation. Although he was overheard in the lounge complaining of chronic joint pain. When asked about pain, he denied any physical discomfort or pain   /79 (BP Location: Left arm)   Pulse 74   Temp 97.9  F (36.6  C) (Temporal)   Resp 12   Ht 1.905 m (6' 3\")   Wt 114.2 kg (251 lb 12.8 oz)   SpO2 93%   BMI 31.47 kg/m      "

## 2024-11-05 NOTE — PLAN OF CARE
"Team Note Due:  Wednesday     Assessment/Intervention/Current Symtoms and Care Coordination:  Chart review and met with team, discussed pt progress, symptomology, and response to treatment.  Discussed the discharge plan and any potential impediments to discharge.    Per team, pt reported he wants to stay here until he gets into group home. Reports seeing 'shadowy figures' which is new. Slept 4.75 hours. Said to staff that if a peer gets too close to him, \"if he doesn't back up\" he will punch a peer who was standing too close to him.     I met with patient and he said that his group home is ready for him, and to give her a call.   I called Brenda, Ph: [phone]. She was asking some questions. She said that once she sends the 6790 to [facility] will wait for approval for him to admit there. She stated her nurse will call a nurse here to get a report. She also said her nurse wants to speak to Pt. I provided the unit # for her.     I contacted ROSS Scott and [facility] Keyana to share the update and to keep me posted as he is ready for discharge.     I let patient know that we will wait to hear from [facility] and group home for next steps. He appears proactive stating he will call them as well to check in on updates. He was cooperative and pleasant, thanking writer. He does appear slightly anxious as evidenced by pacing.     Discharge Plan or Goal:  [facility] - they recommend residential treatment first.  Pt doesn't want to go to residential treatment.    Pending:  [facility] - Accepted awaiting admission   [facility]  [facility]  [address] [place], [place], [address]  Brenda - Ph: [phone]  [email or web address]    Barriers to Discharge:  Symptoms, withdrawal sx      Referral Status:  10/31/24: Referred to [facility] - Denied, recommend JAQUELINE treatment     Legal Status:  Pt is voluntary.      Contacts (include UMBERTO status):  Primary Care:   Deshawn Parker, " C-NP  Location: [facility]: [address] [facility], [place], [place], [address]  Phone: [phone]     Subutex:  Iliana Allen NP  Location: [facility] - [address] [facility] [address] [address], [address].  Phone: [phone]     Psychiatry  Provider: JODY SCHOENECKER APRN, CNP  Address: [facility] [address] [place] [address]  Phone: [phone]  Fax: [phone]     *Pt reports this is now at [facility]      Therapist:  Howie at [facility] 2x/month      /ACT Team:  Sunny Woodruff  Direct: [phone]  [email or web address]   Main: [phone]  Fax: [phone]     CADI CM:  [email or web address] (UMBERTO on file)     Other contact information (family, friends, SO) and UMBERTO status:   Alla Preciado (Sister) [phone] (UMBERTO on file)      Upcoming Meetings and Dates/Important Information and next steps:  Coordinate care, discharge plan.

## 2024-11-05 NOTE — PLAN OF CARE
"RN Assessment:    Pt presented with euthymic affect. Pt was cooperative while interacting with writer. Pt was alert and oriented x 4. Pt denied having SI, HI, and thoughts of SIB. Pt endorsed having visual hallucinations. Pt stated pt was seeing \" shadowy figures \" following pt. Pt endorsed having lower back pain. Pt given PRN medication for pain. Pt endorsed not sleeping well last night due to waking multiple times. Pt feels the medications that are currently ordered are working well. No medication side effects endorsed by pt or observed by writer. Pt was intermittently present in the milieu. Continue to monitor for safety and changes in medical condition.    It was reported to writer that pt told a severely intrusive peer that if the peer continued to get into pt's personal space, pt would strike peer.       PRN Medications passed this shift:    0810: Acetaminophen 650 mg PO for lower back pain. This medication was effective per pt report.     0810: Hydroxyzine 50 mg PO for anxiety. This medication was not effective per pt report.     0933: Nicotine lozenge 4 mg PO for nicotine withdrawal symptoms.    1127: Nicotine lozenge 4 mg PO for nicotine withdrawal symptoms.     1451: Olanzapine ODT 10 mg PO for anxiety.   "

## 2024-11-06 PROCEDURE — 99232 SBSQ HOSP IP/OBS MODERATE 35: CPT | Performed by: PSYCHIATRY & NEUROLOGY

## 2024-11-06 PROCEDURE — 250N000013 HC RX MED GY IP 250 OP 250 PS 637: Performed by: EMERGENCY MEDICINE

## 2024-11-06 PROCEDURE — 250N000013 HC RX MED GY IP 250 OP 250 PS 637: Performed by: PSYCHIATRY & NEUROLOGY

## 2024-11-06 PROCEDURE — 250N000013 HC RX MED GY IP 250 OP 250 PS 637

## 2024-11-06 PROCEDURE — 97150 GROUP THERAPEUTIC PROCEDURES: CPT | Mod: GO

## 2024-11-06 PROCEDURE — 124N000002 HC R&B MH UMMC

## 2024-11-06 PROCEDURE — 250N000012 HC RX MED GY IP 250 OP 636 PS 637: Performed by: PSYCHIATRY & NEUROLOGY

## 2024-11-06 PROCEDURE — 250N000012 HC RX MED GY IP 250 OP 636 PS 637

## 2024-11-06 PROCEDURE — 250N000013 HC RX MED GY IP 250 OP 250 PS 637: Performed by: NURSE PRACTITIONER

## 2024-11-06 RX ORDER — BUPRENORPHINE AND NALOXONE 4; 1 MG/1; MG/1
1 FILM, SOLUBLE BUCCAL; SUBLINGUAL DAILY
Status: DISCONTINUED | OUTPATIENT
Start: 2024-11-06 | End: 2024-11-06

## 2024-11-06 RX ORDER — GABAPENTIN 600 MG/1
600 TABLET ORAL 3 TIMES DAILY
Status: DISCONTINUED | OUTPATIENT
Start: 2024-11-06 | End: 2024-11-08 | Stop reason: HOSPADM

## 2024-11-06 RX ORDER — PRAZOSIN HYDROCHLORIDE 1 MG/1
1 CAPSULE ORAL AT BEDTIME
Status: DISCONTINUED | OUTPATIENT
Start: 2024-11-06 | End: 2024-11-08 | Stop reason: HOSPADM

## 2024-11-06 RX ORDER — BUPRENORPHINE AND NALOXONE 4; 1 MG/1; MG/1
1 FILM, SOLUBLE BUCCAL; SUBLINGUAL
Status: DISCONTINUED | OUTPATIENT
Start: 2024-11-06 | End: 2024-11-06

## 2024-11-06 RX ORDER — BUPRENORPHINE AND NALOXONE 8; 2 MG/1; MG/1
1 FILM, SOLUBLE BUCCAL; SUBLINGUAL 2 TIMES DAILY
Status: DISCONTINUED | OUTPATIENT
Start: 2024-11-06 | End: 2024-11-07

## 2024-11-06 RX ORDER — BUPRENORPHINE AND NALOXONE 4; 1 MG/1; MG/1
1 FILM, SOLUBLE BUCCAL; SUBLINGUAL DAILY
Status: DISCONTINUED | OUTPATIENT
Start: 2024-11-06 | End: 2024-11-07

## 2024-11-06 RX ORDER — OLANZAPINE 10 MG/1
10 TABLET, ORALLY DISINTEGRATING ORAL 3 TIMES DAILY PRN
Status: DISCONTINUED | OUTPATIENT
Start: 2024-11-06 | End: 2024-11-08

## 2024-11-06 RX ORDER — OLANZAPINE 10 MG/2ML
10 INJECTION, POWDER, FOR SOLUTION INTRAMUSCULAR 3 TIMES DAILY PRN
Status: DISCONTINUED | OUTPATIENT
Start: 2024-11-06 | End: 2024-11-08

## 2024-11-06 RX ADMIN — ATORVASTATIN CALCIUM 20 MG: 20 TABLET, FILM COATED ORAL at 19:16

## 2024-11-06 RX ADMIN — PRAZOSIN HYDROCHLORIDE 1 MG: 1 CAPSULE ORAL at 21:53

## 2024-11-06 RX ADMIN — NICOTINE POLACRILEX 4 MG: 2 LOZENGE ORAL at 19:19

## 2024-11-06 RX ADMIN — ACETAMINOPHEN 650 MG: 325 TABLET, FILM COATED ORAL at 10:37

## 2024-11-06 RX ADMIN — FLUOXETINE HYDROCHLORIDE 40 MG: 20 CAPSULE ORAL at 08:46

## 2024-11-06 RX ADMIN — NICOTINE POLACRILEX 4 MG: 2 LOZENGE ORAL at 13:13

## 2024-11-06 RX ADMIN — QUETIAPINE FUMARATE 400 MG: 200 TABLET ORAL at 21:53

## 2024-11-06 RX ADMIN — NICOTINE POLACRILEX 4 MG: 2 LOZENGE ORAL at 20:34

## 2024-11-06 RX ADMIN — NICOTINE 1 PATCH: 21 PATCH, EXTENDED RELEASE TRANSDERMAL at 08:46

## 2024-11-06 RX ADMIN — HYDROXYZINE HYDROCHLORIDE 50 MG: 50 TABLET, FILM COATED ORAL at 16:43

## 2024-11-06 RX ADMIN — LITHIUM CARBONATE 900 MG: 450 TABLET, EXTENDED RELEASE ORAL at 21:53

## 2024-11-06 RX ADMIN — BUPRENORPHINE AND NALOXONE 1 FILM: 4; 1 FILM, SOLUBLE BUCCAL; SUBLINGUAL at 14:49

## 2024-11-06 RX ADMIN — QUETIAPINE FUMARATE 50 MG: 50 TABLET ORAL at 08:46

## 2024-11-06 RX ADMIN — QUETIAPINE FUMARATE 50 MG: 50 TABLET ORAL at 19:15

## 2024-11-06 RX ADMIN — HYDROXYZINE HYDROCHLORIDE 50 MG: 50 TABLET, FILM COATED ORAL at 10:37

## 2024-11-06 RX ADMIN — BUPRENORPHINE AND NALOXONE 1 FILM: 8; 2 FILM BUCCAL; SUBLINGUAL at 08:46

## 2024-11-06 RX ADMIN — Medication 1 TABLET: at 08:46

## 2024-11-06 RX ADMIN — NICOTINE POLACRILEX 4 MG: 2 LOZENGE ORAL at 21:53

## 2024-11-06 RX ADMIN — BUPRENORPHINE AND NALOXONE 1 FILM: 8; 2 FILM BUCCAL; SUBLINGUAL at 17:57

## 2024-11-06 RX ADMIN — GABAPENTIN 100 MG: 100 CAPSULE ORAL at 13:13

## 2024-11-06 RX ADMIN — NICOTINE POLACRILEX 4 MG: 2 LOZENGE ORAL at 14:49

## 2024-11-06 RX ADMIN — GABAPENTIN 600 MG: 600 TABLET, FILM COATED ORAL at 19:16

## 2024-11-06 RX ADMIN — GABAPENTIN 600 MG: 600 TABLET, FILM COATED ORAL at 08:46

## 2024-11-06 ASSESSMENT — ACTIVITIES OF DAILY LIVING (ADL)
ADLS_ACUITY_SCORE: 0
ORAL_HYGIENE: INDEPENDENT
ADLS_ACUITY_SCORE: 0
DRESS: SCRUBS (BEHAVIORAL HEALTH)
ADLS_ACUITY_SCORE: 0
LAUNDRY: UNABLE TO COMPLETE
HYGIENE/GROOMING: INDEPENDENT
ADLS_ACUITY_SCORE: 0

## 2024-11-06 NOTE — PLAN OF CARE
"Nursing Assessment    Recent Vitals: B/P: 105/69, T: 97, P: 72, R: 16     Sleep:  Hours of sleep at night: 5.5    General Shift Summary  Patient has been visible in the milieu, socializes with peers, makes needs known to staff, and participates in groups. He is eating well. Hygiene is good. He is medication compliant.    Patient was concerned about being discharged out to the streets. He stated that he feels he has been good and has been following through with treatment. He asked about getting into a group home. Writer informed him that likely by the end of the week he will go to one, if not then next week. Patient was happy to hear this. He stated that he just wants to get treatment and remain off substances. Patient stated that he is \"bummed out\" about Trump winning and is worried about our country.    Patient received PRN Atarax and Tylenol for pain and anxiety due to feeling anxious about when he will discharge. Patient initially requested for Zyprexa for anxiety. Writer informed him that this is for agitation and also can cause weight gain if taken frequently. Patient was understanding. He received PRN Gabapentin at 1313 for continuous anxiety.    Plan is to continue to monitor patient status q 15 mins, assess response to medications, and maintain the patients safety.    Harmony Reagan RN MSN  "

## 2024-11-06 NOTE — PLAN OF CARE
Team Note Due:  Wednesday     Assessment/Intervention/Current Symtoms and Care Coordination:  Chart review and met with team, discussed pt progress, symptomology, and response to treatment.  Discussed the discharge plan and any potential impediments to discharge.    Per team, pt slept 5.5 hours, irritable but calm and behaviorally controlled.     I received a call from Marcos at [facility] asking if he was interested in placement. On 10/31 [facility] had already denied Pt and recommended JAQUELINE treatment so I called back left voicemail asking if he is being reconsidered, and if so I will ask patient about this option. I shared he can call me back if Pt is still being considered for IRTS.     I did email Keyana Vickers, MICHAEL HAWKINS and ROSS Woodruff to ask for an approximate timeline on how long it will take to get the rate approved and move into group home.     I attempted to meet with pt, and he was socializing with peers and playing pinRoundrateg for OT group. Will try again later.     Behavioral team note complete.     I met with pt and he is open to residential treatment if it will take a while to get into the group home. I agreed to wait to hear from MICHAEL HAWKINS to see what she thinks the wait will be. He said Brenda from group home just said that she submitted the 6790 rate sheet. Pt is now open to residential treatment if the group home will take a while though he says he has been to over 70+ treatments and told this writer that it will be a 'waste of time' if he goes to treatment and can get into group home as he likes to be able to finish treatment if he goes.     Discharge Plan or Goal:  [facility] 3R's Declined - they recommend residential treatment first.  Pt is now open to residential treatment if the group home will take a while though he says he has been to over 70+ treatments and told this writer that it will be a 'waste of time' if he goes to treatment and can get into group home as he would rather want to just  complete treatment if he goes there.     Pending:  [facility] - Accepted awaiting admission   [facility]  [facility]  [address] [facility] [address], [place], [address]  Brenda - Ph: [phone]  [email or web address]    Barriers to Discharge:  Symptoms, withdrawal sx      Referral Status:  10/31/24: Referred to [facility] - Denied, recommend JAQUELINE treatment     Legal Status:  Pt is voluntary.      Contacts (include UMBERTO status):  Primary Care:   KERWIN Teixeira  Location: [facility]: [address] [place] [address] [place], [place], [address]  Phone: [phone]     Subutex:  Iliana Allen NP  Location: [place] [address], [address].  Phone: [phone]     Psychiatry  Provider: JODY SCHOENECKER APRN, CNP  Address: [facility] [address] [place] [address]  Phone: [phone]  Fax: [phone]     *Pt reports this is now at [facility]      Therapist:  Howie at [facility] 2x/month      /ACT Team:  Sunny Woodruff  Direct: [phone]  [email or web address]   Main: [phone]  Fax: [phone]     CADI CM:  [email or web address] (UMBERTO on file)     Other contact information (family, friends, SO) and UMBERTO status:   Alla Preciado (Sister) [phone] (UMBERTO on file)      Upcoming Meetings and Dates/Important Information and next steps:  Coordinate care, discharge plan.

## 2024-11-06 NOTE — PLAN OF CARE
Goal Outcome Evaluation:  Problem: Sleep Disturbance  Goal: Adequate Sleep/Rest  Outcome: Progressing      Patient appeared to sleep comfortably for 5.5 hours without s/s of respiratory distress.  No complaints of pain and other medical issues. No behavioral concerns reported or observed. No PRNs given. Patient slept 5.5 hours.

## 2024-11-06 NOTE — PROGRESS NOTES
[facility], [place]   Psychiatric Progress Note  Hospital Day: 8        Interim History:   The patient's care was discussed with the treatment team during the daily team meeting and/or staff's chart notes were reviewed.     Per RN report/notes:  Perry appeared sleeping intermittently for 4.75 hours without distress   Denies pain, SI, delusion and hallucination.  Endorsed anxiety and requested for PRN Zyprexa or 600 mg  gabapentin.   Pt was informed that his Zyprexa order is for agitation while his ordered PRN Gabapentin is 100 mg.  Perry accepted the 100 mg PRN at 0225 and informed the writer to tell his doctor that he will like his PRN Gabapentin increase to 600 mg and also  will like to take his Zyprexa for anxiety. Sticky note put in for provider and oncoming shift  updated in report.  No reported safety concern this shift.    Patient is cooperative this evening. He is anxious, irritable but remains calm behaviorally. He is social with peers and staff and is visible in the milieu. He denies depression, paranoia, delusions and denies SI/SIB/HI. Pt also denies AH/VH. Pt is complaining of flank pain on both sides of the lower back area, tylenol 650mg administered before he went to sleep. No medication side effects stated or observed. Pt is eating and hydrating well. Will continue to monitor.     He states that he is missing his eye glasses's and tooth dentures. None were found in his belongings. Writer also went to the emergency dept to look for them. None were found. Pt is a poor historian as to when he last specifically had it.     Patient appeared to sleep comfortably for 5.5 hours without s/s of respiratory distress. No complaints of pain and other medical issues. No behavioral concerns reported or observed. No PRNs given. Patient slept 5.5 hours.     ----------------------  Upon interview,     Kb initially said that he wanted a higher dose of Suboxone but when informed what  "current dose was, he said that the dose right now \"is fine.\" He denies cravings. Would like the Suboxone to be administered sooner than bedtime, if possible, as this is how he is taking outside of the hospital. He said that he feels safe at this time, with plan for discharge as long as he is discharged to a structured setting. He prefers not to do residential CD treatment because \"I have been through it 70 times and I know what I gotta do to stay sober.\" However, he said that if the timeframe for GH placement is more than one week out, he would consider engaging in residential programming in the meantime. Discussed R/B/A of Zyprexa and recommended avoiding use of this medication for anxiety. He expressed understanding and suspects that it has been contributing to weight gain. He said that he continues to experience sleep disturbances due to trauma based nightmares. He is open to a trial of prazosin. He was encouraged to improve water intake and denies lightheadedness/dizziness upon standing.     Suicidal ideation: denies current or recent suicidal ideation or behaviors.    Homicidal ideation: denies current or recent homicidal ideation or behaviors.    Psychotic symptoms:  VH, delusions. Reports some \"paranoid thoughts\" and baseline AH being more quiet. Denies command AH.     Medication side effects reported: No significant side effects outside of some increased day-time somnolence.     Acute medical concerns: none    Other issues reported by patient:  Patient had no further questions or concerns.           Medications:     Current Facility-Administered Medications   Medication Dose Route Frequency Provider Last Rate Last Admin    [Held by provider] atomoxetine (STRATTERA) capsule 80 mg  80 mg Oral Daily Kulwant Kohler MD        atorvastatin (LIPITOR) tablet 20 mg  20 mg Oral QPM Petr Crawford MD   20 mg at 11/05/24 1923    buprenorphine HCl-naloxone HCl (SUBOXONE) 4-1 MG per film 1 Film  1 Film Sublingual Daily at 4 " "pm uKlwant Kohler MD   1 Film at 11/05/24 1655    buprenorphine HCl-naloxone HCl (SUBOXONE) 8-2 MG per film 1 Film  1 Film Sublingual BID Kulwant Kohler MD   1 Film at 11/06/24 0846    FLUoxetine (PROzac) capsule 40 mg  40 mg Oral Daily Kulwant Kohler MD   40 mg at 11/06/24 0846    gabapentin (NEURONTIN) tablet 600 mg  600 mg Oral TID Kulwant Kohler MD   600 mg at 11/06/24 0846    lithium (ESKALITH CR/LITHOBID) CR tablet 900 mg  900 mg Oral At Bedtime Kulwant Kohler MD   900 mg at 11/05/24 2137    multivitamin w/minerals (THERA-VIT-M) tablet 1 tablet  1 tablet Oral Daily Kulwant Kohler MD   1 tablet at 11/06/24 0846    nicotine (NICODERM CQ) 21 MG/24HR 24 hr patch 1 patch  1 patch Transdermal Daily Leni Carias APRN CNP   1 patch at 11/06/24 0846    QUEtiapine (SEROquel) tablet 400 mg  400 mg Oral At Bedtime Kulwant Kohler MD   400 mg at 11/05/24 2137    QUEtiapine (SEROquel) tablet 50 mg  50 mg Oral BID Petr Crawford MD   50 mg at 11/06/24 0846          Allergies:     Allergies   Allergen Reactions    Wellbutrin [Bupropion] Anaphylaxis and Swelling     Facial swelling    Wellbutrin [Bupropion]     Wellbutrin [Bupropion] Difficulty breathing    Naltrexone Other (See Comments)     Headaches  Headaches            Labs:     No results found for this or any previous visit (from the past 24 hours).         Psychiatric Examination:     /69 (BP Location: Left arm, Patient Position: Sitting, Cuff Size: Adult Large)   Pulse 72   Temp 97  F (36.1  C) (Temporal)   Resp 16   Ht 1.905 m (6' 3\")   Wt 114.2 kg (251 lb 12.8 oz)   SpO2 94%   BMI 31.47 kg/m    Weight is 251 lbs 12.8 oz  Body mass index is 31.47 kg/m .    Weight over time:  Vitals:    10/29/24 1646   Weight: 114.2 kg (251 lb 12.8 oz)       Orthostatic Vitals         Most Recent      Sitting Orthostatic /70 10/31 0823    Sitting Orthostatic Pulse (bpm) 70 10/31 0823    Standing Orthostatic /69 10/31 0823    Standing Orthostatic Pulse (bpm) " "76 10/31 0823              Cardiometabolic risk assessment. 10/31/24      Reviewed patient profile for cardiometabolic risk factors    Date taken /Value  REFERENCE RANGE   Abdominal Obesity  (Waist Circumference)   See nursing flowsheet Women >=35 in (88 cm)   Men >=40 in (102 cm)      Triglycerides  Triglycerides   Date Value Ref Range Status   05/19/2024 173 (H) <150 mg/dL Final   07/08/2021 112 <150 mg/dL Final       >=150 mg/dL (1.7 mmol/L) or current treatment for elevated triglycerides   HDL cholesterol  HDL Cholesterol   Date Value Ref Range Status   07/08/2021 33 (L) >39 mg/dL Final     Direct Measure HDL   Date Value Ref Range Status   05/19/2024 32 (L) >=40 mg/dL Final   ]   Women <50 mg/dL (1.3 mmol/L) in women or current treatment for low HDL cholesterol  Men <40 mg/dL (1 mmol/L) in men or current treatment for low HDL cholesterol     Fasting plasma glucose (FPG) Lab Results   Component Value Date     10/30/2024    GLC 90 07/08/2021      FPG >=100 mg/dL (5.6 mmol/L) or treatment for elevated blood glucose   Blood pressure  BP Readings from Last 3 Encounters:   11/05/24 105/69   07/11/24 97/68   06/06/24 126/78    Blood pressure >=130/85 mmHg or treatment for elevated blood pressure   Family History  See family history     Mental Status Exam:  Appearance: awake, alert and adequately groomed  Attitude:  cooperative  Eye Contact:  good  Mood:  \"anxious\"  Affect:  mood congruent  Speech:  clear, coherent  Language: fluent and intact in English  Psychomotor, Gait, Musculoskeletal:  no evidence of tardive dyskinesia, dystonia, or tics  Throught Process:  linear and goal oriented  Associations:  no loose associations  Thought Content:  no evidence of suicidal ideation or homicidal ideation and no visual hallucinations present. Reports baseline non-command AH and paranoid ideation.   Insight:  fair  Judgement:  fair  Oriented to:  time, person, and place  Attention Span and Concentration:  intact  Recent " and Remote Memory:  intact  Fund of Knowledge:  appropriate         Precautions:     Behavioral Orders   Procedures    Assault precautions    Cheeking Precautions (behavioral units)    Code 1 - Restrict to Unit    Routine Programming     As clinically indicated    Status 15     Every 15 minutes.    Suicide precautions: Suicide Risk: HIGH     Patients on Suicide Precautions should have a Combination Diet ordered that includes a Diet selection(s) AND a Behavioral Tray selection for Safe Tray - with utensils, or Safe Tray - NO utensils       Order Specific Question:   Suicide Risk     Answer:   HIGH          Diagnoses:     Schizoaffective Disorder, Bipolar Type  Stimulant use disorder (with +UDS for methamphetamine)  PTSD, historical   ADHD, historical   Toxic Encephalopathy, resolved          Assessment & Plan:     Assessment and hospital summary:  Perry Preciado is a 49 year old with history of MDD, ADHD, poly-substance use disorder (stimulants, alcohol, opioids) who presented to ED with  in context of worsened  psychosis  SI with plan in context of medications non-adherence due to no access to medications including Subotex, this leading to cravings and relapsing on methamphetamine (reported laced with Fentanyl).     Symptoms and presentation at this time is most consistent with primary psychotic disorder with decompensation in context of medication non-adherence and substance use relapse, per our conversation today (see further details in HPI section above) appears that hx of psychotic symptoms starting at a young age and mood changes are more consistent with schizoaffective disorder.     We have discussed the risks, benefits, and alternatives of recommendations for medication changes (described in the plan section below) to target JAQUELINE and psychotic symptoms. Patient is in agreement with recommendations.      Identified psychosocial stressors include housing instability, unemployment and JAQUELINE. He is motivated to  undergoing CD treatment once more stable. Agreeing with CD consult.       Inpatient psychiatric hospitalization is warranted at this time for safety, stabilization, and possible adjustment in medications.    Today,   Kb reported residual and persisting AH that are less distressing as well as previously reported paranoid ideations driving heightened anxiety. Overall, although improved psychotic symptoms, which he attributes to his Seroquel helping,it appears that these are still driving heightened anxiety. It is also possible that subacute withdrawal syndrome symptoms are also contributing to increased anxiety.   We discussed medication changes including the changes reflected in the section below and Kb agreed with recommendations.       Today's Changes:  -Adjusted prn indication for Zyprexa to agitation only. Should not be using for anxiety.   - Adjusted administration times of both Gabapentin and Subutex per pt request  - Add prazosin 1 mg at bedtime for trauma based nightmares      Target psychiatric symptoms and interventions:  #Psychosis/depressive syndrome  -Seroquel 400 mg PO at bedtime and 50 mg PO BID  -Lithium at 900 mg PO at bedtime   -Prozac 40 mg PO qDay    Substance use with EDS+ for cannabinoids and methamphetamine  -Gabapentin 600 mg PO TID for heightened anxiety/withdrawal sx.     CD consult order placed, and completed on 10/31/24 by Jaren Dai, [facility], appreciate recommendations that include:   1)  Complete an Intensive Outpatient MICD Treatment Program  2)  Abstain from all mood-altering chemicals unless prescribed by a licensed provider.   3)  Attend, at minimum, 2 weekly support group meetings, such as 12 step based (AA/NA), SMART Recovery, Health Realizations, and/or Refuge Recovery meetings.     4)  Actively work with a mentor/sponsor on a weekly basis.   5)  Follow all the recommendations of your treatment/medical providers.  6)  Patient may benefit from obtaining a full mental health  evaluation.        Acute Medical Problems and Treatments:  Acute medical concerns:  - No acute medical concerns         Hospital course:   Perry Preciado presented to the ED via EMS on 10/24/24  from sober house with apparent substance intoxication with UDS+ for , psychosis and SI with plan.  cannabinoids, amphetamines, benzodiazepine (Versed administered in the ED). He did not exhibit violent/aggressive behaviors, and did not require restraints/seclusion. Pt admitted under a voluntary status.     - 10/30/24: Continued SIO 1:1 due to SI with plan. PTA meds were continued and changes made included: Increased Seroquel to 200 mg PO at bedtime (was taking 400 mg at bedtime, restarted in the ED at 100 mg at bedtime). Restarted PTA Lithium at 600 mg PO at bedtime for mood stabilization (was taking 900 mg PO at bedtime PTA). Ordered baseline TSH and CMP. Continued COWS protocol. Suboxone was increased to total daily dose of 12 mg. Gabapentin 600 mg PO TID for heightened anxiety/withdrawal sx. CD consult order placed, pt agreed with consult. Lithium level ordered for 11/4/24     - 10/31/24: Increase buprenorphine HCl-naloxone HCl (SUBOXONE) to 8-2 MG film 1 Film sublingual BID. Continue COWS protocol for now and reassess. Increase lithium to 900 mg PO at bedtime. Increase Seroquel to 300 mg PO at bedtime Increase Prozac to 40 mg PO qDay.     - 11/4/24: -Lithium level reviewed today (0.88 mmol/L). Decreased Suboxone to 8-4 mg at 1600 dose (reducing from a total of 24 mg daily to 20 mg daily) due to observed/reported increase daytime sedation. No apparent withdrawal-like symptoms. Recent COWS scores 1-2. Discontinued COWS protocol. Change scheduled gabapentin to receive PO dosing minimum 2 hrs apart from Suboxone dose due to concerns for possible enhancement of Suboxone when co-administered. Increase Seroquel to PTA dose of 400 mg PO at bedtime to better target paranoid thoughts and AH.       Pertinent  labs/imaging:  Recent Labs   Lab Test 11/04/24  0845 10/30/24  1947 05/30/24  1046 05/27/24  0752 05/22/24  0756   LITHIUM 0.88  --   --  0.85 0.53*   CR  --  1.07  --  1.04 1.10   SG  --   --  1.008  --   --    TSH  --  0.83  --   --  2.36     Recent Labs   Lab Test 05/29/24  1443 05/15/24  2334 01/07/24  0242   WBC 8.3 11.4* 5.4   ANEUTAUTO  --  7.8 2.7      Recent Labs   Lab Test 10/30/24  1947 05/27/24  0752 05/22/24  0756 05/19/24  0732   * 133*   < >  --    A1C  --   --   --  5.6    < > = values in this interval not displayed.     Recent Labs   Lab Test 05/19/24  0732 07/08/21  0749   CHOL 133 152   TRIG 173* 112   LDL 66 97   HDL 32* 33*     Recent Labs   Lab Test 10/30/24  1947 05/27/24  0752   AST 32 40   ALT 43 56   ALKPHOS 114 118     Recent Labs   Lab Test 05/29/24  1443 05/15/24  2334 01/07/24  0242 12/19/23  0844 12/12/23  1738   WBC 8.3 11.4* 5.4   < > 8.5   ANEUTAUTO  --  7.8 2.7  --  4.3   HGB 12.9* 13.2* 13.5   < > 15.6    248 268   < > 264    < > = values in this interval not displayed.      Behavioral/Psychological/Social:  - Encourage unit programming    Safety:  - Continue precautions as noted above  - Status 15 minute checks  - Patient SIO status reviewed with team/RN.  SIO was discontinued on 10/31 as pt denied SI intent or plan in hospital and contracted for safety.  Please also refer to RN/team documentation for add'l details.     Legal Status: voluntary    Disposition Plan   Reason for ongoing admission: poses an imminent risk to self, is unable to care for self due to severe psychosis or mily, and requires detoxification from substance that poses a risk of bodily harm during withdrawal period  Discharge location: Likely  with OP CD treatment at [facility]. Please see CTC notes for more details.   Discharge Medications: not ordered  Follow-up Appointments: not scheduled      --------------------------------  Yolanda Crockett MD  [facility] Psychiatry

## 2024-11-06 NOTE — PLAN OF CARE
"  Problem: Psychotic Signs/Symptoms  Goal: Improved Psychomotor Symptoms (Psychotic Signs/Symptoms)  Outcome: Progressing   Goal Outcome Evaluation:    Patient is cooperative this evening. He is anxious, irritable but remains calm behaviorally. He is social with peers and staff and is visible in the milieu. He denies depression, paranoia, delusions and denies SI/SIB/HI. Pt also denies AH/VH. Pt is complaining of flank pain on both sides of the lower back area, tylenol 650mg administered before he went to sleep. No medication side effects stated or observed. Pt is eating and hydrating well. Will continue to monitor.    //He states that he is missing his eye glasses's and tooth dentures. None were found in his belongings. Writer also went to the emergency dept to look for them. None were found. Pt is a poor historian as to when he last specifically had it.     /69 (BP Location: Left arm, Patient Position: Sitting, Cuff Size: Adult Large)   Pulse 72   Temp 97  F (36.1  C) (Temporal)   Resp 16   Ht 1.905 m (6' 3\")   Wt 114.2 kg (251 lb 12.8 oz)   SpO2 94%   BMI 31.47 kg/m             Last 24H PRN:     acetaminophen (TYLENOL) tablet 650 mg, 650 mg at 11/05/24 2157    gabapentin (NEURONTIN) capsule 100 mg, 100 mg at 11/05/24 0225    hydrOXYzine HCl (ATARAX) tablet 50 mg, 50 mg at 11/05/24 1748    nicotine (COMMIT) lozenge 2-4 mg, 4 mg at 11/05/24 2138    OLANZapine zydis (zyPREXA) ODT tab 10 mg, 10 mg at 11/05/24 1451 **OR** OLANZapine (zyPREXA) injection 10 mg     "

## 2024-11-06 NOTE — PROVIDER NOTIFICATION
11/06/24 1110   Individualization/Patient Specific Goals   Patient Personal Strengths expressive of needs;family/social support   Behavioral Team Discussion   Participants Dr Crockett, attending MD, Elsie ROBLERO, Harmony Reagan RN   Anticipated length of stay 1-2 weeks   Medical/Physical Withdrawal symptoms   Precautions SI   Plan Pt arrived last evening and reports high anxiety, depression and SI thoughts. Denies having any current plan. Denies access to firearms. He is voluntary. Is open to JAQUELINE consult. Would like to go to group home. I reached out to his CM and [facility] CM for discharge planning when he is ready. Still exhibiting withdrawal symptoms and working on med stabilization.   Safety Plan To be completed with unit therapist prior to discharge   Anticipated Discharge Disposition group home;substance use treatment     Goal Outcome Evaluation:         PRECAUTIONS AND SAFETY    Behavioral Orders   Procedures    Assault precautions    Cheeking Precautions (behavioral units)    Code 1 - Restrict to Unit    Routine Programming     As clinically indicated    Status 15     Every 15 minutes.    Suicide precautions: Suicide Risk: HIGH     Patients on Suicide Precautions should have a Combination Diet ordered that includes a Diet selection(s) AND a Behavioral Tray selection for Safe Tray - with utensils, or Safe Tray - NO utensils       Order Specific Question:   Suicide Risk     Answer:   HIGH       Safety  Safety WDL: WDL  Patient Location: patient room, own  Observed Behavior: pacing, walking  Safety Measures: safety rounds completed  Diversional Activity: television  Suicidality: Status 15, Minimal furniture in room  Withdrawal: monitor withdrawal process  Seizure precautions: clutter free environment, calm, consistent lighting  Assault: status 15

## 2024-11-06 NOTE — PLAN OF CARE
"  Rehab Group    Start time: 1115  End time: 1200  Patient time total: 35 minutes    attended partial group     #3 attended   Group Type: OT Clinic   Group Topic Covered: coping skills, healthy leisure time, and relaxation      Group Session Detail:    Intervention: Pt participated in a OT Clinic group to facilitate coping skills exploration and creative expression through personally meaningful activities, and to encourage utilization of these healthy coping skills to promote overall health and wellness. Group included clinical observation of social, cognitive and kinesthetic performance skills to inform treatment and safe discharge planning.    Mood/Affect: Pleasant     Plan: Patient encouraged to maintain attendance for continued ongoing support in working towards occupational therapy goals to support overall treatment/care.        Patient Detail:    Joined at the start of group, motivated to begin working on a project similar to one from a previous admission. Requested needed supplies from writer and ind worked on this for duration of time in group. Was briefly social with writer when they initiated the conversation. When writer offered a compliment on how their project was turning out, patient at first stated \"no, it looks dumb\" and appeared initially hesitant to accept the comment. On another occasion asked another peer sitting at the table as well \"does this look okay?\".       [id number] OT Group (2 or more in attendance)    Patient Active Problem List   Diagnosis    Bilateral carpal tunnel syndrome    KANE (generalized anxiety disorder)    Chronic pain syndrome    Hyperglycemia    Hypertriglyceridemia    Lung nodule    Major depressive disorder, recurrent (H)    Mild intermittent asthma without complication    Tobacco use disorder    Displacement of lumbar intervertebral disc without myelopathy    History of ADHD    History of suicide attempt    Chronic right-sided low back pain with right-sided sciatica    " Psychosis (H)    Suicidal behavior without attempted self-injury    Methamphetamine use disorder, severe, in early remission (H)    Polysubstance abuse (H)    Moderate opioid dependence in early remission (H)    Hx of sepsis    Hx of intravenous drug use in remission    Suicidal ideation    Paranoia (H)

## 2024-11-06 NOTE — PLAN OF CARE
Rehab Group    Start time: 1030  End time: 1115  Patient time total: 15 minutes    attended partial group     #3 attended   Group Type: occupational therapy   Group Topic Covered: coping skills, healthy leisure time, Physical activity, and relaxation      Group Session Detail:    Patient Response: Pt partcipated in group focused on leisure participation and exploration, socialization and movement. Discussed how participation in leisure activities can be used as a healthy coping skill in symptom management and a strategy to reduce stress.    Mood/Affect: Pleasant      Plan: Patient encouraged to maintain attendance for continued ongoing support in working towards occupational therapy goals to support overall treatment/care.        Patient Detail:    Eager to join for group [id number] activity after having expressed interest in the group while talking to writer yesterday. Was an active participant with two other peers during this time. Was social off and on with these peers, offered them compliments on their playing. After about 15 minutes stated he was tired and needed to take a break. Walked around the periphery of group, but did not rejoin. Continues to fixate on wanting to go to group home whenever able.       [id number] OT Group (2 or more in attendance)    Patient Active Problem List   Diagnosis    Bilateral carpal tunnel syndrome    KANE (generalized anxiety disorder)    Chronic pain syndrome    Hyperglycemia    Hypertriglyceridemia    Lung nodule    Major depressive disorder, recurrent (H)    Mild intermittent asthma without complication    Tobacco use disorder    Displacement of lumbar intervertebral disc without myelopathy    History of ADHD    History of suicide attempt    Chronic right-sided low back pain with right-sided sciatica    Psychosis (H)    Suicidal behavior without attempted self-injury    Methamphetamine use disorder, severe, in early remission (H)    Polysubstance abuse (H)    Moderate opioid  dependence in early remission (H)    Hx of sepsis    Hx of intravenous drug use in remission    Suicidal ideation    Paranoia (H)

## 2024-11-06 NOTE — PLAN OF CARE
BEH IP Unit Acuity Rating Score (UARS)  Patient is given one point for every criteria they meet.    CRITERIA SCORING   On a 72 hour hold, court hold, committed, stay of commitment, or revocation. 0    Patient LOS on BEH unit exceeds 20 days. 0  LOS: 8   Patient under guardianship, 55+, otherwise medically complex, or under age 11. 0   Suicide ideation without relief of precipitating factors. 1   Current plan for suicide. 0   Current plan for homicide. 0   Imminent risk or actual attempt to seriously harm another without relief of factors precipitating the attempt. 0   Severe dysfunction in daily living (ex: complete neglect for self care, extreme disruption in vegetative function, extreme deterioration in social interactions). 1   Recent (last 7 days) or current physical aggression in the ED or on unit. 0   Restraints or seclusion episode in past 72 hours. 0   Recent (last 7 days) or current verbal aggression, agitation, yelling, etc., while in the ED or unit. 0   Active psychosis. 0   Need for constant or near constant redirection (from leaving, from others, etc).  0   Intrusive or disruptive behaviors. 0   Patient requires 3 or more hours of individualized nursing care per 8-hour shift (i.e. for ADLs, meds, therapeutic interventions). 0   TOTAL 2

## 2024-11-07 PROBLEM — F25.0 SCHIZOAFFECTIVE DISORDER, BIPOLAR TYPE (H): Status: ACTIVE | Noted: 2024-11-07

## 2024-11-07 PROBLEM — T78.40XA ALLERGIC REACTION: Status: ACTIVE | Noted: 2024-11-07

## 2024-11-07 PROCEDURE — 250N000013 HC RX MED GY IP 250 OP 250 PS 637: Performed by: PSYCHIATRY & NEUROLOGY

## 2024-11-07 PROCEDURE — 250N000013 HC RX MED GY IP 250 OP 250 PS 637: Performed by: EMERGENCY MEDICINE

## 2024-11-07 PROCEDURE — 250N000012 HC RX MED GY IP 250 OP 636 PS 637

## 2024-11-07 PROCEDURE — 250N000012 HC RX MED GY IP 250 OP 636 PS 637: Performed by: PSYCHIATRY & NEUROLOGY

## 2024-11-07 PROCEDURE — 99232 SBSQ HOSP IP/OBS MODERATE 35: CPT | Mod: GC | Performed by: PSYCHIATRY & NEUROLOGY

## 2024-11-07 PROCEDURE — 250N000013 HC RX MED GY IP 250 OP 250 PS 637

## 2024-11-07 PROCEDURE — 90832 PSYTX W PT 30 MINUTES: CPT

## 2024-11-07 PROCEDURE — 124N000002 HC R&B MH UMMC

## 2024-11-07 PROCEDURE — 99222 1ST HOSP IP/OBS MODERATE 55: CPT

## 2024-11-07 PROCEDURE — 97150 GROUP THERAPEUTIC PROCEDURES: CPT | Mod: GO

## 2024-11-07 PROCEDURE — 250N000013 HC RX MED GY IP 250 OP 250 PS 637: Performed by: NURSE PRACTITIONER

## 2024-11-07 RX ORDER — DIPHENHYDRAMINE HCL 25 MG
25-50 CAPSULE ORAL EVERY 4 HOURS PRN
Qty: 30 CAPSULE | Refills: 0 | Status: ON HOLD | OUTPATIENT
Start: 2024-11-07 | End: 2024-11-12

## 2024-11-07 RX ORDER — DIPHENHYDRAMINE HCL 50 MG
50 CAPSULE ORAL EVERY 6 HOURS PRN
Status: DISCONTINUED | OUTPATIENT
Start: 2024-11-07 | End: 2024-11-07

## 2024-11-07 RX ORDER — NALOXONE HYDROCHLORIDE 0.4 MG/ML
0.4 INJECTION, SOLUTION INTRAMUSCULAR; INTRAVENOUS; SUBCUTANEOUS
Status: DISCONTINUED | OUTPATIENT
Start: 2024-11-07 | End: 2024-11-08 | Stop reason: HOSPADM

## 2024-11-07 RX ORDER — BUPRENORPHINE 2 MG/1
TABLET SUBLINGUAL
Qty: 300 TABLET | Refills: 0 | Status: ON HOLD | OUTPATIENT
Start: 2024-11-08 | End: 2024-11-13

## 2024-11-07 RX ORDER — BUPRENORPHINE 8 MG/1
8 TABLET SUBLINGUAL 2 TIMES DAILY
Status: DISCONTINUED | OUTPATIENT
Start: 2024-11-07 | End: 2024-11-08 | Stop reason: HOSPADM

## 2024-11-07 RX ORDER — LITHIUM CARBONATE 450 MG
900 TABLET, EXTENDED RELEASE ORAL AT BEDTIME
Qty: 60 TABLET | Refills: 0 | Status: ON HOLD | OUTPATIENT
Start: 2024-11-07 | End: 2024-11-12

## 2024-11-07 RX ORDER — POLYETHYLENE GLYCOL 3350 17 G
2-4 POWDER IN PACKET (EA) ORAL
Qty: 48 LOZENGE | Refills: 0 | Status: ON HOLD | OUTPATIENT
Start: 2024-11-07 | End: 2024-11-12

## 2024-11-07 RX ORDER — NALOXONE HYDROCHLORIDE 0.4 MG/ML
0.2 INJECTION, SOLUTION INTRAMUSCULAR; INTRAVENOUS; SUBCUTANEOUS
Status: DISCONTINUED | OUTPATIENT
Start: 2024-11-07 | End: 2024-11-08 | Stop reason: HOSPADM

## 2024-11-07 RX ORDER — DIAPER,BRIEF,INFANT-TODD,DISP
EACH MISCELLANEOUS 2 TIMES DAILY
Status: DISCONTINUED | OUTPATIENT
Start: 2024-11-07 | End: 2024-11-08 | Stop reason: HOSPADM

## 2024-11-07 RX ORDER — BUPRENORPHINE 2 MG/1
4 TABLET SUBLINGUAL DAILY
Status: DISCONTINUED | OUTPATIENT
Start: 2024-11-07 | End: 2024-11-08 | Stop reason: HOSPADM

## 2024-11-07 RX ORDER — DIAPER,BRIEF,INFANT-TODD,DISP
EACH MISCELLANEOUS 2 TIMES DAILY
Qty: 15 G | Refills: 0 | Status: ON HOLD | OUTPATIENT
Start: 2024-11-07 | End: 2024-11-12

## 2024-11-07 RX ORDER — QUETIAPINE FUMARATE 400 MG/1
400 TABLET, FILM COATED ORAL AT BEDTIME
Qty: 30 TABLET | Refills: 0 | Status: ON HOLD | OUTPATIENT
Start: 2024-11-07 | End: 2024-11-12

## 2024-11-07 RX ORDER — QUETIAPINE FUMARATE 50 MG/1
50 TABLET, FILM COATED ORAL 2 TIMES DAILY
Qty: 60 TABLET | Refills: 0 | Status: ON HOLD | OUTPATIENT
Start: 2024-11-07 | End: 2024-11-12

## 2024-11-07 RX ORDER — GABAPENTIN 600 MG/1
600 TABLET ORAL 3 TIMES DAILY
Qty: 90 TABLET | Refills: 0 | Status: ON HOLD | OUTPATIENT
Start: 2024-11-07 | End: 2024-11-12

## 2024-11-07 RX ORDER — PRAZOSIN HYDROCHLORIDE 1 MG/1
1 CAPSULE ORAL AT BEDTIME
Qty: 30 CAPSULE | Refills: 0 | Status: ON HOLD | OUTPATIENT
Start: 2024-11-07 | End: 2024-11-12

## 2024-11-07 RX ORDER — FLUOXETINE 40 MG/1
40 CAPSULE ORAL DAILY
Qty: 30 CAPSULE | Refills: 0 | Status: ON HOLD | OUTPATIENT
Start: 2024-11-08 | End: 2024-11-12

## 2024-11-07 RX ORDER — BUPRENORPHINE 2 MG/1
8 TABLET SUBLINGUAL ONCE
Status: COMPLETED | OUTPATIENT
Start: 2024-11-07 | End: 2024-11-07

## 2024-11-07 RX ORDER — DIPHENHYDRAMINE HCL 25 MG
25-50 CAPSULE ORAL EVERY 4 HOURS PRN
Status: DISCONTINUED | OUTPATIENT
Start: 2024-11-07 | End: 2024-11-08

## 2024-11-07 RX ADMIN — QUETIAPINE FUMARATE 50 MG: 50 TABLET ORAL at 21:39

## 2024-11-07 RX ADMIN — BUPRENORPHINE 4 MG: 2 TABLET SUBLINGUAL at 14:02

## 2024-11-07 RX ADMIN — HYDROXYZINE HYDROCHLORIDE 50 MG: 50 TABLET, FILM COATED ORAL at 15:54

## 2024-11-07 RX ADMIN — HYDROCORTISONE: 0.5 CREAM TOPICAL at 19:34

## 2024-11-07 RX ADMIN — FLUOXETINE HYDROCHLORIDE 40 MG: 20 CAPSULE ORAL at 07:43

## 2024-11-07 RX ADMIN — NICOTINE POLACRILEX 4 MG: 2 LOZENGE ORAL at 19:27

## 2024-11-07 RX ADMIN — NICOTINE POLACRILEX 4 MG: 2 LOZENGE ORAL at 12:47

## 2024-11-07 RX ADMIN — DIPHENHYDRAMINE HYDROCHLORIDE 50 MG: 50 CAPSULE ORAL at 08:43

## 2024-11-07 RX ADMIN — NICOTINE 1 PATCH: 21 PATCH, EXTENDED RELEASE TRANSDERMAL at 07:45

## 2024-11-07 RX ADMIN — QUETIAPINE FUMARATE 50 MG: 50 TABLET ORAL at 07:43

## 2024-11-07 RX ADMIN — PRAZOSIN HYDROCHLORIDE 1 MG: 1 CAPSULE ORAL at 21:39

## 2024-11-07 RX ADMIN — DIPHENHYDRAMINE HYDROCHLORIDE 25 MG: 25 CAPSULE ORAL at 13:14

## 2024-11-07 RX ADMIN — BUPRENORPHINE 8 MG: 2 TABLET SUBLINGUAL at 11:09

## 2024-11-07 RX ADMIN — DIPHENHYDRAMINE HYDROCHLORIDE 50 MG: 25 CAPSULE ORAL at 21:42

## 2024-11-07 RX ADMIN — BUPRENORPHINE 8 MG: 2 TABLET SUBLINGUAL at 18:00

## 2024-11-07 RX ADMIN — NICOTINE POLACRILEX 4 MG: 2 LOZENGE ORAL at 07:19

## 2024-11-07 RX ADMIN — LITHIUM CARBONATE 900 MG: 450 TABLET, EXTENDED RELEASE ORAL at 19:32

## 2024-11-07 RX ADMIN — NICOTINE POLACRILEX 4 MG: 2 LOZENGE ORAL at 15:54

## 2024-11-07 RX ADMIN — GABAPENTIN 600 MG: 600 TABLET, FILM COATED ORAL at 19:32

## 2024-11-07 RX ADMIN — GABAPENTIN 600 MG: 600 TABLET, FILM COATED ORAL at 11:09

## 2024-11-07 RX ADMIN — HYDROCORTISONE: 0.5 CREAM TOPICAL at 13:14

## 2024-11-07 RX ADMIN — QUETIAPINE FUMARATE 400 MG: 200 TABLET ORAL at 21:40

## 2024-11-07 RX ADMIN — DIPHENHYDRAMINE HYDROCHLORIDE 50 MG: 25 CAPSULE ORAL at 18:00

## 2024-11-07 RX ADMIN — Medication 1 TABLET: at 07:43

## 2024-11-07 RX ADMIN — ATORVASTATIN CALCIUM 20 MG: 20 TABLET, FILM COATED ORAL at 19:32

## 2024-11-07 RX ADMIN — GABAPENTIN 600 MG: 600 TABLET, FILM COATED ORAL at 07:43
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
## 2024-11-07 NOTE — CONSULTS
Consulted to run a test claim for Buprenorphine SL tabs (Subutex).    Patient has pharmacy benefits through [facility]. Per insurance, the following are covered and preferred under the patient's plans:     Buprenorphine 8mg SL tabs (PA thru 4/16/25) - $0  Buprenorphine 2mg SL tabs (PA thru 2/16/25) - $0       Please feel free to contact me with any other test claims, prior authorizations, or insurance questions regarding outpatient medications.     Thanks!      Elizabeth Lentz [facility]  Discharge Pharmacy Liaison  [facility]  Pronouns: She/Her/Hers    Securely message with [employer], Epic Secure Chat, or [employer]  Phone: [phone]  Fax: [phone]  [email or web address]

## 2024-11-07 NOTE — PLAN OF CARE
"Pt had an uneventful shift this day. Mood presents as euthymic with congruent affect. At start of shift, pt c/o itching all over. Pt displayed red pruritic rash. Pt stated it was due to suboxone that pt has a history of hyper sensitivity to the medication to the point of \"throat closing\". AM suboxone held, provider notified. NEW order for PRN benadryl and hydrocortisone cream NEW order to change suboxone to subutex. 0843 50mg Benadryl given with reported relief. 1315 25mg benadryl given for c/o mild itching with reported relief.    No behavioral concerns reported or observed this shift. Pt denies SI, SIB, HI and thoughts of harming others. Pt denies depression and anxiety this shift. Pt denies A/VH. Pt continues to report drug cravings and became anxious at the thought of waiting for subutex dose.    Food and fluid intake adequate, no other physical concerns reported or observed.    VS reviewed: /73 (BP Location: Left arm, Patient Position: Sitting, Cuff Size: Adult Large)   Pulse 68   Temp 98  F (36.7  C) (Temporal)   Resp 16   Ht 1.905 m (6' 3\")   Wt 123.1 kg (271 lb 6.4 oz)   SpO2 98%   BMI 33.92 kg/m   . Patient reports pain..     Length of stay: 9  "

## 2024-11-07 NOTE — PLAN OF CARE
"Goal Outcome Evaluation:    Plan of Care Reviewed With: patient      Problem: Adult Inpatient Plan of Care  Goal: Plan of Care Review  Outcome: Progressing     Problem: Adult Behavioral Health Plan of Care  Goal: Patient-Specific Goal (Individualization)  Outcome: Progressing           Patient spent most of the shift in the milieu socialized with peers and staff. Pleasant and cooperative with cares. Denies SI, SIB, HI, delusion and paranoia. Endorsed anxiety because of the death of his sister who passed away last week and his family issues generally. He said he would like to watch the burial live. He was heard talking on the phone with his mum about $1 million lawsuit. He told this staff that the lawsuit has been going on for the past 2 years and it is not going to be less that $100,000 and more than a million dollar. He ate all his dinner. He took all his medications. His vitals were good. No behavioral or medical concerns reported.      Vital signs:  Temp: 98.2  F (36.8  C) Temp src: Oral BP: 118/78 Pulse: 78   Resp: 18 SpO2: 96 % O2 Device: None (Room air) Oxygen Delivery: 3 LPM Height: 190.5 cm (6' 3\") Weight: 114.2 kg (251 lb 12.8 oz)  Estimated body mass index is 31.47 kg/m  as calculated from the following:    Height as of this encounter: 1.905 m (6' 3\").    Weight as of this encounter: 114.2 kg (251 lb 12.8 oz).          "

## 2024-11-07 NOTE — DISCHARGE INSTRUCTIONS
Behavioral Discharge Planning and Instructions    Summary: You were admitted on 10/24/2024  due to Suicidal Ideations and Chemical Use Issues.  You were treated by Temi Crockett MD and Dr Edita Byrd MD and discharged on *** from [facility]    Main Diagnosis:   Schizoaffective Disorder, current episode depressive with mixed psychosis (exacerbated by substance use).  Stimulant use disorder (with +UDS for methamphetamine)  PTSD, historical   ADHD, historical   Toxic Encephalopathy, resolved    Commitment:  No commitment - Voluntary.     Health Care Follow-up:     SUBUTEX:  Dec 6th - Fri 1PM  with Howie Stinsonutex provider  [facility] - In Person  [address]  [place] [address]  [phone]  Fax: [phone]    PSYCHIATRY:  Dec 9th - Monday 1PM with [facility] telehealth   If you need to change the appts just call [facility] ahead of time.   [phone]  Fax: [phone]    Information will be faxed to your outpatient providers to ensure a healthy continuity of care for you.     Attend all scheduled appointments with your outpatient providers. Call at least 24 hours in advance if you need to reschedule an appointment to ensure continued access to your outpatient providers.     Major Treatments, Procedures and Findings:  You were provided with: a psychiatric assessment, assessed for medical stability, medication evaluation and/or management, group therapy, individual therapy, CD evaluation/assessment, milieu management, and medical interventions    Symptoms to Report: feeling more aggressive, increased confusion, losing more sleep, mood getting worse, or thoughts of suicide    Early warning signs can include: increased depression or anxiety sleep disturbances increased thoughts or behaviors of suicide or self-harm  increased unusual thinking, such as paranoia or hearing voices    Safety and Wellness:  Take all medicines as  directed.  Make no changes unless your doctor suggests them.      Follow treatment recommendations.  Refrain from alcohol and non-prescribed drugs.  Ask your support system to help you reduce your access to items that could harm yourself or others. If there is a concern for safety, call 911.    Resources:   Mental Health Crisis Resources  Throughout [place]: call **CRISIS (**840061)  Crisis Text Line: is available for free, 24/7 by texting [place] to 116625  Suicide Awareness Voices of Education (SAVE) (www.save.org): 649-430-UUMW (4930)  The [place] Suicide Prevention Lifeline is now: 988 Suicide and Crisis Lifeline. Call 988 anytime.  National [place] on Mental Illness (www.[place].naldo.org): 663.254.5322 or 815-366-7677.  Ymqk5ddyg: text the word LIFE to 28484 for immediate support and crisis intervention  Mental Health Consumer/Survivor Network of [place] (www.mhcsn.net): 983.116.8291 or 038-057-8192  Mental Health Association of [place] (www.mentalhealth.org): 901.200.2233 or 927-225-0008  Peer Support Connection [place] [facility] (PSC) 1-704.771.5535 Available from 5pm - 9am (7 days a week/365 days a year)  Call or Text 989, [facility] 1-192.137.2394, [facility] 1-857.747.5280 Community Outreach for Psych Emergencies, [facility] 1-570.619.3871 [facility] Mental Crisis Program, or [facility] 1-520.410.1111    General Medication Instructions:   See your medication sheet(s) for instructions.   Take all medicines as directed.  Make no changes unless your doctor suggests them.   Go to all your doctor visits.  Be sure to have all your required lab tests. This way, your medicines can be refilled on time.  Do not use any drugs not prescribed by your doctor.  Avoid alcohol.    Advance Directives:   Scanned document on file with [employer]? No scanned doc  Is document scanned? Pt states no documents  Honoring Choices Your Rights Handout: Informed and given  Was more information offered? Pt declined    The Treatment team  has appreciated the opportunity to work with you. If you have any questions or concerns about your recent admission, you can contact the unit which can receive your call 24 hours a day, 7 days a week. They will be able to get in touch with a Provider if needed. The unit number is [phone] .

## 2024-11-07 NOTE — PLAN OF CARE
Goal Outcome Evaluation:  Problem: Sleep Disturbance  Goal: Adequate Sleep/Rest  Outcome: Progressing  Intervention: Promote Sleep/Rest      NOC Shift Report     Pt in bed at beginning of shift, breathing quiet and unlabored. Pt slept through shift. Pt slept 5.75 hours.      No pt complaints or concerns at this time.      No PRNs given. Will continue to monitor.
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
## 2024-11-07 NOTE — PROGRESS NOTES
"[facility], [place]   Psychiatric Progress Note  Hospital Day: 9        Interim History:   The patient's care was discussed with the treatment team during the daily team meeting and/or staff's chart notes were reviewed.     Per RN report/notes:  -\"Patient spent most of the shift in the milieu socialized with peers and staff. Pleasant and cooperative with cares. Denies SI, SIB, HI, delusion and paranoia. Endorsed anxiety because of the death of his sister who passed away last week and his family issues generally. \"    -Pt in bed at beginning of shift, breathing quiet and unlabored. Pt slept through shift. Pt slept 5.75 hours.     -Has been participating and engaged in groups.     -Earlier today reported skin pruritic rash. Staff reported improved with shower. Suboxone AM dose was held.     ----------------------  Upon interview,     Today Kb was seen in his room for our interview. He reports he is feeling particularly anxious today, identifying main  factors being shame/guilt and anger around most recent AMOS relapse as well as next steps in terms of dispo as he is experiencing homelessness.  He shared he is on board with CD treatment and eventually finding long-term living options.     Re: current medications, reports Monday started experiencing more \"itchiness\" and attributes this to Suboxone, reporting this has experienced similar reactions in the past.   He reports (and observed) pruritic rash on arms, legs and back with some obseved scratches on legs. Denies SOB, eye pruritus or any other concerns.   He shares this is the reason why he has been on Subutex (as opposed to Suboxone) in the past.     Denies noticing any other side effects.     Suicidal ideation: denies current or recent suicidal ideation or behaviors.    Homicidal ideation: denies current or recent homicidal ideation or behaviors.    Psychotic symptoms:  Patient denies AH, VH, paranoia, delusions.     Acute medical " concerns: none    Other issues reported by patient:  Patient had no further questions or concerns.             Medications:     Current Facility-Administered Medications   Medication Dose Route Frequency Provider Last Rate Last Admin    [Held by provider] atomoxetine (STRATTERA) capsule 80 mg  80 mg Oral Daily Kulwant Kohler MD        atorvastatin (LIPITOR) tablet 20 mg  20 mg Oral QPM Petr Crawford MD   20 mg at 11/06/24 1916    buprenorphine (SUBUTEX) sublingual tablet 8 mg  8 mg Sublingual BID Kulwant Kohler MD        And    buprenorphine (SUBUTEX) sublingual tablet 4 mg  4 mg Sublingual Daily Kulwant Kohler MD   4 mg at 11/07/24 1402    FLUoxetine (PROzac) capsule 40 mg  40 mg Oral Daily Kulwant Kohler MD   40 mg at 11/07/24 0743    gabapentin (NEURONTIN) tablet 600 mg  600 mg Oral TID Temi Crockett MD   600 mg at 11/07/24 1109    hydrocortisone (CORTAID) 0.5 % cream   Topical BID Ryan Min PA-C   Given at 11/07/24 1314    lithium (ESKALITH CR/LITHOBID) CR tablet 900 mg  900 mg Oral At Bedtime Kulwant Kohler MD   900 mg at 11/06/24 2153    multivitamin w/minerals (THERA-VIT-M) tablet 1 tablet  1 tablet Oral Daily Kulwant Kohler MD   1 tablet at 11/07/24 0743    nicotine (NICODERM CQ) 21 MG/24HR 24 hr patch 1 patch  1 patch Transdermal Daily Leni Carias APRN CNP   1 patch at 11/07/24 0745    prazosin (MINIPRESS) capsule 1 mg  1 mg Oral At Bedtime Temi Crockett MD   1 mg at 11/06/24 2153    QUEtiapine (SEROquel) tablet 400 mg  400 mg Oral At Bedtime Kulwant Kohler MD   400 mg at 11/06/24 2153    QUEtiapine (SEROquel) tablet 50 mg  50 mg Oral BID Petr Crawford MD   50 mg at 11/07/24 0743          Allergies:     Allergies   Allergen Reactions    Wellbutrin [Bupropion] Anaphylaxis and Swelling     Facial swelling    Wellbutrin [Bupropion]     Wellbutrin [Bupropion] Difficulty breathing    Naltrexone Other (See Comments)     Headaches  Headaches            Labs:     No results found for this  "or any previous visit (from the past 24 hours).         Psychiatric Examination:     /73 (BP Location: Left arm, Patient Position: Sitting, Cuff Size: Adult Large)   Pulse 68   Temp 98  F (36.7  C) (Temporal)   Resp 16   Ht 1.905 m (6' 3\")   Wt 123.1 kg (271 lb 6.4 oz)   SpO2 98%   BMI 33.92 kg/m    Weight is 271 lbs 6.4 oz  Body mass index is 33.92 kg/m .    Weight over time:  Vitals:    10/29/24 1646 11/07/24 0744   Weight: 114.2 kg (251 lb 12.8 oz) 123.1 kg (271 lb 6.4 oz)       Orthostatic Vitals         Most Recent      Sitting Orthostatic /70 10/31 0823    Sitting Orthostatic Pulse (bpm) 70 10/31 0823    Standing Orthostatic /69 10/31 0823    Standing Orthostatic Pulse (bpm) 76 10/31 0823              Cardiometabolic risk assessment. 10/31/24      Reviewed patient profile for cardiometabolic risk factors    Date taken /Value  REFERENCE RANGE   Abdominal Obesity  (Waist Circumference)   See nursing flowsheet Women >=35 in (88 cm)   Men >=40 in (102 cm)      Triglycerides  Triglycerides   Date Value Ref Range Status   05/19/2024 173 (H) <150 mg/dL Final   07/08/2021 112 <150 mg/dL Final       >=150 mg/dL (1.7 mmol/L) or current treatment for elevated triglycerides   HDL cholesterol  HDL Cholesterol   Date Value Ref Range Status   07/08/2021 33 (L) >39 mg/dL Final     Direct Measure HDL   Date Value Ref Range Status   05/19/2024 32 (L) >=40 mg/dL Final   ]   Women <50 mg/dL (1.3 mmol/L) in women or current treatment for low HDL cholesterol  Men <40 mg/dL (1 mmol/L) in men or current treatment for low HDL cholesterol     Fasting plasma glucose (FPG) Lab Results   Component Value Date     10/30/2024    GLC 90 07/08/2021      FPG >=100 mg/dL (5.6 mmol/L) or treatment for elevated blood glucose   Blood pressure  BP Readings from Last 3 Encounters:   11/07/24 114/73   07/11/24 97/68   06/06/24 126/78    Blood pressure >=130/85 mmHg or treatment for elevated blood pressure   Family " "History  See family history     Mental Status Exam:  Appearance: awake, alert, adequately groomed, and dressed in hospital scrubs  Attitude:  cooperative  Eye Contact:  good  Mood:  \"anxiety\"  Affect:  appropriate and in normal range for topics discussed   Speech:  clear, coherent  Language: fluent and intact in English  Psychomotor, Gait, Musculoskeletal:  no evidence of tardive dyskinesia, dystonia, or tics  Throught Process:  logical and goal oriented  Associations:  no loose associations  Thought Content:  no evidence of suicidal ideation or homicidal ideation, no auditory hallucinations present, and no visual hallucinations present  Insight:  good  Judgement:  fair, adequate for safety  Oriented to:  time, person, and place  Attention Span and Concentration:  intact  Recent and Remote Memory:  intact  Fund of Knowledge:  appropriate           Precautions:     Behavioral Orders   Procedures    Assault precautions    Cheeking Precautions (behavioral units)    Code 1 - Restrict to Unit    Routine Programming     As clinically indicated    Status 15     Every 15 minutes.    Suicide precautions: Suicide Risk: HIGH     Patients on Suicide Precautions should have a Combination Diet ordered that includes a Diet selection(s) AND a Behavioral Tray selection for Safe Tray - with utensils, or Safe Tray - NO utensils       Order Specific Question:   Suicide Risk     Answer:   HIGH          Diagnoses:     Schizoaffective Disorder, Bipolar Type  Stimulant use disorder (with +UDS for methamphetamine)  PTSD, historical   ADHD, historical   Toxic Encephalopathy, resolved          Assessment & Plan:     Assessment and hospital summary:  Perry Preciado is a 49 year old with history of MDD, ADHD, poly-substance use disorder (stimulants, alcohol, opioids) who presented to ED with  in context of worsened  psychosis  SI with plan in context of medications non-adherence due to no access to medications including Subotex, this leading " to cravings and relapsing on methamphetamine (reported laced with Fentanyl).     Symptoms and presentation at this time is most consistent with primary psychotic disorder with decompensation in context of medication non-adherence and substance use relapse, per our conversation today (see further details in HPI section above) appears that hx of psychotic symptoms starting at a young age and mood changes are more consistent with schizoaffective disorder.     We have discussed the risks, benefits, and alternatives of recommendations for medication changes (described in the plan section below) to target JAQUELINE and psychotic symptoms. Patient is in agreement with recommendations.      Identified psychosocial stressors include housing instability, unemployment and JAQUELINE. He is motivated to undergoing CD treatment once more stable. Agreeing with CD consult.       Inpatient psychiatric hospitalization is warranted at this time for safety, stabilization, and possible adjustment in medications.    Today,   Kb reports main issue is anxiety and is able to identify psychosocial stressors. He is open to work through this with unit therapist and learn more healthy coping skills. We discussed next steps including treatment team recommending CD treatment as he is at high risk for relapse. He expressed that although his preference would be IOP plus group home, he is willing to go to CD treatment and continue working with CM to find more long term living options.   Tentative plan is to discharge door-to door possibly to [facility]. Please see CTC notes for more details.     Re: allergic-like reaction, per IM (appreciate help) it is more likely reaction is due to allergy/hypersensitivity to Suboxone component. Plan is to change Suboxone for Subutex as it appears he had not had any issues with Subutex in the past. Please see IM notes by Ryan Min PA-C for more details.     Today's Changes:  -IM consult placed due to skin rash which pt  attributes to Suboxone. IM consult completed, appreciate recommendations.   -Benadryl PO PRN ordered.  -Discontinue Suboxone 8-4-8.   -Started Subutex 8 mg qDay, 4 mg at 2 PM and 8 mg at 6 PM.       Target psychiatric symptoms and interventions:  #Psychosis/depressive syndrome  -Seroquel 400 mg PO at bedtime and 50 mg PO BID  -Lithium at 900 mg PO at bedtime   -Prozac 40 mg PO qDay    Substance use with EDS+ for cannabinoids and methamphetamine  -Gabapentin 600 mg PO TID for heightened anxiety/withdrawal sx.   -Subutex 8 mg qDay, 4 mg at 2 PM and 8 mg at 6 PM.     CD consult order placed, and completed on 10/31/24 by Jaren Dai, [facility], appreciate recommendations that include:   1)  Complete an Intensive Outpatient MICD Treatment Program  2)  Abstain from all mood-altering chemicals unless prescribed by a licensed provider.   3)  Attend, at minimum, 2 weekly support group meetings, such as 12 step based (AA/NA), SMART Recovery, Health Realizations, and/or Refuge Recovery meetings.     4)  Actively work with a mentor/sponsor on a weekly basis.   5)  Follow all the recommendations of your treatment/medical providers.  6)  Patient may benefit from obtaining a full mental health evaluation.        Acute Medical Problems and Treatments:  Acute medical concerns:  #Skin reaction: per IM (appreciate help) it is more likely reaction is due to allergy/hypersensitivity to Suboxone component. Plan is to change Suboxone for Subutex as it appears he had not had any issues with Subutex in the past. Please see IM notes by Ryan Min PA-C for more details.     -Benadryl 25-50 mg PO q6 h PRN  -Hydrocortisone 0.5% cream, topical, apply to affected areas BID           Hospital course:   Perry Preciado presented to the ED via EMS on 10/24/24  from sober house with apparent substance intoxication with UDS+ for , psychosis and SI with plan.  cannabinoids, amphetamines, benzodiazepine (Versed administered in the ED). He did not  exhibit violent/aggressive behaviors, and did not require restraints/seclusion. Pt admitted under a voluntary status.     - 10/30/24: Continued SIO 1:1 due to SI with plan. PTA meds were continued and changes made included: Increased Seroquel to 200 mg PO at bedtime (was taking 400 mg at bedtime, restarted in the ED at 100 mg at bedtime). Restarted PTA Lithium at 600 mg PO at bedtime for mood stabilization (was taking 900 mg PO at bedtime PTA). Ordered baseline TSH and CMP. Continued COWS protocol. Suboxone was increased to total daily dose of 12 mg. Gabapentin 600 mg PO TID for heightened anxiety/withdrawal sx. CD consult order placed, pt agreed with consult. Lithium level ordered for 11/4/24   - 10/31/24: Increase buprenorphine HCl-naloxone HCl (SUBOXONE) to 8-2 MG film 1 Film sublingual BID. Continue COWS protocol for now and reassess. Increase lithium to 900 mg PO at bedtime. Increase Seroquel to 300 mg PO at bedtime Increase Prozac to 40 mg PO qDay.  - 11/4/24: -Lithium level reviewed today (0.88 mmol/L). Decreased Suboxone to 8-4 mg at 1600 dose (reducing from a total of 24 mg daily to 20 mg daily) due to observed/reported increase daytime sedation. No apparent withdrawal-like symptoms. Recent COWS scores 1-2. Discontinued COWS protocol. Change scheduled gabapentin to receive PO dosing minimum 2 hrs apart from Suboxone dose due to concerns for possible enhancement of Suboxone when co-administered. Increase Seroquel to PTA dose of 400 mg PO at bedtime to better target paranoid thoughts and AH.   - 11/6/24:  Added prazosin 1 mg at bedtime for trauma based nightmares.  - 11/7/24: IM consult placed due to skin rash which pt attributes to Suboxone (held). IM consult completed, appreciate recommendations. Hydrocortisone topical BID, Benadryl PO PRN ordered. Discontinued Suboxone 8-4-8. Started Subutex 8 mg qDay, 4 mg at 2 PM and 8 mg at 6 PM.     Pertinent labs/imaging:  Recent Labs   Lab Test 11/04/24  6703  10/30/24  1947 05/30/24  1046 05/27/24  0752 05/22/24  0756   LITHIUM 0.88  --   --  0.85 0.53*   CR  --  1.07  --  1.04 1.10   SG  --   --  1.008  --   --    TSH  --  0.83  --   --  2.36     Recent Labs   Lab Test 05/29/24  1443 05/15/24  2334 01/07/24  0242   WBC 8.3 11.4* 5.4   ANEUTAUTO  --  7.8 2.7      Recent Labs   Lab Test 10/30/24  1947 05/27/24  0752 05/22/24  0756 05/19/24  0732   * 133*   < >  --    A1C  --   --   --  5.6    < > = values in this interval not displayed.     Recent Labs   Lab Test 05/19/24 0732 07/08/21  0749   CHOL 133 152   TRIG 173* 112   LDL 66 97   HDL 32* 33*     Recent Labs   Lab Test 10/30/24  1947 05/27/24  0752   AST 32 40   ALT 43 56   ALKPHOS 114 118     Recent Labs   Lab Test 05/29/24  1443 05/15/24  2334 01/07/24  0242 12/19/23  0844 12/12/23  1738   WBC 8.3 11.4* 5.4   < > 8.5   ANEUTAUTO  --  7.8 2.7  --  4.3   HGB 12.9* 13.2* 13.5   < > 15.6    248 268   < > 264    < > = values in this interval not displayed.      Behavioral/Psychological/Social:  - Encourage unit programming    Safety:  - Continue precautions as noted above  - Status 15 minute checks  - Patient SIO status reviewed with team/RN.  SIO was discontinued on 10/31 as pt denied SI intent or plan in hospital and contracted for safety.  Please also refer to RN/team documentation for add'l details.     Legal Status: voluntary    Disposition Plan   Reason for ongoing admission: poses an imminent risk to self, is unable to care for self due to severe psychosis or mily, and requires detoxification from substance that poses a risk of bodily harm during withdrawal period  Discharge location: Likely CD treatment is the anticipated plan. Please see CTC notes for more details.   Discharge Medications: ordered.  Follow-up Appointments: scheduled, Please see CTC notes for details.       --------------------------------  Kulwant Kohler MD.  Psychiatry Resident  [facility]     Patient seen with  treatment team and discussed with attending physician, Dr. Crockett, who is in agreement with assessment and plan.
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
## 2024-11-08 ENCOUNTER — TELEPHONE (OUTPATIENT)
Dept: BEHAVIORAL HEALTH | Facility: CLINIC | Age: 49
End: 2024-11-08
Payer: COMMERCIAL

## 2024-11-08 ENCOUNTER — APPOINTMENT (OUTPATIENT)
Dept: CT IMAGING | Facility: CLINIC | Age: 49
End: 2024-11-08
Payer: COMMERCIAL

## 2024-11-08 ENCOUNTER — HOSPITAL ENCOUNTER (INPATIENT)
Facility: CLINIC | Age: 49
LOS: 1 days | Discharge: ANOTHER HOSPITAL | End: 2024-11-09
Attending: STUDENT IN AN ORGANIZED HEALTH CARE EDUCATION/TRAINING PROGRAM | Admitting: STUDENT IN AN ORGANIZED HEALTH CARE EDUCATION/TRAINING PROGRAM
Payer: COMMERCIAL

## 2024-11-08 VITALS
HEIGHT: 75 IN | DIASTOLIC BLOOD PRESSURE: 86 MMHG | OXYGEN SATURATION: 90 % | HEART RATE: 77 BPM | WEIGHT: 272.27 LBS | SYSTOLIC BLOOD PRESSURE: 122 MMHG | BODY MASS INDEX: 33.85 KG/M2 | TEMPERATURE: 99.5 F | RESPIRATION RATE: 18 BRPM

## 2024-11-08 DIAGNOSIS — G03.9 MENINGITIS: Primary | ICD-10-CM

## 2024-11-08 LAB
ANION GAP SERPL CALCULATED.3IONS-SCNC: 11 MMOL/L (ref 7–15)
APTT PPP: 33 SECONDS (ref 22–38)
BUN SERPL-MCNC: 21.5 MG/DL (ref 6–20)
CALCIUM SERPL-MCNC: 9.4 MG/DL (ref 8.8–10.4)
CHLORIDE SERPL-SCNC: 99 MMOL/L (ref 98–107)
CREAT SERPL-MCNC: 1.28 MG/DL (ref 0.67–1.17)
CRP SERPL-MCNC: 4.79 MG/L
EGFRCR SERPLBLD CKD-EPI 2021: 69 ML/MIN/1.73M2
ERYTHROCYTE [DISTWIDTH] IN BLOOD BY AUTOMATED COUNT: 12.9 % (ref 10–15)
GLUCOSE BLDC GLUCOMTR-MCNC: 147 MG/DL (ref 70–99)
GLUCOSE SERPL-MCNC: 105 MG/DL (ref 70–99)
HCO3 SERPL-SCNC: 29 MMOL/L (ref 22–29)
HCT VFR BLD AUTO: 43 % (ref 40–53)
HGB BLD-MCNC: 14.5 G/DL (ref 13.3–17.7)
HOLD SPECIMEN: NORMAL
HOLD SPECIMEN: NORMAL
INR PPP: 0.95 (ref 0.85–1.15)
MCH RBC QN AUTO: 30.9 PG (ref 26.5–33)
MCHC RBC AUTO-ENTMCNC: 33.7 G/DL (ref 31.5–36.5)
MCV RBC AUTO: 92 FL (ref 78–100)
PLATELET # BLD AUTO: 217 10E3/UL (ref 150–450)
POTASSIUM SERPL-SCNC: 5 MMOL/L (ref 3.4–5.3)
RBC # BLD AUTO: 4.7 10E6/UL (ref 4.4–5.9)
SODIUM SERPL-SCNC: 139 MMOL/L (ref 135–145)
TROPONIN T SERPL HS-MCNC: 8 NG/L
WBC # BLD AUTO: 11.1 10E3/UL (ref 4–11)

## 2024-11-08 PROCEDURE — 70498 CT ANGIOGRAPHY NECK: CPT | Mod: 26 | Performed by: RADIOLOGY

## 2024-11-08 PROCEDURE — 250N000012 HC RX MED GY IP 250 OP 636 PS 637

## 2024-11-08 PROCEDURE — 99207 PR APP CREDIT; MD BILLING SHARED VISIT: CPT

## 2024-11-08 PROCEDURE — A9585 GADOBUTROL INJECTION: HCPCS

## 2024-11-08 PROCEDURE — 70496 CT ANGIOGRAPHY HEAD: CPT

## 2024-11-08 PROCEDURE — 86140 C-REACTIVE PROTEIN: CPT

## 2024-11-08 PROCEDURE — 85027 COMPLETE CBC AUTOMATED: CPT

## 2024-11-08 PROCEDURE — 250N000013 HC RX MED GY IP 250 OP 250 PS 637

## 2024-11-08 PROCEDURE — 93010 ELECTROCARDIOGRAM REPORT: CPT | Performed by: INTERNAL MEDICINE

## 2024-11-08 PROCEDURE — 250N000013 HC RX MED GY IP 250 OP 250 PS 637: Performed by: PSYCHIATRY & NEUROLOGY

## 2024-11-08 PROCEDURE — 70496 CT ANGIOGRAPHY HEAD: CPT | Mod: 26 | Performed by: RADIOLOGY

## 2024-11-08 PROCEDURE — 99223 1ST HOSP IP/OBS HIGH 75: CPT | Performed by: STUDENT IN AN ORGANIZED HEALTH CARE EDUCATION/TRAINING PROGRAM

## 2024-11-08 PROCEDURE — 120N000002 HC R&B MED SURG/OB UMMC

## 2024-11-08 PROCEDURE — 250N000013 HC RX MED GY IP 250 OP 250 PS 637: Performed by: EMERGENCY MEDICINE

## 2024-11-08 PROCEDURE — 87040 BLOOD CULTURE FOR BACTERIA: CPT

## 2024-11-08 PROCEDURE — 97150 GROUP THERAPEUTIC PROCEDURES: CPT | Mod: GO

## 2024-11-08 PROCEDURE — 250N000013 HC RX MED GY IP 250 OP 250 PS 637: Performed by: NURSE PRACTITIONER

## 2024-11-08 PROCEDURE — 85730 THROMBOPLASTIN TIME PARTIAL: CPT

## 2024-11-08 PROCEDURE — 93005 ELECTROCARDIOGRAM TRACING: CPT

## 2024-11-08 PROCEDURE — 99207 PR NO BILLABLE SERVICE THIS VISIT: CPT | Performed by: NURSE PRACTITIONER

## 2024-11-08 PROCEDURE — 86780 TREPONEMA PALLIDUM: CPT

## 2024-11-08 PROCEDURE — 250N000009 HC RX 250

## 2024-11-08 PROCEDURE — 80048 BASIC METABOLIC PNL TOTAL CA: CPT

## 2024-11-08 PROCEDURE — 84484 ASSAY OF TROPONIN QUANT: CPT

## 2024-11-08 PROCEDURE — 99239 HOSP IP/OBS DSCHRG MGMT >30: CPT | Performed by: PSYCHIATRY & NEUROLOGY

## 2024-11-08 PROCEDURE — 85610 PROTHROMBIN TIME: CPT

## 2024-11-08 PROCEDURE — 36415 COLL VENOUS BLD VENIPUNCTURE: CPT

## 2024-11-08 PROCEDURE — 250N000011 HC RX IP 250 OP 636

## 2024-11-08 PROCEDURE — 255N000002 HC RX 255 OP 636

## 2024-11-08 RX ORDER — GADOBUTROL 604.72 MG/ML
12 INJECTION INTRAVENOUS ONCE
Status: COMPLETED | OUTPATIENT
Start: 2024-11-08 | End: 2024-11-08

## 2024-11-08 RX ORDER — HYDROMORPHONE HYDROCHLORIDE 2 MG/1
4 TABLET ORAL
Status: DISCONTINUED | OUTPATIENT
Start: 2024-11-08 | End: 2024-11-08 | Stop reason: HOSPADM

## 2024-11-08 RX ORDER — AMOXICILLIN 250 MG
1 CAPSULE ORAL 2 TIMES DAILY PRN
Status: DISCONTINUED | OUTPATIENT
Start: 2024-11-08 | End: 2024-11-09 | Stop reason: HOSPADM

## 2024-11-08 RX ORDER — DIPHENHYDRAMINE HCL 25 MG
25 CAPSULE ORAL EVERY 4 HOURS PRN
Status: DISCONTINUED | OUTPATIENT
Start: 2024-11-08 | End: 2024-11-08 | Stop reason: HOSPADM

## 2024-11-08 RX ORDER — CEFTRIAXONE 2 G/1
2 INJECTION, POWDER, FOR SOLUTION INTRAMUSCULAR; INTRAVENOUS EVERY 12 HOURS
Status: DISCONTINUED | OUTPATIENT
Start: 2024-11-08 | End: 2024-11-08 | Stop reason: HOSPADM

## 2024-11-08 RX ORDER — IOPAMIDOL 755 MG/ML
100 INJECTION, SOLUTION INTRAVASCULAR ONCE
Status: COMPLETED | OUTPATIENT
Start: 2024-11-08 | End: 2024-11-08

## 2024-11-08 RX ORDER — BUPRENORPHINE 2 MG/1
4 TABLET SUBLINGUAL EVERY 24 HOURS
Status: DISCONTINUED | OUTPATIENT
Start: 2024-11-09 | End: 2024-11-09 | Stop reason: HOSPADM

## 2024-11-08 RX ORDER — DEXAMETHASONE SODIUM PHOSPHATE 10 MG/ML
10 INJECTION, SOLUTION INTRAMUSCULAR; INTRAVENOUS EVERY 6 HOURS
Status: DISCONTINUED | OUTPATIENT
Start: 2024-11-08 | End: 2024-11-08 | Stop reason: HOSPADM

## 2024-11-08 RX ORDER — BUPRENORPHINE 2 MG/1
8 TABLET SUBLINGUAL 2 TIMES DAILY
Status: DISCONTINUED | OUTPATIENT
Start: 2024-11-09 | End: 2024-11-09 | Stop reason: HOSPADM

## 2024-11-08 RX ORDER — LITHIUM CARBONATE 450 MG
900 TABLET, EXTENDED RELEASE ORAL AT BEDTIME
Status: DISCONTINUED | OUTPATIENT
Start: 2024-11-09 | End: 2024-11-09 | Stop reason: HOSPADM

## 2024-11-08 RX ORDER — QUETIAPINE FUMARATE 50 MG/1
50 TABLET, FILM COATED ORAL 2 TIMES DAILY
Status: DISCONTINUED | OUTPATIENT
Start: 2024-11-09 | End: 2024-11-09 | Stop reason: HOSPADM

## 2024-11-08 RX ORDER — LIDOCAINE 40 MG/G
CREAM TOPICAL
Status: DISCONTINUED | OUTPATIENT
Start: 2024-11-08 | End: 2024-11-09 | Stop reason: HOSPADM

## 2024-11-08 RX ORDER — ATORVASTATIN CALCIUM 20 MG/1
20 TABLET, FILM COATED ORAL EVERY EVENING
Status: DISCONTINUED | OUTPATIENT
Start: 2024-11-09 | End: 2024-11-09 | Stop reason: HOSPADM

## 2024-11-08 RX ORDER — MULTIPLE VITAMINS W/ MINERALS TAB 9MG-400MCG
1 TAB ORAL DAILY
Status: DISCONTINUED | OUTPATIENT
Start: 2024-11-09 | End: 2024-11-09 | Stop reason: HOSPADM

## 2024-11-08 RX ORDER — ACETAMINOPHEN 325 MG/1
650 TABLET ORAL EVERY 4 HOURS PRN
Status: DISCONTINUED | OUTPATIENT
Start: 2024-11-08 | End: 2024-11-09 | Stop reason: HOSPADM

## 2024-11-08 RX ORDER — CEFTRIAXONE 2 G/1
2 INJECTION, POWDER, FOR SOLUTION INTRAMUSCULAR; INTRAVENOUS EVERY 12 HOURS
Status: DISCONTINUED | OUTPATIENT
Start: 2024-11-09 | End: 2024-11-09 | Stop reason: HOSPADM

## 2024-11-08 RX ORDER — CEFAZOLIN SODIUM 1 G/50ML
2500 SOLUTION INTRAVENOUS ONCE
Status: DISCONTINUED | OUTPATIENT
Start: 2024-11-08 | End: 2024-11-08 | Stop reason: HOSPADM

## 2024-11-08 RX ORDER — POLYETHYLENE GLYCOL 3350 17 G
2-4 POWDER IN PACKET (EA) ORAL
Status: DISCONTINUED | OUTPATIENT
Start: 2024-11-08 | End: 2024-11-09 | Stop reason: HOSPADM

## 2024-11-08 RX ORDER — PRAZOSIN HYDROCHLORIDE 1 MG/1
1 CAPSULE ORAL AT BEDTIME
Status: DISCONTINUED | OUTPATIENT
Start: 2024-11-09 | End: 2024-11-09 | Stop reason: HOSPADM

## 2024-11-08 RX ORDER — CEFAZOLIN SODIUM 1 G/50ML
2500 SOLUTION INTRAVENOUS ONCE
Status: COMPLETED | OUTPATIENT
Start: 2024-11-09 | End: 2024-11-09

## 2024-11-08 RX ORDER — ATOMOXETINE 80 MG/1
80 CAPSULE ORAL DAILY
Status: DISCONTINUED | OUTPATIENT
Start: 2024-11-09 | End: 2024-11-09 | Stop reason: HOSPADM

## 2024-11-08 RX ORDER — GABAPENTIN 600 MG/1
600 TABLET ORAL 3 TIMES DAILY
Status: DISCONTINUED | OUTPATIENT
Start: 2024-11-09 | End: 2024-11-09 | Stop reason: HOSPADM

## 2024-11-08 RX ORDER — IOPAMIDOL 755 MG/ML
117 INJECTION, SOLUTION INTRAVASCULAR ONCE
Status: CANCELLED | OUTPATIENT
Start: 2024-11-08 | End: 2024-11-08

## 2024-11-08 RX ORDER — CALCIUM CARBONATE 500 MG/1
1000 TABLET, CHEWABLE ORAL 4 TIMES DAILY PRN
Status: DISCONTINUED | OUTPATIENT
Start: 2024-11-08 | End: 2024-11-09 | Stop reason: HOSPADM

## 2024-11-08 RX ORDER — VANCOMYCIN HYDROCHLORIDE
1250 EVERY 12 HOURS
Status: DISCONTINUED | OUTPATIENT
Start: 2024-11-09 | End: 2024-11-08

## 2024-11-08 RX ORDER — VANCOMYCIN 2 GRAM/500 ML IN 0.9 % SODIUM CHLORIDE INTRAVENOUS
2000 ONCE
Status: DISCONTINUED | OUTPATIENT
Start: 2024-11-08 | End: 2024-11-08 | Stop reason: ALTCHOICE

## 2024-11-08 RX ORDER — OLANZAPINE 10 MG/2ML
10 INJECTION, POWDER, FOR SOLUTION INTRAMUSCULAR 3 TIMES DAILY PRN
Status: DISCONTINUED | OUTPATIENT
Start: 2024-11-08 | End: 2024-11-08

## 2024-11-08 RX ORDER — CEFTRIAXONE 2 G/1
2 INJECTION, POWDER, FOR SOLUTION INTRAMUSCULAR; INTRAVENOUS EVERY 24 HOURS
Status: DISCONTINUED | OUTPATIENT
Start: 2024-11-08 | End: 2024-11-08

## 2024-11-08 RX ORDER — CEFTRIAXONE 2 G/1
2 INJECTION, POWDER, FOR SOLUTION INTRAMUSCULAR; INTRAVENOUS EVERY 24 HOURS
Status: DISCONTINUED | OUTPATIENT
Start: 2024-11-09 | End: 2024-11-08

## 2024-11-08 RX ORDER — OLANZAPINE 5 MG/1
5 TABLET, ORALLY DISINTEGRATING ORAL 3 TIMES DAILY PRN
Status: DISCONTINUED | OUTPATIENT
Start: 2024-11-08 | End: 2024-11-08

## 2024-11-08 RX ORDER — DEXAMETHASONE SODIUM PHOSPHATE 4 MG/ML
10 INJECTION, SOLUTION INTRA-ARTICULAR; INTRALESIONAL; INTRAMUSCULAR; INTRAVENOUS; SOFT TISSUE EVERY 6 HOURS
Status: DISCONTINUED | OUTPATIENT
Start: 2024-11-09 | End: 2024-11-09 | Stop reason: HOSPADM

## 2024-11-08 RX ORDER — ACETAMINOPHEN 650 MG/1
650 SUPPOSITORY RECTAL EVERY 4 HOURS PRN
Status: DISCONTINUED | OUTPATIENT
Start: 2024-11-08 | End: 2024-11-09 | Stop reason: HOSPADM

## 2024-11-08 RX ORDER — OLANZAPINE 10 MG/2ML
10 INJECTION, POWDER, FOR SOLUTION INTRAMUSCULAR 3 TIMES DAILY PRN
Status: DISCONTINUED | OUTPATIENT
Start: 2024-11-08 | End: 2024-11-08 | Stop reason: HOSPADM

## 2024-11-08 RX ORDER — AMOXICILLIN 250 MG
2 CAPSULE ORAL 2 TIMES DAILY PRN
Status: DISCONTINUED | OUTPATIENT
Start: 2024-11-08 | End: 2024-11-09 | Stop reason: HOSPADM

## 2024-11-08 RX ORDER — VANCOMYCIN HYDROCHLORIDE
1500 EVERY 8 HOURS
Status: DISCONTINUED | OUTPATIENT
Start: 2024-11-09 | End: 2024-11-08 | Stop reason: HOSPADM

## 2024-11-08 RX ORDER — HYDROMORPHONE HYDROCHLORIDE 1 MG/ML
0.3 INJECTION, SOLUTION INTRAMUSCULAR; INTRAVENOUS; SUBCUTANEOUS ONCE
Status: DISCONTINUED | OUTPATIENT
Start: 2024-11-08 | End: 2024-11-08

## 2024-11-08 RX ADMIN — GABAPENTIN 600 MG: 600 TABLET, FILM COATED ORAL at 12:01

## 2024-11-08 RX ADMIN — NICOTINE POLACRILEX 4 MG: 2 LOZENGE ORAL at 08:37

## 2024-11-08 RX ADMIN — DIPHENHYDRAMINE HYDROCHLORIDE 50 MG: 25 CAPSULE ORAL at 06:15

## 2024-11-08 RX ADMIN — GABAPENTIN 600 MG: 600 TABLET, FILM COATED ORAL at 08:31

## 2024-11-08 RX ADMIN — HYDROCORTISONE: 0.5 CREAM TOPICAL at 07:11

## 2024-11-08 RX ADMIN — HYDROMORPHONE HYDROCHLORIDE 4 MG: 4 TABLET ORAL at 20:18

## 2024-11-08 RX ADMIN — ATORVASTATIN CALCIUM 20 MG: 20 TABLET, FILM COATED ORAL at 21:51

## 2024-11-08 RX ADMIN — QUETIAPINE FUMARATE 50 MG: 50 TABLET ORAL at 22:02

## 2024-11-08 RX ADMIN — Medication 1 TABLET: at 08:31

## 2024-11-08 RX ADMIN — ACETAMINOPHEN 650 MG: 325 TABLET, FILM COATED ORAL at 17:32

## 2024-11-08 RX ADMIN — PRAZOSIN HYDROCHLORIDE 1 MG: 1 CAPSULE ORAL at 21:51

## 2024-11-08 RX ADMIN — GABAPENTIN 600 MG: 600 TABLET, FILM COATED ORAL at 21:50

## 2024-11-08 RX ADMIN — LITHIUM CARBONATE 900 MG: 450 TABLET, EXTENDED RELEASE ORAL at 21:51

## 2024-11-08 RX ADMIN — NICOTINE POLACRILEX 4 MG: 2 LOZENGE ORAL at 12:03

## 2024-11-08 RX ADMIN — FLUOXETINE HYDROCHLORIDE 40 MG: 20 CAPSULE ORAL at 08:30

## 2024-11-08 RX ADMIN — GADOBUTROL 12 ML: 604.72 INJECTION INTRAVENOUS at 21:23

## 2024-11-08 RX ADMIN — NICOTINE 1 PATCH: 21 PATCH, EXTENDED RELEASE TRANSDERMAL at 08:30

## 2024-11-08 RX ADMIN — OLANZAPINE 10 MG: 10 TABLET, ORALLY DISINTEGRATING ORAL at 06:19

## 2024-11-08 RX ADMIN — BUPRENORPHINE 4 MG: 2 TABLET SUBLINGUAL at 13:59

## 2024-11-08 RX ADMIN — QUETIAPINE FUMARATE 50 MG: 50 TABLET ORAL at 08:31

## 2024-11-08 RX ADMIN — SODIUM CHLORIDE 80 ML: 9 INJECTION, SOLUTION INTRAVENOUS at 18:54

## 2024-11-08 RX ADMIN — QUETIAPINE FUMARATE 400 MG: 200 TABLET ORAL at 21:50

## 2024-11-08 RX ADMIN — HYDROXYZINE HYDROCHLORIDE 50 MG: 50 TABLET, FILM COATED ORAL at 12:01

## 2024-11-08 RX ADMIN — ACETAMINOPHEN 650 MG: 325 TABLET, FILM COATED ORAL at 06:19

## 2024-11-08 RX ADMIN — IOPAMIDOL 67 ML: 755 INJECTION, SOLUTION INTRAVENOUS at 18:55

## 2024-11-08 RX ADMIN — BUPRENORPHINE 8 MG: 2 TABLET SUBLINGUAL at 08:31

## 2024-11-08 ASSESSMENT — ACTIVITIES OF DAILY LIVING (ADL)
ADLS_ACUITY_SCORE: 0
DRESS: INDEPENDENT
LAUNDRY: UNABLE TO COMPLETE
ORAL_HYGIENE: INDEPENDENT
ADLS_ACUITY_SCORE: 0
HYGIENE/GROOMING: INDEPENDENT
ADLS_ACUITY_SCORE: 0

## 2024-11-08 NOTE — PLAN OF CARE
"Patient began shift pacing the hallway. Later around 1600 he requested to have the thermostat up in his room and reports feeling cold. 15 minutes later, he approached the writer, primary RN and reports \"not feeling well\". VS assessment completed showing slightly elevated BP. Writer checked on him and patient appears to be shivering and jittery. Temp reassessed (99.5) which is up from earlier(98.1). BP reading unable to obtain due to patient shaking. Patient vomited x2; no blood noted.Upon assessment, patient is disoriented x3, confused and repeated reports having \"worst headache\" he ever had.    Rapid response team activated and no answer. Hospitalist was paged again and Hospitalist came to unit and activated code stroke. Please refer to Hospitalist's note and flyer nurses' notes for further detail.       Alertness: Alert and oriented to self only  Affect: anxious  Mood: anxious & worried ; paranoid  Appearance: adequately groomed.  Thought Process: disorganized  Thought Content: Denies thoughts of harming self/others;   Hallucinations: reports auditory hallucinations and says the voices are non-commanding  Anxiety & Depression: endorses high anxiety in the context of today's medical issue; denies depression  Skin: WNL per patient report.  Bowel/bladder: reports voiding issue; bladder scanned once with reading of 181 mL.  Appetite: poor  Medications: Medication compliant  New Provider orders: refer to orders.      Milieu Participation: Patient was present in the milieu and appeared slightly social and interactive with peers at the start of the shift. Patient voices his needs appropriately. No acute behavioral concern with patient this shift.     Patient is eating, hydrating, and sleeping adequately. Patient is medication compliant with reminders. Reports difficulty voiding as side effect for Subutex.    Patient discharged to 10 ICU for higher level of care.                   "

## 2024-11-08 NOTE — PLAN OF CARE
Rehab Group    Start time: 1615  End time: 1700  Patient time total: 30 minutes    came in and out of group session    #5 attended   Group Type: occupational therapy   Group Topic Covered: cognitive activities, coping skills, healthy leisure time, and relaxation      Group Session Detail:    Patient Response: Pt partcipated in group focused on leisure participation and exploration, socialization and cognitive challenge. Discussed how participation in leisure activities can be used as a healthy coping skill in symptom management and a strategy to reduce stress.    Mood/Affect: Bright  Pleasant      Plan: Patient encouraged to maintain attendance for continued ongoing support in working towards occupational therapy goals to support overall treatment/care.        Patient Detail:    Eager and motivated to join for group. Spoke up and selected one of the activity options that writer offered and was encouraging to other peer in the group that they would also enjoy the activity. Was social during this time, both with other peers and writer. Towards the later part of group, patient appeared to become somewhat antsy. At first took some time to stand and move around and then encouraging another peer to participate for them. Did not rejoin the group after this.       [id number] OT Group (2 or more in attendance)    Patient Active Problem List   Diagnosis    Bilateral carpal tunnel syndrome    KANE (generalized anxiety disorder)    Chronic pain syndrome    Hyperglycemia    Hypertriglyceridemia    Lung nodule    Major depressive disorder, recurrent (H)    Mild intermittent asthma without complication    Tobacco use disorder    Displacement of lumbar intervertebral disc without myelopathy    History of ADHD    History of suicide attempt    Chronic right-sided low back pain with right-sided sciatica    Psychosis (H)    Suicidal behavior without attempted self-injury    Methamphetamine use disorder, severe, in early remission (H)     Polysubstance abuse (H)    Moderate opioid dependence in early remission (H)    Hx of sepsis    Hx of intravenous drug use in remission    Suicidal ideation    Paranoia (H)    Schizoaffective disorder, bipolar type (H)    Allergic reaction

## 2024-11-08 NOTE — PLAN OF CARE
Team Note Due:  Wednesday     Assessment/Intervention/Current Symtoms and Care Coordination:  Chart review and met with team, discussed pt progress, symptomology, and response to treatment.  Discussed the discharge plan and any potential impediments to discharge.    I checked with [facility], they are coming to see him this afternoon. I let patient know this. I also told patient if he's accepted they said early next week, but I encouraged him to ask them when they see him this afternoon.     Discharge Plan or Goal:  [facility] Declined - they recommend residential treatment first.    Considering [facility] or [facility]    Pending:  [facility] - Accepted awaiting admission   [facility]  Eyewitness Surveillance [facility]  [address], [place], [place], [address]  Brenda - Ph: [phone]  [email or web address]    Barriers to Discharge:  Symptoms, withdrawal sx      Referral Status:  10/31/24: Referred to [facility] - Denied, recommend JAQUELINE treatment     Legal Status:  Pt is voluntary.      Contacts (include UMBERTO status):  Primary Care:   KERWIN Teixeira  Location: [facility]: [address] [place] [address]. [place], [place], [address]  Phone: [phone]     Subutex:  Howie  Location: [address] [address], [address].  Phone: [phone]     Psychiatry  [facility], Telehealth Monday Dec 9th at 1PM.        /ACT Team:  Sunny Woodruff  Direct: [phone]  [email or web address]   Main: [phone]  Fax: [phone]     CADI CM:  [email or web address] (UMBERTO on file)     Other contact information (family, friends, SO) and UMBERTO status:   Alla Preciado (Sister) [phone] (UMBERTO on file)      Upcoming Meetings and Dates/Important Information and next steps:  Coordinate care, discharge plan.

## 2024-11-08 NOTE — CARE PLAN
Rehab Group    Start time: 1030  End time: 1215  Patient time total: 60 minutes    attended partial group     #3 attended   Group Type: occupational therapy and general health and coping   Group Topic Covered: activity therapy and coping skills     Group Session Detail:  OT clinic group provides an opportunity for individual-based goal directed activity within a group setting - allowing for interaction and socialization.  Hands-on task work help to build/restore/or maintain skills such as: focus, concentration, decision making, and problem solving.  Patients are encouraged to carry over potential new interests/hobbies learned in group following discharge.     Patient Response/Contribution:  positive affect and cooperative with task     Patient Detail:    Pt participated in check-in, acknowledged feeling anxious, unsure why he wasn't hearing back about [facility], asked writer again to ask his .  Assured pt his  left 2 messages, though did let her know to he wanted to speak with him.  Pt discussed taking things personally and feeling rejected the longer he doesn't hear back.  Eager to participate in task activity to get his mind off things, and proceeded to work on detailed painting, which did seem relaxing.  Social with writer and peers.      [id number] OT Group (2 or more in attendance)      Patient Active Problem List   Diagnosis    Bilateral carpal tunnel syndrome    KANE (generalized anxiety disorder)    Chronic pain syndrome    Hyperglycemia    Hypertriglyceridemia    Lung nodule    Major depressive disorder, recurrent (H)    Mild intermittent asthma without complication    Tobacco use disorder    Displacement of lumbar intervertebral disc without myelopathy    History of ADHD    History of suicide attempt    Chronic right-sided low back pain with right-sided sciatica    Psychosis (H)    Suicidal behavior without attempted self-injury    Methamphetamine use disorder, severe, in early  remission (H)    Polysubstance abuse (H)    Moderate opioid dependence in early remission (H)    Hx of sepsis    Hx of intravenous drug use in remission    Suicidal ideation    Paranoia (H)    Schizoaffective disorder, bipolar type (H)    Allergic reaction
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
## 2024-11-08 NOTE — PLAN OF CARE
Nursing Assessment    Recent Vitals: B/P: 104/55, T: 97.3, P: 64, R: 16     General Shift Summary  Patient has been in and out of his room this shift. He presented as somnolent in the morning and was falling asleep as vitals were being taken. He has either paced the duggan or been seen watching t.v. He denied depression, HI/AVH. Around 1145 patient voiced feeling highly anxious stating he feels people are talking about him or laughing about him but he knows that they aren't. He was provided his noon Gabapentin and an hour later seemed calmer.     Hygiene is good. He is eating well. He is medication cooperative with no voiced side effects.    Patient had a meeting with [facility], meeting seemed to go well.    Plan is to discharge likely on Monday.    Harmony Reagan, RN MSN
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
## 2024-11-08 NOTE — TELEPHONE ENCOUNTER
----- Message from Jude Donahue sent at 11/8/2024  3:05 PM CST -----  Regarding: schedule admission  Scheduling Request    Patient Name: Perry Preciado JrMichoacano  Location of programming: [facility]  Start Date: November / 11 / 2024  Group:  TRACEY on Monday at 1:00 PM   Attending Provider (MD): Cris  Duration of Appointment in minutes: 840  Visit Type: In-person or Treatment - 870    Additional notes: Station 12 transfer

## 2024-11-09 ENCOUNTER — HOSPITAL ENCOUNTER (INPATIENT)
Facility: CLINIC | Age: 49
LOS: 4 days | Discharge: NURSING, REHAB OR LONG-TERM CARE | End: 2024-11-13
Attending: HOSPITALIST | Admitting: INTERNAL MEDICINE
Payer: COMMERCIAL

## 2024-11-09 ENCOUNTER — APPOINTMENT (OUTPATIENT)
Dept: GENERAL RADIOLOGY | Facility: CLINIC | Age: 49
End: 2024-11-09
Attending: RADIOLOGY
Payer: COMMERCIAL

## 2024-11-09 VITALS
TEMPERATURE: 98.1 F | DIASTOLIC BLOOD PRESSURE: 115 MMHG | OXYGEN SATURATION: 92 % | SYSTOLIC BLOOD PRESSURE: 144 MMHG | RESPIRATION RATE: 18 BRPM

## 2024-11-09 DIAGNOSIS — F41.1 GAD (GENERALIZED ANXIETY DISORDER): ICD-10-CM

## 2024-11-09 DIAGNOSIS — Z72.51 HISTORY OF UNPROTECTED SEX: ICD-10-CM

## 2024-11-09 DIAGNOSIS — Z86.19 HX OF SEPSIS: ICD-10-CM

## 2024-11-09 DIAGNOSIS — F17.200 TOBACCO USE DISORDER: ICD-10-CM

## 2024-11-09 DIAGNOSIS — T78.40XA ALLERGIC REACTION, INITIAL ENCOUNTER: ICD-10-CM

## 2024-11-09 DIAGNOSIS — F32.A DEPRESSION, UNSPECIFIED DEPRESSION TYPE: ICD-10-CM

## 2024-11-09 DIAGNOSIS — F25.0 SCHIZOAFFECTIVE DISORDER, BIPOLAR TYPE (H): ICD-10-CM

## 2024-11-09 DIAGNOSIS — E78.1 HYPERTRIGLYCERIDEMIA: ICD-10-CM

## 2024-11-09 LAB
ALBUMIN SERPL BCG-MCNC: 3.7 G/DL (ref 3.5–5.2)
ALBUMIN UR-MCNC: NEGATIVE MG/DL
ALP SERPL-CCNC: 57 U/L (ref 40–150)
ALT SERPL W P-5'-P-CCNC: 37 U/L (ref 0–70)
ANION GAP SERPL CALCULATED.3IONS-SCNC: 12 MMOL/L (ref 7–15)
APPEARANCE CSF: CLEAR
APPEARANCE UR: CLEAR
AST SERPL W P-5'-P-CCNC: 33 U/L (ref 0–45)
BILIRUB DIRECT SERPL-MCNC: <0.2 MG/DL (ref 0–0.3)
BILIRUB SERPL-MCNC: 0.3 MG/DL
BILIRUB UR QL STRIP: NEGATIVE
BUN SERPL-MCNC: 25 MG/DL (ref 6–20)
C TRACH DNA SPEC QL PROBE+SIG AMP: NEGATIVE
CALCIUM SERPL-MCNC: 8.9 MG/DL (ref 8.8–10.4)
CHLORIDE SERPL-SCNC: 99 MMOL/L (ref 98–107)
COLOR CSF: COLORLESS
COLOR UR AUTO: NORMAL
CREAT SERPL-MCNC: 1.28 MG/DL (ref 0.67–1.17)
CRYPTOC AG SPEC QL: NEGATIVE
EGFRCR SERPLBLD CKD-EPI 2021: 69 ML/MIN/1.73M2
ERYTHROCYTE [DISTWIDTH] IN BLOOD BY AUTOMATED COUNT: 13.3 % (ref 10–15)
GLUCOSE CSF-MCNC: 132 MG/DL (ref 40–70)
GLUCOSE SERPL-MCNC: 144 MG/DL (ref 70–99)
GLUCOSE UR STRIP-MCNC: NEGATIVE MG/DL
HCO3 SERPL-SCNC: 25 MMOL/L (ref 22–29)
HCT VFR BLD AUTO: 37.7 % (ref 40–53)
HGB BLD-MCNC: 12.7 G/DL (ref 13.3–17.7)
HGB UR QL STRIP: NEGATIVE
KETONES UR STRIP-MCNC: NEGATIVE MG/DL
LEUKOCYTE ESTERASE UR QL STRIP: NEGATIVE
LYMPH ABN NFR CSF MANUAL: 26 %
MCH RBC QN AUTO: 30 PG (ref 26.5–33)
MCHC RBC AUTO-ENTMCNC: 33.7 G/DL (ref 31.5–36.5)
MCV RBC AUTO: 89 FL (ref 78–100)
MONOS+MACROS NFR CSF MANUAL: 70 %
N GONORRHOEA DNA SPEC QL NAA+PROBE: NEGATIVE
NEUTROPHILS NFR CSF MANUAL: 4 %
NITRATE UR QL: NEGATIVE
PH UR STRIP: 6 [PH] (ref 5–7)
PLATELET # BLD AUTO: 219 10E3/UL (ref 150–450)
POTASSIUM SERPL-SCNC: 4.5 MMOL/L (ref 3.4–5.3)
PROT CSF-MCNC: 43.4 MG/DL (ref 15–45)
PROT SERPL-MCNC: 6.4 G/DL (ref 6.4–8.3)
RBC # BLD AUTO: 4.23 10E6/UL (ref 4.4–5.9)
RBC # CSF MANUAL: 9 /UL (ref 0–2)
RBC URINE: <1 /HPF
S PNEUM AG SPEC QL: NEGATIVE
SODIUM SERPL-SCNC: 136 MMOL/L (ref 135–145)
SP GR UR STRIP: 1.02 (ref 1–1.03)
SPECIMEN TYPE: NORMAL
SPECIMEN TYPE: NORMAL
SQUAMOUS EPITHELIAL: <1 /HPF
T PALLIDUM AB SER QL: NONREACTIVE
TUBE # CSF: 4
UROBILINOGEN UR STRIP-MCNC: NORMAL MG/DL
WBC # BLD AUTO: 30 10E3/UL (ref 4–11)
WBC # CSF MANUAL: 12 /UL (ref 0–5)
WBC URINE: <1 /HPF

## 2024-11-09 PROCEDURE — 87070 CULTURE OTHR SPECIMN AEROBIC: CPT | Performed by: INTERNAL MEDICINE

## 2024-11-09 PROCEDURE — 250N000012 HC RX MED GY IP 250 OP 636 PS 637: Performed by: STUDENT IN AN ORGANIZED HEALTH CARE EDUCATION/TRAINING PROGRAM

## 2024-11-09 PROCEDURE — 36415 COLL VENOUS BLD VENIPUNCTURE: CPT | Performed by: STUDENT IN AN ORGANIZED HEALTH CARE EDUCATION/TRAINING PROGRAM

## 2024-11-09 PROCEDURE — 250N000011 HC RX IP 250 OP 636: Performed by: STUDENT IN AN ORGANIZED HEALTH CARE EDUCATION/TRAINING PROGRAM

## 2024-11-09 PROCEDURE — 250N000013 HC RX MED GY IP 250 OP 250 PS 637: Performed by: INTERNAL MEDICINE

## 2024-11-09 PROCEDURE — 87491 CHLMYD TRACH DNA AMP PROBE: CPT

## 2024-11-09 PROCEDURE — 87899 AGENT NOS ASSAY W/OPTIC: CPT | Performed by: INTERNAL MEDICINE

## 2024-11-09 PROCEDURE — 87483 CNS DNA AMP PROBE TYPE 12-25: CPT | Performed by: INTERNAL MEDICINE

## 2024-11-09 PROCEDURE — 87205 SMEAR GRAM STAIN: CPT | Performed by: INTERNAL MEDICINE

## 2024-11-09 PROCEDURE — 250N000013 HC RX MED GY IP 250 OP 250 PS 637

## 2024-11-09 PROCEDURE — 99207 PR APP CREDIT; MD BILLING SHARED VISIT: CPT | Performed by: INTERNAL MEDICINE

## 2024-11-09 PROCEDURE — 250N000011 HC RX IP 250 OP 636: Performed by: INTERNAL MEDICINE

## 2024-11-09 PROCEDURE — 82945 GLUCOSE OTHER FLUID: CPT | Performed by: INTERNAL MEDICINE

## 2024-11-09 PROCEDURE — 82248 BILIRUBIN DIRECT: CPT

## 2024-11-09 PROCEDURE — 120N000001 HC R&B MED SURG/OB

## 2024-11-09 PROCEDURE — 82947 ASSAY GLUCOSE BLOOD QUANT: CPT

## 2024-11-09 PROCEDURE — 250N000012 HC RX MED GY IP 250 OP 636 PS 637: Performed by: INTERNAL MEDICINE

## 2024-11-09 PROCEDURE — 250N000011 HC RX IP 250 OP 636

## 2024-11-09 PROCEDURE — 81003 URINALYSIS AUTO W/O SCOPE: CPT

## 2024-11-09 PROCEDURE — 250N000009 HC RX 250: Performed by: RADIOLOGY

## 2024-11-09 PROCEDURE — 87529 HSV DNA AMP PROBE: CPT | Performed by: INTERNAL MEDICINE

## 2024-11-09 PROCEDURE — 86592 SYPHILIS TEST NON-TREP QUAL: CPT | Performed by: INTERNAL MEDICINE

## 2024-11-09 PROCEDURE — 250N000013 HC RX MED GY IP 250 OP 250 PS 637: Performed by: STUDENT IN AN ORGANIZED HEALTH CARE EDUCATION/TRAINING PROGRAM

## 2024-11-09 PROCEDURE — 99207 PR NO CHARGE LOS: CPT | Performed by: STUDENT IN AN ORGANIZED HEALTH CARE EDUCATION/TRAINING PROGRAM

## 2024-11-09 PROCEDURE — 89051 BODY FLUID CELL COUNT: CPT | Performed by: INTERNAL MEDICINE

## 2024-11-09 PROCEDURE — 258N000003 HC RX IP 258 OP 636: Performed by: INTERNAL MEDICINE

## 2024-11-09 PROCEDURE — 258N000003 HC RX IP 258 OP 636

## 2024-11-09 PROCEDURE — 99239 HOSP IP/OBS DSCHRG MGMT >30: CPT | Performed by: INTERNAL MEDICINE

## 2024-11-09 PROCEDURE — 62328 DX LMBR SPI PNXR W/FLUOR/CT: CPT

## 2024-11-09 PROCEDURE — 87102 FUNGUS ISOLATION CULTURE: CPT | Performed by: INTERNAL MEDICINE

## 2024-11-09 PROCEDURE — 84157 ASSAY OF PROTEIN OTHER: CPT | Performed by: INTERNAL MEDICINE

## 2024-11-09 PROCEDURE — 258N000003 HC RX IP 258 OP 636: Performed by: STUDENT IN AN ORGANIZED HEALTH CARE EDUCATION/TRAINING PROGRAM

## 2024-11-09 PROCEDURE — 85027 COMPLETE CBC AUTOMATED: CPT | Performed by: STUDENT IN AN ORGANIZED HEALTH CARE EDUCATION/TRAINING PROGRAM

## 2024-11-09 PROCEDURE — 009U3ZX DRAINAGE OF SPINAL CANAL, PERCUTANEOUS APPROACH, DIAGNOSTIC: ICD-10-PCS | Performed by: RADIOLOGY

## 2024-11-09 RX ORDER — CALCIUM CARBONATE 500 MG/1
1000 TABLET, CHEWABLE ORAL 4 TIMES DAILY PRN
Status: CANCELLED | OUTPATIENT
Start: 2024-11-09

## 2024-11-09 RX ORDER — LITHIUM CARBONATE 450 MG
900 TABLET, EXTENDED RELEASE ORAL AT BEDTIME
Status: DISCONTINUED | OUTPATIENT
Start: 2024-11-09 | End: 2024-11-13 | Stop reason: HOSPADM

## 2024-11-09 RX ORDER — ONDANSETRON 2 MG/ML
4 INJECTION INTRAMUSCULAR; INTRAVENOUS EVERY 6 HOURS PRN
Status: DISCONTINUED | OUTPATIENT
Start: 2024-11-09 | End: 2024-11-13 | Stop reason: HOSPADM

## 2024-11-09 RX ORDER — LIDOCAINE 40 MG/G
CREAM TOPICAL
Status: DISCONTINUED | OUTPATIENT
Start: 2024-11-09 | End: 2024-11-13 | Stop reason: HOSPADM

## 2024-11-09 RX ORDER — NALOXONE HYDROCHLORIDE 0.4 MG/ML
0.4 INJECTION, SOLUTION INTRAMUSCULAR; INTRAVENOUS; SUBCUTANEOUS
Status: DISCONTINUED | OUTPATIENT
Start: 2024-11-09 | End: 2024-11-13 | Stop reason: HOSPADM

## 2024-11-09 RX ORDER — AMOXICILLIN 250 MG
1 CAPSULE ORAL 2 TIMES DAILY PRN
Status: DISCONTINUED | OUTPATIENT
Start: 2024-11-09 | End: 2024-11-09

## 2024-11-09 RX ORDER — LITHIUM CARBONATE 450 MG
900 TABLET, EXTENDED RELEASE ORAL AT BEDTIME
Status: DISCONTINUED | OUTPATIENT
Start: 2024-11-09 | End: 2024-11-09

## 2024-11-09 RX ORDER — LIDOCAINE 40 MG/G
CREAM TOPICAL
Status: DISCONTINUED | OUTPATIENT
Start: 2024-11-09 | End: 2024-11-09

## 2024-11-09 RX ORDER — LIDOCAINE 40 MG/G
CREAM TOPICAL
Status: CANCELLED | OUTPATIENT
Start: 2024-11-09

## 2024-11-09 RX ORDER — AMOXICILLIN 250 MG
2 CAPSULE ORAL 2 TIMES DAILY PRN
Status: DISCONTINUED | OUTPATIENT
Start: 2024-11-09 | End: 2024-11-13 | Stop reason: HOSPADM

## 2024-11-09 RX ORDER — QUETIAPINE FUMARATE 400 MG/1
400 TABLET, FILM COATED ORAL AT BEDTIME
Status: DISCONTINUED | OUTPATIENT
Start: 2024-11-10 | End: 2024-11-09

## 2024-11-09 RX ORDER — QUETIAPINE FUMARATE 50 MG/1
50 TABLET, FILM COATED ORAL 2 TIMES DAILY
Status: CANCELLED | OUTPATIENT
Start: 2024-11-09

## 2024-11-09 RX ORDER — PROCHLORPERAZINE 25 MG
25 SUPPOSITORY, RECTAL RECTAL EVERY 12 HOURS PRN
Status: DISCONTINUED | OUTPATIENT
Start: 2024-11-09 | End: 2024-11-13 | Stop reason: HOSPADM

## 2024-11-09 RX ORDER — OXYCODONE HYDROCHLORIDE 5 MG/1
15 TABLET ORAL EVERY 4 HOURS PRN
Status: DISCONTINUED | OUTPATIENT
Start: 2024-11-09 | End: 2024-11-11

## 2024-11-09 RX ORDER — GABAPENTIN 600 MG/1
600 TABLET ORAL 3 TIMES DAILY
Status: CANCELLED | OUTPATIENT
Start: 2024-11-09

## 2024-11-09 RX ORDER — ATORVASTATIN CALCIUM 20 MG/1
20 TABLET, FILM COATED ORAL EVERY EVENING
Status: DISCONTINUED | OUTPATIENT
Start: 2024-11-09 | End: 2024-11-13 | Stop reason: HOSPADM

## 2024-11-09 RX ORDER — NALOXONE HYDROCHLORIDE 0.4 MG/ML
0.2 INJECTION, SOLUTION INTRAMUSCULAR; INTRAVENOUS; SUBCUTANEOUS
Status: DISCONTINUED | OUTPATIENT
Start: 2024-11-09 | End: 2024-11-09 | Stop reason: HOSPADM

## 2024-11-09 RX ORDER — QUETIAPINE FUMARATE 400 MG/1
400 TABLET, FILM COATED ORAL AT BEDTIME
Status: DISCONTINUED | OUTPATIENT
Start: 2024-11-09 | End: 2024-11-09

## 2024-11-09 RX ORDER — BUPRENORPHINE 2 MG/1
4 TABLET SUBLINGUAL EVERY 24 HOURS
Status: CANCELLED | OUTPATIENT
Start: 2024-11-09

## 2024-11-09 RX ORDER — HYDROMORPHONE HYDROCHLORIDE 1 MG/ML
1 INJECTION, SOLUTION INTRAMUSCULAR; INTRAVENOUS; SUBCUTANEOUS ONCE
Status: COMPLETED | OUTPATIENT
Start: 2024-11-09 | End: 2024-11-09

## 2024-11-09 RX ORDER — ATORVASTATIN CALCIUM 20 MG/1
20 TABLET, FILM COATED ORAL EVERY EVENING
Status: CANCELLED | OUTPATIENT
Start: 2024-11-09

## 2024-11-09 RX ORDER — VANCOMYCIN HYDROCHLORIDE
1500 EVERY 8 HOURS
Status: DISCONTINUED | OUTPATIENT
Start: 2024-11-09 | End: 2024-11-09 | Stop reason: HOSPADM

## 2024-11-09 RX ORDER — ACETAMINOPHEN 325 MG/1
650 TABLET ORAL EVERY 4 HOURS PRN
Status: CANCELLED | OUTPATIENT
Start: 2024-11-09

## 2024-11-09 RX ORDER — PRAZOSIN HYDROCHLORIDE 1 MG/1
1 CAPSULE ORAL AT BEDTIME
Status: DISCONTINUED | OUTPATIENT
Start: 2024-11-09 | End: 2024-11-09

## 2024-11-09 RX ORDER — NALOXONE HYDROCHLORIDE 0.4 MG/ML
0.4 INJECTION, SOLUTION INTRAMUSCULAR; INTRAVENOUS; SUBCUTANEOUS
Status: DISCONTINUED | OUTPATIENT
Start: 2024-11-09 | End: 2024-11-09

## 2024-11-09 RX ORDER — NALOXONE HYDROCHLORIDE 0.4 MG/ML
0.2 INJECTION, SOLUTION INTRAMUSCULAR; INTRAVENOUS; SUBCUTANEOUS
Status: DISCONTINUED | OUTPATIENT
Start: 2024-11-09 | End: 2024-11-09

## 2024-11-09 RX ORDER — LITHIUM CARBONATE 450 MG
900 TABLET, EXTENDED RELEASE ORAL AT BEDTIME
Status: CANCELLED | OUTPATIENT
Start: 2024-11-09

## 2024-11-09 RX ORDER — POLYETHYLENE GLYCOL 3350 17 G
2-4 POWDER IN PACKET (EA) ORAL
Status: CANCELLED | OUTPATIENT
Start: 2024-11-09

## 2024-11-09 RX ORDER — GABAPENTIN 600 MG/1
600 TABLET ORAL 3 TIMES DAILY
Status: DISCONTINUED | OUTPATIENT
Start: 2024-11-09 | End: 2024-11-09

## 2024-11-09 RX ORDER — AMOXICILLIN 250 MG
2 CAPSULE ORAL 2 TIMES DAILY PRN
Status: DISCONTINUED | OUTPATIENT
Start: 2024-11-09 | End: 2024-11-09

## 2024-11-09 RX ORDER — OXYCODONE HYDROCHLORIDE 10 MG/1
10 TABLET ORAL EVERY 4 HOURS PRN
Status: CANCELLED | OUTPATIENT
Start: 2024-11-09

## 2024-11-09 RX ORDER — PROCHLORPERAZINE MALEATE 10 MG
10 TABLET ORAL EVERY 6 HOURS PRN
Status: DISCONTINUED | OUTPATIENT
Start: 2024-11-09 | End: 2024-11-13 | Stop reason: HOSPADM

## 2024-11-09 RX ORDER — ATORVASTATIN CALCIUM 20 MG/1
20 TABLET, FILM COATED ORAL EVERY EVENING
Status: DISCONTINUED | OUTPATIENT
Start: 2024-11-09 | End: 2024-11-09

## 2024-11-09 RX ORDER — QUETIAPINE FUMARATE 50 MG/1
50 TABLET, FILM COATED ORAL 2 TIMES DAILY
Status: DISCONTINUED | OUTPATIENT
Start: 2024-11-09 | End: 2024-11-10

## 2024-11-09 RX ORDER — NALOXONE HYDROCHLORIDE 0.4 MG/ML
0.4 INJECTION, SOLUTION INTRAMUSCULAR; INTRAVENOUS; SUBCUTANEOUS
Status: DISCONTINUED | OUTPATIENT
Start: 2024-11-09 | End: 2024-11-09 | Stop reason: HOSPADM

## 2024-11-09 RX ORDER — BUPRENORPHINE 8 MG/1
8 TABLET SUBLINGUAL 2 TIMES DAILY
Status: DISCONTINUED | OUTPATIENT
Start: 2024-11-09 | End: 2024-11-13 | Stop reason: HOSPADM

## 2024-11-09 RX ORDER — CALCIUM CARBONATE 500 MG/1
1000 TABLET, CHEWABLE ORAL 4 TIMES DAILY PRN
Status: DISCONTINUED | OUTPATIENT
Start: 2024-11-09 | End: 2024-11-09

## 2024-11-09 RX ORDER — POLYETHYLENE GLYCOL 3350 17 G
2-4 POWDER IN PACKET (EA) ORAL
Status: DISCONTINUED | OUTPATIENT
Start: 2024-11-09 | End: 2024-11-13 | Stop reason: HOSPADM

## 2024-11-09 RX ORDER — AMOXICILLIN 250 MG
1 CAPSULE ORAL 2 TIMES DAILY PRN
Status: CANCELLED | OUTPATIENT
Start: 2024-11-09

## 2024-11-09 RX ORDER — ATOMOXETINE 80 MG/1
80 CAPSULE ORAL DAILY
Status: DISCONTINUED | OUTPATIENT
Start: 2024-11-09 | End: 2024-11-09

## 2024-11-09 RX ORDER — ONDANSETRON 4 MG/1
4 TABLET, ORALLY DISINTEGRATING ORAL EVERY 6 HOURS PRN
Status: DISCONTINUED | OUTPATIENT
Start: 2024-11-09 | End: 2024-11-13 | Stop reason: HOSPADM

## 2024-11-09 RX ORDER — OXYCODONE HYDROCHLORIDE 10 MG/1
10 TABLET ORAL EVERY 4 HOURS PRN
Status: DISCONTINUED | OUTPATIENT
Start: 2024-11-09 | End: 2024-11-09 | Stop reason: HOSPADM

## 2024-11-09 RX ORDER — CALCIUM CARBONATE 500 MG/1
1000 TABLET, CHEWABLE ORAL 4 TIMES DAILY PRN
Status: DISCONTINUED | OUTPATIENT
Start: 2024-11-09 | End: 2024-11-13 | Stop reason: HOSPADM

## 2024-11-09 RX ORDER — DEXAMETHASONE SODIUM PHOSPHATE 4 MG/ML
10 INJECTION, SOLUTION INTRA-ARTICULAR; INTRALESIONAL; INTRAMUSCULAR; INTRAVENOUS; SOFT TISSUE EVERY 6 HOURS
Status: DISCONTINUED | OUTPATIENT
Start: 2024-11-09 | End: 2024-11-11

## 2024-11-09 RX ORDER — PRAZOSIN HYDROCHLORIDE 1 MG/1
1 CAPSULE ORAL AT BEDTIME
Status: CANCELLED | OUTPATIENT
Start: 2024-11-09

## 2024-11-09 RX ORDER — OXYCODONE HYDROCHLORIDE 5 MG/1
5 TABLET ORAL EVERY 4 HOURS PRN
Status: CANCELLED | OUTPATIENT
Start: 2024-11-09

## 2024-11-09 RX ORDER — CEFTRIAXONE 2 G/1
2 INJECTION, POWDER, FOR SOLUTION INTRAMUSCULAR; INTRAVENOUS EVERY 12 HOURS
Status: CANCELLED | OUTPATIENT
Start: 2024-11-09

## 2024-11-09 RX ORDER — BUPRENORPHINE 2 MG/1
4 TABLET SUBLINGUAL EVERY 24 HOURS
Status: DISCONTINUED | OUTPATIENT
Start: 2024-11-10 | End: 2024-11-13 | Stop reason: HOSPADM

## 2024-11-09 RX ORDER — VANCOMYCIN HYDROCHLORIDE
1500 EVERY 8 HOURS
Status: CANCELLED | OUTPATIENT
Start: 2024-11-09

## 2024-11-09 RX ORDER — PRAZOSIN HYDROCHLORIDE 1 MG/1
1 CAPSULE ORAL AT BEDTIME
Status: DISCONTINUED | OUTPATIENT
Start: 2024-11-09 | End: 2024-11-13 | Stop reason: HOSPADM

## 2024-11-09 RX ORDER — ACETAMINOPHEN 325 MG/1
650 TABLET ORAL EVERY 4 HOURS PRN
Status: DISCONTINUED | OUTPATIENT
Start: 2024-11-09 | End: 2024-11-09

## 2024-11-09 RX ORDER — LIDOCAINE HYDROCHLORIDE 10 MG/ML
30 INJECTION, SOLUTION EPIDURAL; INFILTRATION; INTRACAUDAL; PERINEURAL ONCE
Status: COMPLETED | OUTPATIENT
Start: 2024-11-09 | End: 2024-11-09

## 2024-11-09 RX ORDER — NALOXONE HYDROCHLORIDE 0.4 MG/ML
0.2 INJECTION, SOLUTION INTRAMUSCULAR; INTRAVENOUS; SUBCUTANEOUS
Status: DISCONTINUED | OUTPATIENT
Start: 2024-11-09 | End: 2024-11-13 | Stop reason: HOSPADM

## 2024-11-09 RX ORDER — NALOXONE HYDROCHLORIDE 0.4 MG/ML
0.2 INJECTION, SOLUTION INTRAMUSCULAR; INTRAVENOUS; SUBCUTANEOUS
Status: CANCELLED | OUTPATIENT
Start: 2024-11-09

## 2024-11-09 RX ORDER — OXYCODONE HYDROCHLORIDE 5 MG/1
5 TABLET ORAL EVERY 4 HOURS PRN
Status: DISCONTINUED | OUTPATIENT
Start: 2024-11-09 | End: 2024-11-09 | Stop reason: HOSPADM

## 2024-11-09 RX ORDER — BUPRENORPHINE 2 MG/1
8 TABLET SUBLINGUAL 2 TIMES DAILY
Status: CANCELLED | OUTPATIENT
Start: 2024-11-09

## 2024-11-09 RX ORDER — CEFTRIAXONE 2 G/1
2 INJECTION, POWDER, FOR SOLUTION INTRAMUSCULAR; INTRAVENOUS EVERY 12 HOURS
Status: DISCONTINUED | OUTPATIENT
Start: 2024-11-10 | End: 2024-11-11

## 2024-11-09 RX ORDER — QUETIAPINE FUMARATE 400 MG/1
400 TABLET, FILM COATED ORAL AT BEDTIME
Status: DISCONTINUED | OUTPATIENT
Start: 2024-11-09 | End: 2024-11-10

## 2024-11-09 RX ORDER — OXYCODONE HYDROCHLORIDE 5 MG/1
5 TABLET ORAL EVERY 4 HOURS PRN
Status: DISCONTINUED | OUTPATIENT
Start: 2024-11-09 | End: 2024-11-11

## 2024-11-09 RX ORDER — AMOXICILLIN 250 MG
2 CAPSULE ORAL 2 TIMES DAILY PRN
Status: CANCELLED | OUTPATIENT
Start: 2024-11-09

## 2024-11-09 RX ORDER — OXYCODONE HYDROCHLORIDE 5 MG/1
5 TABLET ORAL EVERY 4 HOURS PRN
Status: DISCONTINUED | OUTPATIENT
Start: 2024-11-09 | End: 2024-11-09

## 2024-11-09 RX ORDER — ACETAMINOPHEN 650 MG/1
650 SUPPOSITORY RECTAL EVERY 4 HOURS PRN
Status: DISCONTINUED | OUTPATIENT
Start: 2024-11-09 | End: 2024-11-09

## 2024-11-09 RX ORDER — DEXAMETHASONE SODIUM PHOSPHATE 4 MG/ML
10 INJECTION, SOLUTION INTRA-ARTICULAR; INTRALESIONAL; INTRAMUSCULAR; INTRAVENOUS; SOFT TISSUE EVERY 6 HOURS
Status: CANCELLED | OUTPATIENT
Start: 2024-11-09

## 2024-11-09 RX ORDER — AMOXICILLIN 250 MG
1 CAPSULE ORAL 2 TIMES DAILY PRN
Status: DISCONTINUED | OUTPATIENT
Start: 2024-11-09 | End: 2024-11-13 | Stop reason: HOSPADM

## 2024-11-09 RX ORDER — ATOMOXETINE 80 MG/1
80 CAPSULE ORAL DAILY
Status: CANCELLED | OUTPATIENT
Start: 2024-11-10

## 2024-11-09 RX ORDER — MULTIPLE VITAMINS W/ MINERALS TAB 9MG-400MCG
1 TAB ORAL DAILY
Status: CANCELLED | OUTPATIENT
Start: 2024-11-10

## 2024-11-09 RX ORDER — QUETIAPINE FUMARATE 50 MG/1
50 TABLET, FILM COATED ORAL 2 TIMES DAILY
Status: DISCONTINUED | OUTPATIENT
Start: 2024-11-09 | End: 2024-11-09

## 2024-11-09 RX ORDER — KETOROLAC TROMETHAMINE 15 MG/ML
30 INJECTION, SOLUTION INTRAMUSCULAR; INTRAVENOUS EVERY 6 HOURS PRN
Status: DISCONTINUED | OUTPATIENT
Start: 2024-11-09 | End: 2024-11-13 | Stop reason: HOSPADM

## 2024-11-09 RX ORDER — ACETAMINOPHEN 325 MG/1
650 TABLET ORAL EVERY 4 HOURS PRN
Status: DISCONTINUED | OUTPATIENT
Start: 2024-11-09 | End: 2024-11-13 | Stop reason: HOSPADM

## 2024-11-09 RX ORDER — MULTIPLE VITAMINS W/ MINERALS TAB 9MG-400MCG
1 TAB ORAL DAILY
Status: DISCONTINUED | OUTPATIENT
Start: 2024-11-10 | End: 2024-11-09

## 2024-11-09 RX ORDER — ATOMOXETINE 80 MG/1
80 CAPSULE ORAL DAILY
Status: DISCONTINUED | OUTPATIENT
Start: 2024-11-10 | End: 2024-11-13 | Stop reason: HOSPADM

## 2024-11-09 RX ORDER — ACETAMINOPHEN 650 MG/1
650 SUPPOSITORY RECTAL EVERY 4 HOURS PRN
Status: DISCONTINUED | OUTPATIENT
Start: 2024-11-09 | End: 2024-11-13 | Stop reason: HOSPADM

## 2024-11-09 RX ORDER — MULTIPLE VITAMINS W/ MINERALS TAB 9MG-400MCG
1 TAB ORAL DAILY
Status: DISCONTINUED | OUTPATIENT
Start: 2024-11-10 | End: 2024-11-13 | Stop reason: HOSPADM

## 2024-11-09 RX ORDER — NALOXONE HYDROCHLORIDE 0.4 MG/ML
0.4 INJECTION, SOLUTION INTRAMUSCULAR; INTRAVENOUS; SUBCUTANEOUS
Status: CANCELLED | OUTPATIENT
Start: 2024-11-09

## 2024-11-09 RX ORDER — ACETAMINOPHEN 650 MG/1
650 SUPPOSITORY RECTAL EVERY 4 HOURS PRN
Status: CANCELLED | OUTPATIENT
Start: 2024-11-09

## 2024-11-09 RX ORDER — OLANZAPINE 10 MG/1
2.5 INJECTION, POWDER, LYOPHILIZED, FOR SOLUTION INTRAMUSCULAR ONCE
Status: DISCONTINUED | OUTPATIENT
Start: 2024-11-09 | End: 2024-11-09 | Stop reason: HOSPADM

## 2024-11-09 RX ORDER — POLYETHYLENE GLYCOL 3350 17 G
2-4 POWDER IN PACKET (EA) ORAL
Status: DISCONTINUED | OUTPATIENT
Start: 2024-11-09 | End: 2024-11-09

## 2024-11-09 RX ORDER — OXYCODONE HYDROCHLORIDE 5 MG/1
10 TABLET ORAL EVERY 4 HOURS PRN
Status: DISCONTINUED | OUTPATIENT
Start: 2024-11-09 | End: 2024-11-09

## 2024-11-09 RX ORDER — DIPHENHYDRAMINE HCL 25 MG
25-50 CAPSULE ORAL EVERY 4 HOURS PRN
Status: DISCONTINUED | OUTPATIENT
Start: 2024-11-09 | End: 2024-11-13 | Stop reason: HOSPADM

## 2024-11-09 RX ORDER — GABAPENTIN 600 MG/1
600 TABLET ORAL 3 TIMES DAILY
Status: DISCONTINUED | OUTPATIENT
Start: 2024-11-09 | End: 2024-11-13 | Stop reason: HOSPADM

## 2024-11-09 RX ADMIN — OXYCODONE HYDROCHLORIDE 15 MG: 5 TABLET ORAL at 18:15

## 2024-11-09 RX ADMIN — ACETAMINOPHEN 650 MG: 325 TABLET, FILM COATED ORAL at 09:14

## 2024-11-09 RX ADMIN — LIDOCAINE HYDROCHLORIDE 10 ML: 10 INJECTION, SOLUTION EPIDURAL; INFILTRATION; INTRACAUDAL; PERINEURAL at 19:50

## 2024-11-09 RX ADMIN — DEXAMETHASONE SODIUM PHOSPHATE 10 MG: 4 INJECTION, SOLUTION INTRAMUSCULAR; INTRAVENOUS at 12:35

## 2024-11-09 RX ADMIN — CEFTRIAXONE SODIUM 2 G: 2 INJECTION, POWDER, FOR SOLUTION INTRAMUSCULAR; INTRAVENOUS at 13:43

## 2024-11-09 RX ADMIN — OXYCODONE HYDROCHLORIDE 10 MG: 10 TABLET ORAL at 15:34

## 2024-11-09 RX ADMIN — CEFTRIAXONE SODIUM 2 G: 2 INJECTION, POWDER, FOR SOLUTION INTRAMUSCULAR; INTRAVENOUS at 01:01

## 2024-11-09 RX ADMIN — NICOTINE POLACRILEX 4 MG: 2 LOZENGE ORAL at 18:15

## 2024-11-09 RX ADMIN — VANCOMYCIN HYDROCHLORIDE 1500 MG: 10 INJECTION, POWDER, LYOPHILIZED, FOR SOLUTION INTRAVENOUS at 20:59

## 2024-11-09 RX ADMIN — Medication 1500 MG: at 09:53

## 2024-11-09 RX ADMIN — DEXAMETHASONE SODIUM PHOSPHATE 10 MG: 4 INJECTION, SOLUTION INTRAMUSCULAR; INTRAVENOUS at 00:55

## 2024-11-09 RX ADMIN — ACYCLOVIR SODIUM 1000 MG: 50 INJECTION, SOLUTION INTRAVENOUS at 12:07

## 2024-11-09 RX ADMIN — SENNOSIDES AND DOCUSATE SODIUM 1 TABLET: 50; 8.6 TABLET ORAL at 07:43

## 2024-11-09 RX ADMIN — VANCOMYCIN HYDROCHLORIDE 2500 MG: 10 INJECTION, POWDER, LYOPHILIZED, FOR SOLUTION INTRAVENOUS at 01:39

## 2024-11-09 RX ADMIN — FLUOXETINE 40 MG: 20 CAPSULE ORAL at 07:43

## 2024-11-09 RX ADMIN — ATORVASTATIN CALCIUM 20 MG: 20 TABLET, FILM COATED ORAL at 20:59

## 2024-11-09 RX ADMIN — QUETIAPINE FUMARATE 400 MG: 400 TABLET ORAL at 23:43

## 2024-11-09 RX ADMIN — HYDROMORPHONE HYDROCHLORIDE 1 MG: 1 INJECTION, SOLUTION INTRAMUSCULAR; INTRAVENOUS; SUBCUTANEOUS at 11:17

## 2024-11-09 RX ADMIN — GABAPENTIN 600 MG: 600 TABLET, FILM COATED ORAL at 13:26

## 2024-11-09 RX ADMIN — BUPRENORPHINE 4 MG: 2 TABLET SUBLINGUAL at 13:26

## 2024-11-09 RX ADMIN — LITHIUM CARBONATE 900 MG: 450 TABLET ORAL at 22:18

## 2024-11-09 RX ADMIN — DEXAMETHASONE SODIUM PHOSPHATE 10 MG: 4 INJECTION, SOLUTION INTRAMUSCULAR; INTRAVENOUS at 06:58

## 2024-11-09 RX ADMIN — GABAPENTIN 600 MG: 600 TABLET, FILM COATED ORAL at 22:14

## 2024-11-09 RX ADMIN — NICOTINE POLACRILEX 2 MG: 2 LOZENGE ORAL at 12:20

## 2024-11-09 RX ADMIN — ACETAMINOPHEN 650 MG: 325 TABLET, FILM COATED ORAL at 14:26

## 2024-11-09 RX ADMIN — NICOTINE POLACRILEX 2 MG: 2 LOZENGE ORAL at 09:21

## 2024-11-09 RX ADMIN — BUPRENORPHINE HCL 8 MG: 8 TABLET SUBLINGUAL at 23:10

## 2024-11-09 RX ADMIN — QUETIAPINE FUMARATE 50 MG: 50 TABLET ORAL at 20:59

## 2024-11-09 RX ADMIN — Medication 1 TABLET: at 07:42

## 2024-11-09 RX ADMIN — OXYCODONE HYDROCHLORIDE 5 MG: 5 TABLET ORAL at 07:43

## 2024-11-09 RX ADMIN — OXYCODONE HYDROCHLORIDE 15 MG: 5 TABLET ORAL at 22:13

## 2024-11-09 RX ADMIN — BUPRENORPHINE 8 MG: 2 TABLET SUBLINGUAL at 07:43

## 2024-11-09 RX ADMIN — NICOTINE POLACRILEX 4 MG: 2 LOZENGE ORAL at 22:14

## 2024-11-09 RX ADMIN — ACETAMINOPHEN 650 MG: 325 TABLET, FILM COATED ORAL at 20:59

## 2024-11-09 RX ADMIN — PRAZOSIN HYDROCHLORIDE 1 MG: 1 CAPSULE ORAL at 22:18

## 2024-11-09 RX ADMIN — GABAPENTIN 600 MG: 600 TABLET, FILM COATED ORAL at 07:43

## 2024-11-09 RX ADMIN — ACYCLOVIR SODIUM 1000 MG: 1000 INJECTION, SOLUTION INTRAVENOUS at 20:59

## 2024-11-09 RX ADMIN — DEXAMETHASONE SODIUM PHOSPHATE 10 MG: 4 INJECTION, SOLUTION INTRAMUSCULAR; INTRAVENOUS at 18:16

## 2024-11-09 RX ADMIN — QUETIAPINE FUMARATE 50 MG: 50 TABLET ORAL at 07:43

## 2024-11-09 RX ADMIN — NICOTINE POLACRILEX 4 MG: 2 LOZENGE ORAL at 05:42

## 2024-11-09 RX ADMIN — ACYCLOVIR SODIUM 1000 MG: 50 INJECTION, SOLUTION INTRAVENOUS at 02:58

## 2024-11-09 RX ADMIN — OXYCODONE HYDROCHLORIDE 10 MG: 10 TABLET ORAL at 12:16

## 2024-11-09 ASSESSMENT — ACTIVITIES OF DAILY LIVING (ADL)
ADLS_ACUITY_SCORE: 20.75
ADLS_ACUITY_SCORE: 20.75
ADLS_ACUITY_SCORE: 20.25
ADLS_ACUITY_SCORE: 20.25
ADLS_ACUITY_SCORE: 18.25
ADLS_ACUITY_SCORE: 18.25
ADLS_ACUITY_SCORE: 20.75
ADLS_ACUITY_SCORE: 20.75
ADLS_ACUITY_SCORE: 20.25
ADLS_ACUITY_SCORE: 20.25
ADLS_ACUITY_SCORE: 18.25
ADLS_ACUITY_SCORE: 20.25
ADLS_ACUITY_SCORE: 18.25
ADLS_ACUITY_SCORE: 20.75
ADLS_ACUITY_SCORE: 20.75
ADLS_ACUITY_SCORE: 18.25
ADLS_ACUITY_SCORE: 20.75
ADLS_ACUITY_SCORE: 20.25
ADLS_ACUITY_SCORE: 20.25
ADLS_ACUITY_SCORE: 20.75
ADLS_ACUITY_SCORE: 20.75

## 2024-11-09 NOTE — PHARMACY-VANCOMYCIN DOSING SERVICE
Pharmacy Vancomycin Initial Note  Date of Service 2024  Patient's  1975  49 year old, male    Indication: Meningitis    Current estimated CrCl = Estimated Creatinine Clearance: 99.7 mL/min (A) (based on SCr of 1.28 mg/dL (H)).    Creatinine for last 3 days  2024:  6:10 PM Creatinine 1.28 mg/dL    Recent Vancomycin Level(s) for last 3 days  No results found for requested labs within last 3 days.      Vancomycin IV Administrations (past 72 hours)        No vancomycin orders with administrations in past 72 hours.                    Nephrotoxins and other renal medications (From now, onward)      Start     Dose/Rate Route Frequency Ordered Stop    24 0800  vancomycin (VANCOCIN) 1,250 mg in 0.9% NaCl 250 mL intermittent infusion         1,250 mg  166.7 mL/hr over 90 Minutes Intravenous EVERY 12 HOURS 24 2030  Vancomycin (VANCOCIN) 2,000 mg in 0.9% NaCl 500 mL intermittent infusion         2,000 mg  250 mL/hr over 2 Hours Intravenous ONCE 24 0000  lithium (ESKALITH CR/LITHOBID) 450 MG CR tablet         900 mg Oral AT BEDTIME 24 1253      10/31/24 2200  lithium (ESKALITH CR/LITHOBID) CR tablet 900 mg         900 mg Oral AT BEDTIME 10/31/24 1126              Contrast Orders - past 72 hours (72h ago, onward)      Start     Dose/Rate Route Frequency Stop    24 1900  iopamidol (ISOVUE-370) solution 100 mL         100 mL Intravenous ONCE 24 1855                Plan:  Start vancomycin  2500 mg (20mg/kg) IV loading dose followed by 1500mg q8h.   Vancomycin monitoring method: Trough (Method 2 = manual dose calculation)  Vancomycin therapeutic monitoring goal: 15-20 mg/L  Pharmacy will check vancomycin levels as appropriate in 1-3 Days.    Serum creatinine levels will be ordered daily for the first week of therapy and at least twice weekly for subsequent weeks.      NOBLE PEREZ [facility]

## 2024-11-09 NOTE — PHARMACY-VANCOMYCIN DOSING SERVICE
Pharmacy Vancomycin Initial Note  Date of Service 2024  Patient's  1975  49 year old, male    Indication: Meningitis    Current estimated CrCl = Estimated Creatinine Clearance: 99.9 mL/min (A) (based on SCr of 1.28 mg/dL (H)).    Creatinine for last 3 days  2024:  6:10 PM Creatinine 1.28 mg/dL  2024:  5:28 AM Creatinine 1.28 mg/dL    Recent Vancomycin Level(s) for last 3 days  No results found for requested labs within last 3 days.      Vancomycin IV Administrations (past 72 hours)                     vancomycin (VANCOCIN) 1,500 mg in 0.9% NaCl 250 mL intermittent infusion (mg) 1,500 mg New Bag 24 0953    vancomycin (VANCOCIN) 2,500 mg in sodium chloride 0.9 % 575 mL intermittent infusion (mg) 2,500 mg New Bag 24 0139                    Nephrotoxins and other renal medications (From now, onward)      Start     Dose/Rate Route Frequency Ordered Stop    24 220  lithium (ESKALITH CR/LITHOBID) CR tablet 900 mg        Note to Pharmacy: PTA Sig:Take 2 tablets (900 mg) by mouth at bedtime.      900 mg Oral AT BEDTIME 24 1714      24 2000  acyclovir (ZOVIRAX) 1,000 mg in sodium chloride 0.9 % 270 mL intermittent infusion         10 mg/kg × 100.1 kg (Adjusted)  270 mL/hr over 1 Hours Intravenous EVERY 8 HOURS 24 1649      24 1800  vancomycin (VANCOCIN) 1,500 mg in 0.9% NaCl 265 mL intermittent infusion         1,500 mg  over 90 Minutes Intravenous EVERY 8 HOURS 24 1649      24 1736  ketorolac (TORADOL) injection 30 mg         30 mg Intravenous EVERY 6 HOURS PRN 24 1736 24 1735            Contrast Orders - past 72 hours (72h ago, onward)      None              Plan:  Continue vancomycin  1500 mg IV q8h as started at [facility].   Vancomycin monitoring method: Trough (Method 2 = manual dose calculation)  Vancomycin therapeutic monitoring goal: 15-20 mg/L  Pharmacy will check vancomycin levels as appropriate in  11/10 AM prior to 4th  maintenance dose  .    Serum creatinine levels will be ordered a minimum of twice weekly.      Rory Treadwell, [facility]

## 2024-11-09 NOTE — PLAN OF CARE
Goal Outcome Evaluation:           Overall Patient Progress: improvingOverall Patient Progress: improving       Problem: Adult Inpatient Plan of Care  Goal: Plan of Care Review  Description: The Plan of Care Review/Shift note should be completed every shift.  The Outcome Evaluation is a brief statement about your assessment that the patient is improving, declining, or no change.  This information will be displayed automatically on your shift  note.  Outcome: Met  Flowsheets (Taken 11/9/2024 6748)  Overall Patient Progress: improving     Pt alert and oriented 2-3, intermittent confusion, Ems arrived and pt was transferred to [facility], discharge paper works/ instructions  given , vitals was stable at discharge, pt prn oxycodone was given for headache 10/10. All pt bellongings wer given to pt at discharge time at around 15:50pm

## 2024-11-09 NOTE — PROGRESS NOTES
Transfer Type: [facility]  Transfer Triage Note    Date of call: 11/09/24  Time of call: 10:55 AM    Current Patient Location:  [facility]  Current Level of Care: ICU    Vitals: Temp: 98.3  F (36.8  C) Temp src: Oral BP: 119/66     Resp: 18 SpO2: 94 %      O2 Device: None (Room air) at Oxygen Delivery: 2 LPM  Diagnosis: Suspected Meningitis   Reason for requested transfer: Procedure can be done here and not at referring hospital  Further diagnostic work up, management, and consultation for specialized care   Isolation Needs: None    Care everywhere has been updated and reviewed: Yes  Necessary images have been sent through PACS: Yes    If patient is transferring for specialty care or specific procedure, the specialist required has participated in the transfer call and agreed with need for transfer and anticipated timeline: Yes, Provider name: Dr. Perla specialty with: Interventional Radiology    Transfer accepted: Yes  Stability of Patient: Patient is vitally stable, with no critical labs, and will likely remain stable throughout the transfer process  Is the patient appropriate for [facility]? Yes  Level of Care Needed: Med Surg  Telemetry Needed:  None  Expected Time of Arrival for Transfer: 0-8 hours  Arrival Location:  [facility]     Recommendations for Management and Stabilization: Not needed    Additional Comments: Patient is a 49-year-old man with MDD, KANE, ADHD, and polysubstance use disorder (methamphetamine and opiates) who was admitted to psychiatry for suicidal ideations.  Patient reported severe worsening of his life.  CT head and CTA head/neck was negative for stroke.  Patient also reported neck stiffness and was found to have positive Kernig sign.  MRI brain concerning for meningitis.  He was started on IV antibiotics and antivirals with dexamethasone.  Patient recommended to have emergent LP with IR..  Patient to transfer to [facility] neuroscience floor for for  closer neuromonitoring.    Sweta Waters MD

## 2024-11-09 NOTE — PLAN OF CARE
End of shift Summary: See flowsheet for VS and detail assessments.     Changes this Shift:     Neuro/CMS: A&Ox2-3; disoriented to place and time. PERRLA. Afebrile. Cooperative but anxious and restless. Able to make needs known.   Pulmonary: Stable on RA during the day, uses 2LPM of O2 via NC during the night for sleep apnea. Denies CP and SOB.   Output: Continent of bowel/bladder. Voids spontaneously and adequately in the bathroom. Had BM this shift per pt report.   Activity: Independent in room  Skin: Generalized rash and scabs.   Pain: Rates headaches 10/10; gave PRN PO 5mg oxycodone, PRN PO 10mg oxycodone, one time dose of IV dilaudid and PRN PO tylenol but verbalizes headaches still remains 10/10.   IV: L and R PIV SL and patent.   Additional info: 1:1 sitter in place for safety   Plan: Transfer to Neuro unit at [facility]. RN to RN report given

## 2024-11-09 NOTE — PROGRESS NOTES
Admitted/transferred from: Psych unit  Reason for admission/transfer: Possible meningitis   2 RN skin assessment: completed by FUNMILAYO Love and FUNMILAYO Clark  Result of skin assessment and interventions/actions: Rash and red scabs on back, shin, and ankles  Height, weight, drug calc weight: Done  Patient belongings (see Flowsheet)  MDRO education added to care planYes    Patient arrived to unit at 2300. Requiring 2L nasal cannula. Febrile upon arrival Patient A&O x3, with intermittent confusion, and appears to be have trouble understanding his diagnosis. Patient not oriented to situation and asking the same questions repeatedly. Initially refused medication, MD called to bedside to explain and redirect patient. Attempt unsuccessful, MD ordered zypexa. Patient appeared paranoid thinking we will harvest his organs. RN redirected patient and was able to convince patient to allow medication administration. Zyprexa held by RN. At 5am patient got up to use bathroom and became increasingly agitated attempting to leave unit to go outside and smoke. SUSIE team called, and patient redirected back to bed. Vital signs stable.   ?

## 2024-11-09 NOTE — CODE/RAPID RESPONSE
Stroke code was called on this patient at 1820 this evening. At this time, this provider was at another hospital emergency and was unable to see or assess this patient. All cares deferred to patient's Hospital Medicine team.    WOLF Rhoades CNP  11/8/2024  7:21 PM

## 2024-11-09 NOTE — PROGRESS NOTES
"Medicine cross-cover note    Paged by nursing that patient had \"worst headache ever\" and vomited x 2.  Immediately told nursing to call rapid response, due to high suspicion of potential hemorrhagic stroke.  Informed by nursing that rapid response provider was at another rapid and unable to come.  Went directly over to station 12.  Spoke with Sade Mojica of code stroke while en route to unit.  Agreed with high concern for stroke.  Assessed patient on unit, reports left-sided facial numbness, and vision changes. Has never had a headache previous to this.  Notes hit his head into the wall around noon. Had rapid onset of head pain around 5 PM.  Ordered code stroke order set.  Stroke code called.  SBP approximately 130mmHg, noted to have fever of 103.     Code stroke provider was tied up with another emergency.  Writer stayed with ICU fliers for continue management patient. Called CT who informed me that another code stroke was currently using the CT scanner and that there was going to be a delay. Transported patient to CT able to await scan.  Continued to communicate with neurology provider and contacted Dr. Jesus to inquire if any additional interventions are required. CT head/CTA head neck unremarkable for acute stroke.  Recommended MRI with and without contrast.     Returned with patient on unit 12. noted to have positive Kernig sign, and reports neck stiffness.  Additional neurological exam grossly unremarkable, with the exception of poor nose-to-finger testing with left extremity.     With stroke ruled out, primary differential diagnosis is acute meningitis.  Spoke with Dr. Lorelei Huddleston and Dr. Tomy Jesus, who both agreed admission is required. Started antibiotics and steroids.  Acknowledge that patient would not be able to have LP performed on [facility], spoke with patient placement for possible transfer to [facility] or [facility] for admission.     Patient placement called back and stated that she was " advised to attempt [facility] ICU transfer.  Informed her that the patient will be unable to obtain lumbar puncture on [facility],  Writer called the ICU, spoke with provider and confirmed that they do not perform lumbar punctures at this campus.     Will await call from patient placement, and will work on transferring patient to either [facility] or outside hospital for further treatment of meningitis.    -Continue ceftriaxone  -Continue dexamethasone  -Continue vancomycin  -Monitor vitals   -Neurochecks    Case was discussed with Dr. Tomy Castro PA-C  Hospitalist Service, [facility]  Securely message with [employer] (more info)  Text page via [facility] Paging/Directory   See signed in provider for up to date coverage information

## 2024-11-09 NOTE — CONSULTS
"  [facility]    Stroke Telephone Note    I was called by Daniel Castro on 11/08/24 regarding patient Perry JOSE Ilana Samuels. The patient is a 49 year old male with history significant for substance-induced psychosis, depression, polysubstance abuse, schizophrenia, TBI.  He is admitted for suicidal ideation. Code stroke was activated for acute onset \"worse headache of his life\". Per responding provider, he is also having left facial numbness. These began at 1700. He is not on anticoagulation.  Not a TNK candidate due to minor deficits.    Vitals  BP: (!) 155/88   Pulse: 77   Resp: 18   Temp: 99.5  F (37.5  C)   Weight: 123.1 kg (271 lb 6.4 oz)    Stroke Code Data (for stroke code without tele)  Stroke code activated 11/08/24  1820   Stroke provider first response 11/08/24  1820   Last known normal 11/08/24  1655      Time of discovery (or onset of symptoms) 11/08/24  1700   Head CT read by Stroke Neuro Provider 11/08/24  1851   Was stroke code de-escalated? Yes  11/08/24  1905     Imaging Findings  CT head: Negative for acute pathology  CTA head/neck: No significant stenosis or LVO.  Incidental left apical consolidative groundglass opacities    Intravenous Thrombolysis  Not given due to:   - minor/isolated/quickly resolving symptoms    Endovascular Treatment  Not initiated due to absence of proximal vessel occlusion    Impression  Acute onset headache and left facial numbness.  Concern for complex migraine versus less likely acute stroke.    Recommendations   - Q4 hour neurochecks  - MRI brain w/wo.  If negative for acute stroke and still having neurologic symptoms, recommend general neurology consultation for further evaluation     Case discussed with vascular neurology attending Dr. Campo.  Recommendations discussed with Daniel Castro.     My recommendations are based on the information provided over the phone by Perry Preciado Jr.'s in-person providers. They " "are not intended to replace the clinical judgment of his in-person providers. I was not requested to personally see or examine the patient at this time.     Sade Mojica, CNP  Vascular Neurology    To page me or covering stroke neurology team member, click here: AMCOM  Choose \"On Call\" tab at top, then select \"NEUROLOGY/ALL SITES\" from middle drop-down box, press Enter, then look for \"stroke\" or \"telestroke\" for your site.    "

## 2024-11-09 NOTE — H&P
[facility]    History and Physical - Hospitalist Service, GOLD TEAM        Date of Admission: 11/8    Assessment & Plan      Perry Preciado Jr. is a 49 year old male admitted on (Not on file).  Medical history notable for major depressive disorder, ADHD, polysubstance use disorder.  Transferred to medicine due to concern of meningitis.    # Suspected Meningitis  #New onset severe headache  Patient admitted on psychiatry.  Reported to have severe rapid onset headache.  Noted to have hit head on wall earlier in day.  Was assessed by medicine.  No previous history of similar headache, noted visual changes and left facial numbness.  Code stroke performed.  CT head/CTA head neck unremarkable for stroke.  Patient noted to have fever 103, neck stiffness, positive Kernig sign.  Suspected to have meningitis.  Needs lumbar puncture but unable to transfer to [facility] due to bed availability.  Started on IV antibiotics and dexamethasone.  MRI brain showed findings suggestive of meningitis.   -Will await transfer to [place] for lumbar puncture  -Continue IV ceftriaxone/vancomycin  -Continue dexamethasone  -Positive HSV AB, empiric acyclovir  -Follow CBC  -Blood cultures pending  -Neurochecks q2hrs   -STD testing     #Schizophrenia  # Polysubstance use disorder (methamphetamines laced with fentanyl)  #General Anxiety disorder  #Major depressive disorder  Was admitted by psychiatry with suicidal ideation and setting of recent relapse.  Will continue current psychiatry regiment.  Will consult psychiatry for further management.  -Psychiatry consult, recs appreciated  -Fluoxetine 40 mg daily  -Seroquel 50 mg twice daily, with additional 400 mg at bedtime  -Prazosin 1 mg at bedtime   -Lithium 900 mg tablet at bedtime  -Buprenorphine sublingual tablet 8 mg twice daily, with additional 4 mg daily at 1400       Diet: Regular diet  DVT Prophylaxis: Pneumatic Compression  "Devices  Tran Catheter: Not present  Lines: None     Cardiac Monitoring: None  Code Status:Full    Clinically Significant Risk Factors Present on Admission                   # Hypertension: Home medication list includes antihypertensive(s)           # Obesity: Estimated body mass index is 34.03 kg/m  as calculated from the following:    Height as of an earlier encounter on 11/8/24: 1.905 m (6' 3\").    Weight as of an earlier encounter on 11/8/24: 123.5 kg (272 lb 4.3 oz).         # Financial/Environmental Concerns:    # Asthma: noted on problem list        Disposition Plan     Medically Ready for Discharge:          The patient's care was discussed with the Attending Physician, Dr. Lorelei Huddleston .    Lorelei Huddleston PA-C  Hospitalist Service, [facility]  Securely message with [employer] (more info)  Text page via [facility] Paging/Directory   See signed in provider for up to date coverage information        Physician Attestation      I saw and evaluated Perry Preciado  as part of a shared APRN/PA visit.     I personally reviewed the vital signs, medications, labs, and imaging.    Key management decisions made by me and carried out under my direction include: Started IV antibiotics, acyclovir and steroids given significant concern for meningitis and history of HSV.     Counseling and/or coordination of care performed by me:  Coordination for transfer to the [facility] for  tomorrow.     >75 MINUTES SPENT BY ME on the date of service doing chart review, history, exam, documentation & further activities per the note.    Lorelei Huddleston  Date of Service (when I saw the patient): 11/8/24   ______________________________________________________________________    Chief Complaint   Meningitis    History is obtained from the patient    History of Present Illness   Will admitted to psychiatry noted worst headache of his life.  States has never had previous headache " like this. Code stroke called which was unremarkable.  Patient had fever and meningeal signs.  Suspected to have meningitis and was transferred to ICU.  Awaiting bed placement      Past Medical History    Past Medical History:   Diagnosis Date    Acute bilateral low back pain with right-sided sciatica 06/07/2016    Acute pain of right shoulder due to trauma     Adult antisocial behavior     jail incarceration: 16 years    Aspiration pneumonia (H) 02/24/2014    CARDIOVASCULAR SCREENING; LDL GOAL LESS THAN 130 06/07/2016    Carpal tunnel syndrome of right wrist 06/07/2016    Chest pain 02/19/2014    Chest trauma 02/19/2014    Chronic pain syndrome 09/30/2019    Chronic right-sided low back pain with right-sided sciatica 05/10/2018    DDD (degenerative disc disease), lumbosacral 05/05/2020    Depression with anxiety 05/05/2020    Displacement of lumbar intervertebral disc without myelopathy 05/16/2012    KANE (generalized anxiety disorder) 07/07/2017    Heroin abuse (H) 05/10/2018    History of ADHD 01/06/2014    History of drug abuse (H) 01/06/2014    History of suicide attempt 05/10/2018    HTN (hypertension) 02/26/2014    Hyperglycemia 02/23/2014    Hypertriglyceridemia 11/02/2015    IV drug abuse (H) 05/10/2018    Major depressive disorder, recurrent (H) 02/23/2018    Major depressive disorder, recurrent episode, moderate (H) 06/06/2016    Methamphetamine abuse (H) 02/23/2018    Overview:  see [facility] H&P 11/27/2009, [facility] admit 9/2/2010    Mild intermittent asthma without complication 05/10/2018    Opiate overdose (H) 02/22/2014    Positive D dimer 11/02/2015    Psychosis (H) 05/04/2020    Sepsis (H) 02/22/2014    SIRS (systemic inflammatory response syndrome) (H) 11/02/2015    Substance abuse (H) 05/07/2018    Suicidal ideation 02/23/2018    Tobacco use disorder 05/10/2018       Past Surgical History   Past Surgical History:   Procedure Laterality Date    BACK SURGERY         Prior to Admission Medications    Prior to Admission Medications   Prescriptions Last Dose Informant Patient Reported? Taking?   FLUoxetine (PROZAC) 40 MG capsule   No No   Sig: Take 1 capsule (40 mg) by mouth daily.   QUEtiapine (SEROQUEL) 400 MG tablet   No No   Sig: Take 1 tablet (400 mg) by mouth at bedtime.   QUEtiapine (SEROQUEL) 50 MG tablet   No No   Sig: Take 1 tablet (50 mg) by mouth 2 times daily.   atomoxetine (STRATTERA) 80 MG capsule   Yes No   Sig: Take 80 mg by mouth daily.   atorvastatin (LIPITOR) 20 MG tablet   No No   Sig: Take 1 tablet (20 mg) by mouth every evening   buprenorphine (SUBUTEX) 2 MG SUBL sublingual tablet   No No   Sig: Place 4 tablets (8 mg) under the tongue 2 times daily AND 2 tablets (4 mg) daily. Place 4 tablets (8 mg) under the tongue 2 times daily AND 2 tablets (4 mg) daily at 2 PM.   diphenhydrAMINE (BENADRYL) 25 MG capsule   No No   Sig: Take 1-2 capsules (25-50 mg) by mouth every 4 hours as needed for itching (Reported skin itching.).   gabapentin (NEURONTIN) 600 MG tablet   No No   Sig: Take 1 tablet (600 mg) by mouth 3 times daily.   hydrocortisone (CORTAID) 0.5 % external cream   No No   Sig: Apply topically 2 times daily.   lithium (ESKALITH CR/LITHOBID) 450 MG CR tablet   No No   Sig: Take 2 tablets (900 mg) by mouth at bedtime.   multivitamin w/minerals (THERA-VIT-M) tablet   No No   Sig: Take 1 tablet by mouth daily   nicotine (COMMIT) 2 MG lozenge   No No   Sig: Place 1-2 lozenges (2-4 mg) inside cheek every hour as needed for nicotine withdrawal symptoms.   prazosin (MINIPRESS) 1 MG capsule   No No   Sig: Take 1 capsule (1 mg) by mouth at bedtime.      Facility-Administered Medications: None           Physical Exam   Vital Signs: Temp: (!) 101.3  F (38.5  C) Temp src: Oral BP: 95/54       SpO2: 92 % O2 Device: Nasal cannula Oxygen Delivery: 2 LPM  Weight: 0 lbs 0 oz    Exam:  General: Appears stated age, no acute distress  Head: Normocephalic. No masses, lesions, tenderness or  abnormalities  Cardiovascular: S1/S2, Normal rate and rhythm   Respiratory: Normal respiratory effort, lung fields clear to auscultation  Gastrointestinal: non-tender, soft  Neuropsych: Speaks clearly, answers questions appropriately, motor/sensory innervation of extremities grossly intact; not oriented to time or situation      Medical Decision Making       90 MINUTES SPENT BY ME on the date of service doing chart review, history, exam, documentation & further activities per the note.

## 2024-11-09 NOTE — DISCHARGE SUMMARY
"[facility]  Hospitalist Discharge Summary      Date of Admission:  11/8/2024  Date of Discharge:  11/9/2024  Discharging Provider: Steve Dhillon MD  Discharge Service: Hospitalist Service, GOLD TEAM 19    Discharge Diagnoses     #Severe headache   #Suspected meningitis   #Schizophrenia  # Polysubstance use disorder (methamphetamines laced with fentanyl)  #General Anxiety disorder  #Major depressive disorder    Clinically Significant Risk Factors     # Obesity: Estimated body mass index is 34.03 kg/m  as calculated from the following:    Height as of an earlier encounter on 11/8/24: 1.905 m (6' 3\").    Weight as of an earlier encounter on 11/8/24: 123.5 kg (272 lb 4.3 oz).       Follow-ups Needed After Discharge       Unresulted Labs Ordered in the Past 30 Days of this Admission       Date and Time Order Name Status Description    11/9/2024 12:05 AM CHLAMYDIA TRACHOMATIS/NEISSERIA GONORRHOEAE BY PCR In process     11/8/2024  8:02 PM Blood Culture Hand, Left Preliminary             Discharge Disposition   Discharged to home  Condition at discharge: Stable    Hospital Course   Perry Preciado Jr. is a 49 year old male admitted on 11/8/24. Medical history notable for major depressive disorder, ADHD, polysubstance use disorder. Transferred to medicine due to concern of meningitis.      #Suspected Meningitis  #New onset severe headache  Patient admitted on psychiatry. Reported to have severe rapid onset headache.  Noted to have hit head on wall earlier in day. Was assessed by medicine. No previous history of similar headache, noted visual changes and left facial numbness. Code stroke performed.  CT head/CTA head neck unremarkable for stroke. Patient noted to have fever 103, neck stiffness, positive Kernig sign. Suspected to have meningitis. Patient was started on IV antibiotics and dexamethasone.  MRI brain showed findings suggestive of meningitis.  I saw the patient " this morning, fever resolved but he continues with severe headache and neck stiffness, I ordered one dose of IV Dilaudid. I called patient placement and he was accepted to [facility] for Neurology evaluation and LP.   -Continue IV ceftriaxone/vancomycin  -Continue dexamethasone  -Positive HSV AB, empiric acyclovir  -Follow CBC  -Blood cultures pending  -Neurochecks q2hrs   -STD testing      #Schizophrenia  # Polysubstance use disorder (methamphetamines laced with fentanyl)  #General Anxiety disorder  #Major depressive disorder  Was admitted by psychiatry with suicidal ideation and setting of recent relapse.  Will continue current psychiatry regiment.  Will consult psychiatry for further management.  -Psychiatry consult, recs appreciated  -Fluoxetine 40 mg daily  -Seroquel 50 mg twice daily, with additional 400 mg at bedtime  -Prazosin 1 mg at bedtime   -Lithium 900 mg tablet at bedtime  -Buprenorphine sublingual tablet 8 mg twice daily, with additional 4 mg daily at 1400      Consultations This Hospital Stay   PHARMACY TO DOSE VANCO  CARE MANAGEMENT / SOCIAL WORK IP CONSULT  PHARMACY TO DOSE VANCO  CARE MANAGEMENT / SOCIAL WORK IP CONSULT    Code Status   Full Code    Time Spent on this Encounter   I, Steve Dhillon MD, personally saw the patient today and spent greater than 30 minutes discharging this patient.       Steve Dhillon MD  [facility] [address] ICU  [address] [address]  Phone: [phone]  Fax: [phone]  ______________________________________________________________________    Physical Exam   Vital Signs: Temp: 98.3  F (36.8  C) Temp src: Oral BP: 119/66     Resp: 18 SpO2: 94 % O2 Device: None (Room air) Oxygen Delivery: 2 LPM  Weight: 0 lbs 0 oz  General Appearance: Alert, obese, chronically ill, room air.    Respiratory: Normal respiratory effort, clear lungs.   Cardiovascular: RRR, no murmur.   GI: Abdomen soft, + BS, no tenderness to  palpation.   Skin: No rash.   Other: Alert, oriented, normal cranial nerves, + neck rigidity, moving all extremities.        Primary Care Physician   Physician No Ref-Primary    Discharge Orders      Treponema Abs w Reflex to RPR and Titer     First-voided urine-Chlamydia trachomatis/Neisseria gonorrhoeae by PCR       Significant Results and Procedures   Results for orders placed or performed during the hospital encounter of 10/24/24   CT Head w/o Contrast    Narrative    CT HEAD W/O CONTRAST, CTA HEAD NECK W CONTRAST 11/8/2024 6:51 PM    CT Head without contrast  CT Angiogram of the Neck with contrast   CT Angiogram of the Head with contrast    History:  Code Stroke to evaluate for potential thrombolysis and  thrombectomy. PLEASE READ IMMEDIATELY    Comparison: MR head 1/10/2019.     Technique:   HEAD CT: Using multidetector thin collimation helical acquisition  technique, axial, coronal and sagittal CT images were obtained from  the base of the skull to the vertex without intravenous contrast and  reviewed in brain, bone, and subdural windows.     HEAD and NECK CTA: Thereafter, postcontrast images were obtained from  the aortic arch through the [place].  Axial images were  obtained using thin collimation multidetector helical technique. This  CT angiogram data was reconstructed at thin intervals with mild  overlap. Images were sent to the [employer] workstation, and 3D  reconstructions and multiplanar reformations were obtained with  reconstructions performed by the technologist and the radiologist. The  source images, multiplanar reformations, 3D reconstructions in both  maximum intensity projection display and volume rendered models were  reviewed,     Findings:   Head CT: There is no evidence of intracranial hemorrhage, mass effect,  or midline shift. Gray/white matter differentiation in both cerebral  hemispheres is preserved. There is mild patchy low attenuation within  the periventricular and  supraventricular white matter of both cerebral  hemispheres, nonspecific but most likely representing chronic small  vessel ischemic disease, given the patient's age. The ventricles and  sulci are enlarged but within normal limits for the patient's age, and  the ventricles are not out of proportion to the sulci.     The visualized portions of the paranasal sinuses and mastoid air cells  are clear.    Head CTA demonstrates no aneurysm or stenosis of the major  intracranial arteries. The anterior communicating artery is patent.  The posterior communicating arteries are patent bilaterally.     Neck CTA demonstrates no stenosis of the major cervical arteries on  either side. The origins of the great vessels from the aortic arch are  patent. The normal distal right internal carotid artery measures 5 mm.  The normal distal left internal carotid artery measures 5 mm.     No mass is noted within the visualized portions of the cervical soft  tissues or lung apices. Scattered left apical consolidative  groundglass opacities.      Impression    Impression:   1. No evidence of intracranial hemorrhage.  2. Head CTA demonstrates no aneurysm or stenosis of the major  intracranial arteries.   3. Neck CTA demonstrates no stenosis of the major cervical arteries.   4. Partially visualized left apical consolidative groundglass  opacities, concerning for developing infectious/inflammatory process.  Recommend follow-up chest radiograph for further assessment.    I have personally reviewed the examination and initial interpretation  and I agree with the findings.    SCOOTER MICHELLE MD         SYSTEM ID:  [id number]   CTA Head Neck with Contrast    Narrative    CT HEAD W/O CONTRAST, CTA HEAD NECK W CONTRAST 11/8/2024 6:51 PM    CT Head without contrast  CT Angiogram of the Neck with contrast   CT Angiogram of the Head with contrast    History:  Code Stroke to evaluate for potential thrombolysis and  thrombectomy. PLEASE READ  IMMEDIATELY    Comparison: MR head 1/10/2019.     Technique:   HEAD CT: Using multidetector thin collimation helical acquisition  technique, axial, coronal and sagittal CT images were obtained from  the base of the skull to the vertex without intravenous contrast and  reviewed in brain, bone, and subdural windows.     HEAD and NECK CTA: Thereafter, postcontrast images were obtained from  the aortic arch through the [place] of [place].  Axial images were  obtained using thin collimation multidetector helical technique. This  CT angiogram data was reconstructed at thin intervals with mild  overlap. Images were sent to the [employer] workstation, and 3D  reconstructions and multiplanar reformations were obtained with  reconstructions performed by the technologist and the radiologist. The  source images, multiplanar reformations, 3D reconstructions in both  maximum intensity projection display and volume rendered models were  reviewed,     Findings:   Head CT: There is no evidence of intracranial hemorrhage, mass effect,  or midline shift. Gray/white matter differentiation in both cerebral  hemispheres is preserved. There is mild patchy low attenuation within  the periventricular and supraventricular white matter of both cerebral  hemispheres, nonspecific but most likely representing chronic small  vessel ischemic disease, given the patient's age. The ventricles and  sulci are enlarged but within normal limits for the patient's age, and  the ventricles are not out of proportion to the sulci.     The visualized portions of the paranasal sinuses and mastoid air cells  are clear.    Head CTA demonstrates no aneurysm or stenosis of the major  intracranial arteries. The anterior communicating artery is patent.  The posterior communicating arteries are patent bilaterally.     Neck CTA demonstrates no stenosis of the major cervical arteries on  either side. The origins of the great vessels from the aortic arch are  patent. The  normal distal right internal carotid artery measures 5 mm.  The normal distal left internal carotid artery measures 5 mm.     No mass is noted within the visualized portions of the cervical soft  tissues or lung apices. Scattered left apical consolidative  groundglass opacities.      Impression    Impression:   1. No evidence of intracranial hemorrhage.  2. Head CTA demonstrates no aneurysm or stenosis of the major  intracranial arteries.   3. Neck CTA demonstrates no stenosis of the major cervical arteries.   4. Partially visualized left apical consolidative groundglass  opacities, concerning for developing infectious/inflammatory process.  Recommend follow-up chest radiograph for further assessment.    I have personally reviewed the examination and initial interpretation  and I agree with the findings.    SCOOTER MICHELLE MD         SYSTEM ID:  [id number]   MR Brain w/o & w Contrast    Narrative    EXAM: MR BRAIN W/O & W CONTRAST  11/8/2024 9:27 PM     HISTORY:  New onset worst headache, had code stroke, CT okay,  recommended MRI, although top differential now meningitis       COMPARISON:  MR brain 1/10/2019, CT head 11/8/2024    TECHNIQUE: Sagittal T1-weighted, coronal T2-weighted, axial T2 FLAIR,  axial susceptibility weighted, and axial diffusion-weighted with ADC  map images of the brain were obtained without intravenous contrast.  After the administration of intravenous contrast axial and coronal  fat-suppressed T1-weighted weighted images were obtained    CONTRAST: 12.3 mL Gadavist.    FINDINGS:    There is no mass effect, midline shift, or intracranial hemorrhage.  The ventricles are proportionate to the cerebral sulci.  Diffusion-weighted images are negative for acute/focal abnormality..  No definite evidence for leptomeningeal disease. There is artifact and  vascularity within the posterior fossa which mildly limits evaluation  for leptomeningeal disease in the posterior fossa.    Major intracranial  vascular structures are within normal limits.  Hypointense signal within the major intracranial arterial vessels may  be artifact secondary to rapid movement of blood versus signal  saturation effects.    No suspicious abnormality of the skull marrow signal. Scattered trace  paranasal sinus mucosal debris. Small right mastoid air cell  effusion.. No focal abnormality of the pituitary gland, sella, skull  base and upper cervical spinal structures on sagittal images. The  orbits are normal.      Impression    IMPRESSION:  1.  No diffusion restriction concerning for stroke.  2.  No definite evidence for leptomeningeal disease, however subtle  leptomeningeal disease can be inconspicuous on MRI.    I have personally reviewed the examination and initial interpretation  and I agree with the findings.    SCOOTER MICHELLE MD         SYSTEM ID:  [id number]       Discharge Medications   Current Discharge Medication List        CONTINUE these medications which have NOT CHANGED    Details   atomoxetine (STRATTERA) 80 MG capsule Take 80 mg by mouth daily.      atorvastatin (LIPITOR) 20 MG tablet Take 1 tablet (20 mg) by mouth every evening  Qty: 90 tablet, Refills: 1    Associated Diagnoses: Hypertriglyceridemia      buprenorphine (SUBUTEX) 2 MG SUBL sublingual tablet Place 4 tablets (8 mg) under the tongue 2 times daily AND 2 tablets (4 mg) daily. Place 4 tablets (8 mg) under the tongue 2 times daily AND 2 tablets (4 mg) daily at 2 PM.  Qty: 300 tablet, Refills: 0    Associated Diagnoses: Hx of sepsis      diphenhydrAMINE (BENADRYL) 25 MG capsule Take 1-2 capsules (25-50 mg) by mouth every 4 hours as needed for itching (Reported skin itching.).  Qty: 30 capsule, Refills: 0    Associated Diagnoses: Allergic reaction, initial encounter      FLUoxetine (PROZAC) 40 MG capsule Take 1 capsule (40 mg) by mouth daily.  Qty: 30 capsule, Refills: 0    Associated Diagnoses: Schizoaffective disorder, bipolar type (H)      gabapentin  (NEURONTIN) 600 MG tablet Take 1 tablet (600 mg) by mouth 3 times daily.  Qty: 90 tablet, Refills: 0    Associated Diagnoses: AKNE (generalized anxiety disorder)      hydrocortisone (CORTAID) 0.5 % external cream Apply topically 2 times daily.  Qty: 15 g, Refills: 0    Associated Diagnoses: Allergic reaction, initial encounter      lithium (ESKALITH CR/LITHOBID) 450 MG CR tablet Take 2 tablets (900 mg) by mouth at bedtime.  Qty: 60 tablet, Refills: 0    Associated Diagnoses: Schizoaffective disorder, bipolar type (H)      multivitamin w/minerals (THERA-VIT-M) tablet Take 1 tablet by mouth daily  Qty: 90 tablet, Refills: 1      nicotine (COMMIT) 2 MG lozenge Place 1-2 lozenges (2-4 mg) inside cheek every hour as needed for nicotine withdrawal symptoms.  Qty: 48 lozenge, Refills: 0    Associated Diagnoses: Tobacco use disorder      prazosin (MINIPRESS) 1 MG capsule Take 1 capsule (1 mg) by mouth at bedtime.  Qty: 30 capsule, Refills: 0    Associated Diagnoses: KANE (generalized anxiety disorder)      !! QUEtiapine (SEROQUEL) 400 MG tablet Take 1 tablet (400 mg) by mouth at bedtime.  Qty: 30 tablet, Refills: 0    Associated Diagnoses: Depression, unspecified depression type      !! QUEtiapine (SEROQUEL) 50 MG tablet Take 1 tablet (50 mg) by mouth 2 times daily.  Qty: 60 tablet, Refills: 0    Associated Diagnoses: Schizoaffective disorder, bipolar type (H)       !! - Potential duplicate medications found. Please discuss with provider.        Allergies   Allergies   Allergen Reactions    Wellbutrin [Bupropion] Anaphylaxis and Swelling     Facial swelling    Wellbutrin [Bupropion]     Wellbutrin [Bupropion] Difficulty breathing    Naltrexone Other (See Comments)     Headaches  Headaches

## 2024-11-09 NOTE — MEDICATION SCRIBE - ADMISSION MEDICATION HISTORY
Pharmacist Admission Medication History    Admission medication history compiled 100% from [facility] notes and MAR.    Medication History Completed By: Rory Treadwell [facility] 11/9/2024 5:26 PM    PTA Med List   Medication Sig Last Dose/Taking    atorvastatin (LIPITOR) 20 MG tablet Take 1 tablet (20 mg) by mouth every evening 11/8/2024 Bedtime    buprenorphine (SUBUTEX) 2 MG SUBL sublingual tablet Place 4 tablets (8 mg) under the tongue 2 times daily AND 2 tablets (4 mg) daily. Place 4 tablets (8 mg) under the tongue 2 times daily AND 2 tablets (4 mg) daily at 2 PM. 11/9/2024 Noon    diphenhydrAMINE (BENADRYL) 25 MG capsule Take 1-2 capsules (25-50 mg) by mouth every 4 hours as needed for itching (Reported skin itching.). Taking As Needed    FLUoxetine (PROZAC) 40 MG capsule Take 1 capsule (40 mg) by mouth daily. 11/9/2024 Morning    gabapentin (NEURONTIN) 600 MG tablet Take 1 tablet (600 mg) by mouth 3 times daily. 11/9/2024 at  1:26 PM    lithium (ESKALITH CR/LITHOBID) 450 MG CR tablet Take 2 tablets (900 mg) by mouth at bedtime. 11/8/2024 Bedtime    multivitamin w/minerals (THERA-VIT-M) tablet Take 1 tablet by mouth daily 11/9/2024 Morning    nicotine (COMMIT) 2 MG lozenge Place 1-2 lozenges (2-4 mg) inside cheek every hour as needed for nicotine withdrawal symptoms. 11/9/2024 Noon    prazosin (MINIPRESS) 1 MG capsule Take 1 capsule (1 mg) by mouth at bedtime. 11/8/2024 Bedtime    QUEtiapine (SEROQUEL) 400 MG tablet Take 1 tablet (400 mg) by mouth at bedtime. 11/8/2024 Bedtime    QUEtiapine (SEROQUEL) 50 MG tablet Take 1 tablet (50 mg) by mouth 2 times daily. 11/9/2024 Morning

## 2024-11-09 NOTE — PROVIDER NOTIFICATION
Paged provider Dr Steve Pal via vocera that pt continues to c/o of 10/10 headache even though 5mg of oxycodone and tylenol was given. Ordered one time dose of IV dilaudid and given by Lorraine Olvera RN.

## 2024-11-10 LAB
ALBUMIN SERPL BCG-MCNC: 3.8 G/DL (ref 3.5–5.2)
ALP SERPL-CCNC: 85 U/L (ref 40–150)
ALT SERPL W P-5'-P-CCNC: 30 U/L (ref 0–70)
ANION GAP SERPL CALCULATED.3IONS-SCNC: 11 MMOL/L (ref 7–15)
AST SERPL W P-5'-P-CCNC: 20 U/L (ref 0–45)
BILIRUB SERPL-MCNC: 0.2 MG/DL
BUN SERPL-MCNC: 19.2 MG/DL (ref 6–20)
C GATTII+NEOFOR DNA CSF QL NAA+NON-PROBE: NEGATIVE
CALCIUM SERPL-MCNC: 8.8 MG/DL (ref 8.8–10.4)
CHLORIDE SERPL-SCNC: 105 MMOL/L (ref 98–107)
CMV DNA CSF QL NAA+NON-PROBE: NEGATIVE
CREAT SERPL-MCNC: 0.88 MG/DL (ref 0.67–1.17)
E COLI K1 AG CSF QL: NEGATIVE
EGFRCR SERPLBLD CKD-EPI 2021: >90 ML/MIN/1.73M2
ERYTHROCYTE [DISTWIDTH] IN BLOOD BY AUTOMATED COUNT: 13.5 % (ref 10–15)
EV RNA SPEC QL NAA+PROBE: NEGATIVE
GLUCOSE SERPL-MCNC: 379 MG/DL (ref 70–99)
GP B STREP DNA CSF QL NAA+NON-PROBE: NEGATIVE
HAEM INFLU DNA CSF QL NAA+NON-PROBE: NEGATIVE
HCO3 SERPL-SCNC: 24 MMOL/L (ref 22–29)
HCT VFR BLD AUTO: 35.4 % (ref 40–53)
HGB BLD-MCNC: 12 G/DL (ref 13.3–17.7)
HHV6 DNA CSF QL NAA+NON-PROBE: NEGATIVE
HSV1 DNA CSF QL NAA+NON-PROBE: NEGATIVE
HSV1 DNA CSF QL NAA+PROBE: NOT DETECTED
HSV2 DNA CSF QL NAA+NON-PROBE: NEGATIVE
HSV2 DNA CSF QL NAA+PROBE: NOT DETECTED
L MONOCYTOG DNA CSF QL NAA+NON-PROBE: NEGATIVE
MCH RBC QN AUTO: 30.4 PG (ref 26.5–33)
MCHC RBC AUTO-ENTMCNC: 33.9 G/DL (ref 31.5–36.5)
MCV RBC AUTO: 90 FL (ref 78–100)
N MEN DNA CSF QL NAA+NON-PROBE: NEGATIVE
PARECHOVIRUS A RNA CSF QL NAA+NON-PROBE: NEGATIVE
PLATELET # BLD AUTO: 221 10E3/UL (ref 150–450)
POTASSIUM SERPL-SCNC: 4.1 MMOL/L (ref 3.4–5.3)
PROT SERPL-MCNC: 6.9 G/DL (ref 6.4–8.3)
RBC # BLD AUTO: 3.95 10E6/UL (ref 4.4–5.9)
S PNEUM DNA CSF QL NAA+NON-PROBE: NEGATIVE
SODIUM SERPL-SCNC: 140 MMOL/L (ref 135–145)
VANCOMYCIN SERPL-MCNC: 21.5 UG/ML
VZV DNA CSF QL NAA+NON-PROBE: NEGATIVE
WBC # BLD AUTO: 26.8 10E3/UL (ref 4–11)

## 2024-11-10 PROCEDURE — 84132 ASSAY OF SERUM POTASSIUM: CPT | Performed by: INTERNAL MEDICINE

## 2024-11-10 PROCEDURE — 82247 BILIRUBIN TOTAL: CPT | Performed by: INTERNAL MEDICINE

## 2024-11-10 PROCEDURE — 99254 IP/OBS CNSLTJ NEW/EST MOD 60: CPT | Performed by: INTERNAL MEDICINE

## 2024-11-10 PROCEDURE — 80202 ASSAY OF VANCOMYCIN: CPT | Performed by: INTERNAL MEDICINE

## 2024-11-10 PROCEDURE — 85048 AUTOMATED LEUKOCYTE COUNT: CPT | Performed by: INTERNAL MEDICINE

## 2024-11-10 PROCEDURE — 250N000011 HC RX IP 250 OP 636: Performed by: INTERNAL MEDICINE

## 2024-11-10 PROCEDURE — 250N000013 HC RX MED GY IP 250 OP 250 PS 637: Performed by: INTERNAL MEDICINE

## 2024-11-10 PROCEDURE — 99233 SBSQ HOSP IP/OBS HIGH 50: CPT | Performed by: INTERNAL MEDICINE

## 2024-11-10 PROCEDURE — 258N000003 HC RX IP 258 OP 636: Performed by: INTERNAL MEDICINE

## 2024-11-10 PROCEDURE — 120N000001 HC R&B MED SURG/OB

## 2024-11-10 PROCEDURE — 85018 HEMOGLOBIN: CPT | Performed by: INTERNAL MEDICINE

## 2024-11-10 PROCEDURE — 250N000012 HC RX MED GY IP 250 OP 636 PS 637: Performed by: INTERNAL MEDICINE

## 2024-11-10 PROCEDURE — 82947 ASSAY GLUCOSE BLOOD QUANT: CPT | Performed by: INTERNAL MEDICINE

## 2024-11-10 PROCEDURE — 36415 COLL VENOUS BLD VENIPUNCTURE: CPT | Performed by: INTERNAL MEDICINE

## 2024-11-10 RX ORDER — LORAZEPAM 1 MG/1
1 TABLET ORAL EVERY 4 HOURS PRN
Status: DISCONTINUED | OUTPATIENT
Start: 2024-11-10 | End: 2024-11-11

## 2024-11-10 RX ORDER — QUETIAPINE FUMARATE 50 MG/1
100 TABLET, FILM COATED ORAL 2 TIMES DAILY
Status: DISCONTINUED | OUTPATIENT
Start: 2024-11-10 | End: 2024-11-13 | Stop reason: HOSPADM

## 2024-11-10 RX ORDER — HYDROXYZINE HYDROCHLORIDE 25 MG/1
50 TABLET, FILM COATED ORAL EVERY 6 HOURS PRN
Status: DISCONTINUED | OUTPATIENT
Start: 2024-11-10 | End: 2024-11-13 | Stop reason: HOSPADM

## 2024-11-10 RX ORDER — HYDROXYZINE HYDROCHLORIDE 25 MG/1
25 TABLET, FILM COATED ORAL EVERY 6 HOURS PRN
Status: DISCONTINUED | OUTPATIENT
Start: 2024-11-10 | End: 2024-11-13 | Stop reason: HOSPADM

## 2024-11-10 RX ADMIN — CEFTRIAXONE SODIUM 2 G: 2 INJECTION, POWDER, FOR SOLUTION INTRAMUSCULAR; INTRAVENOUS at 01:05

## 2024-11-10 RX ADMIN — HYDROXYZINE HYDROCHLORIDE 50 MG: 25 TABLET, FILM COATED ORAL at 12:58

## 2024-11-10 RX ADMIN — ATORVASTATIN CALCIUM 20 MG: 20 TABLET, FILM COATED ORAL at 20:14

## 2024-11-10 RX ADMIN — VANCOMYCIN HYDROCHLORIDE 1500 MG: 10 INJECTION, POWDER, LYOPHILIZED, FOR SOLUTION INTRAVENOUS at 10:41

## 2024-11-10 RX ADMIN — OXYCODONE HYDROCHLORIDE 15 MG: 5 TABLET ORAL at 12:59

## 2024-11-10 RX ADMIN — DEXAMETHASONE SODIUM PHOSPHATE 10 MG: 4 INJECTION, SOLUTION INTRAMUSCULAR; INTRAVENOUS at 18:03

## 2024-11-10 RX ADMIN — PRAZOSIN HYDROCHLORIDE 1 MG: 1 CAPSULE ORAL at 21:01

## 2024-11-10 RX ADMIN — QUETIAPINE FUMARATE 500 MG: 400 TABLET ORAL at 21:46

## 2024-11-10 RX ADMIN — DEXAMETHASONE SODIUM PHOSPHATE 10 MG: 4 INJECTION, SOLUTION INTRAMUSCULAR; INTRAVENOUS at 12:59

## 2024-11-10 RX ADMIN — VANCOMYCIN HYDROCHLORIDE 1500 MG: 10 INJECTION, POWDER, LYOPHILIZED, FOR SOLUTION INTRAVENOUS at 18:07

## 2024-11-10 RX ADMIN — QUETIAPINE FUMARATE 50 MG: 50 TABLET ORAL at 08:40

## 2024-11-10 RX ADMIN — OXYCODONE HYDROCHLORIDE 15 MG: 5 TABLET ORAL at 18:20

## 2024-11-10 RX ADMIN — OXYCODONE HYDROCHLORIDE 15 MG: 5 TABLET ORAL at 08:54

## 2024-11-10 RX ADMIN — ACYCLOVIR SODIUM 1000 MG: 1000 INJECTION, SOLUTION INTRAVENOUS at 04:26

## 2024-11-10 RX ADMIN — ACETAMINOPHEN 650 MG: 325 TABLET, FILM COATED ORAL at 12:59

## 2024-11-10 RX ADMIN — LORAZEPAM 1 MG: 1 TABLET ORAL at 21:15

## 2024-11-10 RX ADMIN — CEFTRIAXONE SODIUM 2 G: 2 INJECTION, POWDER, FOR SOLUTION INTRAMUSCULAR; INTRAVENOUS at 12:59

## 2024-11-10 RX ADMIN — LITHIUM CARBONATE 900 MG: 450 TABLET ORAL at 21:01

## 2024-11-10 RX ADMIN — GABAPENTIN 600 MG: 600 TABLET, FILM COATED ORAL at 08:40

## 2024-11-10 RX ADMIN — NICOTINE POLACRILEX 4 MG: 2 LOZENGE ORAL at 15:21

## 2024-11-10 RX ADMIN — ACETAMINOPHEN 650 MG: 325 TABLET, FILM COATED ORAL at 18:20

## 2024-11-10 RX ADMIN — GABAPENTIN 600 MG: 600 TABLET, FILM COATED ORAL at 21:15

## 2024-11-10 RX ADMIN — OXYCODONE HYDROCHLORIDE 15 MG: 5 TABLET ORAL at 23:06

## 2024-11-10 RX ADMIN — KETOROLAC TROMETHAMINE 30 MG: 15 INJECTION, SOLUTION INTRAMUSCULAR; INTRAVENOUS at 23:06

## 2024-11-10 RX ADMIN — ACETAMINOPHEN 650 MG: 325 TABLET, FILM COATED ORAL at 08:54

## 2024-11-10 RX ADMIN — NICOTINE POLACRILEX 4 MG: 2 LOZENGE ORAL at 12:43

## 2024-11-10 RX ADMIN — BUPRENORPHINE HCL 8 MG: 8 TABLET SUBLINGUAL at 08:40

## 2024-11-10 RX ADMIN — LORAZEPAM 1 MG: 1 TABLET ORAL at 15:21

## 2024-11-10 RX ADMIN — NICOTINE POLACRILEX 2 MG: 2 LOZENGE ORAL at 21:50

## 2024-11-10 RX ADMIN — FLUOXETINE HYDROCHLORIDE 40 MG: 20 CAPSULE ORAL at 08:40

## 2024-11-10 RX ADMIN — KETOROLAC TROMETHAMINE 30 MG: 15 INJECTION, SOLUTION INTRAMUSCULAR; INTRAVENOUS at 15:20

## 2024-11-10 RX ADMIN — ACYCLOVIR SODIUM 1000 MG: 1000 INJECTION, SOLUTION INTRAVENOUS at 21:03

## 2024-11-10 RX ADMIN — Medication 1 TABLET: at 08:41

## 2024-11-10 RX ADMIN — NICOTINE POLACRILEX 4 MG: 2 LOZENGE ORAL at 08:54

## 2024-11-10 RX ADMIN — GABAPENTIN 600 MG: 600 TABLET, FILM COATED ORAL at 15:22

## 2024-11-10 RX ADMIN — NICOTINE POLACRILEX 2 MG: 2 LOZENGE ORAL at 21:46

## 2024-11-10 RX ADMIN — DEXAMETHASONE SODIUM PHOSPHATE 10 MG: 4 INJECTION, SOLUTION INTRAMUSCULAR; INTRAVENOUS at 01:05

## 2024-11-10 RX ADMIN — QUETIAPINE FUMARATE 100 MG: 50 TABLET ORAL at 20:14

## 2024-11-10 RX ADMIN — VANCOMYCIN HYDROCHLORIDE 1500 MG: 10 INJECTION, POWDER, LYOPHILIZED, FOR SOLUTION INTRAVENOUS at 02:56

## 2024-11-10 RX ADMIN — ACYCLOVIR SODIUM 1000 MG: 1000 INJECTION, SOLUTION INTRAVENOUS at 13:54

## 2024-11-10 RX ADMIN — BUPRENORPHINE HCL 4 MG: 2 TABLET SUBLINGUAL at 15:10

## 2024-11-10 RX ADMIN — DEXAMETHASONE SODIUM PHOSPHATE 10 MG: 4 INJECTION, SOLUTION INTRAMUSCULAR; INTRAVENOUS at 05:35

## 2024-11-10 RX ADMIN — BUPRENORPHINE HCL 8 MG: 8 TABLET SUBLINGUAL at 20:13

## 2024-11-10 ASSESSMENT — ACTIVITIES OF DAILY LIVING (ADL)
ADLS_ACUITY_SCORE: 18.25

## 2024-11-10 NOTE — H&P
[facility]    History and Physical - Hospitalist Service       Date of Admission:  11/9/2024    Assessment & Plan    Perry Preciado Jr. is a 49 year old male with significant psychiatric and pain medication history.  He was admitted at [facility] inpatient psychiatry unit initially admitted on 10/29/24 for Maxine, suicide ideations and recent relapse onto meth and fentanyl.  He was transferred to [facility] mental health unit.  His medical history notable for major depressive disorder, ADHD, polysubstance use disorder.  Medicine was consulted for rash.    On 11/8 he complained about the worse headache of his life involving also the back of the neck.  A code stroke was called and he had a stroke work up that was largely negative including CT head/ CTA head and neck.  MRI also did not disclose any recent infarction.  Due to photophobia, Kernig's sign, poor oral intake, severe pain, patient will be admitted fase:    In the ER, WBC is up 11.1 11/8 >> 4.5 on 11/10, WBC is 30 K Hgh 14.5 >>12.7.  UA normal       ## Headache  ## rule out meningitis  Patient was started on IV antibiotics including vancomycin and ceftriaxone as well as acyclovir.    Underwent LP earlier today  Continue vancomycin and IV ceftriaxone  Continue decadron every   ID consultation  Pain control with oxycodone and Toradol    ## Chronic pain  Continue Subutex  Continue     ## Major depression  ## Hx of ADHD  ## KANE  ## PTSD  Continue Fluoxetine    Continue Neurontin  Continue Lithium  Continue Prazosin  Continue Seroquel    ## Hyperlipidemia  Continue Lipitor        Diet: Combination Diet Regular Diet Adult    DVT Prophylaxis: Pneumatic Compression Devices  Tran Catheter: Not present  Lines: None     Cardiac Monitoring: None  Code Status: Full Code      Clinically Significant Risk Factors Present on Admission     # Hypertension: Home medication list includes antihypertensive(s)           # Obesity: Estimated body mass index is 34.03  "kg/m  as calculated from the following:    Height as of 11/8/24: 1.905 m (6' 3\").    Weight as of 11/8/24: 123.5 kg (272 lb 4.3 oz).       # Financial/Environmental Concerns:    # Asthma: noted on problem list        Disposition Plan     Medically Ready for Discharge: Anticipated in 2-4 Days        Petr Cadet MD  Hospitalist Service  [facility]  Securely message with [employer] (more info)  Text page via [employer] Paging/Directory     ______________________________________________________________________    Chief Complaint   Headaches, neck pain    History is obtained from the patient and electronic health record    History of Present Illness   Perry Preciado Jr. is a 49 year old male with significant psychiatric and pain medication history.  He was admitted at [facility] inpatient psychiatry unit initially admitted on 10/29/24 for Maxine, suicide ideations and recent relapse onto meth and fentanyl.  He was transferred to [facility] mental health unit.  His medical history notable for major depressive disorder, ADHD, polysubstance use disorder.  Medicine was consulted for rash.    On 11/8 he complained about the worse headache of his life involving also the back of the neck.  A code stroke was called and he had a stroke work up that was largely negative including CT head/ CTA head and neck.  MRI also did not disclose any recent infarction.  Due to photophobia, Kernig's sign, poor oral intake, severe pain, patient will be admitted fase:    In the ER, WBC is up 11.1 11/8 >> 4.5 on 11/10, WBC is 30 K Hgh 14.5 >>12.7.  UA normal     Past Medical History    Past Medical History:   Diagnosis Date    Acute bilateral low back pain with right-sided sciatica 06/07/2016    Acute pain of right shoulder due to trauma     Adult antisocial behavior     nursing home incarceration: 16 years    Aspiration pneumonia (H) 02/24/2014    CARDIOVASCULAR SCREENING; LDL GOAL LESS THAN 130 06/07/2016    Carpal tunnel syndrome of " right wrist 06/07/2016    Chest pain 02/19/2014    Chest trauma 02/19/2014    Chronic pain syndrome 09/30/2019    Chronic right-sided low back pain with right-sided sciatica 05/10/2018    DDD (degenerative disc disease), lumbosacral 05/05/2020    Depression with anxiety 05/05/2020    Displacement of lumbar intervertebral disc without myelopathy 05/16/2012    KANE (generalized anxiety disorder) 07/07/2017    Heroin abuse (H) 05/10/2018    History of ADHD 01/06/2014    History of drug abuse (H) 01/06/2014    History of suicide attempt 05/10/2018    HTN (hypertension) 02/26/2014    Hyperglycemia 02/23/2014    Hypertriglyceridemia 11/02/2015    IV drug abuse (H) 05/10/2018    Major depressive disorder, recurrent (H) 02/23/2018    Major depressive disorder, recurrent episode, moderate (H) 06/06/2016    Methamphetamine abuse (H) 02/23/2018    Overview:  see [facility] H&P 11/27/2009, [facility] admit 9/2/2010    Mild intermittent asthma without complication 05/10/2018    Opiate overdose (H) 02/22/2014    Positive D dimer 11/02/2015    Psychosis (H) 05/04/2020    Sepsis (H) 02/22/2014    SIRS (systemic inflammatory response syndrome) (H) 11/02/2015    Substance abuse (H) 05/07/2018    Suicidal ideation 02/23/2018    Tobacco use disorder 05/10/2018       Past Surgical History   Past Surgical History:   Procedure Laterality Date    BACK SURGERY         Prior to Admission Medications   Prior to Admission Medications   Prescriptions Last Dose Informant Patient Reported? Taking?   FLUoxetine (PROZAC) 40 MG capsule 11/9/2024 Morning  No Yes   Sig: Take 1 capsule (40 mg) by mouth daily.   QUEtiapine (SEROQUEL) 400 MG tablet 11/8/2024 Bedtime  No Yes   Sig: Take 1 tablet (400 mg) by mouth at bedtime.   QUEtiapine (SEROQUEL) 50 MG tablet 11/9/2024 Morning  No Yes   Sig: Take 1 tablet (50 mg) by mouth 2 times daily.   atomoxetine (STRATTERA) 80 MG capsule   Yes No   Sig: Take 80 mg by mouth daily.   atorvastatin (LIPITOR) 20 MG tablet  11/8/2024 Bedtime  No Yes   Sig: Take 1 tablet (20 mg) by mouth every evening   buprenorphine (SUBUTEX) 2 MG SUBL sublingual tablet 11/9/2024 Noon  No Yes   Sig: Place 4 tablets (8 mg) under the tongue 2 times daily AND 2 tablets (4 mg) daily. Place 4 tablets (8 mg) under the tongue 2 times daily AND 2 tablets (4 mg) daily at 2 PM.   diphenhydrAMINE (BENADRYL) 25 MG capsule   No Yes   Sig: Take 1-2 capsules (25-50 mg) by mouth every 4 hours as needed for itching (Reported skin itching.).   gabapentin (NEURONTIN) 600 MG tablet 11/9/2024 at  1:26 PM  No Yes   Sig: Take 1 tablet (600 mg) by mouth 3 times daily.   hydrocortisone (CORTAID) 0.5 % external cream   No No   Sig: Apply topically 2 times daily.   lithium (ESKALITH CR/LITHOBID) 450 MG CR tablet 11/8/2024 Bedtime  No Yes   Sig: Take 2 tablets (900 mg) by mouth at bedtime.   multivitamin w/minerals (THERA-VIT-M) tablet 11/9/2024 Morning  No Yes   Sig: Take 1 tablet by mouth daily   nicotine (COMMIT) 2 MG lozenge 11/9/2024 Noon  No Yes   Sig: Place 1-2 lozenges (2-4 mg) inside cheek every hour as needed for nicotine withdrawal symptoms.   prazosin (MINIPRESS) 1 MG capsule 11/8/2024 Bedtime  No Yes   Sig: Take 1 capsule (1 mg) by mouth at bedtime.      Facility-Administered Medications: None           Physical Exam   Vital Signs: Temp: 97.8  F (36.6  C) Temp src: Oral BP: 122/71 Pulse: 71   Resp: 20 SpO2: 93 % O2 Device: None (Room air)    Weight: 0 lbs 0 oz    General Appearance: anxious  Respiratory: CTA  Cardiovascular: RRR  GI: soft, no mesh, do  Skin: warm and dry  Other: Ambulatory, no gross deficits noted     Medical Decision Making       77 MINUTES SPENT BY ME on the date of service doing chart review, history, exam, documentation & further activities per the note.      Data     I have personally reviewed the following data over the past 24 hrs:    30.0 (H)  \   12.7 (L)   / 219     136 99 25.0 (H) /  144 (H)   4.5 25 1.28 (H) \     ALT: 37 AST: 33 AP: 57  TBILI: 0.3   ALB: 3.7 TOT PROTEIN: 6.4 LIPASE: N/A     Procal: N/A CRP: N/A Lactic Acid: N/A         Imaging results reviewed over the past 24 hrs:   No results found for this or any previous visit (from the past 24 hours).

## 2024-11-10 NOTE — PROGRESS NOTES
[facility]  Hospitalist Progress Note  Petr Cadet MD  11/10/2024    Assessment & Plan   Perry Preciado Jr. is a 49 year old male with significant psychiatric and pain medication history.  He was admitted at [facility] inpatient psychiatry unit initially admitted on 10/29/24 for Maxine, suicide ideations and recent relapse onto meth and fentanyl.  He was transferred to [facility] mental health unit.  His medical history notable for major depressive disorder, ADHD, polysubstance use disorder.  Medicine was consulted for rash.     On 11/8 he complained about the worse headache of his life involving also the back of the neck.  A code stroke was called and he had a stroke work up that was largely negative including CT head/ CTA head and neck.  MRI also did not disclose any recent infarction.  Due to photophobia, Kernig's sign, poor oral intake, severe pain, patient will be admitted fase:     In the ER, WBC is up 11.1 11/8 >> 4.5 on 11/10, WBC is 30 K Hgh 14.5 >>12.7.  UA normal         ## Headache  ## rule out meningitis  Patient was started on IV antibiotics including vancomycin and ceftriaxone as well as acyclovir.    LP completed with 12 wbc and 9 rbc with only 4% neutrophils  Continue vancomycin and IV ceftriaxone  Continue decadron every   ID consultation appreciated, meningitis/encephalitis panel sent (lab has 0.5 ml which should be enough)  Pain control with oxycodone and Toradol with addition of ativan for anxiety     ## Chronic pain  Continue Subutex     ## Major depression  ## Hx of ADHD  ## KANE  ## PTSD  Continue Fluoxetine    Continue Neurontin  Continue Lithium  Continue Prazosin  Increase Seroquel from 50 mg BID to 100 mg BID and increase bedtime Seroqeul from 400 to 500 mg.    Has atarax for anxiety, ativan for breakthrough     ## Hyperlipidemia  Continue Lipitor       Diet: Combination Diet Regular Diet Adult    DVT Prophylaxis: Pneumatic Compression Devices  Tran Catheter: Not  "present  Lines: None     Cardiac Monitoring: None  Code Status: Full Code          Clinically Significant Risk Factors Present on Admission     # Hypertension: Home medication list includes antihypertensive(s)            # Obesity: Estimated body mass index is 34.03 kg/m  as calculated from the following:    Height as of 11/8/24: 1.905 m (6' 3\").    Weight as of 11/8/24: 123.5 kg (272 lb 4.3 oz).       # Financial/Environmental Concerns:    # Asthma: noted on problem list         Disposition Plan  Medically Ready for Discharge:     Disposition:   -- anticipate return back to inpatient psychiatry unit at discharge    Interval History   -- discussed with RN  -- slept very little  -- having breakthrough anxiety  -- wants to go back to inpatient psych for his chem dep tx    -Data reviewed today: I reviewed all new labs and imaging over the last 24 hours. I personally reviewed no images or EKG's today.    Physical Exam    , Blood pressure 127/68, pulse 67, temperature 97.9  F (36.6  C), temperature source Oral, resp. rate 18, SpO2 93%.  There were no vitals filed for this visit.  Vital Signs with Ranges  Temp:  [97.8  F (36.6  C)-98.2  F (36.8  C)] 97.9  F (36.6  C)  Pulse:  [67-78] 67  Resp:  [18-20] 18  BP: (122-133)/(68-74) 127/68  SpO2:  [93 %-94 %] 93 %  I/O's Last 24 hours  I/O last 3 completed shifts:  In: 240 [P.O.:240]  Out: -     Constitutional: Awake, alert, less anxious then  yesterday  Respiratory: Clear to auscultation bilaterally, no crackles or wheezing  Cardiovascular: Regular rate and rhythm, normal S1 and S2   GI: Normal bowel sounds, soft, non-distended, non-tender  Skin/Integumen: No rashes, no cyanosis, no edema  Other:      Medications   All medications were reviewed.  Current Facility-Administered Medications   Medication Dose Route Frequency Provider Last Rate Last Admin     Current Facility-Administered Medications   Medication Dose Route Frequency Provider Last Rate Last Admin    acyclovir " (ZOVIRAX) 1,000 mg in sodium chloride 0.9 % 270 mL intermittent infusion  10 mg/kg (Adjusted) Intravenous Q8H Steve Dhillon  mL/hr at 11/10/24 1354 1,000 mg at 11/10/24 1354    atomoxetine (STRATTERA) capsule 80 mg  80 mg Oral Daily Petr Cadet MD        atorvastatin (LIPITOR) tablet 20 mg  20 mg Oral QPM Petr Cadet MD   20 mg at 11/09/24 2059    buprenorphine (SUBUTEX) sublingual tablet 8 mg  8 mg Sublingual BID Steve Dhillon MD   8 mg at 11/10/24 0840    And    buprenorphine (SUBUTEX) sublingual tablet 4 mg  4 mg Sublingual Q24H Steve Dhillon MD   4 mg at 11/10/24 1510    cefTRIAXone (ROCEPHIN) 2 g vial to attach to  ml bag for ADULTS or NS 50 ml bag for PEDS  2 g Intravenous Q12H Steve Dhillon MD   2 g at 11/10/24 1259    dexAMETHasone (DECADRON) injection 10 mg  10 mg Intravenous Q6H Steve Dhillon MD   10 mg at 11/10/24 1259    FLUoxetine (PROzac) capsule 40 mg  40 mg Oral Daily Petr Cadet MD   40 mg at 11/10/24 0840    gabapentin (NEURONTIN) tablet 600 mg  600 mg Oral TID Petr Cadet MD   600 mg at 11/10/24 1522    lithium (ESKALITH CR/LITHOBID) CR tablet 900 mg  900 mg Oral At Bedtime Petr Cadet MD   900 mg at 11/09/24 2218    multivitamin w/minerals (THERA-VIT-M) tablet 1 tablet  1 tablet Oral Daily Petr Cadet MD   1 tablet at 11/10/24 0841    prazosin (MINIPRESS) capsule 1 mg  1 mg Oral At Bedtime Petr Cadet MD   1 mg at 11/09/24 2218    QUEtiapine (SEROquel) tablet 100 mg  100 mg Oral BID Petr Cadet MD        QUEtiapine (SEROquel) tablet 500 mg  500 mg Oral At Bedtime Petr Cadet MD        sodium chloride (PF) 0.9% PF flush 3 mL  3 mL Intracatheter Q8H Petr Cadet MD   3 mL at 11/10/24 0842    vancomycin (VANCOCIN) 1,500 mg in 0.9% NaCl 265 mL intermittent infusion  1,500 mg Intravenous Q8H Stvee Dhillon MD   1,500 mg at 11/10/24 1041        Data    Recent Labs   Lab 11/10/24  1037 11/09/24  0528 11/08/24  2335 11/08/24  1810   WBC 26.8* 30.0*  --  11.1*   HGB 12.0* 12.7*  --  14.5   MCV 90 89  --  92    219  --  217   INR  --   --   --  0.95    136  --  139   POTASSIUM 4.1 4.5  --  5.0   CHLORIDE 105 99  --  99   CO2 24 25  --  29   BUN 19.2 25.0*  --  21.5*   CR 0.88 1.28*  --  1.28*   ANIONGAP 11 12  --  11   DORENE 8.8 8.9  --  9.4   * 144* 147* 105*   ALBUMIN 3.8 3.7  --   --    PROTTOTAL 6.9 6.4  --   --    BILITOTAL 0.2 0.3  --   --    ALKPHOS 85 57  --   --    ALT 30 37  --   --    AST 20 33  --   --        No results found for this or any previous visit (from the past 24 hours).    Petr Cadet MD  Text Page  (7am to 6pm)

## 2024-11-10 NOTE — PLAN OF CARE
Reason for Admission: suspected meningitis, suicidal ideation    Cognitive/Mentation: A/Ox 3, disoriented to time  Neuros/CMS: Intact ex headache, RUE 3/5, R eye blurry vision, posterior neck pain.  VS: VSS on RA.     /GI: Continent. Last BM 11/9/24.   Pulmonary: LS clear.  Pain: 10/10 head ache into posterior neck, oxycodone given.     Drains/Lines: PIV x2 SL  Skin: scattered scabs  Activity: independent  Diet: regular with thin liquids. Takes pills whole with water.     Discharge: pending    Aggression Stoplight Tool: yellow    End of shift summary: pt restless/pacing but cooperative since arrival. Pt left for LP at shift change. Sitter in place, suicide precautions in place.

## 2024-11-10 NOTE — PLAN OF CARE
Pt here with suspected Meningitis, Suicidal ideation. A&OX4. Neuros stable ex posterior neck pain, generalized weakness. VSS. Regular diet, thin liquids. Takes pills whole. Up with IND. Denies pain. Pt scoring green on the Aggression Stop Light Tool. Sitter at bedside. Discharge pending.

## 2024-11-10 NOTE — CONSULTS
[facility]    Infectious Disease Consultation     Date of Admission:  11/9/2024  Date of Consult (When I saw the patient): 11/10/24    Assessment & Plan   Perry Preciado Jr. is a 49 year old male who was admitted on 11/9/2024.     Impression: 1 49-year-old male, transferred from [facility], with acute fever and headache, has had some degree of headache and chronic neurologic symptoms and dizziness but definitely more acute symptoms currently, so far micro is negative, lumbar puncture unimpressive but did have 12 white cells present primarily monocytes thus at least some meningeal inflammation  2 polysubstance abuse and psychiatric underlying issues    REC 1 on Vanco and ceftriaxone but unlikely this is bacterial meningitis continue those until cultures are back including blood cultures  2 prior herpes antibodies are irrelevant, however obviously viral and multiple other diagnoses in the differential diagnosis, very unfortunately very important meningitis profile not done on the lumbar puncture, have ordered it hopefully there is enough left to accomplish that or at least get viral studies, acyclovir for now        Iban Medrano MD    Reason for Consult   Reason for consult: I was asked to evaluate this patient for fever.    Primary Care Physician   Physician No Ref-Primary    Chief Complaint   Headache and fever    History is obtained from the patient and medical records    History of Present Illness   Perry Preciado Jr. is a 49 year old male who presents with transfer from psychiatric unit due to acute fever and severe headache.  By history the patient is describing some degree of intermittent fever, dizziness and other symptoms for many weeks, the new symptoms however much more severe.  No major mentation changes.  Had blood cultures done on admission started on broad antibiotics and lumbar puncture done.  Had 12 white cells primarily monocytes on the LP so abnormal but  not at a level usually suggestive of major meningeal inflammation.  No recent travel or exposures    Past Medical History   I have reviewed this patient's medical history and updated it with pertinent information if needed.   Past Medical History:   Diagnosis Date    Acute bilateral low back pain with right-sided sciatica 06/07/2016    Acute pain of right shoulder due to trauma     Adult antisocial behavior     [facility] incarceration: 16 years    Aspiration pneumonia (H) 02/24/2014    CARDIOVASCULAR SCREENING; LDL GOAL LESS THAN 130 06/07/2016    Carpal tunnel syndrome of right wrist 06/07/2016    Chest pain 02/19/2014    Chest trauma 02/19/2014    Chronic pain syndrome 09/30/2019    Chronic right-sided low back pain with right-sided sciatica 05/10/2018    DDD (degenerative disc disease), lumbosacral 05/05/2020    Depression with anxiety 05/05/2020    Displacement of lumbar intervertebral disc without myelopathy 05/16/2012    KANE (generalized anxiety disorder) 07/07/2017    Heroin abuse (H) 05/10/2018    History of ADHD 01/06/2014    History of drug abuse (H) 01/06/2014    History of suicide attempt 05/10/2018    HTN (hypertension) 02/26/2014    Hyperglycemia 02/23/2014    Hypertriglyceridemia 11/02/2015    IV drug abuse (H) 05/10/2018    Major depressive disorder, recurrent (H) 02/23/2018    Major depressive disorder, recurrent episode, moderate (H) 06/06/2016    Methamphetamine abuse (H) 02/23/2018    Overview:  see [facility] H&P 11/27/2009, [facility] admit 9/2/2010    Mild intermittent asthma without complication 05/10/2018    Opiate overdose (H) 02/22/2014    Positive D dimer 11/02/2015    Psychosis (H) 05/04/2020    Sepsis (H) 02/22/2014    SIRS (systemic inflammatory response syndrome) (H) 11/02/2015    Substance abuse (H) 05/07/2018    Suicidal ideation 02/23/2018    Tobacco use disorder 05/10/2018       Past Surgical History   I have reviewed this patient's surgical history and updated it with pertinent information  if needed.  Past Surgical History:   Procedure Laterality Date    BACK SURGERY         Prior to Admission Medications   Prior to Admission Medications   Prescriptions Last Dose Informant Patient Reported? Taking?   FLUoxetine (PROZAC) 40 MG capsule 11/9/2024 Morning  No Yes   Sig: Take 1 capsule (40 mg) by mouth daily.   QUEtiapine (SEROQUEL) 400 MG tablet 11/8/2024 Bedtime  No Yes   Sig: Take 1 tablet (400 mg) by mouth at bedtime.   QUEtiapine (SEROQUEL) 50 MG tablet 11/9/2024 Morning  No Yes   Sig: Take 1 tablet (50 mg) by mouth 2 times daily.   atomoxetine (STRATTERA) 80 MG capsule   Yes No   Sig: Take 80 mg by mouth daily.   atorvastatin (LIPITOR) 20 MG tablet 11/8/2024 Bedtime  No Yes   Sig: Take 1 tablet (20 mg) by mouth every evening   buprenorphine (SUBUTEX) 2 MG SUBL sublingual tablet 11/9/2024 Noon  No Yes   Sig: Place 4 tablets (8 mg) under the tongue 2 times daily AND 2 tablets (4 mg) daily. Place 4 tablets (8 mg) under the tongue 2 times daily AND 2 tablets (4 mg) daily at 2 PM.   diphenhydrAMINE (BENADRYL) 25 MG capsule   No Yes   Sig: Take 1-2 capsules (25-50 mg) by mouth every 4 hours as needed for itching (Reported skin itching.).   gabapentin (NEURONTIN) 600 MG tablet 11/9/2024 at  1:26 PM  No Yes   Sig: Take 1 tablet (600 mg) by mouth 3 times daily.   hydrocortisone (CORTAID) 0.5 % external cream   No No   Sig: Apply topically 2 times daily.   lithium (ESKALITH CR/LITHOBID) 450 MG CR tablet 11/8/2024 Bedtime  No Yes   Sig: Take 2 tablets (900 mg) by mouth at bedtime.   multivitamin w/minerals (THERA-VIT-M) tablet 11/9/2024 Morning  No Yes   Sig: Take 1 tablet by mouth daily   nicotine (COMMIT) 2 MG lozenge 11/9/2024 Noon  No Yes   Sig: Place 1-2 lozenges (2-4 mg) inside cheek every hour as needed for nicotine withdrawal symptoms.   prazosin (MINIPRESS) 1 MG capsule 11/8/2024 Bedtime  No Yes   Sig: Take 1 capsule (1 mg) by mouth at bedtime.      Facility-Administered Medications: None     Allergies    Allergies   Allergen Reactions    Wellbutrin [Bupropion] Anaphylaxis and Swelling     Facial swelling    Wellbutrin [Bupropion]     Wellbutrin [Bupropion] Difficulty breathing    Naltrexone Other (See Comments)     Headaches  Headaches         Immunization History   Immunization History   Administered Date(s) Administered    DT (PEDS <7y) 03/13/2006    Flu, Unspecified 10/27/2011    Influenza (IIV3) PF 10/19/2011    Influenza Vaccine >6 months,quad, PF 10/17/2014, 09/23/2019, 09/29/2020    Influenza, seasonal, injectable, PF 10/19/2011    Pneumococcal 23 valent 11/24/2021    TDAP (Adacel,Boostrix) 05/16/2011, 11/24/2021    Twinrix A/B 08/11/2020, 11/24/2021       Social History   I have reviewed this patient's social history and updated it with pertinent information if needed. Perry Preciado Jr.  reports that he has been smoking cigarettes. He has been exposed to tobacco smoke. He has never used smokeless tobacco. He reports current alcohol use. He reports current drug use. Drugs: Fentanyl and Methamphetamines.    Family History   I have reviewed this patient's family history and updated it with pertinent information if needed.   No family history on file.    Review of Systems   The 10 point Review of Systems is negative except as per the HPI which includes some degree of dizziness    Physical Exam   Temp: 97.9  F (36.6  C) Temp src: Oral BP: 127/68 Pulse: 67   Resp: 18 SpO2: 93 % O2 Device: None (Room air)    Vital Signs with Ranges  Temp:  [97.8  F (36.6  C)-98.2  F (36.8  C)] 97.9  F (36.6  C)  Pulse:  [67-78] 67  Resp:  [18-20] 18  BP: (122-144)/() 127/68  SpO2:  [92 %-94 %] 93 %  0 lbs 0 oz  There is no height or weight on file to calculate BMI.    GENERAL APPEARANCE:  awake up walking in the room mentation seems okay, not that ill looking  EYES: Eyes grossly normal to inspection  NECK: no adenopathy  RESP: lungs clear   CV: regular rates and rhythm  LYMPHATICS: normal ant/post cervical and  "supraclavicular nodes  ABDOMEN: soft, nontender  MS: extremities normal  SKIN: no suspicious lesions or rashes        Data   All laboratory and imaging data in the past 24 hours reviewed  No results for input(s): \"CULT\" in the last 168 hours.  No lab results found.    Invalid input(s): \"UC\"       All cultures:  Recent Labs   Lab 11/08/24 2149   CULTURE No growth after 1 day      Blood culture:  Results for orders placed or performed during the hospital encounter of 10/24/24   Blood Culture Hand, Left    Collection Time: 11/08/24  9:49 PM    Specimen: Hand, Left; Blood   Result Value Ref Range    Culture No growth after 1 day       Urine culture:  Results for orders placed or performed during the hospital encounter of 05/19/24   Urine Culture    Collection Time: 05/29/24  1:22 PM    Specimen: Urine, Midstream   Result Value Ref Range    Culture No Growth              "

## 2024-11-11 ENCOUNTER — TELEPHONE (OUTPATIENT)
Dept: BEHAVIORAL HEALTH | Facility: CLINIC | Age: 49
End: 2024-11-11
Payer: COMMERCIAL

## 2024-11-11 PROBLEM — F11.20: Chronic | Status: ACTIVE | Noted: 2024-05-15

## 2024-11-11 LAB
BASOPHILS # BLD AUTO: 0 10E3/UL (ref 0–0.2)
BASOPHILS NFR BLD AUTO: 0 %
CK SERPL-CCNC: 64 U/L (ref 39–308)
CREAT SERPL-MCNC: 0.95 MG/DL (ref 0.67–1.17)
EGFRCR SERPLBLD CKD-EPI 2021: >90 ML/MIN/1.73M2
EOSINOPHIL # BLD AUTO: 0 10E3/UL (ref 0–0.7)
EOSINOPHIL NFR BLD AUTO: 0 %
ERYTHROCYTE [DISTWIDTH] IN BLOOD BY AUTOMATED COUNT: 13.5 % (ref 10–15)
HCT VFR BLD AUTO: 35.7 % (ref 40–53)
HGB BLD-MCNC: 11.9 G/DL (ref 13.3–17.7)
IMM GRANULOCYTES # BLD: 0.5 10E3/UL
IMM GRANULOCYTES NFR BLD: 2 %
LYMPHOCYTES # BLD AUTO: 1.5 10E3/UL (ref 0.8–5.3)
LYMPHOCYTES NFR BLD AUTO: 8 %
MCH RBC QN AUTO: 30.2 PG (ref 26.5–33)
MCHC RBC AUTO-ENTMCNC: 33.3 G/DL (ref 31.5–36.5)
MCV RBC AUTO: 91 FL (ref 78–100)
MONOCYTES # BLD AUTO: 1.3 10E3/UL (ref 0–1.3)
MONOCYTES NFR BLD AUTO: 6 %
NEUTROPHILS # BLD AUTO: 16.3 10E3/UL (ref 1.6–8.3)
NEUTROPHILS NFR BLD AUTO: 83 %
NRBC # BLD AUTO: 0 10E3/UL
NRBC BLD AUTO-RTO: 0 /100
PLATELET # BLD AUTO: 259 10E3/UL (ref 150–450)
PROCALCITONIN SERPL IA-MCNC: 17.6 NG/ML
RBC # BLD AUTO: 3.94 10E6/UL (ref 4.4–5.9)
WBC # BLD AUTO: 19.6 10E3/UL (ref 4–11)

## 2024-11-11 PROCEDURE — 258N000003 HC RX IP 258 OP 636: Performed by: INTERNAL MEDICINE

## 2024-11-11 PROCEDURE — 250N000013 HC RX MED GY IP 250 OP 250 PS 637: Performed by: INTERNAL MEDICINE

## 2024-11-11 PROCEDURE — 250N000011 HC RX IP 250 OP 636: Performed by: INTERNAL MEDICINE

## 2024-11-11 PROCEDURE — 120N000001 HC R&B MED SURG/OB

## 2024-11-11 PROCEDURE — 99254 IP/OBS CNSLTJ NEW/EST MOD 60: CPT | Performed by: PSYCHIATRY & NEUROLOGY

## 2024-11-11 PROCEDURE — 250N000011 HC RX IP 250 OP 636

## 2024-11-11 PROCEDURE — 87340 HEPATITIS B SURFACE AG IA: CPT

## 2024-11-11 PROCEDURE — 82565 ASSAY OF CREATININE: CPT | Performed by: INTERNAL MEDICINE

## 2024-11-11 PROCEDURE — 99232 SBSQ HOSP IP/OBS MODERATE 35: CPT | Performed by: INTERNAL MEDICINE

## 2024-11-11 PROCEDURE — 258N000003 HC RX IP 258 OP 636

## 2024-11-11 PROCEDURE — 250N000012 HC RX MED GY IP 250 OP 636 PS 637: Performed by: INTERNAL MEDICINE

## 2024-11-11 PROCEDURE — 84145 PROCALCITONIN (PCT): CPT | Performed by: INTERNAL MEDICINE

## 2024-11-11 PROCEDURE — 36415 COLL VENOUS BLD VENIPUNCTURE: CPT | Performed by: INTERNAL MEDICINE

## 2024-11-11 PROCEDURE — 82550 ASSAY OF CK (CPK): CPT | Performed by: INTERNAL MEDICINE

## 2024-11-11 PROCEDURE — 85041 AUTOMATED RBC COUNT: CPT | Performed by: INTERNAL MEDICINE

## 2024-11-11 PROCEDURE — 85004 AUTOMATED DIFF WBC COUNT: CPT | Performed by: INTERNAL MEDICINE

## 2024-11-11 RX ORDER — METHOCARBAMOL 500 MG/1
500 TABLET, FILM COATED ORAL 4 TIMES DAILY
Status: DISCONTINUED | OUTPATIENT
Start: 2024-11-11 | End: 2024-11-13 | Stop reason: HOSPADM

## 2024-11-11 RX ORDER — NICOTINE 21 MG/24HR
1 PATCH, TRANSDERMAL 24 HOURS TRANSDERMAL DAILY
Status: DISCONTINUED | OUTPATIENT
Start: 2024-11-11 | End: 2024-11-13 | Stop reason: HOSPADM

## 2024-11-11 RX ORDER — CEFTRIAXONE 2 G/1
2 INJECTION, POWDER, FOR SOLUTION INTRAMUSCULAR; INTRAVENOUS EVERY 24 HOURS
Status: DISCONTINUED | OUTPATIENT
Start: 2024-11-12 | End: 2024-11-11

## 2024-11-11 RX ORDER — CEFTRIAXONE 2 G/1
2 INJECTION, POWDER, FOR SOLUTION INTRAMUSCULAR; INTRAVENOUS EVERY 24 HOURS
Status: DISCONTINUED | OUTPATIENT
Start: 2024-11-11 | End: 2024-11-11

## 2024-11-11 RX ADMIN — METHOCARBAMOL 500 MG: 500 TABLET ORAL at 21:02

## 2024-11-11 RX ADMIN — CALCIUM CARBONATE (ANTACID) CHEW TAB 500 MG 1000 MG: 500 CHEW TAB at 19:24

## 2024-11-11 RX ADMIN — HYDROXYZINE HYDROCHLORIDE 25 MG: 25 TABLET, FILM COATED ORAL at 12:53

## 2024-11-11 RX ADMIN — NICOTINE 1 PATCH: 21 PATCH, EXTENDED RELEASE TRANSDERMAL at 17:05

## 2024-11-11 RX ADMIN — DEXAMETHASONE SODIUM PHOSPHATE 10 MG: 4 INJECTION, SOLUTION INTRAMUSCULAR; INTRAVENOUS at 06:36

## 2024-11-11 RX ADMIN — LITHIUM CARBONATE 900 MG: 450 TABLET ORAL at 21:05

## 2024-11-11 RX ADMIN — ACETAMINOPHEN 650 MG: 325 TABLET, FILM COATED ORAL at 12:53

## 2024-11-11 RX ADMIN — QUETIAPINE FUMARATE 100 MG: 50 TABLET ORAL at 08:23

## 2024-11-11 RX ADMIN — VANCOMYCIN HYDROCHLORIDE 1250 MG: 10 INJECTION, POWDER, LYOPHILIZED, FOR SOLUTION INTRAVENOUS at 03:32

## 2024-11-11 RX ADMIN — FLUOXETINE HYDROCHLORIDE 40 MG: 20 CAPSULE ORAL at 08:23

## 2024-11-11 RX ADMIN — NICOTINE POLACRILEX 4 MG: 2 LOZENGE ORAL at 17:06

## 2024-11-11 RX ADMIN — GABAPENTIN 600 MG: 600 TABLET, FILM COATED ORAL at 15:32

## 2024-11-11 RX ADMIN — NICOTINE POLACRILEX 2 MG: 2 LOZENGE ORAL at 08:36

## 2024-11-11 RX ADMIN — ACETAMINOPHEN 650 MG: 325 TABLET, FILM COATED ORAL at 08:36

## 2024-11-11 RX ADMIN — PRAZOSIN HYDROCHLORIDE 1 MG: 1 CAPSULE ORAL at 21:04

## 2024-11-11 RX ADMIN — QUETIAPINE FUMARATE 500 MG: 400 TABLET ORAL at 21:09

## 2024-11-11 RX ADMIN — BUPRENORPHINE HCL 4 MG: 2 TABLET SUBLINGUAL at 15:32

## 2024-11-11 RX ADMIN — GABAPENTIN 600 MG: 600 TABLET, FILM COATED ORAL at 08:36

## 2024-11-11 RX ADMIN — Medication 1 TABLET: at 08:23

## 2024-11-11 RX ADMIN — GABAPENTIN 600 MG: 600 TABLET, FILM COATED ORAL at 21:04

## 2024-11-11 RX ADMIN — DEXAMETHASONE SODIUM PHOSPHATE 10 MG: 4 INJECTION, SOLUTION INTRAMUSCULAR; INTRAVENOUS at 00:26

## 2024-11-11 RX ADMIN — NICOTINE POLACRILEX 4 MG: 2 LOZENGE ORAL at 04:24

## 2024-11-11 RX ADMIN — ATOMOXETINE 80 MG: 80 CAPSULE ORAL at 08:24

## 2024-11-11 RX ADMIN — ACETAMINOPHEN 650 MG: 325 TABLET, FILM COATED ORAL at 04:24

## 2024-11-11 RX ADMIN — CEFTRIAXONE SODIUM 2 G: 2 INJECTION, POWDER, FOR SOLUTION INTRAMUSCULAR; INTRAVENOUS at 00:26

## 2024-11-11 RX ADMIN — LORAZEPAM 1 MG: 1 TABLET ORAL at 07:16

## 2024-11-11 RX ADMIN — BUPRENORPHINE HCL 8 MG: 8 TABLET SUBLINGUAL at 21:14

## 2024-11-11 RX ADMIN — METHOCARBAMOL 500 MG: 500 TABLET ORAL at 17:06

## 2024-11-11 RX ADMIN — KETOROLAC TROMETHAMINE 30 MG: 15 INJECTION, SOLUTION INTRAMUSCULAR; INTRAVENOUS at 21:05

## 2024-11-11 RX ADMIN — KETOROLAC TROMETHAMINE 30 MG: 15 INJECTION, SOLUTION INTRAMUSCULAR; INTRAVENOUS at 15:32

## 2024-11-11 RX ADMIN — ATORVASTATIN CALCIUM 20 MG: 20 TABLET, FILM COATED ORAL at 21:02

## 2024-11-11 RX ADMIN — ACYCLOVIR SODIUM 1000 MG: 1000 INJECTION, SOLUTION INTRAVENOUS at 05:12

## 2024-11-11 RX ADMIN — OXYCODONE HYDROCHLORIDE 15 MG: 5 TABLET ORAL at 08:36

## 2024-11-11 RX ADMIN — QUETIAPINE FUMARATE 100 MG: 50 TABLET ORAL at 21:04

## 2024-11-11 RX ADMIN — BUPRENORPHINE HCL 8 MG: 8 TABLET SUBLINGUAL at 08:23

## 2024-11-11 RX ADMIN — ACETAMINOPHEN 650 MG: 325 TABLET, FILM COATED ORAL at 00:27

## 2024-11-11 RX ADMIN — OXYCODONE HYDROCHLORIDE 15 MG: 5 TABLET ORAL at 04:29

## 2024-11-11 ASSESSMENT — ACTIVITIES OF DAILY LIVING (ADL)
ADLS_ACUITY_SCORE: 18.25

## 2024-11-11 NOTE — PLAN OF CARE
Reason for Admission: suspected meningitis, suicidal ideation     Cognitive/Mentation: A/Ox 3, disoriented to time  Neuros/CMS: Intact ex headache  VS: VSS on RA.      /GI: Continent. Last BM 11/9/24.   Pulmonary: LS clear.  Pain: 10/10 head ache into posterior neck, PRNs given     Drains/Lines: PIVx1 SL  Skin: scattered scabs  Activity: independent  Diet: regular with thin liquids. Takes pills whole with water.      Discharge: pending     Aggression Stoplight Tool: yellow     End of shift summary: pt restless/pacing but cooperative. Sitter in place, suicide precautions in place.

## 2024-11-11 NOTE — CONSULTS
Care Management Initial Consult    General Information  Assessment completed with: VM-chart review,    Type of CM/SW Visit: Initial Assessment    Primary Care Provider verified and updated as needed: Yes   Readmission within the last 30 days: no previous admission in last 30 days      Reason for Consult: discharge planning  Advance Care Planning: Advance Care Planning Reviewed: no concerns identified          Communication Assessment  Patient's communication style: spoken language (English or Bilingual)             Cognitive  Cognitive/Neuro/Behavioral: .WDL except  Level of Consciousness: alert  Arousal Level: opens eyes spontaneously  Orientation: disoriented to, time  Mood/Behavior: anxious, restless  Best Language: 0 - No aphasia  Speech: clear, spontaneous    Living Environment:   People in home: facility resident     Current living Arrangements: other (see comments) (Psychiatry)  Name of Facility: [place]   Able to return to prior arrangements: yes       Family/Social Support:  Care provided by: self  Provides care for: no one  Marital Status: Single  Support system: Sibling(s)          Description of Support System:           Current Resources:   Patient receiving home care services: No        Community Resources:    Equipment currently used at home: none  Supplies currently used at home: None    Employment/Financial:  Employment Status: unemployed        Financial Concerns: none           Does the patient's insurance plan have a 3 day qualifying hospital stay waiver?  No    Lifestyle & Psychosocial Needs:  Social Drivers of Health     Food Insecurity: Low Risk  (11/10/2024)    Food Insecurity     Within the past 12 months, did you worry that your food would run out before you got money to buy more?: No     Within the past 12 months, did the food you bought just not last and you didn t have money to get more?: No   Recent Concern: Food Insecurity - High Risk (10/29/2024)    Food Insecurity     Within the  past 12 months, did you worry that your food would run out before you got money to buy more?: Yes     Within the past 12 months, did the food you bought just not last and you didn t have money to get more?: Yes   Depression: Not at risk (7/11/2024)    PHQ-2     PHQ-2 Score: 2   Housing Stability: Low Risk  (11/10/2024)    Housing Stability     Do you have housing? : Yes     Are you worried about losing your housing?: No   Recent Concern: Housing Stability - High Risk (10/29/2024)    Housing Stability     Do you have housing? : No     Are you worried about losing your housing?: No   Tobacco Use: High Risk (7/11/2024)    Patient History     Smoking Tobacco Use: Every Day     Smokeless Tobacco Use: Never     Passive Exposure: Current   Financial Resource Strain: Low Risk  (11/10/2024)    Financial Resource Strain     Within the past 12 months, have you or your family members you live with been unable to get utilities (heat, electricity) when it was really needed?: No   Alcohol Use: Not At Risk (10/29/2024)    AUDIT-C     Frequency of Alcohol Consumption: Never     Average Number of Drinks: Patient does not drink     Frequency of Binge Drinking: Never   Transportation Needs: Low Risk  (11/10/2024)    Transportation Needs     Within the past 12 months, has lack of transportation kept you from medical appointments, getting your medicines, non-medical meetings or appointments, work, or from getting things that you need?: No   Recent Concern: Transportation Needs - High Risk (10/29/2024)    Transportation Needs     Within the past 12 months, has lack of transportation kept you from medical appointments, getting your medicines, non-medical meetings or appointments, work, or from getting things that you need?: Yes   Physical Activity: Insufficiently Active (10/6/2021)    Received from [facility] and On license of [facility]    Exercise Vital Sign     Days of Exercise per Week: 4 days     Minutes of Exercise per Session: 30 min  "  Interpersonal Safety: Low Risk  (11/10/2024)    Interpersonal Safety     Do you feel physically and emotionally safe where you currently live?: Yes     Within the past 12 months, have you been hit, slapped, kicked or otherwise physically hurt by someone?: No     Within the past 12 months, have you been humiliated or emotionally abused in other ways by your partner or ex-partner?: No   Recent Concern: Interpersonal Safety - High Risk (10/29/2024)    Interpersonal Safety     Do you feel physically and emotionally safe where you currently live?: No     Within the past 12 months, have you been hit, slapped, kicked or otherwise physically hurt by someone?: No     Within the past 12 months, have you been humiliated or emotionally abused in other ways by your partner or ex-partner?: No   Stress: Stress Concern Present (10/6/2021)    Received from [facility] - Occupational Stress Questionnaire     Feeling of Stress : To some extent   Social Connections: Unknown (4/11/2023)    Received from [employer] [employer] [facility] [employer] [facility]    Social Connections     Frequency of Communication with Friends and Family: Not on file   Health Literacy: Not on file       Functional Status:  Prior to admission patient needed assistance:   Dependent ADLs:: Independent          Mental Health Status:          Chemical Dependency Status:                Values/Beliefs:  Spiritual, Cultural Beliefs, Buddhism Practices, Values that affect care: no               Discussed  Partnership in Safe Discharge Planning  document with patient/family: No    Additional Information:  Per  consult for discharge planning, chart was reviewed. Patient is a 49 year old male [facility] [place] [facility]. H&P reviewed, patient has \"significant psychiatric and pain medication history.\"    Writer spoke with Jazmyne at " [facility] ([phone]). They stated that once patient is medically stable they will look at taking him. Jazmyne stated that patient needs to be off Oxycodone and Ativan for 24 hours before he can be admitted. Jazmyne stated that she will call SW tomorrow morning to review.     Pt will need medical transport arranged to bring him to [address] [facility]. They will bring him to the [facility] entrance, he will check in and then someone will come down to get him.     Next Steps: Wait for medical clearance    LUCIO SALVADOR  [facility]  INPATIENT CARE COORDINATION

## 2024-11-11 NOTE — PLAN OF CARE
[id number]    Reason for Admission: suspected meningitis, suicidal ideation    Cognitive/Mentation: A/Ox 3 (disoriented to time)  Neuros/CMS: Intact ex generalized weakness  VS: stable.   /GI: continent  Pulmonary: LS clear.  Pain: 8-9.  Various prn and scheduled medications administered   Activity: independent  Diet: regular with thin  liquids. Takes pills whole with water.     Therapies recs: pending  Discharge: pending    Aggression Stoplight Tool: yellow

## 2024-11-11 NOTE — CONSULTS
"    [facility]     INITIAL PSYCHIATRY   CONSULT     DATE OF SERVICE   11/11/2024       IDENTIFICATION   Perry Preciado Jr.  Age: 49 year old  MRN# [id number]   YOB: 1975   LOS: 2       CHIEF COMPLAINT   \"When can I go back to the IRTS?\"       CONSULT REQUEST BY   Petr Cadet re:  Bipolar       HISTORY OF PRESENT ILLNESS   This is a 49 year old male with history of schizoaffective disorder, bipolar type, KANE, polysubstance use disorder.  Admitted to inpatient psychiatry on 10/24 after being brought in from sober house by EMS due to substance intoxication, psychosis and SI with plan with intent.  UDS remarkable for cannabinoids, domains, benzodiazepines.  Versed noted to be administered in the ED.  Admitted voluntarily.    On 11/8, complained of worse headache of life involving back and neck.  A stroke code was called with workup that was largely negative including CT head/CTA head and neck.  No infarct.  Noted on MRI.  Admission due to concerns of meningitis with signs of photophobia, Kernig's, poor oral intake.    Consult placed to psychiatry for bipolar.    Detailed review of patient's admission to psychiatry at [facility] 10/24 reviewed and course of hospitalization until 11/8 was performed.  On day of transfer, patient appeared to be at baseline.  Stable and awaiting placement at [facility].  Due to concerns of meningitis, transferred to ICU.  Psychiatric recommendations included checking CK and when medically cleared discharge directly to [facility] residential CD program.  CK is 64 on 11/11.    Further care coordination performed.  Includes, review of psychiatry discharge course of hospitalization from 10/24 and recommendations for discharge on 11/8 as follows:  Perry Preciado presented to the ED via EMS on 10/24/24  from sober house with apparent substance intoxication with UDS+ for , psychosis and SI with plan.  cannabinoids, amphetamines, benzodiazepine " (Versed administered in the ED). He did not exhibit violent/aggressive behaviors, and did not require restraints/seclusion. Pt admitted under a voluntary status.   - 10/30/24: Continued SIO 1:1 due to SI with plan. PTA meds were continued and changes made included: Increased Seroquel to 200 mg PO at bedtime (was taking 400 mg at bedtime, restarted in the ED at 100 mg at bedtime). Restarted PTA Lithium at 600 mg PO at bedtime for mood stabilization (was taking 900 mg PO at bedtime PTA). Ordered baseline TSH and CMP. Continued COWS protocol. Suboxone was increased to total daily dose of 12 mg. Gabapentin 600 mg PO TID for heightened anxiety/withdrawal sx. CD consult order placed, pt agreed with consult. Lithium level ordered for 11/4/24   - 10/31/24: Increase buprenorphine HCl-naloxone HCl (SUBOXONE) to 8-2 MG film 1 Film sublingual BID. Continue COWS protocol for now and reassess. Increase lithium to 900 mg PO at bedtime. Increase Seroquel to 300 mg PO at bedtime Increase Prozac to 40 mg PO qDay.  - 11/4/24: -Lithium level reviewed today (0.88 mmol/L). Decreased Suboxone to 8-4 mg at 1600 dose (reducing from a total of 24 mg daily to 20 mg daily) due to observed/reported increase daytime sedation. No apparent withdrawal-like symptoms. Recent COWS scores 1-2. Discontinued COWS protocol. Change scheduled gabapentin to receive PO dosing minimum 2 hrs apart from Suboxone dose due to concerns for possible enhancement of Suboxone when co-administered. Increase Seroquel to PTA dose of 400 mg PO at bedtime to better target paranoid thoughts and AH.   - 11/6/24:  Added prazosin 1 mg at bedtime for trauma based nightmares.  - 11/7/24: IM consult placed due to skin rash which pt attributes to Suboxone (held). IM consult completed, appreciate recommendations. Hydrocortisone topical BID, Benadryl PO PRN ordered. Discontinued Suboxone 8-4-8. Started Subutex 8 mg qDay, 4 mg at 2 PM and 8 mg at 6 PM.   - 11/8/24: During day shift,  patient appeared to be at baseline. Stable and awaiting placement at [facility]. Concern for meningitis and pt transferred to ICU. See above consult section for details.   Recommendations:  - Would consider checking CK to R/O NMS (unlikely but has been receiving moderate doses of two neuroleptics here)  - When medically cleared, may discharge directly to [facility] residential CD program on [facility] after their staff medically clear him and when bed is available. At the time of my last assessment (9/8), patient was at his psychiatric baseline. Denied SI/HI, future oriented, and cognitively intact.     On 11/11, infectious disease consult and most recent visit reviewed.  Transferred from psychiatry with acute fever and headache.  Workup is negative and LP unimpressive but did have 12 white cells present primarily monocytes thus some meningeal inflammation.  Recommendations are as follows:  REC 1  fever resolved, some degree of ongoing headache, looks clinically okay.  2   Minor CSF abnormality not suggestive of any significant infection, fortunately enough CSF left to do the full micro panel which was negative, doubt CNS infection DC all  antimicrobials  3  will get procalcitonin for completeness  and if he has recurrent fever expand workup, if headache continues question neural workup for other but MRI negative and not obvious cause    Upon interview, the patient is cooperative on approach.  Visit performed in patient's room on the unit with sitter as droplet precautions are discontinued.  Consent is given to evaluate.  Patient is voluntary.  Patient is made aware of plan to coordinate cares with treatment team.    Patient is sufficiently able to appreciate events leading to initial psychiatric admission on 10/24 related to substance use and statements of suicidality.  Provides understanding of medications continued and/or adjusted during course of hospitalization as per discharge summary in 11/8.  Feels  "medications have been effective and tolerable.  Does not feel headache is related to medication changes performed psychiatrically.  Describes headache as chronic and exacerbated date of transfer.    Aware of recommendations that were in place for disposition.  Accepting of plans to .  Go back to [facility].  [facility] is [facility] to proceed with discharge plan coordinated prior to transfer from [place] to [facility].    Medication compliant.  Denies medication side effect.  Describes efficacy targeting symptoms of mood, anxiety and psychosis.  On psychiatric review of symptoms, mood is, \"better.\"  Anxiety regarding plans for disposition.  Acknowledges suicide attempt history.  Denies thoughts of self or others.  Sitter is in place although contracted for safety while hospitalized.  Future-oriented.  Gun access removed.  No overt psychosis.    Physical issues of residual headache.  Medical workup has been essentially negative.    Treatment plan discussed.  Patient remains voluntary.  Continue psychiatric medications unchanged, given stabilization of medications as coordinated while at [facility].  Patient is accepting to proceed with placement recommended by  to [facility].        Review of external notes and/or information:   I personally reviewed notes from the patient's admit note dated 10/24 and 11/9. This provided me with information regarding patient's recent clinical course.     I personally reviewed the patient's chart, including available medication list and available past medical history, past surgical history, family history, and social history.        CHEMICAL DEPENDENCY HISTORY   History   Drug Use    Types: Fentanyl, Methamphetamines     Comment: last use 2 weeks ago     Social History    Substance and Sexual Activity      Alcohol use: Yes    History   Smoking Status    Every Day    Types: " "Cigarettes   Smokeless Tobacco    Never     Treatment: Multiple, including CD tx at [place], [place], and [facility], most recent tx at [facility] [place] for 90 days following discharge from [facility] [address] at beginning of June 2024.   Detox: Denies withdrawal seizure  Legal: No DUI history reported       PAST PSYCHIATRIC HISTORY   Hospitalizations: Multiple.  By prior psychiatric hospitalization prior to current admission on 10/24/2024 at [facility]  History of Commitment: None  Past Medications: As per chart review  Antidepressants:  - Wellbutrin: Patient is allergic. Face swells  - Citalopram: Unsure if it helped     Insomnia  - Trazodone: Helped for a bit then stopped     Antianxiety  - Clonidine: Unsure if it helped  - Hydroxyzine: Helps     Antipsychotics:  - Seroquel: Helps  - Abilify: Unsure if it helped  - Zyprexa: Helps     Mood stabilizers:  - Lithium was started for SI and to help with \"neural inflammation from TBI\": Helps  - Gabapentin for neuropathy: Helps     Substance Use  - Hx of Subutex and suboxone. Prefers subutex because suboxone causes itching.  - Acamprosate: Unsure if it helped  TMS/ECT/Ketamine:  No  Suicide Attempts/Gun Access: History of 2 suicide attempts by attempts as shooting self.  No gun access reported.    Reviewed in detail and please see DEC Assessment for full details and history.       PAST MEDICAL HISTORY   Past Medical History:   Diagnosis Date    Acute bilateral low back pain with right-sided sciatica 06/07/2016    Acute pain of right shoulder due to trauma     Adult antisocial behavior     senior care incarceration: 16 years    Aspiration pneumonia (H) 02/24/2014    CARDIOVASCULAR SCREENING; LDL GOAL LESS THAN 130 06/07/2016    Carpal tunnel syndrome of right wrist 06/07/2016    Chest pain 02/19/2014    Chest trauma 02/19/2014    Chronic pain syndrome 09/30/2019    Chronic right-sided low back pain with right-sided sciatica 05/10/2018    DDD (degenerative disc disease), lumbosacral 05/05/2020    " Depression with anxiety 05/05/2020    Displacement of lumbar intervertebral disc without myelopathy 05/16/2012    KANE (generalized anxiety disorder) 07/07/2017    Heroin abuse (H) 05/10/2018    History of ADHD 01/06/2014    History of drug abuse (H) 01/06/2014    History of suicide attempt 05/10/2018    HTN (hypertension) 02/26/2014    Hyperglycemia 02/23/2014    Hypertriglyceridemia 11/02/2015    IV drug abuse (H) 05/10/2018    Major depressive disorder, recurrent (H) 02/23/2018    Major depressive disorder, recurrent episode, moderate (H) 06/06/2016    Methamphetamine abuse (H) 02/23/2018    Overview:  see [facility] H&P 11/27/2009, [facility] admit 9/2/2010    Mild intermittent asthma without complication 05/10/2018    Opiate overdose (H) 02/22/2014    Positive D dimer 11/02/2015    Psychosis (H) 05/04/2020    Sepsis (H) 02/22/2014    SIRS (systemic inflammatory response syndrome) (H) 11/02/2015    Substance abuse (H) 05/07/2018    Suicidal ideation 02/23/2018    Tobacco use disorder 05/10/2018     Past Surgical History:   Procedure Laterality Date    BACK SURGERY       Primary Care Provider: No Ref-Primary, Physician  Medications:   Current Facility-Administered Medications   Medication Dose Route Frequency Provider Last Rate Last Admin    atomoxetine (STRATTERA) capsule 80 mg  80 mg Oral Daily Petr Cadet MD   80 mg at 11/11/24 0824    atorvastatin (LIPITOR) tablet 20 mg  20 mg Oral QPM Petr Cadet MD   20 mg at 11/10/24 2014    buprenorphine (SUBUTEX) sublingual tablet 8 mg  8 mg Sublingual BID Steve Dhillon MD   8 mg at 11/11/24 0823    And    buprenorphine (SUBUTEX) sublingual tablet 4 mg  4 mg Sublingual Q24H Steve Dhillon MD   4 mg at 11/10/24 1510    dexAMETHasone (DECADRON) injection 10 mg  10 mg Intravenous Q6H Steve Dhillon MD   10 mg at 11/11/24 0636    FLUoxetine (PROzac) capsule 40 mg  40 mg Oral Daily Petr Cadet MD   40 mg at 11/11/24 0823     gabapentin (NEURONTIN) tablet 600 mg  600 mg Oral TID Petr Cadet MD   600 mg at 11/11/24 0836    lithium (ESKALITH CR/LITHOBID) CR tablet 900 mg  900 mg Oral At Bedtime Petr Cadet MD   900 mg at 11/10/24 2101    multivitamin w/minerals (THERA-VIT-M) tablet 1 tablet  1 tablet Oral Daily Petr Cadet MD   1 tablet at 11/11/24 0823    prazosin (MINIPRESS) capsule 1 mg  1 mg Oral At Bedtime Petr Cadet MD   1 mg at 11/10/24 2101    QUEtiapine (SEROquel) tablet 100 mg  100 mg Oral BID Petr Cadet MD   100 mg at 11/11/24 0823    QUEtiapine (SEROquel) tablet 500 mg  500 mg Oral At Bedtime Petr Cadet MD   500 mg at 11/10/24 2146    sodium chloride (PF) 0.9% PF flush 3 mL  3 mL Intracatheter Q8H Petr Cadet MD   3 mL at 11/11/24 0922     Medications as needed:   Current Facility-Administered Medications   Medication Dose Route Frequency Provider Last Rate Last Admin    acetaminophen (TYLENOL) tablet 650 mg  650 mg Oral Q4H PRN Petr Cadet MD   650 mg at 11/11/24 0836    Or    acetaminophen (TYLENOL) Suppository 650 mg  650 mg Rectal Q4H PRN Petr Cadet MD        calcium carbonate (TUMS) chewable tablet 1,000 mg  1,000 mg Oral 4x Daily PRN Petr Cadet MD        diphenhydrAMINE (BENADRYL) capsule 25-50 mg  25-50 mg Oral Q4H PRN Petr Cadet MD        hydrOXYzine HCl (ATARAX) tablet 25 mg  25 mg Oral Q6H PRN Petr Cadet MD        Or    hydrOXYzine HCl (ATARAX) tablet 50 mg  50 mg Oral Q6H PRN Petr Cadet MD   50 mg at 11/10/24 1258    ketorolac (TORADOL) injection 30 mg  30 mg Intravenous Q6H PRN Petr Cadet MD   30 mg at 11/10/24 2306    lidocaine (LMX4) cream   Topical Q1H PRN Petr Cadet MD        lidocaine 1 % 0.1-1 mL  0.1-1 mL Other Q1H PRN Petr Cadet MD        LORazepam (ATIVAN) tablet 1 mg  1 mg Oral Q4H PRN Petr Cadet MD   1 mg at 11/11/24 0716    naloxone (NARCAN) injection 0.2 mg   0.2 mg Intravenous Q2 Min PRN Treadwell, Rory S, RPH        Or    naloxone (NARCAN) injection 0.4 mg  0.4 mg Intravenous Q2 Min PRN Treadwell, Rory S, RPH        Or    naloxone (NARCAN) injection 0.2 mg  0.2 mg Intramuscular Q2 Min PRN Treadwell, Rory S, RPH        Or    naloxone (NARCAN) injection 0.4 mg  0.4 mg Intramuscular Q2 Min PRN Treadwell, Rory S, RPH        nicotine (COMMIT) lozenge 2-4 mg  2-4 mg Buccal Q1H PRN Petr Cadet MD   2 mg at 11/11/24 0836    ondansetron (ZOFRAN ODT) ODT tab 4 mg  4 mg Oral Q6H PRN Petr Cadet MD        Or    ondansetron (ZOFRAN) injection 4 mg  4 mg Intravenous Q6H PRN Petr Cadet MD        oxyCODONE (ROXICODONE) tablet 5 mg  5 mg Oral Q4H PRN Petr Cadet MD        Or    oxyCODONE (ROXICODONE) tablet 15 mg  15 mg Oral Q4H PRN Petr Cadet MD   15 mg at 11/11/24 0836    prochlorperazine (COMPAZINE) injection 10 mg  10 mg Intravenous Q6H PRN Petr Cadet MD        Or    prochlorperazine (COMPAZINE) tablet 10 mg  10 mg Oral Q6H PRN Petr Cadet MD        Or    prochlorperazine (COMPAZINE) suppository 25 mg  25 mg Rectal Q12H PRN Petr Cadet MD        senna-docusate (SENOKOT-S/PERICOLACE) 8.6-50 MG per tablet 1 tablet  1 tablet Oral BID PRN Petr Cadet MD        Or    senna-docusate (SENOKOT-S/PERICOLACE) 8.6-50 MG per tablet 2 tablet  2 tablet Oral BID PRN Petr Cadet MD        sodium chloride (PF) 0.9% PF flush 3 mL  3 mL Intracatheter q1 min prn Petr Cadet MD         ALLERGIES: Wellbutrin [bupropion], Wellbutrin [bupropion], Wellbutrin [bupropion], and Naltrexone       MEDICATIONS   Medications Prior to Admission   Medication Sig Dispense Refill Last Dose/Taking    atorvastatin (LIPITOR) 20 MG tablet Take 1 tablet (20 mg) by mouth every evening 90 tablet 1 11/8/2024 Bedtime    buprenorphine (SUBUTEX) 2 MG SUBL sublingual tablet Place 4 tablets (8 mg) under the tongue 2 times daily AND 2 tablets (4 mg)  daily. Place 4 tablets (8 mg) under the tongue 2 times daily AND 2 tablets (4 mg) daily at 2 PM. 300 tablet 0 11/9/2024 Noon    diphenhydrAMINE (BENADRYL) 25 MG capsule Take 1-2 capsules (25-50 mg) by mouth every 4 hours as needed for itching (Reported skin itching.). 30 capsule 0 Taking As Needed    FLUoxetine (PROZAC) 40 MG capsule Take 1 capsule (40 mg) by mouth daily. 30 capsule 0 11/9/2024 Morning    gabapentin (NEURONTIN) 600 MG tablet Take 1 tablet (600 mg) by mouth 3 times daily. 90 tablet 0 11/9/2024 at  1:26 PM    lithium (ESKALITH CR/LITHOBID) 450 MG CR tablet Take 2 tablets (900 mg) by mouth at bedtime. 60 tablet 0 11/8/2024 Bedtime    multivitamin w/minerals (THERA-VIT-M) tablet Take 1 tablet by mouth daily 90 tablet 1 11/9/2024 Morning    nicotine (COMMIT) 2 MG lozenge Place 1-2 lozenges (2-4 mg) inside cheek every hour as needed for nicotine withdrawal symptoms. 48 lozenge 0 11/9/2024 Noon    prazosin (MINIPRESS) 1 MG capsule Take 1 capsule (1 mg) by mouth at bedtime. 30 capsule 0 11/8/2024 Bedtime    QUEtiapine (SEROQUEL) 400 MG tablet Take 1 tablet (400 mg) by mouth at bedtime. 30 tablet 0 11/8/2024 Bedtime    QUEtiapine (SEROQUEL) 50 MG tablet Take 1 tablet (50 mg) by mouth 2 times daily. 60 tablet 0 11/9/2024 Morning    atomoxetine (STRATTERA) 80 MG capsule Take 80 mg by mouth daily.       hydrocortisone (CORTAID) 0.5 % external cream Apply topically 2 times daily. 15 g 0            ROS   The 10 point Review of Systems is negative other than noted in the HPI or here.       FAMILY HISTORY   No family history on file.     Psychiatric: Depression, anxiety  Chemical: Mother: Heroin  Suicide: Father, sister       SOCIAL HISTORY   Social History     Socioeconomic History    Marital status:      Spouse name: Not on file    Number of children: Not on file    Years of education: Not on file    Highest education level: Not on file   Occupational History    Not on file   Tobacco Use    Smoking status:  Every Day     Current packs/day: 0.50     Types: Cigarettes     Passive exposure: Current    Smokeless tobacco: Never   Vaping Use    Vaping status: Never Used   Substance and Sexual Activity    Alcohol use: Yes    Drug use: Yes     Types: Fentanyl, Methamphetamines     Comment: last use 2 weeks ago    Sexual activity: Yes   Other Topics Concern    Parent/sibling w/ CABG, MI or angioplasty before 65F 55M? Not Asked   Social History Narrative    ** Merged History Encounter **          Social Drivers of Health     Financial Resource Strain: Low Risk  (11/10/2024)    Financial Resource Strain     Within the past 12 months, have you or your family members you live with been unable to get utilities (heat, electricity) when it was really needed?: No   Food Insecurity: Low Risk  (11/10/2024)    Food Insecurity     Within the past 12 months, did you worry that your food would run out before you got money to buy more?: No     Within the past 12 months, did the food you bought just not last and you didn t have money to get more?: No   Recent Concern: Food Insecurity - High Risk (10/29/2024)    Food Insecurity     Within the past 12 months, did you worry that your food would run out before you got money to buy more?: Yes     Within the past 12 months, did the food you bought just not last and you didn t have money to get more?: Yes   Transportation Needs: Low Risk  (11/10/2024)    Transportation Needs     Within the past 12 months, has lack of transportation kept you from medical appointments, getting your medicines, non-medical meetings or appointments, work, or from getting things that you need?: No   Recent Concern: Transportation Needs - High Risk (10/29/2024)    Transportation Needs     Within the past 12 months, has lack of transportation kept you from medical appointments, getting your medicines, non-medical meetings or appointments, work, or from getting things that you need?: Yes   Physical Activity: Insufficiently  Active (10/6/2021)    Received from [facility]    Exercise Vital Sign     Days of Exercise per Week: 4 days     Minutes of Exercise per Session: 30 min   Stress: Stress Concern Present (10/6/2021)    Received from [facility]    [employer] [employer] - Occupational Stress Questionnaire     Feeling of Stress : To some extent   Social Connections: Unknown (4/11/2023)    Received from [facility], [facility]    Social Connections     Frequency of Communication with Friends and Family: Not on file   Interpersonal Safety: Low Risk  (11/10/2024)    Interpersonal Safety     Do you feel physically and emotionally safe where you currently live?: Yes     Within the past 12 months, have you been hit, slapped, kicked or otherwise physically hurt by someone?: No     Within the past 12 months, have you been humiliated or emotionally abused in other ways by your partner or ex-partner?: No   Recent Concern: Interpersonal Safety - High Risk (10/29/2024)    Interpersonal Safety     Do you feel physically and emotionally safe where you currently live?: No     Within the past 12 months, have you been hit, slapped, kicked or otherwise physically hurt by someone?: No     Within the past 12 months, have you been humiliated or emotionally abused in other ways by your partner or ex-partner?: No   Housing Stability: Low Risk  (11/10/2024)    Housing Stability     Do you have housing? : Yes     Are you worried about losing your housing?: No   Recent Concern: Housing Stability - High Risk (10/29/2024)    Housing Stability     Do you have housing? : No     Are you worried about losing your housing?: No     Born and Raised: [place]  Occupation: Unemployed  Legal:  Endorses history of legal issues. Extensive intermediate history   Living Situation: Living arrangements - the patient previously was staying at a boarding lodge.  "Working with CM around more permanent housing options now that reports has CADI waiver        MENTAL STATUS EXAM   Appearance: Cooperative  Mood: Mood: \"Better\"  Affect: appropriate  was congruent to speech  Suicidal Ideation: PRESENT / ABSENT: absent   Homicidal Ideation: PRESENT / ABSENT: absent   Thought process: unremarkable and no LAMBERTO.  Thought content: denies suicidal ideation, violent ideation, and psychosis .   Fund of Knowledge: sufficient  Attention/Concentration: Fair  Language ability:  Intact  Memory:  Appropriate  Insight:  fair.  Judgement: fair and adequate for safety  Orientation: Yes, x4  Psychomotor Behavior: slowed    Muscle Strength and Tone: MuscleStrength: Normal  Gait and Station: Normal       PHYSICAL EXAM   Vitals: BP (!) 148/92 (BP Location: Right arm)   Pulse (P) 64   Temp (P) 98  F (36.7  C) (Oral)   Resp (P) 16   SpO2 (P) 94%   Weight:   0 lbs 0 oz    There is no height or weight on file to calculate BMI.    Physical exam as per history and physical by hospitalist, Petr Cadet MD. Dated 11/9/2024:    General Appearance:  anxious  Respiratory: CTA  Cardiovascular: RRR  GI: soft, no mesh, do  Skin: warm and dry  Other:  Ambulatory, no gross deficits noted      I have reviewed the physical exam as documented by by the medical team and agree with findings and assessment and have no additional findings to add at this time.       LABS   personally reviewed.   Recent Results (from the past 48 hours)   Glucose CSF:    Collection Time: 11/09/24  8:01 PM   Result Value Ref Range    Glucose  (H) 40 - 70 mg/dL   Protein total CSF:    Collection Time: 11/09/24  8:01 PM   Result Value Ref Range    Protein total CSF 43.4 15.0 - 45.0 mg/dL   Cerebrospinal fluid Aerobic Bacterial Culture Routine With Gram Stain    Collection Time: 11/09/24  8:01 PM    Specimen: Lumbar Puncture; Cerebrospinal fluid   Result Value Ref Range    Culture No growth after 1 day     Gram Stain Result No " organisms seen     Gram Stain Result 3+ WBC seen    Legionella Urinary Antigen and Streptococcus pneumoniae antigen    Collection Time: 11/09/24  8:01 PM    Specimen: Lumbar Puncture; Cerebrospinal fluid   Result Value Ref Range    Streptococcus pneumoniae antigen Negative Negative    Legionella pneumophila Urinary/Strep pneumoniae Antigen Specimen Type Cerebrospinal fluid    Cryptococcus antigen CSF Tube 2    Collection Time: 11/09/24  8:01 PM    Specimen: Lumbar Puncture; Cerebrospinal fluid   Result Value Ref Range    Cryptococcal Antigen Negative Negative    Cryptococcal Antigen Specimen Type Cerebrospinal fluid    HSV Types 1 and 2 Qualitative PCR CSF    Collection Time: 11/09/24  8:01 PM    Specimen: Lumbar Puncture; Cerebrospinal fluid   Result Value Ref Range    Herpes Simplex Virus 1 DNA - CSF Not Detected Not Detected    Herpes Simplex Virus 2 DNA - CSF Not Detected Not Detected   Cell Count CSF    Collection Time: 11/09/24  8:01 PM   Result Value Ref Range    Tube Number 4     Color Colorless Colorless    Clarity Clear Clear    Total Nucleated Cells 12 (H) 0 - 5 /uL    RBC Count 9 (H) 0 - 2 /uL   Differential CSF    Collection Time: 11/09/24  8:01 PM   Result Value Ref Range    % Neutrophils 4 %    % Lymphocytes 26 %    % Monocytes/Macrophages 70 %   Fungal or Yeast Culture Routine    Collection Time: 11/09/24  8:01 PM    Specimen: Lumbar Puncture; Cerebrospinal fluid   Result Value Ref Range    Culture No growth after 1 day    Meningitis/Encephalitis Panel Qual PCR CSF:    Collection Time: 11/09/24  8:01 PM   Result Value Ref Range    Escherichia coli K1 Negative Negative    Haemophilus influenzae Negative Negative    Listeria monocytogenes Negative Negative    Neisseria meningitidis Negative Negative    Streptococcus agalactiae (GBS) Negative Negative    Streptococcus pneumoniae Negative Negative    Cytomegalovirus Negative Negative    Enterovirus Negative Negative    Herpes simplex virus 1 Negative  Negative    Herpes simplex virus 2 Negative Negative    Human Herpes Virus 6 Negative Negative    Human parechovirus Negative Negative    Varicella zoster virus Negative Negative    Cryptococcus neoformans/gattii Negative Negative   Comprehensive metabolic panel    Collection Time: 11/10/24 10:37 AM   Result Value Ref Range    Sodium 140 135 - 145 mmol/L    Potassium 4.1 3.4 - 5.3 mmol/L    Carbon Dioxide (CO2) 24 22 - 29 mmol/L    Anion Gap 11 7 - 15 mmol/L    Urea Nitrogen 19.2 6.0 - 20.0 mg/dL    Creatinine 0.88 0.67 - 1.17 mg/dL    GFR Estimate >90 >60 mL/min/1.73m2    Calcium 8.8 8.8 - 10.4 mg/dL    Chloride 105 98 - 107 mmol/L    Glucose 379 (H) 70 - 99 mg/dL    Alkaline Phosphatase 85 40 - 150 U/L    AST 20 0 - 45 U/L    ALT 30 0 - 70 U/L    Protein Total 6.9 6.4 - 8.3 g/dL    Albumin 3.8 3.5 - 5.2 g/dL    Bilirubin Total 0.2 <=1.2 mg/dL   CBC with platelets    Collection Time: 11/10/24 10:37 AM   Result Value Ref Range    WBC Count 26.8 (H) 4.0 - 11.0 10e3/uL    RBC Count 3.95 (L) 4.40 - 5.90 10e6/uL    Hemoglobin 12.0 (L) 13.3 - 17.7 g/dL    Hematocrit 35.4 (L) 40.0 - 53.0 %    MCV 90 78 - 100 fL    MCH 30.4 26.5 - 33.0 pg    MCHC 33.9 31.5 - 36.5 g/dL    RDW 13.5 10.0 - 15.0 %    Platelet Count 221 150 - 450 10e3/uL   Vancomycin level    Collection Time: 11/10/24  5:06 PM   Result Value Ref Range    Vancomycin 21.5   ug/mL   CK total    Collection Time: 11/11/24  8:39 AM   Result Value Ref Range    CK 64 39 - 308 U/L   Procalcitonin    Collection Time: 11/11/24  8:39 AM   Result Value Ref Range    Procalcitonin 17.60 (HH) <0.50 ng/mL   Creatinine    Collection Time: 11/11/24  8:39 AM   Result Value Ref Range    Creatinine 0.95 0.67 - 1.17 mg/dL    GFR Estimate >90 >60 mL/min/1.73m2   CBC with platelets and differential    Collection Time: 11/11/24  8:39 AM   Result Value Ref Range    WBC Count 19.6 (H) 4.0 - 11.0 10e3/uL    RBC Count 3.94 (L) 4.40 - 5.90 10e6/uL    Hemoglobin 11.9 (L) 13.3 - 17.7 g/dL     "Hematocrit 35.7 (L) 40.0 - 53.0 %    MCV 91 78 - 100 fL    MCH 30.2 26.5 - 33.0 pg    MCHC 33.3 31.5 - 36.5 g/dL    RDW 13.5 10.0 - 15.0 %    Platelet Count 259 150 - 450 10e3/uL    % Neutrophils 83 %    % Lymphocytes 8 %    % Monocytes 6 %    % Eosinophils 0 %    % Basophils 0 %    % Immature Granulocytes 2 %    NRBCs per 100 WBC 0 <1 /100    Absolute Neutrophils 16.3 (H) 1.6 - 8.3 10e3/uL    Absolute Lymphocytes 1.5 0.8 - 5.3 10e3/uL    Absolute Monocytes 1.3 0.0 - 1.3 10e3/uL    Absolute Eosinophils 0.0 0.0 - 0.7 10e3/uL    Absolute Basophils 0.0 0.0 - 0.2 10e3/uL    Absolute Immature Granulocytes 0.5 (H) <=0.4 10e3/uL    Absolute NRBCs 0.0 10e3/uL     No results found for: \"PHENYTOIN\", \"PHENOBARB\", \"VALPROATE\", \"CBMZ\"       ASSESSMENT   Consult placed to psychiatry regarding management of bipolar.  Occurring in the setting of recent admission to inpatient psychiatry at [facility] for substance intoxication, psychosis and suicidal ideation on 10/24.  During course of hospitalization, stabilized on psychiatric medications.  On 11/8, felt to be at baseline and plans in place for patient [facility] to [facility] but transferred to [facility] for management of headache.    Risk assessment performed in detail.  No imminent risk identified.  Does not meet criteria for 72 hold.  Does not demonstrate imminent risk to self or others.  Patient sees self as baseline.  When medically stable, support recommendation is to resume discharge plans as coordinated by inpatient psychiatry for placement to [facility] and continue medications to target symptoms of mood and psychosis.       DIAGNOSIS   Principal Problem:    Schizoaffective disorder, bipolar type (H)    Active Problem List:  Patient Active Problem List   Diagnosis    Bilateral carpal tunnel syndrome    KANE (generalized anxiety disorder)    Chronic pain syndrome    Hyperglycemia    Hypertriglyceridemia    Lung nodule    Major depressive disorder, recurrent (H)    " Mild intermittent asthma without complication    Tobacco use disorder    Displacement of lumbar intervertebral disc without myelopathy    History of ADHD    History of suicide attempt    Chronic right-sided low back pain with right-sided sciatica    Psychosis (H)    Suicidal behavior without attempted self-injury    Methadone use disorder, severe, dependence (H)    Polysubstance abuse (H)    Moderate opioid dependence in early remission (H)    Methamphetamine use disorder, severe, dependence (H)    Hx of sepsis    Hx of intravenous drug use in remission    Suicidal ideation    Paranoia (H)    Schizoaffective disorder, bipolar type (H)    Allergic reaction    Meningitis          RECOMMENDATIONS   Target psychiatric symptoms and interventions:  Education:  Risks, benefits, and alternatives discussed at length with patient.     Safety/Supervision/Disposition:  Legal Status:  Voluntary  Precautions placed:   Routine; Droplet Precautions removed at time of visit.  1:1 sitter  Placement:  [facility] when cleared medically    Medication Recommendations:   PTA Psychiatric Medications reviewed.  Lithobid: Continue PTA Lithobid 900 mg nightly to target mood.  Seroquel: Continue PTA Seroquel 500 mg at bedtime and 100 mg twice daily for mood and psychosis.  Prozac: Continue PTA Prozac 40 mg daily for depression and anxiety.  Neurontin: Continue PTA gabapentin 600 mg 3 times daily for augmentation.  Strattera: Continue PTA Strattera 80 mg daily for cognition.  Prazosin: Continue PTA prazosin 1 mg nightly for trauma-related insomnia.  Parameters in place.  Ativan: Continue as needed Ativan 1 mg every 4 hours, anxiety while hospitalized; discontinue at time of discharge.    Care Coordination:  Treatment plan discussed directly with staff.  Please reconsult with Psychiatry as needed.         [facility] Patient able to contract for safety and Patient on precautions    Total encounter time:  A total  of  63  minutes spent related to chart review, history and exam, documentation   and further activities as noted above    This note was created with help of Dragon dictation system. Grammatical / typing errors are not intentional.        Lawrence Dominguez MD - 11/11/2024  - 12:14 PM  Consult/Liaison Psychiatry   [facility]

## 2024-11-11 NOTE — PLAN OF CARE
Goal Outcome Evaluation:      Plan of Care Reviewed With: patient          Outcome Evaluation: Return to [facility] treatn    LUCIO SALVADOR  [facility]  INPATIENT CARE COORDINATION

## 2024-11-11 NOTE — PHARMACY-ADMISSION MEDICATION HISTORY
Pharmacy Vancomycin Note  Date of Service November 10, 2024  Patient's  1975   49 year old, male    Indication: Meningitis  Day of Therapy: 2  Current vancomycin regimen:  1500 mg IV q8h  Current vancomycin monitoring method: Trough (Method 1 = dosing nomogram)  Current vancomycin therapeutic monitoring goal: 15-20 mg/L      Current estimated CrCl = Estimated Creatinine Clearance: 145.3 mL/min (based on SCr of 0.88 mg/dL).    Creatinine for last 3 days  2024:  6:10 PM Creatinine 1.28 mg/dL  2024:  5:28 AM Creatinine 1.28 mg/dL  11/10/2024: 10:37 AM Creatinine 0.88 mg/dL    Recent Vancomycin Levels (past 3 days)  11/10/2024:  5:06 PM Vancomycin 21.5 ug/mL    Vancomycin IV Administrations (past 72 hours)                     vancomycin (VANCOCIN) 1,500 mg in 0.9% NaCl 265 mL intermittent infusion (mg) 1,500 mg New Bag 11/10/24 1807     1,500 mg New Bag  1041     1,500 mg New Bag  0256     1,500 mg New Bag 24 2059    vancomycin (VANCOCIN) 1,500 mg in 0.9% NaCl 250 mL intermittent infusion (mg) 1,500 mg New Bag 24 0953    vancomycin (VANCOCIN) 2,500 mg in sodium chloride 0.9 % 575 mL intermittent infusion (mg) 2,500 mg New Bag 24 0139                    Nephrotoxins and other renal medications (From now, onward)      Start     Dose/Rate Route Frequency Ordered Stop    24 220  lithium (ESKALITH CR/LITHOBID) CR tablet 900 mg        Note to Pharmacy: PTA Sig:Take 2 tablets (900 mg) by mouth at bedtime.      900 mg Oral AT BEDTIME 24 1714      24  acyclovir (ZOVIRAX) 1,000 mg in sodium chloride 0.9 % 270 mL intermittent infusion         10 mg/kg × 100.1 kg (Adjusted)  270 mL/hr over 1 Hours Intravenous EVERY 8 HOURS 24 1649      24 1800  vancomycin (VANCOCIN) 1,500 mg in 0.9% NaCl 265 mL intermittent infusion         1,500 mg  over 90 Minutes Intravenous EVERY 8 HOURS 24 1649      24 1736  ketorolac (TORADOL) injection 30 mg         30 mg  Intravenous EVERY 6 HOURS PRN 11/09/24 1736 11/14/24 1735               Contrast Orders - past 72 hours (72h ago, onward)      None            Interpretation of levels and current regimen:  Vancomycin level is reflective of supratherapeutic level.  Trough level drawn 6.5 hours after infusion    Has serum creatinine changed greater than 50% in last 72 hours: No but improving     Urine output:  unable to determine    Renal Function: Stable    I    Plan:  Decrease Dose to 1250 mg q8h  Vancomycin monitoring method: Trough (Method 2 = manual dose calculation)  Vancomycin therapeutic monitoring goal: 15-20 mg/L  Pharmacy will check vancomycin levels as appropriate in 1-3 Days.  Serum creatinine levels will be ordered every 48 hours.    Rory Treadwell, [facility]

## 2024-11-11 NOTE — PROGRESS NOTES
[facility]  Hospitalist Progress Note  Petr Cadet MD  11/11/2024    Assessment & Plan   Perry Preciado Jr. is a 49 year old male with significant psychiatric and pain medication history.  He was admitted at [facility] inpatient psychiatry unit initially admitted on 10/29/24 for Maxine, suicide ideations and recent relapse onto meth and fentanyl.  He was transferred to [facility] mental health unit.  His medical history notable for major depressive disorder, ADHD, polysubstance use disorder.  Medicine was consulted for rash.     On 11/8 he complained about the worse headache of his life involving also the back of the neck.  A code stroke was called and he had a stroke work up that was largely negative including CT head/ CTA head and neck.  MRI also did not disclose any recent infarction.  Due to photophobia, Kernig's sign, poor oral intake, severe pain, patient will be admitted fase:     In the ER, WBC is up 11.1 11/8 >> 4.5 on 11/10, WBC is 30 K Hgh 14.5 >>12.7.  UA normal         ## Headache  ## Rule out meningitis  Patient was started on IV antibiotics including vancomycin and ceftriaxone as well as acyclovir.    LP completed with 12 wbc and 9 rbc with only 4% neutrophils  Meningitis and encephalitis panel negative  ID following and stopped all antibiotics  Follow up Procalciton elevated 17.6 but patient without a fever  Elevated wbc but patient also getting decadron which will be discontinued  Pain control with oxycodone and Toradol with addition of ativan for anxiety     ## Chronic pain  Continue Subutex  On benadryl and atarax for pain adjuvant   Methocarbamol for neck pain     ## Major depression  ## Hx of ADHD  ## KANE  ## PTSD  Continue Fluoxetine    Continue Neurontin  Continue Lithium  Continue Prazosin  Continue Seroquel 100 mg BID and  bedtime Seroqeul  500 mg.    Has atarax for anxiety, ativan for breakthrough     ## Hyperlipidemia  Continue Lipitor    ## Nicotine  "dependence  Nicotine patch       Diet: Combination Diet Regular Diet Adult    DVT Prophylaxis: Pneumatic Compression Devices  Tran Catheter: Not present  Lines: None     Cardiac Monitoring: None  Code Status: Full Code          Clinically Significant Risk Factors Present on Admission     # Hypertension: Home medication list includes antihypertensive(s)            # Obesity: Estimated body mass index is 34.03 kg/m  as calculated from the following:    Height as of 11/8/24: 1.905 m (6' 3\").    Weight as of 11/8/24: 123.5 kg (272 lb 4.3 oz).       # Financial/Environmental Concerns:    # Asthma: noted on problem list         Disposition Plan  Medically Ready for Discharge:     Disposition:   -- anticipate [facility] to [facility] at discharge, patient needs to be off ativan and oxycodone for at least 24 hours    Interval History   -- discussed with RN  -- having breakthrough anxiety  [facility]    -Data reviewed today: I reviewed all new labs and imaging over the last 24 hours. I personally reviewed no images or EKG's today.    Physical Exam    , Blood pressure (!) 148/92, pulse 64, temperature 98  F (36.7  C), temperature source Oral, resp. rate 16, SpO2 94%.  There were no vitals filed for this visit.  Vital Signs with Ranges  Temp:  [97.7  F (36.5  C)-98.4  F (36.9  C)] 98  F (36.7  C)  Pulse:  [60-72] 64  Resp:  [14-20] 16  BP: (113-148)/(62-92) 148/92  SpO2:  [93 %-95 %] 94 %  I/O's Last 24 hours  I/O last 3 completed shifts:  In: 720 [P.O.:720]  Out: -     Constitutional: Awake, alert, less anxious then  yesterday  Respiratory: Clear to auscultation bilaterally, no crackles or wheezing  Cardiovascular: Regular rate and rhythm, normal S1 and S2   GI: Normal bowel sounds, soft, non-distended, non-tender  Skin/Integumen: No rashes, no cyanosis, no edema  Other:      Medications   All medications were reviewed.  Current Facility-Administered Medications   Medication Dose " Route Frequency Provider Last Rate Last Admin     Current Facility-Administered Medications   Medication Dose Route Frequency Provider Last Rate Last Admin    atomoxetine (STRATTERA) capsule 80 mg  80 mg Oral Daily Petr Cadet MD   80 mg at 11/11/24 0824    atorvastatin (LIPITOR) tablet 20 mg  20 mg Oral QPM Petr Cadet MD   20 mg at 11/10/24 2014    buprenorphine (SUBUTEX) sublingual tablet 8 mg  8 mg Sublingual BID Steve Dhillon MD   8 mg at 11/11/24 0823    And    buprenorphine (SUBUTEX) sublingual tablet 4 mg  4 mg Sublingual Q24H Steve Dhillon MD   4 mg at 11/10/24 1510    FLUoxetine (PROzac) capsule 40 mg  40 mg Oral Daily Petr Cadet MD   40 mg at 11/11/24 0823    gabapentin (NEURONTIN) tablet 600 mg  600 mg Oral TID Petr Cadet MD   600 mg at 11/11/24 0836    lithium (ESKALITH CR/LITHOBID) CR tablet 900 mg  900 mg Oral At Bedtime Petr Cadet MD   900 mg at 11/10/24 2101    methocarbamol (ROBAXIN) tablet 500 mg  500 mg Oral 4x Daily Petr Cadet MD        multivitamin w/minerals (THERA-VIT-M) tablet 1 tablet  1 tablet Oral Daily Petr Cadet MD   1 tablet at 11/11/24 0823    prazosin (MINIPRESS) capsule 1 mg  1 mg Oral At Bedtime Petr Cadet MD   1 mg at 11/10/24 2101    QUEtiapine (SEROquel) tablet 100 mg  100 mg Oral BID Petr Cadet MD   100 mg at 11/11/24 0823    QUEtiapine (SEROquel) tablet 500 mg  500 mg Oral At Bedtime Petr Cadet MD   500 mg at 11/10/24 2146    sodium chloride (PF) 0.9% PF flush 3 mL  3 mL Intracatheter Q8H Petr Cadet MD   3 mL at 11/11/24 0922        Data   Recent Labs   Lab 11/11/24  0839 11/10/24  1037 11/09/24  0528 11/08/24  2335 11/08/24  1810   WBC 19.6* 26.8* 30.0*  --  11.1*   HGB 11.9* 12.0* 12.7*  --  14.5   MCV 91 90 89  --  92    221 219  --  217   INR  --   --   --   --  0.95   NA  --  140 136  --  139   POTASSIUM  --  4.1 4.5  --  5.0   CHLORIDE  --  105 99   --  99   CO2  --  24 25  --  29   BUN  --  19.2 25.0*  --  21.5*   CR 0.95 0.88 1.28*  --  1.28*   ANIONGAP  --  11 12  --  11   DORENE  --  8.8 8.9  --  9.4   GLC  --  379* 144* 147* 105*   ALBUMIN  --  3.8 3.7  --   --    PROTTOTAL  --  6.9 6.4  --   --    BILITOTAL  --  0.2 0.3  --   --    ALKPHOS  --  85 57  --   --    ALT  --  30 37  --   --    AST  --  20 33  --   --        No results found for this or any previous visit (from the past 24 hours).    Petr Cadet MD  Text Page  (7am to 6pm)

## 2024-11-11 NOTE — PROGRESS NOTES
Date & Time: 11/11/2024 [id number]  Behavior & Aggression: green   Fall Risk: no steady ind  Orientation:A&O 3-4 disoriented to time at times   ABNL VS/O2:VSS on RA except HTN   ABNL Labs: see chart   Pain Management:PRN Tylenol x2 , Oxycodone   Bowel/Bladder: Continent of B/B  Drains: PIV SL   Wounds/incisions  Diet:Regular   Activity Level: Ind   Tests/Procedures:   Anticipated  DC Date: 11/11/2024 to [place]   Significant Information:

## 2024-11-11 NOTE — PROGRESS NOTES
Patient has tested negative for Meningitis panel. Removing from droplet precautions since infectious agents have been ruled out that need droplet precautions. Bakari Glover, Infection Prevention on 11/11/2024 at 7:48 AM

## 2024-11-11 NOTE — PROGRESS NOTES
[facility]  Infectious Disease Progress Note          Assessment and Plan:   Date of Admission:  11/9/2024  Date of Consult (When I saw the patient): 11/10/24        Assessment & Plan  Perry Preciado Jr. is a 49 year old male who was admitted on 11/9/2024.      Impression: 1 49-year-old male, transferred from [place] psychiatry, with acute fever and headache, has had some degree of headache and chronic neurologic symptoms and dizziness but definitely more acute symptoms currently, so far micro is negative, lumbar puncture unimpressive but did have 12 white cells present primarily monocytes thus at least some meningeal inflammation  2 polysubstance abuse and psychiatric underlying issues     REC 1  fever resolved, some degree of ongoing headache, looks clinically okay.  2   Minor CSF abnormality not suggestive of any significant infection, fortunately enough CSF left to do the full micro panel which was negative, doubt CNS infection DC all  antimicrobials  3  will get procalcitonin for completeness  and if he has recurrent fever expand workup, if headache continues question neural workup for other but MRI negative and not obvious cause              Interval History:     no new complaints  still some degree of headache, slight dizziness but no fever or illness symptoms CSF completely negative studies, fevers resolved blood culture negative nothing to suggest any focal infection site              Medications:     Current Facility-Administered Medications   Medication Dose Route Frequency Provider Last Rate Last Admin    atomoxetine (STRATTERA) capsule 80 mg  80 mg Oral Daily Petr Cadet MD   80 mg at 11/11/24 0824    atorvastatin (LIPITOR) tablet 20 mg  20 mg Oral QPM Petr Cadet MD   20 mg at 11/10/24 2014    buprenorphine (SUBUTEX) sublingual tablet 8 mg  8 mg Sublingual BID Steve Dhillon MD   8 mg at 11/11/24 0823    And    buprenorphine (SUBUTEX) sublingual  tablet 4 mg  4 mg Sublingual Q24H Steve Dhillon MD   4 mg at 11/10/24 1510    dexAMETHasone (DECADRON) injection 10 mg  10 mg Intravenous Q6H Stvee Dhillon MD   10 mg at 11/11/24 0636    FLUoxetine (PROzac) capsule 40 mg  40 mg Oral Daily Petr Cadet MD   40 mg at 11/11/24 0823    gabapentin (NEURONTIN) tablet 600 mg  600 mg Oral TID Petr Cadet MD   600 mg at 11/11/24 0836    lithium (ESKALITH CR/LITHOBID) CR tablet 900 mg  900 mg Oral At Bedtime Petr Cadet MD   900 mg at 11/10/24 2101    multivitamin w/minerals (THERA-VIT-M) tablet 1 tablet  1 tablet Oral Daily Petr Cadet MD   1 tablet at 11/11/24 0823    prazosin (MINIPRESS) capsule 1 mg  1 mg Oral At Bedtime Petr Cadet MD   1 mg at 11/10/24 2101    QUEtiapine (SEROquel) tablet 100 mg  100 mg Oral BID Petr Cadet MD   100 mg at 11/11/24 0823    QUEtiapine (SEROquel) tablet 500 mg  500 mg Oral At Bedtime Petr Cadet MD   500 mg at 11/10/24 2146    sodium chloride (PF) 0.9% PF flush 3 mL  3 mL Intracatheter Q8H Petr Cadet MD   3 mL at 11/11/24 0922                  Physical Exam:   Blood pressure (!) 148/92, pulse 72, temperature 97.8  F (36.6  C), temperature source Oral, resp. rate 16, SpO2 93%.  Wt Readings from Last 2 Encounters:   11/08/24 123.5 kg (272 lb 4.3 oz)   07/11/24 123.6 kg (272 lb 6.4 oz)     Vital Signs with Ranges  Temp:  [97.7  F (36.5  C)-98.4  F (36.9  C)] 97.8  F (36.6  C)  Pulse:  [60-72] 72  Resp:  [14-20] 16  BP: (113-148)/(62-92) 148/92  SpO2:  [93 %-95 %] 93 %    Constitutional: Awake, alert, cooperative, no apparent distress   mildly anxious but otherwise looks okay     Lungs: Clear to auscultation bilaterally, no crackles or wheezing   Cardiovascular: Regular rate and rhythm, normal S1 and S2, and no murmur noted   Abdomen: Normal bowel sounds, soft, non-distended, non-tender   Skin: No rashes, no cyanosis, no edema   Other:           Data:   All  "microbiology laboratory data reviewed.  Recent Labs   Lab Test 11/11/24  0839 11/10/24  1037 11/09/24  0528   WBC 19.6* 26.8* 30.0*   HGB 11.9* 12.0* 12.7*   HCT 35.7* 35.4* 37.7*   MCV 91 90 89    221 219     Recent Labs   Lab Test 11/10/24  1037 11/09/24  0528 11/08/24  1810   CR 0.88 1.28* 1.28*     No lab results found.  No lab results found.    Invalid input(s): \"UC\"     "

## 2024-11-11 NOTE — DISCHARGE SUMMARY
"Psychiatric Discharge Summary    Perry Preciado Jr. MRN# [id number]   Age: 49 year old YOB: 1975     Date of Admission:  10/24/2024  Date of Discharge:  11/8/2024 11:34 PM  Admitting Physician:  Manjinder Pascual MD  Discharge Physician:  Temi Crockett MD (Contact: [phone])         Event Leading to Hospitalization:   Per H&P:    Perry Preciado [facility], psychosis and SI with plan. While in the ED, the patient exhibited signs and symptoms suggestive of substance intoxication, psychosis and SI, plan and intent. Routine labs were performed and were remarkable for +UDS for cannabinoids, amphetamines, benzodiazepine (Versed administered in the ED). He did not exhibit violent/aggressive behaviors, and did not require restraints/seclusion. Pt admitted under a voluntary status.     Per ED Provider Note dated 10/24/24:     \"Perry Preciado Jr. is a 49 year old male with history of MDD, KANE, ADHD, polysubstance use disorder, TBI [facility] [place]  Patient reports he recently relapsed on methamphetamines and opiates.  He feels depressed and anxious about this.  He endorses suicidal ideation since he feels like he would be better off dead.  He reports a plan to cut his wrists or run into traffic.  He reports severe depression because he feels like his life is a failure.  He endorses frequent paranoia.\"           Per [facility] Assessment dated 10/25/24:      \"Perry Preciado Jr. presents to the ED via EMS. Patient is presenting to the ED for the following concerns: Paranoia, Suicidal ideation.   Factors that make the mental health crisis life threatening or complex are:  Pt arrived at the ED via EMS. Pt reporting paranoia with auditory and visual hallucinations. Pt reports these hallucinations are demons or shadows that say, \"they are going to kill pt\". Pt " "also endorses SI, does not have specific plan but states intent and if he left \"he would kill himself\".Pt expressed feeling anxious, depressed, some irritability, short tempered, and lack of motivation. Pt reports they stopped taking medication about a week ago. Pt reported sleep was  ok  and when asked about appetite, pt said  not really   Pt has a Hx of 2 suicide attempts which pt states were impulsive act where the pt had a thought and acted on it immediately. Pt denies HI and SIB. Pt was sober for 11 months and began using Meth a week ago. Pt reporting living in an IRTS facility and transiting about three weeks ago to a boarding house. Pt has DxHx of MDD, KANE, ADHD, Polysub, and TBI. \"           Per Psychiatry ED Consult:     Perry Preciado Jr. is a 49 year old male with a history of MDD, KANE, TBI, substance abuse here with SI with plan to cut his wrists or run into traffic. SI started when he relapsed on methamphetamine and fentanyl about a week ago after 11 months sobriety. He has been off medications for about 10 days, says he started forgetting to take them. He feels \"embarrassed\" about relapsing. He has been feeling depressed, has little motivation, poor appetite, sleep has been \"ok\", feels more anxious than usual. He has been paranoid and hallucinating. He sees shadows which he feels are demons threatening to kill him. Symptoms started about a week ago. He had also recently been taking strattera 80 mg daily, gabapentin 600 mg TID, hydroxyzine 100 mg TID PRN, propranolol 10 mg TID. Has been hospitalized before, most recent a year ago. History of 2 suicide attempts.     He came in last night via EMS from sober living. At one point got agitated and got versed, olanzapine and haldol. He became very somnolent with those medications. When I tried to see him around 16:45 I was unable to wake him. An hour later he was able to stay awake and have a conversation but will still pretty somnolent. He reports that he " "feels the same now as he did when he came in. Outpatient meds have been restarted including fluoxetine 20 mg daily, seroquel 50 mg BID and 100 mg at bedtime (seroquel dose was 50 mg BID and 400 mg at bedtime when he stopped taking it). UDS ordered but hasn't been collected yet.        ------------------------------------  Upon interview with patient,   Kb was seen in his room today, he initially was observed paying comfortably in bed, appearing somewhat anxious.   He reported being in the hospital due to recent substance use and SI with plan. He Shares over past 3 weeks been struggling with obtaining his Subotex, leading to cravings and eventually relapsing on methamphetamines, sharing this may had been laced with fentanyl. Reports last use was 10/24 before coming into the hospital.     He reports chronic hx of psychosis including AH and VH at a young age, dating back to childhood (5-5 yo). He shares he has \"always\" experienced AH that are worst when experiencing depressive episodes even when not using substances or when in more sustained substance use remission.   He reports hx of at least 1 clear manic episode about 1.5-2 years ago.      He reports over past 3 weeks has experienced worsened depressive symptoms as well as well as baseline AH, this in the context of substance use and medication non-adherence.   He shares feels \"ashamed\" about relapsing as he has been \"sober\" for a couple months.   He began experiencing suicidal ideation, driven by the above contributing factors.  Since that time, it has gradually worsened. He began contemplating acting on suicidal thoughts and had plan of cutting his wrists or walking into traffic as he feels like MH/AMOS burden had been unmanageable, and  and decided to seek help, being brought to the ED via EMS.     He does have a history of suicide attempts. Today he reports persisting SI but no plan or intent.   Main concern at the moment is withdrawal-like symptoms that includes " "heightened anxiety, body-aches, cephalea, irritability, diaphoresis, nausea  and request help managing these symptoms. Currently kaylan for safety, denies HI, denies hx of violence.     Review of PTA meds:  Reports was taking Li 450 mg 2 tabs at bedtime, seroquel 50 mg BID + 400 mg at bedtime. Gabapentin 600 mg TID PRN.         Per staff:   COWS scale score was 6 this AM      Kb  noted the following stressors/symptoms: depressed mood, anxiety, irritablility, sleep disturbance, JAQUELINE. Patient open to med management changes recommendations.      Patient has been using the following substances: cannabis \"2-3 x per week\",  methamphetamine smoked, unclear qtty, ~3 times/week over past 3 weeks. Denies any IV use, denies other substance use that includes alcohol.      Goals for hospitalization include: Reduced Symptoms, Increased Psychosocial Support, medication adjustments and monitor Effects, and planning next steps including CD treatment/IRTS although preference would be group home with a day-treatment for JAQUELINE.         For CD only:   Denies Hx of overdose, IV use, hepatitis.      Patient has tolerance, withdrawal, progressive use, loss of control, spending more time and more amount than intended. Patient has made attempts to quit, is experiencing cravings, and reports negative consequences.     Patient denies having a history of seizures.       See Admission note by Temi Crockett MD on 10/30/24 for additional details.          Diagnoses:     Schizoaffective Disorder, Bipolar Type  Stimulant use disorder (with +UDS for methamphetamine)  PTSD, historical   ADHD, historical   Toxic Encephalopathy, resolved   Severe HA and possible meningitis         Labs:     Recent Results (from the past 8 weeks)   Urine Drug Screen Panel    Collection Time: 10/26/24 11:45 AM   Result Value Ref Range    Amphetamines Urine Screen Positive (A) Screen Negative    Barbituates Urine Screen Negative Screen Negative    Benzodiazepine Urine " Screen Positive (A) Screen Negative    Cannabinoids Urine Screen Positive (A) Screen Negative    Cocaine Urine Screen Negative Screen Negative    Fentanyl Qual Urine Screen Negative Screen Negative    Opiates Urine Screen Negative Screen Negative    PCP Urine Screen Negative Screen Negative   Comprehensive metabolic panel    Collection Time: 10/30/24  7:47 PM   Result Value Ref Range    Sodium 139 135 - 145 mmol/L    Potassium 4.8 3.4 - 5.3 mmol/L    Carbon Dioxide (CO2) 26 22 - 29 mmol/L    Anion Gap 11 7 - 15 mmol/L    Urea Nitrogen 15.5 6.0 - 20.0 mg/dL    Creatinine 1.07 0.67 - 1.17 mg/dL    GFR Estimate 86 >60 mL/min/1.73m2    Calcium 9.6 8.8 - 10.4 mg/dL    Chloride 102 98 - 107 mmol/L    Glucose 139 (H) 70 - 99 mg/dL    Alkaline Phosphatase 114 40 - 150 U/L    AST 32 0 - 45 U/L    ALT 43 0 - 70 U/L    Protein Total 7.2 6.4 - 8.3 g/dL    Albumin 3.9 3.5 - 5.2 g/dL    Bilirubin Total 0.2 <=1.2 mg/dL   TSH with free T4 reflex    Collection Time: 10/30/24  7:47 PM   Result Value Ref Range    TSH 0.83 0.30 - 4.20 uIU/mL   Lithium level    Collection Time: 11/04/24  8:45 AM   Result Value Ref Range    Lithium 0.88 0.60 - 1.20 mmol/L   Extra Purple Top Tube    Collection Time: 11/04/24  8:45 AM   Result Value Ref Range    Hold Specimen [facility]    CBC with platelets    Collection Time: 11/08/24  6:10 PM   Result Value Ref Range    WBC Count 11.1 (H) 4.0 - 11.0 10e3/uL    RBC Count 4.70 4.40 - 5.90 10e6/uL    Hemoglobin 14.5 13.3 - 17.7 g/dL    Hematocrit 43.0 40.0 - 53.0 %    MCV 92 78 - 100 fL    MCH 30.9 26.5 - 33.0 pg    MCHC 33.7 31.5 - 36.5 g/dL    RDW 12.9 10.0 - 15.0 %    Platelet Count 217 150 - 450 10e3/uL   Partial thromboplastin time    Collection Time: 11/08/24  6:10 PM   Result Value Ref Range    aPTT 33 22 - 38 Seconds   INR    Collection Time: 11/08/24  6:10 PM   Result Value Ref Range    INR 0.95 0.85 - 1.15   Basic metabolic panel    Collection Time: 11/08/24  6:10 PM   Result Value Ref Range    Sodium  139 135 - 145 mmol/L    Potassium 5.0 3.4 - 5.3 mmol/L    Chloride 99 98 - 107 mmol/L    Carbon Dioxide (CO2) 29 22 - 29 mmol/L    Anion Gap 11 7 - 15 mmol/L    Urea Nitrogen 21.5 (H) 6.0 - 20.0 mg/dL    Creatinine 1.28 (H) 0.67 - 1.17 mg/dL    GFR Estimate 69 >60 mL/min/1.73m2    Calcium 9.4 8.8 - 10.4 mg/dL    Glucose 105 (H) 70 - 99 mg/dL   Troponin T, High Sensitivity    Collection Time: 11/08/24  6:10 PM   Result Value Ref Range    Troponin T, High Sensitivity 8 <=22 ng/L   Extra Red Top Tube    Collection Time: 11/08/24  6:10 PM   Result Value Ref Range    Hold Specimen [facility]    CRP inflammation    Collection Time: 11/08/24  6:10 PM   Result Value Ref Range    CRP Inflammation 4.79 <5.00 mg/L   Treponema Abs w Reflex to RPR and Titer    Collection Time: 11/08/24  6:10 PM   Result Value Ref Range    Treponema Antibody Total Nonreactive Nonreactive   Extra Green Top (Lithium Heparin) Tube    Collection Time: 11/08/24  6:28 PM   Result Value Ref Range    Hold Specimen [facility]    Blood Culture Hand, Left    Collection Time: 11/08/24  9:49 PM    Specimen: Hand, Left; Blood   Result Value Ref Range    Culture No growth after 2 days    Glucose by meter    Collection Time: 11/08/24 11:35 PM   Result Value Ref Range    GLUCOSE BY METER POCT 147 (H) 70 - 99 mg/dL   Comprehensive metabolic panel    Collection Time: 11/09/24  5:28 AM   Result Value Ref Range    Sodium 136 135 - 145 mmol/L    Potassium 4.5 3.4 - 5.3 mmol/L    Carbon Dioxide (CO2) 25 22 - 29 mmol/L    Anion Gap 12 7 - 15 mmol/L    Urea Nitrogen 25.0 (H) 6.0 - 20.0 mg/dL    Creatinine 1.28 (H) 0.67 - 1.17 mg/dL    GFR Estimate 69 >60 mL/min/1.73m2    Calcium 8.9 8.8 - 10.4 mg/dL    Chloride 99 98 - 107 mmol/L    Glucose 144 (H) 70 - 99 mg/dL    Alkaline Phosphatase 57 40 - 150 U/L    AST 33 0 - 45 U/L    ALT 37 0 - 70 U/L    Protein Total 6.4 6.4 - 8.3 g/dL    Albumin 3.7 3.5 - 5.2 g/dL    Bilirubin Total 0.3 <=1.2 mg/dL   CBC with platelets    Collection Time:  11/09/24  5:28 AM   Result Value Ref Range    WBC Count 30.0 (H) 4.0 - 11.0 10e3/uL    RBC Count 4.23 (L) 4.40 - 5.90 10e6/uL    Hemoglobin 12.7 (L) 13.3 - 17.7 g/dL    Hematocrit 37.7 (L) 40.0 - 53.0 %    MCV 89 78 - 100 fL    MCH 30.0 26.5 - 33.0 pg    MCHC 33.7 31.5 - 36.5 g/dL    RDW 13.3 10.0 - 15.0 %    Platelet Count 219 150 - 450 10e3/uL   Bilirubin direct    Collection Time: 11/09/24  5:28 AM   Result Value Ref Range    Bilirubin Direct <0.20 0.00 - 0.30 mg/dL   UA with Microscopic reflex to Culture    Collection Time: 11/09/24  5:57 AM    Specimen: Urine, Midstream   Result Value Ref Range    Color Urine Light Yellow Colorless, Straw, Light Yellow, Yellow    Appearance Urine Clear Clear    Glucose Urine Negative Negative mg/dL    Bilirubin Urine Negative Negative    Ketones Urine Negative Negative mg/dL    Specific Gravity Urine 1.024 1.003 - 1.035    Blood Urine Negative Negative    pH Urine 6.0 5.0 - 7.0    Protein Albumin Urine Negative Negative mg/dL    Urobilinogen Urine Normal Normal, 2.0 mg/dL    Nitrite Urine Negative Negative    Leukocyte Esterase Urine Negative Negative    RBC Urine <1 <=2 /HPF    WBC Urine <1 <=5 /HPF    Squamous Epithelials Urine <1 <=1 /HPF   First-voided urine-Chlamydia trachomatis/Neisseria gonorrhoeae by PCR    Collection Time: 11/09/24  6:15 AM    Specimen: Urine, Voided   Result Value Ref Range    Chlamydia Trachomatis Negative Negative    Neisseria gonorrhoeae Negative Negative   Glucose CSF:    Collection Time: 11/09/24  8:01 PM   Result Value Ref Range    Glucose  (H) 40 - 70 mg/dL   Protein total CSF:    Collection Time: 11/09/24  8:01 PM   Result Value Ref Range    Protein total CSF 43.4 15.0 - 45.0 mg/dL   Cerebrospinal fluid Aerobic Bacterial Culture Routine With Gram Stain    Collection Time: 11/09/24  8:01 PM    Specimen: Lumbar Puncture; Cerebrospinal fluid   Result Value Ref Range    Culture No growth, less than 1 day     Gram Stain Result No organisms  seen     Gram Stain Result 3+ WBC seen    Legionella Urinary Antigen and Streptococcus pneumoniae antigen    Collection Time: 11/09/24  8:01 PM    Specimen: Lumbar Puncture; Cerebrospinal fluid   Result Value Ref Range    Streptococcus pneumoniae antigen Negative Negative    Legionella pneumophila Urinary/Strep pneumoniae Antigen Specimen Type Cerebrospinal fluid    Cryptococcus antigen CSF Tube 2    Collection Time: 11/09/24  8:01 PM    Specimen: Lumbar Puncture; Cerebrospinal fluid   Result Value Ref Range    Cryptococcal Antigen Negative Negative    Cryptococcal Antigen Specimen Type Cerebrospinal fluid    HSV Types 1 and 2 Qualitative PCR CSF    Collection Time: 11/09/24  8:01 PM    Specimen: Lumbar Puncture; Cerebrospinal fluid   Result Value Ref Range    Herpes Simplex Virus 1 DNA - CSF Not Detected Not Detected    Herpes Simplex Virus 2 DNA - CSF Not Detected Not Detected   Cell Count CSF    Collection Time: 11/09/24  8:01 PM   Result Value Ref Range    Tube Number 4     Color Colorless Colorless    Clarity Clear Clear    Total Nucleated Cells 12 (H) 0 - 5 /uL    RBC Count 9 (H) 0 - 2 /uL   Differential CSF    Collection Time: 11/09/24  8:01 PM   Result Value Ref Range    % Neutrophils 4 %    % Lymphocytes 26 %    % Monocytes/Macrophages 70 %   Fungal or Yeast Culture Routine    Collection Time: 11/09/24  8:01 PM    Specimen: Lumbar Puncture; Cerebrospinal fluid   Result Value Ref Range    Culture No growth after 1 day    Meningitis/Encephalitis Panel Qual PCR CSF:    Collection Time: 11/09/24  8:01 PM   Result Value Ref Range    Escherichia coli K1 Negative Negative    Haemophilus influenzae Negative Negative    Listeria monocytogenes Negative Negative    Neisseria meningitidis Negative Negative    Streptococcus agalactiae (GBS) Negative Negative    Streptococcus pneumoniae Negative Negative    Cytomegalovirus Negative Negative    Enterovirus Negative Negative    Herpes simplex virus 1 Negative Negative     Herpes simplex virus 2 Negative Negative    Human Herpes Virus 6 Negative Negative    Human parechovirus Negative Negative    Varicella zoster virus Negative Negative    Cryptococcus neoformans/gattii Negative Negative   Comprehensive metabolic panel    Collection Time: 11/10/24 10:37 AM   Result Value Ref Range    Sodium 140 135 - 145 mmol/L    Potassium 4.1 3.4 - 5.3 mmol/L    Carbon Dioxide (CO2) 24 22 - 29 mmol/L    Anion Gap 11 7 - 15 mmol/L    Urea Nitrogen 19.2 6.0 - 20.0 mg/dL    Creatinine 0.88 0.67 - 1.17 mg/dL    GFR Estimate >90 >60 mL/min/1.73m2    Calcium 8.8 8.8 - 10.4 mg/dL    Chloride 105 98 - 107 mmol/L    Glucose 379 (H) 70 - 99 mg/dL    Alkaline Phosphatase 85 40 - 150 U/L    AST 20 0 - 45 U/L    ALT 30 0 - 70 U/L    Protein Total 6.9 6.4 - 8.3 g/dL    Albumin 3.8 3.5 - 5.2 g/dL    Bilirubin Total 0.2 <=1.2 mg/dL   CBC with platelets    Collection Time: 11/10/24 10:37 AM   Result Value Ref Range    WBC Count 26.8 (H) 4.0 - 11.0 10e3/uL    RBC Count 3.95 (L) 4.40 - 5.90 10e6/uL    Hemoglobin 12.0 (L) 13.3 - 17.7 g/dL    Hematocrit 35.4 (L) 40.0 - 53.0 %    MCV 90 78 - 100 fL    MCH 30.4 26.5 - 33.0 pg    MCHC 33.9 31.5 - 36.5 g/dL    RDW 13.5 10.0 - 15.0 %    Platelet Count 221 150 - 450 10e3/uL   Vancomycin level    Collection Time: 11/10/24  5:06 PM   Result Value Ref Range    Vancomycin 21.5   ug/mL          Consults:   Consultation during this admission received from internal medicine on 11/7/24:    Assessment & Plan  Perry JOSE Preciado  is a 49 year old male admitted on 10/24/2024. He has a PMH of substance induced psychosis, MDD, polysubstance use, schizophrenia, HLD, neuropathy, KANE, ADHD, TBI. Admitted to psych with SI. Internal medicine is consulted for rash with concern that it was related to suboxone.   _________________________     # Concern for drug related rash   Per consult order, patient has a new pruritic rash which he related to suboxone - started two days ago when he first  "started on suboxone again. Discussed with addiction medicine provider who noted that this is not common but is certainly possible. On chart review, Kb first noted rash related to suboxone in 12/2023. Discussed with pharmacist and he has been prescribed subutex 8 mg BID per PDMP - followed at [facility]. On exam, he has an erythematous purpuric maculopapular rash on anteriomedial arms, posterior low back, anterior legs with surrounding excoriation.   - Pharmacy to run subutex script, ordered   - Subutex per psychiatry   - PRN hydrocortisone ointment  - benadryl q 4 hrs PRN      # MDD   # KANE   # ADHD   # Schizophrenia   # Polysubstance use disorder (nicotine, opioids, meth)  Presented to the ED with suicidal ideation in the setting of recent relapse.   - As managed per psychiatry   - HOLD suboxone for now     # Hypertriglyceridemia  - Continue pta statin      # Neuropathy  - 600mg TID     # Positive HSV1/HSV2 Antibody  - noted     Stroke Telephone Note     I was called by Daniel Castro on 11/08/24 regarding patient Perry GARCIA Ilana Samuels. The patient is a 49 year old male with history significant for substance-induced psychosis, depression, polysubstance abuse, schizophrenia, TBI.  He is admitted for suicidal ideation. Code stroke was activated for acute onset \"worse headache of his life\". Per responding provider, he is also having left facial numbness. These began at 1700. He is not on anticoagulation.  Not a TNK candidate due to minor deficits.     Vitals  BP: (!) 155/88   Pulse: 77   Resp: 18   Temp: 99.5  F (37.5  C)   Weight: 123.1 kg (271 lb 6.4 oz)     Stroke Code Data (for stroke code without tele)  Stroke code activated 11/08/24  1820   Stroke provider first response 11/08/24  1820   Last known normal 11/08/24  1655      Time of discovery (or onset of symptoms) 11/08/24  1700   Head CT read by Stroke Neuro Provider 11/08/24  1851   Was stroke code de-escalated? Yes  11/08/24  1905      Imaging " "Findings  CT head: Negative for acute pathology  CTA head/neck: No significant stenosis or LVO.  Incidental left apical consolidative groundglass opacities     Intravenous Thrombolysis  Not given due to:   - minor/isolated/quickly resolving symptoms     Endovascular Treatment  Not initiated due to absence of proximal vessel occlusion     Impression  Acute onset headache and left facial numbness.  Concern for complex migraine versus less likely acute stroke.     Recommendations   - Q4 hour neurochecks  - MRI brain w/wo.  If negative for acute stroke and still having neurologic symptoms, recommend general neurology consultation for further evaluation      Case discussed with vascular neurology attending Dr. Campo.  Recommendations discussed with Daniel Castro.      My recommendations are based on the information provided over the phone by Perry Preciado Jr.'s in-person providers. They are not intended to replace the clinical judgment of his in-person providers. I was not requested to personally see or examine the patient at this time.      Sade Mojica, CNP  Vascular Neurology      Medicine cross-cover note     Paged by nursing that patient had \"worst headache ever\" and vomited x 2.  Immediately told nursing to call rapid response, due to high suspicion of potential hemorrhagic stroke.  Informed by nursing that rapid response provider was at another rapid and unable to come.  Went directly over to station 12.  Spoke with Sade Mojica of code stroke while en route to unit.  Agreed with high concern for stroke.  Assessed patient on unit, reports left-sided facial numbness, and vision changes. Has never had a headache previous to this.  Notes hit his head into the wall around noon. Had rapid onset of head pain around 5 PM.  Ordered code stroke order set.  Stroke code called.  SBP approximately 130mmHg, noted to have fever of 103.      Code stroke provider was tied up with another emergency.  Writer stayed with " ICU fliers for continue management patient. Called CT who informed me that another code stroke was currently using the CT scanner and that there was going to be a delay. Transported patient to CT able to await scan.  Continued to communicate with neurology provider and contacted Dr. Jesus to inquire if any additional interventions are required. CT head/CTA head neck unremarkable for acute stroke.  Recommended MRI with and without contrast.      Returned with patient on unit 12. noted to have positive Kernig sign, and reports neck stiffness.  Additional neurological exam grossly unremarkable, with the exception of poor nose-to-finger testing with left extremity.      With stroke ruled out, primary differential diagnosis is acute meningitis.  Spoke with Dr. Lorelei Huddleston and Dr. Tomy Jesus, who both agreed admission is required. Started antibiotics and steroids.  Acknowledge that patient would not be able to have LP performed on [facility], spoke with patient placement for possible transfer to [place] or [facility] for admission.      Patient placement called back and stated that she was advised to attempt [facility] ICU transfer.  Informed her that the patient will be unable to obtain lumbar puncture on [facility],  Writer called the ICU, spoke with provider and confirmed that they do not perform lumbar punctures at this campus.      Will await call from patient placement, and will work on transferring patient to either [facility] or outside hospital for further treatment of meningitis.     -Continue ceftriaxone  -Continue dexamethasone  -Continue vancomycin  -Monitor vitals   -Neurochecks     Case was discussed with Dr. Tomy Castro PA-C              Hospital Course:   Perry Preciado  was admitted to Station 12 with attending No att. providers found as a voluntary patient. The patient was placed under status 15 (15 minute checks) to ensure patient safety.     Perry Waltonughton is a  49 year old with history of MDD, ADHD, poly-substance use disorder (stimulants, alcohol, opioids) who presented to ED with  in context of worsened  psychosis  SI with plan in context of medications non-adherence due to no access to medications including Subotex, this leading to cravings and relapsing on methamphetamine (reported laced with Fentanyl).      Symptoms and presentation at this time is most consistent with primary psychotic disorder with decompensation in context of medication non-adherence and substance use relapse, per our conversation today (see further details in HPI section above) appears that hx of psychotic symptoms starting at a young age and mood changes are more consistent with schizoaffective disorder.     We have discussed the risks, benefits, and alternatives of recommendations for medication changes (described in the plan section below) to target JAQUELINE and psychotic symptoms. Patient is in agreement with recommendations.      Identified psychosocial stressors include housing instability, unemployment and JAQUELINE. He is motivated to undergoing CD treatment once more stable. Agreeing with CD consult.      Perry Preciado presented to the ED via EMS on 10/24/24  from sober house with apparent substance intoxication with UDS+ for , psychosis and SI with plan.  cannabinoids, amphetamines, benzodiazepine (Versed administered in the ED). He did not exhibit violent/aggressive behaviors, and did not require restraints/seclusion. Pt admitted under a voluntary status.      - 10/30/24: Continued SIO 1:1 due to SI with plan. PTA meds were continued and changes made included: Increased Seroquel to 200 mg PO at bedtime (was taking 400 mg at bedtime, restarted in the ED at 100 mg at bedtime). Restarted PTA Lithium at 600 mg PO at bedtime for mood stabilization (was taking 900 mg PO at bedtime PTA). Ordered baseline TSH and CMP. Continued COWS protocol. Suboxone was increased to total daily dose of 12 mg.  Gabapentin 600 mg PO TID for heightened anxiety/withdrawal sx. CD consult order placed, pt agreed with consult. Lithium level ordered for 11/4/24   - 10/31/24: Increase buprenorphine HCl-naloxone HCl (SUBOXONE) to 8-2 MG film 1 Film sublingual BID. Continue COWS protocol for now and reassess. Increase lithium to 900 mg PO at bedtime. Increase Seroquel to 300 mg PO at bedtime Increase Prozac to 40 mg PO qDay.  - 11/4/24: -Lithium level reviewed today (0.88 mmol/L). Decreased Suboxone to 8-4 mg at 1600 dose (reducing from a total of 24 mg daily to 20 mg daily) due to observed/reported increase daytime sedation. No apparent withdrawal-like symptoms. Recent COWS scores 1-2. Discontinued COWS protocol. Change scheduled gabapentin to receive PO dosing minimum 2 hrs apart from Suboxone dose due to concerns for possible enhancement of Suboxone when co-administered. Increase Seroquel to PTA dose of 400 mg PO at bedtime to better target paranoid thoughts and AH.   - 11/6/24:  Added prazosin 1 mg at bedtime for trauma based nightmares.  - 11/7/24: IM consult placed due to skin rash which pt attributes to Suboxone (held). IM consult completed, appreciate recommendations. Hydrocortisone topical BID, Benadryl PO PRN ordered. Discontinued Suboxone 8-4-8. Started Subutex 8 mg qDay, 4 mg at 2 PM and 8 mg at 6 PM.   - 11/8/24: During day shift, patient appeared to be at baseline. Stable and awaiting placement at [facility]. Concern for meningitis and pt transferred to ICU. See above consult section for details.        Perry Preciado Jr. did participate in groups and was visible in the milieu.     The patient's symptoms of psychosis improved.     Perry Preciado Jr. was released to  ICU . At the time of discharge Perry Preciado Jr. was determined to not be a danger to himself or others.     Recommendations:  - Would consider checking CK to R/O NMS (unlikely but has been receiving moderate doses of two neuroleptics here)  -  When medically cleared, [place] [facility] [place] [facility] [place] staff medically clear him and when bed is available. At the time of my last assessment (9/8), patient was at his psychiatric baseline. Denied SI/HI, future oriented, and cognitively intact.            Discharge Medications:     Discharge Medication List as of 11/8/2024 11:34 PM        START taking these medications    Details   diphenhydrAMINE (BENADRYL) 25 MG capsule Take 1-2 capsules (25-50 mg) by mouth every 4 hours as needed for itching (Reported skin itching.)., Disp-30 capsule, R-0, E-Prescribe      hydrocortisone (CORTAID) 0.5 % external cream Apply topically 2 times daily.Disp-15 g, [id number]      nicotine (COMMIT) 2 MG lozenge Place 1-2 lozenges (2-4 mg) inside cheek every hour as needed for nicotine withdrawal symptoms., Disp-48 lozenge, R-0, E-Prescribe      prazosin (MINIPRESS) 1 MG capsule Take 1 capsule (1 mg) by mouth at bedtime., Disp-30 capsule, R-0, E-Prescribe           CONTINUE these medications which have CHANGED    Details   buprenorphine (SUBUTEX) 2 MG SUBL sublingual tablet Place 4 tablets (8 mg) under the tongue 2 times daily AND 2 tablets (4 mg) daily. Place 4 tablets (8 mg) under the tongue 2 times daily AND 2 tablets (4 mg) daily at 2 PM., Disp-300 tablet, R-0, Local Print      FLUoxetine (PROZAC) 40 MG capsule Take 1 capsule (40 mg) by mouth daily., Disp-30 capsule, R-0, E-Prescribe      gabapentin (NEURONTIN) 600 MG tablet Take 1 tablet (600 mg) by mouth 3 times daily., Disp-90 tablet, R-0, E-Prescribe      lithium (ESKALITH CR/LITHOBID) 450 MG CR tablet Take 2 tablets (900 mg) by mouth at bedtime., Disp-60 tablet, R-0, E-Prescribe      !! QUEtiapine (SEROQUEL) 400 MG tablet Take 1 tablet (400 mg) by mouth at bedtime., Disp-30 tablet, R-0, E-Prescribe      !! QUEtiapine (SEROQUEL) 50 MG tablet Take 1 tablet (50 mg) by mouth 2 times daily., Disp-60 tablet, R-0, E-Prescribe  "      !! - Potential duplicate medications found. Please discuss with provider.        CONTINUE these medications which have NOT CHANGED    Details   atomoxetine (STRATTERA) 80 MG capsule Take 80 mg by mouth daily., Historical      atorvastatin (LIPITOR) 20 MG tablet Take 1 tablet (20 mg) by mouth every evening, Disp-90 tablet, R-1, E-Prescribe      multivitamin w/minerals (THERA-VIT-M) tablet Take 1 tablet by mouth daily, Disp-90 tablet, R-1, E-Prescribe           STOP taking these medications       hydrOXYzine HCl (ATARAX) 50 MG tablet Comments:   Reason for Stopping:         propranolol (INDERAL) 10 MG tablet Comments:   Reason for Stopping:                    Psychiatric Examination:   Appearance: sedated but able to respond to questions  Attitude:  cooperative, appreciative of care  Eye Contact:  good  Mood:  \"pretty good this morning\"  Affect:  appropriate and in normal range for topics discussed   Speech:  clear, coherent  Language: fluent and intact in English  Psychomotor, Gait, Musculoskeletal:  no evidence of tardive dyskinesia, dystonia, or tics  Throught Process:  logical and goal oriented  Associations:  no loose associations  Thought Content:  no evidence of suicidal ideation or homicidal ideation, no auditory hallucinations present, and no visual hallucinations present  Insight:  good  Judgement:  fair, adequate for safety  Oriented to:  time, person, and place  Attention Span and Concentration:  intact  Recent and Remote Memory:  intact  Fund of Knowledge:  appropriate         Discharge Plan:   Transfer to ICU    Health Care Follow-up:      SUBUTEX:  Dec 6th - Fri 1PM  with Howie Deras provider  Devendra Bruce - In Person  [address] [facility]. [facility] [address]  [place] [address]  [phone]  Fax: [phone]     PSYCHIATRY:  Dec 9th - Monday 1PM with [facility] telehealth   If you need to change the appts just call Devendra Bruce ahead of time.   [phone]  Fax: [phone]     Information will " be faxed to your outpatient providers to ensure a healthy continuity of care for you.      Attend all scheduled appointments with your outpatient providers. Call at least 24 hours in advance if you need to reschedule an appointment to ensure continued access to your outpatient providers.      Major Treatments, Procedures and Findings:  You were provided with: a psychiatric assessment, assessed for medical stability, medication evaluation and/or management, group therapy, individual therapy, CD evaluation/assessment, milieu management, and medical interventions     Symptoms to Report: feeling more aggressive, increased confusion, losing more sleep, mood getting worse, or thoughts of suicide     Early warning signs can include: increased depression or anxiety sleep disturbances increased thoughts or behaviors of suicide or self-harm  increased unusual thinking, such as paranoia or hearing voices     Safety and Wellness:  Take all medicines as directed.  Make no changes unless your doctor suggests them.      Follow treatment recommendations.  Refrain from alcohol and non-prescribed drugs.  Ask your support system to help you reduce your access to items that could harm yourself or others. If there is a concern for safety, call 911.     Resources:   Mental Health Crisis Resources  Throughout [place]: call **CRISIS (**120927)  Crisis Text Line: is available for free, 24/7 by texting [place] to 476855  Suicide Awareness Voices of Education (SAVE) (www.save.org): 872-228-SDEW (2509)  The National Suicide Prevention Lifeline is now: 988 Suicide and Crisis Lifeline. Call 988 anytime.  National [place] on Mental Illness (www.[place].naldo.org): 694.768.8528 or 179-918-5029.  Lpeh1ooow: text the word LIFE to 04273 for immediate support and crisis intervention  Mental Health Consumer/Survivor Network of [place] (www.mhcsn.net): 774.704.2826 or 597-719-5692  Mental Health Association of [place] (www.mentalhealth.org): 131.865.7963 or  180.982.5386  Peer Support Connection [place] Warmline (PSC) 1-151.368.3285 Available from 5pm - 9am (7 days a week/365 days a year)  Call or Text 988, [facility] 1-732.598.1592, [facility] 1-416.263.1373 Community Outreach for Psych Emergencies, [facility] 1-459.800.6096 [facility] Crisis [facility], or [facility] 1-738.801.2037     General Medication Instructions:   See your medication sheet(s) for instructions.   Take all medicines as directed.  Make no changes unless your doctor suggests them.   Go to all your doctor visits.  Be sure to have all your required lab tests. This way, your medicines can be refilled on time.  Do not use any drugs not prescribed by your doctor.  Avoid alcohol.     Advance Directives:   Scanned document on file with [employer]? No scanned doc  Is document scanned? Pt states no documents  Honoring Choices Your Rights Handout: Informed and given  Was more information offered? Pt declined     The Treatment team has appreciated the opportunity to work with you. If you have any questions or concerns about your recent admission, you can contact the unit which can receive your call 24 hours a day, 7 days a week. They will be able to get in touch with a Provider if needed. The unit number is [phone] .      Attestation:  The patient has been seen and evaluated by me,  Temi Crockett MD    > 40 minutes total time that was spent and over 50% of this time was spent in counseling and coordination of care with staff, reviewing medical record, educating patient about treatment options, side effects and benefits and alternative treatments for medications, providing supportive therapy and redirection regarding above symptoms.     This document is created with the help of Dragon dictation system.  All grammatical/typing errors or context distortion are unintentional and inherent to software.

## 2024-11-11 NOTE — TELEPHONE ENCOUNTER
----- Message from Francisco Starr sent at 11/11/2024  8:46 AM CST -----  Regarding: Cancel Appointments  Pt will not be admitting today please cancel his appointments. Thank you.

## 2024-11-12 ENCOUNTER — TELEPHONE (OUTPATIENT)
Dept: BEHAVIORAL HEALTH | Facility: CLINIC | Age: 49
End: 2024-11-12
Payer: COMMERCIAL

## 2024-11-12 LAB
ATRIAL RATE - MUSE: 101 BPM
DIASTOLIC BLOOD PRESSURE - MUSE: NORMAL MMHG
INTERPRETATION ECG - MUSE: NORMAL
P AXIS - MUSE: 46 DEGREES
PR INTERVAL - MUSE: 160 MS
QRS DURATION - MUSE: 94 MS
QT - MUSE: 424 MS
QTC - MUSE: 549 MS
R AXIS - MUSE: 3 DEGREES
SYSTOLIC BLOOD PRESSURE - MUSE: NORMAL MMHG
T AXIS - MUSE: 25 DEGREES
VENTRICULAR RATE- MUSE: 101 BPM

## 2024-11-12 PROCEDURE — 99232 SBSQ HOSP IP/OBS MODERATE 35: CPT | Performed by: INTERNAL MEDICINE

## 2024-11-12 PROCEDURE — 250N000013 HC RX MED GY IP 250 OP 250 PS 637: Performed by: INTERNAL MEDICINE

## 2024-11-12 PROCEDURE — 120N000001 HC R&B MED SURG/OB

## 2024-11-12 PROCEDURE — 93005 ELECTROCARDIOGRAM TRACING: CPT

## 2024-11-12 PROCEDURE — 250N000012 HC RX MED GY IP 250 OP 636 PS 637: Performed by: INTERNAL MEDICINE

## 2024-11-12 RX ORDER — DIPHENHYDRAMINE HCL 25 MG
25-50 CAPSULE ORAL EVERY 4 HOURS PRN
Qty: 30 CAPSULE | Refills: 0 | Status: SHIPPED | OUTPATIENT
Start: 2024-11-12

## 2024-11-12 RX ORDER — QUETIAPINE FUMARATE 400 MG/1
400 TABLET, FILM COATED ORAL AT BEDTIME
Qty: 30 TABLET | Refills: 0 | Status: SHIPPED | OUTPATIENT
Start: 2024-11-12 | End: 2024-11-27

## 2024-11-12 RX ORDER — ATORVASTATIN CALCIUM 20 MG/1
20 TABLET, FILM COATED ORAL EVERY EVENING
Qty: 30 TABLET | Refills: 0 | Status: SHIPPED | OUTPATIENT
Start: 2024-11-12 | End: 2024-12-12

## 2024-11-12 RX ORDER — POLYETHYLENE GLYCOL 3350 17 G
2-4 POWDER IN PACKET (EA) ORAL
Qty: 48 LOZENGE | Refills: 0 | Status: SHIPPED | OUTPATIENT
Start: 2024-11-12

## 2024-11-12 RX ORDER — FLUOXETINE 40 MG/1
40 CAPSULE ORAL DAILY
Qty: 30 CAPSULE | Refills: 0 | Status: SHIPPED | OUTPATIENT
Start: 2024-11-12 | End: 2024-11-27

## 2024-11-12 RX ORDER — LITHIUM CARBONATE 450 MG
900 TABLET, EXTENDED RELEASE ORAL AT BEDTIME
Qty: 60 TABLET | Refills: 0 | Status: SHIPPED | OUTPATIENT
Start: 2024-11-12 | End: 2024-11-27

## 2024-11-12 RX ORDER — PRAZOSIN HYDROCHLORIDE 1 MG/1
1 CAPSULE ORAL AT BEDTIME
Qty: 30 CAPSULE | Refills: 0 | Status: SHIPPED | OUTPATIENT
Start: 2024-11-12 | End: 2024-11-27

## 2024-11-12 RX ORDER — MULTIPLE VITAMINS W/ MINERALS TAB 9MG-400MCG
1 TAB ORAL DAILY
Qty: 90 TABLET | Refills: 1 | Status: SHIPPED | OUTPATIENT
Start: 2024-11-12 | End: 2024-11-14

## 2024-11-12 RX ORDER — GABAPENTIN 600 MG/1
600 TABLET ORAL 3 TIMES DAILY
Qty: 90 TABLET | Refills: 0 | Status: SHIPPED | OUTPATIENT
Start: 2024-11-12 | End: 2024-11-14

## 2024-11-12 RX ORDER — QUETIAPINE FUMARATE 50 MG/1
50 TABLET, FILM COATED ORAL 2 TIMES DAILY
Qty: 60 TABLET | Refills: 0 | Status: SHIPPED | OUTPATIENT
Start: 2024-11-12 | End: 2024-11-27

## 2024-11-12 RX ORDER — ATOMOXETINE 80 MG/1
80 CAPSULE ORAL DAILY
Qty: 30 CAPSULE | Refills: 0 | Status: SHIPPED | OUTPATIENT
Start: 2024-11-12 | End: 2024-11-14

## 2024-11-12 RX ORDER — FAMOTIDINE 20 MG/1
20 TABLET, FILM COATED ORAL 2 TIMES DAILY
Status: DISCONTINUED | OUTPATIENT
Start: 2024-11-12 | End: 2024-11-13 | Stop reason: HOSPADM

## 2024-11-12 RX ADMIN — CALCIUM CARBONATE (ANTACID) CHEW TAB 500 MG 1000 MG: 500 CHEW TAB at 05:56

## 2024-11-12 RX ADMIN — QUETIAPINE FUMARATE 500 MG: 400 TABLET ORAL at 22:23

## 2024-11-12 RX ADMIN — METHOCARBAMOL 500 MG: 500 TABLET ORAL at 22:23

## 2024-11-12 RX ADMIN — NICOTINE POLACRILEX 4 MG: 2 LOZENGE ORAL at 12:58

## 2024-11-12 RX ADMIN — METHOCARBAMOL 500 MG: 500 TABLET ORAL at 17:47

## 2024-11-12 RX ADMIN — HYDROXYZINE HYDROCHLORIDE 50 MG: 25 TABLET, FILM COATED ORAL at 00:03

## 2024-11-12 RX ADMIN — GABAPENTIN 600 MG: 600 TABLET, FILM COATED ORAL at 17:47

## 2024-11-12 RX ADMIN — METHOCARBAMOL 500 MG: 500 TABLET ORAL at 08:03

## 2024-11-12 RX ADMIN — PRAZOSIN HYDROCHLORIDE 1 MG: 1 CAPSULE ORAL at 22:24

## 2024-11-12 RX ADMIN — GABAPENTIN 600 MG: 600 TABLET, FILM COATED ORAL at 08:01

## 2024-11-12 RX ADMIN — METHOCARBAMOL 500 MG: 500 TABLET ORAL at 12:55

## 2024-11-12 RX ADMIN — BUPRENORPHINE HCL 8 MG: 8 TABLET SUBLINGUAL at 19:58

## 2024-11-12 RX ADMIN — Medication 1 TABLET: at 08:02

## 2024-11-12 RX ADMIN — ATOMOXETINE 80 MG: 80 CAPSULE ORAL at 08:04

## 2024-11-12 RX ADMIN — BUPRENORPHINE HCL 8 MG: 8 TABLET SUBLINGUAL at 08:02

## 2024-11-12 RX ADMIN — QUETIAPINE FUMARATE 100 MG: 50 TABLET ORAL at 19:20

## 2024-11-12 RX ADMIN — NICOTINE POLACRILEX 4 MG: 2 LOZENGE ORAL at 08:08

## 2024-11-12 RX ADMIN — CALCIUM CARBONATE (ANTACID) CHEW TAB 500 MG 1000 MG: 500 CHEW TAB at 12:57

## 2024-11-12 RX ADMIN — FLUOXETINE HYDROCHLORIDE 40 MG: 20 CAPSULE ORAL at 08:02

## 2024-11-12 RX ADMIN — CALCIUM CARBONATE (ANTACID) CHEW TAB 500 MG 1000 MG: 500 CHEW TAB at 08:40

## 2024-11-12 RX ADMIN — ACETAMINOPHEN 650 MG: 325 TABLET, FILM COATED ORAL at 05:12

## 2024-11-12 RX ADMIN — LITHIUM CARBONATE 900 MG: 450 TABLET ORAL at 22:23

## 2024-11-12 RX ADMIN — FAMOTIDINE 20 MG: 20 TABLET, FILM COATED ORAL at 19:58

## 2024-11-12 RX ADMIN — HYDROXYZINE HYDROCHLORIDE 50 MG: 25 TABLET, FILM COATED ORAL at 06:27

## 2024-11-12 RX ADMIN — NICOTINE POLACRILEX 4 MG: 2 LOZENGE ORAL at 06:30

## 2024-11-12 RX ADMIN — BUPRENORPHINE HCL 4 MG: 2 TABLET SUBLINGUAL at 13:55

## 2024-11-12 RX ADMIN — ATORVASTATIN CALCIUM 20 MG: 20 TABLET, FILM COATED ORAL at 19:21

## 2024-11-12 RX ADMIN — GABAPENTIN 600 MG: 600 TABLET, FILM COATED ORAL at 22:23

## 2024-11-12 RX ADMIN — QUETIAPINE FUMARATE 100 MG: 50 TABLET ORAL at 08:02

## 2024-11-12 RX ADMIN — FAMOTIDINE 20 MG: 20 TABLET, FILM COATED ORAL at 13:55

## 2024-11-12 RX ADMIN — NICOTINE 1 PATCH: 21 PATCH, EXTENDED RELEASE TRANSDERMAL at 08:08

## 2024-11-12 ASSESSMENT — ACTIVITIES OF DAILY LIVING (ADL)
ADLS_ACUITY_SCORE: 0
ADLS_ACUITY_SCORE: 18.25
ADLS_ACUITY_SCORE: 0
ADLS_ACUITY_SCORE: 0
ADLS_ACUITY_SCORE: 18.25
ADLS_ACUITY_SCORE: 0
ADLS_ACUITY_SCORE: 0
ADLS_ACUITY_SCORE: 18.25
ADLS_ACUITY_SCORE: 18.25
ADLS_ACUITY_SCORE: 0
ADLS_ACUITY_SCORE: 0

## 2024-11-12 NOTE — PLAN OF CARE
"Patient Name: Sunshine  MRN: [id number]  Date of Admission: 11/9/2024  Reason for Admission: head/neck pain, suicidal ideation  Level of Care: Med-surg    Vitals:   BP Readings from Last 1 Encounters:   11/12/24 139/79     Pulse Readings from Last 1 Encounters:   11/12/24 54     Wt Readings from Last 1 Encounters:   11/08/24 123.5 kg (272 lb 4.3 oz)     Ht Readings from Last 1 Encounters:   11/08/24 1.905 m (6' 3\")     Estimated body mass index is 34.03 kg/m  as calculated from the following:    Height as of 11/8/24: 1.905 m (6' 3\").    Weight as of 11/8/24: 123.5 kg (272 lb 4.3 oz).  Temp Readings from Last 1 Encounters:   11/12/24 97.3  F (36.3  C) (Oral)       Pain: Pain goal 0 Pain Rating 5/10 Effective pain medication/regimen PRN hydroxyzine 50mg x1    Assessment    Resp: VSS on RA  Telemetry: n/a  Neuro: A&Ox3-4, pt reported year initially as \"2014\" but corrected himself after repeating question, pt knew month and day of week. Pacing at times while watching TV, ambulating hallways w/sitter. Frequently asks about discharge plan. Neuro checks discontinued.   GI/: Continent, adequate UO, -BM, +flatus  Skin/Wounds: Skin intact  Lines/Drains: PIV SL  Activity: Ambulating indepedently in room, 1:1 continuous observation-sitter at bedside for suicide precautions.   Sleep: Slept comfortably after PRN hydroxyzine. Anxious around 0500, PRN tylenol given and hydroxyzine given when available, pt agreeable to medication schedule, denied suicidal thoughts at that time.   Abnormal Labs:     Aggression Stop Light: Green          Patient [facility]       Goal Outcome Evaluation:      Plan of Care Reviewed With: patient    Overall Patient Progress: improving  "

## 2024-11-12 NOTE — PROGRESS NOTES
[facility]    Hospitalist Progress Note    Interval History   - Feels well today, he states he feels back to baseline. Headache, fever, concentration all improved. He is hoping to leave this hosptial today and go back to inpatient treatment  - Patient is medically stable to discharge back to inpatient treatment  Addendum: Discussed with [facility] intake, patient does not need 1:1, is behaviorally appropriate, suspect aseptic meningitis as possible etiology behind patient's prior decompensation  Addendum: Famotidine for GERD    Assessment & Plan   Summary: Perry Preciado Jr. is a 49 year old male with PMH MDD, ADHD, KANE, PTSD, polysubstance abuse, who was admitted on 11/9/2024 for infection evaluation.  He was admitted at [facility] inpatient psychiatry unit initially admitted on 10/29/24 for Maxine, suicide ideations and recent relapse onto meth and fentanyl.  He was transferred to [facility] mental health unit.  His medical history notable for major depressive disorder, ADHD, polysubstance use disorder.   On 11/8 he complained about the worse headache of his life involving also the back of the neck.  A code stroke was called and he had a stroke work up that was largely negative including CT head/ CTA head and neck.  MRI also did not disclose any recent infarction.  Due to photophobia, Kernig's sign, poor oral intake, severe pain, patient was transferred to [facility] for further workup.  Patient was started on IV antibiotics including vancomycin and ceftriaxone as well as acyclovir. LP completed with 12 wbc and 9 rbc with only 4% neutrophils. Meningitis and encephalitis panel negative. ID following and stopped all antibiotics. Follow up Procalciton elevated 17.6 but patient without a fever. Elevated wbc but patient also getting decadron which will be discontinued.  - Off Ativan and oxycodone     Chronic pain  - Continue Subutex  - On benadryl and atarax for pain adjuvant   - Methocarbamol  "for neck pain     Major depression  Hx of ADHD  KANE  PTSD  - Continue Fluoxetine    - Continue Neurontin  - Continue Lithium  - Continue Prazosin  - Continue Seroquel 100 mg BID and  bedtime Seroqeul  500 mg.    - Has atarax for anxiety, ativan for breakthrough     Hyperlipidemia: Continue Lipitor    Nicotine dependence: Nicotine patch    Long QTc: 549msec on 11/8, recheck 11/12 given symptomatic improvement    Clinically Significant Risk Factors                              # Obesity: Estimated body mass index is 34.03 kg/m  as calculated from the following:    Height as of 11/8/24: 1.905 m (6' 3\").    Weight as of 11/8/24: 123.5 kg (272 lb 4.3 oz)., PRESENT ON ADMISSION       # Financial/Environmental Concerns: none  # Asthma: noted on problem list         PT/OT: ordered  Diet: Combination Diet Regular Diet; Safe Tray - with utensils    DVT Prophylaxis: Low Risk/Ambulatory with no VTE prophylaxis indicated  Tran Catheter: Not present  Lines: None     Cardiac Monitoring: None  Code Status: Full Code    Medically Ready for Discharge: Ready Now anticipate discharge to [facility] today      Iban Almazan MD  Hospitalist Service  [facility] [place]  Securely message with Imagry (more info)  Text page via [employer] Paging/Directory     - Total time spent on encounter is 35 minutes, which includes counseling, coordination of care, time spent discussing with family, and/or time spent discussing with consultants.    Data reviewed today: I reviewed all new labs and imaging results over the last 24 hours.    Physical Exam   Temp: 97.6  F (36.4  C) Temp src: Oral BP: (!) 157/100 Pulse: 67   Resp: 14 SpO2: 97 % O2 Device: None (Room air)    There were no vitals filed for this visit.  Vital Signs with Ranges  Temp:  [97.2  F (36.2  C)-98.5  F (36.9  C)] 97.6  F (36.4  C)  Pulse:  [45-67] 67  Resp:  [14-18] 14  BP: (135-173)/() 157/100  SpO2:  [94 %-97 %] 97 %  I/O last 3 completed shifts:  In: 1440 " [P.O.:1440]  Out: -   O2 requirements: none    Constitutional: Male in NAD  HEENT: Eyes nonicteric, oral mucosa moist  Cardiovascular: RRR, normal S1/2, no m/r/g  Respiratory: CTAB, no wheezing or crackles  Vascular: No LE pitting edema, noted distal pedal pulses  GI: Normoactive bowel sounds, nontender, nondistended  Skin/Integumen: No rashes  Neuro/Psych: Appropriate affect and mood. A&Ox3, moves all extremities    Medications   Current Facility-Administered Medications   Medication Dose Route Frequency Provider Last Rate Last Admin     Current Facility-Administered Medications   Medication Dose Route Frequency Provider Last Rate Last Admin    atomoxetine (STRATTERA) capsule 80 mg  80 mg Oral Daily Petr Cadet MD   80 mg at 11/12/24 0804    atorvastatin (LIPITOR) tablet 20 mg  20 mg Oral QPM Petr Cadet MD   20 mg at 11/11/24 2102    buprenorphine (SUBUTEX) sublingual tablet 8 mg  8 mg Sublingual BID Steve Dhillon MD   8 mg at 11/12/24 0802    And    buprenorphine (SUBUTEX) sublingual tablet 4 mg  4 mg Sublingual Q24H Steve Dhillon MD   4 mg at 11/11/24 1532    FLUoxetine (PROzac) capsule 40 mg  40 mg Oral Daily Petr Cadet MD   40 mg at 11/12/24 0802    gabapentin (NEURONTIN) tablet 600 mg  600 mg Oral TID Petr Cadet MD   600 mg at 11/12/24 0801    lithium (ESKALITH CR/LITHOBID) CR tablet 900 mg  900 mg Oral At Bedtime Petr Cadet MD   900 mg at 11/11/24 2105    methocarbamol (ROBAXIN) tablet 500 mg  500 mg Oral 4x Daily Petr Cadet MD   500 mg at 11/12/24 0803    multivitamin w/minerals (THERA-VIT-M) tablet 1 tablet  1 tablet Oral Daily Petr Cadet MD   1 tablet at 11/12/24 0802    nicotine (NICODERM CQ) 21 MG/24HR 24 hr patch 1 patch  1 patch Transdermal Daily Petr Cadet MD   1 patch at 11/12/24 0808    prazosin (MINIPRESS) capsule 1 mg  1 mg Oral At Bedtime Petr Cadet MD   1 mg at 11/11/24 2104    QUEtiapine  (SEROquel) tablet 100 mg  100 mg Oral BID Petr Cadet MD   100 mg at 11/12/24 0802    QUEtiapine (SEROquel) tablet 500 mg  500 mg Oral At Bedtime Petr Cadet MD   500 mg at 11/11/24 2109    sodium chloride (PF) 0.9% PF flush 3 mL  3 mL Intracatheter Q8H Petr Cadet MD   3 mL at 11/12/24 0809       Data   Recent Labs   Lab 11/11/24  0839 11/10/24  1037 11/09/24  0528 11/08/24  2335 11/08/24  1810   WBC 19.6* 26.8* 30.0*  --  11.1*   HGB 11.9* 12.0* 12.7*  --  14.5   MCV 91 90 89  --  92    221 219  --  217   INR  --   --   --   --  0.95   NA  --  140 136  --  139   POTASSIUM  --  4.1 4.5  --  5.0   CHLORIDE  --  105 99  --  99   CO2  --  24 25  --  29   BUN  --  19.2 25.0*  --  21.5*   CR 0.95 0.88 1.28*  --  1.28*   ANIONGAP  --  11 12  --  11   DORENE  --  8.8 8.9  --  9.4   GLC  --  379* 144* 147* 105*   ALBUMIN  --  3.8 3.7  --   --    PROTTOTAL  --  6.9 6.4  --   --    BILITOTAL  --  0.2 0.3  --   --    ALKPHOS  --  85 57  --   --    ALT  --  30 37  --   --    AST  --  20 33  --   --        Imaging:   No results found for this or any previous visit (from the past 24 hours).

## 2024-11-12 NOTE — PROGRESS NOTES
[facility]  Infectious Disease Progress Note          Assessment and Plan:   Date of Admission:  11/9/2024  Date of Consult (When I saw the patient): 11/10/24        Assessment & Plan  Perry Preciado Jr. is a 49 year old male who was admitted on 11/9/2024.      Impression: 1 49-year-old male, transferred from [place] [facility], with acute fever and headache, has had some degree of headache and chronic neurologic symptoms and dizziness but definitely more acute symptoms currently, so far micro is negative, lumbar puncture unimpressive but did have 12 white cells present primarily monocytes thus at least some meningeal inflammation  2 polysubstance abuse and psychiatric underlying issues     REC 1  fever resolved, resolved headache, looks clinically okay.  2   Minor CSF abnormality not suggestive of any significant infection, fortunately enough CSF left to do the full micro panel which was negative, doubt CNS infection DC all  antimicrobials  3  Likely CSF and headache nonspecific and not the cause of the fever, fevers resolved but cause unclear.  Notably still some leukocytosis and procalcitonin quite elevated despite no obvious focal bacterial infection, overall I think correct intervention currently is simply to watch, probably okay disposition and watch as outpatient (at rehab facility)              Interval History:     no new complaints   resolve headache, slight dizziness but no fever or illness symptoms CSF completely negative studies, fevers resolved blood culture negative nothing to suggest any focal infection site  interestingly white count still elevated procalcitonin markedly elevated but no obvious focal infection site and negative microbiology              Medications:     Current Facility-Administered Medications   Medication Dose Route Frequency Provider Last Rate Last Admin    atomoxetine (STRATTERA) capsule 80 mg  80 mg Oral Daily Petr Cadet MD   80 mg at  11/12/24 0804    atorvastatin (LIPITOR) tablet 20 mg  20 mg Oral QPM Petr Cadet MD   20 mg at 11/11/24 2102    buprenorphine (SUBUTEX) sublingual tablet 8 mg  8 mg Sublingual BID Steve Dhillon MD   8 mg at 11/12/24 0802    And    buprenorphine (SUBUTEX) sublingual tablet 4 mg  4 mg Sublingual Q24H Steve Dhillon MD   4 mg at 11/11/24 1532    FLUoxetine (PROzac) capsule 40 mg  40 mg Oral Daily Petr Cadet MD   40 mg at 11/12/24 0802    gabapentin (NEURONTIN) tablet 600 mg  600 mg Oral TID Petr Cadet MD   600 mg at 11/12/24 0801    lithium (ESKALITH CR/LITHOBID) CR tablet 900 mg  900 mg Oral At Bedtime Petr Cadet MD   900 mg at 11/11/24 2105    methocarbamol (ROBAXIN) tablet 500 mg  500 mg Oral 4x Daily Petr Cadet MD   500 mg at 11/12/24 0803    multivitamin w/minerals (THERA-VIT-M) tablet 1 tablet  1 tablet Oral Daily Petr Cadet MD   1 tablet at 11/12/24 0802    nicotine (NICODERM CQ) 21 MG/24HR 24 hr patch 1 patch  1 patch Transdermal Daily Petr Cadet MD   1 patch at 11/12/24 0808    prazosin (MINIPRESS) capsule 1 mg  1 mg Oral At Bedtime Petr Cadet MD   1 mg at 11/11/24 2104    QUEtiapine (SEROquel) tablet 100 mg  100 mg Oral BID Petr Cadet MD   100 mg at 11/12/24 0802    QUEtiapine (SEROquel) tablet 500 mg  500 mg Oral At Bedtime Petr Cadet MD   500 mg at 11/11/24 2109    sodium chloride (PF) 0.9% PF flush 3 mL  3 mL Intracatheter Q8H Petr Cadet MD   3 mL at 11/12/24 0809                  Physical Exam:   Blood pressure 135/88, pulse 67, temperature 97.2  F (36.2  C), temperature source Oral, resp. rate 14, SpO2 95%.  Wt Readings from Last 2 Encounters:   11/08/24 123.5 kg (272 lb 4.3 oz)   07/11/24 123.6 kg (272 lb 6.4 oz)     Vital Signs with Ranges  Temp:  [97.2  F (36.2  C)-98.5  F (36.9  C)] 97.2  F (36.2  C)  Pulse:  [45-67] 67  Resp:  [14-18] 14  BP: (135-173)/(79-96) 135/88  SpO2:  [94 %-97  "%] 95 %    Constitutional: Awake, alert, cooperative, no apparent distress   mildly anxious but otherwise looks okay     Lungs: Clear to auscultation bilaterally, no crackles or wheezing   Cardiovascular: Regular rate and rhythm, normal S1 and S2, and no murmur noted   Abdomen: Normal bowel sounds, soft, non-distended, non-tender   Skin: No rashes, no cyanosis, no edema   Other:           Data:   All microbiology laboratory data reviewed.  Recent Labs   Lab Test 11/11/24  0839 11/10/24  1037 11/09/24  0528   WBC 19.6* 26.8* 30.0*   HGB 11.9* 12.0* 12.7*   HCT 35.7* 35.4* 37.7*   MCV 91 90 89    221 219     Recent Labs   Lab Test 11/11/24  0839 11/10/24  1037 11/09/24  0528   CR 0.95 0.88 1.28*     No lab results found.  No lab results found.    Invalid input(s): \"UC\"     "

## 2024-11-12 NOTE — TELEPHONE ENCOUNTER
----- Message from Jude Donahue sent at 11/12/2024  2:39 PM CST -----  Regarding: schedule admission  Scheduling Request    Patient Name: Perry Preciado JrMichoacano  Location of programming: [facility]   Start Date: November / 13 / 2024  Group:  LASHA on Wednesday at 12:00 PM   Attending Provider (MD): Cris  Duration of Appointment in minutes: 840  Visit Type: In-person or Treatment - 870    Additional notes: [facility]

## 2024-11-12 NOTE — PLAN OF CARE
Pt here with headache, suicidal ideation    A/Ox4. Neuros intact ex for headache. VSS RA. IND in room. Regular diet, thin liquids. Pills whole. Tylenol, torelac, robaxin PRN. Plan to return to chemical dependency rehab, likely tomorrow. Pending. S

## 2024-11-12 NOTE — PLAN OF CARE
BP (!) 157/100 (BP Location: Left arm)   Pulse 67   Temp 97.6  F (36.4  C) (Oral)   Resp 14   SpO2 97%     Patient name: Perry Preciado Jr.    Summary: Patient here with altered mental status- currently alert and oriented. Pleasant, independent in room. Plan to send to chemical dependency facility.    Rory Fierro, RN  [facility]

## 2024-11-12 NOTE — PROGRESS NOTES
Pt here with HA, suicidal ideation. A&O x4, forgetful. VSS, on RA. LS clear. C/o heart burn, PRN TUMs given and scheduled Pepcid. Nicotine patch R shoulder, PRN nicorette lozenge given. Schedule Robaxin and Subutex given for pain  Neuro's intact. Up independently, ambulates in [place] frequently, d/roberta sit at 1130. Reg diet, takes pills whole with water. Plans to discharge to  facility, discharge pending. Pt scoring green on Aggression Stop Light Tool.

## 2024-11-12 NOTE — PROGRESS NOTES
Care Management Follow Up    Length of Stay (days): 3    Expected Discharge Date: 11/12/2024     Concerns to be Addressed: discharge planning     Patient plan of care discussed at interdisciplinary rounds: Yes    Anticipated Discharge Disposition: Inpatient Chemical Dependency  Anticipated Discharge Services: transport    Referrals Placed by CM/CANDE:  N/A  Private pay costs discussed: Not applicable    Discussed  Partnership in Safe Discharge Planning  document with patient/family: No     Handoff Completed: No, handoff not indicated or clinically appropriate    Additional Information:  Patient's discharge plan is to go to [facility] psychiatry treatment at [facility]. CANDE called Jazmyne in admissions there to plan this, she is not in until 1000. CANDE scheduled a stretcher ride in the event that patient can go today, pickup is between [id number].     ADD: 1116  [place] back from Jazmyne in admissions at [facility]. Patient cannot come to them with a sitter. She said if he has had a sitter, then he is likely not appropriate for their program because they would be concerned about him being safe (their unit is not locked, they cannot accommodate 1:1 monitoring, they can accommodate patients with confusion, he did not have a sitter on the psych unit at [facility] so something must have changed with him that is concerning). She said they may need to come out to do an assessment in person tomorrow to see if he is appropriate. Also, the nurse overnight documented Tramodol was given to the patient, which he cannot be on for 24 hours if he is to admit to them. Unclear if this was an error in documentation, as Tramadol is not ordered/on the MAR that CANDE and Jazmyne can see. Transport cancelled. Bedside and charge updated.     ADD: 1230  Spoke to Jazmyne in admissions and she is wanting a virtual meeting, she will send CANDE a teams link for a meeting today at 1430.     ADD: 1430  Assisted patient in meeting virtually with admissions staff at  [facility]. At the end of their meeting they state that patient can come to them tomorrow, he must be onsite before noon. Patient is excited and agreeable. [facility] is scheduled for tomorrow 11/13 with pickup time between [id number]. Hospitalist notified of early discharge and the need to have meds sent down to discharge pharmacy. Facility admissions aware of transport time. PCS completed for patient by hand and faxed, as he needs monitoring during transport.    - Patient must be sent with all medications (new and old) after filling at discharge pharmacy   - RN to RN is needed about an hour prior to patient's arrival at phone [phone]    ASHLY Bleu, LGSW   Social Work   [facility]

## 2024-11-12 NOTE — PLAN OF CARE
Liyah Madrigal RN on 11/11/2024 at 10:34 PM    Reason for Admission: HA, suicidal ideation  Cognitive/Mentation: A/Ox 4  Neuros/CMS: Stable  VS: VSS on RA.   Tele: n/a.  GI/: Continent.  Pulmonary: LS clear.  Pain: HA. PRN tramodol given   Drains/Lines: PIV SL  Skin: scattered scabs  Activity: indep  Diet: reg with thin liquids. Takes pills whole.   Therapies recs:  return to chemical dependency rehab  Discharge: likely tomorrow  Aggression Stoplight Tool: green  End of shift summary: no neuros ordered

## 2024-11-13 ENCOUNTER — TELEPHONE (OUTPATIENT)
Dept: BEHAVIORAL HEALTH | Facility: CLINIC | Age: 49
End: 2024-11-13
Payer: COMMERCIAL

## 2024-11-13 ENCOUNTER — TRANSFERRED RECORDS (OUTPATIENT)
Dept: HEALTH INFORMATION MANAGEMENT | Facility: CLINIC | Age: 49
End: 2024-11-13

## 2024-11-13 ENCOUNTER — HOSPITAL ENCOUNTER (OUTPATIENT)
Dept: BEHAVIORAL HEALTH | Facility: CLINIC | Age: 49
Discharge: HOME | End: 2024-11-13
Attending: FAMILY MEDICINE
Payer: COMMERCIAL

## 2024-11-13 VITALS
RESPIRATION RATE: 18 BRPM | DIASTOLIC BLOOD PRESSURE: 83 MMHG | SYSTOLIC BLOOD PRESSURE: 143 MMHG | OXYGEN SATURATION: 97 % | HEART RATE: 70 BPM | BODY MASS INDEX: 36.46 KG/M2 | WEIGHT: 291.7 LBS | TEMPERATURE: 98.3 F

## 2024-11-13 PROBLEM — F19.20 CHEMICAL DEPENDENCY (H): Status: ACTIVE | Noted: 2024-11-13

## 2024-11-13 LAB
ATRIAL RATE - MUSE: 55 BPM
BACTERIA BLD CULT: NO GROWTH
CREAT UR-MCNC: 38 MG/DL
DIASTOLIC BLOOD PRESSURE - MUSE: NORMAL MMHG
GLUCOSE BLDC GLUCOMTR-MCNC: 127 MG/DL (ref 70–99)
INTERPRETATION ECG - MUSE: NORMAL
P AXIS - MUSE: 48 DEGREES
PR INTERVAL - MUSE: 154 MS
QRS DURATION - MUSE: 100 MS
QT - MUSE: 440 MS
QTC - MUSE: 420 MS
R AXIS - MUSE: 25 DEGREES
SYSTOLIC BLOOD PRESSURE - MUSE: NORMAL MMHG
T AXIS - MUSE: 24 DEGREES
VDRL CSF QL: NON REACTIVE
VENTRICULAR RATE- MUSE: 55 BPM

## 2024-11-13 PROCEDURE — H2035 A/D TX PROGRAM, PER HOUR: HCPCS | Mod: HQ

## 2024-11-13 PROCEDURE — 250N000013 HC RX MED GY IP 250 OP 250 PS 637: Performed by: HOSPITALIST

## 2024-11-13 PROCEDURE — 250N000012 HC RX MED GY IP 250 OP 636 PS 637: Performed by: INTERNAL MEDICINE

## 2024-11-13 PROCEDURE — 99239 HOSP IP/OBS DSCHRG MGMT >30: CPT | Performed by: INTERNAL MEDICINE

## 2024-11-13 PROCEDURE — 1002N00001 HC LODGING PLUS FACILITY CHARGE ADULT

## 2024-11-13 PROCEDURE — 250N000013 HC RX MED GY IP 250 OP 250 PS 637: Performed by: INTERNAL MEDICINE

## 2024-11-13 PROCEDURE — H2035 A/D TX PROGRAM, PER HOUR: HCPCS

## 2024-11-13 RX ORDER — MAGNESIUM HYDROXIDE/ALUMINUM HYDROXICE/SIMETHICONE 120; 1200; 1200 MG/30ML; MG/30ML; MG/30ML
30 SUSPENSION ORAL EVERY 6 HOURS PRN
COMMUNITY

## 2024-11-13 RX ORDER — BUPRENORPHINE 2 MG/1
TABLET SUBLINGUAL
Qty: 30 TABLET | Refills: 0 | Status: SHIPPED | OUTPATIENT
Start: 2024-11-13 | End: 2024-11-14

## 2024-11-13 RX ORDER — AMOXICILLIN 250 MG
2 CAPSULE ORAL DAILY PRN
COMMUNITY
End: 2024-11-14

## 2024-11-13 RX ORDER — ALBUTEROL SULFATE 90 UG/1
2 INHALANT RESPIRATORY (INHALATION) EVERY 6 HOURS PRN
COMMUNITY

## 2024-11-13 RX ORDER — IBUPROFEN 200 MG
600 TABLET ORAL EVERY 6 HOURS PRN
COMMUNITY

## 2024-11-13 RX ORDER — LORATADINE 10 MG/1
10 TABLET ORAL DAILY PRN
COMMUNITY
End: 2024-11-14

## 2024-11-13 RX ORDER — ACETAMINOPHEN 500 MG
500-1000 TABLET ORAL EVERY 8 HOURS PRN
COMMUNITY

## 2024-11-13 RX ORDER — GUAIFENESIN/DEXTROMETHORPHAN 100-10MG/5
10 SYRUP ORAL EVERY 4 HOURS PRN
COMMUNITY

## 2024-11-13 RX ADMIN — NICOTINE 1 PATCH: 21 PATCH, EXTENDED RELEASE TRANSDERMAL at 08:40

## 2024-11-13 RX ADMIN — Medication 1 LOZENGE: at 08:41

## 2024-11-13 RX ADMIN — ATOMOXETINE 80 MG: 80 CAPSULE ORAL at 08:41

## 2024-11-13 RX ADMIN — QUETIAPINE FUMARATE 100 MG: 50 TABLET ORAL at 08:41

## 2024-11-13 RX ADMIN — Medication 1 LOZENGE: at 06:05

## 2024-11-13 RX ADMIN — METHOCARBAMOL 500 MG: 500 TABLET ORAL at 08:42

## 2024-11-13 RX ADMIN — BUPRENORPHINE HCL 8 MG: 8 TABLET SUBLINGUAL at 08:42

## 2024-11-13 RX ADMIN — GABAPENTIN 600 MG: 600 TABLET, FILM COATED ORAL at 08:42

## 2024-11-13 RX ADMIN — FLUOXETINE HYDROCHLORIDE 40 MG: 20 CAPSULE ORAL at 08:42

## 2024-11-13 RX ADMIN — FAMOTIDINE 20 MG: 20 TABLET, FILM COATED ORAL at 08:42

## 2024-11-13 RX ADMIN — Medication 1 TABLET: at 08:41

## 2024-11-13 ASSESSMENT — COLUMBIA-SUICIDE SEVERITY RATING SCALE - C-SSRS
LETHALITY/MEDICAL DAMAGE CODE MOST LETHAL POTENTIAL ATTEMPT: BEHAVIOR LIKELY TO RESULT IN DEATH DESPITE AVAILABLE MEDICAL CARE
SUICIDE, SINCE LAST CONTACT: NO
4. HAVE YOU HAD THESE THOUGHTS AND HAD SOME INTENTION OF ACTING ON THEM?: NO
ATTEMPT SINCE LAST CONTACT: NO
TOTAL  NUMBER OF ABORTED OR SELF INTERRUPTED ATTEMPTS SINCE LAST CONTACT: NO
LETHALITY/MEDICAL DAMAGE CODE MOST LETHAL ACTUAL ATTEMPT: NO PHYSICAL DAMAGE OR VERY MINOR PHYSICAL DAMAGE
MOST LETHAL DATE: 67094
1. SINCE LAST CONTACT, HAVE YOU WISHED YOU WERE DEAD OR WISHED YOU COULD GO TO SLEEP AND NOT WAKE UP?: NO
6. HAVE YOU EVER DONE ANYTHING, STARTED TO DO ANYTHING, OR PREPARED TO DO ANYTHING TO END YOUR LIFE?: YES
TOTAL  NUMBER OF INTERRUPTED ATTEMPTS SINCE LAST CONTACT: NO
1. IN THE PAST MONTH, HAVE YOU WISHED YOU WERE DEAD OR WISHED YOU COULD GO TO SLEEP AND NOT WAKE UP?: YES
2. HAVE YOU ACTUALLY HAD ANY THOUGHTS OF KILLING YOURSELF IN THE PAST MONTH?: YES
5. HAVE YOU STARTED TO WORK OUT OR WORKED OUT THE DETAILS OF HOW TO KILL YOURSELF? DO YOU INTEND TO CARRY OUT THIS PLAN?: YES
2. HAVE YOU ACTUALLY HAD ANY THOUGHTS OF KILLING YOURSELF?: NO
3. HAVE YOU BEEN THINKING ABOUT HOW YOU MIGHT KILL YOURSELF?: YES
6. HAVE YOU EVER DONE ANYTHING, STARTED TO DO ANYTHING, OR PREPARED TO DO ANYTHING TO END YOUR LIFE?: NO

## 2024-11-13 ASSESSMENT — ANXIETY QUESTIONNAIRES
7. FEELING AFRAID AS IF SOMETHING AWFUL MIGHT HAPPEN: NOT AT ALL
8. IF YOU CHECKED OFF ANY PROBLEMS, HOW DIFFICULT HAVE THESE MADE IT FOR YOU TO DO YOUR WORK, TAKE CARE OF THINGS AT HOME, OR GET ALONG WITH OTHER PEOPLE?: SOMEWHAT DIFFICULT
GAD7 TOTAL SCORE: 5
7. FEELING AFRAID AS IF SOMETHING AWFUL MIGHT HAPPEN: NOT AT ALL
1. FEELING NERVOUS, ANXIOUS, OR ON EDGE: SEVERAL DAYS
5. BEING SO RESTLESS THAT IT IS HARD TO SIT STILL: SEVERAL DAYS
2. NOT BEING ABLE TO STOP OR CONTROL WORRYING: NOT AT ALL
GAD7 TOTAL SCORE: 5
6. BECOMING EASILY ANNOYED OR IRRITABLE: SEVERAL DAYS
4. TROUBLE RELAXING: SEVERAL DAYS
GAD7 TOTAL SCORE: 5
IF YOU CHECKED OFF ANY PROBLEMS ON THIS QUESTIONNAIRE, HOW DIFFICULT HAVE THESE PROBLEMS MADE IT FOR YOU TO DO YOUR WORK, TAKE CARE OF THINGS AT HOME, OR GET ALONG WITH OTHER PEOPLE: SOMEWHAT DIFFICULT
3. WORRYING TOO MUCH ABOUT DIFFERENT THINGS: SEVERAL DAYS

## 2024-11-13 ASSESSMENT — ACTIVITIES OF DAILY LIVING (ADL)
ADLS_ACUITY_SCORE: 0

## 2024-11-13 NOTE — PROVIDER NOTIFICATION
MD Notification    Notified Person: MD    Notified Person Name: Wilfrid Neville    Notification Date/Time: 11/13/24 ; 5:19 AM    Notification Interaction: Vocjami    Purpose of Notification: Pt c/o sore throat    Orders Received: Ordered Chloraseptic lozenge.

## 2024-11-13 NOTE — PROGRESS NOTES
Name: Perry Preciado Jr.  Date: 11/13/2024  Medical Record: [id number]    Envelope Number: [id number]    List of Contents (List each item separately in new row):   Laptop & Tablet    Admission:  I am responsible for any personal items that are not sent to the safe or pharmacy.  [facility] is not responsible for loss, theft or damage of any property in my possession.      Patient Signature:  ___________________________________________       Date/Time:__________________________    Staff Signature: __________________________________       Date/Time:__________________________    [facility] Staff person, if patient is unable/unwilling to sign:      __________________________________________________________       Date/Time: __________________________      **All medications are packaged by [facility] staff and securely stored on [facility]. Medications left by patients at discharge will be packaged by LP staff and transported by LP staff to inpatient pharmacy for storage.**        Discharge:  [facility] has returned all of my personal belongings:    Patient Signature: ________________________________________     Date/Time: ____________________________________    Staff Signature: ______________________________________     Date/Time:_____________________________________    Writer informed patient that writer is waiting for orders from MD. Writer will call back with antibiotic orders. Patient verbalized understanding and agreeable to plan of care, no further questions or concerns at this time.

## 2024-11-13 NOTE — PLAN OF CARE
Reason for Admission: Severe headache; suicidal ideation    Cognitive/Mentation: A/Ox 4  Neuros/CMS: Intact ex rambling speech  VS: VSS on RA.   Tele: N/A.  /GI: Continent. Gets up to BR Last BM 11/13/24.   Pulmonary: LS clear.  Pain: denies.      Drains/Lines: No PIV  Skin: Rash on R and L forearm, scbas on back, shin, and calf  Activity: Independent.  Diet: Reg with thin liquids. Takes pills whole.     Therapies recs: CD facility  Discharge: Today @ 10:30-11:15 AM on  Health Stretcher. Old and new meds needs to be sent with him via pharmacy. RN to RN is needed about an hour prior to patient's arrival at phone [phone]    Aggression Stoplight Tool: Green    End of shift summary: Fair sleep between cares. Walked him for 3 laps around 5:00 AM and had some meaningful conversation with him about his life, family, desire for recovery, healthy living. Utilized therapuetic use of self and active listening. Complained of some sore throat. Notified MD and ordered lozenge PRN.

## 2024-11-13 NOTE — PROGRESS NOTES
[facility] Nursing Health Assessment      Vital signs: See flow sheets    Transfer from [facility]     Counselor: Group C  Drug of Choice: meth and fentayl  Last use: 10/23/2024  Home clinic/MD: used [facility] but open to switching to [facility]  Psychiatrist/therapist: [facility], but would like to switch to [facility]    Medical history/current conditions:  Recent probable aseptic meningitis, Hyperlipidemia, chronic low back pain with right-sided sciatica, degenerative disc disease, HTN, asthma    H&P Screen:  H&P within the last 90 days: Yes.  Date: 10/9/2024 Location: [facility]      Mental Health diagnosis: depression, anxiety, PTSD, ADHD, TBI  Medication compliant?: Yes  Recent sucidal thoughts? Yes, brought him into inpatient. Has had serious attempts in past  When? 10/24/2024  Current thought of self-harm? No Plan? Not at this time or when coming into hospital    Pain assessment:   Pt. Experiencing pain at this time?  Yes.  Rating on 0-10 scale: (1-10 scale): 10.  Location: lower back traveling down his right leg to his toes, on and off between 5 and 10 all day  Chronic  Result of: past injuries and degenerative disc disease.       LP Falls assessment    Has patient had a fall(s) in the last 6 months?  No  If yes, what were the circumstances surrounding the fall(s)? NA  Does patient have gait dysfunctions? (limping, dragging of toes, shuffling feet, unsteadiness, difficulty standing /walking)  No  Does patient appear to have deconditioning/muscle loss/malnutrition/fatigue? (due to inactivity, bedrest (long hospitalization), medical condition(s) No  Does patient experience confusion, dizziness or vertigo? No  Is patient visually impaired? Yes   Does patient have any adaptive equipment (hearing aids, wheelchair, walker, prosthesis, crutches, cane, etc..)  Wears glasses and does not currently have them  Is patient taking 2 or more of the following medications?  anticonvulsants,  anti-hypertensives, diuretics, laxatives, sedatives, and psychotropics (single-select) Yes  Is patient taking medication (s) that would cause urinary or bowel urgency (ex: lactulose/Furosemide)? No  Does patient have a medical condition (ex: Diabetes, Liver Disease, Respiratory Diseases, Chronic Pain, Heart Disease, Neuropathy, Seizure like Disorder, etc...) that could affect balance/gait or risk for falls? Yes    If yes to any of the above educate patient on fall prevention while at [facility].           Floors clutter/obstacle free           Proper footwear/[place] shoes          Encourage the use of appropriate lighting.            Adjust walking speed accordingly          Recommend caution going on longer walks. Try shorter walks and see how you do first.  Use elevators when appropriate.          Notify staff if you are experiencing fatigue, weakness, drowsiness/ dizziness or have had a fall.           Use adaptive equipment as recommended          Wheelchairs must be managed and propelled independently without staff assistance           Expectation of complete independence with all cares and compliance.  Report to staff if having difficulty     LP patient who poses a potential falls risk will be advised of the following:  Please follow the recommendations as listed above or it may affect your treatment plan.  Your treatment team would have to evaluate if this level of care is appropriate for you.    Patient acknowledges and verbalizes understanding of the above criteria? Yes  Support staff / counselors notified of pt Fall Screen and plan of prevention. LPRN to re-assess as appropriate. White board and SBAR updated Yes  ____________________________________________________________________________________________________________________________  RN Assessment of Infectious Disease concerns:    Infectious disease concerns None     Community Medical Screen for COVID-19    Do you have any of the following NEW or  worsening symptoms NOT attributed to pre-existing conditions?    Yes    Fever of 100.0  F (37.8 C) or over  Chills  Cough  Shortness of Breath  Loss of taste or smell  Generalized body aches  Persistent headache  Sore throat (or trouble eating or drinking in young children?)  Nausea, Vomiting, or diarrhea (loose stools)    If pt responds YES to any of the symptoms / Positive COVID-19  without symptoms pt will need to leave unit immediately and can return in 6 days from discharge date.      Did you test positive for COVID-19 in the last 10 days or are you waiting on the test results due to an exposure or symptoms?  Yes    Has anyone told you to self-quarantine due to exposure to someone with COVID-19?  No    RN Assessment of Patient's Ability to Safely Manage and Self-Administer Respiratory Treatments    Has experience in the management of Respiratory (If NA, indicate and move to Integrative Therapies): Yes    Including knowledge and understanding of the importance of:    Does pt have any of the following Respiratory Illness/disorders?   Asthma, COPD, RAD, Bronchitis, Emphysema, Cystic fibrosis, CIERRA Yes    Which Acute or Chronic Respiratory Illness do you have which requires intervention? Asthma   Did pt bring all prescribed respiratory supplies? Oxygen Concentrator, CPAP, BiPap, Nebulizer, Nebulizer solution, scheduled inhaler, Rescue Inhaler  No, he does not have a rescue inhaler ordered but would like to   Pt understands they must carry  Rescue Inhalers  at all times Yes   Alerting staff with respiratory symptoms?  Wheezing, SOB, Tightness or pain in chest, Excessive Daytime Sleepiness Further Education Needed     Does the patient have the physical and mental ability to:     Perform respiratory cares? Oxygen Concentrator, CPAP, BiPap, Nebulizer tx, scheduled inhaler, Rescue Inhaler tx, respiratory medicines Yes   Determine when and how often to use respiratory treatments? (Oxygen Concentrator, CPAP, BiPap,  Nebulizer tx, scheduled inhaler, Rescue Inhaler tx, respiratory medicines Yes   Oxygen Concentrator, Nebulizer/CPAP/BiPAP accessories No   Oxygen Concentrator, CPAP/BiPAP Therapy settings No     Therefore does the patient, present a risk of harm to themselves or other clients in the facility if allowed to self-administer Respiratory treatments.  Consider factors above.  No    I have assessed the patient to be able to safely administer respiratory treatments.   Pt does not have rescue inhaler ordered yet, is set to see a provider to discuss this    Integrative Therapies: Essential Oils    Patient requesting essential oil inhaler to manage (Mood/Mental Health/Physical/Spiritual symptoms).     Discussed appropriate use of essential oil inhalers and instructed patient not to leave labeled product out on unit.     Patient was screened for kidney disease, asthma/reactive airway disease and rashes and wounds or 1st trimester of pregnancy    List Essential Oils requested by pt Unable to use due to asthma LIMIT ONE ESSENTIAL OIL PER PT    Patient verbalized and demonstrated understanding of how to use essential oil inhaler correctly and will notify LP RN with any concerns or side effects. Patient agrees not to share their essential oil inhaler with other clients.  Continue to support the patient in safely utilizing integrative therapies as able to manage symptoms during treatment.     Patient tobacco use:    Do you use tobacco? Yes, cigarettes, pack to a pack and half a day   Are you interested in quitting? Yes    NRT (Nicotine Replacement therapy) ordered? Yes   Pt is aware of the dangers of tobacco cessation and in contemplation.    Pt given written education.    Nutritional Assessment:    Have you ever purged, binged or restricted yourself as a way to control your weight?   No     Are you on a special diet?   No     Do you have any concerns regarding your nutritional status?   No     Have you had any appetite changes in  the last 3 months?   No   Have you had weight loss or weight gain of more than 10 lbs in the last 3 months?   If patient gained or lost more than 10 lbs, then refer to program RN / attending Physician for assessment.   Yes, explain: Gained weight recently after stopping use   Was the patient informed of BMI?    Above,  Referral to primary care physician   No   Have you engaged in any risk-taking behavior that would put you at risk for exposure to blood-borne or sexually transmitted diseases?   Yes, explain: IV user, would like testing done   Do you have any dental problems?   Yes, is missing part of his dentures which were lost in the ED. Patient to discuss dental issues with the LP nurse.       Review with pt's for opioid use disorder: (Please delete below if not applicable)    Pt reporting last use of opioid on date 10/23/2024   Did pt's bring comfort medications and/or Suboxone as instructed to manage withdrawal? Pt is taking Subutex due to allergy to Suboxone.    Who is pt's community provider that helps with opioid use disorder? Followed by [facility] Bruce but would like to change due to feeling like they don't get it to him on time   Refer pt to walk-in Recovery Clinic? Yes  LPRN to review with pt:   Pt is expected to attend all required LP programming without staff prompting   Compliancy with all prescribed medication self-administration is required   Pt understands LP drug toxicology screening process Yes    LPRN reviewed with pt the following information:  Yes  Pt informed if leaving AMA, they will be directed to take medications with them.  Should pt's choose to leave medications at LP, ALL medications will be packaged and delivered to inpatient pharmacy for temporary storage.  Inpt pharmacy will follow protocol to reach out to pt.  If pharmacy unable to reach pt and/or pt does not retrieve medications, they will be destroyed per inpt pharmacy protocol.   In regards to 'medical concerns/medication refill  expectations' while at LP:  Pt understands LP has no rounding/managing provider to assess medical issues or to refill medications.  Pt's instructed to make virtual/phone appt/s with community provider/s and notify LPRN of date and time  Pt's understand they may not leave LP to attend any medical appt's.   Pt's understand they are responsible for having a plan to refill medications and to allow time to troubleshoot.      Pt verbalizes understanding of the above criteria Yes    [facility]  Nurse Liaison / CD [facility]  O: [phone]  Fax:[phone]  LPRN [place] [id number]  M-F: 7AM to 5:30PM   Sat-Sun: 7AM to 3PM  After hours: [phone]     Nursing Assessment Summary:  See Above    On-going nursing intervention required?   No    Acute care visit recommended: yes.   has a history of IV drug use and is therefore at risk for HIV and Hepatitis C. and wants to see provider about getting an inhaler

## 2024-11-13 NOTE — PLAN OF CARE
Pt discharging to inpatient chemical dependency program at [facility] vis stretcher transport [phone], will give report to [phone] prior to discharge.
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
## 2024-11-13 NOTE — TELEPHONE ENCOUNTER
[facility] Appointment Request   Patients with any form of [facility] insurance Cannot be scheduled with this clinic    Type of appointment Acute    Any specific provider? No    Appointment Request made by Gabby Rodríguez RN: [phone]     Additional appointment medical/ information. Pt stated he has asthma and has used a rescue inhaler in the past but no longer has a prescription for one. He would like a prescription for this.    Date of discharge from [facility] 12/11/24    [facility]  Nurse Liaison / [facility] ()  [facility] [address]  O:[phone]  F:[phone]  RN [place] [id number]

## 2024-11-13 NOTE — PROGRESS NOTES
Care Management Discharge Note    Discharge Date: 11/13/2024       Discharge Disposition: Inpatient Chemical Dependency  Discharge Services: transport  Discharge Transportation: agency    Private pay costs discussed: Not applicable    Does the patient's insurance plan have a 3 day qualifying hospital stay waiver?  No    PAS Confirmation Code:  not needed for this level of care  Patient/family educated on Medicare website which has current facility and service quality ratings: no    Education Provided on the Discharge Plan:  yes  Persons Notified of Discharge Plans: patient, facility, bedside RN  Patient/Family in Agreement with the Plan: yes    Handoff Referral Completed: No, handoff not indicated or clinically appropriate    Additional Information:  Patient is medically ready to discharge today to inpatient chemical dependency treatment. [facility] stretch will pick patient up between [id number] to bring him to [facility]. SW confirmed plan with admissions Kulwant Donahue today, they do not need orders sent and can access the Epic chart.    Nissa Aburto, MSW, LGSW   Social Work   [facility]

## 2024-11-13 NOTE — TELEPHONE ENCOUNTER
----- Message from Jude Donahue sent at 11/13/2024  2:59 PM CST -----  Regarding: Admission  Pt arrived and admitted to [facility] today.    UMBERTO signed for [facility].

## 2024-11-13 NOTE — DISCHARGE SUMMARY
[facility]    Hospitalist Discharge Summary       Date of Admission:  11/9/2024  Date of Discharge:  11/13/2024  Discharging Provider: Iban Almazan MD      Discharge Diagnoses   Possible aseptic meningitis  Polysubstance abuse  Major depression    Follow-ups Needed After Discharge   Follow-up Appointments       Follow-up and recommended labs and tests       Follow up with primary care provider, Physician No Ref-Primary, within 7 days for hospital follow- up.  No follow up labs or test are needed.              Unresulted Labs Ordered in the Past 30 Days of this Admission       Date and Time Order Name Status Description    11/9/2024 10:28 PM Fungal or Yeast Culture Routine Preliminary     11/9/2024  5:33 PM Cerebrospinal fluid Aerobic Bacterial Culture Routine With Gram Stain Preliminary     11/8/2024  8:02 PM Blood Culture Hand, Left Preliminary         These results will be followed up by PCP    Hospital Course   Perry Preciado Jr. is a 49 year old male with PMH MDD, ADHD, KANE, PTSD, polysubstance abuse, who was admitted on 11/9/2024 for infection evaluation.  He was admitted at [facility] inpatient psychiatry unit initially admitted on 10/29/24 for mily, suicide ideations and recent relapse onto meth and fentanyl.  He was transferred to [facility] mental health unit.  His medical history notable for major depressive disorder, ADHD, polysubstance use disorder.   On 11/8 he complained about the worst headache of his life involving also the back of the neck.  A code stroke was called and he had a stroke work up that was largely negative including CT head/ CTA head and neck.  MRI showed no acute pathology.  Due to photophobia, Kernig's sign, poor oral intake, severe pain, patient was transferred to [facility] for further workup.  Patient was started on IV antibiotics including vancomycin and ceftriaxone as well as acyclovir. LP completed with 12 wbc and 9 rbc with only 4% neutrophils.  "Meningitis and encephalitis panel negative. ID following and stopped all antibiotics. Follow up Procalciton elevated 17.6 but patient without a fever. Elevated wbc but patient also getting decadron which will be discontinued. Patient appeared back to baseline on 11/12/2024. Etiology of patient's meningismus unclear, suspect it may have been related to aseptic meningitis. Patient is calm, cooperative, and wishes to discharge to inpatient treatment.     Chronic pain  - Continue Subutex, only 3 day prescription given at discharge  - Continue PTA gabapentin  - Methocarbamol for neck pain     Major depression  Hx of ADHD  KANE  PTSD  - Continue Fluoxetine    - Continue Neurontin  - Continue Lithium  - Continue Prazosin  - Continue Seroquel 100 mg BID and  bedtime Seroqeul  500 mg.       Hyperlipidemia: Continue Lipitor    Nicotine dependence: Nicotine patch    URI: Patient reports mild cough, sore throat at discharge. No oropharyngeal erythema, very small white pinprick lesions noted not consistent with a fungal infection.   - Monitor for improvement    Clinically Significant Risk Factors                              # Obesity: Estimated body mass index is 36.46 kg/m  as calculated from the following:    Height as of 11/8/24: 1.905 m (6' 3\").    Weight as of this encounter: 132.3 kg (291 lb 11.2 oz)., PRESENT ON ADMISSION     # Financial/Environmental Concerns: none  # Asthma: noted on problem list          Consultations This Hospital Stay   PHARMACY TO DOSE VANCO  CARE MANAGEMENT / SOCIAL WORK IP CONSULT  INFECTIOUS DISEASES IP CONSULT  PSYCHIATRY IP CONSULT  VASCULAR ACCESS ADULT IP CONSULT    Code Status   Full Code    Time Spent on this Encounter   IIban, personally saw the patient today and spent approximately 35 minutes discharging this patient.       Iban Almazan MD  [facility]  ______________________________________________________________________    Physical Exam "   Vital Signs: Temp: 98.3  F (36.8  C) Temp src: Oral BP: (!) 143/83 Pulse: 70   Resp: 18 SpO2: 97 % O2 Device: None (Room air)    Weight: 291 lbs 11.2 oz    Constitutional: Male in NAD  HEENT: Eyes nonicteric, oral mucosa moist  Cardiovascular: RRR, normal S1/2, no m/r/g  Respiratory: CTAB, no wheezing or crackles  Vascular: No LE pitting edema, noted distal pedal pulses  GI: Normoactive bowel sounds, nontender, nondistended  Skin/Integumen: No rashes  Neuro/Psych: Appropriate affect and mood. A&Ox3, moves all extremities       Primary Care Physician   Physician No Ref-Primary    Discharge Disposition   Discharged to inpatient treatment  Condition at discharge: Stable    Significant Results and Procedures   Most Recent 3 CBC's:  Recent Labs   Lab Test 11/11/24  0839 11/10/24  1037 11/09/24  0528   WBC 19.6* 26.8* 30.0*   HGB 11.9* 12.0* 12.7*   MCV 91 90 89    221 219     Most Recent 3 BMP's:  Recent Labs   Lab Test 11/13/24  0559 11/11/24  0839 11/10/24  1037 11/09/24  0528 11/08/24  2335 11/08/24  1810   NA  --   --  140 136  --  139   POTASSIUM  --   --  4.1 4.5  --  5.0   CHLORIDE  --   --  105 99  --  99   CO2  --   --  24 25  --  29   BUN  --   --  19.2 25.0*  --  21.5*   CR  --  0.95 0.88 1.28*  --  1.28*   ANIONGAP  --   --  11 12  --  11   DORENE  --   --  8.8 8.9  --  9.4   *  --  379* 144*   < > 105*    < > = values in this interval not displayed.     Most Recent 2 LFT's:  Recent Labs   Lab Test 11/10/24  1037 11/09/24  0528   AST 20 33   ALT 30 37   ALKPHOS 85 57   BILITOTAL 0.2 0.3     Most Recent 3 INR's:  Recent Labs   Lab Test 11/08/24  1810   INR 0.95     Most Recent 3 Troponin's:No lab results found.  Most Recent 3 BNP's:No lab results found.  Most Recent D-dimer:No lab results found.  Most Recent Cholesterol Panel:  Recent Labs   Lab Test 05/19/24  0732   CHOL 133   LDL 66   HDL 32*   TRIG 173*       Results for orders placed or performed during the hospital encounter of 10/24/24   CT  Head w/o Contrast    Narrative    CT HEAD W/O CONTRAST, CTA HEAD NECK W CONTRAST 11/8/2024 6:51 PM    CT Head without contrast  CT Angiogram of the Neck with contrast   CT Angiogram of the Head with contrast    History:  Code Stroke to evaluate for potential thrombolysis and  thrombectomy. PLEASE READ IMMEDIATELY    Comparison: MR head 1/10/2019.     Technique:   HEAD CT: Using multidetector thin collimation helical acquisition  technique, axial, coronal and sagittal CT images were obtained from  the base of the skull to the vertex without intravenous contrast and  reviewed in brain, bone, and subdural windows.     HEAD and NECK CTA: Thereafter, postcontrast images were obtained from  the aortic arch through the [place] of [place].  Axial images were  obtained using thin collimation multidetector helical technique. This  CT angiogram data was reconstructed at thin intervals with mild  overlap. Images were sent to the [employer] workstation, and 3D  reconstructions and multiplanar reformations were obtained with  reconstructions performed by the technologist and the radiologist. The  source images, multiplanar reformations, 3D reconstructions in both  maximum intensity projection display and volume rendered models were  reviewed,     Findings:   Head CT: There is no evidence of intracranial hemorrhage, mass effect,  or midline shift. Gray/white matter differentiation in both cerebral  hemispheres is preserved. There is mild patchy low attenuation within  the periventricular and supraventricular white matter of both cerebral  hemispheres, nonspecific but most likely representing chronic small  vessel ischemic disease, given the patient's age. The ventricles and  sulci are enlarged but within normal limits for the patient's age, and  the ventricles are not out of proportion to the sulci.     The visualized portions of the paranasal sinuses and mastoid air cells  are clear.    Head CTA demonstrates no aneurysm or stenosis of  the major  intracranial arteries. The anterior communicating artery is patent.  The posterior communicating arteries are patent bilaterally.     Neck CTA demonstrates no stenosis of the major cervical arteries on  either side. The origins of the great vessels from the aortic arch are  patent. The normal distal right internal carotid artery measures 5 mm.  The normal distal left internal carotid artery measures 5 mm.     No mass is noted within the visualized portions of the cervical soft  tissues or lung apices. Scattered left apical consolidative  groundglass opacities.      Impression    Impression:   1. No evidence of intracranial hemorrhage.  2. Head CTA demonstrates no aneurysm or stenosis of the major  intracranial arteries.   3. Neck CTA demonstrates no stenosis of the major cervical arteries.   4. Partially visualized left apical consolidative groundglass  opacities, concerning for developing infectious/inflammatory process.  Recommend follow-up chest radiograph for further assessment.    I have personally reviewed the examination and initial interpretation  and I agree with the findings.    SCOOTER MICHELLE MD         SYSTEM ID:  [id number]   CTA Head Neck with Contrast    Narrative    CT HEAD W/O CONTRAST, CTA HEAD NECK W CONTRAST 11/8/2024 6:51 PM    CT Head without contrast  CT Angiogram of the Neck with contrast   CT Angiogram of the Head with contrast    History:  Code Stroke to evaluate for potential thrombolysis and  thrombectomy. PLEASE READ IMMEDIATELY    Comparison: MR head 1/10/2019.     Technique:   HEAD CT: Using multidetector thin collimation helical acquisition  technique, axial, coronal and sagittal CT images were obtained from  the base of the skull to the vertex without intravenous contrast and  reviewed in brain, bone, and subdural windows.     HEAD and NECK CTA: Thereafter, postcontrast images were obtained from  the aortic arch through the [place] of [place].  Axial images were  obtained  using thin collimation multidetector helical technique. This  CT angiogram data was reconstructed at thin intervals with mild  overlap. Images were sent to the [employer] workstation, and 3D  reconstructions and multiplanar reformations were obtained with  reconstructions performed by the technologist and the radiologist. The  source images, multiplanar reformations, 3D reconstructions in both  maximum intensity projection display and volume rendered models were  reviewed,     Findings:   Head CT: There is no evidence of intracranial hemorrhage, mass effect,  or midline shift. Gray/white matter differentiation in both cerebral  hemispheres is preserved. There is mild patchy low attenuation within  the periventricular and supraventricular white matter of both cerebral  hemispheres, nonspecific but most likely representing chronic small  vessel ischemic disease, given the patient's age. The ventricles and  sulci are enlarged but within normal limits for the patient's age, and  the ventricles are not out of proportion to the sulci.     The visualized portions of the paranasal sinuses and mastoid air cells  are clear.    Head CTA demonstrates no aneurysm or stenosis of the major  intracranial arteries. The anterior communicating artery is patent.  The posterior communicating arteries are patent bilaterally.     Neck CTA demonstrates no stenosis of the major cervical arteries on  either side. The origins of the great vessels from the aortic arch are  patent. The normal distal right internal carotid artery measures 5 mm.  The normal distal left internal carotid artery measures 5 mm.     No mass is noted within the visualized portions of the cervical soft  tissues or lung apices. Scattered left apical consolidative  groundglass opacities.      Impression    Impression:   1. No evidence of intracranial hemorrhage.  2. Head CTA demonstrates no aneurysm or stenosis of the major  intracranial arteries.   3. Neck CTA demonstrates  no stenosis of the major cervical arteries.   4. Partially visualized left apical consolidative groundglass  opacities, concerning for developing infectious/inflammatory process.  Recommend follow-up chest radiograph for further assessment.    I have personally reviewed the examination and initial interpretation  and I agree with the findings.    SCOOTER MICHELLE MD         SYSTEM ID:  [id number]   MR Brain w/o & w Contrast    Narrative    EXAM: MR BRAIN W/O & W CONTRAST  11/8/2024 9:27 PM     HISTORY:  New onset worst headache, had code stroke, CT okay,  recommended MRI, although top differential now meningitis       COMPARISON:  MR brain 1/10/2019, CT head 11/8/2024    TECHNIQUE: Sagittal T1-weighted, coronal T2-weighted, axial T2 FLAIR,  axial susceptibility weighted, and axial diffusion-weighted with ADC  map images of the brain were obtained without intravenous contrast.  After the administration of intravenous contrast axial and coronal  fat-suppressed T1-weighted weighted images were obtained    CONTRAST: 12.3 mL Gadavist.    FINDINGS:    There is no mass effect, midline shift, or intracranial hemorrhage.  The ventricles are proportionate to the cerebral sulci.  Diffusion-weighted images are negative for acute/focal abnormality..  No definite evidence for leptomeningeal disease. There is artifact and  vascularity within the posterior fossa which mildly limits evaluation  for leptomeningeal disease in the posterior fossa.    Major intracranial vascular structures are within normal limits.  Hypointense signal within the major intracranial arterial vessels may  be artifact secondary to rapid movement of blood versus signal  saturation effects.    No suspicious abnormality of the skull marrow signal. Scattered trace  paranasal sinus mucosal debris. Small right mastoid air cell  effusion.. No focal abnormality of the pituitary gland, sella, skull  base and upper cervical spinal structures on sagittal images.  The  orbits are normal.      Impression    IMPRESSION:  1.  No diffusion restriction concerning for stroke.  2.  No definite evidence for leptomeningeal disease, however subtle  leptomeningeal disease can be inconspicuous on MRI.    I have personally reviewed the examination and initial interpretation  and I agree with the findings.    SCOOTER MICHELLE MD         SYSTEM ID:  [id number]       Discharge Orders      Reason for your hospital stay    You were hospitalized for an infection, possibly a viral meningitis. You improved with supportive management.     Follow-up and recommended labs and tests     Follow up with primary care provider, Physician No Ref-Primary, within 7 days for hospital follow- up.  No follow up labs or test are needed.     Activity    Your activity upon discharge: activity as tolerated     Diet    Follow this diet upon discharge: Regular diet     Discharge Medications   Current Discharge Medication List        CONTINUE these medications which have CHANGED    Details   atomoxetine (STRATTERA) 80 MG capsule Take 1 capsule (80 mg) by mouth daily.  Qty: 30 capsule, Refills: 0    Associated Diagnoses: KANE (generalized anxiety disorder)      atorvastatin (LIPITOR) 20 MG tablet Take 1 tablet (20 mg) by mouth every evening.  Qty: 30 tablet, Refills: 0    Associated Diagnoses: Hypertriglyceridemia      buprenorphine (SUBUTEX) 2 MG SUBL sublingual tablet Place 4 tablets (8 mg) under the tongue 2 times daily AND 2 tablets (4 mg) daily. At 2PM.  Qty: 30 tablet, Refills: 0    Associated Diagnoses: Hx of sepsis      diphenhydrAMINE (BENADRYL) 25 MG capsule Take 1-2 capsules (25-50 mg) by mouth every 4 hours as needed for itching (Reported skin itching.).  Qty: 30 capsule, Refills: 0    Associated Diagnoses: Allergic reaction, initial encounter      FLUoxetine (PROZAC) 40 MG capsule Take 1 capsule (40 mg) by mouth daily.  Qty: 30 capsule, Refills: 0    Associated Diagnoses: Schizoaffective disorder, bipolar  type (H)      gabapentin (NEURONTIN) 600 MG tablet Take 1 tablet (600 mg) by mouth 3 times daily.  Qty: 90 tablet, Refills: 0    Associated Diagnoses: KANE (generalized anxiety disorder)      lithium (ESKALITH CR/LITHOBID) 450 MG CR tablet Take 2 tablets (900 mg) by mouth at bedtime.  Qty: 60 tablet, Refills: 0    Associated Diagnoses: Schizoaffective disorder, bipolar type (H)      multivitamin w/minerals (THERA-VIT-M) tablet Take 1 tablet by mouth daily.  Qty: 90 tablet, Refills: 1    Associated Diagnoses: KANE (generalized anxiety disorder)      nicotine (COMMIT) 2 MG lozenge Place 1-2 lozenges (2-4 mg) inside cheek every hour as needed for nicotine withdrawal symptoms.  Qty: 48 lozenge, Refills: 0    Associated Diagnoses: Tobacco use disorder      prazosin (MINIPRESS) 1 MG capsule Take 1 capsule (1 mg) by mouth at bedtime.  Qty: 30 capsule, Refills: 0    Associated Diagnoses: KANE (generalized anxiety disorder)      !! QUEtiapine (SEROQUEL) 400 MG tablet Take 1 tablet (400 mg) by mouth at bedtime.  Qty: 30 tablet, Refills: 0    Associated Diagnoses: Depression, unspecified depression type      !! QUEtiapine (SEROQUEL) 50 MG tablet Take 1 tablet (50 mg) by mouth 2 times daily.  Qty: 60 tablet, Refills: 0    Associated Diagnoses: Schizoaffective disorder, bipolar type (H)       !! - Potential duplicate medications found. Please discuss with provider.        STOP taking these medications       hydrocortisone (CORTAID) 0.5 % external cream Comments:   Reason for Stopping:             Allergies   Allergies   Allergen Reactions    Wellbutrin [Bupropion] Anaphylaxis and Swelling     Facial swelling    Wellbutrin [Bupropion]     Wellbutrin [Bupropion] Difficulty breathing    Naltrexone Other (See Comments)     Headaches  Headaches

## 2024-11-13 NOTE — GROUP NOTE
Group Therapy Documentation    PATIENT'S NAME: Perry Preciado Jr.  MRN:   [id number]  :   1975  ACCT. NUMBER: [id number]  DATE OF SERVICE: 24  START TIME: 12:30 PM  END TIME:  2:30 PM  FACILITATOR(S): Maurice Franklin LADC  TOPIC: BEH Group Therapy  Number of patients attending the group:  10  Group Length:  2 Hours    Dimensions addressed: 1, 2, 3, 4, 5, and 6    Summary of Group / Topics Discussed:    Recovery Principles and Disease of addiction      Group Attendance:  Attended group session    Patient's response to the group topic/interactions:  cooperative with task    Patient appeared to be Actively participating.        Client specific details:  Perry participated and interacted appropriately with peers and staff in PM group. No triggers to use noted or discussed.

## 2024-11-13 NOTE — PROGRESS NOTES
Initial Services Plan    Before your first treatment group, please do the following    Immediate health & safety concerns: Look for sober housing and a supportive social network.  Look for a support network (such as AA, NA, DBT group, a Jewish group, etc.)  Other: Obtain DA in next 10 days.     Suggestions for client during the time between intake & completion of treatment plan:  Tour your treatment center (unit or outpatient clinic).  Introduce yourself to the treatment group.  Spend time getting to know your peers.  Complete the problem list for your treatment plan.  Start drug and alcohol use history.  Review your patient or client handbook.    Client issues to be addressed in the first treatment sessions:  Other aftercare planning    SVETLANA Smallwood  11/13/2024

## 2024-11-14 ENCOUNTER — TELEPHONE (OUTPATIENT)
Dept: BEHAVIORAL HEALTH | Facility: CLINIC | Age: 49
End: 2024-11-14

## 2024-11-14 ENCOUNTER — PATIENT OUTREACH (OUTPATIENT)
Dept: CARE COORDINATION | Facility: CLINIC | Age: 49
End: 2024-11-14

## 2024-11-14 ENCOUNTER — OFFICE VISIT (OUTPATIENT)
Dept: BEHAVIORAL HEALTH | Facility: CLINIC | Age: 49
End: 2024-11-14
Payer: COMMERCIAL

## 2024-11-14 ENCOUNTER — HOSPITAL ENCOUNTER (OUTPATIENT)
Dept: BEHAVIORAL HEALTH | Facility: CLINIC | Age: 49
Discharge: HOME | End: 2024-11-14
Attending: FAMILY MEDICINE
Payer: COMMERCIAL

## 2024-11-14 VITALS — HEART RATE: 80 BPM | SYSTOLIC BLOOD PRESSURE: 112 MMHG | DIASTOLIC BLOOD PRESSURE: 69 MMHG

## 2024-11-14 DIAGNOSIS — F10.90 ALCOHOL USE DISORDER: ICD-10-CM

## 2024-11-14 DIAGNOSIS — Z51.81 ENCOUNTER FOR MONITORING OPIOID MAINTENANCE THERAPY: ICD-10-CM

## 2024-11-14 DIAGNOSIS — F15.90 STIMULANT USE DISORDER: ICD-10-CM

## 2024-11-14 DIAGNOSIS — F11.20 OPIOID USE DISORDER, SEVERE, DEPENDENCE (H): Primary | ICD-10-CM

## 2024-11-14 DIAGNOSIS — Z79.891 ENCOUNTER FOR MONITORING OPIOID MAINTENANCE THERAPY: ICD-10-CM

## 2024-11-14 DIAGNOSIS — Z72.51 HISTORY OF UNPROTECTED SEX: ICD-10-CM

## 2024-11-14 DIAGNOSIS — F17.200 TOBACCO USE DISORDER: Primary | ICD-10-CM

## 2024-11-14 DIAGNOSIS — F11.90 OPIOID USE DISORDER: ICD-10-CM

## 2024-11-14 DIAGNOSIS — F39 MOOD DISORDER (H): ICD-10-CM

## 2024-11-14 DIAGNOSIS — F17.200 TOBACCO USE DISORDER: ICD-10-CM

## 2024-11-14 DIAGNOSIS — F29 PSYCHOSIS, UNSPECIFIED PSYCHOSIS TYPE (H): ICD-10-CM

## 2024-11-14 DIAGNOSIS — F19.90 IVDU (INTRAVENOUS DRUG USER): ICD-10-CM

## 2024-11-14 LAB
AMPHETAMINE QUAL URINE POCT: NEGATIVE
BACTERIA CSF CULT: NO GROWTH
BARBITURATE QUAL URINE POCT: NEGATIVE
BENZODIAZEPINE QUAL URINE POCT: NEGATIVE
BUPRENORPHINE QUAL URINE POCT: ABNORMAL
COCAINE QUAL URINE POCT: NEGATIVE
CREATININE QUAL URINE POCT: ABNORMAL
FENTANYL UR QL: NORMAL
FENTANYL UR QL: NORMAL
GRAM STAIN RESULT: NORMAL
GRAM STAIN RESULT: NORMAL
HBV SURFACE AG SERPL QL IA: NONREACTIVE
INTERNAL QC QUAL URINE POCT: ABNORMAL
MDMA QUAL URINE POCT: NEGATIVE
METHADONE QUAL URINE POCT: NEGATIVE
METHAMPHETAMINE QUAL URINE POCT: NEGATIVE
OPIATE QUAL URINE POCT: NEGATIVE
OXYCODONE QUAL URINE POCT: NEGATIVE
PH QUAL URINE POCT: ABNORMAL
PHENCYCLIDINE QUAL URINE POCT: NEGATIVE
POCT KIT EXPIRATION DATE: ABNORMAL
POCT KIT LOT NUMBER: ABNORMAL
SPECIFIC GRAVITY POCT: 1.02
TEMPERATURE URINE POCT: ABNORMAL
THC QUAL URINE POCT: NEGATIVE

## 2024-11-14 PROCEDURE — 80307 DRUG TEST PRSMV CHEM ANLYZR: CPT

## 2024-11-14 PROCEDURE — 1002N00001 HC LODGING PLUS FACILITY CHARGE ADULT

## 2024-11-14 PROCEDURE — H2035 A/D TX PROGRAM, PER HOUR: HCPCS | Mod: HQ

## 2024-11-14 RX ORDER — GABAPENTIN 600 MG/1
300 TABLET ORAL 3 TIMES DAILY
COMMUNITY
Start: 2024-11-14

## 2024-11-14 RX ORDER — NICOTINE 21 MG/24HR
1 PATCH, TRANSDERMAL 24 HOURS TRANSDERMAL EVERY 24 HOURS
Qty: 30 PATCH | Refills: 0 | Status: SHIPPED | OUTPATIENT
Start: 2024-11-14

## 2024-11-14 RX ORDER — BUPRENORPHINE 8 MG/1
20 TABLET SUBLINGUAL DAILY
Qty: 37 TABLET | Refills: 0 | Status: SHIPPED | OUTPATIENT
Start: 2024-11-14

## 2024-11-14 ASSESSMENT — COLUMBIA-SUICIDE SEVERITY RATING SCALE - C-SSRS
6. HAVE YOU EVER DONE ANYTHING, STARTED TO DO ANYTHING, OR PREPARED TO DO ANYTHING TO END YOUR LIFE?: YES
1. IN THE PAST MONTH, HAVE YOU WISHED YOU WERE DEAD OR WISHED YOU COULD GO TO SLEEP AND NOT WAKE UP?: YES
2. HAVE YOU ACTUALLY HAD ANY THOUGHTS OF KILLING YOURSELF?: NO
6. HAVE YOU EVER DONE ANYTHING, STARTED TO DO ANYTHING, OR PREPARED TO DO ANYTHING TO END YOUR LIFE?: NO

## 2024-11-14 ASSESSMENT — PATIENT HEALTH QUESTIONNAIRE - PHQ9: SUM OF ALL RESPONSES TO PHQ QUESTIONS 1-9: 11

## 2024-11-14 NOTE — GROUP NOTE
Group Therapy Documentation    PATIENT'S NAME: Perry Preciado Jr.  MRN:   [id number]  :   1975  ACCT. NUMBER: [id number]  DATE OF SERVICE: 24  START TIME: 12:30 PM  END TIME:  2:30 PM  FACILITATOR(S): Maurice Franklin LADC  TOPIC: BEH Group Therapy  Number of patients attending the group:  11  Group Length:  2 Hours    Dimensions addressed: 1, 2, 3, 4, 5, and 6    Summary of Group / Topics Discussed:    Recovery Principles, Disease of addiction, and Relapse prevention      Group Attendance:  Attended group session    Patient's response to the group topic/interactions:  cooperative with task    Patient appeared to be Actively participating.        Client specific details:  Kb participated and interacted appropriately with peers and staff in PM group. No triggers to use noted or discussed.

## 2024-11-14 NOTE — PROGRESS NOTES
Name: Perry Preciado Jr.  Date: 11/13/2024  Medical Record: [id number]    Envelope Number: [id number]    List of Contents (List each item separately in new row):   Buprenorphine 8mg    Admission:  I am responsible for any personal items that are not sent to the safe or pharmacy.  [facility] is not responsible for loss, theft or damage of any property in my possession.  Patient Signature:  ___________________________________________       Date/Time:__________________________    Staff Signature: __________________________________       Date/Time:__________________________    [facility] Staff person, if patient is unable/unwilling to sign:      __________________________________________________________       Date/Time: __________________________      **All medications are packaged by [facility] staff and securely stored on [facility]. Medications left by patients at discharge will be packaged by [facility] staff and transported by [facility] staff to inpatient pharmacy for storage.**    Discharge:  [facility] has returned all of my personal belongings:    Patient Signature: ________________________________________     Date/Time: ____________________________________    Staff Signature: ______________________________________     Date/Time:_____________________________________

## 2024-11-14 NOTE — PROGRESS NOTES
[facility]: [facility]    Background: Transitional Care Management program identified per system criteria and reviewed by [facility] [place] team for possible outreach.    Assessment: Upon chart review, [facility] Team member will not proceed with patient outreach related to this episode of Transitional Care Management program due to reason below:    Patient discharged to Inpatient Chemical Dependency.    Plan: Transitional Care Management episode addressed appropriately per reason noted above.      DELIA Palma  [facility]    *Connected Care Resource Team does NOT follow patient ongoing. Referrals are identified based on internal discharge reports and the outreach is to ensure patient has an understanding of their discharge instructions.

## 2024-11-14 NOTE — TELEPHONE ENCOUNTER
Pt had a rescue inhaler dropped off by family and did endorse having asthma during intake. However, there is no visible order for the albuterol inhaler and the one brought in does not have a prescription on it. Pt has a new medical appointment set up for Monday to discuss asthma but until then, would you be willing to put in an order so he can have it while at LP?

## 2024-11-14 NOTE — GROUP NOTE
Psychoeducation Group Documentation    PATIENT'S NAME: Perry Preciado Jr.  MRN:   [id number]  :   1975  ACCT. NUMBER: [id number]  DATE OF SERVICE: 24  START TIME:  3:00 PM  END TIME:  4:00 PM  FACILITATOR(S): Nel Carr LADC; Jeimy Preston LADC; Jasmin Green LADC  TOPIC: BEH Pyschoeducation  Number of patients attending the group:  29  Group Length:  1 Hours    Skills Group Therapy Type: Recovery skills    Summary of Group / Topics Discussed:    Relationship/social skills, Balanced lifestyle skills, Symptom management skills, Medication management skills, and Relapse prevention skills  Patients attended a skills group with Dr. Hopkins (Professor from the [facility].) Patients developed knowledge on the disease of addiction from both a biological and environmental perspective, through addiction based research studies.  Patients identified ways to utilize treatment and recovery strategies to address their biological and environmental factors leading to use of substances. Each patient had an opportunity to process the information, ask questions of the facilitator, and provide constructive feedback to peers who shared.           Group Attendance:  Attended group session    Patient's response to the group topic/interactions:  cooperative with task    Patient appeared to be Engaged.         Client specific details:  Perry attended pm skills group and engaged in sufficient discussion.

## 2024-11-14 NOTE — PROGRESS NOTES
Acknowledgement of Current Treatment Plan - Initial Treatment Plan      INITIAL TREATMENT PLAN:      1. I have participated in creating my treatment plan with my therapist/counselor on 11/14/2024. I agree with the plan as it is written in the electronic health record.     Name Signature/Date   Perry Preciado                                                                   11/14/2024   Name of Therapist / Counselor      Jeimy Preston [facility], [facility] Signature/Date                                                                     11/14/2024      2. I have completed and reviewed my Safety Plan with my counselor and signed this on 11/13/2024. I have been given the option for a hard copy of this plan.     Patient signature/date:       ________________________________________________________________11/14/2024_     3. Last Use Date: 10.24.24     Patient signature/date:      __________________________________________________________________11/14/2024___

## 2024-11-14 NOTE — PROGRESS NOTES
/Comprehensive Assessment Summary     Based on client interview, review of previous assessments and   comprehensive assessment interview the following diagnosis and recommendations are:     Patient: Perry Preciado Jr.  MRN; [id number]   : 1975  Age: 49 year old Sex: male       Client meets criteria for:  F15.20 Amphetamine use disorder, severe  F10.20 Alcohol use disorder, severe  F11.10 Opioid use disorder, mild, on maintenance therapy    Dimension One: Acute Intoxication/Withdrawal Potential     Ratin  (Consider the client's ability to cope with withdrawal symptoms and current state of intoxication)   Patient provided divergent reports regarding return to use.  He reported to recovery clinic staff a return to opioid use in 2024, while reporting a 1 week relapse prior to hospitalization in .  Patient is taking Buprenorphine to assist with opioid use disorder management.  Last use of Alcohol 10.24.24; last use of marijuana 10.24.24; last use of methamphetamine 10.22.24; last use of fentanyl (per substance use assessment update) 10.22.24).      Dimension Two: Biomedical Condition and Complications    Ratin  (Consider the degree to which any physical disorder would interfere with treatment for substance abuse, and the client's ability to tolerate any related discomfort; determine the impact of continued chemical use on the unborn child if the client is pregnant)   The patient recently was diagnosed with aseptic meningitis resulting in treatment at [facility] from 24 until 24.  Patient also has history of chronic pain including sciatics, degenerative disc disease, hyperlipidemia, hypertension, and asthma.  Patient received a history and physical 10/30/24.  Patient reports ability to manage medical conditions within [facility] and reports physical health concerns will not interfere with program requirements.    Dimension Three: Emotional/Behavioral/Cognitive  Conditions & Complications  Ratin  (Determine the degree to which any condition or complications are likely to interfere with treatment for substance abuse or with functioning in significant life areas and the likelihood of risk of harm to self or others)   Prior to his transfer to [facility] to treat aseptic meningitis, patient was hospitalized 10/29/24 until 24 for mily and suicidal ideation with plan and intent.  Patient reported demons were threatening to kill him.  Patient was previously diagnosed with post traumatic stress disorder, traumatic brain injury, anxiety, depression, and schizoaffective disorder, bipolar type.  Patient has history of 2 suicide attempts.  The most recent was in 2024 when he placed a firearm in his mouth and pulled the trigger.  The gun did not fire.  Patient then took the firearm out of his mouth and pulled the trigger again, and the gun fired.  Patient's other plans for suicide included cutting his wrists and walking out into traffic.  Patient reports auditory and visual hallucinations have been present since childhood.  As of 24 patient scored 6/24 on the PHQ9 suggesting mild depressive symptoms.  Patient scored 5/21 on the GAD7 suggesting mild symptoms of anxiety.  Patient is recommended to meet with individual therapist throughout his treatment stay as needed.    Dimension Four: Treatment Acceptance/Resistance     Ratin  (Consider the amount of support and encouragement necessary to keep the client involved in treatment)   Patient's most recent full substance use assessment was 23 while in the ED at [facility].  He went to MI/CD treatment at [facility], then followed up with aftercare at [facility].  He then transferred to a sober house.  Patient's mental health deteriorated and he was again hospitalized.  [facility] [place] [facility]  He then transferred to a [facility] where his substance use and mental health needs were not  consistently or appropriately met.  He was then hospitalized (2024) for suicidal ideation with plan and intent, paranoia, and mily.  Patient transferred to a medical unit to treat aseptic meningitis, and eventually transferred to [facility] 24.  Patient reports desire for sobriety, however, demonstrates limited capacity to create a plan of aftercare that adequately provides support, structure, and accountability to support his mental health and substance use needs.  Patient reports he would like to go back to [facility] or into a group home through his CADI waiver upon discharge from residential MI/CD treatment at [facility].  Patient appears to be in contemplation stage of change regarding enacting a lifestyle conducive to recovery.    Dimension Five: Continued Use/Relaspe Prevention     Ratin  (Consider the degree to which the client's recognizes relapse issues and has the skills to prevent relapse of either substance use or mental health problems)   Patient reports a lengthy history of substance use treatment with some periods of sobriety when living in a highly structured and professionally supported environment.  Patient reports he is supported by his CADI worker and his sister.  Patient reports he has a sponsor, but does not have adequate sober supports outside of professionals.  Patient reports previous history of 11 months, and later 8 months of sobriety in the past.  Prior to his most recent relapse, patient reports sobriety of 8 months.    Dimension Six: Recovery Environment     Ratin  (Consider the degree to which key areas of the client's life are supportive of or antagonistic to treatment participation and recovery)   Patient was previously staying in a [facility] and lodge without adequate services or supports to treat symptoms of mental health or substance use disorders.  Patient reports lack of sober supports, however, he does report a relationship with a sponsor.  Patient is not  employed, and reports he has a CADI worker.  Patient reports he has a good relationship with his sister, but no other contact with family members.      I have reviewed the information on the assessment, psychosocial and medical history and checklist:        it is current    Jeimy MOTA, [facility]

## 2024-11-14 NOTE — PROGRESS NOTES
Name: Perry Preciado Jr.  Date: 11/13/2024  Medical Record: [id number]    Envelope Number: [id number]    List of Contents (List each item separately in new row):   Unknown meds  Tab a radha  Clear eyes  Gabapentin 600 MG   Hydroxyzine 50 MG    Admission:  I am responsible for any personal items that are not sent to the safe or pharmacy.  [facility] is not responsible for loss, theft or damage of any property in my possession.      Patient Signature:  ___________________________________________       Date/Time:__________________________    Staff Signature: __________________________________       Date/Time:__________________________    [facility] Staff person, if patient is unable/unwilling to sign:      __________________________________________________________       Date/Time: __________________________      **All medications are packaged by [facility] staff and securely stored on [facility]. Medications left by patients at discharge will be packaged by [facility] staff and transported by [facility] staff to inpatient pharmacy for storage.**    Discharge:  [facility] has returned all of my personal belongings:    Patient Signature: ________________________________________     Date/Time: ____________________________________    Staff Signature: ______________________________________     Date/Time:_____________________________________

## 2024-11-14 NOTE — PROGRESS NOTES
[facility] Initial Visit    ASSESSMENT/PLAN                                                      1. Opioid use disorder, severe, dependence (H) (Primary)  50 yo male with 25+yr h/o opioid use including IV use.  Pt reports last fentanyl use was ~11/8/24 but he is not certain about the dates.    He reports cravings for fentanyl and is presenting with excess sedation.   Continue buprenorphine 20mg TDD at this time.   Decreasing gabapentin to address sedation.   Continue LP and follow counselors' recommendations  Naloxone prescribed  - Drugs of Abuse Screen Urine (POC CUPS) POCT; Standing  - Drugs of Abuse Screen Urine (POC CUPS) POCT  - buprenorphine (SUBUTEX) 8 MG SUBL sublingual tablet; Place 2.5 tablets (20 mg) under the tongue daily. Take 1 in AM, 1 1/2 in PM  Dispense: 37 tablet; Refill: 0  - FENTANYL, QUALITATIVE, WITH REFLEX TO QUANT URINE    2. Encounter for monitoring opioid maintenance therapy  - Drugs of Abuse Screen Urine (POC CUPS) POCT; Standing  - Drugs of Abuse Screen Urine (POC CUPS) POCT  - FENTANYL, QUALITATIVE, WITH REFLEX TO QUANT URINE    3. Stimulant use disorder  Continue programming through LP and follow recommendations.   Pt reports allergy to bupropion.   Pt may benefit from rx stimulant treatment if MH symptoms stabilize in the future.       4. Alcohol use disorder  Decreasing gabapentin to 300mg tid due to level of sedation.   Continue programming through LP and follow recommendations  - gabapentin (NEURONTIN) 600 MG tablet; Take 0.5 tablets (300 mg) by mouth 3 times daily.    5. Tobacco use disorder  Pt is using NRT, encouraged cessation.  Pt reports allergy to bupropion.     6. IVDU (intravenous drug user)  Orders placed for updated HCV and HIV screening    7. History of unprotected sex  Pt was interested in STI testing, but wanted to have blood drawn on a different date.  Communicated this to  nursing.   - Hepatitis B Surface Antibody; Future  - Hepatitis B  core antibody; Future  - NEISSERIA GONORRHOEA PCR  - CHLAMYDIA TRACHOMATIS PCR  - Hepatitis B surface antigen; Future  - Hepatitis C Screen Reflex to HCV RNA Quant and Genotype; Future  - HIV Antigen Antibody Combo Cascade; Future  - Treponema Abs w Reflex to RPR and Titer; Future    8. Mood disorder (H)  9. Psychosis, unspecified psychosis type (H)  Currently prescribed fluoxetine, lithium, prazosin, quetiapine.   Previous diagnoses include MDD with psychotic features, schizoaffective disorder, MDD with substance induced psychosis.  Has appointment with dual diagnosis clinic 11/27/24 for further evaluation and treatment.         Return in about 2 weeks (around 11/28/2024).      Patient counseling completed today:  Discussed mechanism of action, potential risks/benefits/side effects of medications and other recommendations above.    Discussed risk of precipitated withdrawal with initiation of buprenorphine in the presence of full opioid agonists.    Reviewed directions for initiation of buprenorphine to reduce risk of precipitated withdrawal and maximize efficacy.    Harm reduction counseling including never use alone, availability of naloxone, avoiding combination of opioids with benzodiazepines, alcohol, or other sedatives, safer administration.      Discussed importance of avoiding isolation, building a network of supportive relationships, avoiding people/places/things associated with past use to reduce risk of relapse; including motivational interviewing regarding psychosocial treatment for addiction.     SUBJECTIVE                                                    Rooming information:    Pt was brought to  by LP staff with concerns of drowsiness,pt is sleepingbut also coherent answering questions,pt states its from being released from hospital and having meninginitis. Pt states hsi dose of subutex is fine has been on that same dose for a while  Preferred name and pronouns: e/HIM   Reason for visit: refill  for subutex,discuss dose  Last use of fentanyl or other full opioid agonist: with a week ago  Last dose of buprenorphine (if applicable:) 11/14/24 morning  Withdrawal symptoms currently: unsure- states might be due to decrease of hospital dose to now low dose  Other substances taken in the last 2 weeks: none  LMP (if applicable:) NA  Food/housing/insurance assistance needed: LP 11/13/24  3rd party involvement desired: NA  Best phone number for patient: LP number , pt doesn't have a phone right now    Depression Response    Patient completed the PHQ-9 assessment for depression and scored >9? Yes  Question 9 on the PHQ-9 was positive for suicidality? Yes, thoughts that want to be done  C-SSRS screener risk level. C-SSRS Risk (Lifetime/Recent)  Calculated C-SSRS Risk Score (Lifetime/Recent): Moderate Risk   Does patient have current mental health provider? No    Is this a virtual visit? No    I personally notified the following: visit provider        CC/HPI:  Perry Preciado Jr. is a 49 year old male with PMH dyslipidemia, TBI, psychosis, mood disorder, ADHD, and polysubstance use disorder including opioids on buprenorphine who presents to the Recovery Clinic for initial visit.      Brief History:  Perry Preciado Jr. was first seen in Recovery Clinic on 11/14/24. They were referred by staff from [facility] for pt to continue buprenorphine.   Pt was hospitalized 11/8-11/13/24 for evaluation of headache, was eventually diagnosed with possible aseptic meningitis following LP and imaging.  He admitted to [facility] directly from this hospitalization.   He was also hospitalized 10/24-11/8/24 for SI and psychosis in setting of substance use.    At initial  visit, pt reported he stopped buprenorphine and returned to use of fentanyl in 9/2024.    Pt is not a good historian at time of initial visit.          Substance Use History :  Opioids:   Age at first use: 13, taking parent's pain pills; began using heroin in  his 30's; then fentanyl pressed pills  Most recent use: substance: pressed fentanyl pills; quantity 2 per day; route: IN ; timing of last use: 11/8/24     IV drug use: Yes: last IV use 11/8/24  History of overdose: Yes  Previous residential or outpatient treatments for addiction : Yes: 5+  Previous medication treatments for addiction: Yes: buprenorphine off and on for several years  Longest period of sobriety: 2 years  Medical complications related to substance use: overdose  Hepatitis C: 5/27/24 HCV ab nonreactive  HIV: 5/22/24 HIV ag/ab nonreactive    Other Addiction History:  Stimulants   Methamphetamine first use age 13, given to him by his father.  Uses by inhaled or IV routes. Started also using cocaine later in life.   Sedatives/hypnotics/anxiolytics:   no  Alcohol:   1 liter vodka or whiskey/day, last use 11/8/24.  Nicotine:   1 ppd  Cannabis:   daily  Hallucinogens/Dissociatives:   Has used in the past  Eating disorder:  no  Gambling:   no        [place] Prescription Drug Monitoring Program Reviewed:  Yes  10/16/2024 10/09/2024 4 Buprenorphine 8 Mg Tablet Sl 14.00 7 We Wor [id number] Gen (0932) 0/1 16.00 mg [place]   10/11/2024 10/11/2024 4 Gabapentin 600 Mg Tablet 90.00 30 Og Nwa [id number] Gen (0932) 0/0  [place]   09/18/2024 09/17/2024 3 Gabapentin 600 Mg Tablet 30.00 10 Og Nwa [id number] Gen (0817) 0/1  [place]   09/11/2024 09/11/2024 3 Buprenorphine 8 Mg Tablet Sl 60.00 30 We Wor [id number] Gen (0817) 0/0 16.[place] mg [place]         PAST PSYCHIATRIC HISTORY:  Diagnoses- MDD, KANE, ADHD, psychosis  Suicide Attempts: Yes   Hospitalizations: Yes 5/19-6/6/24 for SI, AH/VH; 10/24-11/8/24 from sober house for psychosis and SI in setting of substance use; multiple others          7/11/2024    10:13 AM 11/13/2024     2:31 PM 11/14/2024    10:00 AM   PHQ   PHQ-9 Total Score 6 6  11   Q9: Thoughts of better off dead/self-harm past 2 weeks Not at all  Not at all  Several days       Patient-reported         Social  History  Housing status:  currently in [facility]  Education: high school graduate  Employment status: Unemployed, not seeking work  Relationship status:   Children: 3 children  Legal: not currently        Medications:  Current Outpatient Medications   Medication Sig Dispense Refill    acetaminophen (TYLENOL) 500 MG tablet Take 500-1,000 mg by mouth every 8 hours as needed for mild pain.      albuterol (PROAIR HFA/PROVENTIL HFA/VENTOLIN HFA) 108 (90 Base) MCG/ACT inhaler Inhale 2 puffs into the lungs every 6 hours as needed for shortness of breath, wheezing or cough.      atorvastatin (LIPITOR) 20 MG tablet Take 1 tablet (20 mg) by mouth every evening. 30 tablet 0    benzocaine-menthol (CEPACOL) 15-3.6 MG lozenge Place 1 lozenge inside cheek every 2 hours as needed for sore throat.      buprenorphine (SUBUTEX) 2 MG SUBL sublingual tablet Place 4 tablets (8 mg) under the tongue 2 times daily AND 2 tablets (4 mg) daily. At 2PM. 30 tablet 0    diphenhydrAMINE (BENADRYL) 25 MG capsule Take 1-2 capsules (25-50 mg) by mouth every 4 hours as needed for itching (Reported skin itching.). 30 capsule 0    FLUoxetine (PROZAC) 40 MG capsule Take 1 capsule (40 mg) by mouth daily. 30 capsule 0    gabapentin (NEURONTIN) 600 MG tablet Take 1 tablet (600 mg) by mouth 3 times daily. 90 tablet 0    guaiFENesin-dextromethorphan (ROBITUSSIN DM) 100-10 MG/5ML syrup Take 10 mLs by mouth every 4 hours as needed for cough.      ibuprofen (ADVIL/MOTRIN) 200 MG tablet Take 600 mg by mouth every 6 hours as needed for pain.      nicotine (COMMIT) 2 MG lozenge Place 1-2 lozenges (2-4 mg) inside cheek every hour as needed for nicotine withdrawal symptoms. 48 lozenge 0    nicotine (NICODERM CQ) 21 MG/24HR 24 hr patch Place 1 patch over 24 hours onto the skin every 24 hours. 30 patch 0    prazosin (MINIPRESS) 1 MG capsule Take 1 capsule (1 mg) by mouth at bedtime. 30 capsule 0    QUEtiapine (SEROQUEL) 400 MG tablet Take 1 tablet (400 mg)  by mouth at bedtime. 30 tablet 0    QUEtiapine (SEROQUEL) 50 MG tablet Take 1 tablet (50 mg) by mouth 2 times daily. 60 tablet 0    alum & mag hydroxide-simethicone (MAALOX) 200-200-20 MG/5ML SUSP suspension Take 30 mLs by mouth every 6 hours as needed for indigestion.      atomoxetine (STRATTERA) 80 MG capsule Take 1 capsule (80 mg) by mouth daily. (Patient not taking: Reported on 11/14/2024) 30 capsule 0    lithium (ESKALITH CR/LITHOBID) 450 MG CR tablet Take 2 tablets (900 mg) by mouth at bedtime. 60 tablet 0    loratadine (CLARITIN) 10 MG tablet Take 10 mg by mouth daily as needed for allergies. (Patient not taking: Reported on 11/14/2024)      melatonin 5 MG tablet Take 5 mg by mouth nightly as needed for sleep. (Patient not taking: Reported on 11/14/2024)      multivitamin w/minerals (THERA-VIT-M) tablet Take 1 tablet by mouth daily. (Patient not taking: Reported on 11/14/2024) 90 tablet 1    senna-docusate (SENOKOT-S/PERICOLACE) 8.6-50 MG tablet Take 2 tablets by mouth daily as needed for constipation. (Patient not taking: Reported on 11/14/2024)       No current facility-administered medications for this visit.     Facility-Administered Medications Ordered in Other Visits   Medication Dose Route Frequency Provider Last Rate Last Admin    Self Administer Medications: Behavioral Services   Does not apply See Admin Instructions Arlen Lynch MD           Allergies   Allergen Reactions    Wellbutrin [Bupropion] Anaphylaxis and Swelling     Facial swelling    Wellbutrin [Bupropion]     Wellbutrin [Bupropion] Difficulty breathing    Naltrexone Other (See Comments)     Headaches  Headaches         Past Medical History:   Diagnosis Date    Acute bilateral low back pain with right-sided sciatica 06/07/2016    Acute pain of right shoulder due to trauma     Adult antisocial behavior     Prison incarceration: 16 years    Aspiration pneumonia (H) 02/24/2014    CARDIOVASCULAR SCREENING; LDL GOAL LESS THAN 130  06/07/2016    Carpal tunnel syndrome of right wrist 06/07/2016    Chest pain 02/19/2014    Chest trauma 02/19/2014    Chronic pain syndrome 09/30/2019    Chronic right-sided low back pain with right-sided sciatica 05/10/2018    DDD (degenerative disc disease), lumbosacral 05/05/2020    Depression with anxiety 05/05/2020    Displacement of lumbar intervertebral disc without myelopathy 05/16/2012    KANE (generalized anxiety disorder) 07/07/2017    Heroin abuse (H) 05/10/2018    History of ADHD 01/06/2014    History of drug abuse (H) 01/06/2014    History of suicide attempt 05/10/2018    HTN (hypertension) 02/26/2014    Hyperglycemia 02/23/2014    Hypertriglyceridemia 11/02/2015    IV drug abuse (H) 05/10/2018    Major depressive disorder, recurrent (H) 02/23/2018    Major depressive disorder, recurrent episode, moderate (H) 06/06/2016    Methamphetamine abuse (H) 02/23/2018    Overview:  see [facility] H&P 11/27/2009, [facility] admit 9/2/2010    Mild intermittent asthma without complication 05/10/2018    Opiate overdose (H) 02/22/2014    Positive D dimer 11/02/2015    Psychosis (H) 05/04/2020    Sepsis (H) 02/22/2014    SIRS (systemic inflammatory response syndrome) (H) 11/02/2015    Substance abuse (H) 05/07/2018    Suicidal ideation 02/23/2018    Tobacco use disorder 05/10/2018       Past Surgical History:   Procedure Laterality Date    BACK SURGERY         No family history on file.      REVIEW OF SYSTEMS:  Pt describes feeling very tired since admitting to LP.  He states this is because he is feeling overwhelmed, not because of the dose of gabapentin or buprenorphine.  Pt does state he had not been taking gabapentin prior to his recent hospitalization.    He does endorse cravings for fentanyl.     OBJECTIVE                                                      /69   Pulse 80     Physical Exam  Constitutional:       Appearance: He is not toxic-appearing or diaphoretic.      Comments: Pt appears sleepy, occasionally  falling asleep briefly during visit   Neurological:      Mental Status: He is oriented to person, place, and time.      Cranial Nerves: No dysarthria.      Gait: Gait is intact.   Psychiatric:         Attention and Perception: He is inattentive.         Mood and Affect: Mood is anxious and depressed. Affect is blunt.         Speech: Speech normal.         Behavior: Behavior is cooperative.         Thought Content: Thought content does not include suicidal ideation.      Comments: Insight and judgement are fair          Labs:  *POC urine drug screen does not screen for Fentanyl    Recent Results (from the past 720 hours)   Urine Drug Screen Panel    Collection Time: 10/26/24 11:45 AM   Result Value Ref Range    Amphetamines Urine Screen Positive (A) Screen Negative    Barbituates Urine Screen Negative Screen Negative    Benzodiazepine Urine Screen Positive (A) Screen Negative    Cannabinoids Urine Screen Positive (A) Screen Negative    Cocaine Urine Screen Negative Screen Negative    Fentanyl Qual Urine Screen Negative Screen Negative    Opiates Urine Screen Negative Screen Negative    PCP Urine Screen Negative Screen Negative   Comprehensive metabolic panel    Collection Time: 10/30/24  7:47 PM   Result Value Ref Range    Sodium 139 135 - 145 mmol/L    Potassium 4.8 3.4 - 5.3 mmol/L    Carbon Dioxide (CO2) 26 22 - 29 mmol/L    Anion Gap 11 7 - 15 mmol/L    Urea Nitrogen 15.5 6.0 - 20.0 mg/dL    Creatinine 1.07 0.67 - 1.17 mg/dL    GFR Estimate 86 >60 mL/min/1.73m2    Calcium 9.6 8.8 - 10.4 mg/dL    Chloride 102 98 - 107 mmol/L    Glucose 139 (H) 70 - 99 mg/dL    Alkaline Phosphatase 114 40 - 150 U/L    AST 32 0 - 45 U/L    ALT 43 0 - 70 U/L    Protein Total 7.2 6.4 - 8.3 g/dL    Albumin 3.9 3.5 - 5.2 g/dL    Bilirubin Total 0.2 <=1.2 mg/dL   TSH with free T4 reflex    Collection Time: 10/30/24  7:47 PM   Result Value Ref Range    TSH 0.83 0.30 - 4.20 uIU/mL   Lithium level    Collection Time: 11/04/24  8:45 AM    Result Value Ref Range    Lithium 0.88 0.60 - 1.20 mmol/L   Extra Purple Top Tube    Collection Time: 11/04/24  8:45 AM   Result Value Ref Range    Hold Specimen JI    CBC with platelets    Collection Time: 11/08/24  6:10 PM   Result Value Ref Range    WBC Count 11.1 (H) 4.0 - 11.0 10e3/uL    RBC Count 4.70 4.40 - 5.90 10e6/uL    Hemoglobin 14.5 13.3 - 17.7 g/dL    Hematocrit 43.0 40.0 - 53.0 %    MCV 92 78 - 100 fL    MCH 30.9 26.5 - 33.0 pg    MCHC 33.7 31.5 - 36.5 g/dL    RDW 12.9 10.0 - 15.0 %    Platelet Count 217 150 - 450 10e3/uL   Partial thromboplastin time    Collection Time: 11/08/24  6:10 PM   Result Value Ref Range    aPTT 33 22 - 38 Seconds   INR    Collection Time: 11/08/24  6:10 PM   Result Value Ref Range    INR 0.95 0.85 - 1.15   Basic metabolic panel    Collection Time: 11/08/24  6:10 PM   Result Value Ref Range    Sodium 139 135 - 145 mmol/L    Potassium 5.0 3.4 - 5.3 mmol/L    Chloride 99 98 - 107 mmol/L    Carbon Dioxide (CO2) 29 22 - 29 mmol/L    Anion Gap 11 7 - 15 mmol/L    Urea Nitrogen 21.5 (H) 6.0 - 20.0 mg/dL    Creatinine 1.28 (H) 0.67 - 1.17 mg/dL    GFR Estimate 69 >60 mL/min/1.73m2    Calcium 9.4 8.8 - 10.4 mg/dL    Glucose 105 (H) 70 - 99 mg/dL   Troponin T, High Sensitivity    Collection Time: 11/08/24  6:10 PM   Result Value Ref Range    Troponin T, High Sensitivity 8 <=22 ng/L   Extra Red Top Tube    Collection Time: 11/08/24  6:10 PM   Result Value Ref Range    Hold Specimen JI    CRP inflammation    Collection Time: 11/08/24  6:10 PM   Result Value Ref Range    CRP Inflammation 4.79 <5.00 mg/L   Treponema Abs w Reflex to RPR and Titer    Collection Time: 11/08/24  6:10 PM   Result Value Ref Range    Treponema Antibody Total Nonreactive Nonreactive   EKG 12-lead, tracing only    Collection Time: 11/08/24  6:19 PM   Result Value Ref Range    Systolic Blood Pressure  mmHg    Diastolic Blood Pressure  mmHg    Ventricular Rate 101 BPM    Atrial Rate 101 BPM    GA Interval 160 ms     QRS Duration 94 ms     ms    QTc 549 ms    P Axis 46 degrees    R AXIS 3 degrees    T Axis 25 degrees    Interpretation ECG       Sinus tachycardia  Possible Left atrial enlargement  Prolonged QT  Abnormal ECG  When compared with ECG of 15-Nov-2014 16:09,  QT has lengthened  Confirmed by MD LANDON, IBETH ([id number]) on 11/11/2024 10:12:51 PM  Also confirmed by MD NAVARRETE HENRI ([id number]),  Brittany Contreras ([id number])  on 11/12/2024 4:38:47 AM     Extra Green Top (Lithium Heparin) Tube    Collection Time: 11/08/24  6:28 PM   Result Value Ref Range    Hold Specimen JIC    Blood Culture Hand, Left    Collection Time: 11/08/24  9:49 PM    Specimen: Hand, Left; Blood   Result Value Ref Range    Culture No Growth    Glucose by meter    Collection Time: 11/08/24 11:35 PM   Result Value Ref Range    GLUCOSE BY METER POCT 147 (H) 70 - 99 mg/dL   Comprehensive metabolic panel    Collection Time: 11/09/24  5:28 AM   Result Value Ref Range    Sodium 136 135 - 145 mmol/L    Potassium 4.5 3.4 - 5.3 mmol/L    Carbon Dioxide (CO2) 25 22 - 29 mmol/L    Anion Gap 12 7 - 15 mmol/L    Urea Nitrogen 25.0 (H) 6.0 - 20.0 mg/dL    Creatinine 1.28 (H) 0.67 - 1.17 mg/dL    GFR Estimate 69 >60 mL/min/1.73m2    Calcium 8.9 8.8 - 10.4 mg/dL    Chloride 99 98 - 107 mmol/L    Glucose 144 (H) 70 - 99 mg/dL    Alkaline Phosphatase 57 40 - 150 U/L    AST 33 0 - 45 U/L    ALT 37 0 - 70 U/L    Protein Total 6.4 6.4 - 8.3 g/dL    Albumin 3.7 3.5 - 5.2 g/dL    Bilirubin Total 0.3 <=1.2 mg/dL   CBC with platelets    Collection Time: 11/09/24  5:28 AM   Result Value Ref Range    WBC Count 30.0 (H) 4.0 - 11.0 10e3/uL    RBC Count 4.23 (L) 4.40 - 5.90 10e6/uL    Hemoglobin 12.7 (L) 13.3 - 17.7 g/dL    Hematocrit 37.7 (L) 40.0 - 53.0 %    MCV 89 78 - 100 fL    MCH 30.0 26.5 - 33.0 pg    MCHC 33.7 31.5 - 36.5 g/dL    RDW 13.3 10.0 - 15.0 %    Platelet Count 219 150 - 450 10e3/uL   Bilirubin direct    Collection Time: 11/09/24  5:28 AM   Result Value Ref  Range    Bilirubin Direct <0.20 0.00 - 0.30 mg/dL   UA with Microscopic reflex to Culture    Collection Time: 11/09/24  5:57 AM    Specimen: Urine, Midstream   Result Value Ref Range    Color Urine Light Yellow Colorless, Straw, Light Yellow, Yellow    Appearance Urine Clear Clear    Glucose Urine Negative Negative mg/dL    Bilirubin Urine Negative Negative    Ketones Urine Negative Negative mg/dL    Specific Gravity Urine 1.024 1.003 - 1.035    Blood Urine Negative Negative    pH Urine 6.0 5.0 - 7.0    Protein Albumin Urine Negative Negative mg/dL    Urobilinogen Urine Normal Normal, 2.0 mg/dL    Nitrite Urine Negative Negative    Leukocyte Esterase Urine Negative Negative    RBC Urine <1 <=2 /HPF    WBC Urine <1 <=5 /HPF    Squamous Epithelials Urine <1 <=1 /HPF   First-voided urine-Chlamydia trachomatis/Neisseria gonorrhoeae by PCR    Collection Time: 11/09/24  6:15 AM    Specimen: Urine, Voided   Result Value Ref Range    Chlamydia Trachomatis Negative Negative    Neisseria gonorrhoeae Negative Negative   Glucose CSF:    Collection Time: 11/09/24  8:01 PM   Result Value Ref Range    Glucose  (H) 40 - 70 mg/dL   Protein total CSF:    Collection Time: 11/09/24  8:01 PM   Result Value Ref Range    Protein total CSF 43.4 15.0 - 45.0 mg/dL   Cerebrospinal fluid Aerobic Bacterial Culture Routine With Gram Stain    Collection Time: 11/09/24  8:01 PM    Specimen: Lumbar Puncture; Cerebrospinal fluid   Result Value Ref Range    Culture No Growth     Gram Stain Result No organisms seen     Gram Stain Result 3+ WBC seen    VDRL CSF:    Collection Time: 11/09/24  8:01 PM   Result Value Ref Range    T.Pallidum (VDRL) CSF Reflex Non Reactive Non Reactive   Legionella Urinary Antigen and Streptococcus pneumoniae antigen    Collection Time: 11/09/24  8:01 PM    Specimen: Lumbar Puncture; Cerebrospinal fluid   Result Value Ref Range    Streptococcus pneumoniae antigen Negative Negative    Legionella pneumophila  Urinary/Strep pneumoniae Antigen Specimen Type Cerebrospinal fluid    Cryptococcus antigen CSF Tube 2    Collection Time: 11/09/24  8:01 PM    Specimen: Lumbar Puncture; Cerebrospinal fluid   Result Value Ref Range    Cryptococcal Antigen Negative Negative    Cryptococcal Antigen Specimen Type Cerebrospinal fluid    HSV Types 1 and 2 Qualitative PCR CSF    Collection Time: 11/09/24  8:01 PM    Specimen: Lumbar Puncture; Cerebrospinal fluid   Result Value Ref Range    Herpes Simplex Virus 1 DNA - CSF Not Detected Not Detected    Herpes Simplex Virus 2 DNA - CSF Not Detected Not Detected   Cell Count CSF    Collection Time: 11/09/24  8:01 PM   Result Value Ref Range    Tube Number 4     Color Colorless Colorless    Clarity Clear Clear    Total Nucleated Cells 12 (H) 0 - 5 /uL    RBC Count 9 (H) 0 - 2 /uL   Differential CSF    Collection Time: 11/09/24  8:01 PM   Result Value Ref Range    % Neutrophils 4 %    % Lymphocytes 26 %    % Monocytes/Macrophages 70 %   Fungal or Yeast Culture Routine    Collection Time: 11/09/24  8:01 PM    Specimen: Lumbar Puncture; Cerebrospinal fluid   Result Value Ref Range    Culture No growth after 4 days    Meningitis/Encephalitis Panel Qual PCR CSF:    Collection Time: 11/09/24  8:01 PM   Result Value Ref Range    Escherichia coli K1 Negative Negative    Haemophilus influenzae Negative Negative    Listeria monocytogenes Negative Negative    Neisseria meningitidis Negative Negative    Streptococcus agalactiae (GBS) Negative Negative    Streptococcus pneumoniae Negative Negative    Cytomegalovirus Negative Negative    Enterovirus Negative Negative    Herpes simplex virus 1 Negative Negative    Herpes simplex virus 2 Negative Negative    Human Herpes Virus 6 Negative Negative    Human parechovirus Negative Negative    Varicella zoster virus Negative Negative    Cryptococcus neoformans/gattii Negative Negative   Comprehensive metabolic panel    Collection Time: 11/10/24 10:37 AM   Result  Value Ref Range    Sodium 140 135 - 145 mmol/L    Potassium 4.1 3.4 - 5.3 mmol/L    Carbon Dioxide (CO2) 24 22 - 29 mmol/L    Anion Gap 11 7 - 15 mmol/L    Urea Nitrogen 19.2 6.0 - 20.0 mg/dL    Creatinine 0.88 0.67 - 1.17 mg/dL    GFR Estimate >90 >60 mL/min/1.73m2    Calcium 8.8 8.8 - 10.4 mg/dL    Chloride 105 98 - 107 mmol/L    Glucose 379 (H) 70 - 99 mg/dL    Alkaline Phosphatase 85 40 - 150 U/L    AST 20 0 - 45 U/L    ALT 30 0 - 70 U/L    Protein Total 6.9 6.4 - 8.3 g/dL    Albumin 3.8 3.5 - 5.2 g/dL    Bilirubin Total 0.2 <=1.2 mg/dL   CBC with platelets    Collection Time: 11/10/24 10:37 AM   Result Value Ref Range    WBC Count 26.8 (H) 4.0 - 11.0 10e3/uL    RBC Count 3.95 (L) 4.40 - 5.90 10e6/uL    Hemoglobin 12.0 (L) 13.3 - 17.7 g/dL    Hematocrit 35.4 (L) 40.0 - 53.0 %    MCV 90 78 - 100 fL    MCH 30.4 26.5 - 33.0 pg    MCHC 33.9 31.5 - 36.5 g/dL    RDW 13.5 10.0 - 15.0 %    Platelet Count 221 150 - 450 10e3/uL   Vancomycin level    Collection Time: 11/10/24  5:06 PM   Result Value Ref Range    Vancomycin 21.5   ug/mL   CK total    Collection Time: 11/11/24  8:39 AM   Result Value Ref Range    CK 64 39 - 308 U/L   Procalcitonin    Collection Time: 11/11/24  8:39 AM   Result Value Ref Range    Procalcitonin 17.60 (HH) <0.50 ng/mL   Creatinine    Collection Time: 11/11/24  8:39 AM   Result Value Ref Range    Creatinine 0.95 0.67 - 1.17 mg/dL    GFR Estimate >90 >60 mL/min/1.73m2   CBC with platelets and differential    Collection Time: 11/11/24  8:39 AM   Result Value Ref Range    WBC Count 19.6 (H) 4.0 - 11.0 10e3/uL    RBC Count 3.94 (L) 4.40 - 5.90 10e6/uL    Hemoglobin 11.9 (L) 13.3 - 17.7 g/dL    Hematocrit 35.7 (L) 40.0 - 53.0 %    MCV 91 78 - 100 fL    MCH 30.2 26.5 - 33.0 pg    MCHC 33.3 31.5 - 36.5 g/dL    RDW 13.5 10.0 - 15.0 %    Platelet Count 259 150 - 450 10e3/uL    % Neutrophils 83 %    % Lymphocytes 8 %    % Monocytes 6 %    % Eosinophils 0 %    % Basophils 0 %    % Immature Granulocytes 2 %     NRBCs per 100 WBC 0 <1 /100    Absolute Neutrophils 16.3 (H) 1.6 - 8.3 10e3/uL    Absolute Lymphocytes 1.5 0.8 - 5.3 10e3/uL    Absolute Monocytes 1.3 0.0 - 1.3 10e3/uL    Absolute Eosinophils 0.0 0.0 - 0.7 10e3/uL    Absolute Basophils 0.0 0.0 - 0.2 10e3/uL    Absolute Immature Granulocytes 0.5 (H) <=0.4 10e3/uL    Absolute NRBCs 0.0 10e3/uL   EKG 12-lead, tracing only    Collection Time: 11/12/24  2:54 PM   Result Value Ref Range    Systolic Blood Pressure  mmHg    Diastolic Blood Pressure  mmHg    Ventricular Rate 55 BPM    Atrial Rate 55 BPM    NV Interval 154 ms    QRS Duration 100 ms     ms    QTc 420 ms    P Axis 48 degrees    R AXIS 25 degrees    T Axis 24 degrees    Interpretation ECG       Sinus bradycardia  Otherwise normal ECG  When compared with ECG of 08-Nov-2024 18:19,  Vent. rate has decreased by  46 bpm  QT has shortened  Confirmed by MD MARE, NBA ([id number]) on 11/13/2024 9:15:47 AM     Glucose by meter    Collection Time: 11/13/24  5:59 AM   Result Value Ref Range    GLUCOSE BY METER POCT 127 (H) 70 - 99 mg/dL   Urine Creatinine for Drug Screen Panel    Collection Time: 11/13/24  4:10 PM   Result Value Ref Range    Creatinine Urine for Drug Screen 38 mg/dL   Drugs of Abuse Screen Urine (POC CUPS) POCT    Collection Time: 11/14/24 10:44 AM   Result Value Ref Range    POCT Kit Lot Number [id number]     POCT Kit Expiration Date 9914964     Temperature Urine POCT 90 F 90 F, 92 F, 94 F, 96 F, 98 F, 100 F    Specific [place] POCT 1.025 1.005, 1.015, 1.025    pH Qual Urine POCT 5 pH 4 pH, 5 pH, 7 pH, 9 pH    Creatinine Qual Urine POCT 100 mg/dL 20 mg/dL, 50 mg/dL, 100 mg/dL, 200 mg/dL    Internal QC Qual Urine POCT Valid Valid    Amphetamine Qual Urine POCT Negative Negative    Barbiturate Qual Urine POCT Negative Negative    Buprenorphine Qual Urine POCT Screen Positive (A) Negative    Benzodiazepine Qual Urine POCT Negative Negative    Cocaine Qual Urine POCT Negative Negative     Methamphetamine Qual Urine POCT Negative Negative    MDMA Qual Urine POCT Negative Negative    Methadone Qual Urine POCT Negative Negative    Opiate Qual Urine POCT Negative Negative    Oxycodone Qual Urine POCT Negative Negative    Phencyclidine Qual Urine POCT Negative Negative    THC Qual Urine POCT Negative Negative               At least 60 min spent on day of encounter in review of medical record,  review, obtaining histories, discussing recommendations, counseling, providing support.    The longitudinal plan of care for the diagnosis(es)/condition(s) as documented were addressed during this visit. Due to the added complexity in care, I will continue to support Perry in the subsequent management and with ongoing continuity of care.      Arlen Lynch MD  Addiction Medicine  [facility]  [address] [place] [address] [phone]

## 2024-11-14 NOTE — PROGRESS NOTES
Comprehensive Summary Update and Review  Counselor met with patient on 11.14.24 and reviewed the Comprehensive Assessment.       The following updates have been made:   Goals:  Get into a group home.

## 2024-11-14 NOTE — PROGRESS NOTES
"Late Entry:  Writer and colleague met with patient due to \"high\" rating on CSSRS.  Patient indicated no suicidal thoughts today with last suicide attempt in September 2024.  Patient completed a safety plan and a CSSRS was completed suggesting patient is at \"low\" risk for suicide.  Patient's risk for suicide will continue to be assessed throughout his treatment stay.  "

## 2024-11-14 NOTE — TELEPHONE ENCOUNTER
[facility] Psychiatry Clinic Appointment Request     Type of Request: Psychiatry at:  [facility] Psychiatry, [facility], 2nd floor [address]  [address]  [address]    [phone]    [facility] nurse contact phone number: [phone]    Pt is currently attending [facility] residential treatment and requesting long term psychiatric/addiction medicine stabilization/service.     Diagnosis: opioid use disorder, amphetamine use disorder    MH Diagnosis: MDD, KANE, ADHD, PTSD, TBI    What Would Be Helpful from the [facility] Psychiatry Clinic: acute psychiatric medication stabilization to improve treatment participation     Needs: No    Requesting in-person appt.  Pt discharge date from [facility] is: 12/11/24    Clinician Gender Preference (if applicable): Eliz    [facility]  Nurse Liaison / CD Adult [facility] ()  [address] [place] [address]  O:[phone]  F:[phone]  [place] [id number]

## 2024-11-15 ENCOUNTER — HOSPITAL ENCOUNTER (OUTPATIENT)
Dept: BEHAVIORAL HEALTH | Facility: CLINIC | Age: 49
Discharge: HOME | End: 2024-11-15
Attending: FAMILY MEDICINE
Payer: COMMERCIAL

## 2024-11-15 ENCOUNTER — TELEPHONE (OUTPATIENT)
Dept: BEHAVIORAL HEALTH | Facility: CLINIC | Age: 49
End: 2024-11-15
Payer: COMMERCIAL

## 2024-11-15 LAB
C TRACH DNA SPEC QL NAA+PROBE: NEGATIVE
N GONORRHOEA DNA SPEC QL NAA+PROBE: NEGATIVE

## 2024-11-15 PROCEDURE — H2035 A/D TX PROGRAM, PER HOUR: HCPCS | Mod: HQ | Performed by: COUNSELOR

## 2024-11-15 PROCEDURE — 1002N00001 HC LODGING PLUS FACILITY CHARGE ADULT

## 2024-11-15 PROCEDURE — H2035 A/D TX PROGRAM, PER HOUR: HCPCS | Mod: HQ

## 2024-11-15 NOTE — GROUP NOTE
"Group Therapy Documentation    PATIENT'S NAME: Perry Preciado Jr.  MRN:   [id number]  :   1975  ACCT. NUMBER: [id number]  DATE OF SERVICE: 11/15/24  START TIME:  9:00 AM  END TIME: 11:00 AM  FACILITATOR(S): Jeimy Preston LADC  TOPIC: BEH Group Therapy  Number of patients attending the group:  10  Group Length:  2 Hours    Dimensions addressed: 3, 4, 5, and 6    Summary of Group / Topics Discussed:    Recovery Principles, Disease of addiction, Emotions/expression, and Relapse prevention    Writer provided group psychotherapy related to above topics.  A patient presented substance use history and the group processed positive loss.        Group Attendance:  Attended group session    Patient's response to the group topic/interactions:  cooperative with task, discussed personal experience with topic, and listened actively    Patient appeared to be Actively participating, Attentive, and Engaged.        Client specific details:  Patient actively participated in today's group.  Patient was late entering group due to over-sleeping and did not check in.  Patient reported gratitude that the group dynamic was \"calm\" which assists him in gaining tools for recovery.    "

## 2024-11-15 NOTE — TELEPHONE ENCOUNTER
Transition Clinic Referral URGENT please work in early next week if able    Please reach out to [facility] Nurses for scheduling, pt does not have his phone while in treatment. Call [phone] or reply to this message  in epic.      Type of Referral:      __x___Medication Only    Referring Provider Name: Arlen Lynch MD    Transitions  check pt in with phone number:   1st option: [phone] [facility]    2nd option: [phone] [facility] Nurse.   3rd option: [phone] [facility] Coordinator    Transitions Provider [employer] visits: Use the following ID and pin:  ID [id number]  Pin [id number]    Reason for Transition Clinic Referral: awaiting long-term psych appt. [facility] and requesting psychiatric stabilization per concerns in RN note.history of schizoaffective disorder, bipolar type, KANE, polysubstance use disorder. 10/24 after being brought in from sober house by EMS due to substance intoxication, psychosis and SI with plan with intent. Pt admitted to us on 11/13 and reported feeling very lethargic on 11/13 and 11/14 daytime. Last night 11/15 staff observed he did not sleep and pt reports high levels of anxiety today.     Please indicate if this if FIRST TIME/NEW CONSULT versus requesting 2nd, 3rd, etc when attending LP: first    Next Level of Care Patient Will Be Transitioned To: Psychiatry    Start Date for Next Level of Care (Psychiatry provider visit - Required): 11/27 - 0730 - new psych appt with [facility] - 2nd floor [address]      Type of Clinical Assessment Completed (Crisis, DA, JAQUELINE, etc.): JAQUELINE    Date Clinical Assessment (JAQUELINE/comprehensive assessment) was Completed: SEE EPIC    Diagnosis: Polysubstance (meth)     Needs: NO    Does Patient Have Access to Technology: yes    Patient E-mail Address: NA    Gender Preference (if applicable): NO    Arlen Lynch MD

## 2024-11-15 NOTE — GROUP NOTE
Group Therapy Documentation    PATIENT'S NAME: Perry Preciado Jr.  MRN:   [id number]  :   1975  ACCT. NUMBER: [id number]  DATE OF SERVICE: 11/15/24  START TIME: 12:30 PM  END TIME:  2:30 PM  FACILITATOR(S): Maurice Franklin LADC  TOPIC: BEH Group Therapy  Number of patients attending the group:  10  Group Length:  2 Hours    Dimensions addressed: 1, 2, 3, 4, 5, and 6    Summary of Group / Topics Discussed:    Disease of addiction, Leisure explorations/use of leisure time, and Self-care activities      Group Attendance:  Attended group session    Patient's response to the group topic/interactions:  cooperative with task    Patient appeared to be Actively participating.        Client specific details:  Kb participated and interacted appropriately with peers and staff in PM group. No triggers to use noted or discussed.

## 2024-11-16 ENCOUNTER — HOSPITAL ENCOUNTER (OUTPATIENT)
Dept: BEHAVIORAL HEALTH | Facility: CLINIC | Age: 49
Discharge: HOME | End: 2024-11-16
Attending: FAMILY MEDICINE
Payer: COMMERCIAL

## 2024-11-16 ENCOUNTER — TELEPHONE (OUTPATIENT)
Dept: BEHAVIORAL HEALTH | Facility: CLINIC | Age: 49
End: 2024-11-16
Payer: COMMERCIAL

## 2024-11-16 DIAGNOSIS — F51.01 PRIMARY INSOMNIA: ICD-10-CM

## 2024-11-16 DIAGNOSIS — F11.20 OPIOID USE DISORDER, SEVERE, DEPENDENCE (H): Primary | ICD-10-CM

## 2024-11-16 DIAGNOSIS — Z86.19 HX OF SEPSIS: ICD-10-CM

## 2024-11-16 PROCEDURE — 1002N00001 HC LODGING PLUS FACILITY CHARGE ADULT

## 2024-11-16 PROCEDURE — H2035 A/D TX PROGRAM, PER HOUR: HCPCS | Mod: HQ

## 2024-11-16 NOTE — TELEPHONE ENCOUNTER
"Pt came to writer today explaining that he had a \"bunch\" of Buprenorphine tabs that were brought in with his belongings upon admission (11/13).  Writer began investigation and opened the secured envelope (#69877) in CS cupboard and found two 8mg tabs were in the envelope.  Pt stated he had more Buprenorphine in the \"pill organizers\" brought in.  Writer pulled the envelope (#[id number]) from locked room where all non-CS medications are kept.  Writer and staff Maninder were present as pt looked in each of the pill organizers and then conceded that there were no CS in the pill organizers.  Writer agrees with pt.    Pt was then willing to sign with two staff members that he brought in only 2 tabs of Buprenorphine and signed the CS record.  All items re-packaged appropriately and stored per  protocol     Karen Mcdonald RN    [facility]  Nurse Liaison / CD Adult [facility] (LP)  [address] [place] [address]  O:[phone]  F:[phone]  [place] [address]  LP After hours(UC):[phone]  LP admin counselor:[phone]  CD assess:[phone]         ,        "

## 2024-11-16 NOTE — PROGRESS NOTES
"Pt seemed anxious this afternoon.  Coming into nursing office often.  Pt stating at 12:30pm that he has sore stomach and feels sweaty starting at around 1210 or so.  Pt then stated he \"puked\"  .  Writer gave pt permission to lay down for 20 minutes to see if sx pass or he may be assessed in ED.  Pt denied and just opted to go to programming.  LP counselor informed (Jasmin WEBB)  Will support pt as needed.  Note placed on SBAR for evening staff    Karen Mcdonald RN  "

## 2024-11-16 NOTE — PROGRESS NOTES
Name: Perry Preciado Jr.  Date: 11/15/2024  Medical Record: [id number]  Envelope Number: [id number]  List of Contents (List each item separately in new row):     Narcan 4 mg    Admission:  I am responsible for any personal items that are not sent to the safe or pharmacy.  [facility] is not responsible for loss, theft or damage of any property in my possession.      Patient Signature:  ___________________________________________       Date/Time:__________________________    Staff Signature: __________________________________       Date/Time:__________________________    [facility] Staff person, if patient is unable/unwilling to sign:      __________________________________________________________       Date/Time: __________________________    **All medications are packaged by LP staff and securely stored on [facility]. Medications left by patients at discharge will be packaged by LP staff and transported by LP staff to inpatient pharmacy for storage.**    Discharge:  [facility] has returned all of my personal belongings:    Patient Signature: ________________________________________     Date/Time: ____________________________________    Staff Signature: ______________________________________     Date/Time:_____________________________________

## 2024-11-16 NOTE — PROGRESS NOTES
Name: Perry Preciado Jr.  Date: 11/16/2024  Medical Record: [id number]    Envelope Number: [id number]    List of Contents (List each item separately in new row):   Buprenorphine 8mg subl tab (2 Tablets)   Admission:  I am responsible for any personal items that are not sent to the safe or pharmacy.  [facility] is not responsible for loss, theft or damage of any property in my possession.    Patient Signature:  ___________________________________________       Date/Time:__________________________    Staff Signature: __________________________________       Date/Time:__________________________    [facility] Staff person, if patient is unable/unwilling to sign:      __________________________________________________________       Date/Time: __________________________    **All medications are packaged by LP staff and securely stored on [facility]. Medications left by patients at discharge will be packaged by LP staff and transported by LP staff to inpatient pharmacy for storage.**    Discharge:  [facility] has returned all of my personal belongings:    Patient Signature: ________________________________________     Date/Time: ____________________________________    Staff Signature: ______________________________________     Date/Time:_____________________________________

## 2024-11-16 NOTE — GROUP NOTE
Group Therapy Documentation    PATIENT'S NAME: Perry Preciado Jr.  MRN:   [id number]  :   1975  ACCT. NUMBER: [id number]  DATE OF SERVICE: 24  START TIME: 12:30 PM  END TIME:  2:30 PM  FACILITATOR(S): Jasmin Green LADC; Miya Rinaldi LADC; Dhara Drummond LADC  TOPIC: BEH Group Therapy  Number of patients attending the group:  28  Group Length:  2 Hours    Dimensions addressed: 5 and 6    Summary of Group / Topics Discussed:    Relationship/socialization and Relapse prevention    Group Attendance:  Attended group session     Patient's response to the group topic/interactions:  cooperative with task     Patient appeared to be Actively participating, Attentive, and Engaged.         Client specific details: Pt listened respectfully to a presentation on communication styles and skills, and took part in the related activity.

## 2024-11-16 NOTE — TELEPHONE ENCOUNTER
This LP pt is interested in Sublocade. Prior Auth for Subutex is a barrier.  He has the following upcoming appts.  Should we get him in the Recovery Clinic sooner to initiate this?    Please advise.  Karen

## 2024-11-16 NOTE — GROUP NOTE
Group Therapy Documentation    PATIENT'S NAME: Perry Preciado Jr.  MRN:   [id number]  :   1975  ACCT. NUMBER: [id number]  DATE OF SERVICE: 24  START TIME:  9:00 AM  END TIME: 11:00 AM  FACILITATOR(S): Jasmin Green LADC; Miya Rinaldi LADC; Dhara Drummond LADC  TOPIC: BEH Group Therapy  Number of patients attending the group:  28  Group Length:  2 Hours    Dimensions addressed: 5 and 6    Summary of Group / Topics Discussed:    Sober coping skills and Co-occurring illnesses symptom management    Group Attendance:  Attended group session    Patient's response to the group topic/interactions:  cooperative with task    Patient appeared to be Attentive and Engaged.        Client specific details: Pt listened respectfully to a presentation on coping skills and mitigation strategies for stress and the neurobiology thereof, and asked appropriate questions.

## 2024-11-16 NOTE — PROGRESS NOTES
Name: Perry Preciado Jr.  Date: 11/16/2024  Medical Record: [id number]    Envelope Number: [id number]    List of Contents (List each item separately in new row):   Unknown meds in 3 weekly box,  Tab-A-Nasim,  Clear eyes drop,  Nicotine JEREMIAH gum 2 mg (8 pieces),  Gabapentin 600 mg tabs,   Hydroxyzine HCL 50 mg tabs.   Admission:  I am responsible for any personal items that are not sent to the safe or pharmacy.  [facility] is not responsible for loss, theft or damage of any property in my possession.    Patient Signature:  ___________________________________________       Date/Time:__________________________    Staff Signature: __________________________________       Date/Time:__________________________    [facility] Staff person, if patient is unable/unwilling to sign:      __________________________________________________________       Date/Time: __________________________    **All medications are packaged by LP staff and securely stored on [facility]. Medications left by patients at discharge will be packaged by LP staff and transported by LP staff to inpatient pharmacy for storage.**    Discharge:  [facility] has returned all of my personal belongings:    Patient Signature: ________________________________________     Date/Time: ____________________________________    Staff Signature: ______________________________________     Date/Time:_____________________________________

## 2024-11-17 ENCOUNTER — HOSPITAL ENCOUNTER (OUTPATIENT)
Dept: BEHAVIORAL HEALTH | Facility: CLINIC | Age: 49
Discharge: HOME | End: 2024-11-17
Attending: FAMILY MEDICINE
Payer: COMMERCIAL

## 2024-11-17 PROBLEM — F11.20 OPIOID USE DISORDER, SEVERE, DEPENDENCE (H): Status: ACTIVE | Noted: 2024-11-13

## 2024-11-17 PROCEDURE — H2035 A/D TX PROGRAM, PER HOUR: HCPCS | Mod: HQ

## 2024-11-17 PROCEDURE — 1002N00001 HC LODGING PLUS FACILITY CHARGE ADULT

## 2024-11-17 RX ORDER — ALBUTEROL SULFATE 0.83 MG/ML
2.5 SOLUTION RESPIRATORY (INHALATION)
OUTPATIENT
Start: 2024-11-17

## 2024-11-17 RX ORDER — DIPHENHYDRAMINE HYDROCHLORIDE 50 MG/ML
50 INJECTION INTRAMUSCULAR; INTRAVENOUS
Start: 2024-11-17

## 2024-11-17 RX ORDER — DIPHENHYDRAMINE HYDROCHLORIDE 50 MG/ML
25 INJECTION INTRAMUSCULAR; INTRAVENOUS
Start: 2024-11-17

## 2024-11-17 RX ORDER — LIDOCAINE HYDROCHLORIDE 10 MG/ML
2 INJECTION, SOLUTION EPIDURAL; INFILTRATION; INTRACAUDAL; PERINEURAL ONCE
OUTPATIENT
Start: 2024-11-17 | End: 2024-11-17

## 2024-11-17 RX ORDER — EPINEPHRINE 1 MG/ML
0.3 INJECTION, SOLUTION, CONCENTRATE INTRAVENOUS EVERY 5 MIN PRN
OUTPATIENT
Start: 2024-11-17

## 2024-11-17 RX ORDER — METHYLPREDNISOLONE SODIUM SUCCINATE 40 MG/ML
40 INJECTION INTRAMUSCULAR; INTRAVENOUS
Start: 2024-11-17

## 2024-11-17 RX ORDER — MEPERIDINE HYDROCHLORIDE 25 MG/ML
25 INJECTION INTRAMUSCULAR; INTRAVENOUS; SUBCUTANEOUS
OUTPATIENT
Start: 2024-11-17

## 2024-11-17 RX ORDER — ALBUTEROL SULFATE 90 UG/1
1-2 INHALANT RESPIRATORY (INHALATION)
Start: 2024-11-17

## 2024-11-17 NOTE — GROUP NOTE
Group Therapy Documentation    PATIENT'S NAME: Perry Preciado Jr.  MRN:   [id number]  :   1975  ACCT. NUMBER: [id number]  DATE OF SERVICE: 24  START TIME:  9:00 AM  END TIME: 11:00 AM  FACILITATOR(S): Jasmin Green LADC; Dhara Drummond LADC  TOPIC: BEH Group Therapy  Number of patients attending the group:  28  Group Length:  2 Hours    Dimensions addressed: 2 and 5    Summary of Group / Topics Discussed:    Co-occurring illnesses symptom management and Relapse prevention    Group Attendance:  Attended group session    Patient's response to the group topic/interactions:  cooperative with task    Patient appeared to be Actively participating, Attentive, and Engaged.        Client specific details: Pt listened respectfully to LP RN presentation on TB and STIs, and took part in the related destigmatization activity.

## 2024-11-17 NOTE — GROUP NOTE
Group Therapy Documentation    PATIENT'S NAME: Perry Preciado Jr.  MRN:   [id number]  :   1975  ACCT. NUMBER: [id number]  DATE OF SERVICE: 24  START TIME: 12:30 PM  END TIME:  1:30 PM  FACILITATOR(S): Dhara Drummond; Jasmin Green LADC  TOPIC: BEH Group Therapy  Number of patients attending the group:  28  Group Length:  1 Hours    Dimensions addressed: 5 and 6    Summary of Group / Topics Discussed:    Recovery Principles, Sober coping skills, Relationship/socialization, Balanced lifestyle, Trauma informed care, Disease of addiction, Leisure explorations/use of leisure time, Relapse prevention, and Self-care activities      Group Attendance:  Attended group session    Patient's response to the group topic/interactions:  cooperative with task and listened actively    Patient appeared to be Attentive and Engaged.        Client specific details:  Client attended group and appeared to be engaged throughout the group topic. Client listened actively.

## 2024-11-17 NOTE — TELEPHONE ENCOUNTER
I placed orders for Sublocade. See attestation below.     Please contact infusion center [phone] to schedule first Sublocade.     Pt will be establishing care with dual diagnosis clinic (psych/addiction) on 11/27/24.  Dr. Valdes confirmed he will be managing pt's OUD treatment along with his other diagnoses.      Pt should keep his 11/27/24 dual diagnosis clinic appointment.    The 11/29/24  visit can be cancelled.         - I have reviewed recommendations for comprehensive treatment plan with the patient  - I have reviewed the patient's medications comprehensively  and provided education to the patient on risks associated with concurrent use of benzodiazepines, alcohol, other sedatives with opioids  - I have recommended concomitant psychosocial support  - I have complied with all aspects of REMS program for Sublocade. [facility] where Sublocade will be administered is in compliance with all aspects of REMS program.   - Patient meets DSM-5 criteria for moderate or severe opioid use disorder  - Patient has been prescribed buprenorphine 8-24mg/day for at least one 1 week, demonstrating control of cravings and withdrawal symptoms as well as tolerance of buprenorphine.   - Patient will discontinue sublingual buprenorphine upon initiation of Sublocade  - Patient does not have evidence of tampering or attempts to remove the depot at the injection site.   - Patient does not have severe hepatic impairment.   - Patient does not have adrenal insufficiency.   - Patient does not have a history of long QT syndrome  - Patient does not take any antiarrhythmic medications.  He is prescribed fluoxetine and quetiapine which have low risk of affecting QT interval.  Risk mitigation strategies include periodic ECG monitoring.   - Urine Drug Screen on 11/14/24 was positive for buprenorphine  - Patient will not be receiving methadone while on Sublocade  - Patient will not be receiving any other long acting  buprenorphine products or long acting naltrexone while on Sublocade

## 2024-11-18 ENCOUNTER — HOSPITAL ENCOUNTER (OUTPATIENT)
Dept: BEHAVIORAL HEALTH | Facility: CLINIC | Age: 49
Discharge: HOME | End: 2024-11-18
Attending: FAMILY MEDICINE
Payer: COMMERCIAL

## 2024-11-18 LAB
BUPRENORPHINE UR CFM-MCNC: 113 NG/ML
BUPRENORPHINE/CREAT UR: 297 NG/MG {CREAT}
GABAPENTIN UR QL CFM: PRESENT
LORAZEPAM UR QL CFM: PRESENT
NORBUPRENORPHINE UR CFM-MCNC: ABNORMAL NG/ML

## 2024-11-18 PROCEDURE — H2035 A/D TX PROGRAM, PER HOUR: HCPCS | Mod: HQ | Performed by: COUNSELOR

## 2024-11-18 PROCEDURE — 1002N00001 HC LODGING PLUS FACILITY CHARGE ADULT

## 2024-11-18 PROCEDURE — H2035 A/D TX PROGRAM, PER HOUR: HCPCS | Mod: HQ

## 2024-11-18 RX ORDER — TRAZODONE HYDROCHLORIDE 50 MG/1
50-100 TABLET, FILM COATED ORAL
Qty: 30 TABLET | Refills: 0 | Status: SHIPPED | OUTPATIENT
Start: 2024-11-18

## 2024-11-18 RX ORDER — BUPRENORPHINE 2 MG/1
4 TABLET SUBLINGUAL DAILY
Qty: 30 TABLET | Refills: 0 | Status: SHIPPED | OUTPATIENT
Start: 2024-11-18

## 2024-11-18 NOTE — TELEPHONE ENCOUNTER
Pt was reporting he was not sleeping this was concerning to me and I did not want him to wait until 11/27 to address this with a psych provider. Please contact [facility] RN directly to discuss this further [phone].

## 2024-11-18 NOTE — TELEPHONE ENCOUNTER
Rx for trazodone sent to pharmacy.     1. Opioid use disorder, severe, dependence (H) (Primary)    2. Primary insomnia  - traZODone (DESYREL) 50 MG tablet; Take 1-2 tablets ( mg) by mouth nightly as needed for sleep.  Dispense: 30 tablet; Refill: 0

## 2024-11-18 NOTE — GROUP NOTE
Group Therapy Documentation    PATIENT'S NAME: Perry Preciado Jr.  MRN:   [id number]  :   1975  ACCT. NUMBER: [id number]  DATE OF SERVICE: 24  START TIME: 12:30 PM  END TIME:  2:30 PM  FACILITATOR(S): Maurice Franklin LADC; Lexi Sanders  TOPIC: BEH Group Therapy  Number of patients attending the group:  10  Group Length:  2 Hours    Dimensions addressed: 1, 2, 3, 4, 5, and 6    Summary of Group / Topics Discussed:    Spiritual Care      Group Attendance:  Attended group session    Patient's response to the group topic/interactions:  cooperative with task    Patient appeared to be Actively participating.        Client specific details:  Kb participated and interacted appropriately with peers and staff in spiritual group. No triggers to use noted or discussed.

## 2024-11-18 NOTE — TELEPHONE ENCOUNTER
Writer spoke with pt and he has been sleeping better at night but is repeatedly nodding off in group. He is open to trying trazodone, pt said today that he is ok waiting until his 11/27 appt with [facility] psych provider

## 2024-11-18 NOTE — GROUP NOTE
"Group Therapy Documentation    PATIENT'S NAME: Perry Preciado Jr.  MRN:   [id number]  :   1975  ACCT. NUMBER: [id number]  DATE OF SERVICE: 24  START TIME:  9:00 AM  END TIME: 11:00 AM  FACILITATOR(S): Jeimy Preston LADC  TOPIC: BEH Group Therapy  Number of patients attending the group:  10  Group Length:  2 Hours    Dimensions addressed: 3, 4, 5, and 6    Summary of Group / Topics Discussed:    Recovery Principles, Sober coping skills, Balanced lifestyle, Trauma informed care, Disease of addiction, and Relapse prevention    Writer facilitated group psychotherapy related to the above topics.  Patients discussed continuing care including structure and routine, and levels of care.        Group Attendance:  Attended group session    Patient's response to the group topic/interactions:  cooperative with task and listened actively    Patient appeared to be Attentive and Engaged.        Client specific details:  Patient passively engaged in today's group.  Patient checked in feeling, \"lost, sad.\"  Patient discussed some after care needs and listened actively to other peers.    "

## 2024-11-19 ENCOUNTER — HOSPITAL ENCOUNTER (OUTPATIENT)
Dept: BEHAVIORAL HEALTH | Facility: CLINIC | Age: 49
Discharge: HOME | End: 2024-11-19
Attending: FAMILY MEDICINE
Payer: COMMERCIAL

## 2024-11-19 ENCOUNTER — VIRTUAL VISIT (OUTPATIENT)
Dept: BEHAVIORAL HEALTH | Facility: CLINIC | Age: 49
End: 2024-11-19
Payer: COMMERCIAL

## 2024-11-19 DIAGNOSIS — F33.1 MAJOR DEPRESSIVE DISORDER, RECURRENT EPISODE, MODERATE (H): ICD-10-CM

## 2024-11-19 DIAGNOSIS — F12.10 CANNABIS ABUSE: ICD-10-CM

## 2024-11-19 DIAGNOSIS — F41.1 GAD (GENERALIZED ANXIETY DISORDER): ICD-10-CM

## 2024-11-19 DIAGNOSIS — F43.10 PTSD (POST-TRAUMATIC STRESS DISORDER): ICD-10-CM

## 2024-11-19 DIAGNOSIS — F90.2 ADHD (ATTENTION DEFICIT HYPERACTIVITY DISORDER), COMBINED TYPE: Primary | ICD-10-CM

## 2024-11-19 DIAGNOSIS — F10.90 ALCOHOL USE DISORDER: ICD-10-CM

## 2024-11-19 PROCEDURE — H2035 A/D TX PROGRAM, PER HOUR: HCPCS | Mod: HQ

## 2024-11-19 PROCEDURE — H2035 A/D TX PROGRAM, PER HOUR: HCPCS | Mod: HQ | Performed by: COUNSELOR

## 2024-11-19 PROCEDURE — 1002N00001 HC LODGING PLUS FACILITY CHARGE ADULT

## 2024-11-19 PROCEDURE — 90791 PSYCH DIAGNOSTIC EVALUATION: CPT | Mod: 95 | Performed by: SOCIAL WORKER

## 2024-11-19 ASSESSMENT — ANXIETY QUESTIONNAIRES
1. FEELING NERVOUS, ANXIOUS, OR ON EDGE: NEARLY EVERY DAY
5. BEING SO RESTLESS THAT IT IS HARD TO SIT STILL: NEARLY EVERY DAY
7. FEELING AFRAID AS IF SOMETHING AWFUL MIGHT HAPPEN: MORE THAN HALF THE DAYS
3. WORRYING TOO MUCH ABOUT DIFFERENT THINGS: NEARLY EVERY DAY
GAD7 TOTAL SCORE: 15
6. BECOMING EASILY ANNOYED OR IRRITABLE: NOT AT ALL
2. NOT BEING ABLE TO STOP OR CONTROL WORRYING: SEVERAL DAYS
GAD7 TOTAL SCORE: 15
4. TROUBLE RELAXING: NEARLY EVERY DAY

## 2024-11-19 ASSESSMENT — COLUMBIA-SUICIDE SEVERITY RATING SCALE - C-SSRS
1. SINCE LAST CONTACT, HAVE YOU WISHED YOU WERE DEAD OR WISHED YOU COULD GO TO SLEEP AND NOT WAKE UP?: NO
2. HAVE YOU ACTUALLY HAD ANY THOUGHTS OF KILLING YOURSELF?: NO

## 2024-11-19 ASSESSMENT — PATIENT HEALTH QUESTIONNAIRE - PHQ9: SUM OF ALL RESPONSES TO PHQ QUESTIONS 1-9: 10

## 2024-11-19 NOTE — GROUP NOTE
Group Therapy Documentation    PATIENT'S NAME: Perry Preciado Jr.  MRN:   [id number]  :   1975  ACCT. NUMBER: [id number]  DATE OF SERVICE: 24  START TIME: 12:30 PM  END TIME:  2:30 PM  FACILITATOR(S): Maurice Franklin LADC  TOPIC: BEH Group Therapy  Number of patients attending the group:  9  Group Length:  2 Hours    Dimensions addressed: 1, 2, 3, 4, 5, and 6    Summary of Group / Topics Discussed:    Recovery Principles, Sober coping skills, Disease of addiction, and Relapse prevention      Group Attendance:  Attended group session    Patient's response to the group topic/interactions:  cooperative with task    Patient appeared to be Actively participating.        Client specific details:  Kb participated and interacted appropriately with peers and staff in PM group. No triggers to use noted or discussed.

## 2024-11-19 NOTE — TELEPHONE ENCOUNTER
Per [facility] staff, [facility] PA approved. Patient already scheduled for [facility] 11/26/24.    Ruth Castillo RN on 11/19/2024 at 8:10 AM

## 2024-11-19 NOTE — GROUP NOTE
Group Therapy Documentation    PATIENT'S NAME: Perry Preciado Jr.  MRN:   [id number]  :   1975  ACCT. NUMBER: [id number]  DATE OF SERVICE: 24  START TIME:  3:00 PM  END TIME:  4:00 PM  FACILITATOR(S): Cristian De La Cruz LADC; Alisa Brown LADC  TOPIC: BEH Group Therapy  Number of patients attending the group:  26  Group Length:  2 Hours    Dimensions addressed: 3, 4, and 5    Summary of Group / Topics Discussed:    Sober coping skills, Disease of addiction, and Relapse prevention      Group participants viewed a performance by the artist . A related discussion was led by counseling staff regarding recovery skills.       Group Attendance:  Excused    Patient's response to the group topic/interactions:  N/A    Patient appeared to be N/A.        Client specific details:  Patient was excused for a DA appointment.

## 2024-11-19 NOTE — GROUP NOTE
"Group Therapy Documentation    PATIENT'S NAME: Perry Preciado Jr.  MRN:   [id number]  :   1975  ACCT. NUMBER: [id number]  DATE OF SERVICE: 24  START TIME:  9:00 AM  END TIME: 11:00 AM  FACILITATOR(S): Jeimy Preston LADC  TOPIC: BEH Group Therapy  Number of patients attending the group:  9  Group Length:  2 Hours    Dimensions addressed: 3, 4, 5, and 6    Summary of Group / Topics Discussed:    Relationship/socialization, Balanced lifestyle, Trauma informed care, Disease of addiction, Emotions/expression, and Relapse prevention    Writer facilitated group psychotherapy related to the above topics.  Patients discussed the concepts of surrender, trust, and resentments.  Two graduation ceremonies were conducted and patients individually identified change talk that improves the ability to risk trusting others.        Group Attendance:  Attended group session    Patient's response to the group topic/interactions:  cooperative with task, discussed personal experience with topic, and listened actively    Patient appeared to be Actively participating, Attentive, and Engaged.        Client specific details:  Patient actively participated in today's group.  Patient checked in feeling, \"sad, anxious.\"  Patient discussed how external experiences can reflect a different message than intended and demonstrated openness, honesty, and willingness within the group.  Patient received feedback including respect and trust following his discussion.    "
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
## 2024-11-20 ENCOUNTER — HOSPITAL ENCOUNTER (OUTPATIENT)
Dept: BEHAVIORAL HEALTH | Facility: CLINIC | Age: 49
Discharge: HOME | End: 2024-11-20
Attending: FAMILY MEDICINE
Payer: COMMERCIAL

## 2024-11-20 PROCEDURE — 1002N00001 HC LODGING PLUS FACILITY CHARGE ADULT

## 2024-11-20 PROCEDURE — H2035 A/D TX PROGRAM, PER HOUR: HCPCS | Mod: HQ | Performed by: COUNSELOR

## 2024-11-20 PROCEDURE — H2035 A/D TX PROGRAM, PER HOUR: HCPCS | Mod: HQ

## 2024-11-20 NOTE — GROUP NOTE
Psychoeducation Group Documentation    PATIENT'S NAME: Perry Preciado Jr.  MRN:   [id number]  :   1975  ACCT. NUMBER: [id number]  DATE OF SERVICE: 24  START TIME:  8:30 AM  END TIME:  9:20 AM  FACILITATOR(S): Miya Rinaldi LADC; Jeimy Preston LADC  TOPIC: BEH Pyschoeducation  Number of patients attending the group:  25  Group Length:  1 Hours    Skills Group Therapy Type: Addiction Psychoeducation    Summary of Group / Topics Discussed:    Psychoeducation on Addiction and GI Tract      Dr. Kathi Hurtado facilitated a 60 minute Psychoeducational lecture on the GI System and how its effected by addiction. QandA session and discussion included.           Group Attendance:  Attended group session    Patient's response to the group topic/interactions:  cooperative with task    Patient appeared to be Actively participating.         Client specific details:  Kb attended Psychoeducational lecture group and participated in processing discussion. Patient remained engaged and participated throughout group session.       SVETLANA [place]                   denies

## 2024-11-20 NOTE — GROUP NOTE
"Group Therapy Documentation    PATIENT'S NAME: Perry Preciado Jr.  MRN:   [id number]  :   1975  ACCT. NUMBER: [id number]  DATE OF SERVICE: 24  START TIME:  9:45 AM  END TIME: 11:15 AM  FACILITATOR(S): Jeimy Preston LADC  TOPIC: BEH Group Therapy  Number of patients attending the group:  9  Group Length:  1.5 Hours    Dimensions addressed: 3, 4, and 5    Summary of Group / Topics Discussed:    Recovery Principles and Co-occurring illnesses symptom management    Writer facilitated group psychotherapy related to above topics.  Today's topic included \"Anger\" and \"letting go.\"  Patients discussed strategies to manage anger and impulsivity and strategies to gain perspective while letting go of expected outcomes.  Patients reported increased insights following group.      Group Attendance:  Attended group session    Patient's response to the group topic/interactions:  cooperative with task, discussed personal experience with topic, and listened actively    Patient appeared to be Actively participating, Attentive, and Engaged.        Client specific details:  Patient actively participated in today's group.  Patient checked in feeling, \"comfortable with the group.\"  Patient provided insightful feedback for a peer who was struggling with \"letting go.\"  Patient reported increased insight into strategies for using step 3 following the group.    "

## 2024-11-20 NOTE — GROUP NOTE
Group Therapy Documentation    PATIENT'S NAME: Perry Preciado Jr.  MRN:   [id number]  :   1975  ACCT. NUMBER: [id number]  DATE OF SERVICE: 24  START TIME: 12:30 PM  END TIME:  2:30 PM  FACILITATOR(S): Maurice Franklin LADC  TOPIC: BEH Group Therapy  Number of patients attending the group:  10  Group Length:  2 Hours    Dimensions addressed: 1, 2, 3, 4, 5, and 6    Summary of Group / Topics Discussed:    Recovery Principles, Disease of addiction, and Relapse prevention      Group Attendance:  Attended group session    Patient's response to the group topic/interactions:  cooperative with task    Patient appeared to be Actively participating.        Client specific details:  Kb participated and interacted appropriately with peers and staff in PM group. No triggers to use noted or discussed.

## 2024-11-21 ENCOUNTER — HOSPITAL ENCOUNTER (OUTPATIENT)
Dept: BEHAVIORAL HEALTH | Facility: CLINIC | Age: 49
End: 2024-11-21
Attending: FAMILY MEDICINE
Payer: COMMERCIAL

## 2024-11-21 DIAGNOSIS — Z51.81 ENCOUNTER FOR MONITORING OPIOID MAINTENANCE THERAPY: ICD-10-CM

## 2024-11-21 DIAGNOSIS — Z79.891 ENCOUNTER FOR MONITORING OPIOID MAINTENANCE THERAPY: ICD-10-CM

## 2024-11-21 DIAGNOSIS — F11.20 OPIOID USE DISORDER, SEVERE, DEPENDENCE (H): ICD-10-CM

## 2024-11-21 LAB
BACTERIA CSF CULT: NORMAL
FENTANYL UR QL: NORMAL

## 2024-11-21 PROCEDURE — H2035 A/D TX PROGRAM, PER HOUR: HCPCS | Mod: HQ | Performed by: COUNSELOR

## 2024-11-21 PROCEDURE — H2035 A/D TX PROGRAM, PER HOUR: HCPCS | Mod: HQ

## 2024-11-21 PROCEDURE — 80307 DRUG TEST PRSMV CHEM ANLYZR: CPT

## 2024-11-21 NOTE — GROUP NOTE
Group Therapy Documentation    PATIENT'S NAME: Perry Preciado Jr.  MRN:   [id number]  :   1975  ACCT. NUMBER: [id number]  DATE OF SERVICE: 24  START TIME:  3:00 PM  END TIME:  4:00 PM  FACILITATOR(S): Maurice Franklin LADC  TOPIC: BEH Group Therapy  Number of patients attending the group:  10  Group Length:  1 Hours    Dimensions addressed: 1, 2, 3, 4, 5, and 6    Summary of Group / Topics Discussed:    Disease of addiction      Group Attendance:  Attended group session    Patient's response to the group topic/interactions:  cooperative with task    Patient appeared to be Actively participating.        Client specific details:  Kb participated and interacted appropriately with peers and staff in skills group. No triggers to use noted or discussed.

## 2024-11-21 NOTE — GROUP NOTE
"Group Therapy Documentation    PATIENT'S NAME: Perry Preciado Jr.  MRN:   [id number]  :   1975  ACCT. NUMBER: [id number]  DATE OF SERVICE: 24  START TIME:  9:00 AM  END TIME: 11:00 AM  FACILITATOR(S): Jeimy Preston LADC  TOPIC: BEH Group Therapy  Number of patients attending the group:  9  Group Length:  2 Hours    Dimensions addressed: 3, 4, 5, and 6    Summary of Group / Topics Discussed:    Sober coping skills, Relationship/socialization, Balanced lifestyle, Cognitive behavioral therapy skills, Co-occurring illnesses symptom management, Coping/DBT informed care, Trauma informed care, Disease of addiction, Emotions/expression, and Relapse prevention    Writer facilitated group psychotherapy related to the above topics.  Patients discussed acceptance, direct and honest communication, and family dynamics including al-anon and boundary setting.  Patients reported greater insight into the disease of addiction and the impact on family following the group.      Group Attendance:  Attended group session    Patient's response to the group topic/interactions:  cooperative with task, discussed personal experience with topic, and listened actively    Patient appeared to be Actively participating, Attentive, and Engaged.        Client specific details:  Patient actively participated in group.  Patient checked in feeling, \"okay.\"  Patient discussed strategies to maintain personal values while experiencing others who do not hold similar values.      "

## 2024-11-21 NOTE — GROUP NOTE
Group Therapy Documentation    PATIENT'S NAME: Perry Preciado Jr.  MRN:   [id number]  :   1975  ACCT. NUMBER: [id number]  DATE OF SERVICE: 24  START TIME: 12:30 PM  END TIME:  2:30 PM  FACILITATOR(S): Maurice Franklin LADC  TOPIC: BEH Group Therapy  Number of patients attending the group:  10  Group Length:  2 Hours    Dimensions addressed: 1, 2, 3, 4, 5, and 6    Summary of Group / Topics Discussed:    Recovery Principles, Balanced lifestyle, Relapse prevention, and Self-care activities      Group Attendance:  Attended group session    Patient's response to the group topic/interactions:  cooperative with task    Patient appeared to be Actively participating.        Client specific details:  Kb participated and interacted appropriately with peers and staff in PM group. No triggers to use noted or discussed.

## 2024-11-22 ENCOUNTER — HOSPITAL ENCOUNTER (OUTPATIENT)
Dept: BEHAVIORAL HEALTH | Facility: CLINIC | Age: 49
Discharge: HOME | End: 2024-11-22
Attending: FAMILY MEDICINE
Payer: COMMERCIAL

## 2024-11-22 PROCEDURE — H2035 A/D TX PROGRAM, PER HOUR: HCPCS | Mod: HQ | Performed by: COUNSELOR

## 2024-11-22 PROCEDURE — 1002N00001 HC LODGING PLUS FACILITY CHARGE ADULT

## 2024-11-22 PROCEDURE — H2035 A/D TX PROGRAM, PER HOUR: HCPCS | Mod: HQ

## 2024-11-22 NOTE — GROUP NOTE
"Group Therapy Documentation    PATIENT'S NAME: Perry Preciado Jr.  MRN:   [id number]  :   1975  ACCT. NUMBER: [id number]  DATE OF SERVICE: 24  START TIME:  9:00 AM  END TIME: 11:00 AM  FACILITATOR(S): Jeimy Preston LADC  TOPIC: BEH Group Therapy  Number of patients attending the group:  9  Group Length:  2 Hours    Dimensions addressed: 3, 4, 5, and 6    Summary of Group / Topics Discussed:    Recovery Principles, Cognitive behavioral therapy skills, Co-occurring illnesses symptom management, Disease of addiction, Emotions/expression, and Relapse prevention    Writer facilitated group psychotherapy regarding above topics.  Patients discussed strategies for preventing relapse including education regarding family dynamics, trauma-informed strategies to cope and prepare for powerlessness within recovery, and how to manage others' return to use.  Patients all reported increased insight following group.      Group Attendance:  Attended group session    Patient's response to the group topic/interactions:  cooperative with task, discussed personal experience with topic, and listened actively    Patient appeared to be Actively participating, Attentive, and Engaged.        Client specific details:  Patient actively engaged in today's group.  Patient checked in feeling, \"blessed.\"  Patient provided insightful feedback and personal experience related to the group topics.      "

## 2024-11-22 NOTE — GROUP NOTE
Group Therapy Documentation    PATIENT'S NAME: Perry Preciado Jr.  MRN:   [id number]  :   1975  ACCT. NUMBER: [id number]  DATE OF SERVICE: 24  START TIME: 12:30 PM  END TIME:  2:30 PM  FACILITATOR(S): Nel Carr LADC; Cristian De La Cruz LADC; Jeimy Preston LADC  TOPIC: BEH Group Therapy  Number of patients attending the group:  28  Group Length:  2 Hours    Dimensions addressed: 1, 2, 3, 4, 5, and 6    Summary of Group / Topics Discussed:    Recovery Principles, Sober coping skills, Relationship/socialization, Mindfulness/Relaxation, Disease of addiction, Leisure explorations/use of leisure time, and Relapse prevention    Presented a film for therapeutic recreational activity. Sufficient discussion and processing afterwards with the group.      Group Attendance:  Attended group session    Patient's response to the group topic/interactions:  cooperative with task    Patient appeared to be Engaged.        Client specific details:  Perry attended PM group and engaged in satisfactory discussion.

## 2024-11-23 ENCOUNTER — HOSPITAL ENCOUNTER (OUTPATIENT)
Dept: BEHAVIORAL HEALTH | Facility: CLINIC | Age: 49
Discharge: HOME | End: 2024-11-23
Attending: FAMILY MEDICINE
Payer: COMMERCIAL

## 2024-11-23 PROCEDURE — 1002N00001 HC LODGING PLUS FACILITY CHARGE ADULT

## 2024-11-23 PROCEDURE — H2035 A/D TX PROGRAM, PER HOUR: HCPCS | Mod: HQ

## 2024-11-23 NOTE — GROUP NOTE
Psychoeducation Group Documentation    PATIENT'S NAME: Perry Preciado Jr.  MRN:   [id number]  :   1975  ACCT. NUMBER: [id number]  DATE OF SERVICE: 24  START TIME:  9:00 AM  END TIME: 11:00 AM  FACILITATOR(S): Francisco Starr LADC; Jerzy Packer LADC  TOPIC: BEH Pyschoeducation  Number of patients attending the group:  26  Group Length:  2 Hours    Skills Group Therapy Type: Recovery skills    Summary of Group / Topics Discussed:    Relapse prevention skills        Group Attendance:  Attended group session    Patient's response to the group topic/interactions:  listened actively    Patient appeared to be Attentive and Engaged.         Client specific details:  Pt was attentive and engaged.

## 2024-11-23 NOTE — GROUP NOTE
Psychoeducation Group Documentation    PATIENT'S NAME: Perry Preciado Jr.  MRN:   [id number]  :   1975  ACCT. NUMBER: [id number]  DATE OF SERVICE: 24  START TIME: 12:30 PM  END TIME:  2:30 PM  FACILITATOR(S): Francisco Starr LADC; Maurice Franklin LADC  TOPIC: BEH Pyschoeducation  Number of patients attending the group:  26  Group Length:  2 Hours    Skills Group Therapy Type: Recovery skills    Summary of Group / Topics Discussed:    Relationship/social skills        Group Attendance:  Attended group session    Patient's response to the group topic/interactions:  listened actively    Patient appeared to be Attentive and Engaged.         Client specific details:  Pt was attentive and engaged.

## 2024-11-24 ENCOUNTER — HOSPITAL ENCOUNTER (OUTPATIENT)
Dept: BEHAVIORAL HEALTH | Facility: CLINIC | Age: 49
Discharge: HOME | End: 2024-11-24
Attending: FAMILY MEDICINE
Payer: COMMERCIAL

## 2024-11-24 PROCEDURE — 1002N00001 HC LODGING PLUS FACILITY CHARGE ADULT

## 2024-11-24 PROCEDURE — H2035 A/D TX PROGRAM, PER HOUR: HCPCS | Mod: HQ

## 2024-11-24 NOTE — PROGRESS NOTES
(0812 Hr): While taking his morning medication, the patient reported having a large mass in his brain, which his doctor assured him was not meningitis. The patient also mentioned experiencing intermittent pressure on that side of the brain and appeared concerned. The writer advised the patient to seek advice from an LP RN or go to the emergency department (ED).

## 2024-11-24 NOTE — GROUP NOTE
Psychoeducation Group Documentation    PATIENT'S NAME: Perry Preciado Jr.  MRN:   [id number]  :   1975  ACCT. NUMBER: [id number]  DATE OF SERVICE: 24  START TIME:  8:45 AM  END TIME: 10:45 AM  FACILITATOR(S): Jerzy Packer LADC; Jaswinder Galeano RN  TOPIC: BEH Pyschoeducation  Number of patients attending the group:  9  Group Length:  2 Hours    Skills Group Therapy Type: Healthy behaviors development and Medication education    Summary of Group / Topics Discussed:    Balanced lifestyle skills and Medication management skills        Group Attendance:  Attended group session    Patient's response to the group topic/interactions:  cooperative with task    Patient appeared to be Attentive and Engaged.         Client specific details:  The patient participated in the morning nursing lecture on HIV/AIDS.

## 2024-11-24 NOTE — GROUP NOTE
Psychoeducation Group Documentation    PATIENT'S NAME: Perry Preciado Jr.  MRN:   [id number]  :   1975  ACCT. NUMBER: [id number]  DATE OF SERVICE: 24  START TIME: 12:30 PM  END TIME:  1:30 PM  FACILITATOR(S): Francisco Starr LADC  TOPIC: BEH Pyschoeducation  Number of patients attending the group:  25  Group Length:  1 Hours    Skills Group Therapy Type: Emotion regulation skills    Summary of Group / Topics Discussed:    Mindfulness/Relaxation skills and Coping/DBT skills        Group Attendance:  Attended group session    Patient's response to the group topic/interactions:  listened actively    Patient appeared to be Attentive and Engaged.         Client specific details:  Pt was attentive and engaged.

## 2024-11-25 ENCOUNTER — HOSPITAL ENCOUNTER (OUTPATIENT)
Dept: BEHAVIORAL HEALTH | Facility: CLINIC | Age: 49
Discharge: HOME | End: 2024-11-25
Attending: FAMILY MEDICINE
Payer: COMMERCIAL

## 2024-11-25 PROCEDURE — H2035 A/D TX PROGRAM, PER HOUR: HCPCS | Mod: HQ | Performed by: COUNSELOR

## 2024-11-25 PROCEDURE — 1002N00001 HC LODGING PLUS FACILITY CHARGE ADULT

## 2024-11-25 PROCEDURE — H2035 A/D TX PROGRAM, PER HOUR: HCPCS | Mod: HQ

## 2024-11-25 NOTE — GROUP NOTE
"Group Therapy Documentation    PATIENT'S NAME: Perry Preciado Jr.  MRN:   [id number]  :   1975  ACCT. NUMBER: [id number]  DATE OF SERVICE: 24  START TIME:  9:00 AM  END TIME: 11:00 AM  FACILITATOR(S): Jeimy Preston LADC  TOPIC: BEH Group Therapy  Number of patients attending the group:  8  Group Length:  2 Hours    Dimensions addressed: 3, 4, 5, and 6    Summary of Group / Topics Discussed:    Sober coping skills, Cognitive behavioral therapy skills, Coping/DBT informed care, Trauma informed care, Disease of addiction, Emotions/expression, and Relapse prevention    Writer facilitated group psychotherapy regarding above topics.  Patients checked in with current mood and discussed topics of relevance to their relapse prevention and ongoing recovery efforts.  Topics covered were substance use history including the progression and chronicity of the disease.        Group Attendance:  Attended group session    Patient's response to the group topic/interactions:  cooperative with task and listened actively    Patient appeared to be Actively participating, Attentive, and Engaged.        Client specific details:  Patient actively participated in today's group.  Patient checked in feeling, \"not used to not having control.\"  Patient shared similarities with another peer's step one use history and remarked on his own consequences that led to this treatment encounter.      "

## 2024-11-25 NOTE — PROGRESS NOTES
Writer faxed referral information to [facility] and to [facility] with [facility].  Writer called [facility] to ensure referral was received.  They will call back once received and reviewed.
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
## 2024-11-26 ENCOUNTER — APPOINTMENT (OUTPATIENT)
Dept: GENERAL RADIOLOGY | Facility: CLINIC | Age: 49
End: 2024-11-26
Attending: EMERGENCY MEDICINE
Payer: COMMERCIAL

## 2024-11-26 ENCOUNTER — TELEPHONE (OUTPATIENT)
Dept: BEHAVIORAL HEALTH | Facility: CLINIC | Age: 49
End: 2024-11-26

## 2024-11-26 ENCOUNTER — HOSPITAL ENCOUNTER (EMERGENCY)
Facility: CLINIC | Age: 49
Discharge: HOME | End: 2024-11-27
Attending: EMERGENCY MEDICINE
Payer: COMMERCIAL

## 2024-11-26 ENCOUNTER — TELEPHONE (OUTPATIENT)
Dept: BEHAVIORAL HEALTH | Facility: CLINIC | Age: 49
End: 2024-11-26
Payer: COMMERCIAL

## 2024-11-26 ENCOUNTER — HOSPITAL ENCOUNTER (OUTPATIENT)
Dept: BEHAVIORAL HEALTH | Facility: CLINIC | Age: 49
Discharge: HOME | End: 2024-11-26
Attending: FAMILY MEDICINE
Payer: COMMERCIAL

## 2024-11-26 DIAGNOSIS — R10.9 FLANK PAIN: ICD-10-CM

## 2024-11-26 DIAGNOSIS — Z87.898 HX OF INTRAVENOUS DRUG USE IN REMISSION: ICD-10-CM

## 2024-11-26 DIAGNOSIS — M79.89 LEG SWELLING: ICD-10-CM

## 2024-11-26 DIAGNOSIS — F10.20 ALCOHOL USE DISORDER, MODERATE, DEPENDENCE (H): ICD-10-CM

## 2024-11-26 LAB
ALBUMIN SERPL BCG-MCNC: 4.2 G/DL (ref 3.5–5.2)
ALP SERPL-CCNC: 108 U/L (ref 40–150)
ALT SERPL W P-5'-P-CCNC: 124 U/L (ref 0–70)
ANION GAP SERPL CALCULATED.3IONS-SCNC: 11 MMOL/L (ref 7–15)
AST SERPL W P-5'-P-CCNC: 96 U/L (ref 0–45)
BASOPHILS # BLD AUTO: 0.1 10E3/UL (ref 0–0.2)
BASOPHILS NFR BLD AUTO: 1 %
BILIRUB SERPL-MCNC: 0.5 MG/DL
BUN SERPL-MCNC: 12.4 MG/DL (ref 6–20)
CALCIUM SERPL-MCNC: 9.5 MG/DL (ref 8.8–10.4)
CHLORIDE SERPL-SCNC: 100 MMOL/L (ref 98–107)
CREAT SERPL-MCNC: 0.97 MG/DL (ref 0.67–1.17)
D DIMER PPP FEU-MCNC: 0.42 UG/ML FEU (ref 0–0.5)
EGFRCR SERPLBLD CKD-EPI 2021: >90 ML/MIN/1.73M2
EOSINOPHIL # BLD AUTO: 0.2 10E3/UL (ref 0–0.7)
EOSINOPHIL NFR BLD AUTO: 3 %
ERYTHROCYTE [DISTWIDTH] IN BLOOD BY AUTOMATED COUNT: 14.2 % (ref 10–15)
GLUCOSE SERPL-MCNC: 93 MG/DL (ref 70–99)
HCO3 SERPL-SCNC: 28 MMOL/L (ref 22–29)
HCT VFR BLD AUTO: 40.3 % (ref 40–53)
HGB BLD-MCNC: 13.4 G/DL (ref 13.3–17.7)
IMM GRANULOCYTES # BLD: 0 10E3/UL
IMM GRANULOCYTES NFR BLD: 0 %
LYMPHOCYTES # BLD AUTO: 1.9 10E3/UL (ref 0.8–5.3)
LYMPHOCYTES NFR BLD AUTO: 28 %
MCH RBC QN AUTO: 30.1 PG (ref 26.5–33)
MCHC RBC AUTO-ENTMCNC: 33.3 G/DL (ref 31.5–36.5)
MCV RBC AUTO: 91 FL (ref 78–100)
MONOCYTES # BLD AUTO: 0.9 10E3/UL (ref 0–1.3)
MONOCYTES NFR BLD AUTO: 13 %
NEUTROPHILS # BLD AUTO: 3.8 10E3/UL (ref 1.6–8.3)
NEUTROPHILS NFR BLD AUTO: 55 %
NRBC # BLD AUTO: 0 10E3/UL
NRBC BLD AUTO-RTO: 0 /100
PLATELET # BLD AUTO: 237 10E3/UL (ref 150–450)
POTASSIUM SERPL-SCNC: 4.3 MMOL/L (ref 3.4–5.3)
PROT SERPL-MCNC: 7.4 G/DL (ref 6.4–8.3)
RBC # BLD AUTO: 4.45 10E6/UL (ref 4.4–5.9)
SODIUM SERPL-SCNC: 139 MMOL/L (ref 135–145)
WBC # BLD AUTO: 6.9 10E3/UL (ref 4–11)

## 2024-11-26 PROCEDURE — 250N000011 HC RX IP 250 OP 636: Performed by: EMERGENCY MEDICINE

## 2024-11-26 PROCEDURE — 99285 EMERGENCY DEPT VISIT HI MDM: CPT | Mod: 25 | Performed by: EMERGENCY MEDICINE

## 2024-11-26 PROCEDURE — H2035 A/D TX PROGRAM, PER HOUR: HCPCS | Mod: HQ

## 2024-11-26 PROCEDURE — 85004 AUTOMATED DIFF WBC COUNT: CPT | Performed by: EMERGENCY MEDICINE

## 2024-11-26 PROCEDURE — 1002N00001 HC LODGING PLUS FACILITY CHARGE ADULT

## 2024-11-26 PROCEDURE — 36415 COLL VENOUS BLD VENIPUNCTURE: CPT | Performed by: EMERGENCY MEDICINE

## 2024-11-26 PROCEDURE — 82040 ASSAY OF SERUM ALBUMIN: CPT | Performed by: EMERGENCY MEDICINE

## 2024-11-26 PROCEDURE — 86708 HEPATITIS A ANTIBODY: CPT

## 2024-11-26 PROCEDURE — 85379 FIBRIN DEGRADATION QUANT: CPT | Performed by: EMERGENCY MEDICINE

## 2024-11-26 PROCEDURE — 96374 THER/PROPH/DIAG INJ IV PUSH: CPT | Performed by: EMERGENCY MEDICINE

## 2024-11-26 PROCEDURE — 93005 ELECTROCARDIOGRAM TRACING: CPT | Performed by: EMERGENCY MEDICINE

## 2024-11-26 PROCEDURE — 71046 X-RAY EXAM CHEST 2 VIEWS: CPT

## 2024-11-26 PROCEDURE — 99284 EMERGENCY DEPT VISIT MOD MDM: CPT | Performed by: EMERGENCY MEDICINE

## 2024-11-26 PROCEDURE — 93010 ELECTROCARDIOGRAM REPORT: CPT | Performed by: EMERGENCY MEDICINE

## 2024-11-26 RX ORDER — KETOROLAC TROMETHAMINE 30 MG/ML
30 INJECTION, SOLUTION INTRAMUSCULAR; INTRAVENOUS ONCE
Status: COMPLETED | OUTPATIENT
Start: 2024-11-26 | End: 2024-11-26

## 2024-11-26 RX ADMIN — KETOROLAC TROMETHAMINE 30 MG: 30 INJECTION, SOLUTION INTRAMUSCULAR at 22:17

## 2024-11-26 ASSESSMENT — ANXIETY QUESTIONNAIRES
2. NOT BEING ABLE TO STOP OR CONTROL WORRYING: SEVERAL DAYS
GAD7 TOTAL SCORE: 7
7. FEELING AFRAID AS IF SOMETHING AWFUL MIGHT HAPPEN: SEVERAL DAYS
1. FEELING NERVOUS, ANXIOUS, OR ON EDGE: SEVERAL DAYS
3. WORRYING TOO MUCH ABOUT DIFFERENT THINGS: SEVERAL DAYS
5. BEING SO RESTLESS THAT IT IS HARD TO SIT STILL: SEVERAL DAYS
4. TROUBLE RELAXING: SEVERAL DAYS
GAD7 TOTAL SCORE: 7
6. BECOMING EASILY ANNOYED OR IRRITABLE: SEVERAL DAYS

## 2024-11-26 ASSESSMENT — ACTIVITIES OF DAILY LIVING (ADL)
ADLS_ACUITY_SCORE: 58

## 2024-11-26 ASSESSMENT — COLUMBIA-SUICIDE SEVERITY RATING SCALE - C-SSRS
6. HAVE YOU EVER DONE ANYTHING, STARTED TO DO ANYTHING, OR PREPARED TO DO ANYTHING TO END YOUR LIFE?: YES
2. HAVE YOU ACTUALLY HAD ANY THOUGHTS OF KILLING YOURSELF IN THE PAST MONTH?: NO
1. IN THE PAST MONTH, HAVE YOU WISHED YOU WERE DEAD OR WISHED YOU COULD GO TO SLEEP AND NOT WAKE UP?: NO

## 2024-11-26 ASSESSMENT — PATIENT HEALTH QUESTIONNAIRE - PHQ9: SUM OF ALL RESPONSES TO PHQ QUESTIONS 1-9: 2

## 2024-11-26 NOTE — GROUP NOTE
Group Therapy Documentation    PATIENT'S NAME: Perry Preciado Jr.  MRN:   [id number]  :   1975  ACCT. NUMBER: [id number]  DATE OF SERVICE: 24  START TIME: 12:30 PM  END TIME:  2:30 PM  FACILITATOR(S): Maurice Franklin LADC  TOPIC: BEH Group Therapy  Number of patients attending the group:  8  Group Length:  2 Hours    Dimensions addressed: 1, 2, 3, 4, 5, and 6    Summary of Group / Topics Discussed:    Recovery Principles, Sober coping skills, Cognitive behavioral therapy skills, Coping/DBT informed care, Disease of addiction, and Relapse prevention      Group Attendance:  Attended group session    Patient's response to the group topic/interactions:  cooperative with task    Patient appeared to be Actively participating.        Client specific details:  Kb participated and interacted appropriately with peers and staff in PM group. No triggers to use noted or discussed.

## 2024-11-26 NOTE — PROGRESS NOTES
[facility] Weekly Treatment Plan Review      ATTENDANCE for the following date span:  24 to 24      Weekly Treatment Plan Review     Treatment Plan initiated on: 24.    Dimension1: Acute Intoxication/Withdrawal Potential -   Previous Dimension Ratin  Current Dimension Ratin  Date of Last Use: 24  Any reports of withdrawal symptoms - No      Dimension 2: Biomedical Conditions & Complications -   Previous Dimension Ratin  Current Dimension Ratin  Medical Concerns:  None  Current Medications & Medication Changes:  Current Outpatient Medications   Medication Sig Dispense Refill    acetaminophen (TYLENOL) 500 MG tablet Take 500-1,000 mg by mouth every 8 hours as needed for mild pain.      albuterol (PROAIR HFA/PROVENTIL HFA/VENTOLIN HFA) 108 (90 Base) MCG/ACT inhaler Inhale 2 puffs into the lungs every 6 hours as needed for shortness of breath, wheezing or cough.      alum & mag hydroxide-simethicone (MAALOX) 200-200-20 MG/5ML SUSP suspension Take 30 mLs by mouth every 6 hours as needed for indigestion.      atorvastatin (LIPITOR) 20 MG tablet Take 1 tablet (20 mg) by mouth every evening. 30 tablet 0    benzocaine-menthol (CEPACOL) 15-3.6 MG lozenge Place 1 lozenge inside cheek every 2 hours as needed for sore throat.      buprenorphine (SUBUTEX) 2 MG SUBL sublingual tablet Place 2 tablets (4 mg) under the tongue daily. Take mid-day 30 tablet 0    buprenorphine (SUBUTEX) 8 MG SUBL sublingual tablet Place 1 tablet (8 mg) under the tongue 2 times daily. 30 tablet 0    diphenhydrAMINE (BENADRYL) 25 MG capsule Take 1-2 capsules (25-50 mg) by mouth every 4 hours as needed for itching (Reported skin itching.). 30 capsule 0    FLUoxetine (PROZAC) 40 MG capsule Take 1 capsule (40 mg) by mouth daily. 30 capsule 0    gabapentin (NEURONTIN) 600 MG tablet Take 0.5 tablets (300 mg) by mouth 3 times daily.      guaiFENesin-dextromethorphan (ROBITUSSIN DM) 100-10 MG/5ML syrup Take 10  mLs by mouth every 4 hours as needed for cough.      ibuprofen (ADVIL/MOTRIN) 200 MG tablet Take 600 mg by mouth every 6 hours as needed for pain.      lithium (ESKALITH CR/LITHOBID) 450 MG CR tablet Take 2 tablets (900 mg) by mouth at bedtime. 60 tablet 0    naloxone (NARCAN) 4 MG/0.1ML nasal spray Spray 1 spray (4 mg) into one nostril alternating nostrils as needed for opioid reversal. every 2-3 minutes until assistance arrives 0.2 mL 11    nicotine (COMMIT) 2 MG lozenge Place 1-2 lozenges (2-4 mg) inside cheek every hour as needed for nicotine withdrawal symptoms. 48 lozenge 0    nicotine (NICODERM CQ) 21 MG/24HR 24 hr patch Place 1 patch over 24 hours onto the skin every 24 hours. 30 patch 0    prazosin (MINIPRESS) 1 MG capsule Take 1 capsule (1 mg) by mouth at bedtime. 30 capsule 0    QUEtiapine (SEROQUEL) 400 MG tablet Take 1 tablet (400 mg) by mouth at bedtime. 30 tablet 0    QUEtiapine (SEROQUEL) 50 MG tablet Take 1 tablet (50 mg) by mouth 2 times daily. 60 tablet 0    traZODone (DESYREL) 50 MG tablet Take 1-2 tablets ( mg) by mouth nightly as needed for sleep. 30 tablet 0     No current facility-administered medications for this encounter.     Facility-Administered Medications Ordered in Other Encounters   Medication Dose Route Frequency Provider Last Rate Last Admin    Self Administer Medications: Behavioral Services   Does not apply See Admin Instructions Arlen Lynch MD         Medication Prescriber:  (See Chart)  Taking meds as prescribed? Yes  Medication side effects or concerns:  None  Outside medical appointments this week (list provider and reason for visit):  TBD    Narrative-Patient reports no biomedical  concerns that would prohibit him from completing [employer] [facility] program. Patient appears fully functioning and able to seek medical attention as needed..       Dimension 3: Emotional/Behavioral Conditions & Complications -   Previous Dimension Ratin  Current Dimension Rating:   2  PHQ2:       4/25/2016     4:39 PM   PHQ-2 ( 1999 [employer])   Q1: Little interest or pleasure in doing things 0   Q2: Feeling down, depressed or hopeless 0   PHQ-2 Score 0      GAD2:        No data to display              Mental health diagnosis No formal diagnosis  Date of last SIB:  N/A  Date of  last SI:  N/A  Date of last HI: N/A    Behavioral Targets: Follow recommendations of medical provider. Report any alcohol or drug use to   counselor and any increase in withdrawal symptoms to nurse. Stabilize and maintain mental health.     Risk Factors: Early recovery, guilt and shame, grief and loss, mental health issues, poor self esteem    Protective factors:  positive relationships positive social network, sober network, and positive family connections, forward/future oriented thinking, dedication to family/friends, safe and stable environment, regular sleep, effectively controls impulses, regular physical activity, sense of belonging  , purpose  , help seeking behaviors when distressed  , abstinence from substances, adherence with prescribed medication, agreement to use safety plan, structured day, uses community crisis resources, effective problem-solving skills, committment to well-being, sense of meaning, positive social skills, healthy fear of risky behaviors or pain, strong sense of self-worth/esteem, and sense of personal control or determination    Current  Assignments: Managing Depression and Managing Anxiety and Worry    Narrative: Patient is working towards gaining better awareness regarding how his substance abuse negatively impacts his mental and emotional well being. Patient reports that his mood has improved and that he's managed stressful situations by talking with his peers and not worrying so much. Patient is working on the above noted assignments and will present them in group upon completion. Patient will be scheduled to meet with staff psychotherapist to discussed his mental and emotional  concerns. Patient reports no suicidal ideation at this time..    [place] Suicide Severity Rating Scale (Lifetime/Recent)      10/25/2024     2:03 PM 10/26/2024     1:23 AM 10/26/2024    12:00 PM 10/26/2024     9:00 PM 2024     2:00 PM 2024     4:00 PM 2024    10:00 AM   [place] Suicide Severity Rating (Lifetime/Recent)   Q1 Wished to be Dead (Past Month) 1-->yes 1-->yes 1-->yes 1-->yes 1-->yes     Q2 Suicidal Thoughts (Past Month) 1-->yes 1-->yes 1-->yes 1-->yes 1-->yes     Q3 Suicidal Thought Method 0-->no 0-->no 1-->yes 1-->yes 1-->yes     Q4 Suicidal Intent without Specific Plan 1-->yes 1-->yes 0-->no 0-->no 0-->no     Q5 Suicide Intent with Specific Plan 0-->no 0-->no 1-->yes 1-->yes 1-->yes     Q6 Suicide Behavior (Lifetime)  1-->yes 1-->yes 1-->yes 1-->yes     If yes to Q6, within past 3 months?     1-->yes     Level of Risk per Screen high risk high risk high risk high risk high risk     1. Wish to be Dead (Past 1 Month)       Y   2. Non-Specific Active Suicidal Thoughts (Past 1 Month)       N   Most Severe Ideation Rating (Past 1 Month) 3         Frequency (Past 1 Month) 4         Duration (Past 1 Month) 2         Controllability (Past 1 Month) 3         Deterrents (Past 1 Month) 2         Reasons for Ideation (Past 1 Month) 5         Actual Attempt (Past 3 Months) N         Interrupted Attempts (Past 3 Months) N         Aborted or Self-Interrupted Attempt (Past 3 Months) N         Preparatory Acts or Behavior (Past 3 Months) N         Most Lethal Attempt Date      2024    Actual Lethality/Medical Damage Code (Most Lethal Attempt)      0    Potential Lethality Code (Most Lethal Attempt)      2    Calculated C-SSRS Risk Score (Lifetime/Recent) No Risk Indicated      Moderate Risk         Dimension 4: Treatment Acceptance / Resistance -   Previous Dimension Ratin  Current Dimension Ratin  JAQUELINE Diagnosis:  Alcohol Use Disorder, Severe F10.20-(303.90)    Commitment to tx  process/Stage of change- Patient appears to be in the contemplative stage of change at this time.    JAQUELINE Assignments - First Step, Drug Use History    Narrative - Patient is working on his internal motivation for change. Patient reports attending meetings, groups and lectures on time and offers meaningful feedback to his peers regarding home work assignments and topics group discussion. Patient reports being motivated by not wanting to continue experiencing the consequences of his substance abuse and wanting to be free of his addiction. Patient will be working on the above noted assignments and will present both in group upon completion.    Dimension 5: Relapse / Continued Problem Potential -   Previous Dimension Ratin  Current Dimension Ratin  Relapses this week - None  Urges to use - None  UA results - N/A    JAQUELINE Assignments-Identifying Relapse Triggers and Cues, Relapse Prevention Plan    Narrative- Patient is working to gain insight regarding his triggers, cues and early warning signs for relapse. Patient rates his cravings this week as a 2, 1-(low)-10-(high). Patient reports that he's managed his cravings by talking with his peers and getting a sober perspective. Patient will be working on the above noted assignments and will present both in group upon completion  Patient attended a Spirituality Workshop conducted by Lexi Sanders and completed all activities. Patient also attended a Relapse Prevention Plan workshop and completed all activities.      Dimension 6: Recovery Environment -   Previous Dimension Ratin  Current Dimension Ratin  Family Involvement - Yes  Summarize attendance at family groups and family sessions - n/a  Family supportive of treatment?  Yes  Recreational activities - None    Narrative -Patient reports attending all 12 Step meetings this week according to his treatment plan Patient has been working on developing his sober network by spending free time with same-gender  peers. Patient will be working with counselors and support staff to develop his aftercare treatment program.    Progress made on transition planning goals:Patient would like to admit to sober housing and outpatient treatment programming.    Justification for Continued Treatment at this Level of Care: Risk ratings indicate continued need for this level of care. Pt has been unable to maintain abstinence from alcohol, drugs while at the outpatient level of care, lacks long-term sober maintenance skills, lacks sober coping skills and has medical issues which are exacerbated by substance abuse.     Treatment coordination activities this week:   TBD  Need for peer recovery support referral? No    Discharge Planning:  Target Discharge Date/Timeframe: 12/11/24   Med Mgmt Provider/Appt:  TBD   Ind therapy Provider/Appt:  DONNAD   Family therapy Provider/Appt:  RAD   Other referrals:  TBD      Has vulnerable adult status changed? No    Interdisciplinary Clinical Supervision including: SVETLANA and Mental health professional    Are Treatment Plan goals/objectives effective? Yes  *If no, list changes to treatment plan:    Are the current goals meeting client's needs? Yes  *If no, list the changes to treatment plan.    Patient Input / Response: Pt contributed to treatment plan review.    *Client agrees with any changes to the treatment plan: N/A  *Client received copy of changes: N/A  *Client is aware of right to access a treatment plan review: Yes    SVETLANA Gan

## 2024-11-26 NOTE — TELEPHONE ENCOUNTER
"[facility] [place]   Patients with any form of [employer] insurance Cannot be scheduled with this clinic    Type of appointment Follow-up    Any specific provider? Has seen [facility] recently    Appointment Request made by Gabby Rodríguez RN: [phone]     Additional appointment medical/ information. Pt is concerned about a \"mass\" in his head. Pt was hospitalized recently with probable viral meningitis and is concerned about an MRI which showed something in his brain. He is now at  and was therefore cleared of the meningitis but is still concerned and wants to discuss with a provider.     Date of discharge from [facility] 12/11/2024    [facility]  [facility] ()  [address], [place], [place]  O:[phone]  F:[phone]  [facility] Pool [id number]      "

## 2024-11-26 NOTE — TELEPHONE ENCOUNTER
Pt is refusing to go to his sublocade appt today, he wants to think about it a little more . Please advise. He is currently not set up for follow up in  because he is transferring his care to addiction/psych team tomorrow 11/27

## 2024-11-26 NOTE — GROUP NOTE
Group Therapy Documentation    PATIENT'S NAME: Perry Preciado Jr.  MRN:   [id number]  :   1975  ACCT. NUMBER: [id number]  DATE OF SERVICE: 24  START TIME:  3:00 PM  END TIME:  4:00 PM  FACILITATOR(S): Miya Rinaldi LADC; Alisa Brown LADC; Jerzy Packer LADC  TOPIC: BEH Group Therapy  Number of patients attending the group:  30  Group Length:  1 Hours    Dimensions addressed: 3    Summary of Group / Topics Discussed:    Recovery Principles, Coping/DBT informed care, and Relapse prevention    Facilitator SVETLANA Florentino lead a group on DBT    Group Objectives  Reviewed:  -What is DBT?   -Who founded DBT?  -Who benefits from engaging in DBT services?  -What skills are associated with DBT ?  -How/When do I implement DBT Skills?                   Group Attendance:  Attended group session    Patient's response to the group topic/interactions:  cooperative with task    Patient appeared to be Actively participating.        Client specific details:  Perry attended afternoon skills group and participated in processing discussion. Patient remained engaged and participated throughout group session.       SVETLANA [place]

## 2024-11-26 NOTE — ADDENDUM NOTE
Encounter addended by: Maurice Franklin LADC on: 11/26/2024 9:09 AM   Actions taken: Charge Capture section accepted

## 2024-11-26 NOTE — ADDENDUM NOTE
Encounter addended by: Maurice Franklin LADC on: 11/26/2024 9:06 AM   Actions taken: Clinical Note Signed

## 2024-11-26 NOTE — GROUP NOTE
"Group Therapy Documentation    PATIENT'S NAME: Perry Preciado Jr.  MRN:   [id number]  :   1975  ACCT. NUMBER: [id number]  DATE OF SERVICE: 24  START TIME:  9:00 AM  END TIME: 11:00 AM  FACILITATOR(S): Alisa Brown LADC  TOPIC: BEH Group Therapy  Number of patients attending the group: 8    Group Length:  2 Hours    Dimensions addressed: 3, 4, and 5    Summary of Group / Topics Discussed:    Recovery Principles, Sober coping skills, Relationship/socialization, and Relapse prevention      Group Attendance:  Attended group session    Patient's response to the group topic/interactions:  cooperative with task    Patient appeared to be Actively participating, Attentive, and Engaged.        Client specific details: Patient completed his \"Step One\" assignment and presented in group. Patient discussed how his substance abuse has caused severe consequences in his life and how sobriety can bring him the things in his life that are really important. Patient received positive feedback from his peers..    "

## 2024-11-26 NOTE — GROUP NOTE
Group Therapy Documentation    PATIENT'S NAME: Perry Preciado Jr.  MRN:   [id number]  :   1975  ACCT. NUMBER: [id number]  DATE OF SERVICE: 24  START TIME: 12:30 PM  END TIME:  2:30 PM  FACILITATOR(S): Maurice Franklin LADC; Lexi Sanders  TOPIC: BEH Group Therapy  Number of patients attending the group:  8  Group Length:  2 Hours    Dimensions addressed: 1, 2, 3, 4, 5, and 6    Summary of Group / Topics Discussed:    Spiritual Care    Group Attendance:  Attended group session    Patient's response to the group topic/interactions:  cooperative with task    Patient appeared to be Actively participating.        Client specific details:  Kb participated and interacted appropriately with peers and staff in spiritual group. No triggers to use noted or discussed.

## 2024-11-27 ENCOUNTER — APPOINTMENT (OUTPATIENT)
Dept: ULTRASOUND IMAGING | Facility: CLINIC | Age: 49
End: 2024-11-27
Attending: EMERGENCY MEDICINE
Payer: COMMERCIAL

## 2024-11-27 ENCOUNTER — OFFICE VISIT (OUTPATIENT)
Dept: PSYCHIATRY | Facility: CLINIC | Age: 49
End: 2024-11-27
Attending: STUDENT IN AN ORGANIZED HEALTH CARE EDUCATION/TRAINING PROGRAM
Payer: COMMERCIAL

## 2024-11-27 ENCOUNTER — HOSPITAL ENCOUNTER (OUTPATIENT)
Dept: BEHAVIORAL HEALTH | Facility: CLINIC | Age: 49
Discharge: HOME | End: 2024-11-27
Attending: FAMILY MEDICINE
Payer: COMMERCIAL

## 2024-11-27 VITALS
BODY MASS INDEX: 34.7 KG/M2 | SYSTOLIC BLOOD PRESSURE: 122 MMHG | WEIGHT: 277.6 LBS | DIASTOLIC BLOOD PRESSURE: 86 MMHG | HEART RATE: 69 BPM

## 2024-11-27 VITALS
SYSTOLIC BLOOD PRESSURE: 165 MMHG | HEART RATE: 71 BPM | TEMPERATURE: 97.9 F | OXYGEN SATURATION: 98 % | DIASTOLIC BLOOD PRESSURE: 109 MMHG | RESPIRATION RATE: 16 BRPM

## 2024-11-27 DIAGNOSIS — F32.A DEPRESSION, UNSPECIFIED DEPRESSION TYPE: ICD-10-CM

## 2024-11-27 DIAGNOSIS — F25.0 SCHIZOAFFECTIVE DISORDER, BIPOLAR TYPE (H): ICD-10-CM

## 2024-11-27 DIAGNOSIS — Z59.819 HOUSING INSTABILITY: ICD-10-CM

## 2024-11-27 DIAGNOSIS — F11.20 OPIOID USE DISORDER, SEVERE, DEPENDENCE (H): Primary | ICD-10-CM

## 2024-11-27 DIAGNOSIS — F15.20 METHAMPHETAMINE USE DISORDER, SEVERE, DEPENDENCE (H): Chronic | ICD-10-CM

## 2024-11-27 DIAGNOSIS — Z79.891 ENCOUNTER FOR MONITORING OPIOID MAINTENANCE THERAPY: ICD-10-CM

## 2024-11-27 DIAGNOSIS — F41.1 GAD (GENERALIZED ANXIETY DISORDER): ICD-10-CM

## 2024-11-27 DIAGNOSIS — F43.10 PTSD (POST-TRAUMATIC STRESS DISORDER): ICD-10-CM

## 2024-11-27 DIAGNOSIS — F10.20 ALCOHOL USE DISORDER, MODERATE, DEPENDENCE (H): ICD-10-CM

## 2024-11-27 DIAGNOSIS — F90.9 ATTENTION DEFICIT HYPERACTIVITY DISORDER (ADHD), UNSPECIFIED ADHD TYPE: ICD-10-CM

## 2024-11-27 DIAGNOSIS — Z51.81 ENCOUNTER FOR MONITORING OPIOID MAINTENANCE THERAPY: ICD-10-CM

## 2024-11-27 DIAGNOSIS — Z91.51 HISTORY OF SUICIDE ATTEMPT: ICD-10-CM

## 2024-11-27 DIAGNOSIS — F17.200 TOBACCO USE DISORDER: ICD-10-CM

## 2024-11-27 DIAGNOSIS — F11.20 OPIOID USE DISORDER, SEVERE, DEPENDENCE (H): ICD-10-CM

## 2024-11-27 DIAGNOSIS — Z87.898 HX OF INTRAVENOUS DRUG USE IN REMISSION: ICD-10-CM

## 2024-11-27 DIAGNOSIS — F10.90 ALCOHOL USE DISORDER: ICD-10-CM

## 2024-11-27 DIAGNOSIS — F33.2 SEVERE EPISODE OF RECURRENT MAJOR DEPRESSIVE DISORDER, WITHOUT PSYCHOTIC FEATURES (H): ICD-10-CM

## 2024-11-27 LAB
ALBUMIN UR-MCNC: NEGATIVE MG/DL
APAP SERPL-MCNC: <5 UG/ML (ref 10–30)
APPEARANCE UR: CLEAR
ATRIAL RATE - MUSE: 63 BPM
BILIRUB UR QL STRIP: NEGATIVE
COLOR UR AUTO: ABNORMAL
DIASTOLIC BLOOD PRESSURE - MUSE: NORMAL MMHG
FENTANYL UR QL: NORMAL
GLUCOSE UR STRIP-MCNC: NEGATIVE MG/DL
HAV AB SER QL IA: REACTIVE
HAV IGM SERPL QL IA: NONREACTIVE
HBV CORE IGM SERPL QL IA: NONREACTIVE
HBV SURFACE AB SERPL IA-ACNC: 25.7 M[IU]/ML
HBV SURFACE AB SERPL IA-ACNC: REACTIVE M[IU]/ML
HBV SURFACE AG SERPL QL IA: NONREACTIVE
HCV AB SERPL QL IA: NONREACTIVE
HGB UR QL STRIP: NEGATIVE
HIV 1+2 AB+HIV1 P24 AG SERPL QL IA: NONREACTIVE
HYALINE CASTS: 1 /LPF
INTERPRETATION ECG - MUSE: NORMAL
KETONES UR STRIP-MCNC: NEGATIVE MG/DL
LEUKOCYTE ESTERASE UR QL STRIP: NEGATIVE
MUCOUS THREADS #/AREA URNS LPF: PRESENT /LPF
NITRATE UR QL: NEGATIVE
P AXIS - MUSE: 52 DEGREES
PH UR STRIP: 5.5 [PH] (ref 5–7)
PR INTERVAL - MUSE: 182 MS
QRS DURATION - MUSE: 108 MS
QT - MUSE: 444 MS
QTC - MUSE: 454 MS
R AXIS - MUSE: 20 DEGREES
RBC URINE: 0 /HPF
SP GR UR STRIP: 1.01 (ref 1–1.03)
SQUAMOUS EPITHELIAL: <1 /HPF
SYSTOLIC BLOOD PRESSURE - MUSE: NORMAL MMHG
T AXIS - MUSE: 39 DEGREES
UROBILINOGEN UR STRIP-MCNC: NORMAL MG/DL
VENTRICULAR RATE- MUSE: 63 BPM
WBC URINE: 1 /HPF

## 2024-11-27 PROCEDURE — 90792 PSYCH DIAG EVAL W/MED SRVCS: CPT | Mod: GC | Performed by: STUDENT IN AN ORGANIZED HEALTH CARE EDUCATION/TRAINING PROGRAM

## 2024-11-27 PROCEDURE — 87389 HIV-1 AG W/HIV-1&-2 AB AG IA: CPT

## 2024-11-27 PROCEDURE — 86706 HEP B SURFACE ANTIBODY: CPT

## 2024-11-27 PROCEDURE — 93970 EXTREMITY STUDY: CPT

## 2024-11-27 PROCEDURE — 36415 COLL VENOUS BLD VENIPUNCTURE: CPT | Performed by: EMERGENCY MEDICINE

## 2024-11-27 PROCEDURE — 76705 ECHO EXAM OF ABDOMEN: CPT

## 2024-11-27 PROCEDURE — 80307 DRUG TEST PRSMV CHEM ANLYZR: CPT

## 2024-11-27 PROCEDURE — 80143 DRUG ASSAY ACETAMINOPHEN: CPT | Performed by: EMERGENCY MEDICINE

## 2024-11-27 PROCEDURE — 81003 URINALYSIS AUTO W/O SCOPE: CPT | Performed by: EMERGENCY MEDICINE

## 2024-11-27 PROCEDURE — 250N000013 HC RX MED GY IP 250 OP 250 PS 637: Performed by: EMERGENCY MEDICINE

## 2024-11-27 PROCEDURE — 1002N00001 HC LODGING PLUS FACILITY CHARGE ADULT

## 2024-11-27 PROCEDURE — 80074 ACUTE HEPATITIS PANEL: CPT | Performed by: EMERGENCY MEDICINE

## 2024-11-27 RX ORDER — QUETIAPINE FUMARATE 50 MG/1
50 TABLET, FILM COATED ORAL 2 TIMES DAILY
Qty: 60 TABLET | Refills: 0 | Status: SHIPPED | OUTPATIENT
Start: 2024-12-10

## 2024-11-27 RX ORDER — GABAPENTIN 600 MG/1
600 TABLET ORAL 3 TIMES DAILY
Qty: 90 TABLET | Refills: 0 | Status: SHIPPED | OUTPATIENT
Start: 2024-11-27

## 2024-11-27 RX ORDER — ATOMOXETINE 80 MG/1
80 CAPSULE ORAL DAILY
Qty: 30 CAPSULE | Refills: 0 | Status: SHIPPED | OUTPATIENT
Start: 2024-12-10

## 2024-11-27 RX ORDER — BUPRENORPHINE 8 MG/1
8 TABLET SUBLINGUAL 3 TIMES DAILY
Qty: 30 TABLET | Refills: 0 | Status: SHIPPED | OUTPATIENT
Start: 2024-11-27

## 2024-11-27 RX ORDER — ACETAMINOPHEN 325 MG/1
650 TABLET ORAL ONCE
Status: COMPLETED | OUTPATIENT
Start: 2024-11-27 | End: 2024-11-27

## 2024-11-27 RX ORDER — PRAZOSIN HYDROCHLORIDE 1 MG/1
1 CAPSULE ORAL AT BEDTIME
Qty: 30 CAPSULE | Refills: 0 | Status: SHIPPED | OUTPATIENT
Start: 2024-12-10

## 2024-11-27 RX ORDER — LITHIUM CARBONATE 450 MG
900 TABLET, EXTENDED RELEASE ORAL AT BEDTIME
Qty: 60 TABLET | Refills: 0 | Status: SHIPPED | OUTPATIENT
Start: 2024-12-10

## 2024-11-27 RX ORDER — QUETIAPINE FUMARATE 400 MG/1
400 TABLET, FILM COATED ORAL AT BEDTIME
Qty: 30 TABLET | Refills: 0 | Status: SHIPPED | OUTPATIENT
Start: 2024-12-10

## 2024-11-27 RX ADMIN — ACETAMINOPHEN 650 MG: 325 TABLET, FILM COATED ORAL at 00:25

## 2024-11-27 SDOH — ECONOMIC STABILITY - HOUSING INSECURITY: HOUSING INSTABILITY UNSPECIFIED: Z59.819

## 2024-11-27 ASSESSMENT — PATIENT HEALTH QUESTIONNAIRE - PHQ9
10. IF YOU CHECKED OFF ANY PROBLEMS, HOW DIFFICULT HAVE THESE PROBLEMS MADE IT FOR YOU TO DO YOUR WORK, TAKE CARE OF THINGS AT HOME, OR GET ALONG WITH OTHER PEOPLE: SOMEWHAT DIFFICULT
SUM OF ALL RESPONSES TO PHQ QUESTIONS 1-9: 5
SUM OF ALL RESPONSES TO PHQ QUESTIONS 1-9: 5

## 2024-11-27 ASSESSMENT — ANXIETY QUESTIONNAIRES
7. FEELING AFRAID AS IF SOMETHING AWFUL MIGHT HAPPEN: SEVERAL DAYS
4. TROUBLE RELAXING: MORE THAN HALF THE DAYS
GAD7 TOTAL SCORE: 11
6. BECOMING EASILY ANNOYED OR IRRITABLE: NOT AT ALL
GAD7 TOTAL SCORE: 11
IF YOU CHECKED OFF ANY PROBLEMS ON THIS QUESTIONNAIRE, HOW DIFFICULT HAVE THESE PROBLEMS MADE IT FOR YOU TO DO YOUR WORK, TAKE CARE OF THINGS AT HOME, OR GET ALONG WITH OTHER PEOPLE: SOMEWHAT DIFFICULT
2. NOT BEING ABLE TO STOP OR CONTROL WORRYING: MORE THAN HALF THE DAYS
8. IF YOU CHECKED OFF ANY PROBLEMS, HOW DIFFICULT HAVE THESE MADE IT FOR YOU TO DO YOUR WORK, TAKE CARE OF THINGS AT HOME, OR GET ALONG WITH OTHER PEOPLE?: SOMEWHAT DIFFICULT
5. BEING SO RESTLESS THAT IT IS HARD TO SIT STILL: MORE THAN HALF THE DAYS
GAD7 TOTAL SCORE: 11
3. WORRYING TOO MUCH ABOUT DIFFERENT THINGS: MORE THAN HALF THE DAYS
7. FEELING AFRAID AS IF SOMETHING AWFUL MIGHT HAPPEN: SEVERAL DAYS
1. FEELING NERVOUS, ANXIOUS, OR ON EDGE: MORE THAN HALF THE DAYS

## 2024-11-27 ASSESSMENT — ACTIVITIES OF DAILY LIVING (ADL)
ADLS_ACUITY_SCORE: 58

## 2024-11-27 ASSESSMENT — PAIN SCALES - GENERAL: PAINLEVEL_OUTOF10: MODERATE PAIN (4)

## 2024-11-27 NOTE — PROGRESS NOTES
Name: Perry Preciado Jr.  Date: 11/27/2024  Medical Record: [id number]  Envelope Number: [id number]  List of Contents (List each item separately in new row):     Buprenorphine HCL 2 mg Subl. - 14 tablets    Admission:  I am responsible for any personal items that are not sent to the safe or pharmacy.  [facility] is not responsible for loss, theft or damage of any property in my possession.      Patient Signature:  ___________________________________________       Date/Time:__________________________    Staff Signature: __________________________________       Date/Time:__________________________    [facility] Staff person, if patient is unable/unwilling to sign:      __________________________________________________________       Date/Time: __________________________    **All medications are packaged by [facility] staff and securely stored on [facility]. Medications left by patients at discharge will be packaged by [facility] staff and transported by [facility] staff to inpatient pharmacy for storage.**    Discharge:  [facility] has returned all of my personal belongings:    Patient Signature: ________________________________________     Date/Time: ____________________________________    Staff Signature: ______________________________________     Date/Time:_____________________________________

## 2024-11-27 NOTE — DISCHARGE INSTRUCTIONS
Take acetaminophen as needed for discomfort.  Elevate your legs when at rest.  Wear compression stockings when up.    Follow-up with primary care-referral placed    Return if fever, trouble breathing, or other concerns.

## 2024-11-27 NOTE — PROGRESS NOTES
Pt came to staff complaining on a recent onset of abdominal/rib pain that is extending to his lower back. He requested to go to the ED to get it assessed. Pt remained in the ED until 0420 AM at which time he returned to [facility] with a discharge summary and no new scripts. Will continue to monitor.

## 2024-11-27 NOTE — ED TRIAGE NOTES
From [facility], 20 minutes ago started having R sided lower rib pain/cramp, radiates to lower back. Pain 7/10, denies trouble making in a full deep breath. Denies falls.      Triage Assessment (Adult)       Row Name 11/26/24 2049          Triage Assessment    Airway WDL WDL        Respiratory WDL    Respiratory WDL WDL        Skin Circulation/Temperature WDL    Skin Circulation/Temperature WDL WDL        Cardiac WDL    Cardiac WDL WDL        Peripheral/Neurovascular WDL    Peripheral Neurovascular WDL WDL        Cognitive/Neuro/Behavioral WDL    Cognitive/Neuro/Behavioral WDL WDL

## 2024-11-27 NOTE — PROGRESS NOTES
Name: Perry Preciado Jr.  Date: 11/27/2024  Medical Record: [id number]    Envelope Number: [id number]    List of Contents (List each item separately in new row):   Nicotine 21mg/24hr Pt24 (2 box)  Admission:  I am responsible for any personal items that are not sent to the safe or pharmacy.  [facility] is not responsible for loss, theft or damage of any property in my possession.    Patient Signature:  ___________________________________________       Date/Time:__________________________    Staff Signature: __________________________________       Date/Time:__________________________    [facility] Staff person, if patient is unable/unwilling to sign:      __________________________________________________________       Date/Time: __________________________    **All medications are packaged by [facility] staff and securely stored on [facility]. Medications left by patients at discharge will be packaged by [facility] staff and transported by [facility] staff to inpatient pharmacy for storage.**    Discharge:  [facility] has returned all of my personal belongings:    Patient Signature: ________________________________________     Date/Time: ____________________________________    Staff Signature: ______________________________________     Date/Time:_____________________________________

## 2024-11-27 NOTE — PROGRESS NOTES
Name: Perry Preciado Jr.  Date: 11/27/2024  Medical Record: [id number]    Envelope Number: [id number]    List of Contents (List each item separately in new row):   Quetiapine fumarate 100 mg tabs, Gabapentin 600 mg tabs.   Admission:  I am responsible for any personal items that are not sent to the safe or pharmacy.  [facility] is not responsible for loss, theft or damage of any property in my possession.    Patient Signature:  ___________________________________________       Date/Time:__________________________    Staff Signature: __________________________________       Date/Time:__________________________    [facility] Staff person, if patient is unable/unwilling to sign:      __________________________________________________________       Date/Time: __________________________    **All medications are packaged by [facility] staff and securely stored on [facility]. Medications left by patients at discharge will be packaged by [facility] staff and transported by [facility] staff to inpatient pharmacy for storage.**    Discharge:  [facility] has returned all of my personal belongings:    Patient Signature: ________________________________________     Date/Time: ____________________________________    Staff Signature: ______________________________________     Date/Time:_____________________________________

## 2024-11-27 NOTE — ED PROVIDER NOTES
[facility] [place]    11/26/24            History     Chief Complaint   Patient presents with    Flank Pain     From [facility], 20 minutes ago started having R sided lower rib pain/cramp, radiates to lower back. Pain 7/10, denies trouble making in a full deep breath. Denies falls. Has not taken any medication for it       HPI  Perry Preciado Jr. is a 49 year old male who with PMH of polysubstance abuse, asthma, hypertriglyceridemia, meningitis, KANE, MDD and schizoaffective disorder presents to the ED due to right sided posterolateral back pain.  Patient states that he acute onset of pain in [facility] this evening.  He states that the pain has improved but persist.  Initially, he had a difficulty taking a deep breath.  That has now resolved.  He denies any dyspnea.  No fever.  No cough.  He denies any radiation of the pain.  He denies any hematuria.  He did notice some urinary hesitancy earlier today but denies dysuria.  He denies any abdominal pain.  Denies any nausea or vomiting.  Denies any leg pain or swelling.    Past Medical History  Past Medical History:   Diagnosis Date    Acute bilateral low back pain with right-sided sciatica 06/07/2016    Acute pain of right shoulder due to trauma     Adult antisocial behavior     assisted incarceration: 16 years    Aspiration pneumonia (H) 02/24/2014    CARDIOVASCULAR SCREENING; LDL GOAL LESS THAN 130 06/07/2016    Carpal tunnel syndrome of right wrist 06/07/2016    Chest pain 02/19/2014    Chest trauma 02/19/2014    Chronic pain syndrome 09/30/2019    Chronic right-sided low back pain with right-sided sciatica 05/10/2018    DDD (degenerative disc disease), lumbosacral 05/05/2020    Depression with anxiety 05/05/2020    Displacement of lumbar intervertebral disc without myelopathy 05/16/2012    KANE (generalized anxiety disorder) 07/07/2017    Heroin abuse (H) 05/10/2018    History of ADHD 01/06/2014    History of drug abuse (H)  01/06/2014    History of suicide attempt 05/10/2018    HTN (hypertension) 02/26/2014    Hyperglycemia 02/23/2014    Hypertriglyceridemia 11/02/2015    IV drug abuse (H) 05/10/2018    Major depressive disorder, recurrent (H) 02/23/2018    Major depressive disorder, recurrent episode, moderate (H) 06/06/2016    Methamphetamine abuse (H) 02/23/2018    Overview:  see [facility] H&P 11/27/2009, [facility] admit 9/2/2010    Mild intermittent asthma without complication 05/10/2018    Opiate overdose (H) 02/22/2014    Positive D dimer 11/02/2015    Psychosis (H) 05/04/2020    Sepsis (H) 02/22/2014    SIRS (systemic inflammatory response syndrome) (H) 11/02/2015    Substance abuse (H) 05/07/2018    Suicidal ideation 02/23/2018    Tobacco use disorder 05/10/2018     Past Surgical History:   Procedure Laterality Date    BACK SURGERY       acetaminophen (TYLENOL) 500 MG tablet  albuterol (PROAIR HFA/PROVENTIL HFA/VENTOLIN HFA) 108 (90 Base) MCG/ACT inhaler  alum & mag hydroxide-simethicone (MAALOX) 200-200-20 MG/5ML SUSP suspension  [START ON 12/10/2024] atomoxetine (STRATTERA) 80 MG capsule  atorvastatin (LIPITOR) 20 MG tablet  benzocaine-menthol (CEPACOL) 15-3.6 MG lozenge  buprenorphine (SUBUTEX) 8 MG SUBL sublingual tablet  diphenhydrAMINE (BENADRYL) 25 MG capsule  FLUoxetine (PROZAC) 20 MG capsule  gabapentin (NEURONTIN) 600 MG tablet  guaiFENesin-dextromethorphan (ROBITUSSIN DM) 100-10 MG/5ML syrup  ibuprofen (ADVIL/MOTRIN) 200 MG tablet  [START ON 12/10/2024] lithium (ESKALITH CR/LITHOBID) 450 MG CR tablet  naloxone (NARCAN) 4 MG/0.1ML nasal spray  nicotine (COMMIT) 2 MG lozenge  nicotine (NICODERM CQ) 7 MG/24HR 24 hr patch  [START ON 12/10/2024] prazosin (MINIPRESS) 1 MG capsule  [START ON 12/10/2024] QUEtiapine (SEROQUEL) 400 MG tablet  [START ON 12/10/2024] QUEtiapine (SEROQUEL) 50 MG tablet  traZODone (DESYREL) 50 MG tablet      Allergies   Allergen Reactions    Wellbutrin [Bupropion] Anaphylaxis and Swelling     Facial  swelling    Wellbutrin [Bupropion]     Wellbutrin [Bupropion] Difficulty breathing    Naltrexone Other (See Comments)     Headaches  Headaches       Family History  No family history on file.  Social History   Social History     Tobacco Use    Smoking status: Every Day     Current packs/day: 0.50     Types: Cigarettes     Passive exposure: Current    Smokeless tobacco: Never   Vaping Use    Vaping status: Never Used   Substance Use Topics    Alcohol use: Yes    Drug use: Yes     Types: Fentanyl, Methamphetamines     Comment: last use 2 weeks ago      Past medical history, past surgical history, medications, allergies, family history, and social history were reviewed with the patient. No additional pertinent items.   A complete review of systems was performed with pertinent positives and negatives noted in the HPI, and all other systems negative.    Physical Exam   BP: (!) 160/83  Pulse: 79  Temp: 97.9  F (36.6  C)  Resp: 16  SpO2: 98 %  Physical Exam  Vitals and nursing note reviewed.   Constitutional:       General: He is not in acute distress.     Appearance: He is not diaphoretic.   HENT:      Head: Atraumatic.   Eyes:      General: No scleral icterus.     Pupils: Pupils are equal, round, and reactive to light.   Cardiovascular:      Rate and Rhythm: Normal rate and regular rhythm.      Heart sounds: Normal heart sounds.   Pulmonary:      Effort: No respiratory distress.      Breath sounds: Normal breath sounds.   Chest:      Chest wall: Tenderness present.   Abdominal:      General: Bowel sounds are normal.      Palpations: Abdomen is soft.      Tenderness: There is no abdominal tenderness.   Musculoskeletal:         General: No tenderness.        Arms:       Right lower leg: Edema present.      Left lower leg: Edema present.   Skin:     General: Skin is warm and dry.      Findings: No rash.   Neurological:      General: No focal deficit present.      Motor: No weakness.      Coordination: Coordination normal.    Psychiatric:         Mood and Affect: Mood normal.           ED Course, Procedures, & Data      Procedures            EKG Interpretation:      Interpreted by BRE ROCHA MD, MD  Time reviewed: 2214  Symptoms at time of EKG: chest pain   Rhythm: normal sinus   Rate: normal  Axis: normal  Ectopy: none  Conduction: normal  ST Segments/ T Waves: No ST-T wave changes  Q Waves: none  Comparison to prior: Sinus bradycardia on 11/12/2024, otherwise unchanged    Clinical Impression: normal EKG           Results for orders placed or performed during the hospital encounter of 11/26/24   XR Chest 2 Views     Status: None    Narrative    EXAM: XR CHEST 2 VIEWS  LOCATION: [facility]  DATE: 11/26/2024    INDICATION: Chest pain  COMPARISON: None.      Impression    IMPRESSION: Negative chest.   US Abdomen Limited w Abdomen Doppler Limited     Status: None    Narrative    EXAM: US ABDOMEN LIMITED W ABDOMEN DOPPLER LIMITED  LOCATION: [facility]  DATE: 11/27/2024    INDICATION: Right upper quadrant abdominal pain, transaminitis for gallstones, bili obstruction, Budd Chiari syndrome.  COMPARISON: None.  TECHNIQUE: Limited abdominal ultrasound. Color flow with spectral Doppler and waveform analysis performed.     FINDINGS:    GALLBLADDER: Normal. No gallstones, wall thickening, or pericholecystic fluid. Negative sonographic Boss's sign.     BILE DUCTS: No biliary dilatation. The common duct measures 5.6 mm.    LIVER: Increased echogenicity from diffuse fatty infiltration. No focal mass.     RIGHT KIDNEY: No hydronephrosis.     PANCREAS: The visualized portions are normal.    No ascites.    ABDOMINAL DUPLEX: The hepatic artery, hepatic veins, IVC, portal veins, and splenic vein are patent with flow in the normal direction.     Splenic Vein : 33 cm/sec  MPV :33cm/sec  RPV : 32 cm/sec  LPV : 36 cm/sec    /22 cm/sec RI:0.79         Impression    IMPRESSION:  1.  Hepatic steatosis  2.  Normal abdominal liver duplex.       US Lower Extremity Venous Duplex Bilateral     Status: None    Narrative    EXAM: US LOWER EXTREMITY VENOUS DUPLEX BILATERAL  LOCATION: [facility]  DATE: 11/27/2024    INDICATION: Swelling???evaluate for DVT; pain  COMPARISON: None.  TECHNIQUE: Venous Duplex ultrasound of bilateral lower extremities with and without compression, augmentation and duplex. Color flow and spectral Doppler with waveform analysis performed.    FINDINGS: Exam includes the common femoral, femoral, popliteal veins as well as segmentally visualized deep calf veins and greater saphenous vein.     RIGHT: No deep vein thrombosis. No superficial thrombophlebitis. No popliteal cyst.    LEFT: No deep vein thrombosis. No superficial thrombophlebitis. No popliteal cyst.      Impression    IMPRESSION:  1.  No deep venous thrombosis in the bilateral lower extremities.   Comprehensive metabolic panel     Status: Abnormal   Result Value Ref Range    Sodium 139 135 - 145 mmol/L    Potassium 4.3 3.4 - 5.3 mmol/L    Carbon Dioxide (CO2) 28 22 - 29 mmol/L    Anion Gap 11 7 - 15 mmol/L    Urea Nitrogen 12.4 6.0 - 20.0 mg/dL    Creatinine 0.97 0.67 - 1.17 mg/dL    GFR Estimate >90 >60 mL/min/1.73m2    Calcium 9.5 8.8 - 10.4 mg/dL    Chloride 100 98 - 107 mmol/L    Glucose 93 70 - 99 mg/dL    Alkaline Phosphatase 108 40 - 150 U/L    AST 96 (H) 0 - 45 U/L     (H) 0 - 70 U/L    Protein Total 7.4 6.4 - 8.3 g/dL    Albumin 4.2 3.5 - 5.2 g/dL    Bilirubin Total 0.5 <=1.2 mg/dL   D dimer quantitative     Status: Normal   Result Value Ref Range    D-Dimer Quantitative 0.42 0.00 - 0.50 ug/mL FEU    Narrative    This D-dimer assay is intended for use in conjunction with a clinical pretest probability assessment model to exclude pulmonary embolism (PE) and deep venous thrombosis (DVT) in outpatients suspected of PE or DVT. The  cut-off value is 0.50 ug/mL FEU.   UA with Microscopic reflex to Culture     Status: Abnormal    Specimen: Urine, NOS   Result Value Ref Range    Color Urine Light Yellow Colorless, Straw, Light Yellow, Yellow    Appearance Urine Clear Clear    Glucose Urine Negative Negative mg/dL    Bilirubin Urine Negative Negative    Ketones Urine Negative Negative mg/dL    Specific Gravity Urine 1.013 1.003 - 1.035    Blood Urine Negative Negative    pH Urine 5.5 5.0 - 7.0    Protein Albumin Urine Negative Negative mg/dL    Urobilinogen Urine Normal Normal, 2.0 mg/dL    Nitrite Urine Negative Negative    Leukocyte Esterase Urine Negative Negative    Mucus Urine Present (A) None Seen /LPF    RBC Urine 0 <=2 /HPF    WBC Urine 1 <=5 /HPF    Squamous Epithelials Urine <1 <=1 /HPF    Hyaline Casts Urine 1 <=2 /LPF    Narrative    Urine Culture not indicated   CBC with platelets and differential     Status: None   Result Value Ref Range    WBC Count 6.9 4.0 - 11.0 10e3/uL    RBC Count 4.45 4.40 - 5.90 10e6/uL    Hemoglobin 13.4 13.3 - 17.7 g/dL    Hematocrit 40.3 40.0 - 53.0 %    MCV 91 78 - 100 fL    MCH 30.1 26.5 - 33.0 pg    MCHC 33.3 31.5 - 36.5 g/dL    RDW 14.2 10.0 - 15.0 %    Platelet Count 237 150 - 450 10e3/uL    % Neutrophils 55 %    % Lymphocytes 28 %    % Monocytes 13 %    % Eosinophils 3 %    % Basophils 1 %    % Immature Granulocytes 0 %    NRBCs per 100 WBC 0 <1 /100    Absolute Neutrophils 3.8 1.6 - 8.3 10e3/uL    Absolute Lymphocytes 1.9 0.8 - 5.3 10e3/uL    Absolute Monocytes 0.9 0.0 - 1.3 10e3/uL    Absolute Eosinophils 0.2 0.0 - 0.7 10e3/uL    Absolute Basophils 0.1 0.0 - 0.2 10e3/uL    Absolute Immature Granulocytes 0.0 <=0.4 10e3/uL    Absolute NRBCs 0.0 10e3/uL   Acetaminophen level     Status: Abnormal   Result Value Ref Range    Acetaminophen <5.0 (L) 10.0 - 30.0 ug/mL   Acute hepatitis panel     Status: Normal   Result Value Ref Range    Hepatitis A Antibody IgM Nonreactive Nonreactive    Hepatitis B Core  Antibody IgM Nonreactive Nonreactive    Hepatitis C Antibody Nonreactive Nonreactive    Hepatitis B Surface Antigen Nonreactive Nonreactive   Hepatitis A Antibody Total     Status: None   Result Value Ref Range    Hepatitis A Antibody Total Reactive     Narrative    HAV antibody testing interpretation chart:      HAV Total Antibody - NONREACTIVE  HAV IgM - NOT TESTED  Comments: No evidence of vaccination or previous infection; Susceptible to Hepatitis A infection     HAV Total Antibody - REACTIVE   HAV IgM - NOT TESTED  Comments:Consistent with recent or remote Hepatitis A infection or antibody response to HAV vaccination    HAV Total Antibody - REACTIVE  HAV IgM - NONREACTIVE  Comments:Consistent with resolved Hepatitis A infection or antibody response to HAV vaccination    HAV Total Antibody - REACTIVE  HAV IgM - REACTIVE  Comments:Consistent with active Hepatitis A infection   HIV Antigen Antibody Combo     Status: Normal   Result Value Ref Range    HIV Antigen Antibody Combo Nonreactive Nonreactive   EKG 12-lead, tracing only     Status: None   Result Value Ref Range    Systolic Blood Pressure  mmHg    Diastolic Blood Pressure  mmHg    Ventricular Rate 63 BPM    Atrial Rate 63 BPM    PA Interval 182 ms    QRS Duration 108 ms     ms    QTc 454 ms    P Axis 52 degrees    R AXIS 20 degrees    T Axis 39 degrees    Interpretation ECG       Sinus rhythm  Normal ECG  Unconfirmed report - interpretation of this ECG is computer generated - see medical record for final interpretation  Confirmed by - EMERGENCY ROOM, PHYSICIAN ([id number]),  PATRICIA SAVAGE ([id number]) on 11/27/2024 7:37:26 AM     CBC with platelets differential     Status: None    Narrative    The following orders were created for panel order CBC with platelets differential.  Procedure                               Abnormality         Status                     ---------                               -----------         ------                     CBC  with platelets and d...[[id number]]                      Final result                 Please view results for these tests on the individual orders.     Medications   ketorolac (TORADOL) injection 30 mg (30 mg Intravenous $Given 11/26/24 2217)   acetaminophen (TYLENOL) tablet 650 mg (650 mg Oral $Given 11/27/24 0025)     Labs Ordered and Resulted from Time of ED Arrival to Time of ED Departure   COMPREHENSIVE METABOLIC PANEL - Abnormal       Result Value    Sodium 139      Potassium 4.3      Carbon Dioxide (CO2) 28      Anion Gap 11      Urea Nitrogen 12.4      Creatinine 0.97      GFR Estimate >90      Calcium 9.5      Chloride 100      Glucose 93      Alkaline Phosphatase 108      AST 96 (*)      (*)     Protein Total 7.4      Albumin 4.2      Bilirubin Total 0.5     ROUTINE UA WITH MICROSCOPIC REFLEX TO CULTURE - Abnormal    Color Urine Light Yellow      Appearance Urine Clear      Glucose Urine Negative      Bilirubin Urine Negative      Ketones Urine Negative      Specific Gravity Urine 1.013      Blood Urine Negative      pH Urine 5.5      Protein Albumin Urine Negative      Urobilinogen Urine Normal      Nitrite Urine Negative      Leukocyte Esterase Urine Negative      Mucus Urine Present (*)     RBC Urine 0      WBC Urine 1      Squamous Epithelials Urine <1      Hyaline Casts Urine 1     ACETAMINOPHEN LEVEL - Abnormal    Acetaminophen <5.0 (*)    D DIMER QUANTITATIVE - Normal    D-Dimer Quantitative 0.42     CBC WITH PLATELETS AND DIFFERENTIAL    WBC Count 6.9      RBC Count 4.45      Hemoglobin 13.4      Hematocrit 40.3      MCV 91      MCH 30.1      MCHC 33.3      RDW 14.2      Platelet Count 237      % Neutrophils 55      % Lymphocytes 28      % Monocytes 13      % Eosinophils 3      % Basophils 1      % Immature Granulocytes 0      NRBCs per 100 WBC 0      Absolute Neutrophils 3.8      Absolute Lymphocytes 1.9      Absolute Monocytes 0.9      Absolute Eosinophils 0.2      Absolute Basophils 0.1       Absolute Immature Granulocytes 0.0      Absolute NRBCs 0.0       US Lower Extremity Venous Duplex Bilateral   Final Result   IMPRESSION:   1.  No deep venous thrombosis in the bilateral lower extremities.      US Abdomen Limited w Abdomen Doppler Limited   Final Result   IMPRESSION:   1.  Hepatic steatosis   2.  Normal abdominal liver duplex.            XR Chest 2 Views   Final Result   IMPRESSION: Negative chest.         Reviewed recent hospital admission with concern for meningitis.  CSF infectious testing negative.  Reviewed previous CBC, comprehensive metabolic panel, echocardiogram, EKG.    Critical care was not performed.     Medical Decision Making  The patient's presentation was of moderate complexity (an undiagnosed new problem with uncertain prognosis).    The patient's evaluation involved:  review of external note(s) from 1 sources (see separate area of note for details)  review of 3+ test result(s) ordered prior to this encounter (see separate area of note for details)  ordering and/or review of 3+ test(s) in this encounter (see separate area of note for details)  independent interpretation of testing performed by another health professional (EKG interpreted by me with above results)    The patient's management necessitated moderate risk (a decision regarding minor procedure (compression stockings) with risk factors of none).    Assessment & Plan    49 year old male from [facility].o. to the emergency department with acute onset right lateral low chest pain that is sharp in nature.  He has some reproducibility on exam.  He does not have any respiratory symptoms or cough.  The patient is EKG is normal.  D-dimer is normal so do not suspect pulmonary embolism.  No evidence for pneumonia or pneumothorax on chest radiograph.  He does have mild transaminitis which she has had in the past.  He has not had previous ultrasound imaging of his liver so right upper quadrant ultrasound with Dopplers performed  today.  There is no abnormality on his right upper quadrant ultrasound.  Acetaminophen level negative.  Hepatitis testing negative.  Patient's urinalysis is normal.  He also has lower extremity edema which he has had in the past.  Ultrasound today does not reveal any DVT.  He does not have any dyspnea, orthopnea, or PND to suggest heart failure.  Suspect this is a dependent edema.  Patient provided compression stockings and proper use was instructed.  Primary care referral placed.  Suspect chest wall pain.  Acetaminophen recommended for symptoms.  Return precautions provided.    I have reviewed the nursing notes. I have reviewed the findings, diagnosis, plan and need for follow up with the patient.    Discharge Medication List as of 11/27/2024  4:02 AM          Final diagnoses:   Flank pain   Leg swelling     Chart documentation was completed with Dragon voice-recognition software. Even though reviewed, this chart may still contain some grammatical, spelling, and word errors.     Petr Crawford Md    [facility] EMERGENCY DEPARTMENT  11/26/2024     Petr Crawford MD  11/27/24 1257

## 2024-11-28 ENCOUNTER — APPOINTMENT (OUTPATIENT)
Dept: CT IMAGING | Facility: CLINIC | Age: 49
End: 2024-11-28
Attending: EMERGENCY MEDICINE
Payer: COMMERCIAL

## 2024-11-28 ENCOUNTER — HOSPITAL ENCOUNTER (EMERGENCY)
Facility: CLINIC | Age: 49
Discharge: HOME | End: 2024-11-28
Attending: EMERGENCY MEDICINE
Payer: COMMERCIAL

## 2024-11-28 ENCOUNTER — HOSPITAL ENCOUNTER (OUTPATIENT)
Dept: BEHAVIORAL HEALTH | Facility: CLINIC | Age: 49
Discharge: HOME | End: 2024-11-28
Attending: FAMILY MEDICINE
Payer: COMMERCIAL

## 2024-11-28 VITALS
HEART RATE: 68 BPM | WEIGHT: 279 LBS | TEMPERATURE: 97.7 F | DIASTOLIC BLOOD PRESSURE: 68 MMHG | RESPIRATION RATE: 16 BRPM | OXYGEN SATURATION: 97 % | BODY MASS INDEX: 34.87 KG/M2 | SYSTOLIC BLOOD PRESSURE: 118 MMHG

## 2024-11-28 DIAGNOSIS — R51.9 NONINTRACTABLE HEADACHE, UNSPECIFIED CHRONICITY PATTERN, UNSPECIFIED HEADACHE TYPE: ICD-10-CM

## 2024-11-28 DIAGNOSIS — J34.89 RHINORRHEA: ICD-10-CM

## 2024-11-28 DIAGNOSIS — Z86.61 HISTORY OF MENINGITIS: ICD-10-CM

## 2024-11-28 LAB
ALBUMIN SERPL BCG-MCNC: 3.8 G/DL (ref 3.5–5.2)
ALP SERPL-CCNC: 115 U/L (ref 40–150)
ALT SERPL W P-5'-P-CCNC: 81 U/L (ref 0–70)
ANION GAP SERPL CALCULATED.3IONS-SCNC: 11 MMOL/L (ref 7–15)
AST SERPL W P-5'-P-CCNC: 39 U/L (ref 0–45)
ATRIAL RATE - MUSE: 70 BPM
BACTERIA BLD CULT: NORMAL
BACTERIA CSF CULT: NORMAL
BASOPHILS # BLD AUTO: 0.1 10E3/UL (ref 0–0.2)
BASOPHILS NFR BLD AUTO: 1 %
BILIRUB SERPL-MCNC: 0.3 MG/DL
BUN SERPL-MCNC: 16.7 MG/DL (ref 6–20)
CALCIUM SERPL-MCNC: 9 MG/DL (ref 8.8–10.4)
CHLORIDE SERPL-SCNC: 102 MMOL/L (ref 98–107)
CREAT SERPL-MCNC: 0.95 MG/DL (ref 0.67–1.17)
CRP SERPL-MCNC: 4.08 MG/L
DIASTOLIC BLOOD PRESSURE - MUSE: NORMAL MMHG
EGFRCR SERPLBLD CKD-EPI 2021: >90 ML/MIN/1.73M2
EOSINOPHIL # BLD AUTO: 0.3 10E3/UL (ref 0–0.7)
EOSINOPHIL NFR BLD AUTO: 5 %
ERYTHROCYTE [DISTWIDTH] IN BLOOD BY AUTOMATED COUNT: 14.4 % (ref 10–15)
GLUCOSE SERPL-MCNC: 146 MG/DL (ref 70–99)
HCO3 SERPL-SCNC: 26 MMOL/L (ref 22–29)
HCT VFR BLD AUTO: 38.6 % (ref 40–53)
HGB BLD-MCNC: 13 G/DL (ref 13.3–17.7)
HOLD SPECIMEN: NORMAL
HOLD SPECIMEN: NORMAL
IMM GRANULOCYTES # BLD: 0 10E3/UL
IMM GRANULOCYTES NFR BLD: 0 %
INTERPRETATION ECG - MUSE: NORMAL
LYMPHOCYTES # BLD AUTO: 2.4 10E3/UL (ref 0.8–5.3)
LYMPHOCYTES NFR BLD AUTO: 39 %
MCH RBC QN AUTO: 30.9 PG (ref 26.5–33)
MCHC RBC AUTO-ENTMCNC: 33.7 G/DL (ref 31.5–36.5)
MCV RBC AUTO: 92 FL (ref 78–100)
MONOCYTES # BLD AUTO: 0.7 10E3/UL (ref 0–1.3)
MONOCYTES NFR BLD AUTO: 11 %
NEUTROPHILS # BLD AUTO: 2.7 10E3/UL (ref 1.6–8.3)
NEUTROPHILS NFR BLD AUTO: 43 %
NRBC # BLD AUTO: 0 10E3/UL
NRBC BLD AUTO-RTO: 0 /100
P AXIS - MUSE: 42 DEGREES
PLATELET # BLD AUTO: 210 10E3/UL (ref 150–450)
POTASSIUM SERPL-SCNC: 4 MMOL/L (ref 3.4–5.3)
PR INTERVAL - MUSE: 172 MS
PROT SERPL-MCNC: 6.6 G/DL (ref 6.4–8.3)
QRS DURATION - MUSE: 104 MS
QT - MUSE: 436 MS
QTC - MUSE: 470 MS
R AXIS - MUSE: 11 DEGREES
RBC # BLD AUTO: 4.21 10E6/UL (ref 4.4–5.9)
SODIUM SERPL-SCNC: 139 MMOL/L (ref 135–145)
SYSTOLIC BLOOD PRESSURE - MUSE: NORMAL MMHG
T AXIS - MUSE: 25 DEGREES
VENTRICULAR RATE- MUSE: 70 BPM
WBC # BLD AUTO: 6.1 10E3/UL (ref 4–11)

## 2024-11-28 PROCEDURE — 96376 TX/PRO/DX INJ SAME DRUG ADON: CPT | Performed by: EMERGENCY MEDICINE

## 2024-11-28 PROCEDURE — 70450 CT HEAD/BRAIN W/O DYE: CPT

## 2024-11-28 PROCEDURE — H2035 A/D TX PROGRAM, PER HOUR: HCPCS | Mod: HQ

## 2024-11-28 PROCEDURE — 99285 EMERGENCY DEPT VISIT HI MDM: CPT | Mod: 25 | Performed by: EMERGENCY MEDICINE

## 2024-11-28 PROCEDURE — 250N000011 HC RX IP 250 OP 636: Performed by: EMERGENCY MEDICINE

## 2024-11-28 PROCEDURE — 36415 COLL VENOUS BLD VENIPUNCTURE: CPT | Performed by: EMERGENCY MEDICINE

## 2024-11-28 PROCEDURE — 87040 BLOOD CULTURE FOR BACTERIA: CPT | Performed by: EMERGENCY MEDICINE

## 2024-11-28 PROCEDURE — 258N000003 HC RX IP 258 OP 636: Performed by: EMERGENCY MEDICINE

## 2024-11-28 PROCEDURE — 85041 AUTOMATED RBC COUNT: CPT | Performed by: EMERGENCY MEDICINE

## 2024-11-28 PROCEDURE — 80053 COMPREHEN METABOLIC PANEL: CPT | Performed by: EMERGENCY MEDICINE

## 2024-11-28 PROCEDURE — 96374 THER/PROPH/DIAG INJ IV PUSH: CPT | Performed by: EMERGENCY MEDICINE

## 2024-11-28 PROCEDURE — 96361 HYDRATE IV INFUSION ADD-ON: CPT | Performed by: EMERGENCY MEDICINE

## 2024-11-28 PROCEDURE — 85004 AUTOMATED DIFF WBC COUNT: CPT | Performed by: EMERGENCY MEDICINE

## 2024-11-28 PROCEDURE — 250N000013 HC RX MED GY IP 250 OP 250 PS 637: Performed by: EMERGENCY MEDICINE

## 2024-11-28 PROCEDURE — 93005 ELECTROCARDIOGRAM TRACING: CPT | Performed by: EMERGENCY MEDICINE

## 2024-11-28 PROCEDURE — 99284 EMERGENCY DEPT VISIT MOD MDM: CPT | Performed by: EMERGENCY MEDICINE

## 2024-11-28 PROCEDURE — 86140 C-REACTIVE PROTEIN: CPT | Performed by: EMERGENCY MEDICINE

## 2024-11-28 PROCEDURE — 93010 ELECTROCARDIOGRAM REPORT: CPT | Performed by: EMERGENCY MEDICINE

## 2024-11-28 RX ORDER — KETOROLAC TROMETHAMINE 15 MG/ML
15 INJECTION, SOLUTION INTRAMUSCULAR; INTRAVENOUS ONCE
Status: COMPLETED | OUTPATIENT
Start: 2024-11-28 | End: 2024-11-28

## 2024-11-28 RX ORDER — FLUTICASONE PROPIONATE 50 MCG
1 SPRAY, SUSPENSION (ML) NASAL DAILY
Qty: 9.9 ML | Refills: 0 | Status: SHIPPED | OUTPATIENT
Start: 2024-11-28

## 2024-11-28 RX ORDER — IBUPROFEN 600 MG/1
600 TABLET, FILM COATED ORAL EVERY 6 HOURS PRN
Qty: 30 TABLET | Refills: 0 | Status: SHIPPED | OUTPATIENT
Start: 2024-11-28

## 2024-11-28 RX ORDER — CYCLOBENZAPRINE HCL 10 MG
10 TABLET ORAL 3 TIMES DAILY
Status: DISCONTINUED | OUTPATIENT
Start: 2024-11-28 | End: 2024-11-28 | Stop reason: HOSPADM

## 2024-11-28 RX ORDER — FLUTICASONE PROPIONATE 50 MCG
1 SPRAY, SUSPENSION (ML) NASAL ONCE
Status: COMPLETED | OUTPATIENT
Start: 2024-11-28 | End: 2024-11-28

## 2024-11-28 RX ADMIN — FLUTICASONE PROPIONATE 1 SPRAY: 50 SPRAY, METERED NASAL at 10:15

## 2024-11-28 RX ADMIN — KETOROLAC TROMETHAMINE 15 MG: 15 INJECTION, SOLUTION INTRAMUSCULAR; INTRAVENOUS at 10:08

## 2024-11-28 RX ADMIN — SODIUM CHLORIDE 1000 ML: 9 INJECTION, SOLUTION INTRAVENOUS at 08:51

## 2024-11-28 RX ADMIN — KETOROLAC TROMETHAMINE 15 MG: 15 INJECTION, SOLUTION INTRAMUSCULAR; INTRAVENOUS at 08:51

## 2024-11-28 ASSESSMENT — COLUMBIA-SUICIDE SEVERITY RATING SCALE - C-SSRS
2. HAVE YOU ACTUALLY HAD ANY THOUGHTS OF KILLING YOURSELF IN THE PAST MONTH?: NO
1. IN THE PAST MONTH, HAVE YOU WISHED YOU WERE DEAD OR WISHED YOU COULD GO TO SLEEP AND NOT WAKE UP?: NO
6. HAVE YOU EVER DONE ANYTHING, STARTED TO DO ANYTHING, OR PREPARED TO DO ANYTHING TO END YOUR LIFE?: NO

## 2024-11-28 ASSESSMENT — ACTIVITIES OF DAILY LIVING (ADL)
ADLS_ACUITY_SCORE: 58
ADLS_ACUITY_SCORE: 58

## 2024-11-28 NOTE — GROUP NOTE
Group Therapy Documentation    PATIENT'S NAME: Perry Preciado Jr.  MRN:   [id number]  :   1975  ACCT. NUMBER: [id number]  DATE OF SERVICE: 24  START TIME: 12:30 PM  END TIME:  1:30 PM  FACILITATOR(S): Maurice Franklin LADC; Lexi Sanders  TOPIC: BEH Group Therapy  Number of patients attending the group:  8  Group Length:  1 Hours    Dimensions addressed: 1, 2, 3, 4, 5, and 6    Summary of Group / Topics Discussed:    Recovery Principles and Spiritual Care      Group Attendance:  Attended group session    Patient's response to the group topic/interactions:  cooperative with task    Patient appeared to be Actively participating.        Client specific details:  Kb participated and interacted appropriately with peers and staff in PM group. No triggers to use noted or discussed.

## 2024-11-28 NOTE — GROUP NOTE
Group Therapy Documentation    PATIENT'S NAME: Perry Preciado Jr.  MRN:   [id number]  :   1975  ACCT. NUMBER: [id number]  DATE OF SERVICE: 24  START TIME:  9:00 AM  END TIME: 11:00 AM  FACILITATOR(S): Maurice Franklin LADC  TOPIC: BEH Group Therapy  Number of patients attending the group:  1  Group Length:  2 Hours    Dimensions addressed: 1, 2, 3, 4, 5, and 6    Summary of Group / Topics Discussed:    Recovery Principles      Group Attendance:  Attended group session    Patient's response to the group topic/interactions:  cooperative with task    Patient appeared to be Actively participating.        Client specific details:  Kb participated and interacted appropriately with peers and staff in AM group. No triggers to use noted or discussed. Pt was in ED for one hour of group.

## 2024-11-28 NOTE — ED PROVIDER NOTES
"  History     Chief Complaint   Patient presents with    Headache     Severe HA for a week. Posterior head numbness, sinus pressure, lightheaded. Pt reports being admitted for meningitis 4 weeks ago.      HPI  Perry Preciado Jr. is a 49 year old male with a past medical history of polysubstance use (methadone/opiates, methamphetamine, tobacco), anxiety, chronic pain, hypertriglyceridemia, depression, asthma, prior suicide attempt who presents to the emergency department with a chief complaint of headache.  It has been present for about a week.  It is severe.  It worsened in the past day or 2.  Associated with posterior head numbness and sinus pressure, lightheadedness.  The patient reports that he has been having a lot of nasal drainage for the past few days.  No ear pain or pressure.  He does feel a lot of pressure around his eyes and in the front of his face.  He does note that there have been others who have been sick with similar symptoms at [facility], but he has been wearing a mask and has not been around them closely.  No neck stiffness.  No fevers.  Patient was admitted for meningitis 4 weeks ago. Pt came down from [facility].     Pt was d/w RN at [facility] who sent the patient down. Unclear if patient is just very anxious given recent medical admission for suspected aseptic meningitis. Per her report, the patient stopped taking his at bedtime Seroquel and lithium, reason unclear. Pt had not stated that he didn't want to take this, just didn't show up to receive his medications. Recently seen for flank pain int he ER as well. Recent psychiatric admission. Not currently on any antibiotics.     Pt concerned about a history of a \"brain mass\" that he believes was seen on previous imaging, but has since resolved.  The patient states that he was shown a picture of this during his previous admission and it was \"the size of a ping-pong ball\", \"squiggly\" and he was told that it was his meningitis.  " Patient's previous head imaging (including CTs and MRIs) were reviewed, no mention of intracranial mass.    I have reviewed the Medications, Allergies, Past Medical and Surgical History, and Social History in the Epic system.    MRI brain 11/8/24  IMPRESSION:  1.  No diffusion restriction concerning for stroke.  2.  No definite evidence for leptomeningeal disease, however subtle  leptomeningeal disease can be inconspicuous on MRI.    CTA Head 11/8/24  Impression:   1. No evidence of intracranial hemorrhage.  2. Head CTA demonstrates no aneurysm or stenosis of the major  intracranial arteries.   3. Neck CTA demonstrates no stenosis of the major cervical arteries.   4. Partially visualized left apical consolidative groundglass  opacities, concerning for developing infectious/inflammatory process.  Recommend follow-up chest radiograph for further assessment.    CT head 11/8/24                                                              Impression:   1. No evidence of intracranial hemorrhage.  2. Head CTA demonstrates no aneurysm or stenosis of the major  intracranial arteries.   3. Neck CTA demonstrates no stenosis of the major cervical arteries.   4. Partially visualized left apical consolidative groundglass  opacities, concerning for developing infectious/inflammatory process.  Recommend follow-up chest radiograph for further assessment.    CT head 9/16/22  Impression    :  No acute intracranial findings.    MRI Brain 1/10/19  IMPRESSION: Normal brain MRI.     CT head 8/21/18  IMPRESSION:   1. No mass, hemorrhage, or acute infarct.     CT head 8/12/18  IMPRESSION:   1. No mass, hemorrhage, or acute ischemia.   2. Soft tissue swelling overlying the parietal bone along the midline   posteriorly with no underlying calvarial fracture.     Past Medical History:   Diagnosis Date    Acute bilateral low back pain with right-sided sciatica 06/07/2016    Acute pain of right shoulder due to trauma     Adult antisocial behavior      Prison incarceration: 16 years    Aspiration pneumonia (H) 02/24/2014    CARDIOVASCULAR SCREENING; LDL GOAL LESS THAN 130 06/07/2016    Carpal tunnel syndrome of right wrist 06/07/2016    Chest pain 02/19/2014    Chest trauma 02/19/2014    Chronic pain syndrome 09/30/2019    Chronic right-sided low back pain with right-sided sciatica 05/10/2018    DDD (degenerative disc disease), lumbosacral 05/05/2020    Depression with anxiety 05/05/2020    Displacement of lumbar intervertebral disc without myelopathy 05/16/2012    KANE (generalized anxiety disorder) 07/07/2017    Heroin abuse (H) 05/10/2018    History of ADHD 01/06/2014    History of drug abuse (H) 01/06/2014    History of suicide attempt 05/10/2018    HTN (hypertension) 02/26/2014    Hyperglycemia 02/23/2014    Hypertriglyceridemia 11/02/2015    IV drug abuse (H) 05/10/2018    Major depressive disorder, recurrent (H) 02/23/2018    Major depressive disorder, recurrent episode, moderate (H) 06/06/2016    Methamphetamine abuse (H) 02/23/2018    Overview:  see [facility] H&P 11/27/2009, [facility] admit 9/2/2010    Mild intermittent asthma without complication 05/10/2018    Opiate overdose (H) 02/22/2014    Positive D dimer 11/02/2015    Psychosis (H) 05/04/2020    Sepsis (H) 02/22/2014    SIRS (systemic inflammatory response syndrome) (H) 11/02/2015    Substance abuse (H) 05/07/2018    Suicidal ideation 02/23/2018    Tobacco use disorder 05/10/2018     Past Surgical History:   Procedure Laterality Date    BACK SURGERY       No current facility-administered medications for this encounter.     Current Outpatient Medications   Medication Sig Dispense Refill    acetaminophen (TYLENOL) 500 MG tablet Take 500-1,000 mg by mouth every 8 hours as needed for mild pain.      albuterol (PROAIR HFA/PROVENTIL HFA/VENTOLIN HFA) 108 (90 Base) MCG/ACT inhaler Inhale 2 puffs into the lungs every 6 hours as needed for shortness of breath, wheezing or cough.      alum & mag  hydroxide-simethicone (MAALOX) 200-200-20 MG/5ML SUSP suspension Take 30 mLs by mouth every 6 hours as needed for indigestion.      [START ON 12/10/2024] atomoxetine (STRATTERA) 80 MG capsule Take 1 capsule (80 mg) by mouth daily. 30 capsule 0    atorvastatin (LIPITOR) 20 MG tablet Take 1 tablet (20 mg) by mouth every evening. 30 tablet 0    benzocaine-menthol (CEPACOL) 15-3.6 MG lozenge Place 1 lozenge inside cheek every 2 hours as needed for sore throat.      buprenorphine (SUBUTEX) 8 MG SUBL sublingual tablet Place 1 tablet (8 mg) under the tongue 3 times daily. 30 tablet 0    diphenhydrAMINE (BENADRYL) 25 MG capsule Take 1-2 capsules (25-50 mg) by mouth every 4 hours as needed for itching (Reported skin itching.). 30 capsule 0    FLUoxetine (PROZAC) 20 MG capsule Take 3 capsules (60 mg) by mouth daily. 45 capsule 1    gabapentin (NEURONTIN) 600 MG tablet Take 1 tablet (600 mg) by mouth 3 times daily. 90 tablet 0    guaiFENesin-dextromethorphan (ROBITUSSIN DM) 100-10 MG/5ML syrup Take 10 mLs by mouth every 4 hours as needed for cough.      ibuprofen (ADVIL/MOTRIN) 200 MG tablet Take 600 mg by mouth every 6 hours as needed for pain.      [START ON 12/10/2024] lithium (ESKALITH CR/LITHOBID) 450 MG CR tablet Take 2 tablets (900 mg) by mouth at bedtime. 60 tablet 0    naloxone (NARCAN) 4 MG/0.1ML nasal spray Spray 1 spray (4 mg) into one nostril alternating nostrils as needed for opioid reversal. every 2-3 minutes until assistance arrives 0.2 mL 11    nicotine (COMMIT) 2 MG lozenge Place 1-2 lozenges (2-4 mg) inside cheek every hour as needed for nicotine withdrawal symptoms. 48 lozenge 0    nicotine (NICODERM CQ) 7 MG/24HR 24 hr patch Place 1 patch over 24 hours onto the skin every 24 hours. 14 patch 3    [START ON 12/10/2024] prazosin (MINIPRESS) 1 MG capsule Take 1 capsule (1 mg) by mouth at bedtime. 30 capsule 0    [START ON 12/10/2024] QUEtiapine (SEROQUEL) 400 MG tablet Take 1 tablet (400 mg) by mouth at  bedtime. 30 tablet 0    [START ON 12/10/2024] QUEtiapine (SEROQUEL) 50 MG tablet Take 1 tablet (50 mg) by mouth 2 times daily. 60 tablet 0    traZODone (DESYREL) 50 MG tablet Take 1-2 tablets ( mg) by mouth nightly as needed for sleep. 30 tablet 0     Facility-Administered Medications Ordered in Other Encounters   Medication Dose Route Frequency Provider Last Rate Last Admin    Self Administer Medications: Behavioral Services   Does not apply See Admin Instructions Arlen Lynch MD         Allergies   Allergen Reactions    Wellbutrin [Bupropion] Anaphylaxis and Swelling     Facial swelling    Wellbutrin [Bupropion]     Wellbutrin [Bupropion] Difficulty breathing    Naltrexone Other (See Comments)     Headaches  Headaches       Past medical history, past surgical history, medications, and allergies were reviewed with the patient. Additional pertinent items: None    Social History     Socioeconomic History    Marital status:      Spouse name: Not on file    Number of children: Not on file    Years of education: Not on file    Highest education level: Not on file   Occupational History    Not on file   Tobacco Use    Smoking status: Every Day     Current packs/day: 0.50     Types: Cigarettes     Passive exposure: Current    Smokeless tobacco: Never   Vaping Use    Vaping status: Never Used   Substance and Sexual Activity    Alcohol use: Yes    Drug use: Yes     Types: Fentanyl, Methamphetamines     Comment: last use 2 weeks ago    Sexual activity: Yes   Other Topics Concern    Parent/sibling w/ CABG, MI or angioplasty before 65F 55M? Not Asked   Social History Narrative    ** Merged History Encounter **          Social Drivers of Health     Financial Resource Strain: Low Risk  (11/10/2024)    Financial Resource Strain     Within the past 12 months, have you or your family members you live with been unable to get utilities (heat, electricity) when it was really needed?: No   Food Insecurity: Low Risk   (11/10/2024)    Food Insecurity     Within the past 12 months, did you worry that your food would run out before you got money to buy more?: No     Within the past 12 months, did the food you bought just not last and you didn t have money to get more?: No   Recent Concern: Food Insecurity - High Risk (10/29/2024)    Food Insecurity     Within the past 12 months, did you worry that your food would run out before you got money to buy more?: Yes     Within the past 12 months, did the food you bought just not last and you didn t have money to get more?: Yes   Transportation Needs: Low Risk  (11/10/2024)    Transportation Needs     Within the past 12 months, has lack of transportation kept you from medical appointments, getting your medicines, non-medical meetings or appointments, work, or from getting things that you need?: No   Recent Concern: Transportation Needs - High Risk (10/29/2024)    Transportation Needs     Within the past 12 months, has lack of transportation kept you from medical appointments, getting your medicines, non-medical meetings or appointments, work, or from getting things that you need?: Yes   Physical Activity: Insufficiently Active (10/6/2021)    Received from [facility]    Exercise Vital Sign     Days of Exercise per Week: 4 days     Minutes of Exercise per Session: 30 min   Stress: Stress Concern Present (10/6/2021)    Received from [facility] - Occupational Stress Questionnaire     Feeling of Stress : To some extent   Social Connections: Unknown (4/11/2023)    [facility]    Social Connections     Frequency of Communication with Friends and Family: Not on file   Interpersonal Safety: Low Risk  (11/10/2024)    Interpersonal Safety     Do you feel physically and emotionally safe where you currently live?: Yes      Within the past 12 months, have you been hit, slapped, kicked or otherwise physically hurt by someone?: No     Within the past 12 months, have you been humiliated or emotionally abused in other ways by your partner or ex-partner?: No   Recent Concern: Interpersonal Safety - High Risk (10/29/2024)    Interpersonal Safety     Do you feel physically and emotionally safe where you currently live?: No     Within the past 12 months, have you been hit, slapped, kicked or otherwise physically hurt by someone?: No     Within the past 12 months, have you been humiliated or emotionally abused in other ways by your partner or ex-partner?: No   Housing Stability: Low Risk  (11/10/2024)    Housing Stability     Do you have housing? : Yes     Are you worried about losing your housing?: No   Recent Concern: Housing Stability - High Risk (10/29/2024)    Housing Stability     Do you have housing? : No     Are you worried about losing your housing?: No     Social history was reviewed with the patient. Additional pertinent items: None    Review of Systems  A medically appropriate review of systems was performed with pertinent positives and negatives noted in the HPI, and all other systems negative.    Physical Exam   BP: 128/85  Pulse: 87  Temp: 98.2  F (36.8  C)  Resp: 20  Weight: 126.6 kg (279 lb)  SpO2: 98 %      General: Well nourished, well developed, NAD  HEENT: EOMI, anicteric. NCAT, MMM, clear nasal discharge present, TTP over frontal/maxillary sinuses  Neck: no jugular venous distension, supple, nl ROM, negative Kernig/Brudzinski  Cardiac: Regular rate, extremities well-perfused  Pulm: NLB, normal RR  Skin: Warm and dry to the touch.  No rash  Extremities: No LE edema, no cyanosis, w/w/p  Neuro: Alert and oriented x 4, no facial droop, CN II-XII intact, moving all 4 extremities, sensation intact throughout, steady gait, no nystagmus, coordination normal as tested, PERRL, EOMI.  Speech is clear without aphasia or  dysarthria.      ED Course        Procedures                        EKG Interpretation:      Interpreted by María Steven MD  Time reviewed:901   Symptoms at time of EKG: Headache, lightheadedness  Rhythm: normal sinus   Rate: normal, 70 bpm   Axis: NORMAL  Ectopy: none  Conduction: normal  ST Segments/ T Waves: No ST-T wave changes  Q Waves: none  Comparison to prior: Unchanged from 11/12/24 other than resolution of previously seen bradycardia    Clinical Impression: normal EKG         Labs Ordered and Resulted from Time of ED Arrival to Time of ED Departure   COMPREHENSIVE METABOLIC PANEL - Abnormal       Result Value    Sodium 139      Potassium 4.0      Carbon Dioxide (CO2) 26      Anion Gap 11      Urea Nitrogen 16.7      Creatinine 0.95      GFR Estimate >90      Calcium 9.0      Chloride 102      Glucose 146 (*)     Alkaline Phosphatase 115      AST 39      ALT 81 (*)     Protein Total 6.6      Albumin 3.8      Bilirubin Total 0.3     CBC WITH PLATELETS AND DIFFERENTIAL - Abnormal    WBC Count 6.1      RBC Count 4.21 (*)     Hemoglobin 13.0 (*)     Hematocrit 38.6 (*)     MCV 92      MCH 30.9      MCHC 33.7      RDW 14.4      Platelet Count 210      % Neutrophils 43      % Lymphocytes 39      % Monocytes 11      % Eosinophils 5      % Basophils 1      % Immature Granulocytes 0      NRBCs per 100 WBC 0      Absolute Neutrophils 2.7      Absolute Lymphocytes 2.4      Absolute Monocytes 0.7      Absolute Eosinophils 0.3      Absolute Basophils 0.1      Absolute Immature Granulocytes 0.0      Absolute NRBCs 0.0     CRP INFLAMMATION - Normal    CRP Inflammation 4.08     BLOOD CULTURE            Results for orders placed or performed during the hospital encounter of 11/28/24 (from the past 24 hours)   CBC with platelets differential    Narrative    The following orders were created for panel order CBC with platelets differential.  Procedure                               Abnormality         Status                      ---------                               -----------         ------                     CBC with platelets and d...[[id number]]  Abnormal            Final result                 Please view results for these tests on the individual orders.   Comprehensive metabolic panel   Result Value Ref Range    Sodium 139 135 - 145 mmol/L    Potassium 4.0 3.4 - 5.3 mmol/L    Carbon Dioxide (CO2) 26 22 - 29 mmol/L    Anion Gap 11 7 - 15 mmol/L    Urea Nitrogen 16.7 6.0 - 20.0 mg/dL    Creatinine 0.95 0.67 - 1.17 mg/dL    GFR Estimate >90 >60 mL/min/1.73m2    Calcium 9.0 8.8 - 10.4 mg/dL    Chloride 102 98 - 107 mmol/L    Glucose 146 (H) 70 - 99 mg/dL    Alkaline Phosphatase 115 40 - 150 U/L    AST 39 0 - 45 U/L    ALT 81 (H) 0 - 70 U/L    Protein Total 6.6 6.4 - 8.3 g/dL    Albumin 3.8 3.5 - 5.2 g/dL    Bilirubin Total 0.3 <=1.2 mg/dL   CRP inflammation   Result Value Ref Range    CRP Inflammation 4.08 <5.00 mg/L   CBC with platelets and differential   Result Value Ref Range    WBC Count 6.1 4.0 - 11.0 10e3/uL    RBC Count 4.21 (L) 4.40 - 5.90 10e6/uL    Hemoglobin 13.0 (L) 13.3 - 17.7 g/dL    Hematocrit 38.6 (L) 40.0 - 53.0 %    MCV 92 78 - 100 fL    MCH 30.9 26.5 - 33.0 pg    MCHC 33.7 31.5 - 36.5 g/dL    RDW 14.4 10.0 - 15.0 %    Platelet Count 210 150 - 450 10e3/uL    % Neutrophils 43 %    % Lymphocytes 39 %    % Monocytes 11 %    % Eosinophils 5 %    % Basophils 1 %    % Immature Granulocytes 0 %    NRBCs per 100 WBC 0 <1 /100    Absolute Neutrophils 2.7 1.6 - 8.3 10e3/uL    Absolute Lymphocytes 2.4 0.8 - 5.3 10e3/uL    Absolute Monocytes 0.7 0.0 - 1.3 10e3/uL    Absolute Eosinophils 0.3 0.0 - 0.7 10e3/uL    Absolute Basophils 0.1 0.0 - 0.2 10e3/uL    Absolute Immature Granulocytes 0.0 <=0.4 10e3/uL    Absolute NRBCs 0.0 10e3/uL   Extra Tube    Narrative    The following orders were created for panel order Extra Tube.  Procedure                               Abnormality         Status                     ---------                                -----------         ------                     Extra Blue Top Tube[[id number]]                              In process                 Extra Red Top Tube[[id number]]                               In process                   Please view results for these tests on the individual orders.   CT Head w/o Contrast    Narrative    EXAM: CT HEAD W/O CONTRAST  LOCATION: [facility]  DATE: 11/28/2024    INDICATION: Severe headache, recent meningitis; Headache; r o Meningitis; None of the following: Fever or nuchal rigidity.  COMPARISON: Brain MRI 11/8/2024.  TECHNIQUE: Routine CT Head without IV contrast. Multiplanar reformats. Dose reduction techniques were used.    FINDINGS:  INTRACRANIAL CONTENTS: No intracranial hemorrhage, extraaxial collection, or mass effect.  No CT evidence of acute infarct. Normal parenchymal attenuation. Normal ventricles and sulci.     VISUALIZED ORBITS/SINUSES/MASTOIDS: No intraorbital abnormality. No paranasal sinus mucosal disease. No middle ear or mastoid effusion.    BONES/SOFT TISSUES: No acute abnormality.      Impression    IMPRESSION:  1.  Normal head CT.       Labs, vital signs, and imaging studies were reviewed by me.    Medications   ketorolac (TORADOL) injection 15 mg (has no administration in time range)   fluticasone (FLONASE) 50 MCG/ACT spray 1 spray (has no administration in time range)   cyclobenzaprine (FLEXERIL) tablet 10 mg (has no administration in time range)   sodium chloride 0.9% BOLUS 1,000 mL (0 mLs Intravenous Stopped 11/28/24 0951)   ketorolac (TORADOL) injection 15 mg (15 mg Intravenous $Given 11/28/24 0851)       Assessments & Plan (with Medical Decision Making)   Perry GARCIA Ilana Samuels is a 49 year old male who presents to the emergency department with headache.  Patient also with rhinorrhea and facial pressure on exam, presentation is most consistent with sinusitis/sinus headache, most likely due to  viral syndrome given duration of symptoms, clear nasal discharge, no fevers.  Differential diagnosis would also include tension headache, migraine, less likely meningitis given lack of fevers, lack of neck stiffness, supple neck/negative Kernig/Brudzinski on exam.  However, given patient's recent admission for aseptic meningitis, will further evaluate with labs and head CT.    Head CT shows no acute disease process.    Laboratory workup is remarkable for normal white blood cell count at 6.1, hemoglobin slightly low but stable at 13.0, ALT slightly elevated at 81, blood glucose 146, CRP within normal limits.  Blood culture x 1 was sent.  Normal white blood cell count and CRP make meningitis less likely.    Considered LP to rule out recurrent episode of meningitis, however, given reassuring exam and labs as well as more likely diagnosis of headache due to viral syndrome, this test was deferred at this time.  Patient advised to return to the emergency department should his symptoms worsen.    Critical care was not performed.     Medical Decision Making  The patient's presentation was of high complexity (an acute health issue posing potential threat to life or bodily function).    The patient's evaluation involved:  strong consideration of a test (see separate area of note for details) that was ultimately deferred  ordering and/or review of 3+ test(s) in this encounter (see separate area of note for details)  independent interpretation of testing performed by another health professional (see separate area of note for details)    The patient's management necessitated moderate risk (prescription drug management including medications given in the ED) and moderate risk (limitations due to social determinants of health (see separate area of note for details. )).    CT images were personally reviewed by me, I agree with the radiology reads.    I have reviewed the nursing notes.    I have reviewed the findings, diagnosis, plan  and need for follow up with the patient.    Patient to be discharged home. Advised to follow up with PCP within 1 week. To return to ER immediately with any new/worsening symptoms. Plan of care discussed with patient who expresses understanding and agrees with plan of care.    New Prescriptions    FLUTICASONE (FLONASE) 50 MCG/ACT NASAL SPRAY    Spray 1 spray into both nostrils daily.    IBUPROFEN (ADVIL/MOTRIN) 600 MG TABLET    Take 1 tablet (600 mg) by mouth every 6 hours as needed.       Final diagnoses:   Nonintractable headache, unspecified chronicity pattern, unspecified headache type   History of meningitis   Rhinorrhea       ISABELLA STEVEN MD  11/28/2024   [facility] EMERGENCY DEPARTMENT       Isabella Steven MD  11/28/24 1009       Isabella Steven MD  11/28/24 100

## 2024-11-28 NOTE — ED TRIAGE NOTES
Triage Assessment (Adult)       Row Name 11/28/24 0826          Triage Assessment    Airway WDL WDL        Respiratory WDL    Respiratory WDL WDL        Skin Circulation/Temperature WDL    Skin Circulation/Temperature WDL WDL        Cardiac WDL    Cardiac WDL WDL        Peripheral/Neurovascular WDL    Peripheral Neurovascular WDL WDL        Cognitive/Neuro/Behavioral WDL    Cognitive/Neuro/Behavioral WDL WDL

## 2024-11-28 NOTE — PROGRESS NOTES
Writer spoke with pt in med room this am he reported a headache on both temples that radiated down behind his ear. Pt did not report it being severe at the time and was open to taking tylenol and ibuprofen and checking back later. At this time writer also discussed pt missing his bedtime seroquel 400mg and his lithium 900mg the last 3 nights, One of those nights he was in the ED but the other 2 he did not know why he missed but verbalized that he would get back on track with taking these meds.    About 20 min later pt came back up to writer saying his headache was worse and he was feeling similarly to when he was recently hospitalized for aseptic meningitis. Pt was escorted to ED and charge RN and ED MD were given report on pt. Pt is ok to return to LP once he is medically cleared

## 2024-11-28 NOTE — DISCHARGE INSTRUCTIONS
TODAY'S VISIT:  You were seen today for headache  -   - If you had any labs or imaging/radiology tests performed today, you should also discuss these tests with your usual provider.     FOLLOW-UP:  Please make an appointment to follow up with:  - Your Primary Care Provider. If you do not have a PCP, please call the [facility] Center (phone: [phone] for an appointment    - Have your provider review the results from today's visit with you again to make sure no further follow-up or additional testing is needed based on those results.     RETURN TO THE EMERGENCY DEPARTMENT  Return to the Emergency Department at any time for any new or worsening symptoms or any concerns.

## 2024-11-28 NOTE — PROGRESS NOTES
Name: Perry Preciado Jr.  Date: 11/28/2024  Medical Record: [id number]    Envelope Number: [id number]  List of Contents (List each item separately in new row):   Ibuprofen 600 mg tabs    Admission:  I am responsible for any personal items that are not sent to the safe or pharmacy.  [facility] is not responsible for loss, theft or damage of any property in my possession.    Patient Signature:  ___________________________________________       Date/Time:__________________________    Staff Signature: __________________________________       Date/Time:__________________________    [facility] Staff person, if patient is unable/unwilling to sign:      __________________________________________________________       Date/Time: __________________________    **All medications are packaged by [facility] staff and securely stored on [facility]. Medications left by patients at discharge will be packaged by [facility] staff and transported by [facility] staff to inpatient pharmacy for storage.**    Discharge:  [facility] has returned all of my personal belongings:    Patient Signature: ________________________________________     Date/Time: ____________________________________    Staff Signature: ______________________________________     Date/Time:_____________________________________

## 2024-11-29 ENCOUNTER — HOSPITAL ENCOUNTER (OUTPATIENT)
Dept: BEHAVIORAL HEALTH | Facility: CLINIC | Age: 49
Discharge: HOME | End: 2024-11-29
Attending: FAMILY MEDICINE
Payer: COMMERCIAL

## 2024-11-29 LAB
ATRIAL RATE - MUSE: 70 BPM
DIASTOLIC BLOOD PRESSURE - MUSE: NORMAL MMHG
INTERPRETATION ECG - MUSE: NORMAL
P AXIS - MUSE: 42 DEGREES
PR INTERVAL - MUSE: 172 MS
QRS DURATION - MUSE: 104 MS
QT - MUSE: 436 MS
QTC - MUSE: 470 MS
R AXIS - MUSE: 11 DEGREES
SYSTOLIC BLOOD PRESSURE - MUSE: NORMAL MMHG
T AXIS - MUSE: 25 DEGREES
VENTRICULAR RATE- MUSE: 70 BPM

## 2024-11-29 PROCEDURE — H2035 A/D TX PROGRAM, PER HOUR: HCPCS | Mod: HQ

## 2024-11-29 PROCEDURE — 1002N00001 HC LODGING PLUS FACILITY CHARGE ADULT

## 2024-11-29 NOTE — GROUP NOTE
Group Therapy Documentation    PATIENT'S NAME: Perry Preciado Jr.  MRN:   [id number]  :   1975  ACCT. NUMBER: [id number]  DATE OF SERVICE: 24  START TIME: 12:30 PM  END TIME:  2:30 PM  FACILITATOR(S): Jasmin Green LADC; Maurice Franklin LADC; Miya Rinaldi LADC  TOPIC: BEH Group Therapy  Number of patients attending the group:  27  Group Length:  2 Hours    Dimensions addressed: 5 and 6    Summary of Group / Topics Discussed:    Relationship/socialization, Emotions/expression, and Leisure explorations/use of leisure time    Group Attendance:  Attended group session    Patient's response to the group topic/interactions:  cooperative with task    Patient appeared to be Attentive and Engaged.        Client specific details: Pt watched a recovery-related movie with peers and took part in the subsequent discussion.

## 2024-11-29 NOTE — GROUP NOTE
Group Therapy Documentation    PATIENT'S NAME: Perry Preciado Jr.  MRN:   [id number]  :   1975  ACCT. NUMBER: [id number]  DATE OF SERVICE: 24  START TIME:  9:00 AM  END TIME: 11:00 AM  FACILITATOR(S): Jerzy Packer LADC  TOPIC: BEH Group Therapy  Number of patients attending the group:  7  Group Length:  2 Hours    Dimensions addressed: 3, 4, and 5    Summary of Group / Topics Discussed:    Recovery Principles, Cognitive behavioral therapy skills, Mindfulness/Relaxation, Coping/DBT informed care, Trauma informed care, Disease of addiction, Emotions/expression, and Relapse prevention      Group Attendance:  Attended group session    Patient's response to the group topic/interactions:  cooperative with task    Patient appeared to be Attentive and Engaged.        Client specific details:  Perry participated in morning group. He took part in the graduation of one of his peers. This was followed by check in the reading on not worries so much in the moment and trusting that things will work out. For the second half of group he listened to and gave positive feedback on his peer's assignment.

## 2024-11-30 ENCOUNTER — HOSPITAL ENCOUNTER (OUTPATIENT)
Dept: BEHAVIORAL HEALTH | Facility: CLINIC | Age: 49
Discharge: HOME | End: 2024-11-30
Attending: FAMILY MEDICINE
Payer: COMMERCIAL

## 2024-11-30 PROCEDURE — H2035 A/D TX PROGRAM, PER HOUR: HCPCS | Mod: HQ

## 2024-11-30 PROCEDURE — 1002N00001 HC LODGING PLUS FACILITY CHARGE ADULT

## 2024-11-30 NOTE — GROUP NOTE
Group Therapy Documentation    PATIENT'S NAME: Perry Preciado Jr.  MRN:   [id number]  :   1975  ACCT. NUMBER: [id number]  DATE OF SERVICE: 24  START TIME: 12:30 PM  END TIME:  2:30 PM  FACILITATOR(S): Alisa Brown LADC; Nel Carr LADC; Cristian De La Cruz LADC  TOPIC: BEH Group Therapy  Number of patients attending the group:  26  Group Length:  2 Hours    Dimensions addressed: 3, 4, 5, and 6    Summary of Group / Topics Discussed:    Sober coping skills, Disease of addiction, and Relapse prevention      Group participants viewed addiction and recovery related films, followed by a discussion.       Group Attendance:  Attended group session    Patient's response to the group topic/interactions:  cooperative with task    Patient appeared to be Actively participating.        Client specific details:  Patient actively engaged in group discussion.

## 2024-11-30 NOTE — GROUP NOTE
Group Therapy Documentation    PATIENT'S NAME: Perry Preciado Jr.  MRN:   [id number]  :   1975  ACCT. NUMBER: [id number]  DATE OF SERVICE: 24  START TIME:  9:00 AM  END TIME: 11:00 AM  FACILITATOR(S): Nel Carr LADC; Alisa Brown LADC; Cristian De La Cruz LADC  TOPIC: BEH Group Therapy  Number of patients attending the group:  26  Group Length:  2 Hours    Dimensions addressed: 3, 4, 5, and 6    Summary of Group / Topics Discussed:    Relationship/socialization    Group lecture was presented by counseling staff on the topic of Relationships and Boundaries. Participants provided feedback throughout group session.       Group Attendance:  Attended group session    Patient's response to the group topic/interactions:  cooperative with task    Patient appeared to be Actively participating.        Client specific details:  Patient actively engaged in group discussion.

## 2024-12-01 ENCOUNTER — HOSPITAL ENCOUNTER (OUTPATIENT)
Dept: BEHAVIORAL HEALTH | Facility: CLINIC | Age: 49
Discharge: HOME | End: 2024-12-01
Attending: FAMILY MEDICINE
Payer: COMMERCIAL

## 2024-12-01 PROCEDURE — 1002N00001 HC LODGING PLUS FACILITY CHARGE ADULT

## 2024-12-01 PROCEDURE — H2035 A/D TX PROGRAM, PER HOUR: HCPCS | Mod: HQ

## 2024-12-01 NOTE — GROUP NOTE
Group Therapy Documentation    PATIENT'S NAME: Perry Preciado Jr.  MRN:   [id number]  :   1975  ACCT. NUMBER: [id number]  DATE OF SERVICE: 24  START TIME:  8:45 AM  END TIME: 10:30 AM  FACILITATOR(S): Karen Mcdonald RN; Alisa Brown LADC; Cristian De La Cruz LADC  TOPIC: BEH Group Therapy  Number of patients attending the group:  26  Group Length:  2 Hours    Dimensions addressed: 2    Summary of Group / Topics Discussed:    Self-care activities    Group lecture was facilitated by nursing staff regarding self-care activities including laughter. Feedback was provided by participants throughout group discussion.       Group Attendance:  Attended group session    Patient's response to the group topic/interactions:  cooperative with task    Patient appeared to be Actively participating.        Client specific details:  Patient actively engaged in group discussion.

## 2024-12-01 NOTE — GROUP NOTE
Group Therapy Documentation    PATIENT'S NAME: Perry Preciado Jr.  MRN:   [id number]  :   1975  ACCT. NUMBER: [id number]  DATE OF SERVICE: 24  START TIME: 12:30 PM  END TIME:  1:30 PM  FACILITATOR(S): Alisa Brown LADC  TOPIC: BEH Group Therapy  Number of patients attending the group:  27    Group Length:  1 Hour    Dimensions addressed: 5    Summary of Group / Topics Discussed:    Relapse prevention      Group Attendance:  Attended group session    Patient's response to the group topic/interactions:  cooperative with task    Patient appeared to be Actively participating, Attentive, and Engaged.        Client specific details: Patient attended a Relapse Prevention lecture and was attentive and participative.

## 2024-12-02 ENCOUNTER — HOSPITAL ENCOUNTER (OUTPATIENT)
Dept: BEHAVIORAL HEALTH | Facility: CLINIC | Age: 49
Discharge: HOME | End: 2024-12-02
Attending: FAMILY MEDICINE
Payer: COMMERCIAL

## 2024-12-02 PROCEDURE — H2035 A/D TX PROGRAM, PER HOUR: HCPCS | Mod: HQ | Performed by: COUNSELOR

## 2024-12-02 PROCEDURE — 1002N00001 HC LODGING PLUS FACILITY CHARGE ADULT

## 2024-12-02 PROCEDURE — H2035 A/D TX PROGRAM, PER HOUR: HCPCS | Mod: HQ

## 2024-12-02 ASSESSMENT — COLUMBIA-SUICIDE SEVERITY RATING SCALE - C-SSRS
2. HAVE YOU ACTUALLY HAD ANY THOUGHTS OF KILLING YOURSELF?: NO
1. SINCE LAST CONTACT, HAVE YOU WISHED YOU WERE DEAD OR WISHED YOU COULD GO TO SLEEP AND NOT WAKE UP?: NO
ATTEMPT SINCE LAST CONTACT: NO
6. HAVE YOU EVER DONE ANYTHING, STARTED TO DO ANYTHING, OR PREPARED TO DO ANYTHING TO END YOUR LIFE?: NO
TOTAL  NUMBER OF ABORTED OR SELF INTERRUPTED ATTEMPTS SINCE LAST CONTACT: NO
TOTAL  NUMBER OF INTERRUPTED ATTEMPTS SINCE LAST CONTACT: NO
SUICIDE, SINCE LAST CONTACT: NO

## 2024-12-02 NOTE — PROGRESS NOTES
"INDIVIDUAL SESSION SUMMARY   D) Met with client on 12.2.24 from [id number].   Client reported a mental health diagnosis of: traumatic brain injury, anxiety, and depression. Client reported mood has been: \"pretty good.\" Client identified issues to address in today's session including: anxiety related to housing, impulsivity in the past leading to relapse.     I) Individual session with client. Provided client with verbal interventions including: validation, highlighting change talk, and re-framing perceived barriers. Discussed the benefits of: positive self-talk, self-compassion, identifying and verbalizing unmet needs. Taught serenity prayer exercise to help identify actionable items related to anxious thoughts and how to manage people, places, and things outside of one's control.      A) Client appears to have some insight into his impulsivity and lack of support and structure to maintain sobriety.  Patient reports willingness to follow through with recommendations of professionals.  Patient appears willing and has followed through with requests including signature of ROIs and completion of assignments.      P) Next session is scheduled for 12.9.24.     Jeimy Preston, SVETLANA, [facility]  12/2/2024  "

## 2024-12-02 NOTE — PROGRESS NOTES
"Writer called [facility] to check status on patient's referral.  With an open CADI waiver patient cannot utilize [facility] funding required to fund sober housing.      Patient signed releases for [facility], [facility], and [facility] to assist in referrals for continuing care.  Patient reports when he goes to sober housing the impulse to use is exceptionally high and \"I end up relapsing.\"  Patient reports he requires additional structure and support to maintain sobriety.      Writer faxed referral information to [facility] at [phone].  "

## 2024-12-02 NOTE — GROUP NOTE
Group Therapy Documentation    PATIENT'S NAME: Perry Preciado Jr.  MRN:   [id number]  :   1975  ACCT. NUMBER: [id number]  DATE OF SERVICE: 24  START TIME: 12:30 PM  END TIME:  2:30 PM  FACILITATOR(S): Maurice Franklin LADC; Lexi Sanders  TOPIC: BEH Group Therapy  Number of patients attending the group:  10  Group Length:  2 Hours    Dimensions addressed: 1, 2, 3, 4, 5, and 6    Summary of Group / Topics Discussed:    Spiritual Care      Group Attendance:  Attended group session    Patient's response to the group topic/interactions:  cooperative with task    Patient appeared to be Actively participating.        Client specific details:  Kb participated and interacted appropriately with peers and staff in spiritual group. No triggers to use noted or discussed.

## 2024-12-02 NOTE — GROUP NOTE
"Group Therapy Documentation    PATIENT'S NAME: Perry Preciado Jr.  MRN:   [id number]  :   1975  ACCT. NUMBER: [id number]  DATE OF SERVICE: 24  START TIME:  9:00 AM  END TIME: 11:00 AM  FACILITATOR(S): Jeimy Preston LADC  TOPIC: BEH Group Therapy  Number of patients attending the group:  9  Group Length:  2 Hours    Dimensions addressed: 3, 4, 5, and 6    Summary of Group / Topics Discussed:    Sober coping skills, Relationship/socialization, Balanced lifestyle, Coping/DBT informed care, Trauma informed care, Disease of addiction, Emotions/expression, and Relapse prevention    Writer facilitated group psychotherapy related to the above topics.  Patients checked in with current mood and topics of interest.  A patient shared his substance use history and received feedback.  Patients discussed beliefs that were ingrained earlier in life, and how to discern if these beliefs are accurate and applicable to today.  Patients discussed strategies to implement additional supports to prevent relapse in early recovery.      Group Attendance:  Attended group session    Patient's response to the group topic/interactions:  cooperative with task, discussed personal experience with topic, and listened actively    Patient appeared to be Actively participating, Attentive, and Engaged.        Client specific details:  Patient actively participated in today's group.  Patient checked in feeling, \"nervous, tense.\"  Patient provided personalized insights and feedback within the group noting his increasing awareness of powerlessness throughout his treatment stays.      "

## 2024-12-02 NOTE — ADDENDUM NOTE
Encounter addended by: Cristian De La Cruz LADC on: 12/2/2024 2:38 PM   Actions taken: Charge Capture section accepted

## 2024-12-03 ENCOUNTER — HOSPITAL ENCOUNTER (OUTPATIENT)
Dept: BEHAVIORAL HEALTH | Facility: CLINIC | Age: 49
Discharge: HOME | End: 2024-12-03
Attending: FAMILY MEDICINE
Payer: COMMERCIAL

## 2024-12-03 LAB — BACTERIA BLD CULT: NO GROWTH

## 2024-12-03 PROCEDURE — H2035 A/D TX PROGRAM, PER HOUR: HCPCS | Mod: HQ

## 2024-12-03 PROCEDURE — H2035 A/D TX PROGRAM, PER HOUR: HCPCS | Mod: HQ | Performed by: COUNSELOR

## 2024-12-03 PROCEDURE — 1002N00001 HC LODGING PLUS FACILITY CHARGE ADULT

## 2024-12-03 ASSESSMENT — PATIENT HEALTH QUESTIONNAIRE - PHQ9: SUM OF ALL RESPONSES TO PHQ QUESTIONS 1-9: 0

## 2024-12-03 ASSESSMENT — ANXIETY QUESTIONNAIRES
1. FEELING NERVOUS, ANXIOUS, OR ON EDGE: SEVERAL DAYS
GAD7 TOTAL SCORE: 4
3. WORRYING TOO MUCH ABOUT DIFFERENT THINGS: NOT AT ALL
2. NOT BEING ABLE TO STOP OR CONTROL WORRYING: SEVERAL DAYS
7. FEELING AFRAID AS IF SOMETHING AWFUL MIGHT HAPPEN: NOT AT ALL
6. BECOMING EASILY ANNOYED OR IRRITABLE: NOT AT ALL
GAD7 TOTAL SCORE: 4
4. TROUBLE RELAXING: SEVERAL DAYS
5. BEING SO RESTLESS THAT IT IS HARD TO SIT STILL: SEVERAL DAYS

## 2024-12-03 NOTE — GROUP NOTE
Psychoeducation Group Documentation    PATIENT'S NAME: Perry Preciado Jr.  MRN:   [id number]  :   1975  [id number]  DATE OF SERVICE: 24  START TIME:  3:00 PM  END TIME:  4:00 PM  FACILITATOR(S): Jerzy Packer LADC; Alisa Brown LADC  TOPIC: BEH Pyschoeducation  Number of patients attending the group:  11  Group Length:  1 Hours    Skills Group Therapy Type: Healthy behaviors development    Summary of Group / Topics Discussed:    Symptom management skills        Group Attendance:  Attended group session    Patient's response to the group topic/interactions:  cooperative with task    Patient appeared to be Attentive and Engaged.         Client specific details:  The patient participated in the afternoon lecture on research topics.

## 2024-12-03 NOTE — GROUP NOTE
"Group Therapy Documentation    PATIENT'S NAME: Perry Preciado Jr.  MRN:   [id number]  :   1975  ACCT. NUMBER: [id number]  DATE OF SERVICE: 24  START TIME:  9:00 AM  END TIME: 11:00 AM  FACILITATOR(S): Jeimy Preston LADC  TOPIC: BEH Group Therapy  Number of patients attending the group:  11  Group Length:  2 Hours    Dimensions addressed: 3, 4, 5, and 6    Summary of Group / Topics Discussed:    Sober coping skills, Cognitive behavioral therapy skills, Coping/DBT informed care, Trauma informed care, Disease of addiction, Emotions/expression, and Relapse prevention    Writer facilitated group psychotherapy regarding above topics.  Patients discussed group norms as several new patients entered the group.  Patients then discussed strategies for relapse prevention, discernment between addictive thinking and guidance from a power greater than self.  Patients discussed coping strategies to manage overwhelm, specifically related to traumatic material.        Group Attendance:  Attended group session    Patient's response to the group topic/interactions:  cooperative with task, discussed personal experience with topic, and listened actively    Patient appeared to be Actively participating, Attentive, and Engaged.        Client specific details:  Patient actively participated in today's group.  Patient checked in feeling, \"internal struggle.\"  Patient discussed how parts of him are excited for the next step and parts of him are nervous about managing his disease with decreased professional support.  Patient reported greater insight into relapse prevention following the group.    "

## 2024-12-03 NOTE — GROUP NOTE
Group Therapy Documentation    PATIENT'S NAME: Perry Preciado Jr.  MRN:   [id number]  :   1975  ACCT. NUMBER: [id number]  DATE OF SERVICE: 24  START TIME: 12:30 PM  END TIME:  2:30 PM  FACILITATOR(S): Maurice Franklin LADC  TOPIC: BEH Group Therapy  Number of patients attending the group:  11  Group Length:  2 Hours    Dimensions addressed: 1, 2, 3, 4, 5, and 6    Summary of Group / Topics Discussed:    Recovery Principles, Sober coping skills, Balanced lifestyle, Cognitive behavioral therapy skills, Trauma informed care, Disease of addiction, Relapse prevention, and Self-care activities      Group Attendance:  Attended group session    Patient's response to the group topic/interactions:  cooperative with task    Patient appeared to be Actively participating.        Client specific details:  Kb participated and interacted appropriately with peers and staff in PM group. No triggers to use noted or discussed.

## 2024-12-03 NOTE — PROGRESS NOTES
[facility] Weekly Treatment Plan Review      ATTENDANCE for the following date span:  24 to 12/3/24      Weekly Treatment Plan Review     Treatment Plan initiated on: 24.    Dimension1: Acute Intoxication/Withdrawal Potential -   Previous Dimension Ratin  Current Dimension Ratin  Date of Last Use: 24  Any reports of withdrawal symptoms - No      Dimension 2: Biomedical Conditions & Complications -   Previous Dimension Ratin  Current Dimension Ratin  Medical Concerns:  None  Current Medications & Medication Changes:  Current Outpatient Medications   Medication Sig Dispense Refill    acetaminophen (TYLENOL) 500 MG tablet Take 500-1,000 mg by mouth every 8 hours as needed for mild pain.      albuterol (PROAIR HFA/PROVENTIL HFA/VENTOLIN HFA) 108 (90 Base) MCG/ACT inhaler Inhale 2 puffs into the lungs every 6 hours as needed for shortness of breath, wheezing or cough.      alum & mag hydroxide-simethicone (MAALOX) 200-200-20 MG/5ML SUSP suspension Take 30 mLs by mouth every 6 hours as needed for indigestion.      [START ON 12/10/2024] atomoxetine (STRATTERA) 80 MG capsule Take 1 capsule (80 mg) by mouth daily. 30 capsule 0    atorvastatin (LIPITOR) 20 MG tablet Take 1 tablet (20 mg) by mouth every evening. 30 tablet 0    benzocaine-menthol (CEPACOL) 15-3.6 MG lozenge Place 1 lozenge inside cheek every 2 hours as needed for sore throat.      buprenorphine (SUBUTEX) 8 MG SUBL sublingual tablet Place 1 tablet (8 mg) under the tongue 3 times daily. 30 tablet 0    diphenhydrAMINE (BENADRYL) 25 MG capsule Take 1-2 capsules (25-50 mg) by mouth every 4 hours as needed for itching (Reported skin itching.). 30 capsule 0    FLUoxetine (PROZAC) 20 MG capsule Take 3 capsules (60 mg) by mouth daily. 45 capsule 1    fluticasone (FLONASE) 50 MCG/ACT nasal spray Spray 1 spray into both nostrils daily. 9.9 mL 0    gabapentin (NEURONTIN) 600 MG tablet Take 1 tablet (600 mg) by mouth 3 times  daily. 90 tablet 0    guaiFENesin-dextromethorphan (ROBITUSSIN DM) 100-10 MG/5ML syrup Take 10 mLs by mouth every 4 hours as needed for cough.      ibuprofen (ADVIL/MOTRIN) 200 MG tablet Take 600 mg by mouth every 6 hours as needed for pain.      ibuprofen (ADVIL/MOTRIN) 600 MG tablet Take 1 tablet (600 mg) by mouth every 6 hours as needed. 30 tablet 0    [START ON 12/10/2024] lithium (ESKALITH CR/LITHOBID) 450 MG CR tablet Take 2 tablets (900 mg) by mouth at bedtime. 60 tablet 0    naloxone (NARCAN) 4 MG/0.1ML nasal spray Spray 1 spray (4 mg) into one nostril alternating nostrils as needed for opioid reversal. every 2-3 minutes until assistance arrives 0.2 mL 11    nicotine (COMMIT) 2 MG lozenge Place 1-2 lozenges (2-4 mg) inside cheek every hour as needed for nicotine withdrawal symptoms. 48 lozenge 0    nicotine (NICODERM CQ) 7 MG/24HR 24 hr patch Place 1 patch over 24 hours onto the skin every 24 hours. 14 patch 3    [START ON 12/10/2024] prazosin (MINIPRESS) 1 MG capsule Take 1 capsule (1 mg) by mouth at bedtime. 30 capsule 0    [START ON 12/10/2024] QUEtiapine (SEROQUEL) 400 MG tablet Take 1 tablet (400 mg) by mouth at bedtime. 30 tablet 0    [START ON 12/10/2024] QUEtiapine (SEROQUEL) 50 MG tablet Take 1 tablet (50 mg) by mouth 2 times daily. 60 tablet 0    traZODone (DESYREL) 50 MG tablet Take 1-2 tablets ( mg) by mouth nightly as needed for sleep. 30 tablet 0     No current facility-administered medications for this encounter.     Facility-Administered Medications Ordered in Other Encounters   Medication Dose Route Frequency Provider Last Rate Last Admin    Self Administer Medications: Behavioral Services   Does not apply See Admin Instructions Arlen Lynch MD           Taking meds as prescribed? Yes  Medication side effects or concerns:  None  Outside medical appointments this week (list provider and reason for visit):    11/26/24 - visit to ED due to rib pain, no new scripts  11/27/24 -  "addiction medicine  24 - missed a day of programming due to fatigue/pain    Narrative-Patient reports no biomedical  concerns that would prohibit him from completing [facility] program. Patient appears fully functioning and able to seek medical attention as needed..       Dimension 3: Emotional/Behavioral Conditions & Complications -   Previous Dimension Ratin  Current Dimension Ratin  PHQ2:       12/3/2024    12:00 PM 2016     4:39 PM   PHQ-2 (  [employer])   Q1: Little interest or pleasure in doing things 0 0   Q2: Feeling down, depressed or hopeless 0 0   PHQ-2 Score 0 0      GAD2:       2024     7:51 AM 12/3/2024    12:00 PM   KANE-2   Feeling nervous, anxious, or on edge 1  1   Not being able to stop or control worrying 2  1   KANE-2 Total Score 3  2       Patient-reported     Mental health diagnosis No formal diagnosis  Date of last SIB:  N/A  Date of  last SI:  10/24/24  Date of last HI: N/A    Behavioral Targets: Follow recommendations of medical provider. Report any alcohol or drug use to   counselor and any increase in withdrawal symptoms to nurse. Stabilize and maintain mental health.     Risk Factors: Early recovery, guilt and shame, grief and loss, mental health issues, poor self esteem    Protective factors:  Future oriented, identifies reasons to remain sober/alive, dedication to family    Current MH Assignments: Managing Depression and Managing Anxiety and Worry    Narrative: Patient is working towards gaining better awareness regarding how his substance abuse impacts his mental and emotional health. Patient reports that his mood has remained the same over the past week.  He reports increased sense of stress due to, \"just worrying about where I'm going.\"  Patient reports he manages stressful situations by \"just staying grounded.\" Patient is working on the above noted assignments and will present them in group upon completion. Patient met with psychotherapist 24.  " "Patient reports no suicidal ideation at this time.      Dimension 4: Treatment Acceptance / Resistance -   Previous Dimension Ratin  Current Dimension Ratin  JAQUELINE Diagnosis:  Alcohol Use Disorder, Severe F10.20-(303.90)    Commitment to tx process/Stage of change- Patient appears to be in the contemplative stage of change at this time.    JAQUELINE Assignments - First Step, Drug Use History    Narrative - Patient is working to develop greater internal motivation for maintenance of recovery.  Patient reports his motivation for sobriety is, \"wanting to live.\"  Patient reports willingness to utilize recommended resources to create structure, support, and accountability within his next level of care.      Dimension 5: Relapse / Continued Problem Potential -   Previous Dimension Ratin  Current Dimension Ratin  Relapses this week - None  Urges to use - yes, intensity 7/10, 10 indicating highest intensity of cravings  UA results -   11.14.24 - positive BUP  11.21.24 - positive BUP    JAQUELINE Assignments-Identifying Relapse Triggers and Cues, Relapse Prevention Plan    Narrative- Patient is gaining increased insight regarding his triggers, cues and early warning signs for relapse. Patient rates his cravings this week as a 7, 1-(low)-10-(high). Patient reports that he's managed his cravings by \"just staying grounded.\" Patient will be working on the above noted assignments and will present both in group upon completion  Patient attended a Spirituality Workshop conducted by Lexi Sanders and completed all activities. Patient also attended a Relapse Prevention workshop and completed all activities.      Dimension 6: Recovery Environment -   Previous Dimension Ratin  Current Dimension Ratin  Family Involvement - Yes  Summarize attendance at family groups and family sessions - n/a  Family supportive of treatment?  Yes  Recreational activities - \"all of them\" including ping-pong, walks, talking with peers, " watching movies    Narrative -Patient reports attending all 12 Step meetings this week according to his treatment plan. Patient has been working on developing his sober network by spending free time with same-gender peers. Patient reported interest in attending [facility] and sober housing, referrals were sent.  Patient has tentatively been approved for a discharge 12.10.24 due to necessary court appearance.      Progress made on transition planning goals:  Patient signed release of information forms for several continuing care options.  Referrals were sent to [facility] and an adult assisted living program.  [facility] has tentatively accepted him.      Justification for Continued Treatment at this Level of Care: Risk ratings indicate continued need for this level of care. Pt has been unable to maintain abstinence from alcohol, drugs while at the outpatient level of care, lacks long-term sober maintenance skills, lacks sober coping skills and has medical issues which are exacerbated by substance abuse.     Treatment coordination activities this week:  Patient signed release of information forms for several continuing care options.  Referrals were sent to [facility] and an adult assisted living program.  [facility] has tentatively accepted him.    Need for peer recovery support referral? Patient would benefit from a CPRS.    Discharge Planning:  Target Discharge Date/Timeframe: 12/10/24   Med Mgmt Provider/Appt:  RAD   Ind therapy Provider/Appt:  DONNAD   Family therapy Provider/Appt:  RAD   Other referrals:  TBD      Has vulnerable adult status changed? No    Interdisciplinary Clinical Supervision including: LADC and Mental health professional    Are Treatment Plan goals/objectives effective? Yes  *If no, list changes to treatment plan:    Are the current goals meeting client's needs? Yes  *If no, list the changes to treatment plan.    Patient Input / Response: Pt contributed to treatment plan  review.    *Client agrees with any changes to the treatment plan: N/A  *Client received copy of changes: N/A  *Client is aware of right to access a treatment plan review: Yes    SVETLANA Castaneda, [facility]

## 2024-12-04 ENCOUNTER — HOSPITAL ENCOUNTER (OUTPATIENT)
Dept: BEHAVIORAL HEALTH | Facility: CLINIC | Age: 49
Discharge: HOME | End: 2024-12-04
Attending: FAMILY MEDICINE
Payer: COMMERCIAL

## 2024-12-04 DIAGNOSIS — Z79.891 ENCOUNTER FOR MONITORING OPIOID MAINTENANCE THERAPY: ICD-10-CM

## 2024-12-04 DIAGNOSIS — F11.20 OPIOID USE DISORDER, SEVERE, DEPENDENCE (H): ICD-10-CM

## 2024-12-04 DIAGNOSIS — Z51.81 ENCOUNTER FOR MONITORING OPIOID MAINTENANCE THERAPY: ICD-10-CM

## 2024-12-04 LAB — FENTANYL UR QL: NORMAL

## 2024-12-04 PROCEDURE — H2035 A/D TX PROGRAM, PER HOUR: HCPCS | Mod: HQ | Performed by: COUNSELOR

## 2024-12-04 PROCEDURE — 80307 DRUG TEST PRSMV CHEM ANLYZR: CPT

## 2024-12-04 PROCEDURE — 1002N00001 HC LODGING PLUS FACILITY CHARGE ADULT

## 2024-12-04 PROCEDURE — H2035 A/D TX PROGRAM, PER HOUR: HCPCS | Mod: HQ

## 2024-12-04 NOTE — GROUP NOTE
Group Therapy Documentation    PATIENT'S NAME: Perry Preciado Jr.  MRN:   [id number]  :   1975  ACCT. NUMBER: [id number]  DATE OF SERVICE: 24  START TIME: 12:30 PM  END TIME:  2:30 PM  FACILITATOR(S): Maurice Franklin LADC  TOPIC: BEH Group Therapy  Number of patients attending the group:  10  Group Length:  2 Hours    Dimensions addressed: 1, 2, 3, 4, 5, and 6    Summary of Group / Topics Discussed:    Recovery Principles, Sober coping skills, Trauma informed care, Disease of addiction, and Relapse prevention      Group Attendance:  Attended group session    Patient's response to the group topic/interactions:  cooperative with task    Patient appeared to be Actively participating.        Client specific details:  Kb participated and interacted appropriately with peers and staff in PM group. No triggers to use noted or discussed.

## 2024-12-04 NOTE — GROUP NOTE
Group Therapy Documentation    PATIENT'S NAME: Perry Preciado Jr.  MRN:   [id number]  :   1975  ACCT. NUMBER: [id number]  DATE OF SERVICE: 24  START TIME:  8:30 AM  END TIME:  9:30 AM  FACILITATOR(S): Nel Carr LADC; Miya Rinaldi LADC  TOPIC: BEH Group Therapy  Number of patients attending the group:  26  Group Length:  2 Hours    Dimensions addressed: 1, 2, 3, 4, 5, and 6    Summary of Group / Topics Discussed:    Psychoeducational lecture by Dr. Hamilton [facility]. Discussion on how negative emotions affects substance use. Shared statistic results from patients of [facility]. Sufficient discussion engagement with patients.      Group Attendance:  Attended group session    Patient's response to the group topic/interactions:  cooperative with task    Patient appeared to be Engaged.        Client specific details:  Perry listened actively to the lecture/discussion.

## 2024-12-04 NOTE — GROUP NOTE
Group Therapy Documentation    PATIENT'S NAME: Perry Preciado Jr.  MRN:   [id number]  :   1975  ACCT. NUMBER: [id number]  DATE OF SERVICE: 24  START TIME:  9:45 AM  END TIME: 11:15 AM  FACILITATOR(S): Jeimy Preston LADC  TOPIC: BEH Group Therapy  Number of patients attending the group:  9  Group Length:  1.5 Hours    Dimensions addressed: 3 and 5    Summary of Group / Topics Discussed:    Cognitive behavioral therapy skills, Co-occurring illnesses symptom management, and Coping/DBT informed care    Writer facilitated mental health psychotherapy group focusing on anxiety identification and management.  Patients were provided a description of anxious symptoms, given information on the differences between symptoms and disorders, and provided CBT coping strategies to manage anxious symptoms.        Group Attendance:  Attended group session    Patient's response to the group topic/interactions:  cooperative with task, discussed personal experience with topic, and listened actively    Patient appeared to be Actively participating, Attentive, and Engaged.        Client specific details:  Patient actively participated in today's group.  Patient noted increased awareness of the relationship between anxiety and substance use following group.

## 2024-12-05 ENCOUNTER — TELEPHONE (OUTPATIENT)
Dept: BEHAVIORAL HEALTH | Facility: CLINIC | Age: 49
End: 2024-12-05
Payer: COMMERCIAL

## 2024-12-05 ENCOUNTER — HOSPITAL ENCOUNTER (OUTPATIENT)
Dept: BEHAVIORAL HEALTH | Facility: CLINIC | Age: 49
Discharge: HOME | End: 2024-12-05
Attending: FAMILY MEDICINE
Payer: COMMERCIAL

## 2024-12-05 LAB — BACTERIA CSF CULT: NORMAL

## 2024-12-05 PROCEDURE — H2035 A/D TX PROGRAM, PER HOUR: HCPCS | Mod: HQ | Performed by: COUNSELOR

## 2024-12-05 PROCEDURE — H2035 A/D TX PROGRAM, PER HOUR: HCPCS | Mod: HQ

## 2024-12-05 NOTE — GROUP NOTE
Group Therapy Documentation    PATIENT'S NAME: Perry Preciado Jr.  MRN:   [id number]  :   1975  ACCT. NUMBER: [id number]  DATE OF SERVICE: 24  START TIME: 12:30 PM  END TIME:  2:30 PM  FACILITATOR(S): Maurice Franklin LADC  TOPIC: BEH Group Therapy  Number of patients attending the group:  9  Group Length:  2 Hours    Dimensions addressed: 1, 2, 3, 4, 5, and 6    Summary of Group / Topics Discussed:    Recovery Principles, Sober coping skills, Disease of addiction, and Relapse prevention      Group Attendance:  Attended group session    Patient's response to the group topic/interactions:  cooperative with task    Patient appeared to be Actively participating.        Client specific details:  Kb participated and interacted appropriately with peers and staff in PM group. No triggers to use noted or discussed.

## 2024-12-05 NOTE — PROGRESS NOTES
After taking afternoon medications, this writer and pt spoke regarding his Sublocade injection, which he had canceled the day of on 11/26/24. Pt stated he was mainly afraid of any complications that could come up making life more difficult for him, indicating he already has a lot going on. This writer high-lighted the benefit of not having to remember his sublingual strips every day as well as having long-lasting relief. Pt decided he was willing to take the Sublocade injection and authorized this writer to set it up.

## 2024-12-05 NOTE — GROUP NOTE
Psychoeducation Group Documentation    PATIENT'S NAME: Perry Preciado Jr.  MRN:   [id number]  :   1975  ACCT. NUMBER: [id number]  DATE OF SERVICE: 24  START TIME:  3:00 PM  END TIME:  4:00 PM  FACILITATOR(S): Jasmin Green LADC; Cristian De La Cruz LADC; Jeimy Preston LADC  TOPIC: BEH Pyschoeducation  Number of patients attending the group:  28  Group Length:  1 Hours    Skills Group Therapy Type: Emotion regulation skills    Summary of Group / Topics Discussed:    Coping/DBT skills    Group Attendance:  Attended group session    Patient's response to the group topic/interactions:  cooperative with task    Patient appeared to be Actively participating, Attentive, and Engaged.         Client specific details: Pt participated in an activity emphasizing emotion-identifying and and expression.

## 2024-12-05 NOTE — GROUP NOTE
"Group Therapy Documentation    PATIENT'S NAME: Perry Preciado Jr.  MRN:   [id number]  :   1975  ACCT. NUMBER: [id number]  DATE OF SERVICE: 24  START TIME:  9:00 AM  END TIME: 11:00 AM  FACILITATOR(S): Jeimy Preston LADC  TOPIC: BEH Group Therapy  Number of patients attending the group:  9  Group Length:  2 Hours    Dimensions addressed: 3, 4, 5, and 6    Summary of Group / Topics Discussed:    Sober coping skills, Balanced lifestyle, Cognitive behavioral therapy skills, Coping/DBT informed care, Disease of addiction, Emotions/expression, and Relapse prevention    Writer facilitated group psychotherapy related to the above topics.  Patients checked in with current mood and discussion focused on identification of needs and wants, identification of needs in sobriety and how to remain open to input from other resources.  Patients reported greater insight related to the above topics.      Group Attendance:  Attended group session    Patient's response to the group topic/interactions:  cooperative with task, discussed personal experience with topic, and listened actively    Patient appeared to be Actively participating, Attentive, and Engaged.        Client specific details:  Patient checked in feeling, \"anxious, grateful.\"  Patient verbalized concerns related to his court date and transitioning to his new level of care.      "

## 2024-12-06 ENCOUNTER — HOSPITAL ENCOUNTER (OUTPATIENT)
Dept: BEHAVIORAL HEALTH | Facility: CLINIC | Age: 49
Discharge: HOME | End: 2024-12-06
Attending: FAMILY MEDICINE
Payer: COMMERCIAL

## 2024-12-06 PROCEDURE — 1002N00001 HC LODGING PLUS FACILITY CHARGE ADULT

## 2024-12-06 PROCEDURE — H2035 A/D TX PROGRAM, PER HOUR: HCPCS | Mod: HQ

## 2024-12-06 NOTE — PROGRESS NOTES
Nursing Discharge Planning Meeting    Writer completed discharge planning meeting with patient. Discharge is planned for 12/11/24    Discussed appropriate follow up care to manage JAQUELINE, MI and medical and to obtain medication refills. Patient given a copy of their current medications for reference. Questions were answered at this time and the patient verbalized an understanding of the post-discharge follow up plan.        Patient will f/u with [facility] Provider in Professional Bldg as recommended and/or is aware of upcoming appt:  [facility]  [address] [facility], [address] [facility] [address]   [phone]      12/9/2024 Status: Scheduled    Time: 8:30 AM Length: 30   Visit Type: NEW - OFFICE VISIT [4711] Copay: $0.00   Provider: José Amezquita NP Department:  PRIMARY CARE         Patient will f/u with [facility] Psychiatry as recommended and/or is aware of upcoming appt:  [facility], [facility], 2nd floor [facility] [address]  [address] [place] [address]  [id number]    [phone]  12/11/2024 Status: Scheduled    Time: 10:30 AM Length: 30   Visit Type: ADDICTION MED RETURN [6020] Copay: $0.00   Provider: Wilfrid Valdes MD Department: [facility] PSYCHIATRY       Continue to support patient in discharge planning as needed to assure appropriate continuity of care.     Tobacco Cessation  Patient participated in the nicotine replacement therapy for tobacco cessation or reduction during their treatment programming: Yes, Decreased tobacco use with NRT products      The patient was provided with community resources for follow-up to continue tobacco cessation support once in the community. Also the patient was encouraged to discuss their tobacco cessation efforts with the primary care provider.    [facility]  Nurse Liaison / CD [facility]  O: [phone]  Fax:[phone]  [facility] [place] [id number]  M-F: 7AM to 5:30PM   Sat-Sun: 7AM to 3PM  After hours:  [phone]

## 2024-12-06 NOTE — GROUP NOTE
Group Therapy Documentation    PATIENT'S NAME: Perry Preciado Jr.  MRN:   [id number]  :   1975  ACCT. NUMBER: [id number]  DATE OF SERVICE: 24  START TIME: 12:30 PM  END TIME:  2:30 PM  FACILITATOR(S): Miya Rinaldi [facility] [place]; Jerzy Packer [facility] [place]; Maurice Franklin [facility] [place]; Cristian De La Cruz [facility] [place]; Jasmin Green [facility] [place]  TOPIC: BEH Group Therapy  Number of patients attending the group:  27  Group Length:  2 Hours    Dimensions addressed: 6    Summary of Group / Topics Discussed:    Sober Rec/Sober Coping Skills        Patients viewed an addiction/mental health based film. This film addressed: addiction as a disease, relationships and addiction, engagement in AA, and evaluating priorities when getting sober.   Patients engaged in a thorough discussion about the film, and shared personal experiences, and how the film helped them process their own life events.       Group Attendance:  Attended group session    Patient's response to the group topic/interactions:  cooperative with task    Patient appeared to be Actively participating.        Client specific details:  Perry Attended small group therapy. Patient engaged in discussion and participated in sharing feedback to their peers.

## 2024-12-06 NOTE — GROUP NOTE
Group Therapy Documentation    PATIENT'S NAME: Perry Preciado Jr.  MRN:   [id number]  :   1975  ACCT. NUMBER: [id number]  DATE OF SERVICE: 24  START TIME:  9:00 AM  END TIME: 11:00 AM  FACILITATOR(S): Jerzy Packer LADC  TOPIC: BEH Group Therapy  Number of patients attending the group:  9  Group Length:  2 Hours    Dimensions addressed: 3, 4, 5, and 6    Summary of Group / Topics Discussed:    Recovery Principles, Co-occurring illnesses symptom management, Mindfulness/Relaxation, Coping/DBT informed care, Trauma informed care, Disease of addiction, Emotions/expression, and Relapse prevention      Group Attendance:  Attended group session    Patient's response to the group topic/interactions:  cooperative with task    Patient appeared to be Attentive and Engaged.        Client specific details:  Perry participated in morning group. He checked in and took part in the discussion related to the sober activity from yesterday. For the second half of group he listened to and gave positive feedback on his peer's assignment.

## 2024-12-07 ENCOUNTER — HOSPITAL ENCOUNTER (OUTPATIENT)
Dept: BEHAVIORAL HEALTH | Facility: CLINIC | Age: 49
Discharge: HOME | End: 2024-12-07
Attending: FAMILY MEDICINE
Payer: COMMERCIAL

## 2024-12-07 ENCOUNTER — TRANSFERRED RECORDS (OUTPATIENT)
Dept: HEALTH INFORMATION MANAGEMENT | Facility: CLINIC | Age: 49
End: 2024-12-07
Payer: COMMERCIAL

## 2024-12-07 LAB — BACTERIA CSF CULT: NO GROWTH

## 2024-12-07 PROCEDURE — H2035 A/D TX PROGRAM, PER HOUR: HCPCS | Mod: HQ

## 2024-12-07 PROCEDURE — 1002N00001 HC LODGING PLUS FACILITY CHARGE ADULT

## 2024-12-07 PROCEDURE — H2035 A/D TX PROGRAM, PER HOUR: HCPCS | Mod: HQ | Performed by: COUNSELOR

## 2024-12-07 NOTE — GROUP NOTE
Psychoeducation Group Documentation    PATIENT'S NAME: Perry Preciado Jr.  MRN:   [id number]  :   1975  [id number]  DATE OF SERVICE: 24  START TIME: 12:30 PM  END TIME:  2:30 PM  FACILITATOR(S): Jeimy Preston LADC; Francisco Starr LADC  TOPIC: BEH Pyschoeducation  Number of patients attending the group:  27  Group Length:  2 Hours    Skills Group Therapy Type: Recovery skills and Healthy behaviors development    Summary of Group / Topics Discussed:    Balanced lifestyle skills, Symptom management skills, and Relapse prevention skills    Patients attended a group focused on identification of triggers and cues for relapse, and relapse prevention strategies.  Patients then discussed common characteristics of positive sober supports.          Group Attendance:  Attended group session    Patient's response to the group topic/interactions:  cooperative with task and listened actively    Patient appeared to be Actively participating, Attentive, and Engaged.         Client specific details:  Patient attended and participated in this group.  Patient identified individual triggers for relapse as well as characteristics of healthy supports.
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
## 2024-12-07 NOTE — GROUP NOTE
Psychoeducation Group Documentation    PATIENT'S NAME: Perry Preciado Jr.  MRN:   [id number]  :   1975  ACCT. NUMBER: [id number]  DATE OF SERVICE: 24  START TIME:  9:00 AM  END TIME: 11:00 AM  FACILITATOR(S): Francisco Starr LADC  TOPIC: BEH Pyschoeducation  Number of patients attending the group:  27  Group Length:  2 Hours    Skills Group Therapy Type: Recovery skills    Summary of Group / Topics Discussed:    Relationship/social skills and Relapse prevention skills        Group Attendance:  Attended group session    Patient's response to the group topic/interactions:  listened actively    Patient appeared to be Attentive and Engaged.         Client specific details:  Pt was attentive and listened actively to speakers.

## 2024-12-08 ENCOUNTER — HOSPITAL ENCOUNTER (OUTPATIENT)
Dept: BEHAVIORAL HEALTH | Facility: CLINIC | Age: 49
Discharge: HOME | End: 2024-12-08
Attending: FAMILY MEDICINE
Payer: COMMERCIAL

## 2024-12-08 PROCEDURE — H2035 A/D TX PROGRAM, PER HOUR: HCPCS | Mod: HQ

## 2024-12-08 PROCEDURE — 1002N00001 HC LODGING PLUS FACILITY CHARGE ADULT

## 2024-12-08 NOTE — GROUP NOTE
Psychoeducation Group Documentation    PATIENT'S NAME: Perry Preciado Jr.  MRN:   [id number]  :   1975  ACCT. NUMBER: [id number]  DATE OF SERVICE: 24  START TIME: 12:30 PM  END TIME:  1:30 PM  FACILITATOR(S): Francisco Starr LADC; Jeimy Preston LADC  TOPIC: BEH Pyschoeducation  Number of patients attending the group:  27  Group Length:  1 Hours    Skills Group Therapy Type: Emotion regulation skills    Summary of Group / Topics Discussed:    Coping/DBT skills        Group Attendance:  Attended group session    Patient's response to the group topic/interactions:  listened actively    Patient appeared to be Attentive and Engaged.         Client specific details:  Pt was attentive and engaged with lecturer and cooperated with task.

## 2024-12-08 NOTE — GROUP NOTE
Psychoeducation Group Documentation    PATIENT'S NAME: Perry Preciado Jr.  MRN:   [id number]  :   1975  ACCT. NUMBER: [id number]  DATE OF SERVICE: 24  START TIME:  8:45 AM  END TIME: 10:45 AM  FACILITATOR(S): Jeimy Preston LADC; Francisco Starr LADC  TOPIC: BEH Pyschoeducation  Number of patients attending the group:  27  Group Length:  2 Hours    Skills Group Therapy Type: Healthy behaviors development and Medication education    Summary of Group / Topics Discussed:    Balanced lifestyle skills and Medication management skills        Group Attendance:  Attended group session    Patient's response to the group topic/interactions:  listened actively    Patient appeared to be Attentive and Engaged.         Client specific details:  Pt was attentive and engaged with lecturer.

## 2024-12-09 ENCOUNTER — HOSPITAL ENCOUNTER (OUTPATIENT)
Dept: BEHAVIORAL HEALTH | Facility: CLINIC | Age: 49
Discharge: HOME | End: 2024-12-09
Attending: FAMILY MEDICINE
Payer: COMMERCIAL

## 2024-12-09 ENCOUNTER — OFFICE VISIT (OUTPATIENT)
Dept: FAMILY MEDICINE | Facility: CLINIC | Age: 49
End: 2024-12-09
Payer: COMMERCIAL

## 2024-12-09 VITALS
HEIGHT: 74 IN | WEIGHT: 282.5 LBS | BODY MASS INDEX: 36.26 KG/M2 | HEART RATE: 83 BPM | RESPIRATION RATE: 16 BRPM | DIASTOLIC BLOOD PRESSURE: 81 MMHG | TEMPERATURE: 98 F | OXYGEN SATURATION: 96 % | SYSTOLIC BLOOD PRESSURE: 123 MMHG

## 2024-12-09 DIAGNOSIS — F41.1 GAD (GENERALIZED ANXIETY DISORDER): ICD-10-CM

## 2024-12-09 DIAGNOSIS — M25.511 CHRONIC RIGHT SHOULDER PAIN: Primary | ICD-10-CM

## 2024-12-09 DIAGNOSIS — G89.29 CHRONIC RIGHT-SIDED LOW BACK PAIN WITH RIGHT-SIDED SCIATICA: ICD-10-CM

## 2024-12-09 DIAGNOSIS — E66.812 CLASS 2 SEVERE OBESITY DUE TO EXCESS CALORIES WITH SERIOUS COMORBIDITY AND BODY MASS INDEX (BMI) OF 36.0 TO 36.9 IN ADULT (H): ICD-10-CM

## 2024-12-09 DIAGNOSIS — M79.89 LEG SWELLING: ICD-10-CM

## 2024-12-09 DIAGNOSIS — R63.5 WEIGHT GAIN: ICD-10-CM

## 2024-12-09 DIAGNOSIS — F43.10 PTSD (POST-TRAUMATIC STRESS DISORDER): ICD-10-CM

## 2024-12-09 DIAGNOSIS — E66.01 CLASS 2 SEVERE OBESITY DUE TO EXCESS CALORIES WITH SERIOUS COMORBIDITY AND BODY MASS INDEX (BMI) OF 36.0 TO 36.9 IN ADULT (H): ICD-10-CM

## 2024-12-09 DIAGNOSIS — M54.41 CHRONIC RIGHT-SIDED LOW BACK PAIN WITH RIGHT-SIDED SCIATICA: ICD-10-CM

## 2024-12-09 DIAGNOSIS — G89.29 CHRONIC RIGHT SHOULDER PAIN: Primary | ICD-10-CM

## 2024-12-09 DIAGNOSIS — R79.89 ELEVATED LFTS: ICD-10-CM

## 2024-12-09 DIAGNOSIS — M21.611 BUNION, RIGHT: ICD-10-CM

## 2024-12-09 DIAGNOSIS — R73.01 ELEVATED FASTING BLOOD SUGAR: ICD-10-CM

## 2024-12-09 DIAGNOSIS — F25.0 SCHIZOAFFECTIVE DISORDER, BIPOLAR TYPE (H): ICD-10-CM

## 2024-12-09 PROBLEM — F22 PARANOIA (H): Status: RESOLVED | Noted: 2024-10-30 | Resolved: 2024-12-09

## 2024-12-09 PROBLEM — G03.9 MENINGITIS: Status: RESOLVED | Noted: 2024-11-08 | Resolved: 2024-12-09

## 2024-12-09 LAB
ALBUMIN SERPL BCG-MCNC: 4.1 G/DL (ref 3.5–5.2)
ALP SERPL-CCNC: 84 U/L (ref 40–150)
ALT SERPL W P-5'-P-CCNC: 26 U/L (ref 0–70)
AST SERPL W P-5'-P-CCNC: 27 U/L (ref 0–45)
BILIRUB DIRECT SERPL-MCNC: <0.2 MG/DL (ref 0–0.3)
BILIRUB SERPL-MCNC: 0.3 MG/DL
EST. AVERAGE GLUCOSE BLD GHB EST-MCNC: 126 MG/DL
HBA1C MFR BLD: 6 % (ref 0–5.6)
PROT SERPL-MCNC: 6.9 G/DL (ref 6.4–8.3)

## 2024-12-09 PROCEDURE — 80076 HEPATIC FUNCTION PANEL: CPT

## 2024-12-09 PROCEDURE — 1002N00001 HC LODGING PLUS FACILITY CHARGE ADULT

## 2024-12-09 PROCEDURE — 36415 COLL VENOUS BLD VENIPUNCTURE: CPT

## 2024-12-09 PROCEDURE — 83036 HEMOGLOBIN GLYCOSYLATED A1C: CPT

## 2024-12-09 PROCEDURE — 99213 OFFICE O/P EST LOW 20 MIN: CPT

## 2024-12-09 PROCEDURE — H2035 A/D TX PROGRAM, PER HOUR: HCPCS | Mod: HQ

## 2024-12-09 ASSESSMENT — PAIN SCALES - GENERAL: PAINLEVEL_OUTOF10: MILD PAIN (3)

## 2024-12-09 NOTE — GROUP NOTE
Group Therapy Documentation    PATIENT'S NAME: Perry Preciado Jr.  MRN:   [id number]  :   1975  ACCT. NUMBER: [id number]  DATE OF SERVICE: 24  START TIME: 12:30 PM  END TIME:  2:30 PM  FACILITATOR(S): Maurice Franklin LADC; Lexi Sanders  TOPIC: BEH Group Therapy  Number of patients attending the group:  9  Group Length:  2 Hours    Dimensions addressed: 1, 2, 3, 4, 5, and 6    Summary of Group / Topics Discussed:    Spiritual Care      Group Attendance:  Attended group session    Patient's response to the group topic/interactions:  cooperative with task    Patient appeared to be Actively participating.        Client specific details:  Kb participated and interacted appropriately with peers and staff in Spiritual group. No triggers to use noted or discussed. Pt had RN staff permission to rest in room until 1:30 pm, one hour of group.

## 2024-12-09 NOTE — GROUP NOTE
Group Therapy Documentation    PATIENT'S NAME: Perry Preciado Jr.  MRN:   [id number]  :   1975  ACCT. NUMBER: [id number]  DATE OF SERVICE: 24  START TIME:  9:00 AM  END TIME: 11:00 AM  FACILITATOR(S): Jerzy Packer LADC  TOPIC: BEH Group Therapy  Number of patients attending the group:  10  Group Length:  2 Hours    Dimensions addressed: 3, 4, 5, and 6    Summary of Group / Topics Discussed:    Recovery Principles, Balanced lifestyle, Cognitive behavioral therapy skills, Mindfulness/Relaxation, Coping/DBT informed care, Trauma informed care, Disease of addiction, Emotions/expression, Leisure explorations/use of leisure time, and Self-care activities      Group Attendance:  Attended group session    Patient's response to the group topic/interactions:  cooperative with task    Patient appeared to be Attentive and Engaged.        Client specific details: Perry participated in morning group. He took part in the graduation of his peer which was followed by check in. For the second half of group he took part in a mindfulness activity followed by a discussion.     Looking back at labs-kidney function has been stable at these numbers from past year so this isn't an acute situation. No meds to be held. Continue with staying hydrated. Avoid NSAIDs. Follow up with specialist.

## 2024-12-09 NOTE — PROGRESS NOTES
"  Assessment & Plan       Class 2 severe obesity due to excess calories with serious comorbidity and body mass index (BMI) of 36.0 to 36.9 in adult (H)  Discussed weight and management     Chronic right-sided low back pain with right-sided sciatica  - Physical Therapy  Referral; Future  Chronic right shoulder pain  - Physical Therapy  Referral; Future    Interested in non-medication management of chronic shoulder and back pain.      PTSD (post-traumatic stress disorder)  - Adult Mental Health  Referral; Future  Schizoaffective disorder, bipolar type (H)  KANE (generalized anxiety disorder)  - Adult Mental Health  Referral; Future  Following with Keisha. Next trista 12/11. Reports good control of mental health symptoms    Bunion, right  - Orthopedic  Referral; Future    Elevated LFTs  - Hepatic panel (Albumin, ALT, AST, Bili, Alk Phos, TP); Future    Elevated fasting blood sugar  - Hemoglobin A1c; Future      Follow-up recommended in 1 month.    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 Had a relapse on alcohol in July-September. Thinking of eventually switching so Sublocade.    Going to [facility]- will be doing outpatient treatment for 3 months. Will be living in [place]. Would like to start seeing a therapist, which would be helpful.  Planning on getting some tattoos removed.    History right shoulder pain, lower back pain.  Establish Care      12/9/2024     8:19 AM   Additional Questions   Roomed by Nino CHEN     History of Present Illness       Reason for visit:  Primary doctor   He is taking medications regularly.        Objective    /81 (BP Location: Right arm, Patient Position: Sitting, Cuff Size: Adult Large)   Pulse 83   Temp 98  F (36.7  C) (Temporal)   Resp 16   Ht 1.877 m (6' 1.9\")   Wt 128.1 kg (282 lb 8 oz)   SpO2 96%   BMI 36.37 kg/m    Body mass index is 36.37 " kg/m .  Physical Exam   GENERAL: alert and no distress  RESP: lungs clear to auscultation - no rales, rhonchi or wheezes  CV: regular rate and rhythm, normal S1 S2, no S3 or S4, no murmur, click or rub, no peripheral edema   MS: Right sided medial bunion with erythema. Tenderness over the 2nd MTP joint area.  PSYCH: mentation appears normal, affect normal/bright            Signed Electronically by: José Amezquita NP

## 2024-12-10 ENCOUNTER — TRANSFERRED RECORDS (OUTPATIENT)
Dept: HEALTH INFORMATION MANAGEMENT | Facility: CLINIC | Age: 49
End: 2024-12-10
Payer: COMMERCIAL

## 2024-12-10 DIAGNOSIS — F11.20 OPIOID USE DISORDER, SEVERE, DEPENDENCE (H): ICD-10-CM

## 2024-12-10 RX ORDER — BUPRENORPHINE 8 MG/1
8 TABLET SUBLINGUAL 3 TIMES DAILY
Qty: 21 TABLET | Refills: 0 | Status: SHIPPED | OUTPATIENT
Start: 2024-12-10 | End: 2024-12-17

## 2024-12-10 NOTE — TELEPHONE ENCOUNTER
Please send refill to pharmacy pt was supposed to request refill at upcoming appt on 12/11, but pt discharged from program today in order to attend court all day tomorrow. Pt has phone number of psych clinic and knows he needs to reschedule his appt in order to get future refills.

## 2024-12-10 NOTE — PROGRESS NOTES
[address]  [place] [address]              Perry JOSE Preciado Jr., 1975 : [facility].  He took part in these program(s):     ______ The Inpatient Program   ______ The Outpatient Program   ___X___ The Lodging Plus Program   ______ Lodging Day Outpatient         Date admitted: 11.13.24    Date discharged: 12.10.24     Type of discharge:     __X___ Satisfactory - completed evaluation / treatment   ______ Discharged without completing   ______ Behavioral discharge   ______ Transferred to another chemical dependency program   ______ Transferred to another type of service   ______ Left against medical advice (AMA) / Eloped               Clinician/s: HALIE Castaneda, [facility] [place]  Date: 12.10.24           Time: 0830

## 2024-12-10 NOTE — PROGRESS NOTES
[facility] Discharge Summary/Instructions    Patient:  Perry Preciado Jr.    MRN: [id number]  : 1975 Age: 49 year old Sex: male   -   Focus of Treatment / Discharge Recommendations   Personal Safety/ Management of Symptoms   * Follow your safety plan. Report increased symptoms to your care team and /or go to the nearest Emergency Department.   * Call crisis lines as needed   [facility] [phone] [facility] [phone]   [facility] [phone] Crisis Connection [phone]   [facility] [phone] [facility] [phone]   [facility] [phone] National Suicide Prevention [phone]   [facility] [phone] Suicide Prevention [phone]   [facility] [phone]   Abstinence/Relapse Prevention   * Take all medicines as directed. Carry a current list of medicines with you.   * Use coping skills: Use peer support group,continue to attend 12 step meetings.Obtain and work with sponsor.   * Do not use illicit (street) drugs, controlled substances (narcotics) or alcohol.   Develop/Improve Independent Living/Socialization Skills: Continue to build relationship with family and sober support network.  Community Resources/Supports and Discharge Planning: Attend three 12 step meetings per week.Obtain sponsor. Attend IOP at [facility] and follow all recommendations.     Follow up with psychiatrist / main caregiver: Dr. Valdes, [facility] Next visit: 24 at 10:30 am   Follow up with your therapist: [facility] Next visit: TBD     See your medical doctor: José Amezquita NP    Other:     Client Signature:_______________________ Date / Time:___________   Staff Signature:________________________ Date / Time:___________

## 2024-12-10 NOTE — ADDENDUM NOTE
Encounter addended by: Jeimy Preston [facility] on: 12/10/2024 8:31 AM   Actions taken: Clinical Note Signed

## 2024-12-11 ENCOUNTER — TELEPHONE (OUTPATIENT)
Dept: FAMILY MEDICINE | Facility: CLINIC | Age: 49
End: 2024-12-11
Payer: COMMERCIAL

## 2024-12-11 ENCOUNTER — TELEPHONE (OUTPATIENT)
Dept: BEHAVIORAL HEALTH | Facility: CLINIC | Age: 49
End: 2024-12-11
Payer: COMMERCIAL

## 2024-12-11 DIAGNOSIS — E78.1 HYPERTRIGLYCERIDEMIA: ICD-10-CM

## 2024-12-11 RX ORDER — ATORVASTATIN CALCIUM 20 MG/1
20 TABLET, FILM COATED ORAL EVERY EVENING
Qty: 90 TABLET | Refills: 1 | Status: SHIPPED | OUTPATIENT
Start: 2024-12-11

## 2024-12-11 NOTE — ADDENDUM NOTE
Encounter addended by: Jeimy Preston [facility] on: 12/11/2024 1:19 PM   Actions taken: Clinical Note Signed

## 2024-12-11 NOTE — TELEPHONE ENCOUNTER
Refill sent. Remember to pend the pharmacy (I sent it to the one on the Rx reports) when you get refills too!  It helps us greatly to not need to look it up!

## 2024-12-11 NOTE — TELEPHONE ENCOUNTER
[facility]coming [facility] to [facility] (Dr. Lynch) from [facility] requesting a refill on the following prescription:  atorvastatin (LIPITOR) 20 MG tablet 30 tablet 0 11/12/2024 12/12/2024 No   Sig - Route: Take 1 tablet (20 mg) by mouth every evening. - Oral     Not a patient of [facility] and last prescriber was Dr. Almazan which appears to be at discharge from hospital on 11/13/2024.     Routing to PCP Todd Amezquita NP to further assist with refill. Patient had a visit on 12/09/2024.           [place] [id number], [address]   [place], [place], [address]  Phone: [phone]  Fax: [phone]    Petrona Garcia RN on 12/11/2024 at 11:41 AM

## 2024-12-11 NOTE — PROGRESS NOTES
COUNSELOR S DISCHARGE SUMMARY    Date: 2024     Program Name:  [facility]    Client Name Perry Preciado Jr. Date of Birth 1975      MR#  [id number]    Referred by [facility]       Release copies to N/A    Admit date 24  Discharge Date  12.10.24  # of Days Attended 27  Date Last Attended: 24     Discharge Status:  With Staff Approval    PROBLEMS PRESENTED AT ADMISSION:  (Include reasons & circumstances for admission)  Perry Preciado Jr. is a 49 year old year old male  presenting for inpatient substance use and mental health residential treatment due to opioid, stimulant, and alcohol use disorder.  Patient reported a relapse in 2024 resulting in hospitalization for psychosis and substance use disorders.  Last use of substances other than tobacco was 10.24.24.      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 received the following services to address substance use and mental health disorders.  he participated in:  Group Therapy, Individual Therapy, Psychiatric Medication Management, Recreation Activities, Spirituality Groups, DBT Skills Groups, AA/NA meetings , Life Skills Groups, and Psych-education Groups      PROGRESS:     Dimension 1 - Acute Intoxication/Withdrawal Potential:  Admission ASAM Risk Ratin Client can tolerate and cope with withdrawal discomfort. The client  "displays mild to moderate intoxication or signs and symptoms interfering with daily functioning but does not immediately endanger self or others. Client poses minimal risk of severe withdrawal.  Discharge ASAM Risk Ratin Client can tolerate and cope with withdrawal discomfort. The client displays mild to moderate intoxication or signs and symptoms interfering with daily functioning but does not immediately endanger self or others. Client poses minimal risk of severe withdrawal.    Treatment goal(s):  Develop effective strategies to maintain sobriety.      Progress toward goal(s):  Patient met with addiction medicine throughout his stay at [facility].  Patient declined sublocade, but was maintained on Suboxone for OUD.    Patient reports date of last use is: 10.24.24.   Patient  agreed to report any substance use to staff and any worsening withdrawal symptoms to staff nurse. Patient is on suboxone maintenance.  Patient developed increased insight into symptoms of post acute withdrawal as well as withdrawal symptoms by reporting associated symptoms weekly or as needed to counseling staff.  At time of discharge the patient is taking the following craving management medications: Suboxone.    Assignments:    I will report to nurse any increase in withdrawal symptoms.  I will follow recommendations for withdrawal management.    Dimension 2 - Biomedical Conditions & Complications:  Admission ASAM Risk Ratin Client tolerates and sebastien with physical discomfort and is able to get the services that the client needs.  Discharge ASAM Risk Ratin Client tolerates and sebastien with physical discomfort and is able to get the services that the client needs.    Treatment goal(s):  Follow recommendations of medical provider.    Progress toward goal(s):    Patient identified the following medical concerns: \"The patient recently was diagnosed with aseptic meningitis resulting in treatment at [facility] from 24 until 24. " "Patient also has history of chronic pain including sciatica, degenerative disc disease, hyperlipidemia, hypertension, and asthma. Patient received a history and physical 10/30/24.\"   Patient also went to the ED due to abdominal/rib pain with no acute concerns in AVS.      Patient met with nursing staff and other medical providers to assess and treat medical concerns while in care.  Patient reported medical concerns did not impair ability to participate in programming.  Patient is recommended to follow through with primary medical providers to continue adequate and appropriate treatment of co-occurring medical conditions.  Assignments:    I will continue to take prescribed medications and follow-up with medical interventions while in program.  I will follow up with a primary care physician and follow recommendations.    Dimension 3 - Emotional/Behavioral Conditions & Complications:  Admission ASAM Risk Ratin Client has difficulty with impulse control and lacks coping skills. Client has thoughts of suicide or harm to others without means; however, the thoughts may interfere with participation in some treatment activities. Client has difficulty functioning in significant life areas. Client has moderate symptoms of emotional, behavioral, or cognitive problems. Client is able to participate in most treatment activities.  Discharge ASAM Risk Ratin Client has difficulty with impulse control and lacks coping skills. Client has thoughts of suicide or harm to others without means; however, the thoughts may interfere with participation in some treatment activities. Client has difficulty functioning in significant life areas. Client has moderate symptoms of emotional, behavioral, or cognitive problems. Client is able to participate in most treatment activities.    Mental Health Diagnosis:   F41.1 Generalized Anxiety Disorder  F33.1 Major Depressive Disorder  F90.2 Attention Deficit Hyperactivity Disorder  F43.10 Post " "Traumatic Stress Disorder    Treatment goal(s):  Understand the relationship between addiction and mental health issues.    Progress toward goals:   Throughout treatment patient identified and rated the intensity of mental health symptoms including anxiety, depression, suicidal ideation, panic, obsession, flashbacks, intrusion symptoms, paranoia.  Patient met with a medication provider and followed recommendations for medication assisted therapy to manage mental health symptoms.  Patient participated in a suicide risk screening and associated safety plan upon admission.  Initially, the patient's PHQ-9 was 6/27 suggesting mild symptoms of depression.  The initial GAD7 score was 5/21 suggesting mild symptoms of anxiety.  Upon discharge PHQ9 is 0/27 suggesting no symptoms of depression and GAD7 is 4/21 suggesting mild symptoms of anxiety.  Patient completed a required safety plan the first week of treatment with their primary counselor. Patient denied suicidal or homicidal ideation while in treatment. Patient agreed to meet with staff therapist to identify and cope with mental health symptoms that interact with substance use goals. Patient improved insight into mental health symptoms and associated coping strategies, however, remains vulnerable to overwhelm, impulsivity, and emotional dysregulation in early recovery.      Assignments:   I will read \"Managing Depression.\" Answer questions and present to group.  I will read \"Managing Anxiety and Worry\" answer questions, and present in group.      Dimension 4 - Treatment Acceptance/Resistance:  Admission ASAM Risk Ratin Client displays verbal compliance, but lacks consistent behaviors; has low motivation for change; and is passively involved in treatment.  Discharge ASAM Risk Ratin Client displays verbal compliance, but lacks consistent behaviors; has low motivation for change; and is passively involved in treatment.    Treatment goal(s):      Increase internal " "motivation.    Progress toward goal(s):   Patient stated the primary motivation for treatment was to \"get back on track.\" Patient completed assignments as listed below to increase insight into the severity of the disease and to increase motivation for recovery efforts.  Patient appears to be in the contemplative stage of change at this time. Patient's risk rating has remained the same over the course of their stay due to ongoing anxiety related to transitioning to a lower level of clinical care, family strain, and legal concerns.      Assignments:   1. I will complete the \"First Step\" assignment and present in group.  2. I will complete my \"Drug Use History and \" assignment and present in group.  3. I will attend group programing, lectures, and skills groups on time and actively participate in my treatment program.  4. I will complete my assignments and present them in group and discuss with my counselors.    Dimension 5 - Relapse/Continued Problem Potential:  Admission ASAM Risk Ratin No awareness of the negative impact of mental health problems or substance abuse. No coping skills to arrest mental health or addiction illnesses, or prevent relapse.  Discharge ASAM Risk Rating: 3 Client has poor recognition and understanding of relapse and recidivism issues and displays moderately high vulnerability for further substance use or mental health problems. Client has few coping skills and rarely applies coping skills.    Treatment goal(s):  Develop sober coping and living skills.    Progress toward goal(s):    Patient worked on the below assignments to identify relapse patterns, develop emotional regulation skills, and to create a plan to prevent future relapse. Patient participated in two weekend workshops on relapse prevention and completed a relapse prevention plan. Patient demonstrated the ability to verbalize 3 high risk situations for relapse and 3 coping skills.  The patient was able to identify at least 3 " "triggers. The patient identified urges/cravings throughout treatment and received education and coping strategies to manage. Patient was exposed to topics including healthy boundaries, strategies to identify co-dependency and the importance of sober social supports. Patient requires additional skill development in setting and maintaining boundaries, identifying threats and supports for recovery, identifying values and beliefs, development of sober supports, refusal skills, development of adequate structure, support, and accountability outside of a 24/7 professionally managed setting.     Assignments:  1. I will complete \"Relapse Triggers and cues\" and present to group.   2. I will complete \"Relapse Awareness and Prevention Plan\" and present to group.  3. I will participate in relapse prevention workshops and complete all activities.      Dimension 6 - Recovery Environment: (family, recreation, legal, education, etc.)  Admission ASAM Risk Ratin Client has (A) Chronically antagonistic significant other, living environment, family, peer group or long-term criminal justice involvement that is harmful to recovery or treatment progress, or (B) Client has an actively antagonistic significant other, family, work, or living environment with immediate threat to the client's safety and well-being.  Discharge ASAM Risk Ratin Client has (A) Chronically antagonistic significant other, living environment, family, peer group or long-term criminal justice involvement that is harmful to recovery or treatment progress, or (B) Client has an actively antagonistic significant other, family, work, or living environment with immediate threat to the client's safety and well-being.    Treatment goal(s): Develop a sober support network.      Progress toward goal(s):   Patient verbally identified strategies to facilitate an environment conducive to sobriety by identifying at least 5 sober leisure activities and by attending nightly " 12-step support group meetings.  Patient developed supportive relationships while in treatment to practice interpersonal skills and to facilitate the use of recovery principles including trust, honesty, courage, and compassion.  Patient attended two relationships workshops and received education on family roles in addiction, characteristics of healthy and respectful relationships, and characteristics of co-dependency and enabling.  Patient identified the following housing plans following residential care: [facility] with [employer] board and lodge, which will promote an improvement in the structure, support, and accountability for maintenance of recovery goals within his home environment.  Patient reported willingness to follow through with legal responsibilities including a court appearance where he is the victim of a crime.  Court is scheduled for 12.11.24.  Patient received resources for individual psychotherapy, medication management, physical health maintenance, local 12-step meetings, and opportunities for engagement with local sober communities.      Assignments:   1.I will attend daily 12 step meetings while in treatment.  2. I will work with  staff to develop an aftercare plan including exploring group homes.  3. I will continue weekly contact with my sponsor.  4. I will continue to develop relationships with peers in the program.  5. I will participate in the weekend relationships workshops and complete all activities.    Client strengths identified during treatment were: willingness, honesty, capacity to co-regulate anxiety and irritability with regulated staff, history of sobriety in the past.        Client needs identified during treatment were: insight into level of structure necessary to maintain recovery goals, housing, income, food, mental health care, family strain, legal concerns, physical health concerns.      Discharge Medications:   Current Outpatient Medications   Medication Sig Dispense  Refill    albuterol (PROAIR HFA/PROVENTIL HFA/VENTOLIN HFA) 108 (90 Base) MCG/ACT inhaler Inhale 2 puffs into the lungs every 6 hours as needed for shortness of breath, wheezing or cough.      atomoxetine (STRATTERA) 80 MG capsule Take 1 capsule (80 mg) by mouth daily. 30 capsule 0    atorvastatin (LIPITOR) 20 MG tablet Take 1 tablet (20 mg) by mouth every evening. 90 tablet 1    buprenorphine (SUBUTEX) 8 MG SUBL sublingual tablet Place 1 tablet (8 mg) under the tongue 3 times daily for 7 days. 21 tablet 0    diphenhydrAMINE (BENADRYL) 25 MG capsule Take 1-2 capsules (25-50 mg) by mouth every 4 hours as needed for itching (Reported skin itching.). 30 capsule 0    FLUoxetine (PROZAC) 20 MG capsule Take 3 capsules (60 mg) by mouth daily. 45 capsule 1    fluticasone (FLONASE) 50 MCG/ACT nasal spray Spray 1 spray into both nostrils daily. 9.9 mL 0    gabapentin (NEURONTIN) 600 MG tablet Take 1 tablet (600 mg) by mouth 3 times daily. 90 tablet 0    ibuprofen (ADVIL/MOTRIN) 600 MG tablet Take 1 tablet (600 mg) by mouth every 6 hours as needed. 30 tablet 0    lithium (ESKALITH CR/LITHOBID) 450 MG CR tablet Take 2 tablets (900 mg) by mouth at bedtime. 60 tablet 0    naloxone (NARCAN) 4 MG/0.1ML nasal spray Spray 1 spray (4 mg) into one nostril alternating nostrils as needed for opioid reversal. every 2-3 minutes until assistance arrives 0.2 mL 11    nicotine (COMMIT) 2 MG lozenge Place 1-2 lozenges (2-4 mg) inside cheek every hour as needed for nicotine withdrawal symptoms. 48 lozenge 0    nicotine (NICODERM CQ) 7 MG/24HR 24 hr patch Place 1 patch over 24 hours onto the skin every 24 hours. 14 patch 3    prazosin (MINIPRESS) 1 MG capsule Take 1 capsule (1 mg) by mouth at bedtime. 30 capsule 0    QUEtiapine (SEROQUEL) 400 MG tablet Take 1 tablet (400 mg) by mouth at bedtime. 30 tablet 0    QUEtiapine (SEROQUEL) 50 MG tablet Take 1 tablet (50 mg) by mouth 2 times daily. 60 tablet 0    traZODone (DESYREL) 50 MG tablet Take 1-2  tablets ( mg) by mouth nightly as needed for sleep. 30 tablet 0     No current facility-administered medications for this encounter.     Facility-Administered Medications Ordered in Other Encounters   Medication Dose Route Frequency Provider Last Rate Last Admin    Self Administer Medications: Behavioral Services   Does not apply See Admin Instructions Arlen Lynch MD           CONTINUING CARE RECOMMENDATIONS/REFERRALS:   Remain abstinent from all mood altering chemicals.  Enter aftercare at [facility], follow all recommendations.    [facility] and [facility] and comply with all guidelines to maintain housing.  Monitor and comply with the advice of your doctor regarding mental and physical health, remain medication compliant.  Attend 4-7 12-step meetings weekly and obtain temporary/permanent sponsor.  Seek individual therapy with a provider experienced in treating co-occurring mental health and substance use disorders.   Follow up with a medication provider experienced in treating co-occurring mental health and substance use disorders.  Continue to practice coping skills for emotional health and substance use relapse prevention.    Living arrangements at discharge: board and lodge.      Prognosis:   Guarded          Staff Signature: SVETLANA Castaneda, LPCC

## 2024-12-12 ENCOUNTER — HOSPITAL ENCOUNTER (EMERGENCY)
Facility: CLINIC | Age: 49
Discharge: HOME | End: 2024-12-12
Attending: EMERGENCY MEDICINE | Admitting: EMERGENCY MEDICINE
Payer: COMMERCIAL

## 2024-12-12 VITALS
HEART RATE: 60 BPM | RESPIRATION RATE: 15 BRPM | OXYGEN SATURATION: 96 % | TEMPERATURE: 97.2 F | SYSTOLIC BLOOD PRESSURE: 136 MMHG | DIASTOLIC BLOOD PRESSURE: 77 MMHG

## 2024-12-12 DIAGNOSIS — R11.2 NAUSEA AND VOMITING, UNSPECIFIED VOMITING TYPE: ICD-10-CM

## 2024-12-12 DIAGNOSIS — R51.9 NONINTRACTABLE HEADACHE, UNSPECIFIED CHRONICITY PATTERN, UNSPECIFIED HEADACHE TYPE: ICD-10-CM

## 2024-12-12 PROCEDURE — 258N000003 HC RX IP 258 OP 636: Performed by: EMERGENCY MEDICINE

## 2024-12-12 PROCEDURE — 96374 THER/PROPH/DIAG INJ IV PUSH: CPT | Performed by: EMERGENCY MEDICINE

## 2024-12-12 PROCEDURE — 250N000011 HC RX IP 250 OP 636: Performed by: EMERGENCY MEDICINE

## 2024-12-12 PROCEDURE — 96361 HYDRATE IV INFUSION ADD-ON: CPT | Performed by: EMERGENCY MEDICINE

## 2024-12-12 PROCEDURE — 96375 TX/PRO/DX INJ NEW DRUG ADDON: CPT | Performed by: EMERGENCY MEDICINE

## 2024-12-12 PROCEDURE — 99284 EMERGENCY DEPT VISIT MOD MDM: CPT | Mod: 25 | Performed by: EMERGENCY MEDICINE

## 2024-12-12 PROCEDURE — 99284 EMERGENCY DEPT VISIT MOD MDM: CPT | Performed by: EMERGENCY MEDICINE

## 2024-12-12 RX ORDER — METOCLOPRAMIDE HYDROCHLORIDE 5 MG/ML
10 INJECTION INTRAMUSCULAR; INTRAVENOUS ONCE
Status: COMPLETED | OUTPATIENT
Start: 2024-12-12 | End: 2024-12-12

## 2024-12-12 RX ORDER — KETOROLAC TROMETHAMINE 15 MG/ML
15 INJECTION, SOLUTION INTRAMUSCULAR; INTRAVENOUS ONCE
Status: COMPLETED | OUTPATIENT
Start: 2024-12-12 | End: 2024-12-12

## 2024-12-12 RX ADMIN — KETOROLAC TROMETHAMINE 15 MG: 15 INJECTION, SOLUTION INTRAMUSCULAR; INTRAVENOUS at 05:06

## 2024-12-12 RX ADMIN — METOCLOPRAMIDE HYDROCHLORIDE 10 MG: 5 INJECTION INTRAMUSCULAR; INTRAVENOUS at 05:04

## 2024-12-12 RX ADMIN — SODIUM CHLORIDE 1000 ML: 9 INJECTION, SOLUTION INTRAVENOUS at 05:01

## 2024-12-12 ASSESSMENT — ACTIVITIES OF DAILY LIVING (ADL)
ADLS_ACUITY_SCORE: 58

## 2024-12-12 NOTE — LETTER
December 12, 2024      To Whom It May Concern:      Perry GARCIA Ilana Samuels was seen in our Emergency Department today, 12/12/24.  I expect his condition to improve over the next 2 days.  He may return to work/school when improved.    Sincerely,        FUNMILAYO Mosqueda

## 2024-12-12 NOTE — ED PROVIDER NOTES
ED Provider Note  [facility]      History     Chief Complaint   Patient presents with    Headache    Nausea & Vomiting     HPI  Perry Preciado Jr. is a 49 year old male with a past medical history of polysubstance use (methadone/opiates, methamphetamine, tobacco), anxiety, chronic pain, hypertriglyceridemia, depression, asthma, prior suicide attempt resents to the ED with complaint of headache.  He states that he woke up about an hour and a half ago with headache which is more pronounced over the frontal region of the top of his head, and it is gradually worsened over that time.  He states he has a little bit of blurred vision, has had some nausea and did vomit a couple times, nonbloody emesis.  He states that his neck felt slightly stiff when he woke up, but that is more or less gone now.  No fevers.  No cough.  He states that he does have a little bit of a runny nose now which just started.  No numbness, tingling, weakness.  He does admit that he does get headaches at times, states he has not specifically been diagnosed with migraines before though.    Of note, the patient was admitted 11/9/24 with severe headache, fever, neck stiffness.  There was concern for possible aseptic meningitis.  He did have 12 WBCs in his CSF, though extensive testing of the CSF was negative for organisms, and meningitis CSF panel, including viral panel, bacterial panel, in addition to fungal and yeast cultures were all negative.  Cryptococcus was also negative.  HSV negative, amongst multiple other viruses.  In the end, the infectious disease doctor notes that he doubts that the patient had CNS infection.  However, the patient admits he is extremely anxious about this previous hospitalization.    Past Medical History  Past Medical History:   Diagnosis Date    Acute bilateral low back pain with right-sided sciatica 06/07/2016    Acute pain of right shoulder due to trauma     Adult antisocial behavior      [facility] incarceration: 16 years    Aspiration pneumonia (H) 02/24/2014    CARDIOVASCULAR SCREENING; LDL GOAL LESS THAN 130 06/07/2016    Carpal tunnel syndrome of right wrist 06/07/2016    Chest pain 02/19/2014    Chest trauma 02/19/2014    Chronic pain syndrome 09/30/2019    Chronic right-sided low back pain with right-sided sciatica 05/10/2018    DDD (degenerative disc disease), lumbosacral 05/05/2020    Depression with anxiety 05/05/2020    Displacement of lumbar intervertebral disc without myelopathy 05/16/2012    KANE (generalized anxiety disorder) 07/07/2017    Heroin abuse (H) 05/10/2018    History of ADHD 01/06/2014    History of drug abuse (H) 01/06/2014    History of suicide attempt 05/10/2018    HTN (hypertension) 02/26/2014    Hyperglycemia 02/23/2014    Hypertriglyceridemia 11/02/2015    IV drug abuse (H) 05/10/2018    Major depressive disorder, recurrent (H) 02/23/2018    Major depressive disorder, recurrent episode, moderate (H) 06/06/2016    Meningitis 11/08/2024    Methamphetamine abuse (H) 02/23/2018    Overview:  see [facility] H&P 11/27/2009, [facility] admit 9/2/2010    Mild intermittent asthma without complication 05/10/2018    Opiate overdose (H) 02/22/2014    Positive D dimer 11/02/2015    Psychosis (H) 05/04/2020    Sepsis (H) 02/22/2014    SIRS (systemic inflammatory response syndrome) (H) 11/02/2015    Substance abuse (H) 05/07/2018    Suicidal ideation 02/23/2018    Tobacco use disorder 05/10/2018     Past Surgical History:   Procedure Laterality Date    BACK SURGERY       albuterol (PROAIR HFA/PROVENTIL HFA/VENTOLIN HFA) 108 (90 Base) MCG/ACT inhaler  atomoxetine (STRATTERA) 80 MG capsule  atorvastatin (LIPITOR) 20 MG tablet  buprenorphine (SUBUTEX) 8 MG SUBL sublingual tablet  diphenhydrAMINE (BENADRYL) 25 MG capsule  FLUoxetine (PROZAC) 20 MG capsule  gabapentin (NEURONTIN) 600 MG tablet  ibuprofen (ADVIL/MOTRIN) 600 MG tablet  lithium (ESKALITH CR/LITHOBID) 450 MG CR tablet  nicotine (COMMIT) 2  MG lozenge  nicotine (NICODERM CQ) 7 MG/24HR 24 hr patch  prazosin (MINIPRESS) 1 MG capsule  QUEtiapine (SEROQUEL) 400 MG tablet  QUEtiapine (SEROQUEL) 50 MG tablet  traZODone (DESYREL) 50 MG tablet  fluticasone (FLONASE) 50 MCG/ACT nasal spray  naloxone (NARCAN) 4 MG/0.1ML nasal spray      Allergies   Allergen Reactions    Wellbutrin [Bupropion] Anaphylaxis and Swelling     Facial swelling    Wellbutrin [Bupropion]     Wellbutrin [Bupropion] Difficulty breathing    Naltrexone Other (See Comments)     Headaches  Headaches       Family History  No family history on file.  Social History   Social History     Tobacco Use    Smoking status: Every Day     Current packs/day: 0.50     Types: Cigarettes     Passive exposure: Current    Smokeless tobacco: Never   Vaping Use    Vaping status: Never Used   Substance Use Topics    Alcohol use: Yes    Drug use: Yes     Types: Fentanyl, Methamphetamines     Comment: last use 2 weeks ago      A medically appropriate review of systems was performed with pertinent positives and negatives noted in the HPI, and all other systems negative.    Physical Exam   BP: 139/87  Pulse: 62  Temp: 97.2  F (36.2  C)  Resp: 20  SpO2: 99 %  Physical Exam  Constitutional:       General: He is in acute distress (appears very anxous).      Appearance: Normal appearance. He is not toxic-appearing.   HENT:      Head: Atraumatic.   Eyes:      General: No scleral icterus.     Conjunctiva/sclera: Conjunctivae normal.   Cardiovascular:      Rate and Rhythm: Normal rate.      Heart sounds: Normal heart sounds.   Pulmonary:      Effort: Pulmonary effort is normal. No respiratory distress.      Breath sounds: Normal breath sounds.   Abdominal:      Palpations: Abdomen is soft.      Tenderness: There is no abdominal tenderness.   Musculoskeletal:         General: No deformity.      Cervical back: Neck supple.   Skin:     General: Skin is warm.   Neurological:      Mental Status: He is alert and oriented to  person, place, and time.      Cranial Nerves: No cranial nerve deficit.      Sensory: No sensory deficit.      Motor: No weakness.           ED Course, Procedures, & Data      Procedures              No results found for any visits on 12/12/24.  Medications   metoclopramide (REGLAN) injection 10 mg (10 mg Intravenous $Given 12/12/24 0504)   ketorolac (TORADOL) injection 15 mg (15 mg Intravenous $Given 12/12/24 0506)   sodium chloride 0.9% BOLUS 1,000 mL (1,000 mLs Intravenous $New Bag 12/12/24 0501)     Labs Ordered and Resulted from Time of ED Arrival to Time of ED Departure - No data to display  No orders to display          Critical care was not performed.     Medical Decision Making  The patient's presentation was of moderate complexity (an acute illness with systemic symptoms).    The patient's evaluation involved:  review of external note(s) from 3+ sources (previous notes)  review of 3+ test result(s) ordered prior to this encounter (previous rseults)    The patient's management necessitated moderate risk (prescription drug management including medications given in the ED).    Assessment & Plan    Patient came in with complaint of a headache that was pretty significant but it gradually worsened over approximately an hour and a half.  He did have associated photophobia, mild blurred vision.  No numbness, tingling, weakness.  His exam here is pretty benign.  His neck is supple.  He does have a little bit of a runny nose and I wonder if he might be developing a viral URI.  There is been no fevers.  I have very low suspicion for meningitis at this time.  I did review several notes from his previous hospitalization as well as a multitude of labs, and see that ID felt that it was unlikely that he had a CNS infection at that time as all of his CSF testing was negative for infectious etiology.  Likewise, not concern for subarachnoid hemorrhage given that onset to maximal intensity was over an hour.  Neurologic exam  is unremarkable.  He was given Reglan, Toradol, IV fluid with significant improvement of his symptoms.  On repeat evaluation he states that his symptoms are not completely gone, though dramatically improved.  Visual symptoms are essentially resolved.  I do think migrainous type headache is likely.  The patient feels he is ready to discharge and does ask me for the address so he can call the sister come pick him up.  He is encouraged to follow-up with his outpatient provider, return to the ER with any new or worsening symptoms, any other concerns.  He verbalizes understanding and is agreeable to the plan.    Dictation Disclaimer: Some of this Note has been completed with voice-recognition dictation software. Although errors are generally corrected real-time, there is the potential for a rare error to be present in the completed chart.      I have reviewed the nursing notes. I have reviewed the findings, diagnosis, plan and need for follow up with the patient.    New Prescriptions    No medications on file       Final diagnoses:   Nonintractable headache, unspecified chronicity pattern, unspecified headache type   Nausea and vomiting, unspecified vomiting type       Shefali Love  [facility] EMERGENCY DEPARTMENT  12/12/2024     Shefali Love MD  12/12/24 [id number]

## 2024-12-12 NOTE — ED NOTES
Bed: ED05  Expected date:   Expected time:   Means of arrival:   Comments:  Hold [facility] 20  50 y/o M headache sx meningitis VSS

## 2024-12-17 ENCOUNTER — PATIENT OUTREACH (OUTPATIENT)
Dept: FAMILY MEDICINE | Facility: CLINIC | Age: 49
End: 2024-12-17
Payer: COMMERCIAL

## 2024-12-17 NOTE — TELEPHONE ENCOUNTER
Patient Quality Outreach    Patient is due for the following:   Colon Cancer Screening    Action(s) Taken:   No follow up needed at this time.    Type of outreach:    Sent [employer] message.    Questions for provider review:    None           Danyelle Mckee CMA  Chart routed to Care Team.

## 2024-12-17 NOTE — LETTER
December 17, 2024    To  Perry Preciado Jr.  [address]  [facility] [address]    Your team at [facility] cares about your health. We have reviewed your chart and based on our findings; we are making the following recommendations to better manage your health.     You are in particular need of attention regarding the following:     Call or MyChart message your clinic to schedule a colonoscopy, schedule/ a FIT Test, or order a Cologuard test. If you are unsure what type of test you need, please call your clinic and speak to clinic staff.   Colon cancer is now the second leading cause of cancer-related deaths in the [place] for both men and women and there are over 130,000 new cases and 50,000 deaths per year from colon cancer. Colonoscopies can prevent 90-95% of these deaths. Problem lesions can be removed before they ever become cancer. This test is not only looking for cancer, but also getting rid of precancerous lesions.   If you are under/uninsured, we recommend you contact the [employer] Program.[employer] is a free colorectal cancer screening program that provides colonoscopies for eligible under/uninsured [place] men and women. If you are interested in receiving a free colonoscopy, please call [employer] at t [phone] (mention code [employer]) to see if you're eligible. Please have them send us the results.     If you have already completed these items, please contact the clinic via phone or   [facility] so your care team can review and update your records. Thank you for   choosing [facility] for your healthcare needs. For any questions,   concerns, or to schedule an appointment please contact our clinic.    Healthy Regards,      Your [facility] Care Team

## 2024-12-18 DIAGNOSIS — F11.20 OPIOID USE DISORDER, SEVERE, DEPENDENCE (H): ICD-10-CM

## 2024-12-18 RX ORDER — BUPRENORPHINE 8 MG/1
8 TABLET SUBLINGUAL 3 TIMES DAILY
Qty: 63 TABLET | Refills: 0 | Status: SHIPPED | OUTPATIENT
Start: 2024-12-18 | End: 2025-01-08

## 2024-12-18 NOTE — TELEPHONE ENCOUNTER
[facility] Call Center    Phone Message    May a detailed message be left on voicemail: yes     Reason for Call: Medication Refill Request    Has the patient contacted the pharmacy for the refill? Yes   Name of medication being requested: Subutex  Provider who prescribed the medication: Dr. Valdes  Pharmacy:    [facility] [address]   Date medication is needed: Caller stated they are completely out of medication and asked if it could be filled as soon as possible so they do not get sick. Caller stated they accidentally missed their last appointment because they had a court appearance they had to make and forgot to call and let clinic know.    Action Taken: Message routed to:  Other: [facility] Nurse pool    Travel Screening: Not Applicable     Date of Service:

## 2024-12-18 NOTE — TELEPHONE ENCOUNTER
Last seen: 11/27/24  RTC: 2 weeks  Cancel: 12/11/24  No-show: none  Next appt: 1/8/25      Medication requested:   Pending Prescriptions:                       Disp   Refills    buprenorphine (SUBUTEX) 8 MG SUBL subling*21 tab*0            Sig: Place 1 tablet (8 mg) under the tongue 3 times           daily.        Last refill per - per chart review, last filled 12/10/24. Nursing doesn't have access to  for this patient.       From chart note:   Increase buprenorphine to 24 mg TDD (8 mg TID)          Medication unable to be refilled by RN due to criteria not met as indicated.                 []Eligibility - not seen in the last year              []Supervision - no future appointment              []Compliance - no shows, cancellations or lapse in therapy              []Verification - order discrepancy              [x]Controlled medication              []Medication not included in policy              []90-day supply request              []Other:

## 2024-12-24 ENCOUNTER — APPOINTMENT (OUTPATIENT)
Dept: GENERAL RADIOLOGY | Facility: CLINIC | Age: 49
End: 2024-12-24
Payer: COMMERCIAL

## 2024-12-24 ENCOUNTER — HOSPITAL ENCOUNTER (EMERGENCY)
Facility: CLINIC | Age: 49
Discharge: HOME | End: 2024-12-24
Attending: STUDENT IN AN ORGANIZED HEALTH CARE EDUCATION/TRAINING PROGRAM | Admitting: STUDENT IN AN ORGANIZED HEALTH CARE EDUCATION/TRAINING PROGRAM
Payer: COMMERCIAL

## 2024-12-24 VITALS
RESPIRATION RATE: 16 BRPM | SYSTOLIC BLOOD PRESSURE: 138 MMHG | TEMPERATURE: 98.1 F | DIASTOLIC BLOOD PRESSURE: 87 MMHG | HEART RATE: 63 BPM | OXYGEN SATURATION: 99 %

## 2024-12-24 DIAGNOSIS — R07.89 CHEST PAIN, NON-CARDIAC: ICD-10-CM

## 2024-12-24 LAB
ALBUMIN SERPL BCG-MCNC: 4.4 G/DL (ref 3.5–5.2)
ALP SERPL-CCNC: 96 U/L (ref 40–150)
ALT SERPL W P-5'-P-CCNC: 18 U/L (ref 0–70)
ANION GAP SERPL CALCULATED.3IONS-SCNC: 12 MMOL/L (ref 7–15)
AST SERPL W P-5'-P-CCNC: 28 U/L (ref 0–45)
BASOPHILS # BLD AUTO: 0.1 10E3/UL (ref 0–0.2)
BASOPHILS NFR BLD AUTO: 1 %
BILIRUB DIRECT SERPL-MCNC: <0.2 MG/DL (ref 0–0.3)
BILIRUB SERPL-MCNC: 0.3 MG/DL
BUN SERPL-MCNC: 17.1 MG/DL (ref 6–20)
CALCIUM SERPL-MCNC: 9.6 MG/DL (ref 8.8–10.4)
CHLORIDE SERPL-SCNC: 104 MMOL/L (ref 98–107)
CREAT SERPL-MCNC: 1 MG/DL (ref 0.67–1.17)
EGFRCR SERPLBLD CKD-EPI 2021: >90 ML/MIN/1.73M2
EOSINOPHIL # BLD AUTO: 0.1 10E3/UL (ref 0–0.7)
EOSINOPHIL NFR BLD AUTO: 1 %
ERYTHROCYTE [DISTWIDTH] IN BLOOD BY AUTOMATED COUNT: 14.5 % (ref 10–15)
GLUCOSE SERPL-MCNC: 98 MG/DL (ref 70–99)
HCO3 SERPL-SCNC: 24 MMOL/L (ref 22–29)
HCT VFR BLD AUTO: 42.1 % (ref 40–53)
HGB BLD-MCNC: 14.1 G/DL (ref 13.3–17.7)
HOLD SPECIMEN: NORMAL
HOLD SPECIMEN: NORMAL
IMM GRANULOCYTES # BLD: 0 10E3/UL
IMM GRANULOCYTES NFR BLD: 0 %
LYMPHOCYTES # BLD AUTO: 2 10E3/UL (ref 0.8–5.3)
LYMPHOCYTES NFR BLD AUTO: 20 %
MCH RBC QN AUTO: 30.5 PG (ref 26.5–33)
MCHC RBC AUTO-ENTMCNC: 33.5 G/DL (ref 31.5–36.5)
MCV RBC AUTO: 91 FL (ref 78–100)
MONOCYTES # BLD AUTO: 0.6 10E3/UL (ref 0–1.3)
MONOCYTES NFR BLD AUTO: 7 %
NEUTROPHILS # BLD AUTO: 7.1 10E3/UL (ref 1.6–8.3)
NEUTROPHILS NFR BLD AUTO: 72 %
NRBC # BLD AUTO: 0 10E3/UL
NRBC BLD AUTO-RTO: 0 /100
PLATELET # BLD AUTO: 278 10E3/UL (ref 150–450)
POTASSIUM SERPL-SCNC: 4.5 MMOL/L (ref 3.4–5.3)
PROT SERPL-MCNC: 7.5 G/DL (ref 6.4–8.3)
RBC # BLD AUTO: 4.62 10E6/UL (ref 4.4–5.9)
SODIUM SERPL-SCNC: 140 MMOL/L (ref 135–145)
TROPONIN T SERPL HS-MCNC: <6 NG/L
WBC # BLD AUTO: 9.8 10E3/UL (ref 4–11)

## 2024-12-24 PROCEDURE — 36415 COLL VENOUS BLD VENIPUNCTURE: CPT | Performed by: STUDENT IN AN ORGANIZED HEALTH CARE EDUCATION/TRAINING PROGRAM

## 2024-12-24 PROCEDURE — 250N000013 HC RX MED GY IP 250 OP 250 PS 637: Performed by: STUDENT IN AN ORGANIZED HEALTH CARE EDUCATION/TRAINING PROGRAM

## 2024-12-24 PROCEDURE — 84155 ASSAY OF PROTEIN SERUM: CPT | Performed by: STUDENT IN AN ORGANIZED HEALTH CARE EDUCATION/TRAINING PROGRAM

## 2024-12-24 PROCEDURE — 80076 HEPATIC FUNCTION PANEL: CPT | Performed by: STUDENT IN AN ORGANIZED HEALTH CARE EDUCATION/TRAINING PROGRAM

## 2024-12-24 PROCEDURE — 99285 EMERGENCY DEPT VISIT HI MDM: CPT | Mod: 25 | Performed by: STUDENT IN AN ORGANIZED HEALTH CARE EDUCATION/TRAINING PROGRAM

## 2024-12-24 PROCEDURE — 71046 X-RAY EXAM CHEST 2 VIEWS: CPT | Mod: 26 | Performed by: RADIOLOGY

## 2024-12-24 PROCEDURE — 250N000013 HC RX MED GY IP 250 OP 250 PS 637

## 2024-12-24 PROCEDURE — 80048 BASIC METABOLIC PNL TOTAL CA: CPT | Performed by: STUDENT IN AN ORGANIZED HEALTH CARE EDUCATION/TRAINING PROGRAM

## 2024-12-24 PROCEDURE — 84484 ASSAY OF TROPONIN QUANT: CPT | Performed by: STUDENT IN AN ORGANIZED HEALTH CARE EDUCATION/TRAINING PROGRAM

## 2024-12-24 PROCEDURE — 93010 ELECTROCARDIOGRAM REPORT: CPT | Performed by: STUDENT IN AN ORGANIZED HEALTH CARE EDUCATION/TRAINING PROGRAM

## 2024-12-24 PROCEDURE — 71046 X-RAY EXAM CHEST 2 VIEWS: CPT

## 2024-12-24 PROCEDURE — 93005 ELECTROCARDIOGRAM TRACING: CPT | Performed by: STUDENT IN AN ORGANIZED HEALTH CARE EDUCATION/TRAINING PROGRAM

## 2024-12-24 PROCEDURE — 99284 EMERGENCY DEPT VISIT MOD MDM: CPT | Mod: GC | Performed by: STUDENT IN AN ORGANIZED HEALTH CARE EDUCATION/TRAINING PROGRAM

## 2024-12-24 PROCEDURE — 85025 COMPLETE CBC W/AUTO DIFF WBC: CPT | Performed by: STUDENT IN AN ORGANIZED HEALTH CARE EDUCATION/TRAINING PROGRAM

## 2024-12-24 RX ORDER — IBUPROFEN 600 MG/1
600 TABLET, FILM COATED ORAL ONCE
Status: COMPLETED | OUTPATIENT
Start: 2024-12-24 | End: 2024-12-24

## 2024-12-24 RX ORDER — ACETAMINOPHEN 325 MG/1
975 TABLET ORAL ONCE
Status: COMPLETED | OUTPATIENT
Start: 2024-12-24 | End: 2024-12-24

## 2024-12-24 RX ADMIN — IBUPROFEN 600 MG: 600 TABLET, FILM COATED ORAL at 15:55

## 2024-12-24 RX ADMIN — ACETAMINOPHEN 975 MG: 325 TABLET, FILM COATED ORAL at 15:01

## 2024-12-24 ASSESSMENT — ACTIVITIES OF DAILY LIVING (ADL)
ADLS_ACUITY_SCORE: 58

## 2024-12-24 NOTE — DISCHARGE INSTRUCTIONS
You were evaluated for chest pain. No issues with your heart or lungs was found. We recommend following up with your primary care doctor regarding these symptoms.     Please return if you have worsening chest pain, SOB, fatigue, or any weakness.

## 2024-12-24 NOTE — ED TRIAGE NOTES
BIBA for chest pain, started last night, increasingly worse. R sided. Reproducible with palpation. No cardiac Hx. 12-lead NSR. 140/90. 325 mg ASA, 1x nitroglycerine.

## 2024-12-24 NOTE — ED PROVIDER NOTES
ED Provider Note  [facility]      History     Chief Complaint   Patient presents with    Chest Pain     HPI  Perry Preciado Jr. is a 49 year old male with a pmhx of HTN, asthma, MDD, KANE, prior polysubstance abuse disorder now in remission presenting with chest pain.    Denies any angina or equivalents in past days to weeks. No change recently in exercise capacity. Able to ambulate up several flights of stairs to apt without stopping.    States last night at approx 5pm while laying down began to have chest pain localized to RUSB. Describes as sharp pain that can be reproduced with palpation. Activity did not have effect on symptoms, nor does PO intake cause changes. Pt states he also has pain in his R anterior costal margin that is provoked by deep inspiration. No radiation of pain to L side or L arm or L jaw. Denies any recent car rides, airplane trips, or prolonged immobility. Denies any recent substance use. Denies any lower extremity swelling    Does endorse recent med changes of discontinuation of several psych meds including lithium, seroquel.    Past Medical History  Past Medical History:   Diagnosis Date    Acute bilateral low back pain with right-sided sciatica 06/07/2016    Acute pain of right shoulder due to trauma     Adult antisocial behavior     [facility] incarceration: 16 years    Aspiration pneumonia (H) 02/24/2014    CARDIOVASCULAR SCREENING; LDL GOAL LESS THAN 130 06/07/2016    Carpal tunnel syndrome of right wrist 06/07/2016    Chest pain 02/19/2014    Chest trauma 02/19/2014    Chronic pain syndrome 09/30/2019    Chronic right-sided low back pain with right-sided sciatica 05/10/2018    DDD (degenerative disc disease), lumbosacral 05/05/2020    Depression with anxiety 05/05/2020    Displacement of lumbar intervertebral disc without myelopathy 05/16/2012    KANE (generalized anxiety disorder) 07/07/2017    Heroin abuse (H) 05/10/2018    History of ADHD 01/06/2014     History of drug abuse (H) 01/06/2014    History of suicide attempt 05/10/2018    HTN (hypertension) 02/26/2014    Hyperglycemia 02/23/2014    Hypertriglyceridemia 11/02/2015    IV drug abuse (H) 05/10/2018    Major depressive disorder, recurrent (H) 02/23/2018    Major depressive disorder, recurrent episode, moderate (H) 06/06/2016    Meningitis 11/08/2024    Methamphetamine abuse (H) 02/23/2018    Overview:  see [facility] H&P 11/27/2009, [facility] admit 9/2/2010    Mild intermittent asthma without complication 05/10/2018    Opiate overdose (H) 02/22/2014    Positive D dimer 11/02/2015    Psychosis (H) 05/04/2020    Sepsis (H) 02/22/2014    SIRS (systemic inflammatory response syndrome) (H) 11/02/2015    Substance abuse (H) 05/07/2018    Suicidal ideation 02/23/2018    Tobacco use disorder 05/10/2018     Past Surgical History:   Procedure Laterality Date    BACK SURGERY       albuterol (PROAIR HFA/PROVENTIL HFA/VENTOLIN HFA) 108 (90 Base) MCG/ACT inhaler  atomoxetine (STRATTERA) 80 MG capsule  atorvastatin (LIPITOR) 20 MG tablet  buprenorphine (SUBUTEX) 8 MG SUBL sublingual tablet  diphenhydrAMINE (BENADRYL) 25 MG capsule  FLUoxetine (PROZAC) 20 MG capsule  fluticasone (FLONASE) 50 MCG/ACT nasal spray  gabapentin (NEURONTIN) 600 MG tablet  ibuprofen (ADVIL/MOTRIN) 600 MG tablet  lithium (ESKALITH CR/LITHOBID) 450 MG CR tablet  naloxone (NARCAN) 4 MG/0.1ML nasal spray  nicotine (COMMIT) 2 MG lozenge  nicotine (NICODERM CQ) 7 MG/24HR 24 hr patch  prazosin (MINIPRESS) 1 MG capsule  QUEtiapine (SEROQUEL) 400 MG tablet  QUEtiapine (SEROQUEL) 50 MG tablet  traZODone (DESYREL) 50 MG tablet      Allergies   Allergen Reactions    Wellbutrin [Bupropion] Anaphylaxis and Swelling     Facial swelling    Wellbutrin [Bupropion]     Wellbutrin [Bupropion] Difficulty breathing    Naltrexone Other (See Comments)     Headaches  Headaches       Family History  No family history on file.  Social History   Social History     Tobacco Use     Smoking status: Every Day     Current packs/day: 0.50     Types: Cigarettes     Passive exposure: Current    Smokeless tobacco: Never   Vaping Use    Vaping status: Never Used   Substance Use Topics    Alcohol use: Yes    Drug use: Yes     Types: Fentanyl, Methamphetamines     Comment: last use 2 weeks ago      A medically appropriate review of systems was performed with pertinent positives and negatives noted in the HPI, and all other systems negative.    Physical Exam   BP: 126/87  Pulse: 63  Temp: 98.1  F (36.7  C)  Resp: 16  SpO2: 100 %  Physical Exam  Constitutional:       General: He is not in acute distress.     Appearance: He is well-developed. He is not ill-appearing or toxic-appearing.   HENT:      Head: Normocephalic and atraumatic.   Eyes:      Extraocular Movements: Extraocular movements intact.      Pupils: Pupils are equal, round, and reactive to light.   Cardiovascular:      Rate and Rhythm: Normal rate and regular rhythm.      Heart sounds: Normal heart sounds. No murmur heard.     No friction rub. No gallop. No S3 or S4 sounds.   Pulmonary:      Effort: Pulmonary effort is normal.      Breath sounds: Normal breath sounds. No wheezing, rhonchi or rales.   Chest:      Comments: Tenderness over 2-3rd intercostal space in mid-clav line    No tenderness in R costal margin. No overlying erythema or lesions in any thoracic dermatome  Abdominal:      General: There is no abdominal bruit.      Palpations: Abdomen is soft. There is no mass.      Tenderness: There is no abdominal tenderness. There is no guarding or rebound.      Comments: NO RUQ tenderness to deep and light palpation   Musculoskeletal:      Right lower leg: No tenderness. No edema.      Left lower leg: No tenderness. No edema.   Skin:     General: Skin is warm and dry.      Capillary Refill: Capillary refill takes less than 2 seconds.   Neurological:      General: No focal deficit present.      Mental Status: He is alert and oriented to  person, place, and time.      Motor: No weakness.   Psychiatric:         Mood and Affect: Mood normal.         Behavior: Behavior normal.         ED Course, Procedures, & Data     ED Course as of 12/24/24 1537   Tue Dec 24, 2024   1350 Negative EKG for ACS   1440 CBC, CMP, trops negative   1530 CXR negative for any acute process per my independent read     Procedures            EKG Interpretation:      Interpreted by Renu Gray MD  Time reviewed: 1343  Symptoms at time of EKG: chest pain   Rhythm: normal sinus   Rate: normal  Axis: normal  Ectopy: none  Conduction: normal  ST Segments/ T Waves: No ST-T wave changes  Q Waves: none  Comparison to prior: Unchanged    Clinical Impression: normal EKG       Results for orders placed or performed during the hospital encounter of 12/24/24   Basic metabolic panel     Status: Normal   Result Value Ref Range    Sodium 140 135 - 145 mmol/L    Potassium 4.5 3.4 - 5.3 mmol/L    Chloride 104 98 - 107 mmol/L    Carbon Dioxide (CO2) 24 22 - 29 mmol/L    Anion Gap 12 7 - 15 mmol/L    Urea Nitrogen 17.1 6.0 - 20.0 mg/dL    Creatinine 1.00 0.67 - 1.17 mg/dL    GFR Estimate >90 >60 mL/min/1.73m2    Calcium 9.6 8.8 - 10.4 mg/dL    Glucose 98 70 - 99 mg/dL   Troponin T, High Sensitivity     Status: Normal   Result Value Ref Range    Troponin T, High Sensitivity <6 <=22 ng/L   [place] Draw     Status: None (In process)    Narrative    The following orders were created for panel order [place] Draw.  Procedure                               Abnormality         Status                     ---------                               -----------         ------                     Extra Blue Top Tube[[id number]]                              Final result               Extra Red Top Tube[[id number]]                               Final result               Extra Green Top (Lithium...[[id number]]                                                 Extra Purple Top Tube[[id number]]                                                          Please view results for these tests on the individual orders.   CBC with platelets and differential     Status: None   Result Value Ref Range    WBC Count 9.8 4.0 - 11.0 10e3/uL    RBC Count 4.62 4.40 - 5.90 10e6/uL    Hemoglobin 14.1 13.3 - 17.7 g/dL    Hematocrit 42.1 40.0 - 53.0 %    MCV 91 78 - 100 fL    MCH 30.5 26.5 - 33.0 pg    MCHC 33.5 31.5 - 36.5 g/dL    RDW 14.5 10.0 - 15.0 %    Platelet Count 278 150 - 450 10e3/uL    % Neutrophils 72 %    % Lymphocytes 20 %    % Monocytes 7 %    % Eosinophils 1 %    % Basophils 1 %    % Immature Granulocytes 0 %    NRBCs per 100 WBC 0 <1 /100    Absolute Neutrophils 7.1 1.6 - 8.3 10e3/uL    Absolute Lymphocytes 2.0 0.8 - 5.3 10e3/uL    Absolute Monocytes 0.6 0.0 - 1.3 10e3/uL    Absolute Eosinophils 0.1 0.0 - 0.7 10e3/uL    Absolute Basophils 0.1 0.0 - 0.2 10e3/uL    Absolute Immature Granulocytes 0.0 <=0.4 10e3/uL    Absolute NRBCs 0.0 10e3/uL   Extra Blue Top Tube     Status: None   Result Value Ref Range    Hold Specimen JI    Extra Red Top Tube     Status: None   Result Value Ref Range    Hold Specimen JI    Hepatic panel     Status: Normal   Result Value Ref Range    Protein Total 7.5 6.4 - 8.3 g/dL    Albumin 4.4 3.5 - 5.2 g/dL    Bilirubin Total 0.3 <=1.2 mg/dL    Alkaline Phosphatase 96 40 - 150 U/L    AST 28 0 - 45 U/L    ALT 18 0 - 70 U/L    Bilirubin Direct <0.20 0.00 - 0.30 mg/dL   EKG 12-lead, tracing only     Status: None (Preliminary result)   Result Value Ref Range    Systolic Blood Pressure  mmHg    Diastolic Blood Pressure  mmHg    Ventricular Rate 62 BPM    Atrial Rate 62 BPM    IL Interval 172 ms    QRS Duration 98 ms     ms    QTc 434 ms    P Axis 9 degrees    R AXIS 18 degrees    T Axis 20 degrees    Interpretation ECG Sinus rhythm  Normal ECG      CBC with Platelets & Differential     Status: None    Narrative    The following orders were created for panel order CBC with Platelets & Differential.  Procedure                                Abnormality         Status                     ---------                               -----------         ------                     CBC with platelets and d...[[id number]]                      Final result                 Please view results for these tests on the individual orders.     Medications   ibuprofen (ADVIL/MOTRIN) tablet 600 mg (has no administration in time range)   acetaminophen (TYLENOL) tablet 975 mg (975 mg Oral $Given 12/24/24 1501)     Labs Ordered and Resulted from Time of ED Arrival to Time of ED Departure   BASIC METABOLIC PANEL - Normal       Result Value    Sodium 140      Potassium 4.5      Chloride 104      Carbon Dioxide (CO2) 24      Anion Gap 12      Urea Nitrogen 17.1      Creatinine 1.00      GFR Estimate >90      Calcium 9.6      Glucose 98     TROPONIN T, HIGH SENSITIVITY - Normal    Troponin T, High Sensitivity <6     HEPATIC FUNCTION PANEL - Normal    Protein Total 7.5      Albumin 4.4      Bilirubin Total 0.3      Alkaline Phosphatase 96      AST 28      ALT 18      Bilirubin Direct <0.20     CBC WITH PLATELETS AND DIFFERENTIAL    WBC Count 9.8      RBC Count 4.62      Hemoglobin 14.1      Hematocrit 42.1      MCV 91      MCH 30.5      MCHC 33.5      RDW 14.5      Platelet Count 278      % Neutrophils 72      % Lymphocytes 20      % Monocytes 7      % Eosinophils 1      % Basophils 1      % Immature Granulocytes 0      NRBCs per 100 WBC 0      Absolute Neutrophils 7.1      Absolute Lymphocytes 2.0      Absolute Monocytes 0.6      Absolute Eosinophils 0.1      Absolute Basophils 0.1      Absolute Immature Granulocytes 0.0      Absolute NRBCs 0.0       XR Chest 2 Views    (Results Pending)          Critical care was not performed.     Medical Decision Making  The patient's presentation was of moderate complexity (an undiagnosed new problem with uncertain prognosis).    The patient's evaluation involved:  review of external note(s) from 3+ sources (see separate  area of note for details)  review of 3+ test result(s) ordered prior to this encounter (see separate area of note for details)  ordering and/or review of 3+ test(s) in this encounter (see separate area of note for details)    The patient's management necessitated moderate risk (prescription drug management including medications given in the ED).    Assessment & Plan    Perry Preciado Jr. is a 49 year old male with a pmhx of HTN, asthma, MDD, KANE, prior polysubstance abuse disorder now in remission presenting with chest pain.    Overall unlikely to be ACS given negative EKG, trop, and history that is not consistent with ACS. Unlikely other vascular pathology given reproducible on exam. Considered pna vs pe but with negative CXR and perc score of 0 so unlikely. Unclear etiology but would favor MSK origin vs early shingles. Very unlikely to be infection given lack of exam findings or labs consistent with infection. Advised with return precautions and follow-up with PCP    I have reviewed the nursing notes. I have reviewed the findings, diagnosis, plan and need for follow up with the patient.    New Prescriptions    No medications on file       Final diagnoses:   Chest pain, non-cardiac     Jose Rafael Veras MD   [facility] IM PGY-1    --    ED Attending Physician Attestation    I Renu Gray MD, cared for this patient with the Resident. I have performed a history and physical examination of the patient and discussed management with the resident. I reviewed the resident's documentation above and agree with the documented findings and plan of care.    Renu Gray MD  Emergency Medicine       Renu Gray MD  [facility] EMERGENCY DEPARTMENT  12/24/2024     Renu Gray MD  12/24/24 0923

## 2024-12-25 LAB
ATRIAL RATE - MUSE: 62 BPM
DIASTOLIC BLOOD PRESSURE - MUSE: NORMAL MMHG
INTERPRETATION ECG - MUSE: NORMAL
P AXIS - MUSE: 9 DEGREES
PR INTERVAL - MUSE: 172 MS
QRS DURATION - MUSE: 98 MS
QT - MUSE: 428 MS
QTC - MUSE: 434 MS
R AXIS - MUSE: 18 DEGREES
SYSTOLIC BLOOD PRESSURE - MUSE: NORMAL MMHG
T AXIS - MUSE: 20 DEGREES
VENTRICULAR RATE- MUSE: 62 BPM

## (undated) RX ORDER — LIDOCAINE HYDROCHLORIDE 10 MG/ML
INJECTION, SOLUTION EPIDURAL; INFILTRATION; INTRACAUDAL; PERINEURAL
Status: DISPENSED
Start: 2019-04-02

## (undated) RX ORDER — LIDOCAINE HYDROCHLORIDE 10 MG/ML
INJECTION, SOLUTION EPIDURAL; INFILTRATION; INTRACAUDAL; PERINEURAL
Status: DISPENSED
Start: 2024-11-09

## (undated) RX ORDER — EPINEPHRINE 1 MG/ML(1)
AMPUL (ML) INJECTION
Status: DISPENSED
Start: 2019-04-02